# Patient Record
Sex: MALE | Race: WHITE | NOT HISPANIC OR LATINO | Employment: OTHER | URBAN - NONMETROPOLITAN AREA
[De-identification: names, ages, dates, MRNs, and addresses within clinical notes are randomized per-mention and may not be internally consistent; named-entity substitution may affect disease eponyms.]

---

## 2017-01-06 ENCOUNTER — AMBULATORY - GICH (OUTPATIENT)
Dept: SCHEDULING | Facility: OTHER | Age: 72
End: 2017-01-06

## 2017-01-20 ENCOUNTER — OFFICE VISIT - GICH (OUTPATIENT)
Dept: INTERNAL MEDICINE | Facility: OTHER | Age: 72
End: 2017-01-20

## 2017-01-20 ENCOUNTER — HISTORY (OUTPATIENT)
Dept: INTERNAL MEDICINE | Facility: OTHER | Age: 72
End: 2017-01-20

## 2017-01-20 DIAGNOSIS — F33.1 MAJOR DEPRESSIVE DISORDER, RECURRENT, MODERATE (H): ICD-10-CM

## 2017-01-20 DIAGNOSIS — R41.3 OTHER AMNESIA: ICD-10-CM

## 2017-01-20 DIAGNOSIS — R42 DIZZINESS AND GIDDINESS: ICD-10-CM

## 2017-01-20 DIAGNOSIS — E11.9 TYPE 2 DIABETES MELLITUS WITHOUT COMPLICATIONS (H): ICD-10-CM

## 2017-01-20 LAB
CREAT SERPL-MCNC: 0.83 MG/DL (ref 0.7–1.3)
ESTIMATED AVERAGE GLUCOSE: 140 MG/DL
GFR IF NOT AFRICAN AMERICAN - HISTORICAL: >60 ML/MIN/1.73M2
HEMOGLOBIN A1C MONITORING (POCT) - HISTORICAL: 6.5 % (ref 4–6.2)
VIT B12 SERPL-MCNC: 481 PG/ML (ref 180–914)

## 2017-01-24 ENCOUNTER — AMBULATORY - GICH (OUTPATIENT)
Dept: INTERNAL MEDICINE | Facility: OTHER | Age: 72
End: 2017-01-24

## 2017-01-24 DIAGNOSIS — R42 DIZZINESS AND GIDDINESS: ICD-10-CM

## 2017-01-24 DIAGNOSIS — R41.3 OTHER AMNESIA: ICD-10-CM

## 2017-02-24 ENCOUNTER — AMBULATORY - GICH (OUTPATIENT)
Dept: SCHEDULING | Facility: OTHER | Age: 72
End: 2017-02-24

## 2017-04-11 ENCOUNTER — AMBULATORY - GICH (OUTPATIENT)
Dept: INTERNAL MEDICINE | Facility: OTHER | Age: 72
End: 2017-04-11

## 2017-04-11 DIAGNOSIS — N40.3 NODULAR PROSTATE WITH LOWER URINARY TRACT SYMPTOMS: ICD-10-CM

## 2017-04-11 DIAGNOSIS — N13.8 OTHER OBSTRUCTIVE AND REFLUX UROPATHY: ICD-10-CM

## 2017-04-11 DIAGNOSIS — E11.9 TYPE 2 DIABETES MELLITUS WITHOUT COMPLICATIONS (H): ICD-10-CM

## 2017-04-13 ENCOUNTER — AMBULATORY - GICH (OUTPATIENT)
Dept: LAB | Facility: OTHER | Age: 72
End: 2017-04-13

## 2017-04-13 DIAGNOSIS — N40.3 NODULAR PROSTATE WITH LOWER URINARY TRACT SYMPTOMS: ICD-10-CM

## 2017-04-13 DIAGNOSIS — N13.8 OTHER OBSTRUCTIVE AND REFLUX UROPATHY: ICD-10-CM

## 2017-04-13 DIAGNOSIS — E11.9 TYPE 2 DIABETES MELLITUS WITHOUT COMPLICATIONS (H): ICD-10-CM

## 2017-04-13 LAB
A/G RATIO - HISTORICAL: 2.4 (ref 1–2)
ABSOLUTE BASOPHILS - HISTORICAL: 0 THOU/CU MM
ABSOLUTE EOSINOPHILS - HISTORICAL: 0.2 THOU/CU MM
ABSOLUTE LYMPHOCYTES - HISTORICAL: 1.5 THOU/CU MM (ref 0.9–2.9)
ABSOLUTE MONOCYTES - HISTORICAL: 0.6 THOU/CU MM
ABSOLUTE NEUTROPHILS - HISTORICAL: 3.1 THOU/CU MM (ref 1.7–7)
ALBUMIN SERPL-MCNC: 4.4 G/DL (ref 3.5–5.7)
ALP SERPL-CCNC: 66 IU/L (ref 34–104)
ALT (SGPT) - HISTORICAL: 14 IU/L (ref 7–52)
ANION GAP - HISTORICAL: 9 (ref 5–18)
AST SERPL-CCNC: 18 IU/L (ref 13–39)
BASOPHILS # BLD AUTO: 0.7 %
BILIRUB SERPL-MCNC: 0.7 MG/DL (ref 0.3–1)
BUN SERPL-MCNC: 21 MG/DL (ref 7–25)
BUN/CREAT RATIO - HISTORICAL: 28
CALCIUM SERPL-MCNC: 9.2 MG/DL (ref 8.6–10.3)
CHLORIDE SERPLBLD-SCNC: 106 MMOL/L (ref 98–107)
CHOL/HDL RATIO - HISTORICAL: 3.34
CHOLESTEROL TOTAL: 147 MG/DL
CO2 SERPL-SCNC: 26 MMOL/L (ref 21–31)
CREAT SERPL-MCNC: 0.76 MG/DL (ref 0.7–1.3)
EOSINOPHIL NFR BLD AUTO: 2.9 %
ERYTHROCYTE [DISTWIDTH] IN BLOOD BY AUTOMATED COUNT: 12.6 % (ref 11.5–15.5)
ESTIMATED AVERAGE GLUCOSE: 128 MG/DL
GFR IF NOT AFRICAN AMERICAN - HISTORICAL: >60 ML/MIN/1.73M2
GLOBULIN - HISTORICAL: 1.8 G/DL (ref 2–3.7)
GLUCOSE SERPL-MCNC: 111 MG/DL (ref 70–105)
HCT VFR BLD AUTO: 46 % (ref 37–53)
HDLC SERPL-MCNC: 44 MG/DL (ref 23–92)
HEMOGLOBIN A1C MONITORING (POCT) - HISTORICAL: 6.1 % (ref 4–6.2)
HEMOGLOBIN: 14.7 G/DL (ref 13.5–17.5)
LDLC SERPL CALC-MCNC: 88 MG/DL
LYMPHOCYTES NFR BLD AUTO: 27.4 % (ref 20–44)
MCH RBC QN AUTO: 29.5 PG (ref 26–34)
MCHC RBC AUTO-ENTMCNC: 32 G/DL (ref 32–36)
MCV RBC AUTO: 92 FL (ref 80–100)
MONOCYTES NFR BLD AUTO: 10.5 %
NEUTROPHILS NFR BLD AUTO: 58.5 % (ref 42–72)
NON-HDL CHOLESTEROL - HISTORICAL: 103 MG/DL
PATIENT STATUS - HISTORICAL: NORMAL
PLATELET # BLD AUTO: 209 THOU/CU MM (ref 140–440)
PMV BLD: 7.8 FL (ref 6.5–11)
POTASSIUM SERPL-SCNC: 3.8 MMOL/L (ref 3.5–5.1)
PROT SERPL-MCNC: 6.2 G/DL (ref 6.4–8.9)
PSA TOTAL (DIAGNOSTIC) - HISTORICAL: 2.92 NG/ML
RED BLOOD COUNT - HISTORICAL: 4.98 MIL/CU MM (ref 4.3–5.9)
SODIUM SERPL-SCNC: 141 MMOL/L (ref 133–143)
TRIGL SERPL-MCNC: 76 MG/DL
TSH - HISTORICAL: 2.94 UIU/ML (ref 0.34–5.6)
WHITE BLOOD COUNT - HISTORICAL: 5.4 THOU/CU MM (ref 4.5–11)

## 2017-04-14 ENCOUNTER — COMMUNICATION - GICH (OUTPATIENT)
Dept: INTERNAL MEDICINE | Facility: OTHER | Age: 72
End: 2017-04-14

## 2017-04-18 ENCOUNTER — HISTORY (OUTPATIENT)
Dept: INTERNAL MEDICINE | Facility: OTHER | Age: 72
End: 2017-04-18

## 2017-04-18 ENCOUNTER — OFFICE VISIT - GICH (OUTPATIENT)
Dept: INTERNAL MEDICINE | Facility: OTHER | Age: 72
End: 2017-04-18

## 2017-04-18 DIAGNOSIS — K21.9 GASTRO-ESOPHAGEAL REFLUX DISEASE WITHOUT ESOPHAGITIS: ICD-10-CM

## 2017-04-18 DIAGNOSIS — F41.0 PANIC DISORDER WITHOUT AGORAPHOBIA: ICD-10-CM

## 2017-04-18 DIAGNOSIS — Z23 ENCOUNTER FOR IMMUNIZATION: ICD-10-CM

## 2017-04-18 DIAGNOSIS — N40.3 NODULAR PROSTATE WITH LOWER URINARY TRACT SYMPTOMS: ICD-10-CM

## 2017-04-18 DIAGNOSIS — N13.8 OTHER OBSTRUCTIVE AND REFLUX UROPATHY: ICD-10-CM

## 2017-04-18 DIAGNOSIS — F33.1 MAJOR DEPRESSIVE DISORDER, RECURRENT, MODERATE (H): ICD-10-CM

## 2017-04-18 DIAGNOSIS — E78.5 HYPERLIPIDEMIA: ICD-10-CM

## 2017-04-18 DIAGNOSIS — E11.9 TYPE 2 DIABETES MELLITUS WITHOUT COMPLICATIONS (H): ICD-10-CM

## 2017-06-05 ENCOUNTER — HISTORY (OUTPATIENT)
Dept: INTERNAL MEDICINE | Facility: OTHER | Age: 72
End: 2017-06-05

## 2017-06-05 ENCOUNTER — OFFICE VISIT - GICH (OUTPATIENT)
Dept: INTERNAL MEDICINE | Facility: OTHER | Age: 72
End: 2017-06-05

## 2017-06-05 DIAGNOSIS — E11.65 TYPE 2 DIABETES MELLITUS WITH HYPERGLYCEMIA (H): ICD-10-CM

## 2017-06-05 DIAGNOSIS — E11.49 TYPE 2 DIABETES MELLITUS WITH OTHER DIABETIC NEUROLOGICAL COMPLICATION (CODE): ICD-10-CM

## 2017-06-05 DIAGNOSIS — F41.1 GENERALIZED ANXIETY DISORDER: ICD-10-CM

## 2017-06-06 ENCOUNTER — COMMUNICATION - GICH (OUTPATIENT)
Dept: INTERNAL MEDICINE | Facility: OTHER | Age: 72
End: 2017-06-06

## 2017-06-06 DIAGNOSIS — E78.5 HYPERLIPIDEMIA: ICD-10-CM

## 2017-08-29 ENCOUNTER — COMMUNICATION - GICH (OUTPATIENT)
Dept: INTERNAL MEDICINE | Facility: OTHER | Age: 72
End: 2017-08-29

## 2017-08-29 DIAGNOSIS — E11.8 TYPE 2 DIABETES MELLITUS WITH COMPLICATIONS (H): ICD-10-CM

## 2017-12-19 ENCOUNTER — COMMUNICATION - GICH (OUTPATIENT)
Dept: INTERNAL MEDICINE | Facility: OTHER | Age: 72
End: 2017-12-19

## 2017-12-19 DIAGNOSIS — F33.1 MAJOR DEPRESSIVE DISORDER, RECURRENT, MODERATE (H): ICD-10-CM

## 2017-12-27 NOTE — PROGRESS NOTES
Patient Information     Patient Name MRN Sex Dajuan Guzman [id number] Male 1945      Progress Notes by Brett Olivas MD at 2017  2:40 PM     Author:  Brett Olivas MD Service:  (none) Author Type:  Physician     Filed:  2017  3:08 PM Encounter Date:  2017 Status:  Signed     :  Brett Olivas MD (Physician)            SUBJECTIVE:    Dajuan Basilio is a 72 y.o. male who presents for recheck on issues.    HPI Comments: This patient is here today to talk about a couple of things. First of all, he probably does have a diabetic peripheral neuropathy that is bothersome enough that he would like to start on some treatment. We talked about potentially doing an EMG of the lower extremities but elected not to do that. Recent B12 testing was normal.    He also has a lot of troubles with anxiety. She worries about a burning sensation in his rectum being related to some form of cancer. He worries about his vascular system plugging up. He worries about his wife's health constantly as well. He tells me today that he thinks that he is developing dementia.      Allergies      Allergen   Reactions     Statins-Hmg-Coa Reductase Inhibitors  Myalgia     Higher doses cause myalgias    ,   Current Outpatient Prescriptions     Medication  Sig     aspirin 81 mg tablet Take 1 tablet by mouth once daily with a meal.     blood sugar diagnostic (ACCU-CHEK ONEL) strip Check blood glucose twice daily  Dx code e11.9     Cholecalciferol, Vitamin D3, (VITAMIN D-3) 5,000 unit tab Take 1 tablet by mouth once daily.     citalopram (CELEXA) 20 mg tablet Take 0.5 tablets by mouth once daily.     gabapentin (NEURONTIN) 300 mg capsule One daily--increase to TID if needed     LORazepam (ATIVAN) 1 mg tablet Take 1 tablet by mouth every 6 hours if needed for Anxiety.     metFORMIN (GLUCOPHAGE) 500 mg tablet TAKE 2 TABLETS IN THE MORNING AND TAKE 1 TABLET IN THE EVENING     multivitamin (MVI) tablet Take 1 tablet by  mouth once daily.     simvastatin (ZOCOR) 20 mg tablet TAKE 1/2 TABLET AT BEDTIME     No current facility-administered medications for this visit.      Medications have been reviewed by me and are current to the best of my knowledge and ability. ,   Past Medical History:     Diagnosis  Date     Diabetes mellitus (HC)      Diabetic peripheral neuropathy (HC)      Diverticulosis      Fatty liver     non alcoholic      Fibromyalgia      Hyperlipidemia      Nodular prostate 2012    US confirms benign calcifications      Obesity      Panic disorder     and   Patient Active Problem List      Diagnosis Date Noted     Generalized anxiety disorder 06/05/2017     Abnormal CT scan, bladder 06/29/2016     Moderate episode of recurrent major depressive disorder (HC) 06/16/2016     GERD (gastroesophageal reflux disease) 05/03/2013     NEPHROLITHIASIS 04/23/2012     NODULAR PROSTATE WITH URINARY OBSTRUCTION 03/27/2012     HYPERLIPIDEMIA 02/17/2010     PANIC DISORDER WITHOUT AGORAPHOBIA 02/17/2010     DIVERTICULOSIS, COLON 02/17/2010     DIABETES MELLITUS, TYPE II 02/17/2010     OBESITY 02/17/2010     FIBROMYALGIA 02/17/2010     DIABETIC PERIPHERAL NEUROPATHY 02/17/2010       REVIEW OF SYSTEMS:  Review of Systems   All other systems reviewed and are negative.      OBJECTIVE:  /72  Pulse 64  Wt 87.4 kg (192 lb 9.6 oz)  BMI 30.62 kg/m2    EXAM:   Physical Exam   Constitutional: He is well-developed, well-nourished, and in no distress. No distress.   Skin: He is not diaphoretic.   Nursing note and vitals reviewed.      ASSESSMENT/PLAN:    ICD-10-CM    1. DIABETIC PERIPHERAL NEUROPATHY E11.49 gabapentin (NEURONTIN) 300 mg capsule     E11.65    2. Generalized anxiety disorder F41.1         Plan:  Trial of gabapentin for his peripheral neuropathy. He will start at 300 mg daily at bedtime and slowly increase as needed and tolerated. I reviewed potential side effects with him in great detail.    For his anxiety, a lot of  reassurance was provided. I think the vast majority of his symptoms are made worse by his perseverating and anxiety regarding issues and concerns. Over 50% of a 25 minute visit spent in counseling and coordination of care.

## 2017-12-29 ENCOUNTER — COMMUNICATION - GICH (OUTPATIENT)
Dept: INTERNAL MEDICINE | Facility: OTHER | Age: 72
End: 2017-12-29

## 2017-12-29 DIAGNOSIS — E11.65 TYPE 2 DIABETES MELLITUS WITH HYPERGLYCEMIA (H): ICD-10-CM

## 2017-12-29 DIAGNOSIS — E11.49 TYPE 2 DIABETES MELLITUS WITH OTHER DIABETIC NEUROLOGICAL COMPLICATION (CODE): ICD-10-CM

## 2017-12-30 NOTE — NURSING NOTE
Patient Information     Patient Name MRN Dajuan Griggs [id number] Male 1945      Nursing Note by Kerri Lara LPN at 2017  2:40 PM     Author:  Kerri Lara LPN Service:  (none) Author Type:  NURS- Licensed Practical Nurse     Filed:  2017  2:45 PM Encounter Date:  2017 Status:  Signed     :  Kerri Lara LPN (NURS- Licensed Practical Nurse)            The patient is here today to discuss his bilateral foot pain. He states he has had it for a while now, but it is getting worse.  Kerri Lara LPN......2017  2:40 PM

## 2018-01-03 NOTE — NURSING NOTE
Patient Information     Patient Name MRN Dajuan Griggs [id number] Male 1945      Nursing Note by Kerri Lara LPN at 2017  3:10 PM     Author:  Kerri Lara LPN Service:  (none) Author Type:  NURS- Licensed Practical Nurse     Filed:  2017  3:22 PM Encounter Date:  2017 Status:  Signed     :  Kerri Lara LPN (NURS- Licensed Practical Nurse)            The patient is here today to discuss some of his symptoms lately. He states he has been having headaches, weakness, dizzy, and memory loss.  Kerri Lara LPN......2017  3:14 PM

## 2018-01-03 NOTE — PROGRESS NOTES
Patient Information     Patient Name MRN Sex Dajuan Guzman [id number] Male 1945      Progress Notes by Brett Olivas MD at 2017  3:10 PM     Author:  Brett Olivas MD Service:  (none) Author Type:  Physician     Filed:  2017  3:35 PM Encounter Date:  2017 Status:  Signed     :  Brett Olivas MD (Physician)            SUBJECTIVE:    Dajuan Basilio is a 71 y.o. male who presents for illness.    HPI Comments: He is in today stating that he is not feeling well. He feels weak a lot of the time. He has a headache at times in the back of his head. He says his memory isn't what it used to be. He will walk into a room and then forget what he was going to do or what he was going to get. He admits that his mood is probably a little bit more depressed than usual. He is on Celexa 20 mg but only half tablet daily, he could not tolerate the whole tablet. He thinks that overall he is better on the Celexa that he was on the Paxil so he is happy about that.    We have checked him numerous times in the past for similar symptoms. Nothing has ever really been found. He worries a lot today about Lyme disease, we did check him for that just 6 or 7 months ago and it was negative. He questions the accuracy of this test. He doesn't really have any specific symptoms for Lyme disease, however. I reconciled his medications today, nothing there is different should be affecting him adversely.      Allergies      Allergen   Reactions     Statins-Hmg-Coa Reductase Inhibitors  Myalgia     Higher doses cause myalgias    ,   Current Outpatient Prescriptions     Medication  Sig     aspirin 81 mg tablet Take 1 tablet by mouth once daily with a meal.     blood sugar diagnostic (ACCU-CHEK ONEL) strip Check blood glucose twice daily  Dx code e11.9     Cholecalciferol, Vitamin D3, (VITAMIN D-3) 5,000 unit tab Take 1 tablet by mouth once daily.     citalopram (CELEXA) 20 mg tablet Take 0.5 tablets by mouth once  daily.     lactobacillus rhamnosus, GG, (CULTURELLE) 10 billion cell capsule Take 1 capsule by mouth 3 times daily with meals.     LORazepam (ATIVAN) 1 mg tablet Take 1 tablet by mouth every 6 hours if needed for Anxiety.     metFORMIN (GLUCOPHAGE) 500 mg tablet TAKE 2 TABLETS IN THE MORNING AND TAKE 1 TABLET IN THE EVENING     multivitamin (MVI) tablet Take 1 tablet by mouth once daily.     simvastatin (ZOCOR) 20 mg tablet TAKE 1/2 TABLET AT BEDTIME     No current facility-administered medications for this visit.      Medications have been reviewed by me and are current to the best of my knowledge and ability. ,   Past Medical History      Diagnosis   Date     Diabetes mellitus (HC)       Diabetic peripheral neuropathy (HC)       Diverticulosis       Fatty liver       non alcoholic      Fibromyalgia       Hyperlipidemia       Nodular prostate  2012     US confirms benign calcifications      Obesity       Panic disorder     ,   Patient Active Problem List      Diagnosis Date Noted     Abnormal CT scan, bladder 06/29/2016     Moderate episode of recurrent major depressive disorder (HC) 06/16/2016     GERD (gastroesophageal reflux disease) 05/03/2013     NEPHROLITHIASIS 04/23/2012     NODULAR PROSTATE WITH URINARY OBSTRUCTION 03/27/2012     HYPERLIPIDEMIA 02/17/2010     PANIC DISORDER WITHOUT AGORAPHOBIA 02/17/2010     DIVERTICULOSIS, COLON 02/17/2010     DIABETES MELLITUS, TYPE II 02/17/2010     OBESITY 02/17/2010     FIBROMYALGIA 02/17/2010     DIABETIC PERIPHERAL NEUROPATHY 02/17/2010   ,   Past Surgical History       Procedure   Laterality Date     Colonoscopy screening   2003     and UGI [place] normal       Colonoscopy screening   2009     and UGI repeat Dr. Johnson negative.       Knee arthroscopy        right knee X2       Hernia repair   2000     Biopsy prostate   2013     Carpal tunnel release  Bilateral 2014     Colonoscopy screening   2013     WNL       Knee replacement  Right 2015     Vasectomy       and    Social History      Substance Use Topics        Smoking status:  Former Smoker     Types: Cigarettes     Quit date: 10/25/1983     Smokeless tobacco:  Not on file     Alcohol use  No       REVIEW OF SYSTEMS:  Review of Systems   All other systems reviewed and are negative.      OBJECTIVE:  /70  Pulse 68  Wt 90.2 kg (198 lb 12.8 oz)  BMI 31.14 kg/m2    EXAM:   Physical Exam   Constitutional: He is well-developed, well-nourished, and in no distress. No distress.   Skin: He is not diaphoretic.   Nursing note and vitals reviewed.      ASSESSMENT/PLAN:    ICD-10-CM    1. DIABETES MELLITUS, TYPE II E11.9 HEMOGLOBIN A1C MONITORING (POCT)   2. Moderate episode of recurrent major depressive disorder (HC) F33.1 citalopram (CELEXA) 20 mg tablet   3. Dizzy R42 VITAMIN B12      CREATININE      MR HEAD BRAIN WWO   4. Memory loss R41.3 MR HEAD BRAIN WWO        Plan:  I truly think a lot of his symptoms are just from his anxiety and his depression. I did do a couple blood tests today, an A1c and B12 level and I will let him know the results. Also, with his headache and dizziness as well as memory decline it seems reasonable to proceed with imaging of the brain. MRI is scheduled in the open scanner and I will let her know the results of that as well. Follow-up will be dependent on the results and how he progresses over the ensuing days and weeks.

## 2018-01-04 NOTE — NURSING NOTE
Patient Information     Patient Name MRN Dajuan Griggs [id number] Male 1945      Nursing Note by Kerri Lara LPN at 2017 12:50 PM     Author:  Kerri Lara LPN Service:  (none) Author Type:  NURS- Licensed Practical Nurse     Filed:  2017 12:59 PM Encounter Date:  2017 Status:  Signed     :  Kerri Lara LPN (NURS- Licensed Practical Nurse)            The patient is here today to be seen for a yearly check up.  Kerri Lara LPN......2017  12:49 PM

## 2018-01-04 NOTE — PROGRESS NOTES
Patient Information     Patient Name MRN Dajuan Griggs [id number] Male 1945      Progress Notes by Brett Olivas MD at 2017 12:50 PM     Author:  Brett Olivas MD Service:  (none) Author Type:  Physician     Filed:  2017  1:50 PM Encounter Date:  2017 Status:  Signed     :  Brett Olivas MD (Physician)            SUBJECTIVE:    Dajuan Basilio is a 71 y.o. male who presents for comprehensive review of their multiple medical problems and review of medications, renewal of medications and update on necessary health maintenance issues.      HPI Comments: He is here today for complete evaluation and review of his chronic medical problems. He has a history of type 2 diabetes mellitus. This is historically well controlled. He does not take an ACE inhibitor or ARB as he does not tolerate his blood pressure being any lower. In addition, he is intolerant to statins so he is not on any lipid therapy for this. He just takes his metformin and does quite well with that.    He has a history of panic disorder as well as depression. Of late, he's been having more struggles with this as his wife is ailing and this is very stressful for him. We spent a lot of time today discussing this situation and his wife's situation and a lot of reassurance was provided for him.    He has a history of diabetic peripheral neuropathy and he does have a little bit of balance dysfunction on occasion. He has a history of reflux disease that is well tolerated and controlled. He has a history of some lower urinary tract symptoms but no longer takes Flomax and doesn't feel that he needs anything else for that.    He is up-to-date with colonoscopy. He needs a Prevnar to get his immunizations up-to-date. I spent time today updating his past medical history, past surgical history, family history and social history.      Allergies      Allergen   Reactions     Statins-Hmg-Coa Reductase Inhibitors  Myalgia      Higher doses cause myalgias    ,   Current Outpatient Prescriptions     Medication  Sig     aspirin 81 mg tablet Take 1 tablet by mouth once daily with a meal.     blood sugar diagnostic (ACCU-CHEK ONEL) strip Check blood glucose twice daily  Dx code e11.9     Cholecalciferol, Vitamin D3, (VITAMIN D-3) 5,000 unit tab Take 1 tablet by mouth once daily.     citalopram (CELEXA) 20 mg tablet Take 0.5 tablets by mouth once daily.     LORazepam (ATIVAN) 1 mg tablet Take 1 tablet by mouth every 6 hours if needed for Anxiety.     metFORMIN (GLUCOPHAGE) 500 mg tablet TAKE 2 TABLETS IN THE MORNING AND TAKE 1 TABLET IN THE EVENING     multivitamin (MVI) tablet Take 1 tablet by mouth once daily.     simvastatin (ZOCOR) 20 mg tablet TAKE 1/2 TABLET AT BEDTIME     No current facility-administered medications for this visit.      Medications have been reviewed by me and are current to the best of my knowledge and ability. ,   Past Medical History:     Diagnosis  Date     Diabetes mellitus (HC)      Diabetic peripheral neuropathy (HC)      Diverticulosis      Fatty liver     non alcoholic      Fibromyalgia      Hyperlipidemia      Nodular prostate 2012    US confirms benign calcifications      Obesity      Panic disorder    ,   Patient Active Problem List      Diagnosis Date Noted     Abnormal CT scan, bladder 06/29/2016     Moderate episode of recurrent major depressive disorder (HC) 06/16/2016     GERD (gastroesophageal reflux disease) 05/03/2013     NEPHROLITHIASIS 04/23/2012     NODULAR PROSTATE WITH URINARY OBSTRUCTION 03/27/2012     HYPERLIPIDEMIA 02/17/2010     PANIC DISORDER WITHOUT AGORAPHOBIA 02/17/2010     DIVERTICULOSIS, COLON 02/17/2010     DIABETES MELLITUS, TYPE II 02/17/2010     OBESITY 02/17/2010     FIBROMYALGIA 02/17/2010     DIABETIC PERIPHERAL NEUROPATHY 02/17/2010   ,   Past Surgical History:      Procedure  Laterality Date     BIOPSY PROSTATE  2013     CARPAL TUNNEL RELEASE Bilateral 2014     COLONOSCOPY  SCREENING      and UGI [place] normal       COLONOSCOPY SCREENING  2009    and UGI repeat Dr. Johnson negative.       COLONOSCOPY SCREENING      WNL       HERNIA REPAIR  2000     KNEE ARTHROSCOPY      right knee X2       KNEE REPLACEMENT Right 2015     VASECTOMY      and   Social History      Substance Use Topics        Smoking status:  Former Smoker     Types: Cigarettes     Quit date: 10/25/1983     Smokeless tobacco:  Not on file     Alcohol use  No     Family Status     Relation  Status     Father      Mother      DM, colon cancer and renal failure      Brother Alive     Sister Alive     Sister Alive     Sister Alive     Brother Alive     Sister Alive     Social History     Social History        Marital status:       Spouse name: N/A     Number of children:  N/A     Years of education:  N/A     Social History Main Topics        Smoking status:  Former Smoker     Types: Cigarettes     Quit date: 10/25/1983     Smokeless tobacco:  None     Alcohol use  No     Drug use:  No     Sexual activity:  Not Asked     Other Topics  Concern     None      Social History Narrative     , retired from [employer].  Lives in town.                               REVIEW OF SYSTEMS:  Review of Systems   Constitutional: Negative for chills, diaphoresis, fever, malaise/fatigue and weight loss.   HENT: Negative for congestion, ear pain, nosebleeds, sore throat and tinnitus.    Eyes: Negative for blurred vision, double vision, photophobia, pain, discharge and redness.   Respiratory: Negative for cough, hemoptysis, sputum production, shortness of breath and wheezing.    Cardiovascular: Negative for chest pain, palpitations, orthopnea, claudication, leg swelling and PND.   Gastrointestinal: Negative for abdominal pain, blood in stool, constipation, diarrhea, heartburn, nausea and vomiting.   Genitourinary: Negative for dysuria, flank pain and hematuria.   Musculoskeletal: Negative for back pain, joint  "pain, myalgias and neck pain.   Skin: Negative for itching and rash.   Neurological: Negative for dizziness, tingling, tremors, speech change, loss of consciousness, weakness and headaches.   Psychiatric/Behavioral: Negative for depression, hallucinations, memory loss, substance abuse and suicidal ideas. The patient is not nervous/anxious.        OBJECTIVE:  /78  Pulse 76  Ht 1.689 m (5' 6.5\")  Wt 87.5 kg (193 lb)  BMI 30.68 kg/m2    EXAM:   Physical Exam   Constitutional: He is oriented to person, place, and time and well-developed, well-nourished, and in no distress. No distress.   HENT:   Head: Normocephalic and atraumatic.   Right Ear: Tympanic membrane and external ear normal.   Left Ear: Tympanic membrane and external ear normal.   Nose: Nose normal.   Mouth/Throat: Oropharynx is clear and moist and mucous membranes are normal. No oropharyngeal exudate.   Eyes: Conjunctivae are normal. Pupils are equal, round, and reactive to light. No scleral icterus.   Neck: Normal range of motion. Neck supple. Normal carotid pulses, no hepatojugular reflux and no JVD present. Carotid bruit is not present. No tracheal deviation and no edema present. No thyroid mass and no thyromegaly present.   Cardiovascular: Normal rate, regular rhythm, normal heart sounds and intact distal pulses.  Exam reveals no gallop and no friction rub.    No murmur heard.  Pulmonary/Chest: Effort normal and breath sounds normal. No respiratory distress. He has no decreased breath sounds. He has no wheezes. He has no rhonchi. He has no rales. He exhibits no tenderness.   Abdominal: Soft. Bowel sounds are normal. He exhibits no distension and no mass. There is no hepatosplenomegaly. There is no tenderness. There is no rebound and no guarding. A hernia is present. Hernia confirmed positive in the umbilical area. Hernia confirmed negative in the right inguinal area and confirmed negative in the left inguinal area.       Genitourinary: Rectum " normal, testes/scrotum normal and penis normal. Prostate is enlarged.   Genitourinary Comments: To 3+ prostate, minimal midline superficial nodule, not concerning   Musculoskeletal: Normal range of motion. He exhibits no edema or tenderness.   Lymphadenopathy:     He has no cervical adenopathy.   Neurological: He is alert and oriented to person, place, and time. He has normal motor skills. He displays normal reflexes. No cranial nerve deficit. He exhibits normal muscle tone. Gait normal. Coordination normal.   Skin: Skin is warm and dry. No rash noted. He is not diaphoretic. No cyanosis or erythema. No pallor. Nails show no clubbing.   Psychiatric: Mood, memory, affect and judgment normal.   Nursing note and vitals reviewed.      ASSESSMENT/PLAN:    ICD-10-CM    1. Hyperlipidemia, unspecified hyperlipidemia type E78.5    2. Panic disorder without agoraphobia F41.0    3. Moderate episode of recurrent major depressive disorder (HC) F33.1    4. Gastroesophageal reflux disease, esophagitis presence not specified K21.9    5. Nodular prostate with urinary obstruction N40.3      N13.8    6. DIABETES MELLITUS, TYPE II E11.9 OMNI PREVNAR 13 (AKA PNEUMOCOCCAL VACCINE 13-VALENT IM)   7. Encounter for immunization  Z23 OMNI PREVNAR 13 (AKA PNEUMOCOCCAL VACCINE 13-VALENT IM)        Plan:  His exam today is stable and unremarkable. His hernia does not bother him so we will do nothing with that. He has minimal peripheral neuropathy at this point in time, we are not going to address that further. Lab today looked excellent. He will continue with all of his same medications without any change. Prevnar given today. A lot of reassurance was provided regarding his stress and anxiety surrounding his wife. Assuming all goes well, we should probably recheck his diabetes again in 6 months, he is welcome to come back anytime sooner for problems.

## 2018-01-04 NOTE — TELEPHONE ENCOUNTER
Patient Information     Patient Name MRN Dajuan Griggs [id number] Male 1945      Telephone Encounter by Kerri Lara LPN at 2017  1:03 PM     Author:  Kerri Lara LPN Service:  (none) Author Type:  NURS- Licensed Practical Nurse     Filed:  2017  1:04 PM Encounter Date:  2017 Status:  Signed     :  Kerri Lara LPN (NURS- Licensed Practical Nurse)            Left a message for the patient to call back.  Kerri Lara LPN......2017  1:04 PM

## 2018-01-04 NOTE — TELEPHONE ENCOUNTER
Patient Information     Patient Name MRN Dajuan Griggs [id number] Male 1945      Telephone Encounter by Kerri Lara LPN at 2017 11:19 AM     Author:  Kerri Lara LPN Service:  (none) Author Type:  NURS- Licensed Practical Nurse     Filed:  2017 11:19 AM Encounter Date:  2017 Status:  Signed     :  Kerri Lara LPN (NURS- Licensed Practical Nurse)            Left a message for the patient to call back.  Kerri Lara LPN......2017  11:19 AM

## 2018-01-10 ENCOUNTER — AMBULATORY - GICH (OUTPATIENT)
Dept: SCHEDULING | Facility: OTHER | Age: 73
End: 2018-01-10

## 2018-01-25 ENCOUNTER — DOCUMENTATION ONLY (OUTPATIENT)
Dept: FAMILY MEDICINE | Facility: OTHER | Age: 73
End: 2018-01-25

## 2018-01-25 ENCOUNTER — AMBULATORY - GICH (OUTPATIENT)
Dept: SCHEDULING | Facility: OTHER | Age: 73
End: 2018-01-25

## 2018-01-25 PROBLEM — F41.1 GENERALIZED ANXIETY DISORDER: Status: ACTIVE | Noted: 2017-06-05

## 2018-01-25 RX ORDER — DIPHENOXYLATE HYDROCHLORIDE AND ATROPINE SULFATE 2.5; .025 MG/1; MG/1
1 TABLET ORAL DAILY
COMMUNITY
Start: 2015-02-02 | End: 2018-03-19

## 2018-01-25 RX ORDER — LORAZEPAM 1 MG/1
1 TABLET ORAL EVERY 6 HOURS PRN
COMMUNITY
Start: 2016-06-16 | End: 2019-03-19

## 2018-01-25 RX ORDER — SIMVASTATIN 20 MG
10 TABLET ORAL AT BEDTIME
COMMUNITY
Start: 2017-06-08 | End: 2018-07-22

## 2018-01-25 RX ORDER — CITALOPRAM HYDROBROMIDE 20 MG/1
10 TABLET ORAL DAILY
COMMUNITY
Start: 2017-12-20 | End: 2018-06-01

## 2018-01-25 RX ORDER — GABAPENTIN 300 MG/1
1 CAPSULE ORAL 3 TIMES DAILY PRN
COMMUNITY
Start: 2017-12-29 | End: 2018-03-19

## 2018-01-26 ENCOUNTER — HISTORY (OUTPATIENT)
Dept: INTERNAL MEDICINE | Facility: OTHER | Age: 73
End: 2018-01-26

## 2018-01-26 ENCOUNTER — OFFICE VISIT - GICH (OUTPATIENT)
Dept: INTERNAL MEDICINE | Facility: OTHER | Age: 73
End: 2018-01-26

## 2018-01-26 DIAGNOSIS — E11.49 TYPE 2 DIABETES MELLITUS WITH OTHER DIABETIC NEUROLOGICAL COMPLICATION (CODE): ICD-10-CM

## 2018-01-26 DIAGNOSIS — R80.9 PROTEINURIA: ICD-10-CM

## 2018-01-26 DIAGNOSIS — E11.65 TYPE 2 DIABETES MELLITUS WITH HYPERGLYCEMIA (H): ICD-10-CM

## 2018-01-26 DIAGNOSIS — E11.9 TYPE 2 DIABETES MELLITUS WITHOUT COMPLICATIONS (H): ICD-10-CM

## 2018-01-26 LAB
ESTIMATED AVERAGE GLUCOSE: 137 MG/DL
HEMOGLOBIN A1C MONITORING (POCT) - HISTORICAL: 6.4 % (ref 4–6.2)

## 2018-01-27 VITALS
HEART RATE: 64 BPM | WEIGHT: 192.6 LBS | SYSTOLIC BLOOD PRESSURE: 110 MMHG | DIASTOLIC BLOOD PRESSURE: 72 MMHG | BODY MASS INDEX: 30.62 KG/M2

## 2018-01-27 VITALS
HEART RATE: 76 BPM | WEIGHT: 193 LBS | BODY MASS INDEX: 30.29 KG/M2 | HEIGHT: 67 IN | SYSTOLIC BLOOD PRESSURE: 118 MMHG | DIASTOLIC BLOOD PRESSURE: 78 MMHG

## 2018-01-27 VITALS
HEART RATE: 68 BPM | WEIGHT: 198.8 LBS | BODY MASS INDEX: 31.14 KG/M2 | SYSTOLIC BLOOD PRESSURE: 112 MMHG | DIASTOLIC BLOOD PRESSURE: 70 MMHG

## 2018-02-08 ENCOUNTER — COMMUNICATION - GICH (OUTPATIENT)
Dept: INTERNAL MEDICINE | Facility: OTHER | Age: 73
End: 2018-02-08

## 2018-02-09 VITALS
HEIGHT: 67 IN | BODY MASS INDEX: 31.3 KG/M2 | DIASTOLIC BLOOD PRESSURE: 76 MMHG | WEIGHT: 199.4 LBS | SYSTOLIC BLOOD PRESSURE: 122 MMHG | HEART RATE: 68 BPM

## 2018-02-12 NOTE — TELEPHONE ENCOUNTER
"Patient Information     Patient Name MRN Dajuan Griggs [id number] Male 1945      Telephone Encounter by Jacklyn Emery RN at 2017 10:25 AM     Author:  Jacklyn Emery RN Service:  (none) Author Type:  NURS- Registered Nurse     Filed:  2017 10:34 AM Encounter Date:  2017 Status:  Signed     :  Jacklyn Emery RN (NURS- Registered Nurse)            [facility] pharmacy faxed request for refill on Neurontin 300 mg with current instructions to take one capsule by mouth once daily. May increase to one capsule three times daily if needed.    Note from pharmacy: \"Dajuan would like quantity increased as he is now taking BID.\"    Per system Dr. Bee refilled on 17 #100 x 3 refills.    #100 noted as refilled on 17 per pharmacy.    Nsaids  Gabapentin    Office visit in the past 12 months or per provider note.    Last visit with DONNELL BEE was on: 2017 in [facility]  Next visit with DONNELL BEE is on: No future appointment listed with this provider  Next visit with Internal Medicine is on: No future appointment listed in this department    Max refill for 12 months from last office visit or per provider note.  Prescription refilled per RN Medication Refill Policy.................... JACKLYN EMERY RN ....................  2017   10:32 AM              "

## 2018-02-12 NOTE — TELEPHONE ENCOUNTER
Patient Information     Patient Name MRN Sex Dajuan Guzman [id number] Male 1945      Telephone Encounter by Heber Schultz RN at 2017  2:02 PM     Author:  Heber Schultz RN Service:  (none) Author Type:  NURS- Registered Nurse     Filed:  2017  2:18 PM Encounter Date:  2017 Status:  Signed     :  Heber Schultz RN (NURS- Registered Nurse)            This is a Refill request from: [facility] pharmacy  Name of Medication: Celexa  Quantity requested: 45 tabs  Last fill date: 16 as per rx request for 90 tabs  Due for refill: Yes, as per chart review and per rx request  Last visit with BRETT OLIVAS was on: 2017 in [facility]  PCP:  Brett Olivas MD  Controlled Substance Agreement: N/A   Diagnosis r/t this medication request: Moderate episode of recurrent major depressive disorder (HC)    Chart review shows that rx as requested is listed as historical on patient's active med list. Patient was seen by PCP for a comprehensive medication review on 17, of which PCP states patient will continue on his medications without change. However, patient was to follow up in 6 months. No follow up noted, or scheduled at this time. Writer is unable to fill rx as requested. Writer will update sig from pharmacy to reflect current dosing in patient's chart, will route limited supply to PCP for his consideration/approval, as well as will send patient a reminder letter.     Unable to complete prescription refill per RN Medication Refill Policy.................... Heber Schultz RN ....................  2017   2:04 PM

## 2018-02-13 NOTE — TELEPHONE ENCOUNTER
Patient Information     Patient Name MRN Dajuan Griggs [id number] Male 1945      Telephone Encounter by Jolie May at 2018  8:39 AM     Author:  Jolie May Service:  (none) Author Type:  (none)     Filed:  2018  8:40 AM Encounter Date:  2018 Status:  Signed     :  Jolie May            [facility]

## 2018-02-13 NOTE — TELEPHONE ENCOUNTER
Patient Information     Patient Name MRN Sex Dajuan Guzman [id number] Male 1945      Telephone Encounter by Asia Jain at 2018 10:06 AM     Author:  Asia Jain Service:  (none) Author Type:  (none)     Filed:  2018 10:06 AM Encounter Date:  2018 Status:  Signed     :  Asia Jain            [email or web address] [email or web address] LPN......................2018 10:06 AM

## 2018-02-13 NOTE — TELEPHONE ENCOUNTER
"Patient Information     Patient Name MRN Dajuan Griggs [id number] Male 1945      Telephone Encounter by Stella Whyte RN at 2018  5:06 PM     Author:  Stella Whyte RN Service:  (none) Author Type:  NURS- Registered Nurse     Filed:  2018  5:22 PM Encounter Date:  2018 Status:  Signed     :  Stella Whyte RN (NURS- Registered Nurse)            ,  Pt calls today with a \"Violent cough\" lasting about a week and a half.  Pt states it is unproductive but does get up some phlegm.  Pt states it gives him a headache he coughs so hard and can be hard to catch his breath while coughing.  Pt denies any blood, fever or any other symptoms including chest pain.  Pt has tried OTC remedies with little success.  Per protocol pt should be able to treat cough at home.      Pt is requesting provider review and inquiring about a cough syrup that could be prescribed.  Please advise.  Stella Whyte RN ....................  2018   5:20 PM      Reason for Disposition    Cough    Answer Assessment - Initial Assessment Questions  1. ONSET: \"When did the cough begin?\"       About a week and a half  2. SEVERITY: \"How bad is the cough today?\"       Little bit of phlegm.  Pt states cough is violent and gives him a headache  3. RESPIRATORY DISTRESS: \"Describe your breathing.\"       Hard to catch breath while coughing  4. FEVER: \"Do you have a fever?\" If so, ask: \"What is your temperature, how was it measured, and when did it start?\"      denies  5. HEMOPTYSIS: \"Are you coughing up any blood?\" If so ask: \"How much?\" (flecks, streaks, tablespoons, etc.)      denies  6. TREATMENT: \"What have you done so far to treat the cough?\" (e.g., meds, fluids, humidifier)      Tried extra fluids-  Pt states it's not dry int he house  7. CARDIAC HISTORY: \"Do you have any history of heart disease?\" (e.g., heart attack, congestive heart failure)       denies  8. LUNG HISTORY: \"Do you have any history of " "lung disease?\"  (e.g., pulmonary embolus, asthma, emphysema)      denies  9. PE RISK FACTORS: \"Do you have a history of blood clots?\" (or: recent major surgery, recent prolonged travel, bedridden )      denies  10. OTHER SYMPTOMS: \"Do you have any other symptoms? (e.g., runny nose, wheezing, chest pain)        Pt has been blowing my nose- pt states he is over the cold but the cough is lingering and terrible  11. PREGNANCY: \"Is there any chance you are pregnant?\" \"When was your last menstrual period?\"        NA  12. TRAVEL: \"Have you traveled out of the country in the last month?\" (e.g., travel history, exposures)        NA    Protocols used: ADULT COUGH - ACUTE NON-PRODUCTIVE-A-AH            "

## 2018-02-13 NOTE — NURSING NOTE
Patient Information     Patient Name MRN Sex Dajuan Guzman [id number] Male 1945      Nursing Note by Tank Emerson at 2018  2:20 PM     Author:  Tank Emerson Service:  (none) Author Type:  (none)     Filed:  2018  2:29 PM Encounter Date:  2018 Status:  Signed     :  Tank Emerson            Patient presents to the clinic today regarding kidneys and other concerns.  Tank Emerson LPN .............2018 2:21 PM

## 2018-02-13 NOTE — TELEPHONE ENCOUNTER
Patient Information     Patient Name MRN Sex Dajuan Guzman [id number] Male 1945      Telephone Encounter by Yun Contreras at 2018  9:55 AM     Author:  Yun Contreras Service:  (none) Author Type:  (none)     Filed:  2018  9:56 AM Encounter Date:  2018 Status:  Signed     :  Yun Contreras            MAF-Pt would like to speak with nurse prior to scheduling an appt for a cough. Thank You.  Yun Contreras ....................  2018   9:55 AM

## 2018-02-13 NOTE — PROGRESS NOTES
Patient Information     Patient Name MRN Sex Dajuna Guzman [id number] Male 1945      Progress Notes by Brett Olivas MD at 2018  2:20 PM     Author:  Brett Olivas MD Service:  (none) Author Type:  Physician     Filed:  2018  2:49 PM Encounter Date:  2018 Status:  Signed     :  Brett Olivas MD (Physician)            SUBJECTIVE:    Dajuan Basilio is a 72 y.o. male who presents for check on DM.    HPI Comments: He comes in today for a few things. First of all he is due for a recheck on his diabetes. He has non-insulin-dependent diabetes mellitus. He is on 3 metformin daily. He remains active and tries to keep his blood sugar under control with his activity level. He does have widely fluctuating blood sugars so he does check his blood sugars twice daily on a regular basis. He does not have any hypoglycemic spells. He is due for a recheck on his A1c.    He also has peripheral neuropathy. He is better with gabapentin twice daily so he will continue on that long-term. He does have some slight balance difficulties as well as some memory concerns. We have talked about this in the past. I suggested that we could get him set up to see a neurologist, he declined.    He has urine microalbuminuria. He needs to be on an ACE inhibitor. This was reviewed with him today and I recommended that we start him on something. He is having some muscle aches when he exerts himself. We talked about whether this could be from his statin. He might try going off of the statin for 2 weeks to see if it improves.      Allergies      Allergen   Reactions     Atorvastatin Calcium  *Unknown     Statins-Hmg-Coa Reductase Inhibitors  Myalgia     Higher doses cause myalgias    ,   Current Outpatient Prescriptions     Medication  Sig     aspirin 81 mg tablet Take 1 tablet by mouth once daily with a meal.     blood sugar diagnostic (ACCU-CHEK ONEL) strip Check blood glucose twice daily  Dx code e11.9      Cholecalciferol, Vitamin D3, (VITAMIN D-3) 5,000 unit tab Take 1 tablet by mouth once daily.     citalopram (CELEXA) 20 mg tablet Take 0.5 tablets by mouth once daily.     gabapentin (NEURONTIN) 300 mg capsule Take 1 capsule by mouth 2 times daily. One daily--increase to TID if needed     LORazepam (ATIVAN) 1 mg tablet Take 1 tablet by mouth every 6 hours if needed for Anxiety.     metFORMIN (GLUCOPHAGE) 500 mg tablet TAKE TWO TABLETS BY MOUTH IN THE MORNING AND ONE IN THE EVENING     simvastatin (ZOCOR) 20 mg tablet TAKE ONE-HALF TABLET BY MOUTH AT BEDTIME     No current facility-administered medications for this visit.      Medications have been reviewed by me and are current to the best of my knowledge and ability. ,   Past Medical History:     Diagnosis  Date     Diabetes mellitus (HC)      Diabetic peripheral neuropathy (HC)      Diverticulosis      Fatty liver     non alcoholic      Fibromyalgia      Hyperlipidemia      Nodular prostate 2012    US confirms benign calcifications      Obesity      Panic disorder    ,   Patient Active Problem List      Diagnosis Date Noted     Microalbuminuria 01/26/2018     Generalized anxiety disorder 06/05/2017     Abnormal CT scan, bladder 06/29/2016     Moderate episode of recurrent major depressive disorder (HC) 06/16/2016     GERD (gastroesophageal reflux disease) 05/03/2013     NEPHROLITHIASIS 04/23/2012     NODULAR PROSTATE WITH URINARY OBSTRUCTION 03/27/2012     HYPERLIPIDEMIA 02/17/2010     PANIC DISORDER WITHOUT AGORAPHOBIA 02/17/2010     DIVERTICULOSIS, COLON 02/17/2010     DIABETES MELLITUS, TYPE II 02/17/2010     OBESITY 02/17/2010     FIBROMYALGIA 02/17/2010     DIABETIC PERIPHERAL NEUROPATHY 02/17/2010    and   Past Surgical History:      Procedure  Laterality Date     BIOPSY PROSTATE  2013     CARPAL TUNNEL RELEASE Bilateral 2014     COLONOSCOPY SCREENING  2003    and UGI [place] normal       COLONOSCOPY SCREENING  2009    and UGI repeat Dr. Johnson negative.        "COLONOSCOPY SCREENING  2013    WNL       HERNIA REPAIR  2000     KNEE ARTHROSCOPY      right knee X2       KNEE REPLACEMENT Right 2015     VASECTOMY         REVIEW OF SYSTEMS:  Review of Systems   All other systems reviewed and are negative.      OBJECTIVE:  /76 (Cuff Site: Right Arm, Position: Sitting, Cuff Size: Adult Regular)  Pulse 68  Ht 1.695 m (5' 6.75\")  Wt 90.4 kg (199 lb 6.4 oz)  BMI 31.46 kg/m2    EXAM:   Physical Exam   Constitutional: He is well-developed, well-nourished, and in no distress. No distress.   Skin: He is not diaphoretic.   Nursing note and vitals reviewed.      ASSESSMENT/PLAN:    ICD-10-CM    1. DIABETES MELLITUS, TYPE II E11.9 HEMOGLOBIN A1C MONITORING (POCT)      HEMOGLOBIN A1C MONITORING (POCT)   2. DIABETIC PERIPHERAL NEUROPATHY E11.49 gabapentin (NEURONTIN) 300 mg capsule     E11.65    3. Microalbuminuria R80.9 lisinopril (PRINIVIL; ZESTRIL) 5 mg tablet        Plan:  After discussion of risks and benefits, he will start lisinopril 5 mg daily. A1c drawn and pending, I will send him a letter with the results. Reassured regarding his memory and other concerns, everything seems to be reasonable at this time and we have done the complete evaluation in the past. Assuming all goes well, he will follow-up with me towards the end of April. He will want to have complete lab work done at that time to make sure everything looks good including evaluations for any sort of deficiencies so I will certainly expand his blood work somewhat. Over 50% of a 25 minute visit spent in counseling and coordination of care.        "

## 2018-03-19 ENCOUNTER — OFFICE VISIT (OUTPATIENT)
Dept: INTERNAL MEDICINE | Facility: OTHER | Age: 73
End: 2018-03-19
Attending: INTERNAL MEDICINE
Payer: MEDICARE

## 2018-03-19 ENCOUNTER — HOSPITAL ENCOUNTER (OUTPATIENT)
Dept: GENERAL RADIOLOGY | Facility: OTHER | Age: 73
Discharge: HOME | End: 2018-03-19
Attending: INTERNAL MEDICINE | Admitting: INTERNAL MEDICINE
Payer: MEDICARE

## 2018-03-19 VITALS
WEIGHT: 199.2 LBS | HEART RATE: 76 BPM | DIASTOLIC BLOOD PRESSURE: 70 MMHG | BODY MASS INDEX: 31.43 KG/M2 | SYSTOLIC BLOOD PRESSURE: 138 MMHG

## 2018-03-19 DIAGNOSIS — F41.1 GENERALIZED ANXIETY DISORDER: ICD-10-CM

## 2018-03-19 DIAGNOSIS — R05.9 COUGH: ICD-10-CM

## 2018-03-19 DIAGNOSIS — R05.9 COUGH: Primary | ICD-10-CM

## 2018-03-19 DIAGNOSIS — F41.0 PANIC DISORDER WITHOUT AGORAPHOBIA: ICD-10-CM

## 2018-03-19 PROCEDURE — 71046 X-RAY EXAM CHEST 2 VIEWS: CPT

## 2018-03-19 PROCEDURE — G0463 HOSPITAL OUTPT CLINIC VISIT: HCPCS

## 2018-03-19 PROCEDURE — 99214 OFFICE O/P EST MOD 30 MIN: CPT | Performed by: INTERNAL MEDICINE

## 2018-03-19 PROCEDURE — G0463 HOSPITAL OUTPT CLINIC VISIT: HCPCS | Mod: 25

## 2018-03-19 RX ORDER — LISINOPRIL 5 MG/1
5 TABLET ORAL DAILY
Qty: 30 TABLET | Refills: 1 | COMMUNITY
Start: 2018-03-19 | End: 2018-04-12

## 2018-03-19 RX ORDER — GABAPENTIN 300 MG/1
300 CAPSULE ORAL 2 TIMES DAILY
Qty: 90 CAPSULE | COMMUNITY
Start: 2018-03-19 | End: 2018-03-30

## 2018-03-19 ASSESSMENT — ENCOUNTER SYMPTOMS
ALLERGIC/IMMUNOLOGIC NEGATIVE: 1
CONSTITUTIONAL NEGATIVE: 1
ENDOCRINE NEGATIVE: 1
HEMATOLOGIC/LYMPHATIC NEGATIVE: 1

## 2018-03-19 NOTE — PROGRESS NOTES
Chief Complaint:  Multiple concerns.    HPI: He comes in today with a litany of complaints including a list of problems.  He is complaining of everything to feeling dizzy and lightheaded and almost passing out to having concerns and complaints about his peripheral neuropathy.  He also has concerns about a change in bowels.  His last colonoscopy was in 2013 and was normal.  He feels much more tired than he used to in the past.  He has questions about doing such things as laser and using creams for the neuropathy in his feet.  He is complaining about not having enough time to visit with the doctor due to insurance regulations.  He just is not feeling well in general.    He has a numb sensation in his tongue.  He went to the dentist and apparently he has some fillings that are causing problems.  He is worried about mercury leaching out of these and affecting his body including causing a sore throat and a severe cough.  He is going to apparently get these capped over at the end of the month.    Past Medical History:   Diagnosis Date     Diverticulosis of intestine without perforation or abscess without bleeding     No Comments Provided     Fatty (change of) liver, not elsewhere classified     non alcoholic     Fibromyalgia     No Comments Provided     Hyperlipidemia     No Comments Provided     Nodular prostate without lower urinary tract symptoms     2012,US confirms benign calcifications     Obesity     No Comments Provided     Panic disorder without agoraphobia     No Comments Provided     Type 2 diabetes mellitus with diabetic polyneuropathy (H)     No Comments Provided       Past Surgical History:   Procedure Laterality Date     ARTHROPLASTY KNEE      2015     ARTHROSCOPY KNEE      right knee X2     BIOPSY PROSTATE TRANSRECTAL      2013     COLONOSCOPY      2003,and UGI [place] normal     COLONOSCOPY      2009,and UGI repeat Dr. Johnson negative.     COLONOSCOPY      2013,WNL     HERNIA REPAIR  2000     RELEASE CARPAL  TUNNEL      2014     VASECTOMY      No Comments Provided       Allergies   Allergen Reactions     Atorvastatin Calcium      Lipitor     Hmg-Coa-R Inhibitors Muscle Pain (Myalgia)     Higher doses cause myalgias       Current Outpatient Prescriptions   Medication Sig Dispense Refill     gabapentin (NEURONTIN) 300 MG capsule Take 1 capsule (300 mg) by mouth 2 times daily 90 capsule      metFORMIN (GLUCOPHAGE) 500 MG tablet 2 in morning, 1 in evening. 60 tablet      lisinopril (PRINIVIL/ZESTRIL) 5 MG tablet Take 1 tablet (5 mg) by mouth daily 30 tablet 1     aspirin 81 MG tablet Take 81 mg by mouth daily       blood glucose monitoring (NO BRAND SPECIFIED) test strip Check blood glucose twice daily  Dx code e11.9       citalopram (CELEXA) 20 MG tablet Take 10 mg by mouth daily       Cholecalciferol (D 5000) 5000 UNITS TABS Take 5,000 Units by mouth daily       LORazepam (ATIVAN) 1 MG tablet Take 1 mg by mouth every 6 hours as needed for anxiety       simvastatin (ZOCOR) 20 MG tablet Take 10 mg by mouth At Bedtime       ascorbic acid (VITAMIN C) 500 MG tablet Take 1 tablet by mouth daily. For 60 days       [DISCONTINUED] gabapentin (NEURONTIN) 300 MG capsule Take 1 capsule by mouth 3 times daily as needed       [DISCONTINUED] metFORMIN (GLUCOPHAGE) 500 MG tablet Take 2 tablets by mouth 2 times daily (with meals)       [DISCONTINUED] metFORMIN (GLUCOPHAGE) 500 MG tablet Take 1 tablet by mouth 2 times daily (with meals).       [DISCONTINUED] simvastatin (ZOCOR) 10 MG tablet Take 1 tablet by mouth every evening.         Social History     Social History     Marital status:      Spouse name: N/A     Number of children: N/A     Years of education: N/A     Occupational History     Not on file.     Social History Main Topics     Smoking status: Former Smoker     Types: Cigarettes     Quit date: 10/25/1983     Smokeless tobacco: Never Used     Alcohol use No     Drug use: Not on file      Comment: Drug use: No     Sexual  activity: Not on file     Other Topics Concern     Not on file     Social History Narrative    , retired from [employer].  Lives in town.       Review of Systems   Constitutional: Negative.    Endocrine: Negative.    Skin: Negative.    Allergic/Immunologic: Negative.    Hematological: Negative.        Physical Exam   Constitutional: He is oriented to person, place, and time. He appears well-developed and well-nourished. No distress.   HENT:   Head: Normocephalic.   Right Ear: External ear normal.   Left Ear: External ear normal.   Mouth/Throat: Oropharynx is clear and moist. No oropharyngeal exudate.   Neck: Normal range of motion. Neck supple. No JVD present. No tracheal deviation present. No thyromegaly present.   Cardiovascular: Normal rate, regular rhythm and normal heart sounds.  Exam reveals no gallop and no friction rub.    No murmur heard.  Pulmonary/Chest: Effort normal and breath sounds normal. No respiratory distress. He has no wheezes. He has no rales.   Neurological: He is alert and oriented to person, place, and time.   Skin: Skin is warm and dry. He is not diaphoretic.   Nursing note and vitals reviewed.      Assessment:      ICD-10-CM    1. Cough R05 XR Chest 2 Views   2. Panic disorder without agoraphobia F41.0    3. Generalized anxiety disorder F41.1        Plan: His exam today is unremarkable.  Chest x-ray is normal.  I wonder if some of his symptoms could be related to his lisinopril which was recently started because of diabetic albuminuria.  He will discontinue this medication altogether.  I am not going to substitute anything at this time, I want to see if he feels better off of this medication completely.  He is can be back to see me around 1 May for complete evaluation at which time he can report as to how he is feeling.  We will likely start him on low-dose losartan in place of the ACE inhibitor.  All other medications will continue without change.  I think there is a fair amount of his  anxiety contributing to some of his symptoms as well.  Over 50% of the 25 minute visit spent in counseling and coordination of care.

## 2018-03-19 NOTE — NURSING NOTE
The patient is here today to be seen for a few different concerns.  FERNY CAM LPN 3/19/2018 2:03 PM

## 2018-03-19 NOTE — MR AVS SNAPSHOT
After Visit Summary   3/19/2018    Dajuan Basilio    MRN: [id number]           Patient Information     Date Of Birth          1945        Visit Information        Provider Department      3/19/2018 2:00 PM Brett Olivas MD [facility]        Today's Diagnoses     Cough    -  1    Panic disorder without agoraphobia        Generalized anxiety disorder           Follow-ups after your visit        Your next 10 appointments already scheduled     May 01, 2018  1:00 PM CDT   Office Visit with Brett Olivas MD   [facility] ([facility])    [address]  [place] [id number] [phone]           Bring a current list of meds and any records pertaining to this visit. For Physicals, please bring immunization records and any forms needing to be filled out. Please arrive 10 minutes early to complete paperwork.              Future tests that were ordered for you today     Open Future Orders        Priority Expected Expires Ordered    XR Chest 2 Views Routine 3/19/2018 3/19/2019 3/19/2018            Who to contact     If you have questions or need follow up information about today's clinic visit or your schedule please contact [facility] directly at [phone].  Normal or non-critical lab and imaging results will be communicated to you by MyChart, letter or phone within 4 business days after the clinic has received the results. If you do not hear from us within 7 days, please contact the clinic through Project Greenhart or phone. If you have a critical or abnormal lab result, we will notify you by phone as soon as possible.  Submit refill requests through OpenLabel or call your pharmacy and they will forward the refill request to us. Please allow 3 business days for your refill to be completed.          Additional Information About Your Visit        Project GreenharDocuSpeak Information     OpenLabel lets you send messages to your  "doctor, view your test results, renew your prescriptions, schedule appointments and more. To sign up, go to [email or web address] . Click on \"Log in\" on the left side of the screen, which will take you to the Welcome page. Then click on \"Sign up Now\" on the right side of the page.     You will be asked to enter the access code listed below, as well as some personal information. Please follow the directions to create your username and password.     Your access code is: [email or web address]  Expires: 2018  2:53 PM     Your access code will  in 90 days. If you need help or a new code, please call your [facility] clinic or [phone].        Care EveryWhere ID     This is your Care EveryWhere ID. This could be used by other organizations to access your [facility] medical records  [id number]        Your Vitals Were     Pulse BMI (Body Mass Index)                76 31.43 kg/m2           Blood Pressure from Last 3 Encounters:   18 138/70   18 122/76   17 110/72    Weight from Last 3 Encounters:   18 90.4 kg (199 lb 3.2 oz)   18 90.4 kg (199 lb 6.4 oz)   17 87.4 kg (192 lb 9.6 oz)                 Today's Medication Changes          These changes are accurate as of 3/19/18  2:53 PM.  If you have any questions, ask your nurse or doctor.               These medicines have changed or have updated prescriptions.        Dose/Directions    aspirin 81 MG tablet   This may have changed:  Another medication with the same name was removed. Continue taking this medication, and follow the directions you see here.   Changed by:  Brett Olivas MD        Dose:  81 mg   Take 81 mg by mouth daily   Refills:  0       D 5000 5000 UNITS Tabs   This may have changed:  Another medication with the same name was removed. Continue taking this medication, and follow the directions you see here.   Generic drug:  Cholecalciferol   Changed by:  Brett Olivas MD        Dose:  5000 Units   Take 5,000 Units by " mouth daily   Refills:  0       gabapentin 300 MG capsule   Commonly known as:  NEURONTIN   This may have changed:    - when to take this  - reasons to take this   Changed by:  Brett Olivas MD        Dose:  300 mg   Take 1 capsule (300 mg) by mouth 2 times daily   Quantity:  90 capsule   Refills:  0       LORazepam 1 MG tablet   Commonly known as:  ATIVAN   This may have changed:  Another medication with the same name was removed. Continue taking this medication, and follow the directions you see here.   Changed by:  Brett Olivas MD        Dose:  1 mg   Take 1 mg by mouth every 6 hours as needed for anxiety   Refills:  0       metFORMIN 500 MG tablet   Commonly known as:  GLUCOPHAGE   This may have changed:    - how much to take  - how to take this  - when to take this  - additional instructions  - Another medication with the same name was removed. Continue taking this medication, and follow the directions you see here.   Changed by:  Brett Olivas MD        2 in morning, 1 in evening.   Quantity:  60 tablet   Refills:  0       simvastatin 20 MG tablet   Commonly known as:  ZOCOR   This may have changed:  Another medication with the same name was removed. Continue taking this medication, and follow the directions you see here.   Changed by:  Brett Olivas MD        Dose:  10 mg   Take 10 mg by mouth At Bedtime   Refills:  0         Stop taking these medicines if you haven't already. Please contact your care team if you have questions.     DAILY MULTIVITAMIN PO   Stopped by:  Brett Olivas MD           Fish Oil 500 MG Caps   Stopped by:  Brett Olivas MD           FLOMAX 0.4 MG capsule   Generic drug:  tamsulosin   Stopped by:  Brett Olivas MD           hyaluronate in balanced salt ophthalmic solution solution   Commonly known as:  HEALON IN BSS   Stopped by:  Brett Olivas MD           MULTI-VITAMINS Tabs   Stopped by:  Brett Olivas MD           omeprazole 40 MG capsule   Commonly known as:   priLOSEC   Stopped by:  Brett Olivas MD           PARoxetine 20 MG tablet   Commonly known as:  PAXIL   Stopped by:  Brett Olivas MD                    Primary Care Provider Office Phone # Fax #    Brett Olivas [phone] [address]   [place] [address]        Equal Access to Services     [facility]: Hadii aad ku hadasho Soomaali, waaxda luqadaha, qaybta kaalmada adeegyada, waxay idiin hayaan adeeg kharash lachrisn . So [facility] [phone].    ATENCIÓN: Si habla español, tiene a powell disposición servicios gratuitos de asistencia lingüística. Llame al [phone].    We comply with applicable federal civil rights laws and [place] laws. We do not discriminate on the basis of race, color, national origin, age, disability, sex, sexual orientation, or gender identity.            Thank you!     Thank you for choosing [facility] AND [facility]  for your care. Our goal is always to provide you with excellent care. Hearing back from our patients is one way we can continue to improve our services. Please take a few minutes to complete the written survey that you may receive in the mail after your visit with us. Thank you!             Your Updated Medication List - Protect others around you: Learn how to safely use, store and throw away your medicines at www.disposemymeds.org.          This list is accurate as of 3/19/18  2:53 PM.  Always use your most recent med list.                   Brand Name Dispense Instructions for use Diagnosis    ascorbic acid 500 MG tablet    VITAMIN C     Take 1 tablet by mouth daily. For 60 days        aspirin 81 MG tablet      Take 81 mg by mouth daily        blood glucose monitoring test strip    no brand specified     Check blood glucose twice daily  Dx code e11.9        citalopram 20 MG tablet    celeXA     Take 10 mg by mouth daily        D 5000 5000 UNITS Tabs   Generic drug:  Cholecalciferol      Take 5,000 Units by mouth daily         gabapentin 300 MG capsule    NEURONTIN    90 capsule    Take 1 capsule (300 mg) by mouth 2 times daily        lisinopril 5 MG tablet    PRINIVIL/ZESTRIL    30 tablet    Take 1 tablet (5 mg) by mouth daily        LORazepam 1 MG tablet    ATIVAN     Take 1 mg by mouth every 6 hours as needed for anxiety        metFORMIN 500 MG tablet    GLUCOPHAGE    60 tablet    2 in morning, 1 in evening.        simvastatin 20 MG tablet    ZOCOR     Take 10 mg by mouth At Bedtime

## 2018-03-20 ASSESSMENT — PATIENT HEALTH QUESTIONNAIRE - PHQ9: SUM OF ALL RESPONSES TO PHQ QUESTIONS 1-9: 0

## 2018-03-30 DIAGNOSIS — E11.21 TYPE 2 DIABETES MELLITUS WITH DIABETIC NEPHROPATHY, WITHOUT LONG-TERM CURRENT USE OF INSULIN (H): ICD-10-CM

## 2018-03-30 DIAGNOSIS — E11.42 DIABETIC PERIPHERAL NEUROPATHY (H): Primary | ICD-10-CM

## 2018-03-30 RX ORDER — GABAPENTIN 300 MG/1
300 CAPSULE ORAL 2 TIMES DAILY
Qty: 180 CAPSULE | Refills: 3 | Status: SHIPPED | OUTPATIENT
Start: 2018-03-30 | End: 2018-07-11

## 2018-03-30 NOTE — TELEPHONE ENCOUNTER
"Fax from [facility] for Gabapentin states that patient is taking 2 tablets daily, requesting a 90 day supply. Reviewed at  on 1-26-18, \"continue long term\".    Routing Gabapentin refill request to provider for review/approval because:  Drug not on the [facility] refill protocol.   Filled test strips as requested. Alanna Allen RN on 3/30/2018 at 9:08 AM             "

## 2018-04-05 ENCOUNTER — TELEPHONE (OUTPATIENT)
Dept: INTERNAL MEDICINE | Facility: OTHER | Age: 73
End: 2018-04-05

## 2018-04-05 DIAGNOSIS — H81.10 BENIGN PAROXYSMAL POSITIONAL VERTIGO: Primary | ICD-10-CM

## 2018-04-05 NOTE — TELEPHONE ENCOUNTER
The patient was contacted and given the information below.  Kerri Lara LPN on 4/5/2018 at 9:08 AM

## 2018-04-05 NOTE — TELEPHONE ENCOUNTER
Patient calling and states that he has appointment with Dr. Olivas on 4/10/18 but he needs to be seen today.    States he has been having this dizziness, which Dr. Olivas is aware of, but it is getting progressively worse.    States its worse when he is standing he staggers around but also has some when sitting.  Denies worse when turning head from one side to the other.    State also last night was up 5-6 times in one hour urinating.  Denies any blood,fever, or any more burning sensation than usual.    States also had bad episode of burning in feet and back from last night from neuropathy.    Patient wondering if  would be will to work him in today or he would like to see someone today.    Jacklyn Vazquez RN on 4/5/2018 at 8:35 AM

## 2018-04-05 NOTE — TELEPHONE ENCOUNTER
Patient would like a work in today for multiple issues. Patient  Has an appointment on 4/10/18, but would like to be seen sooner. Feeling dizzy & feverish.

## 2018-04-09 ENCOUNTER — HOSPITAL ENCOUNTER (OUTPATIENT)
Dept: PHYSICAL THERAPY | Facility: OTHER | Age: 73
Setting detail: THERAPIES SERIES
End: 2018-04-09
Attending: FAMILY MEDICINE
Payer: MEDICARE

## 2018-04-09 PROCEDURE — 95992 CANALITH REPOSITIONING PROC: CPT | Mod: GP

## 2018-04-09 PROCEDURE — 40000185 ZZHC STATISTIC PT OUTPT VISIT

## 2018-04-09 PROCEDURE — G8981 BODY POS CURRENT STATUS: HCPCS | Mod: GP,CJ

## 2018-04-09 PROCEDURE — 97112 NEUROMUSCULAR REEDUCATION: CPT | Mod: GP,XU

## 2018-04-09 PROCEDURE — G8982 BODY POS GOAL STATUS: HCPCS | Mod: GP,CI

## 2018-04-09 PROCEDURE — 97161 PT EVAL LOW COMPLEX 20 MIN: CPT | Mod: GP

## 2018-04-09 NOTE — PROGRESS NOTES
04/09/18 1500   Quick Adds   Quick Adds Vestibular Eval   Type of Visit Initial OP PT Evaluation   General Information   Surgical/Medical history reviewed Yes   Pertinent Visual History  normal   Patient role/Employment history Retired   Living environment House/townhome   Home/Community Accessibility Comments 1 step   Patient/Family Goals Statement lay flat in bed, decrease dizziness, improve stability.   General Information Comments laying flat on his back and looking up makes him spin; lasts 2-3 minutes; wobbly with walking worse over the past few months. Bending over occassionaly will make him spin. 95% of the time is sleeping in a recliner due to hip/back issues. No dizziness when lying a recliner.   Fall Risk Screen   Fall screen completed by PT   Have you fallen 2 or more times in the past year? No   Have you fallen and had an injury in the past year? No   Pain   Patient currently in pain Yes   Pain location shoulder/ hip   Pain rating 0/10   Cognitive Status Examination   Orientation orientation to person, place and time   Level of Consciousness alert   Follows Commands and Answers Questions 100% of the time   Personal Safety and Judgment intact   Memory intact   Cervicogenic Screen   Neck ROM WFL   Vertebral Artery Test Normal   Oculomotor Exam   Smooth Pursuit Normal   Saccades Normal   VOR Normal   VOR Cancellation Normal   Rapid Head Thrust Normal   Convergence Testing Normal   Infrared Goggle Exam or Frenzel Lense Exam   Vestibular Suppressant in Last 24 Hours? No   Exam completed with Frenzel Lenses   Head Shake Horizontal Nystagmus comments vertical head shake positive for dizziness, no nystagmus   Juancho-Hallpike (right) Negative;Other  (mild, 2-3 beats horizontal nystagmus & mildly dizzy)   Eland-Hallpike (Left) Upbeating;Other   Juancho-Hallpike (left) comments pt closed eyes and nystagmus was brief, unable to discern direction of torsion   [facility] Supine Roll Test (Right) Negative   [facility] Supine Roll Test  (Left) Negative   BPPV Canal(s) L Posterior   BPPV Type Canalithasis   Planned Therapy Interventions   Planned Therapy Interventions joint mobilization;neuromuscular re-education;ROM;stretching;manual therapy;visual perception   Planned Therapy Interventions Comment vestibular therapy   Clinical Impression   Criteria for Skilled Therapeutic Interventions Met yes, treatment indicated   PT Diagnosis 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   Influenced by the following impairments dizziness, balance impairment   Functional limitations due to impairments walking, functional mobility and transfers   Clinical Presentation Stable/Uncomplicated   Clinical Presentation Rationale mCTSIB, functional mobility   Clinical Decision Making (Complexity) Low complexity   Therapy Frequency 2 times/Week   Predicted Duration of Therapy Intervention (days/wks) 8 weeks   Risk & Benefits of therapy have been explained Yes   Patient, Family & other staff in agreement with plan of care Yes   Education Assessment   Preferred Learning Style Demonstration;Pictures/video   Barriers to Learning No barriers   GOALS   PT Eval Goals 1;2;3   Goal 1   Goal Identifier HEP   Goal Description Pt will be independent in HEP and self management techniques   Target Date 05/07/18   Goal 2   Goal Identifier transfers   Goal Description Pt will perform all transfers and bed mobility with minimal to no dizziness   Target Date 05/21/18   Goal 3   Goal Identifier walking   Goal Description Pt will have decreased sway on mCTSIB cond 4 for improved stability for walking in low light environments   Target Date 05/21/18   Total Evaluation Time   Total Evaluation Time (Minutes) 35

## 2018-04-09 NOTE — PROGRESS NOTES
[facility]        OUTPATIENT PHYSICAL THERAPY FUNCTIONAL EVALUATION  PLAN OF TREATMENT FOR OUTPATIENT REHABILITATION  (COMPLETE FOR INITIAL CLAIMS ONLY)  Patient's Last Name, First Name, M.I.  YOB: 1945  NickastonDajuan  HARSH     Provider's Name   [facility]   Medical Record No.  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 Visits from SOC:  1                              __________________________________________________________________________________  Plan of Treatment/Functional Goals:  joint mobilization, neuromuscular re-education, ROM, stretching, manual therapy, visual perception  vestibular therapy        GOALS  HEP  Pt will be independent in HEP and self management techniques  05/07/18    transfers  Pt will perform all transfers and bed mobility with minimal to no dizziness  05/21/18    walking  Pt will have decreased sway on mCTSIB cond 4 for improved stability for walking in low light environments  05/21/18         Therapy Frequency:  2 times/Week   Predicted Duration of Therapy Intervention:  8 weeks    Gunjan Callejas, PT                                    I CERTIFY THE NEED FOR THESE SERVICES FURNISHED UNDER        THIS PLAN OF TREATMENT AND WHILE UNDER MY CARE .             Physician Signature               Date    X_____________________________________________________                  Certification Date From:    4/9/2018   Certification Date To:   6/4/2018    Referring Provider:   Mansoor Rios    Initial Assessment  See Epic Evaluation-

## 2018-04-12 ENCOUNTER — OFFICE VISIT (OUTPATIENT)
Dept: INTERNAL MEDICINE | Facility: OTHER | Age: 73
End: 2018-04-12
Attending: INTERNAL MEDICINE
Payer: COMMERCIAL

## 2018-04-12 VITALS
SYSTOLIC BLOOD PRESSURE: 138 MMHG | BODY MASS INDEX: 30.55 KG/M2 | WEIGHT: 193.6 LBS | DIASTOLIC BLOOD PRESSURE: 84 MMHG | HEART RATE: 76 BPM

## 2018-04-12 DIAGNOSIS — F41.1 GENERALIZED ANXIETY DISORDER: ICD-10-CM

## 2018-04-12 DIAGNOSIS — R53.81 MALAISE: Primary | ICD-10-CM

## 2018-04-12 DIAGNOSIS — M62.81 GENERALIZED MUSCLE WEAKNESS: ICD-10-CM

## 2018-04-12 DIAGNOSIS — R42 DIZZINESS: ICD-10-CM

## 2018-04-12 DIAGNOSIS — E11.21 TYPE 2 DIABETES MELLITUS WITH DIABETIC NEPHROPATHY, WITHOUT LONG-TERM CURRENT USE OF INSULIN (H): ICD-10-CM

## 2018-04-12 PROCEDURE — G0463 HOSPITAL OUTPT CLINIC VISIT: HCPCS

## 2018-04-12 PROCEDURE — 99213 OFFICE O/P EST LOW 20 MIN: CPT | Performed by: INTERNAL MEDICINE

## 2018-04-12 ASSESSMENT — ENCOUNTER SYMPTOMS
EYES NEGATIVE: 1
ALLERGIC/IMMUNOLOGIC NEGATIVE: 1
CONSTITUTIONAL NEGATIVE: 1
ENDOCRINE NEGATIVE: 1

## 2018-04-12 NOTE — NURSING NOTE
The patient is here today to be seen for fatigue, dizziness, and his body feels like it is burning.  Kerri Lara LPN on 4/12/2018 at 8:34 AM

## 2018-04-12 NOTE — MR AVS SNAPSHOT
After Visit Summary   4/12/2018    Dajuan Basilio    MRN: [id number]           Patient Information     Date Of Birth          1945        Visit Information        Provider Department      4/12/2018 8:40 AM Brett Olivas MD [facility]        Today's Diagnoses     Malaise    -  1    Dizziness        Generalized muscle weakness        Generalized anxiety disorder        Type 2 diabetes mellitus with diabetic nephropathy, without long-term current use of insulin (H)           Follow-ups after your visit        Additional Services     INTERNAL MEDICINE REFERRAL       [facility]--general medical exam                  Your next 10 appointments already scheduled     Apr 19, 2018  1:45 PM CDT   Treatment with Gunjan Callejas PT   [facility] ([facility])    [address] [facility] [id number] [phone]            May 01, 2018  1:00 PM CDT   Office Visit with Brett Olivas MD   [facility]    [address]  [facility] [id number] [phone]           Bring a current list of meds and any records pertaining to this visit. For Physicals, please bring immunization records and any forms needing to be filled out. Please arrive 10 minutes early to complete paperwork.              Who to contact     If you have questions or need follow up information about today's clinic visit or your schedule please contact [facility] directly at [phone].  Normal or non-critical lab and imaging results will be communicated to you by MyChart, letter or phone within 4 business days after the clinic has received the results. If you do not hear from us within 7 days, please contact the clinic through MyChart or phone. If you have a critical or abnormal lab result, we will notify you by phone as soon as possible.  Submit refill requests through Pouncehart or call  "your pharmacy and they will forward the refill request to us. Please allow 3 business days for your refill to be completed.          Additional Information About Your Visit        MyChart Information     Zervehart lets you send messages to your doctor, view your test results, renew your prescriptions, schedule appointments and more. To sign up, go to [email or web address] . Click on \"Log in\" on the left side of the screen, which will take you to the Welcome page. Then click on \"Sign up Now\" on the right side of the page.     You will be asked to enter the access code listed below, as well as some personal information. Please follow the directions to create your username and password.     Your access code is: [email or web address]  Expires: 2018  2:53 PM     Your access code will  in 90 days. If you need help or a new code, please call your [facility] clinic or [phone].        Care EveryWhere ID     This is your Care EveryWhere ID. This could be used by other organizations to access your [facility] medical records  [id number]        Your Vitals Were     Pulse BMI (Body Mass Index)                76 30.55 kg/m2           Blood Pressure from Last 3 Encounters:   18 138/84   18 138/70   18 122/76    Weight from Last 3 Encounters:   18 193 lb 9.6 oz (87.8 kg)   18 199 lb 3.2 oz (90.4 kg)   18 199 lb 6.4 oz (90.4 kg)              We Performed the Following     INTERNAL MEDICINE REFERRAL          Today's Medication Changes          These changes are accurate as of 18  9:00 AM.  If you have any questions, ask your nurse or doctor.               Stop taking these medicines if you haven't already. Please contact your care team if you have questions.     lisinopril 5 MG tablet   Commonly known as:  PRINIVIL/ZESTRIL   Stopped by:  Brett Olivas MD                    Primary Care Provider Office Phone # Fax #    Brett Olivas [phone] [address] [place]   [facility] [address]        Equal Access to Services     [facility] : Hadii ace leahy kendrajose martin Yvesali, wahawada luqjuan carlosha, qapujata makaylaalbertjose maurice. So [facility] [phone].    ATENCIÓN: Si habla español, tiene a powell disposición servicios gratuitos de asistencia lingüística. Llame al [phone].    We comply with applicable federal civil rights laws and [place] laws. We do not discriminate on the basis of race, color, national origin, age, disability, sex, sexual orientation, or gender identity.            Thank you!     Thank you for choosing [facility] AND [facility]  for your care. Our goal is always to provide you with excellent care. Hearing back from our patients is one way we can continue to improve our services. Please take a few minutes to complete the written survey that you may receive in the mail after your visit with us. Thank you!             Your Updated Medication List - Protect others around you: Learn how to safely use, store and throw away your medicines at www.disposemymeds.org.          This list is accurate as of 4/12/18  9:00 AM.  Always use your most recent med list.                   Brand Name Dispense Instructions for use Diagnosis    ascorbic acid 500 MG tablet    VITAMIN C     Take 1 tablet by mouth daily. For 60 days        aspirin 81 MG tablet      Take 81 mg by mouth daily        blood glucose monitoring test strip    no brand specified    200 strip    Check blood glucose twice daily,  Dx code e11.9. Dispense as covered by patient's insurance.    Type 2 diabetes mellitus with diabetic nephropathy, without long-term current use of insulin (H)       citalopram 20 MG tablet    celeXA     Take 10 mg by mouth daily        D 5000 5000 units Tabs   Generic drug:  Cholecalciferol      Take 5,000 Units by mouth daily        gabapentin 300 MG capsule    NEURONTIN    180 capsule    Take 1 capsule (300 mg) by mouth 2 times daily    Diabetic  peripheral neuropathy (H), Type 2 diabetes mellitus with diabetic nephropathy, without long-term current use of insulin (H)       LORazepam 1 MG tablet    ATIVAN     Take 1 mg by mouth every 6 hours as needed for anxiety        metFORMIN 500 MG tablet    GLUCOPHAGE    60 tablet    2 in morning, 1 in evening.        simvastatin 20 MG tablet    ZOCOR     Take 10 mg by mouth At Bedtime

## 2018-04-12 NOTE — PROGRESS NOTES
Chief Complaint: Follow-up on issues.    HPI: He returns today for a follow-up visit.  He continues to feel poorly.  He saw a different provider last week and was sent for vestibular training.  This has helped a little bit with his dizziness but not substantially.  He was also taken off of his simvastatin to see if that might be causing problems.  He has not noticed any improvement yet.  The cough has improved now that he is no longer on the lisinopril.    He is requesting a referral to the [facility].  He tells me that his brother-in-law was going to drive him down there today.  I told him that might not be the best approach, getting an appointment for a complete general medical exam would probably be much more helpful in enlightening.  I told him I would be very happy to make this appointment for him as I am unable to explain all of his symptoms.    He has developed a rash on his legs.  He does not know what to make of that.  He still feels extremely weak and dizzy.  I talked to him directly about the possibility of a lot of his somatic complaints being psychiatric.  He really does not think that that is the case as he has certainly had his psychiatric battles in the past but none of them have had the same feeling her symptoms as he is having now.    Past Medical History:   Diagnosis Date     Diverticulosis of intestine without perforation or abscess without bleeding     No Comments Provided     Fatty (change of) liver, not elsewhere classified     non alcoholic     Fibromyalgia     No Comments Provided     Hyperlipidemia     No Comments Provided     Nodular prostate without lower urinary tract symptoms     2012,US confirms benign calcifications     Obesity     No Comments Provided     Panic disorder without agoraphobia     No Comments Provided     Type 2 diabetes mellitus with diabetic polyneuropathy (H)     No Comments Provided       Past Surgical History:   Procedure Laterality Date     ARTHROPLASTY KNEE       2015     ARTHROSCOPY KNEE      right knee X2     BIOPSY PROSTATE TRANSRECTAL      2013     COLONOSCOPY      2003,and UGI [place] normal     COLONOSCOPY      2009,and UGI repeat Dr. Johnson negative.     COLONOSCOPY      2013,WNL     HERNIA REPAIR  2000     RELEASE CARPAL TUNNEL      2014     VASECTOMY      No Comments Provided       Allergies   Allergen Reactions     Ace Inhibitors Cough     Atorvastatin Calcium      Lipitor     Hmg-Coa-R Inhibitors Muscle Pain (Myalgia)     Higher doses cause myalgias       Current Outpatient Prescriptions   Medication Sig Dispense Refill     gabapentin (NEURONTIN) 300 MG capsule Take 1 capsule (300 mg) by mouth 2 times daily 180 capsule 3     blood glucose monitoring (NO BRAND SPECIFIED) test strip Check blood glucose twice daily,  Dx code e11.9. Dispense as covered by patient's insurance. 200 strip 2     metFORMIN (GLUCOPHAGE) 500 MG tablet 2 in morning, 1 in evening. 60 tablet      aspirin 81 MG tablet Take 81 mg by mouth daily       citalopram (CELEXA) 20 MG tablet Take 10 mg by mouth daily       Cholecalciferol (D 5000) 5000 UNITS TABS Take 5,000 Units by mouth daily       LORazepam (ATIVAN) 1 MG tablet Take 1 mg by mouth every 6 hours as needed for anxiety       simvastatin (ZOCOR) 20 MG tablet Take 10 mg by mouth At Bedtime       ascorbic acid (VITAMIN C) 500 MG tablet Take 1 tablet by mouth daily. For 60 days         Review of Systems   Constitutional: Negative.    Eyes: Negative.    Endocrine: Negative.    Allergic/Immunologic: Negative.        Physical Exam   Constitutional: He appears well-developed and well-nourished. No distress.   Skin: He is not diaphoretic.   Multiple excoriated lesions on his lower extremities   Nursing note and vitals reviewed.      Assessment:        ICD-10-CM    1. Malaise R53.81 INTERNAL MEDICINE REFERRAL   2. Dizziness R42 INTERNAL MEDICINE REFERRAL   3. Generalized muscle weakness M62.81 INTERNAL MEDICINE REFERRAL   4. Generalized anxiety  disorder F41.1    5. Type 2 diabetes mellitus with diabetic nephropathy, without long-term current use of insulin (H) E11.21        Plan: This patient is not feeling well or doing well at this time at all.  I am not sure how to explain this.  He did have cough with the ACE inhibitor so he will stay off of that, we will start him on an ARB at some point in time but I am going to hold on that for the time being.  He has been off his simvastatin for a week without any improvement, I will have him continue holding that for another week.  He will also try off of the gabapentin for a week or so to see if that is causing any of his symptoms.  I do not hold out high hopes that he will improve.  He is wanting to be seen at the [facility] for an evaluation and I think this is a very good idea as I am not able to come up with a diagnosis.  Consult request is placed.

## 2018-04-13 ASSESSMENT — PATIENT HEALTH QUESTIONNAIRE - PHQ9: SUM OF ALL RESPONSES TO PHQ QUESTIONS 1-9: 0

## 2018-04-18 ENCOUNTER — TELEPHONE (OUTPATIENT)
Dept: INTERNAL MEDICINE | Facility: OTHER | Age: 73
End: 2018-04-18

## 2018-04-18 DIAGNOSIS — E53.8 LOW SERUM VITAMIN B12: Primary | ICD-10-CM

## 2018-04-18 RX ORDER — CYANOCOBALAMIN 1000 UG/ML
INJECTION, SOLUTION INTRAMUSCULAR; SUBCUTANEOUS
Qty: 1 ML | Refills: 30 | OUTPATIENT
Start: 2018-04-18 | End: 2018-07-11

## 2018-04-18 NOTE — TELEPHONE ENCOUNTER
In clinical absence of patient's primary, Brett Olivas, patient is requesting that this message be sent to the primary provider's Teamlet for consideration please.      ,  Pt is requesting a referral to [facility] in [place].    Pt saw  04/12/2018 for general Malaise and fatigue and was referred to [facility] but pt states they are not able to get him in until the beginning of July.  PT states he can not wait that long because he feels so poorly.  Would you like to see him or could we send a new referral to [place].  Pt states he would like to be seen as soon as possible.  Please review last office visit and advise.  Thank you!    Stella Whyte RN.............................4/18/2018 4:48 PM

## 2018-04-18 NOTE — TELEPHONE ENCOUNTER
Patient would like to speak with nurse regarding health concerns and getting a referral to [facility] in [place].

## 2018-04-19 ENCOUNTER — ALLIED HEALTH/NURSE VISIT (OUTPATIENT)
Dept: FAMILY MEDICINE | Facility: OTHER | Age: 73
End: 2018-04-19
Attending: INTERNAL MEDICINE
Payer: MEDICARE

## 2018-04-19 DIAGNOSIS — E53.8 LOW SERUM VITAMIN B12: Primary | ICD-10-CM

## 2018-04-19 PROCEDURE — 25000128 H RX IP 250 OP 636: Performed by: INTERNAL MEDICINE

## 2018-04-19 PROCEDURE — 99207 ZZC NO CHARGE NURSE ONLY: CPT

## 2018-04-19 PROCEDURE — 96372 THER/PROPH/DIAG INJ SC/IM: CPT

## 2018-04-19 RX ORDER — CYANOCOBALAMIN 1000 UG/ML
1000 INJECTION, SOLUTION INTRAMUSCULAR; SUBCUTANEOUS CONTINUOUS PRN
Status: DISCONTINUED | OUTPATIENT
Start: 2018-04-19 | End: 2019-11-26

## 2018-04-19 RX ADMIN — CYANOCOBALAMIN 1000 MCG: 1000 INJECTION, SOLUTION INTRAMUSCULAR at 10:51

## 2018-04-19 NOTE — TELEPHONE ENCOUNTER
His vitamin B12 level was checked in early 2017 and was somewhat low.  Normal levels go up to about 1000.     Metformin as well as other medications can inhibit B vitamin absorption.    Likely has multiple low or low normal B vitamin levels.    Have patient come in for a B12 shot once weekly for a month and see if his symptoms improve.  He could also start a B complex tablet Monday Wednesday and Friday.    If he is still having fatigue and malaise symptoms we could send him off for another internal medicine consultation again.    Vitamin B12   Date Value Ref Range Status   01/20/2017 481 180 - 914 pg/mL      Cm Pickens MD

## 2018-04-19 NOTE — TELEPHONE ENCOUNTER
The patient was contacted and given the information below. He stated that he has a burning sensation all over his body and that he doesn't think B12 will help him for what he needs. He would really like the referral placed to [facility] in [place] and would like to be seen by Cm Pickens MD this week. The referral for [facility] is teed up below.  Kerri Lara LPN on 4/19/2018 at 9:03 AM

## 2018-04-19 NOTE — TELEPHONE ENCOUNTER
He will get B-12 shot today and call tomorrow with how he is doing. May want to be seen Friday 4/20.     Cm Pickens MD

## 2018-04-20 ENCOUNTER — OFFICE VISIT (OUTPATIENT)
Dept: INTERNAL MEDICINE | Facility: OTHER | Age: 73
End: 2018-04-20
Attending: INTERNAL MEDICINE
Payer: MEDICARE

## 2018-04-20 ENCOUNTER — TELEPHONE (OUTPATIENT)
Dept: INTERNAL MEDICINE | Facility: OTHER | Age: 73
End: 2018-04-20

## 2018-04-20 VITALS
SYSTOLIC BLOOD PRESSURE: 138 MMHG | DIASTOLIC BLOOD PRESSURE: 80 MMHG | BODY MASS INDEX: 30.2 KG/M2 | WEIGHT: 191.38 LBS | HEART RATE: 72 BPM | TEMPERATURE: 96.1 F

## 2018-04-20 DIAGNOSIS — E53.8 LOW SERUM VITAMIN B12: ICD-10-CM

## 2018-04-20 DIAGNOSIS — R94.31 ST SEGMENT CHANGES ON ELECTROCARDIOGRAM: ICD-10-CM

## 2018-04-20 DIAGNOSIS — R53.81 MALAISE: ICD-10-CM

## 2018-04-20 DIAGNOSIS — E78.2 MIXED HYPERLIPIDEMIA: ICD-10-CM

## 2018-04-20 DIAGNOSIS — R06.09 EXERTIONAL DYSPNEA: ICD-10-CM

## 2018-04-20 DIAGNOSIS — M62.81 GENERALIZED MUSCLE WEAKNESS: ICD-10-CM

## 2018-04-20 DIAGNOSIS — E11.42 CONTROLLED TYPE 2 DIABETES MELLITUS WITH DIABETIC POLYNEUROPATHY, WITHOUT LONG-TERM CURRENT USE OF INSULIN (H): ICD-10-CM

## 2018-04-20 DIAGNOSIS — R07.9 CHEST PAIN, UNSPECIFIED TYPE: Primary | ICD-10-CM

## 2018-04-20 DIAGNOSIS — R42 DIZZINESS: ICD-10-CM

## 2018-04-20 PROBLEM — R80.9 MICROALBUMINURIA: Status: ACTIVE | Noted: 2018-01-26

## 2018-04-20 LAB
ALBUMIN SERPL-MCNC: 4.5 G/DL (ref 3.5–5.7)
ALP SERPL-CCNC: 64 U/L (ref 34–104)
ALT SERPL W P-5'-P-CCNC: 9 U/L (ref 7–52)
ANION GAP SERPL CALCULATED.3IONS-SCNC: 10 MMOL/L (ref 3–14)
AST SERPL W P-5'-P-CCNC: 12 U/L (ref 13–39)
BASOPHILS # BLD AUTO: 0 10E9/L (ref 0–0.2)
BASOPHILS NFR BLD AUTO: 0.5 %
BILIRUB SERPL-MCNC: 0.7 MG/DL (ref 0.3–1)
BUN SERPL-MCNC: 16 MG/DL (ref 7–25)
CALCIUM SERPL-MCNC: 9.3 MG/DL (ref 8.6–10.3)
CHLORIDE SERPL-SCNC: 103 MMOL/L (ref 98–107)
CHOLEST SERPL-MCNC: 186 MG/DL
CO2 SERPL-SCNC: 26 MMOL/L (ref 21–31)
CREAT SERPL-MCNC: 0.85 MG/DL (ref 0.7–1.3)
DIFFERENTIAL METHOD BLD: NORMAL
EOSINOPHIL # BLD AUTO: 0.1 10E9/L (ref 0–0.7)
EOSINOPHIL NFR BLD AUTO: 2.1 %
ERYTHROCYTE [DISTWIDTH] IN BLOOD BY AUTOMATED COUNT: 12.9 % (ref 10–15)
GFR SERPL CREATININE-BSD FRML MDRD: 88 ML/MIN/1.7M2
GLUCOSE SERPL-MCNC: 137 MG/DL (ref 70–105)
HBA1C MFR BLD: 6.2 % (ref 4–6)
HCT VFR BLD AUTO: 46.3 % (ref 40–53)
HDLC SERPL-MCNC: 42 MG/DL (ref 23–92)
HGB BLD-MCNC: 15.9 G/DL (ref 13.3–17.7)
IMM GRANULOCYTES # BLD: 0 10E9/L (ref 0–0.4)
IMM GRANULOCYTES NFR BLD: 0 %
LDLC SERPL CALC-MCNC: 118 MG/DL
LYMPHOCYTES # BLD AUTO: 1.3 10E9/L (ref 0.8–5.3)
LYMPHOCYTES NFR BLD AUTO: 22.1 %
MCH RBC QN AUTO: 30.6 PG (ref 26.5–33)
MCHC RBC AUTO-ENTMCNC: 34.3 G/DL (ref 31.5–36.5)
MCV RBC AUTO: 89 FL (ref 78–100)
MONOCYTES # BLD AUTO: 0.5 10E9/L (ref 0–1.3)
MONOCYTES NFR BLD AUTO: 7.9 %
NEUTROPHILS # BLD AUTO: 3.8 10E9/L (ref 1.6–8.3)
NEUTROPHILS NFR BLD AUTO: 67.4 %
NONHDLC SERPL-MCNC: 144 MG/DL
PLATELET # BLD AUTO: 224 10E9/L (ref 150–450)
POTASSIUM SERPL-SCNC: 4.2 MMOL/L (ref 3.5–5.1)
PROT SERPL-MCNC: 7.4 G/DL (ref 6.4–8.9)
RBC # BLD AUTO: 5.2 10E12/L (ref 4.4–5.9)
SODIUM SERPL-SCNC: 139 MMOL/L (ref 134–144)
TRIGL SERPL-MCNC: 131 MG/DL
TROPONIN I SERPL-MCNC: <0.03 UG/L (ref 0–0.03)
WBC # BLD AUTO: 5.7 10E9/L (ref 4–11)

## 2018-04-20 PROCEDURE — 84484 ASSAY OF TROPONIN QUANT: CPT | Performed by: INTERNAL MEDICINE

## 2018-04-20 PROCEDURE — 85025 COMPLETE CBC W/AUTO DIFF WBC: CPT | Performed by: INTERNAL MEDICINE

## 2018-04-20 PROCEDURE — G0463 HOSPITAL OUTPT CLINIC VISIT: HCPCS

## 2018-04-20 PROCEDURE — 93005 ELECTROCARDIOGRAM TRACING: CPT | Performed by: INTERNAL MEDICINE

## 2018-04-20 PROCEDURE — 83036 HEMOGLOBIN GLYCOSYLATED A1C: CPT | Performed by: INTERNAL MEDICINE

## 2018-04-20 PROCEDURE — 93010 ELECTROCARDIOGRAM REPORT: CPT | Performed by: INTERNAL MEDICINE

## 2018-04-20 PROCEDURE — 36415 COLL VENOUS BLD VENIPUNCTURE: CPT | Performed by: INTERNAL MEDICINE

## 2018-04-20 PROCEDURE — 99215 OFFICE O/P EST HI 40 MIN: CPT | Mod: 25 | Performed by: INTERNAL MEDICINE

## 2018-04-20 PROCEDURE — G0463 HOSPITAL OUTPT CLINIC VISIT: HCPCS | Mod: 25

## 2018-04-20 PROCEDURE — 80053 COMPREHEN METABOLIC PANEL: CPT | Performed by: INTERNAL MEDICINE

## 2018-04-20 PROCEDURE — 80061 LIPID PANEL: CPT | Performed by: INTERNAL MEDICINE

## 2018-04-20 ASSESSMENT — PAIN SCALES - GENERAL: PAINLEVEL: MODERATE PAIN (5)

## 2018-04-20 ASSESSMENT — ENCOUNTER SYMPTOMS
WOUND: 0
ABDOMINAL PAIN: 0
CONFUSION: 0
PALPITATIONS: 0
ADENOPATHY: 0
HEADACHES: 0
CHILLS: 0
FEVER: 0
HEMATURIA: 0
WHEEZING: 0
BACK PAIN: 0
STRIDOR: 0
AGITATION: 0
FATIGUE: 1
LIGHT-HEADEDNESS: 0
BRUISES/BLEEDS EASILY: 0

## 2018-04-20 ASSESSMENT — ANXIETY QUESTIONNAIRES
2. NOT BEING ABLE TO STOP OR CONTROL WORRYING: NOT AT ALL
5. BEING SO RESTLESS THAT IT IS HARD TO SIT STILL: NOT AT ALL
IF YOU CHECKED OFF ANY PROBLEMS ON THIS QUESTIONNAIRE, HOW DIFFICULT HAVE THESE PROBLEMS MADE IT FOR YOU TO DO YOUR WORK, TAKE CARE OF THINGS AT HOME, OR GET ALONG WITH OTHER PEOPLE: NOT DIFFICULT AT ALL
GAD7 TOTAL SCORE: 0
1. FEELING NERVOUS, ANXIOUS, OR ON EDGE: NOT AT ALL
7. FEELING AFRAID AS IF SOMETHING AWFUL MIGHT HAPPEN: NOT AT ALL
6. BECOMING EASILY ANNOYED OR IRRITABLE: NOT AT ALL
3. WORRYING TOO MUCH ABOUT DIFFERENT THINGS: NOT AT ALL

## 2018-04-20 ASSESSMENT — PATIENT HEALTH QUESTIONNAIRE - PHQ9: 5. POOR APPETITE OR OVEREATING: NOT AT ALL

## 2018-04-20 NOTE — NURSING NOTE
Patient presents to the clinic for weakness, fatigue, dizzy and burning sensation all over the body for the past 2 weeks.      Britta Guzman LPN 4/20/2018 8:30 AM

## 2018-04-20 NOTE — TELEPHONE ENCOUNTER
Patient would like to have a work in  today for hot flashes, fatigue. Patient could not make it here by 8:40 appt.. that was available.

## 2018-04-20 NOTE — PROGRESS NOTES
Nursing Notes:   Britta Guzman LPN  4/20/2018  8:39 AM  Signed  Patient presents to the clinic for weakness, fatigue, dizzy and burning sensation all over the body for the past 2 weeks.      Britta Guzman LPN 4/20/2018 8:30 AM      Nursing note reviewed with patient.  Accurracy and completeness verified.   Mr. Basilio is a 72 year old male who:  Patient presents with:  Clinic Care Coordination - Follow-up    HPI     ICD-10-CM    1. Chest pain, unspecified type R07.9 CTA Angiogram coronary artery   2. ST segment changes on electrocardiogram R94.31 CTA Angiogram coronary artery   3. Exertional dyspnea R06.09 EKG 12-lead, tracing only     CTA Angiogram coronary artery     CBC with platelets and differential     Comprehensive metabolic panel (BMP + Alb, Alk Phos, ALT, AST, Total. Bili, TP)     Troponin I   4. Controlled type 2 diabetes mellitus with diabetic polyneuropathy, without long-term current use of insulin (H) E11.42 Hemoglobin A1c   5. Malaise R53.81 EKG 12-lead, tracing only   6. Generalized muscle weakness M62.81    7. Dizziness R42    8. Mixed hyperlipidemia E78.2 Lipid Profile (Chol, Trig, HDL, LDL calc)   9. Low serum vitamin B12 R79.89      Patient called in the clinic yesterday looking for some help with his nonspecific symptoms.  He states that he was walking 4-6 miles a day probably 4 days a week as of about 2-1/2-3 weeks ago but then when he went out to try walking the last time he was so fatigued he was not able to actually walk his normal distance.  He states that he has been way more tired than normal.  He has been sleeping a lot.  States that he gets some burning in his throat at times some nonspecific chest symptoms.  Feels some numbness metallic taste in his mouth at times.  Is not really sure what brings the symptoms on.  He is not really sure what makes them go away.  This seemed to be more persistent recently.    Patient has had normal stress echo couple years ago.  He has not had an EKG  recently.  He has a type II diabetic with good control.  He has been using insulin.  Cholesterol levels have been showing LDL in the 90 range.    EKG today actually shows some precordial ST segment changes concerning for ischemia.  Recommends troponin today.  Labs.  We discussed repeat stress testing versus coronary CT angiogram and he wishes to find out his coronary anatomy.  Denies outright chest pain.  Has not exerted himself very much at all since his fatigue and tiredness started 2-1/2 - 3 weeks ago.    Diabetes, has been controlled.  Due for labs today.    Malaise and nonspecific chest pain symptoms, check EKG.    Reports some generalized muscle weakness or fatigue.  B12 levels were at the low end of the normal spectrum when last checked.  He did come in for a B12 shot yesterday but did not really notice any improvement in his symptoms as of today.    Hyperlipidemia, currently on simvastatin 10 mg daily in the evening.  Recheck labs today.    Functional Capacity: less than 4 METS.  Feels like throat is burning at times.  Metallic numbness taste in his mouth at times.  Did go to the dentist recently and had some of his old fillings changed.  + burning symptoms through his body.   Review of Systems   Constitutional: Positive for fatigue. Negative for chills and fever.   HENT: Negative for congestion.    Respiratory: Negative for wheezing and stridor.    Cardiovascular: Negative for palpitations and leg swelling.   Gastrointestinal: Negative for abdominal pain.   Genitourinary: Negative for hematuria.   Musculoskeletal: Negative for back pain and gait problem.   Skin: Negative for rash and wound.   Neurological: Negative for light-headedness and headaches.   Hematological: Negative for adenopathy. Does not bruise/bleed easily.   Psychiatric/Behavioral: Negative for agitation and confusion.        NEERAJ:   NEERAJ-7 SCORE 4/20/2018   Total Score 0     PHQ9:  PHQ-9 SCORE 3/19/2018 4/12/2018 4/20/2018   Total Score 0 0 0        I have personally reviewed the past medical history, past surgical history, medications, allergies, family and social history as listed below, on 4/20/2018.    Allergies   Allergen Reactions     Ace Inhibitors Cough     Atorvastatin Calcium      Lipitor     Hmg-Coa-R Inhibitors Muscle Pain (Myalgia)     Higher doses cause myalgias       Current Outpatient Prescriptions   Medication Sig Dispense Refill     ascorbic acid (VITAMIN C) 500 MG tablet Take 1 tablet by mouth daily. For 60 days       aspirin 81 MG tablet Take 81 mg by mouth daily       blood glucose monitoring (NO BRAND SPECIFIED) test strip Check blood glucose twice daily,  Dx code e11.9. Dispense as covered by patient's insurance. 200 strip 2     Cholecalciferol (D 5000) 5000 UNITS TABS Take 5,000 Units by mouth daily       citalopram (CELEXA) 20 MG tablet Take 10 mg by mouth daily       cyanocobalamin (VITAMIN B12) 1000 MCG/ML injection Inject 1 mL (1,000 mcg) into the muscle every 1 week x4, then every 2 to 4 weeks 1 mL 30     gabapentin (NEURONTIN) 300 MG capsule Take 1 capsule (300 mg) by mouth 2 times daily 180 capsule 3     LORazepam (ATIVAN) 1 MG tablet Take 1 mg by mouth every 6 hours as needed for anxiety       metFORMIN (GLUCOPHAGE) 500 MG tablet 2 in morning, 1 in evening. 60 tablet      simvastatin (ZOCOR) 20 MG tablet Take 10 mg by mouth At Bedtime          Patient Active Problem List    Diagnosis Date Noted     Exertional dyspnea 04/20/2018     Priority: Medium     Low serum vitamin B12 04/18/2018     Priority: Medium     Microalbuminuria 01/26/2018     Priority: Medium     Generalized anxiety disorder 06/05/2017     Priority: Medium     Abnormal CT scan, bladder 06/29/2016     Priority: Medium     Moderate episode of recurrent major depressive disorder (H) 06/16/2016     Priority: Medium     GERD (gastroesophageal reflux disease) 05/03/2013     Priority: Medium     Nephrolithiasis 04/23/2012     Priority: Medium     BPH (benign  prostatic hyperplasia) 03/27/2012     Priority: Medium     Controlled type 2 diabetes mellitus with diabetic polyneuropathy, without long-term current use of insulin (H) 02/17/2010     Priority: Medium     Diabetic peripheral neuropathy (H) 02/17/2010     Priority: Medium     Diverticular disease of colon 02/17/2010     Priority: Medium     Mixed hyperlipidemia 02/17/2010     Priority: Medium     Obesity 02/17/2010     Priority: Medium     Panic disorder without agoraphobia 02/17/2010     Priority: Medium     Myalgia and myositis 02/17/2010     Priority: Medium     Past Medical History:   Diagnosis Date     Diverticulosis of intestine without perforation or abscess without bleeding     No Comments Provided     Fatty (change of) liver, 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 Johnson negative.     COLONOSCOPY      2013,WNL     HERNIA REPAIR  2000     RELEASE CARPAL TUNNEL      2014     VASECTOMY      No Comments Provided     Social History     Social History     Marital status:      Spouse name: N/A     Number of children: N/A     Years of education: N/A     Social History Main Topics     Smoking status: Former Smoker     Types: Cigarettes     Quit date: 10/25/1983     Smokeless tobacco: Never Used     Alcohol use No     Drug use: No     Sexual activity: Not Asked     Other Topics Concern     None     Social History Narrative     ", retired from [employer].  Lives in town.     Family History   Problem Relation Age of Onset     CANCER Father      Cancer, mesothelioma     Colon Cancer Mother      Cancer-colon,     Genitourinary Problems Mother      Genitourinary Disease,renal failure     DIABETES Mother      Diabetes     Other - See Comments Sister      Renal transplant     Type 2 Diabetes Sister      Diabetes type II     DIABETES Brother      Diabetes       EXAM:   Vitals:    18 0833   BP: 138/80   BP Location: Right arm   Patient Position: Sitting   Cuff Size: Adult Regular   Pulse: 72   Temp: 96.1  F (35.6  C)   TempSrc: Tympanic   Weight: 191 lb 6 oz (86.8 kg)       Current Pain Score: Moderate Pain (5)     BP Readings from Last 3 Encounters:   18 138/80   18 138/84   18 138/70    Wt Readings from Last 3 Encounters:   18 191 lb 6 oz (86.8 kg)   18 193 lb 9.6 oz (87.8 kg)   18 199 lb 3.2 oz (90.4 kg)      Estimated body mass index is 30.2 kg/(m^2) as calculated from the following:    Height as of 18: 5' 6.75\" (1.695 m).    Weight as of this encounter: 191 lb 6 oz (86.8 kg).     Physical Exam   Constitutional: He appears well-developed and well-nourished.   HENT:   Head: Normocephalic and atraumatic.   Eyes: Conjunctivae are normal. No scleral icterus.   Neck: No tracheal deviation present.   Cardiovascular: Normal rate and regular rhythm.    Pulmonary/Chest: Effort normal. No respiratory distress.   Abdominal: Soft. There is no tenderness.   Musculoskeletal: He exhibits no edema or deformity.   Lymphadenopathy:     He has no cervical adenopathy.   Neurological: He is alert.   Skin: Skin is warm and dry.   Has a few follicular inflammatory lesions on his lower legs.  No obvious cellulitis.   Psychiatric: He has a normal mood and affect.       INVESTIGATIONS:    Results for orders placed or performed in visit on 18   CBC with platelets and differential   Result Value Ref Range    " WBC 5.7 4.0 - 11.0 10e9/L    RBC Count 5.20 4.4 - 5.9 10e12/L    Hemoglobin 15.9 13.3 - 17.7 g/dL    Hematocrit 46.3 40.0 - 53.0 %    MCV 89 78 - 100 fl    MCH 30.6 26.5 - 33.0 pg    MCHC 34.3 31.5 - 36.5 g/dL    RDW 12.9 10.0 - 15.0 %    Platelet Count 224 150 - 450 10e9/L    Diff Method Automated Method     % Neutrophils 67.4 %    % Lymphocytes 22.1 %    % Monocytes 7.9 %    % Eosinophils 2.1 %    % Basophils 0.5 %    % Immature Granulocytes 0.0 %    Absolute Neutrophil 3.8 1.6 - 8.3 10e9/L    Absolute Lymphocytes 1.3 0.8 - 5.3 10e9/L    Absolute Monocytes 0.5 0.0 - 1.3 10e9/L    Absolute Eosinophils 0.1 0.0 - 0.7 10e9/L    Absolute Basophils 0.0 0.0 - 0.2 10e9/L    Abs Immature Granulocytes 0.0 0 - 0.4 10e9/L   Troponin I   Result Value Ref Range    Troponin I ES <0.030 0.000 - 0.034 ug/L   Hemoglobin A1c   Result Value Ref Range    Hemoglobin A1C 6.2 (H) 4.0 - 6.0 %   EKG 12-lead, tracing only    Narrative    normal sinus rhythm.  Rate 69.  Leftward axis.  T-wave inversion in the precordial leads V1 through V4.  Normal IL interval.  Normal QTc interval.  Abnormal EKG.  Compared to previous tracing dated 4/12/2016, precordial ST changes are new.  Cm Pickens MD       4/20/2018 - normal sinus rhythm.  Rate 69.  Leftward axis.  T-wave inversion in the precordial leads V1 through V4.  Normal IL interval.  Normal QTc interval.  Abnormal EKG.  Compared to previous tracing dated 4/12/2016, precordial ST changes are new.    Results for orders placed or performed during the hospital encounter of 03/19/18   XR Chest 2 Views    Narrative    PROCEDURE:  XR CHEST 2 VW    HISTORY: ; Cough, .    COMPARISON:  4/12/2016    FINDINGS:  The cardiomediastinal contours are normal.  The trachea is midline.  No focal consolidation, effusion or pneumothorax.  Linear scarring or  atelectasis over the lingula and right middle lobe is unchanged.  No suspicious osseous lesion or subdiaphragmatic free air.      Impression    IMPRESSION:       No focal consolidation.      LILIAN ACEVEDO MD       ASSESSMENT AND PLAN:  Problem List Items Addressed This Visit        Nervous and Auditory    Controlled type 2 diabetes mellitus with diabetic polyneuropathy, without long-term current use of insulin (H)    Relevant Orders    Hemoglobin A1c (Completed)       Respiratory    Exertional dyspnea    Relevant Orders    EKG 12-lead, tracing only (Completed)    CTA Angiogram coronary artery    CBC with platelets and differential (Completed)    Comprehensive metabolic panel (BMP + Alb, Alk Phos, ALT, AST, Total. Bili, TP) (Completed)    Troponin I (Completed)       Endocrine    Mixed hyperlipidemia    Relevant Orders    Lipid Profile (Chol, Trig, HDL, LDL calc) (Completed)       Other    Low serum vitamin B12      Other Visit Diagnoses     Chest pain, unspecified type    -  Primary    Relevant Orders    CTA Angiogram coronary artery    ST segment changes on electrocardiogram        Relevant Orders    CTA Angiogram coronary artery    Malaise        Relevant Orders    EKG 12-lead, tracing only (Completed)    Generalized muscle weakness        Dizziness            cardiovascular risk and specific lipid/LDL goals reviewed, use of aspirin to prevent MI and TIA's discussed  -- Expected clinical course discussed    -- Medications and their side effects discussed    40 minutes spent in face-to-face interaction with patient with greater than 50% spent in counseling and care coordination of listed medical problems.      The 10-year ASCVD risk score (Grafton ANTHONY Jr, et al., 2013) is: 42%    Values used to calculate the score:      Age: 72 years      Sex: Male      Is Non- : No      Diabetic: Yes      Tobacco smoker: No      Systolic Blood Pressure: 138 mmHg      Is BP treated: No      HDL Cholesterol: 44 mg/dL      Total Cholesterol: 147 mg/dL    Patient Instructions   B12 was previously on low-side of normal.... Recommend that you proceed with another  B12 shot in 1 week.     Concerning story with your recent significant fatigue and exertional intolerance... Recommend heart work-up.     Labs and EKG today.   CT coronary artery study ordered  - they will call with date/time of appointment.      Continue aspirin daily.     Return as needed for follow-up with Dr. Pickens.    Clinic : [phone]  Appointment line: [phone]      Cm Pickens MD  Internal Medicine  [facility]

## 2018-04-20 NOTE — LETTER
Dajuan Basilio  [address]  [facility] [place] [address]    4/25/2018      Dear Dajuan Basilio,    The result of your recent tests are included below:    Diabetes is controlled.    Results for orders placed or performed in visit on 04/20/18   CBC with platelets and differential   Result Value Ref Range    WBC 5.7 4.0 - 11.0 10e9/L    RBC Count 5.20 4.4 - 5.9 10e12/L    Hemoglobin 15.9 13.3 - 17.7 g/dL    Hematocrit 46.3 40.0 - 53.0 %    MCV 89 78 - 100 fl    MCH 30.6 26.5 - 33.0 pg    MCHC 34.3 31.5 - 36.5 g/dL    RDW 12.9 10.0 - 15.0 %    Platelet Count 224 150 - 450 10e9/L    Diff Method Automated Method     % Neutrophils 67.4 %    % Lymphocytes 22.1 %    % Monocytes 7.9 %    % Eosinophils 2.1 %    % Basophils 0.5 %    % Immature Granulocytes 0.0 %    Absolute Neutrophil 3.8 1.6 - 8.3 10e9/L    Absolute Lymphocytes 1.3 0.8 - 5.3 10e9/L    Absolute Monocytes 0.5 0.0 - 1.3 10e9/L    Absolute Eosinophils 0.1 0.0 - 0.7 10e9/L    Absolute Basophils 0.0 0.0 - 0.2 10e9/L    Abs Immature Granulocytes 0.0 0 - 0.4 10e9/L   Comprehensive metabolic panel (BMP + Alb, Alk Phos, ALT, AST, Total. Bili, TP)   Result Value Ref Range    Sodium 139 134 - 144 mmol/L    Potassium 4.2 3.5 - 5.1 mmol/L    Chloride 103 98 - 107 mmol/L    Carbon Dioxide 26 21 - 31 mmol/L    Anion Gap 10 3 - 14 mmol/L    Glucose 137 (H) 70 - 105 mg/dL    Urea Nitrogen 16 7 - 25 mg/dL    Creatinine 0.85 0.70 - 1.30 mg/dL    GFR Estimate 88 >60 mL/min/1.7m2    GFR Estimate If Black >90 >60 mL/min/1.7m2    Calcium 9.3 8.6 - 10.3 mg/dL    Bilirubin Total 0.7 0.3 - 1.0 mg/dL    Albumin 4.5 3.5 - 5.7 g/dL    Protein Total 7.4 6.4 - 8.9 g/dL    Alkaline Phosphatase 64 34 - 104 U/L    ALT 9 7 - 52 U/L    AST 12 (L) 13 - 39 U/L   Troponin I   Result Value Ref Range    Troponin I ES <0.030 0.000 - 0.034 ug/L   Lipid Profile (Chol, Trig, HDL, LDL calc)   Result Value Ref Range    Cholesterol 186 <200 mg/dL    Triglycerides 131 <150 mg/dL    HDL Cholesterol 42  23 - 92 mg/dL    LDL Cholesterol Calculated 118 (H) <100 mg/dL    Non HDL Cholesterol 144 (H) <130 mg/dL   Hemoglobin A1c   Result Value Ref Range    Hemoglobin A1C 6.2 (H) 4.0 - 6.0 %   EKG 12-lead, tracing only    Narrative    normal sinus rhythm.  Rate 69.  Leftward axis.  T-wave inversion in the precordial leads V1 through V4.  Normal DE interval.  Normal QTc interval.  Abnormal EKG.  Compared to previous tracing dated 4/12/2016, precordial ST changes are new.  Cm Pickens MD        If you have any further questions or problems, please contact my office at [phone] and schedule an appointment.    Clinic : [phone]  Appointment line: [phone]     Thank you,    Cm Pickens MD    Internal Medicine  [facility]     Reviewed and electronically signed by provider.

## 2018-04-20 NOTE — MR AVS SNAPSHOT
After Visit Summary   4/20/2018    Dajuan Basilio    MRN: [id number]           Patient Information     Date Of Birth          1945        Visit Information        Provider Department      4/20/2018 8:40 AM Cm Pickens MD [facility]        Today's Diagnoses     Exertional dyspnea    -  1    Controlled type 2 diabetes mellitus with diabetic polyneuropathy, without long-term current use of insulin (H)        Malaise        Generalized muscle weakness        Dizziness        Mixed hyperlipidemia          Care Instructions    B12 was previously on low-side of normal.... Recommend that you proceed with another B12 shot in 1 week.     Concerning story with your recent significant fatigue and exertional intolerance... Recommend heart work-up.     Labs and EKG today.   CT coronary artery study ordered  - they will call with date/time of appointment.      Continue aspirin daily.     Return as needed for follow-up with Dr. Pickens.    Clinic : [phone]  Appointment line: [phone]            Follow-ups after your visit        Follow-up notes from your care team     Return if symptoms worsen or fail to improve.      Your next 10 appointments already scheduled     Apr 26, 2018  9:30 AM CDT   Nurse Only with GH INJECTION NURSE   [facility] ([facility])    [address]  [place] [address]   [phone]            May 03, 2018 10:00 AM CDT   Nurse Only with GH INJECTION NURSE   [facility] ([facility])    [address]  [place] [address]   [phone]            May 10, 2018  9:30 AM CDT   Nurse Only with GH INJECTION NURSE   [facility] ([facility])    [address]  [place] [address]   [phone]            May 24, 2018 10:30 AM CDT   Nurse Only with GH INJECTION NURSE   [facility]  "Hospital ([facility])    [address]  [place] [address] [phone]              Future tests that were ordered for you today     Open Future Orders        Priority Expected Expires Ordered    CTA Angiogram coronary artery Routine  2019            Who to contact     If you have questions or need follow up information about today's clinic visit or your schedule please contact [facility] directly at [phone].  Normal or non-critical lab and imaging results will be communicated to you by [employer], letter or phone within 4 business days after the clinic has received the results. If you do not hear from us within 7 days, please contact the clinic through [employer] or phone. If you have a critical or abnormal lab result, we will notify you by phone as soon as possible.  Submit refill requests through [employer] or call your pharmacy and they will forward the refill request to us. Please allow 3 business days for your refill to be completed.          Additional Information About Your Visit        [employer] Information     [employer] lets you send messages to your doctor, view your test results, renew your prescriptions, schedule appointments and more. To sign up, go to [email or web address] . Click on \"Log in\" on the left side of the screen, which will take you to the Welcome page. Then click on \"Sign up Now\" on the right side of the page.     You will be asked to enter the access code listed below, as well as some personal information. Please follow the directions to create your username and password.     Your access code is: [email or web address]  Expires: 2018  2:53 PM     Your access code will  in 90 days. If you need help or a new code, please call your [facility] clinic or [phone].        Care EveryWhere ID     This is your Care EveryWhere ID. This could be used by other organizations to access your [facility] medical records  [id number]   "      Your Vitals Were     Pulse Temperature BMI (Body Mass Index)             72 96.1  F (35.6  C) (Tympanic) 30.2 kg/m2          Blood Pressure from Last 3 Encounters:   04/20/18 138/80   04/12/18 138/84   03/19/18 138/70    Weight from Last 3 Encounters:   04/20/18 191 lb 6 oz (86.8 kg)   04/12/18 193 lb 9.6 oz (87.8 kg)   03/19/18 199 lb 3.2 oz (90.4 kg)              We Performed the Following     CBC with platelets and differential     Comprehensive metabolic panel (BMP + Alb, Alk Phos, ALT, AST, Total. Bili, TP)     EKG 12-lead, tracing only     Hemoglobin A1c     Lipid Profile (Chol, Trig, HDL, LDL calc)     Troponin I        Primary Care Provider Office Phone # Fax #    Brett Olivas [phone] [address] [facility] [address]        Equal Access to Services     [facility] : Hadii aad ku hadasho Soomaali, waaxda luqadaha, qaybta kaalmada adeegyada, waxay jenna haymagn magnus lassiter . So [facility] [phone].    ATENCIÓN: Si habla espjose luis, tiene a powell disposición servicios gratuitos de asistencia lingüística. Llame al [phone].    We comply with applicable federal civil rights laws and [place] laws. We do not discriminate on the basis of race, color, national origin, age, disability, sex, sexual orientation, or gender identity.            Thank you!     Thank you for choosing [facility]  for your care. Our goal is always to provide you with excellent care. Hearing back from our patients is one way we can continue to improve our services. Please take a few minutes to complete the written survey that you may receive in the mail after your visit with us. Thank you!             Your Updated Medication List - Protect others around you: Learn how to safely use, store and throw away your medicines at www.disposemymeds.org.          This list is accurate as of 4/20/18  8:56 AM.  Always use your most recent med list.                   Brand Name  Dispense Instructions for use Diagnosis    ascorbic acid 500 MG tablet    VITAMIN C     Take 1 tablet by mouth daily. For 60 days        aspirin 81 MG tablet      Take 81 mg by mouth daily        blood glucose monitoring test strip    no brand specified    200 strip    Check blood glucose twice daily,  Dx code e11.9. Dispense as covered by patient's insurance.    Type 2 diabetes mellitus with diabetic nephropathy, without long-term current use of insulin (H)       citalopram 20 MG tablet    celeXA     Take 10 mg by mouth daily        cyanocobalamin 1000 MCG/ML injection    VITAMIN B12    1 mL    Inject 1 mL (1,000 mcg) into the muscle every 1 week x4, then every 2 to 4 weeks    Low serum vitamin B12       D 5000 5000 units Tabs   Generic drug:  Cholecalciferol      Take 5,000 Units by mouth daily        gabapentin 300 MG capsule    NEURONTIN    180 capsule    Take 1 capsule (300 mg) by mouth 2 times daily    Diabetic peripheral neuropathy (H), Type 2 diabetes mellitus with diabetic nephropathy, without long-term current use of insulin (H)       LORazepam 1 MG tablet    ATIVAN     Take 1 mg by mouth every 6 hours as needed for anxiety        metFORMIN 500 MG tablet    GLUCOPHAGE    60 tablet    2 in morning, 1 in evening.        simvastatin 20 MG tablet    ZOCOR     Take 10 mg by mouth At Bedtime

## 2018-04-20 NOTE — PATIENT INSTRUCTIONS
B12 was previously on low-side of normal.... Recommend that you proceed with another B12 shot in 1 week.     Concerning story with your recent significant fatigue and exertional intolerance... Recommend heart work-up.     Labs and EKG today.   CT coronary artery study ordered  - they will call with date/time of appointment.      Continue aspirin daily.     Return as needed for follow-up with Dr. Pickens.    Clinic : [phone]  Appointment line: [phone]

## 2018-04-21 ASSESSMENT — ANXIETY QUESTIONNAIRES: GAD7 TOTAL SCORE: 0

## 2018-04-21 ASSESSMENT — PATIENT HEALTH QUESTIONNAIRE - PHQ9: SUM OF ALL RESPONSES TO PHQ QUESTIONS 1-9: 0

## 2018-04-23 ENCOUNTER — TRANSFERRED RECORDS (OUTPATIENT)
Dept: HEALTH INFORMATION MANAGEMENT | Facility: OTHER | Age: 73
End: 2018-04-23

## 2018-04-24 ENCOUNTER — TELEPHONE (OUTPATIENT)
Dept: CARDIOLOGY | Facility: OTHER | Age: 73
End: 2018-04-24

## 2018-04-26 ENCOUNTER — HOSPITAL ENCOUNTER (OUTPATIENT)
Dept: CT IMAGING | Facility: OTHER | Age: 73
Discharge: HOME | End: 2018-04-26
Attending: INTERNAL MEDICINE | Admitting: INTERNAL MEDICINE
Payer: MEDICARE

## 2018-04-26 ENCOUNTER — ALLIED HEALTH/NURSE VISIT (OUTPATIENT)
Dept: FAMILY MEDICINE | Facility: OTHER | Age: 73
End: 2018-04-26
Attending: INTERNAL MEDICINE
Payer: MEDICARE

## 2018-04-26 VITALS
HEIGHT: 67 IN | WEIGHT: 191 LBS | HEART RATE: 68 BPM | SYSTOLIC BLOOD PRESSURE: 125 MMHG | DIASTOLIC BLOOD PRESSURE: 71 MMHG | BODY MASS INDEX: 29.98 KG/M2 | OXYGEN SATURATION: 99 %

## 2018-04-26 DIAGNOSIS — R93.1 AGATSTON CORONARY ARTERY CALCIUM SCORE BETWEEN 200 AND 399: ICD-10-CM

## 2018-04-26 DIAGNOSIS — R06.09 EXERTIONAL DYSPNEA: ICD-10-CM

## 2018-04-26 DIAGNOSIS — R06.09 EXERTIONAL DYSPNEA: Primary | ICD-10-CM

## 2018-04-26 DIAGNOSIS — E53.8 LOW SERUM VITAMIN B12: Primary | ICD-10-CM

## 2018-04-26 DIAGNOSIS — R94.31 ABNORMAL ELECTROCARDIOGRAM: ICD-10-CM

## 2018-04-26 DIAGNOSIS — R07.9 CHEST PAIN, UNSPECIFIED TYPE: ICD-10-CM

## 2018-04-26 DIAGNOSIS — R94.31 ST SEGMENT CHANGES ON ELECTROCARDIOGRAM: ICD-10-CM

## 2018-04-26 PROBLEM — I25.10 CORONARY ARTERY DISEASE, NON-OCCLUSIVE: Status: RESOLVED | Noted: 2018-04-26 | Resolved: 2018-04-26

## 2018-04-26 PROBLEM — I25.10 CORONARY ARTERY DISEASE, NON-OCCLUSIVE: Status: ACTIVE | Noted: 2018-04-26

## 2018-04-26 PROCEDURE — 25000132 ZZH RX MED GY IP 250 OP 250 PS 637: Mod: GY | Performed by: RADIOLOGY

## 2018-04-26 PROCEDURE — A9270 NON-COVERED ITEM OR SERVICE: HCPCS | Mod: GY | Performed by: RADIOLOGY

## 2018-04-26 PROCEDURE — 96372 THER/PROPH/DIAG INJ SC/IM: CPT

## 2018-04-26 PROCEDURE — 75574 CT ANGIO HRT W/3D IMAGE: CPT

## 2018-04-26 PROCEDURE — 25000128 H RX IP 250 OP 636: Performed by: INTERNAL MEDICINE

## 2018-04-26 PROCEDURE — 25000125 ZZHC RX 250: Performed by: INTERNAL MEDICINE

## 2018-04-26 RX ORDER — METOPROLOL TARTRATE 1 MG/ML
10 INJECTION, SOLUTION INTRAVENOUS
Status: DISCONTINUED | OUTPATIENT
Start: 2018-04-26 | End: 2018-04-27 | Stop reason: HOSPADM

## 2018-04-26 RX ORDER — METOPROLOL TARTRATE 100 MG
100 TABLET ORAL
Status: COMPLETED | OUTPATIENT
Start: 2018-04-26 | End: 2018-04-26

## 2018-04-26 RX ORDER — NITROGLYCERIN 0.4 MG/1
0.4 TABLET SUBLINGUAL
Status: COMPLETED | OUTPATIENT
Start: 2018-04-26 | End: 2018-04-26

## 2018-04-26 RX ADMIN — IOHEXOL 100 ML: 350 INJECTION, SOLUTION INTRAVENOUS at 13:30

## 2018-04-26 RX ADMIN — NITROGLYCERIN 0.4 MG: 0.4 TABLET SUBLINGUAL at 13:11

## 2018-04-26 RX ADMIN — CYANOCOBALAMIN 1000 MCG: 1000 INJECTION, SOLUTION INTRAMUSCULAR at 09:33

## 2018-04-26 RX ADMIN — METOPROLOL TARTRATE 100 MG: 100 TABLET ORAL at 12:05

## 2018-04-26 NOTE — PROGRESS NOTES
1154 Patient arrived for a CCTA. Procedure explained to patient and questions and concerns were reviewed. They were connected to a cardiac monitor and vital signs were obtained. The patient's heart rate was 68 . Medications and allergies were reviewed. Metoprolol 100 mg po given. The procedure was explained, and the patient was instructed to rest and relax, as much as possible. A saline lock was established. With an 18 gauge needle and flushed with saline per protocol.   The patient's heart rate was 57. The patient was offered the restroom, and then they walked to CT. The cardiac monitor was placed there, and the patients heart rate was within target of 53 and NTG was given and the procedure was done. The patient was given one bottle of water,and the IV used for medications and CT contrast was discontinued per protocol and the patient tolerated it well and they were told to  Drink at least 3 other additional glasses of water today, per post contrast instructions. The patient left ambulatory in stable condition.

## 2018-04-26 NOTE — DISCHARGE INSTRUCTIONS
IV Contrast- Discharge Instructions After Your CT Scan      The IV contrast you received today will be filtered from your bloodstream by your kidneys during the next 24 hours and pass from the body in urine.  You will not be aware of this process and your urine will not change in color.  To help this process you should drink at least 4 additional glasses of water or juice today.  This reduces stress on your kidneys.    Most contrast reactions are immediate.  Should you develop symptoms of concern after discharge, contact the department at the number below.  After hours you should contact your personal physician.  If you develop breathing distress or wheezing, call 911.

## 2018-04-27 ENCOUNTER — TELEPHONE (OUTPATIENT)
Dept: INTERNAL MEDICINE | Facility: OTHER | Age: 73
End: 2018-04-27

## 2018-05-02 ENCOUNTER — OFFICE VISIT (OUTPATIENT)
Dept: CARDIOLOGY | Facility: OTHER | Age: 73
End: 2018-05-02
Attending: INTERNAL MEDICINE
Payer: COMMERCIAL

## 2018-05-02 VITALS
HEIGHT: 67 IN | WEIGHT: 194.5 LBS | DIASTOLIC BLOOD PRESSURE: 72 MMHG | BODY MASS INDEX: 30.53 KG/M2 | HEART RATE: 72 BPM | SYSTOLIC BLOOD PRESSURE: 130 MMHG

## 2018-05-02 DIAGNOSIS — R06.09 EXERTIONAL DYSPNEA: ICD-10-CM

## 2018-05-02 DIAGNOSIS — R07.89 LEFT CHEST PRESSURE: ICD-10-CM

## 2018-05-02 DIAGNOSIS — E11.42 CONTROLLED TYPE 2 DIABETES MELLITUS WITH DIABETIC POLYNEUROPATHY, WITHOUT LONG-TERM CURRENT USE OF INSULIN (H): ICD-10-CM

## 2018-05-02 DIAGNOSIS — E78.2 MIXED HYPERLIPIDEMIA: ICD-10-CM

## 2018-05-02 DIAGNOSIS — I25.118 CORONARY ARTERY DISEASE OF NATIVE ARTERY OF NATIVE HEART WITH STABLE ANGINA PECTORIS (H): Primary | ICD-10-CM

## 2018-05-02 DIAGNOSIS — R93.1 ELEVATED CORONARY ARTERY CALCIUM SCORE: ICD-10-CM

## 2018-05-02 DIAGNOSIS — R94.31 ABNORMAL ELECTROCARDIOGRAM: ICD-10-CM

## 2018-05-02 DIAGNOSIS — Z86.79 HISTORY OF RHEUMATIC FEVER: ICD-10-CM

## 2018-05-02 DIAGNOSIS — R53.83 FATIGUE, UNSPECIFIED TYPE: ICD-10-CM

## 2018-05-02 PROCEDURE — G0463 HOSPITAL OUTPT CLINIC VISIT: HCPCS | Mod: 25

## 2018-05-02 PROCEDURE — 99205 OFFICE O/P NEW HI 60 MIN: CPT | Performed by: NURSE PRACTITIONER

## 2018-05-02 PROCEDURE — 93005 ELECTROCARDIOGRAM TRACING: CPT | Performed by: NURSE PRACTITIONER

## 2018-05-02 ASSESSMENT — PATIENT HEALTH QUESTIONNAIRE - PHQ9: 5. POOR APPETITE OR OVEREATING: NOT AT ALL

## 2018-05-02 ASSESSMENT — PAIN SCALES - GENERAL: PAINLEVEL: MODERATE PAIN (4)

## 2018-05-02 ASSESSMENT — ANXIETY QUESTIONNAIRES
6. BECOMING EASILY ANNOYED OR IRRITABLE: NOT AT ALL
GAD7 TOTAL SCORE: 0
7. FEELING AFRAID AS IF SOMETHING AWFUL MIGHT HAPPEN: NOT AT ALL
IF YOU CHECKED OFF ANY PROBLEMS ON THIS QUESTIONNAIRE, HOW DIFFICULT HAVE THESE PROBLEMS MADE IT FOR YOU TO DO YOUR WORK, TAKE CARE OF THINGS AT HOME, OR GET ALONG WITH OTHER PEOPLE: NOT DIFFICULT AT ALL
3. WORRYING TOO MUCH ABOUT DIFFERENT THINGS: NOT AT ALL
1. FEELING NERVOUS, ANXIOUS, OR ON EDGE: NOT AT ALL
5. BEING SO RESTLESS THAT IT IS HARD TO SIT STILL: NOT AT ALL
2. NOT BEING ABLE TO STOP OR CONTROL WORRYING: NOT AT ALL

## 2018-05-02 NOTE — PATIENT INSTRUCTIONS
Angiogram at [facility]'s    Labs at a later date - CBC and BMP    Echocardiogram    Please follow-up with cardiology 1 week after angiogram

## 2018-05-02 NOTE — PROGRESS NOTES
[facility] HEART CARE   CARDIOLOGY CONSULT     Dajuan Basilio   [address] [facility] [address]      Brett Olivas     Chief Complaint   Patient presents with     Consult For     CARCAMO, abnormal echo        HPI:   Mr. Basilio is a 72-year-old male who presents for cardiology consult exertional dyspnea, fatigue, activity intolerance and abnormal CTA of the coronaries on 4/20/18. He has a history of hyperlipidemia, type 2 diabetes with diabetic neuropathy, obesity, GERD, nephrolithiasis, BPH and past history of tobacco abuse. He does report history of rheumatic fever and previous reported cardiac hypertrophy as a teenager.    He has been dealing with complaints of generalized weakness and fatigue for the past month.  He was seen by internal medicine on 4/20/2018.  At that time he reported usual activity includes walking 4-6 miles, 4 days a week without difficulty. Over the past few weeks has noted increased fatigue with his walks and activity intolerance, not able to walk his normal distance with increased exhaustion.  He had previously described burning in his throat and nonspecific chest symptoms. EKG at this last visit with IM was noted to show some precordial ST segment changes concerning for ischemia. Troponin was also drawn at this visit without elevation. CTA coronaries was ordered for further evaluation.     Pateint previously had a stress echocardiogram on 4/29/2016.  This is a negative stress echo for ischemia.  He had normal global systolic function with a poststress EF of greater than 75%.  This is a maximum stress test with 92.5% of age-predicted maximum heart rate achieved.  There was no concerning valvular abnormalities on Doppler exam.  There is no evidence for exercise-induced dysrhythmia and no evidence for exercise-induced myocardial ischemia on stress EKG at that time.     With the above reported symptoms of CARCAMO, fatigue and activity intolerance. Patient underwent a CTA coronary arteries on  "4/20/2018.  He had a total coronary calcium score of 248.7.  Left main was 9.6, .9, LCx 0.9, RCA 59.4.  Evaluation of the coronary vessels showed dominance right, scattered mild to moderate areas of atherosclerotic narrowing as described below:  Left main with calcified plaque and remodeling, mild stenosis measuring up to 15%.  LAD with multifocal mild stenosis proximally, 42% by area and 24% by diameter stenosis just beyond the origin of the first diagonal.  Focal high-grade stenosis was present within the second diagonal measuring 65% by area and 41% by diameter.  LCx was patent approximately.  Motion on the study limited assessment for mild to moderate stenosis in the mid to distal LCx.  RCA with scattered mild luminal narrowing measuring up to 44% by air and 25% by diameter.  Additional findings on this study include visualization of the main pulmonary artery is enlarged which may likely suggest pulmonary hypertension.    Today patient reports continued exhaustion and fatigue, has not been walking at all last two weeks \"no energy\". Noted CARCAMO without shortness of breath with rest. He does complain of a \"mild\" left sided chest pressure for which he has some difficulty describing, does not associate to exertional activity specifically. No radiation of chest pain to arm, neck or back. He continues to feel generalized weakness. No edema. No palpitations, lightheadedness or syncope. CAD risk factors include HLD, DMII and past history of tobacco abuse. He denies any past family history of CAD.    Admits that he was recently started on B12 injection with very little improvement in his energy level/ level of fatigue.     Patient is currently on 81 mg aspirin, citalopram 20 mg daily, prescription vitamin D at 5000 units daily, vitamin B12 injections, gabapentin 300 mg twice daily, Ativan as needed, metformin 1000 mg in the morning and 500 mg in the evening and simvastatin 10 mg at bedtime.      IMAGING RESULTS: "   PROCEDURE:  CTA ANGIOGRAM CORONARY ARTERY, 2018 1:31 PM     HISTORY:  burning in throat, fairly sudden exertional fatigue starting  2.5-3 weeks ago, was walking 4-6 miles 4x weekly, now can't;  Exertional dyspnea; Chest pain, unspecified type; ST segment changes  on electrocardiogram.     TECHNIQUE: A ECG synchronized prospectively triggered noncontrast CT  of the heart was performed. Subsequently, a prospective triggered  helical cardiac acquisition was obtained after the administration of  the contrast bolus for the purposes of coronary assessment. Coronary  calcification and stenosis was analyzed using Siemens Family Arch[employer] Solution[employer]Via  software.      COMPARISON:  Chest radiograph 3/19/2018     FINDINGS:     Examination quality: Good.      CORONARY CALCIUM SCORE:     Left Main:                            9.6  Left anterior descendin.9  Left Circumflex:                  0.9  Right coronary artery:        59.4     TOTAL:                               248.7     The estimated probability of a non-zero calcium score for a   Male of age 72 years is 88%. The observed calcium score of 248.7 is at  52 percentile for subjects of the same age, gender, and race/ethnicity  who are free of clinical cardiovascular disease and treated diabetes.     Dominance of coronary artery system: Right     Coronary Vessels  Left main coronary artery: Calcified plaque with remodeling. Mild  stenosis measuring up to 15% by area and a percent by diameter.     Left anterior descending artery: Multifocal mild stenosis proximally.  42% by area and 24% by diameter stenosis just beyond the origin of the  first diagonal. Slight uncorrected motion limits assessment of the  midportion. No severe distal stenosis. Focal high-grade stenosis is  present within the second diagonal measuring 65% by area and 41% by  diameter.     Left circumflex artery: Patent proximally, mild uncorrected motion  limits assessment for mild to moderate stenosis in the  mid to distal  circumflex.     Right coronary artery: Scattered mild luminal narrowing, measuring up  to 44% by area and 25% by diameter 5.5 cm from the origin.     Cardiac Morphology  The heart and proximal thoracic aorta are within normal limits in  caliber. The main pulmonary artery is enlarged, suggesting pulmonary  hypertension.     Cardiac Function  Not performed due to low-dose technique.     Nonvascular findings  Dependent atelectasis. No intrapulmonary mass or suspicious osseous  lesion         IMPRESSION:       Total calcium score of 248.7, reflecting advanced atherosclerotic  disease. The observed calcium score is at the 52 percentile for  subjects of the same age, gender, and race/ethnicity who are free of  clinical cardiovascular disease and treated diabetes.      Scattered mild to moderate areas of atherosclerotic narrowing of the  coronaries as described above. Stenosis in the LAD measures up to 42%  by area and 24% by diameter. Focal stenosis within the second diagonal  is associated with bulky plaque and measures up to 65% by area and 41%  by diameter. Stenosis in the right coronary measures up to 44% by area  and 25% by diameter.     LILIAN ACEVEDO MD      PAST MEDICAL HISTORY:   Past Medical History:   Diagnosis Date     Diverticulosis of intestine without perforation or abscess without bleeding     No Comments Provided     Fatty (change of) liver, not elsewhere classified     non alcoholic     Fibromyalgia     No Comments Provided     Nodular prostate without lower urinary tract symptoms     2012,US confirms benign calcifications     Obesity     No Comments Provided     Panic disorder without agoraphobia     No Comments Provided     Type 2 diabetes mellitus with diabetic polyneuropathy (H)     No Comments Provided          FAMILY HISTORY:   Family History   Problem Relation Age of Onset     CANCER Father      Cancer, mesothelioma     Colon Cancer Mother      Cancer-colon,      Genitourinary Problems Mother      Genitourinary Disease,renal failure     DIABETES Mother      Diabetes     Other - See Comments Sister      Renal transplant     Type 2 Diabetes Sister      Diabetes type II     DIABETES Brother      Diabetes          PAST SURGICAL HISTORY:   Past Surgical History:   Procedure Laterality Date     ARTHROPLASTY KNEE      2015     ARTHROSCOPY KNEE      right knee X2     BIOPSY PROSTATE TRANSRECTAL      2013     COLONOSCOPY      2003,and [facility] [place] normal     COLONOSCOPY      2009,and [facility] repeat Dr. Johnson negative.     COLONOSCOPY      2013,WNL     HERNIA REPAIR  2000     RELEASE CARPAL TUNNEL      2014     VASECTOMY      No Comments Provided          SOCIAL HISTORY:   Social History     Social History     Marital status:      Spouse name: N/A     Number of children: N/A     Years of education: N/A     Social History Main Topics     Smoking status: Former Smoker     Types: Cigarettes     Quit date: 10/25/1983     Smokeless tobacco: Never Used     Alcohol use No     Drug use: No     Sexual activity: Not on file     Other Topics Concern     Not on file     Social History Narrative    , retired from [employer].  Lives in [facility].          CURRENT MEDICATIONS:   Current Outpatient Prescriptions   Medication     ascorbic acid (VITAMIN C) 500 MG tablet     aspirin 81 MG tablet     blood glucose monitoring (NO BRAND SPECIFIED) test strip     Cholecalciferol (D 5000) 5000 UNITS TABS     citalopram (CELEXA) 20 MG tablet     cyanocobalamin (VITAMIN B12) 1000 MCG/ML injection     gabapentin (NEURONTIN) 300 MG capsule     LORazepam (ATIVAN) 1 MG tablet     metFORMIN (GLUCOPHAGE) 500 MG tablet     simvastatin (ZOCOR) 20 MG tablet     Current Facility-Administered Medications   Medication     cyanocobalamin (VITAMIN B12) injection 1,000 mcg          ALLERGIES:   Allergies   Allergen Reactions     Ace Inhibitors Cough     Atorvastatin Calcium      Lipitor     Hmg-Coa-R Inhibitors Muscle Pain  "(Myalgia)     Higher doses cause myalgias          ROS:   CONSTITUTIONAL: No weight changes, fever or chills. Positive for generalized weakness and progressive fatigue.   CARDIOVASCULAR: Positive for left sided chest pressure, does not specifically relate to exertional activity. No palpitations or lower extremity edema.   RESPIRATORY: No shortness of breath. Positive for mild dyspnea upon exertion, no reported cough or sputum production.   GASTROINTESTINAL: No abdominal pain, nausea or vomiting reported.  NEUROLOGICAL: No lightheadedness, dizziness, syncope, ataxia or weakness.   HEMATOLOGIC: No anemia, bleeding or bruising.   ENDOCRINOLOGIC: No reports of sweating, cold or heat intolerance.   SKIN: No abnormal rashes or itching.       PHYSICAL EXAM:   /72 (BP Location: Right arm, Patient Position: Sitting, Cuff Size: Adult Large)  Pulse 72  Ht 1.702 m (5' 7\")  Wt 88.2 kg (194 lb 8 oz)  BMI 30.46 kg/m2  GENERAL: The patient is a well-developed, well-nourished, in no apparent distress. Alert and oriented x3.   HEENT: Head is normocephalic and atraumatic. Eyes are symmetrical with normal visual tracking. Nares appeared normal without nasal drainage. Mucous membranes are moist.   NECK: Supple. No carotid bruits.   HEART: Regular rate and rhythm, S1S2 present without murmur, rub or gallop.   LUNGS: Respirations regular and unlabored. Clear to auscultation.   GI: Abdomen is nondistended.   EXTREMITIES: No peripheral edema present.   NEUROLOGIC: Alert and oriented X3. No focal neurologic deficits.       EKG:    NSR, rate 66 pbm, T-wave inversion in precordial leads, essentially unchanged from last tracing on 4/20/18.    LAB RESULTS:   Office Visit on 04/20/2018   Component Date Value Ref Range Status     WBC 04/20/2018 5.7  4.0 - 11.0 10e9/L Final     RBC Count 04/20/2018 5.20  4.4 - 5.9 10e12/L Final     Hemoglobin 04/20/2018 15.9  13.3 - 17.7 g/dL Final     Hematocrit 04/20/2018 46.3  40.0 - 53.0 % Final     " MCV 04/20/2018 89  78 - 100 fl Final     MCH 04/20/2018 30.6  26.5 - 33.0 pg Final     MCHC 04/20/2018 34.3  31.5 - 36.5 g/dL Final     RDW 04/20/2018 12.9  10.0 - 15.0 % Final     Platelet Count 04/20/2018 224  150 - 450 10e9/L Final     Diff Method 04/20/2018 Automated Method   Final     % Neutrophils 04/20/2018 67.4  % Final     % Lymphocytes 04/20/2018 22.1  % Final     % Monocytes 04/20/2018 7.9  % Final     % Eosinophils 04/20/2018 2.1  % Final     % Basophils 04/20/2018 0.5  % Final     % Immature Granulocytes 04/20/2018 0.0  % Final     Absolute Neutrophil 04/20/2018 3.8  1.6 - 8.3 10e9/L Final     Absolute Lymphocytes 04/20/2018 1.3  0.8 - 5.3 10e9/L Final     Absolute Monocytes 04/20/2018 0.5  0.0 - 1.3 10e9/L Final     Absolute Eosinophils 04/20/2018 0.1  0.0 - 0.7 10e9/L Final     Absolute Basophils 04/20/2018 0.0  0.0 - 0.2 10e9/L Final     Abs Immature Granulocytes 04/20/2018 0.0  0 - 0.4 10e9/L Final     Sodium 04/20/2018 139  134 - 144 mmol/L Final     Potassium 04/20/2018 4.2  3.5 - 5.1 mmol/L Final     Chloride 04/20/2018 103  98 - 107 mmol/L Final     Carbon Dioxide 04/20/2018 26  21 - 31 mmol/L Final     Anion Gap 04/20/2018 10  3 - 14 mmol/L Final     Glucose 04/20/2018 137* 70 - 105 mg/dL Final     Urea Nitrogen 04/20/2018 16  7 - 25 mg/dL Final     Creatinine 04/20/2018 0.85  0.70 - 1.30 mg/dL Final     GFR Estimate 04/20/2018 88  >60 mL/min/1.7m2 Final     GFR Estimate If Black 04/20/2018 >90  >60 mL/min/1.7m2 Final     Calcium 04/20/2018 9.3  8.6 - 10.3 mg/dL Final     Bilirubin Total 04/20/2018 0.7  0.3 - 1.0 mg/dL Final     Albumin 04/20/2018 4.5  3.5 - 5.7 g/dL Final     Protein Total 04/20/2018 7.4  6.4 - 8.9 g/dL Final     Alkaline Phosphatase 04/20/2018 64  34 - 104 U/L Final     ALT 04/20/2018 9  7 - 52 U/L Final     AST 04/20/2018 12* 13 - 39 U/L Final     Troponin I ES 04/20/2018 <0.030  0.000 - 0.034 ug/L Final     Cholesterol 04/20/2018 186  <200 mg/dL Final     Triglycerides  04/20/2018 131  <150 mg/dL Final     HDL Cholesterol 04/20/2018 42  23 - 92 mg/dL Final     LDL Cholesterol Calculated 04/20/2018 118* <100 mg/dL Final     Non HDL Cholesterol 04/20/2018 144* <130 mg/dL Final     Hemoglobin A1C 04/20/2018 6.2* 4.0 - 6.0 % Final   Office Visit - [facility] on 01/26/2018   Component Date Value Ref Range Status     Estimated Average Glucose 01/26/2018 137  mg/dL Final     Hemoglobin A1C Monitoring - Histor* 01/26/2018 6.4* 4.0 - 6.2 % Final          ASSESSMENT:   Dajuan Basilio presents for Mr. Basilio is a 72-year-old male who presents for cardiology consult exertional dyspnea, fatigue, activity intolerance and abnormal CTA of the coronaries on 4/20/18. He has a history of hyperlipidemia, type 2 diabetes with diabetic neuropathy, obesity, GERD, nephrolithiasis, BPH and past history of tobacco abuse.   Patient underwent a CTA coronary arteries on 4/20/2018.  He had a total coronary calcium score of 248.7.  Left main was 9.6, .9, LCx 0.9, RCA 59.4.  Evaluation of the coronary vessels showed dominance right, scattered mild to moderate areas of atherosclerotic narrowing with focal high-grade stenosis present within the second diagonal.    Reports left sided chest pressure, CARCAMO, progressive fatigue and activity intolerance. Abnormal CTCA read with mild to moderate disease. Elevated calcium score mainly of LAD with with suspected moderate disease of LAD with area of focal high grade stenosis seen in second diagonal. CAD risk factors include: DMII, HLD and past history of tobacco abuse. Overall, it is recommended further evaluation with angiography. Patient requesting to stay local and would like to have scheduled at [facility].     PLAN:   1. Exertional dyspnea  - EKG 12-lead, tracing only (Same Day)  - Heart Cath Left heart cath; Future    2. Left chest pressure  - Heart Cath Left heart cath; Future    3. Fatigue, unspecified type  - Heart Cath Left heart cath; Future    4.  Controlled type 2 diabetes mellitus with diabetic polyneuropathy, without long-term current use of insulin (H)  Continued management by PCP    - Heart Cath Left heart cath; Future    5. Mixed hyperlipidemia  Continue with Simvastatin 10 mg nightly at this time.      - Heart Cath Left heart cath; Future    6. Abnormal electrocardiogram  - Heart Cath Left heart cath; Future    7. Coronary artery disease of native artery of native heart with stable angina pectoris (H)  - Heart Cath Left heart cath; Future    8. Elevated coronary artery calcium score  - Heart Cath Left heart cath; Future    9. History of rheumatic fever  Echocardiogram ordered.   - Echocardiogram Complete; Future    Follow-up with cardiology one week following angiogram, certainly sooner if needed.     Thank you for allowing me to participate in the care of your patient. Please do not hesitate to contact me if you have any questions.     Camille Leslei   [facility] Cardiology

## 2018-05-02 NOTE — NURSING NOTE
Pt presents today for consult after having a CTA on 4/26/18.  He states he was told to consult with cardiology regarding the results.  Denies shortness of breath until about a month ago.  He was walking 4 - 6 miles a day and then was no longer able to do this.  He also admits to having occasional mild chest discomfort on the left.  Doesn't last long.    lAanna Banerjee RN............................ 5/2/2018 12:54 PM

## 2018-05-02 NOTE — MR AVS SNAPSHOT
After Visit Summary   5/2/2018    Dajuan Basilio    MRN: [id number]           Patient Information     Date Of Birth          1945        Visit Information        Provider Department      5/2/2018 12:45 PM Camille Leslie APRN CNP [facility]        Today's Diagnoses     Exertional dyspnea    -  1      Care Instructions      Angiogram at [facility] at a later date - CBC and BMP    Echocardiogram    Please follow-up with cardiology 1 week after angiogram           Follow-ups after your visit        Your next 10 appointments already scheduled     May 03, 2018 10:00 AM CDT   Nurse Only with GH INJECTION NURSE   [facility] ([facility]    [address]  [place] [address]   [phone]            May 10, 2018  9:30 AM CDT   Nurse Only with GH INJECTION NURSE   [facility] ([facility])    [address]  [place] [address]   [phone]            May 16, 2018  8:40 AM CDT   SHORT with Cm Pickens MD   [facility] ([facility]    [address]  [place] [address]   [phone]            May 24, 2018 10:30 AM CDT   Nurse Only with GH INJECTION NURSE   [facility] ([facility])    [address]  [place] [address]   [phone]              Who to contact     If you have questions or need follow up information about today's clinic visit or your schedule please contact [facility] directly at [phone].  Normal or non-critical lab and imaging results will be communicated to you by MyChart, letter or phone within 4 business days after the clinic has received the results. If you do not hear from us within 7 days, please contact the clinic through MyChart or phone. If you have a critical or abnormal lab result, we will  "notify you by phone as soon as possible.  Submit refill requests through [employer] or call your pharmacy and they will forward the refill request to us. Please allow 3 business days for your refill to be completed.          Additional Information About Your Visit        MakeLeapshart Information     [employer]-Point lets you send messages to your doctor, view your test results, renew your prescriptions, schedule appointments and more. To sign up, go to [email or web address] . Click on \"Log in\" on the left side of the screen, which will take you to the Welcome page. Then click on \"Sign up Now\" on the right side of the page.     You will be asked to enter the access code listed below, as well as some personal information. Please follow the directions to create your username and password.     Your access code is: [email or web address]  Expires: 2018  2:53 PM     Your access code will  in 90 days. If you need help or a new code, please call your [facility] clinic or [phone].        Care EveryWhere ID     This is your Care EveryWhere ID. This could be used by other organizations to access your [facility] medical records  [id number]        Your Vitals Were     Pulse Height BMI (Body Mass Index)             72 1.702 m (5' 7\") 30.46 kg/m2          Blood Pressure from Last 3 Encounters:   18 130/72   18 125/71   18 138/80    Weight from Last 3 Encounters:   18 88.2 kg (194 lb 8 oz)   18 86.6 kg (191 lb)   18 86.8 kg (191 lb 6 oz)              We Performed the Following     EKG 12-lead, tracing only (Same Day)        Primary Care Provider Office Phone # Fax #    Brett Olivas [phone] [address] [facility] [address]        Equal Access to Services     [facility] HospitalJAIME : Sharon Levine, suzanne ortiz, qajose castañeda . [facility] [phone].    ATENCIÓN: Si sierra schwartz, tiene a powell disposición " servicios gratuitos de asistencia lingüística. Stephon garcia [phone].    We comply with applicable federal civil rights laws and [place] laws. We do not discriminate on the basis of race, color, national origin, age, disability, sex, sexual orientation, or gender identity.            Thank you!     Thank you for choosing [facility] AND [facility]  for your care. Our goal is always to provide you with excellent care. Hearing back from our patients is one way we can continue to improve our services. Please take a few minutes to complete the written survey that you may receive in the mail after your visit with us. Thank you!             Your Updated Medication List - Protect others around you: Learn how to safely use, store and throw away your medicines at www.disposemymeds.org.          This list is accurate as of 5/2/18  1:41 PM.  Always use your most recent med list.                   Brand Name Dispense Instructions for use Diagnosis    ascorbic acid 500 MG tablet    VITAMIN C     Take 1 tablet by mouth daily. For 60 days        aspirin 81 MG tablet      Take 81 mg by mouth daily        blood glucose monitoring test strip    no brand specified    200 strip    Check blood glucose twice daily,  Dx code e11.9. Dispense as covered by patient's insurance.    Type 2 diabetes mellitus with diabetic nephropathy, without long-term current use of insulin (H)       citalopram 20 MG tablet    celeXA     Take 10 mg by mouth daily        cyanocobalamin 1000 MCG/ML injection    VITAMIN B12    1 mL    Inject 1 mL (1,000 mcg) into the muscle every 1 week x4, then every 2 to 4 weeks    Low serum vitamin B12       D 5000 5000 units Tabs   Generic drug:  Cholecalciferol      Take 5,000 Units by mouth daily        gabapentin 300 MG capsule    NEURONTIN    180 capsule    Take 1 capsule (300 mg) by mouth 2 times daily    Diabetic peripheral neuropathy (H), Type 2 diabetes mellitus with diabetic nephropathy, without long-term  current use of insulin (H)       LORazepam 1 MG tablet    ATIVAN     Take 1 mg by mouth every 6 hours as needed for anxiety        metFORMIN 500 MG tablet    GLUCOPHAGE    60 tablet    2 in morning, 1 in evening.        simvastatin 20 MG tablet    ZOCOR     Take 10 mg by mouth At Bedtime

## 2018-05-03 ENCOUNTER — ALLIED HEALTH/NURSE VISIT (OUTPATIENT)
Dept: FAMILY MEDICINE | Facility: OTHER | Age: 73
End: 2018-05-03
Attending: INTERNAL MEDICINE
Payer: MEDICARE

## 2018-05-03 DIAGNOSIS — E53.8 LOW SERUM VITAMIN B12: Primary | ICD-10-CM

## 2018-05-03 PROCEDURE — 96372 THER/PROPH/DIAG INJ SC/IM: CPT

## 2018-05-03 PROCEDURE — 25000128 H RX IP 250 OP 636: Performed by: INTERNAL MEDICINE

## 2018-05-03 RX ADMIN — CYANOCOBALAMIN 1000 MCG: 1000 INJECTION, SOLUTION INTRAMUSCULAR at 10:00

## 2018-05-03 ASSESSMENT — PATIENT HEALTH QUESTIONNAIRE - PHQ9: SUM OF ALL RESPONSES TO PHQ QUESTIONS 1-9: 0

## 2018-05-03 ASSESSMENT — ANXIETY QUESTIONNAIRES: GAD7 TOTAL SCORE: 0

## 2018-05-09 ENCOUNTER — TELEPHONE (OUTPATIENT)
Dept: CARDIOLOGY | Facility: OTHER | Age: 73
End: 2018-05-09

## 2018-05-09 NOTE — TELEPHONE ENCOUNTER
Patient contacted us inquiring about labs before his upcoming appointment 5/17/2018 at [facility] for angiogram.  He shares with me that [facility] has faxed over lab orders at 11:00 am this morning and he also had them refax in the afternoon.  Contacted Omid at [facility]'s and she verified fax number and refaxed a 3rd time.  Not received in HIS.  Contacted [facility] diagnostic labs and orders were received for BMP and CBC.  Contacted patient and he will go to lab after visit with injection nurse tomorrow 5/9/2018.    Landy Stock RN  ....................  5/9/2018   3:52 PM

## 2018-05-10 ENCOUNTER — HOSPITAL ENCOUNTER (OUTPATIENT)
Dept: CARDIOLOGY | Facility: OTHER | Age: 73
Discharge: HOME | End: 2018-05-10
Attending: NURSE PRACTITIONER | Admitting: NURSE PRACTITIONER
Payer: MEDICARE

## 2018-05-10 ENCOUNTER — ALLIED HEALTH/NURSE VISIT (OUTPATIENT)
Dept: FAMILY MEDICINE | Facility: OTHER | Age: 73
End: 2018-05-10
Attending: INTERNAL MEDICINE
Payer: MEDICARE

## 2018-05-10 DIAGNOSIS — R07.89 LEFT CHEST PRESSURE: ICD-10-CM

## 2018-05-10 DIAGNOSIS — R94.39 ABNORMAL BALLISTOCARDIOGRAM: Primary | ICD-10-CM

## 2018-05-10 DIAGNOSIS — R06.09 EXERTIONAL DYSPNEA: ICD-10-CM

## 2018-05-10 DIAGNOSIS — Z86.79 HISTORY OF RHEUMATIC FEVER: ICD-10-CM

## 2018-05-10 DIAGNOSIS — E53.8 LOW SERUM VITAMIN B12: Primary | ICD-10-CM

## 2018-05-10 LAB
ANION GAP SERPL CALCULATED.3IONS-SCNC: 9 MMOL/L (ref 3–14)
BASOPHILS # BLD AUTO: 0 10E9/L (ref 0–0.2)
BASOPHILS NFR BLD AUTO: 0.6 %
BUN SERPL-MCNC: 16 MG/DL (ref 7–25)
CALCIUM SERPL-MCNC: 9.7 MG/DL (ref 8.6–10.3)
CHLORIDE SERPL-SCNC: 104 MMOL/L (ref 98–107)
CO2 SERPL-SCNC: 27 MMOL/L (ref 21–31)
CREAT SERPL-MCNC: 0.82 MG/DL (ref 0.7–1.3)
DIFFERENTIAL METHOD BLD: NORMAL
EOSINOPHIL # BLD AUTO: 0.1 10E9/L (ref 0–0.7)
EOSINOPHIL NFR BLD AUTO: 2.4 %
ERYTHROCYTE [DISTWIDTH] IN BLOOD BY AUTOMATED COUNT: 13.2 % (ref 10–15)
GFR SERPL CREATININE-BSD FRML MDRD: >90 ML/MIN/1.7M2
GLUCOSE SERPL-MCNC: 130 MG/DL (ref 70–105)
HCT VFR BLD AUTO: 42.7 % (ref 40–53)
HGB BLD-MCNC: 14.5 G/DL (ref 13.3–17.7)
IMM GRANULOCYTES # BLD: 0 10E9/L (ref 0–0.4)
IMM GRANULOCYTES NFR BLD: 0.4 %
LYMPHOCYTES # BLD AUTO: 1.1 10E9/L (ref 0.8–5.3)
LYMPHOCYTES NFR BLD AUTO: 21.7 %
MCH RBC QN AUTO: 30.4 PG (ref 26.5–33)
MCHC RBC AUTO-ENTMCNC: 34 G/DL (ref 31.5–36.5)
MCV RBC AUTO: 90 FL (ref 78–100)
MONOCYTES # BLD AUTO: 0.5 10E9/L (ref 0–1.3)
MONOCYTES NFR BLD AUTO: 9.1 %
NEUTROPHILS # BLD AUTO: 3.3 10E9/L (ref 1.6–8.3)
NEUTROPHILS NFR BLD AUTO: 65.8 %
PLATELET # BLD AUTO: 231 10E9/L (ref 150–450)
POTASSIUM SERPL-SCNC: 4.1 MMOL/L (ref 3.5–5.1)
RBC # BLD AUTO: 4.77 10E12/L (ref 4.4–5.9)
SODIUM SERPL-SCNC: 140 MMOL/L (ref 134–144)
WBC # BLD AUTO: 5 10E9/L (ref 4–11)

## 2018-05-10 PROCEDURE — 85025 COMPLETE CBC W/AUTO DIFF WBC: CPT

## 2018-05-10 PROCEDURE — 96372 THER/PROPH/DIAG INJ SC/IM: CPT

## 2018-05-10 PROCEDURE — 80048 BASIC METABOLIC PNL TOTAL CA: CPT

## 2018-05-10 PROCEDURE — 36415 COLL VENOUS BLD VENIPUNCTURE: CPT

## 2018-05-10 PROCEDURE — 25000128 H RX IP 250 OP 636: Performed by: INTERNAL MEDICINE

## 2018-05-10 PROCEDURE — 93306 TTE W/DOPPLER COMPLETE: CPT

## 2018-05-10 PROCEDURE — 93306 TTE W/DOPPLER COMPLETE: CPT | Mod: 26 | Performed by: INTERNAL MEDICINE

## 2018-05-10 RX ADMIN — CYANOCOBALAMIN 1000 MCG: 1000 INJECTION, SOLUTION INTRAMUSCULAR at 09:14

## 2018-05-10 NOTE — MR AVS SNAPSHOT
After Visit Summary   5/10/2018    Dajuan Basilio    MRN: [id number]           Patient Information     Date Of Birth          1945        Visit Information        Provider Department      5/10/2018 9:30 AM GH INJECTION NURSE [facility]        Today's Diagnoses     Low serum vitamin B12    -  1       Follow-ups after your visit        Your next 10 appointments already scheduled     May 10, 2018  9:30 AM CDT   Nurse Only with GH INJECTION NURSE   [facility]    [address]  [place] [address]   [phone]            May 10, 2018 10:10 AM CDT   LAB with GH LAB   [facility]    [address]  [place] [address]   [phone]           Please do not eat 10-12 hours before your appointment if you are coming in fasting for labs on lipids, cholesterol, or glucose (sugar). This does not apply to pregnant women. Water, hot tea and black coffee (with nothing added) are okay. Do not drink other fluids, diet soda or chew gum.            May 16, 2018  8:40 AM CDT   SHORT with Cm Pickens [facility]   [facility]    [address]  [place] [address]   [phone]            May 24, 2018 10:30 AM CDT   Nurse Only with GH INJECTION NURSE   [facility]    [address]  [place] [address] [phone]              Who to contact     If you have questions or need follow up information about today's clinic visit or your schedule please contact [facility] directly at [phone].  Normal or non-critical lab and imaging results will be communicated to you by MyChart, letter or phone within 4 business days after the clinic has received the results. If you do not hear from us within 7 days, please  "contact the clinic through "[employer]" or phone. If you have a critical or abnormal lab result, we will notify you by phone as soon as possible.  Submit refill requests through "[employer]" or call your pharmacy and they will forward the refill request to us. Please allow 3 business days for your refill to be completed.          Additional Information About Your Visit        [employer] Information     "[employer]" lets you send messages to your doctor, view your test results, renew your prescriptions, schedule appointments and more. To sign up, go to [email or web address]" . Click on \"Log in\" on the left side of the screen, which will take you to the Welcome page. Then click on \"Sign up Now\" on the right side of the page.     You will be asked to enter the access code listed below, as well as some personal information. Please follow the directions to create your username and password.     Your access code is: [email or web address]  Expires: 2018  2:53 PM     Your access code will  in 90 days. If you need help or a new code, please call your [place] clinic or [phone].        Care EveryWhere ID     This is your Care EveryWhere ID. This could be used by other organizations to access your [place] medical records  [id number]         Blood Pressure from Last 3 Encounters:   18 130/72   18 125/71   18 138/80    Weight from Last 3 Encounters:   18 194 lb 8 oz (88.2 kg)   18 191 lb (86.6 kg)   18 191 lb 6 oz (86.8 kg)              Today, you had the following     No orders found for display       Primary Care Provider Office Phone # Fax #    Brett Olivas [phone] [address] [facility] [address]        Equal Access to Services     [facility] Center[facility] : hSaron Levine, wahawada luqadaha, qaybta kaalmada ra, jose mendez. [facility] [phone].    ATENCIÓN: Si habla español, tiene a powell disposición servicios gratuitos " de asistencia lingüística. Stephon garcia [phone].    We comply with applicable federal civil rights laws and [place] laws. We do not discriminate on the basis of race, color, national origin, age, disability, sex, sexual orientation, or gender identity.            Thank you!     Thank you for choosing [facility]  for your care. Our goal is always to provide you with excellent care. Hearing back from our patients is one way we can continue to improve our services. Please take a few minutes to complete the written survey that you may receive in the mail after your visit with us. Thank you!             Your Updated Medication List - Protect others around you: Learn how to safely use, store and throw away your medicines at www.disposemymeds.org.          This list is accurate as of 5/10/18  9:19 AM.  Always use your most recent med list.                   Brand Name Dispense Instructions for use Diagnosis    ascorbic acid 500 MG tablet    VITAMIN C     Take 1 tablet by mouth daily. For 60 days        aspirin 81 MG tablet      Take 81 mg by mouth daily        blood glucose monitoring test strip    no brand specified    200 strip    Check blood glucose twice daily,  Dx code e11.9. Dispense as covered by patient's insurance.    Type 2 diabetes mellitus with diabetic nephropathy, without long-term current use of insulin (H)       citalopram 20 MG tablet    celeXA     Take 10 mg by mouth daily        cyanocobalamin 1000 MCG/ML injection    VITAMIN B12    1 mL    Inject 1 mL (1,000 mcg) into the muscle every 1 week x4, then every 2 to 4 weeks    Low serum vitamin B12       D 5000 5000 units Tabs   Generic drug:  Cholecalciferol      Take 5,000 Units by mouth daily        gabapentin 300 MG capsule    NEURONTIN    180 capsule    Take 1 capsule (300 mg) by mouth 2 times daily    Diabetic peripheral neuropathy (H), Type 2 diabetes mellitus with diabetic nephropathy, without long-term current use of insulin  (H)       LORazepam 1 MG tablet    ATIVAN     Take 1 mg by mouth every 6 hours as needed for anxiety        metFORMIN 500 MG tablet    GLUCOPHAGE    60 tablet    2 in morning, 1 in evening.        simvastatin 20 MG tablet    ZOCOR     Take 10 mg by mouth At Bedtime

## 2018-05-11 ENCOUNTER — TELEPHONE (OUTPATIENT)
Dept: INTERNAL MEDICINE | Facility: OTHER | Age: 73
End: 2018-05-11

## 2018-05-11 NOTE — TELEPHONE ENCOUNTER
After verification, patient notified and said he has appointment on 5/17/18 at [facility].  Sarah Allisno LPN ....................5/11/2018   4:22 PM

## 2018-05-15 DIAGNOSIS — R05.9 COUGH: Primary | ICD-10-CM

## 2018-05-15 NOTE — ADDENDUM NOTE
Encounter addended by: Gunjan Callejas, PT on: 5/15/2018  1:02 PM<BR>     Actions taken: Sign clinical note

## 2018-05-15 NOTE — PROGRESS NOTES
Outpatient Physical Therapy Discharge Note     Patient: Dajuan Basilio  : 1945    Beginning/End Dates of Reporting Period:  18 to 5/15/2018    Referring Provider: Dr. 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     Client Self Report:      Objective Measurements:     Goals:  Goal Identifier HEP   Goal Description Pt will be independent in HEP and self management techniques   Target Date 18   Date Met      Progress:     Goal Identifier transfers   Goal Description Pt will perform all transfers and bed mobility with minimal to no dizziness   Target Date 18   Date Met      Progress:     Goal Identifier walking   Goal Description Pt will have decreased sway on mCTSIB cond 4 for improved stability for walking in low light environments   Target Date 18   Date Met      Progress:     Progress Toward Goals:   Not assessed this period.    Plan:  Discharge from therapy.    Discharge:    Reason for Discharge: Patient chooses to discontinue therapy.  Patient has failed to schedule further appointments.  Medicare G-code: Patient did not attend a final scheduled session prior to discharge. Unable to determine discharge functional status.    Equipment Issued: n/a    Discharge Plan: Patient to continue home program.

## 2018-05-17 ENCOUNTER — TRANSFERRED RECORDS (OUTPATIENT)
Dept: HEALTH INFORMATION MANAGEMENT | Facility: OTHER | Age: 73
End: 2018-05-17

## 2018-05-18 ENCOUNTER — TRANSFERRED RECORDS (OUTPATIENT)
Dept: HEALTH INFORMATION MANAGEMENT | Facility: OTHER | Age: 73
End: 2018-05-18

## 2018-05-20 ENCOUNTER — MEDICAL CORRESPONDENCE (OUTPATIENT)
Dept: HEALTH INFORMATION MANAGEMENT | Facility: OTHER | Age: 73
End: 2018-05-20

## 2018-05-21 RX ORDER — CODEINE PHOSPHATE AND GUAIFENESIN 10; 100 MG/5ML; MG/5ML
10 SOLUTION ORAL
COMMUNITY
Start: 2018-02-09 | End: 2019-03-19

## 2018-05-21 NOTE — TELEPHONE ENCOUNTER
Routed to Dr Olivas for refill consideration.    Unable to complete prescription refill per RNMedication Refill Policy.................... Alanna Banerjee ....................  5/21/2018   12:46 PM

## 2018-05-23 ENCOUNTER — TELEPHONE (OUTPATIENT)
Dept: INTERNAL MEDICINE | Facility: OTHER | Age: 73
End: 2018-05-23

## 2018-05-23 NOTE — TELEPHONE ENCOUNTER
[facility] sent a fax requesting instructions for the medication Brilinta.   That medication is not on file for patient-current or past.    [facility] notified.      Britta Guzman LPN 5/23/2018 8:28 AM

## 2018-05-24 ENCOUNTER — ALLIED HEALTH/NURSE VISIT (OUTPATIENT)
Dept: FAMILY MEDICINE | Facility: OTHER | Age: 73
End: 2018-05-24
Attending: INTERNAL MEDICINE
Payer: MEDICARE

## 2018-05-24 ENCOUNTER — TRANSFERRED RECORDS (OUTPATIENT)
Dept: HEALTH INFORMATION MANAGEMENT | Facility: OTHER | Age: 73
End: 2018-05-24

## 2018-05-24 DIAGNOSIS — E53.8 LOW SERUM VITAMIN B12: Primary | ICD-10-CM

## 2018-05-24 PROCEDURE — 96372 THER/PROPH/DIAG INJ SC/IM: CPT

## 2018-05-24 PROCEDURE — 25000128 H RX IP 250 OP 636: Performed by: INTERNAL MEDICINE

## 2018-05-24 RX ADMIN — CYANOCOBALAMIN 1000 MCG: 1000 INJECTION, SOLUTION INTRAMUSCULAR at 10:21

## 2018-05-31 ENCOUNTER — OFFICE VISIT (OUTPATIENT)
Dept: INTERNAL MEDICINE | Facility: OTHER | Age: 73
End: 2018-05-31
Attending: INTERNAL MEDICINE
Payer: MEDICARE

## 2018-05-31 VITALS
SYSTOLIC BLOOD PRESSURE: 126 MMHG | WEIGHT: 194 LBS | BODY MASS INDEX: 30.38 KG/M2 | HEART RATE: 72 BPM | DIASTOLIC BLOOD PRESSURE: 72 MMHG

## 2018-05-31 DIAGNOSIS — R10.32 ABDOMINAL PAIN, LEFT LOWER QUADRANT: ICD-10-CM

## 2018-05-31 DIAGNOSIS — E78.2 MIXED HYPERLIPIDEMIA: ICD-10-CM

## 2018-05-31 DIAGNOSIS — R19.5 ABNORMAL STOOL CALIBER: ICD-10-CM

## 2018-05-31 DIAGNOSIS — R39.12 BENIGN PROSTATIC HYPERPLASIA WITH WEAK URINARY STREAM: ICD-10-CM

## 2018-05-31 DIAGNOSIS — I25.10 CORONARY ARTERY DISEASE INVOLVING NATIVE CORONARY ARTERY OF NATIVE HEART WITHOUT ANGINA PECTORIS: Primary | ICD-10-CM

## 2018-05-31 DIAGNOSIS — E53.8 LOW SERUM VITAMIN B12: ICD-10-CM

## 2018-05-31 DIAGNOSIS — N40.1 BENIGN PROSTATIC HYPERPLASIA WITH WEAK URINARY STREAM: ICD-10-CM

## 2018-05-31 DIAGNOSIS — R73.03 PREDIABETES: ICD-10-CM

## 2018-05-31 DIAGNOSIS — Z86.39 HISTORY OF DIABETES MELLITUS, TYPE II: ICD-10-CM

## 2018-05-31 DIAGNOSIS — Z95.5 STATUS POST INSERTION OF DRUG ELUTING CORONARY ARTERY STENT: ICD-10-CM

## 2018-05-31 DIAGNOSIS — R93.41 ABNORMAL CT SCAN, BLADDER: ICD-10-CM

## 2018-05-31 DIAGNOSIS — R97.20 RISING PSA LEVEL: ICD-10-CM

## 2018-05-31 DIAGNOSIS — G62.9 PERIPHERAL POLYNEUROPATHY: ICD-10-CM

## 2018-05-31 PROBLEM — R53.81 MALAISE AND FATIGUE: Status: ACTIVE | Noted: 2018-05-31

## 2018-05-31 PROBLEM — R53.83 MALAISE AND FATIGUE: Status: ACTIVE | Noted: 2018-05-31

## 2018-05-31 LAB
ALBUMIN UR-MCNC: NEGATIVE MG/DL
APPEARANCE UR: CLEAR
BILIRUB UR QL STRIP: NEGATIVE
COLOR UR AUTO: YELLOW
GLUCOSE UR STRIP-MCNC: NEGATIVE MG/DL
HGB UR QL STRIP: NEGATIVE
KETONES UR STRIP-MCNC: NEGATIVE MG/DL
LEUKOCYTE ESTERASE UR QL STRIP: NEGATIVE
NITRATE UR QL: NEGATIVE
PH UR STRIP: 7 PH (ref 5–7)
PSA SERPL-MCNC: 3.77 NG/ML
SOURCE: NORMAL
SP GR UR STRIP: 1.02 (ref 1–1.03)
UROBILINOGEN UR STRIP-ACNC: 0.2 EU/DL (ref 0.2–1)

## 2018-05-31 PROCEDURE — 99214 OFFICE O/P EST MOD 30 MIN: CPT | Performed by: INTERNAL MEDICINE

## 2018-05-31 PROCEDURE — 84153 ASSAY OF PSA TOTAL: CPT | Performed by: INTERNAL MEDICINE

## 2018-05-31 PROCEDURE — 36415 COLL VENOUS BLD VENIPUNCTURE: CPT | Performed by: INTERNAL MEDICINE

## 2018-05-31 PROCEDURE — G0463 HOSPITAL OUTPT CLINIC VISIT: HCPCS

## 2018-05-31 PROCEDURE — 81003 URINALYSIS AUTO W/O SCOPE: CPT | Performed by: INTERNAL MEDICINE

## 2018-05-31 RX ORDER — CLOPIDOGREL BISULFATE 75 MG/1
75 TABLET ORAL DAILY
Qty: 90 TABLET | Refills: 3 | Status: SHIPPED | OUTPATIENT
Start: 2018-05-31 | End: 2018-08-31

## 2018-05-31 RX ORDER — CLOPIDOGREL BISULFATE 75 MG/1
TABLET ORAL
COMMUNITY
Start: 2018-05-29 | End: 2018-05-31

## 2018-05-31 RX ORDER — NITROGLYCERIN 0.4 MG/1
TABLET SUBLINGUAL
COMMUNITY
Start: 2018-05-18 | End: 2021-10-19

## 2018-05-31 ASSESSMENT — ENCOUNTER SYMPTOMS
BRUISES/BLEEDS EASILY: 0
ABDOMINAL PAIN: 0
NUMBNESS: 1
COUGH: 0
EYE PAIN: 0
FATIGUE: 1
NAUSEA: 0
HEMATURIA: 0
ARTHRALGIAS: 0
HEADACHES: 1
CONFUSION: 0
VOMITING: 0
DIZZINESS: 1
MYALGIAS: 0
DIARRHEA: 0
PALPITATIONS: 0
WHEEZING: 0
SHORTNESS OF BREATH: 0
LIGHT-HEADEDNESS: 0
FEVER: 0
CHILLS: 0
AGITATION: 0
DYSURIA: 0

## 2018-05-31 ASSESSMENT — PATIENT HEALTH QUESTIONNAIRE - PHQ9: 5. POOR APPETITE OR OVEREATING: NOT AT ALL

## 2018-05-31 ASSESSMENT — PAIN SCALES - GENERAL: PAINLEVEL: MILD PAIN (2)

## 2018-05-31 ASSESSMENT — ANXIETY QUESTIONNAIRES
2. NOT BEING ABLE TO STOP OR CONTROL WORRYING: NOT AT ALL
5. BEING SO RESTLESS THAT IT IS HARD TO SIT STILL: NOT AT ALL
7. FEELING AFRAID AS IF SOMETHING AWFUL MIGHT HAPPEN: NOT AT ALL
IF YOU CHECKED OFF ANY PROBLEMS ON THIS QUESTIONNAIRE, HOW DIFFICULT HAVE THESE PROBLEMS MADE IT FOR YOU TO DO YOUR WORK, TAKE CARE OF THINGS AT HOME, OR GET ALONG WITH OTHER PEOPLE: NOT DIFFICULT AT ALL
GAD7 TOTAL SCORE: 0
1. FEELING NERVOUS, ANXIOUS, OR ON EDGE: NOT AT ALL
3. WORRYING TOO MUCH ABOUT DIFFERENT THINGS: NOT AT ALL
6. BECOMING EASILY ANNOYED OR IRRITABLE: NOT AT ALL

## 2018-05-31 NOTE — MR AVS SNAPSHOT
After Visit Summary   5/31/2018    Dajuan Basilio    MRN: [id number]           Patient Information     Date Of Birth          1945        Visit Information        Provider Department      5/31/2018 10:40 AM Cm Pickens MD [facility]        Today's Diagnoses     Coronary artery disease involving native coronary artery of native heart without angina pectoris    -  1    Status post insertion of drug eluting coronary artery stent - x2 stents - 1st OMB - 5/17/2018 - [facility]        Mixed hyperlipidemia        History of diabetes mellitus, type II        Prediabetes        Peripheral polyneuropathy - history of Type 2 Diabetes        Abnormal stool caliber        Abdominal pain, left lower quadrant        Low serum vitamin B12        Benign prostatic hyperplasia with weak urinary stream        Abnormal CT scan, bladder          Care Instructions    Due to urinary symptoms- May need to see urology again.     Check PSA, UA today.     1. Coronary artery disease involving native coronary artery of native heart without angina pectoris  2. Status post insertion of drug eluting coronary artery stent - x2 stents - 1st OMB - 5/17/2018 - [facility]  - clopidogrel (PLAVIX) 75 MG tablet; Take 1 tablet (75 mg) by mouth daily -- start when out of Brilinta  Dispense: 90 tablet; Refill: 3    3. Mixed hyperlipidemia  -- continue simvastatin.     4. History of diabetes mellitus, type II  -- currently well controlled. Continue metformin.     5. Prediabetes    To help with weight loss and improve blood sugar control....    -- Try to avoid Carbohydrates as much as possible -- breads, pasta, baked goods, cakes, oatmeal, cold cereal, potatoes.   These are turned to sugar in one metabolic conversion, cause insulin secretion and increased fat deposition / weight gain.      -- Eat more lean meats, proteins, eggs, nuts.      6. Peripheral polyneuropathy - history of Type 2  Diabetes    START:   - vitamin B complex with vitamin C (VITAMIN  B COMPLEX) TABS tablet; Take 1 tablet by mouth daily -- make sure it has B12 in tablet  Dispense: 100 tablet; Refill: 3    7. Abnormal stool caliber  8. Abdominal pain, left lower quadrant    - CT Abdomen Pelvis w Contrast  - they will call with date/time of appointment.      9. Low serum vitamin B12    START:   - vitamin B complex with vitamin C (VITAMIN  B COMPLEX) TABS tablet; Take 1 tablet by mouth daily -- make sure it has B12 in tablet  Dispense: 100 tablet; Refill: 3    --> Get super-B complex from [facility].       10. Benign prostatic hyperplasia with weak urinary stream  11. Abnormal CT scan, bladder  - Urinalysis w Reflex Microscopic If Positive  - Prostate Specific Antigen GH    Return as needed for follow-up with Dr. Pickens.    Clinic : [phone]  Appointment line: [phone]            Follow-ups after your visit        Your next 10 appointments already scheduled     Jun 07, 2018 10:15 AM CDT   Return Visit with Slim Lowe,    [facility] ([facility]    [address]  [place] [id number] [phone]              Future tests that were ordered for you today     Open Future Orders        Priority Expected Expires Ordered    CT Abdomen Pelvis w Contrast Routine  5/31/2019 5/31/2018            Who to contact     If you have questions or need follow up information about today's clinic visit or your schedule please contact [facility] directly at [phone].  Normal or non-critical lab and imaging results will be communicated to you by MyChart, letter or phone within 4 business days after the clinic has received the results. If you do not hear from us within 7 days, please contact the clinic through MyChart or phone. If you have a critical or abnormal lab result, we will notify you by phone as soon as possible.  Submit refill requests through  "[employer] or call your pharmacy and they will forward the refill request to us. Please allow 3 business days for your refill to be completed.          Additional Information About Your Visit        MyChart Information     [employer] lets you send messages to your doctor, view your test results, renew your prescriptions, schedule appointments and more. To sign up, go to [email or web address] . Click on \"Log in\" on the left side of the screen, which will take you to the Welcome page. Then click on \"Sign up Now\" on the right side of the page.     You will be asked to enter the access code listed below, as well as some personal information. Please follow the directions to create your username and password.     Your access code is: [email or web address]  Expires: 2018  2:53 PM     Your access code will  in 90 days. If you need help or a new code, please call your [place] clinic or [phone].        Care EveryWhere ID     This is your Care EveryWhere ID. This could be used by other organizations to access your [place] medical records  [id number]        Your Vitals Were     Pulse BMI (Body Mass Index)                72 30.38 kg/m2           Blood Pressure from Last 3 Encounters:   18 126/72   18 130/72   18 125/71    Weight from Last 3 Encounters:   18 194 lb (88 kg)   18 194 lb 8 oz (88.2 kg)   18 191 lb (86.6 kg)              We Performed the Following     Prostate Specific Antigen GH     Urinalysis w Reflex Microscopic If Positive          Today's Medication Changes          These changes are accurate as of 18 10:58 AM.  If you have any questions, ask your nurse or doctor.               Start taking these medicines.        Dose/Directions    vitamin B complex with vitamin C Tabs tablet   Used for:  Peripheral polyneuropathy, Low serum vitamin B12   Started by:  Cm Pickens MD        Dose:  1 tablet   Take 1 tablet by mouth daily -- make sure it has B12 in tablet "   Quantity:  100 tablet   Refills:  3         These medicines have changed or have updated prescriptions.        Dose/Directions    clopidogrel 75 MG tablet   Commonly known as:  PLAVIX   This may have changed:    - how much to take  - how to take this  - when to take this  - additional instructions   Used for:  Coronary artery disease involving native coronary artery of native heart without angina pectoris, Status post insertion of drug eluting coronary artery stent   Changed by:  Cm Pickens MD        Dose:  75 mg   Take 1 tablet (75 mg) by mouth daily -- start when out of Brilinta   Quantity:  90 tablet   Refills:  3            Where to get your medicines      These medications were sent to [facility] - [place], [place] - [address]  [address], [facility] [address]     Phone:  [phone]     clopidogrel 75 MG tablet         These medications were sent to [facility] [address] [address]     Phone:  [phone]     vitamin B complex with vitamin C Tabs tablet                Primary Care Provider Office Phone # Fax #    Cm Pickens [phone]       [address] [facility] [address]        Equal Access to Services     [facility] OD[facility] AH: Hadii ace bean Sojose, waaxda luqadaha, qaybta kaalmada adehongyada, jose mendez. [facility] [phone].    ATENCIÓN: Si habla español, tiene a powell disposición servicios gratuitos de asistencia lingüística. [facility] [phone].    We comply with applicable federal civil rights laws and [place] laws. We do not discriminate on the basis of race, color, national origin, age, disability, sex, sexual orientation, or gender identity.            Thank you!     Thank you for choosing [facility]  for your care. Our goal is always to provide you with excellent care. Hearing back from our  patients is one way we can continue to improve our services. Please take a few minutes to complete the written survey that you may receive in the mail after your visit with us. Thank you!             Your Updated Medication List - Protect others around you: Learn how to safely use, store and throw away your medicines at www.disposemymeds.org.          This list is accurate as of 5/31/18 10:58 AM.  Always use your most recent med list.                   Brand Name Dispense Instructions for use Diagnosis    ascorbic acid 500 MG tablet    VITAMIN C     Take 1 tablet by mouth daily. For 60 days        aspirin 81 MG tablet      Take 81 mg by mouth daily        blood glucose monitoring test strip    no brand specified    200 strip    Check blood glucose twice daily,  Dx code e11.9. Dispense as covered by patient's insurance.    Type 2 diabetes mellitus with diabetic nephropathy, without long-term current use of insulin (H)       * guaiFENesin-codeine 100-10 MG/5ML Soln solution    ROBITUSSIN AC     Take 10 mLs by mouth        * CHERATUSSIN -10 MG/5ML Soln solution   Generic drug:  guaiFENesin-codeine     240 mL    TAKE 2 TEASPOONFUL (10ML) BY MOUTH EVERY 4 HOURS AS NEEDED FOR COUGH .  MAX  DOSE  60ML  PER  24  HOURS.    Cough       citalopram 20 MG tablet    celeXA     Take 10 mg by mouth daily        clopidogrel 75 MG tablet    PLAVIX    90 tablet    Take 1 tablet (75 mg) by mouth daily -- start when out of Brilinta    Coronary artery disease involving native coronary artery of native heart without angina pectoris, Status post insertion of drug eluting coronary artery stent       cyanocobalamin 1000 MCG/ML injection    VITAMIN B12    1 mL    Inject 1 mL (1,000 mcg) into the muscle every 1 week x4, then every 2 to 4 weeks    Low serum vitamin B12       D 5000 5000 units Tabs   Generic drug:  Cholecalciferol      Take 5,000 Units by mouth daily        gabapentin 300 MG capsule    NEURONTIN    180 capsule    Take 1  capsule (300 mg) by mouth 2 times daily    Diabetic peripheral neuropathy (H), Type 2 diabetes mellitus with diabetic nephropathy, without long-term current use of insulin (H)       LORazepam 1 MG tablet    ATIVAN     Take 1 mg by mouth every 6 hours as needed for anxiety        metFORMIN 500 MG tablet    GLUCOPHAGE    60 tablet    2 in morning, 1 in evening.        nitroGLYcerin 0.4 MG sublingual tablet    NITROSTAT          simvastatin 20 MG tablet    ZOCOR     Take 10 mg by mouth At Bedtime        vitamin B complex with vitamin C Tabs tablet     100 tablet    Take 1 tablet by mouth daily -- make sure it has B12 in tablet    Peripheral polyneuropathy, Low serum vitamin B12       * Notice:  This list has 2 medication(s) that are the same as other medications prescribed for you. Read the directions carefully, and ask your doctor or other care provider to review them with you.

## 2018-05-31 NOTE — PATIENT INSTRUCTIONS
Due to urinary symptoms- May need to see urology again.     Check PSA, UA today.     1. Coronary artery disease involving native coronary artery of native heart without angina pectoris  2. Status post insertion of drug eluting coronary artery stent - x2 stents - 1st OMB - 5/17/2018 - [facility]  - clopidogrel (PLAVIX) 75 MG tablet; Take 1 tablet (75 mg) by mouth daily -- start when out of Brilinta  Dispense: 90 tablet; Refill: 3    3. Mixed hyperlipidemia  -- continue simvastatin.     4. History of diabetes mellitus, type II  -- currently well controlled. Continue metformin.     5. Prediabetes    To help with weight loss and improve blood sugar control....    -- Try to avoid Carbohydrates as much as possible -- breads, pasta, baked goods, cakes, oatmeal, cold cereal, potatoes.   These are turned to sugar in one metabolic conversion, cause insulin secretion and increased fat deposition / weight gain.      -- Eat more lean meats, proteins, eggs, nuts.      6. Peripheral polyneuropathy - history of Type 2 Diabetes    START:   - vitamin B complex with vitamin C (VITAMIN  B COMPLEX) TABS tablet; Take 1 tablet by mouth daily -- make sure it has B12 in tablet  Dispense: 100 tablet; Refill: 3    7. Abnormal stool caliber  8. Abdominal pain, left lower quadrant    - CT Abdomen Pelvis w Contrast  - they will call with date/time of appointment.      9. Low serum vitamin B12    START:   - vitamin B complex with vitamin C (VITAMIN  B COMPLEX) TABS tablet; Take 1 tablet by mouth daily -- make sure it has B12 in tablet  Dispense: 100 tablet; Refill: 3    --> Get super-B complex from [facility].       10. Benign prostatic hyperplasia with weak urinary stream  11. Abnormal CT scan, bladder  - Urinalysis w Reflex Microscopic If Positive  - Prostate Specific Antigen GH    Return as needed for follow-up with Dr. Pickens.    Clinic : [phone]  Appointment line: [phone]

## 2018-05-31 NOTE — PROGRESS NOTES
Nursing Notes:   Britta Guzman LPN  5/31/2018 10:39 AM  Signed  Patient presents to the clinic for follow up with cardiac concerns.    Previous A1C is at goal of <8  Lab Results   Component Value Date    A1C 6.2 04/20/2018     Urine microalbumin:creatine: n/a  Foot exam year ago-declines today  Eye exam year ago    Tobacco User no  Patient is on a daily aspirin  Patient is on a Statin.  Blood pressure today of:     BP Readings from Last 1 Encounters:   05/02/18 130/72      is at the goal of <139/89 for diabetics.    Britta Guzman LPN on 5/31/2018 at 10:20 AM      Nursing note reviewed with patient.  Accurracy and completeness verified.   Mr. Basilio is a 73 year old male who:  Patient presents with:  Clinic Care Coordination - Follow-up    HPI     ICD-10-CM    1. Coronary artery disease involving native coronary artery of native heart without angina pectoris I25.10 clopidogrel (PLAVIX) 75 MG tablet   2. Status post insertion of drug eluting coronary artery stent - x2 stents - 1st OMB - 5/17/2018 - [facility] Z95.5 clopidogrel (PLAVIX) 75 MG tablet   3. Mixed hyperlipidemia E78.2    4. History of diabetes mellitus, type II Z86.39    5. Prediabetes R73.03    6. Peripheral polyneuropathy - history of Type 2 Diabetes G62.9 vitamin B complex with vitamin C (VITAMIN  B COMPLEX) TABS tablet   7. Abnormal stool caliber R19.5 CT Abdomen Pelvis w Contrast   8. Abdominal pain, left lower quadrant R10.32 CT Abdomen Pelvis w Contrast   9. Low serum vitamin B12 R79.89 vitamin B complex with vitamin C (VITAMIN  B COMPLEX) TABS tablet   10. Benign prostatic hyperplasia with weak urinary stream N40.1 Urinalysis w Reflex Microscopic If Positive    R39.12 Prostate Specific Antigen GH     UROLOGY ADULT REFERRAL   11. Abnormal CT scan, bladder R93.41 Urinalysis w Reflex Microscopic If Positive     Prostate Specific Antigen    12. Rising PSA level R97.20 UROLOGY ADULT REFERRAL     Patient presents for follow-up of  multiple issues.  Recently got 2 coronary artery stents due to significant occlusion of his first obtuse marginal coronary artery.  1 of the lesions was approximately 85% occluded.  This was done at [facility].  He has been doing fairly well since that time.  They put him on Brilinta and was found to be very expensive.  He would subsequently like to change over to Plavix.  Prescription sent to pharmacy.    Hyperlipidemia, seems to be tolerating statin therapy with simvastatin 10 mg daily.  He did have some pretty significant myalgia issues when on higher dose statins.    Prediabetes, continue metformin.  History of type 2 diabetes.  Does have some peripheral neuropathy which was attributed to his diabetes previously.  Also found to have some vitamin B12 deficiency.  After starting on some B12 shots, reports his neuropathy did improve.  Recommend initiation of B complex tablets.  Prescription sent to pharmacy.    Having some left lower quadrant abdominal pain.  CT scan about 2 years ago did not show any significant abnormalities within the colon but did have prostate enlargement and bladder wall thickening.  States that his stool calibers have become much more narrow than previous and his abdominal pain is noted around the time of having a bowel movement or even sometimes when he has to urinate.  Labs just collected.  Check CT scan.    Low B12 level, start B complex tablet.    History of BPH with weak urine stream.  Check urinalysis, PSA.  Has history of abnormal bladder/prostate CT scan.  May need to see urology again.  Pending results from labs, CT scan.    Functional Capacity: > or about 4 METS.   Reports that he can climb a flight of stairs without any chest pain/heaviness.   No orthopnea/paroxysmal nocturnal dyspnea  Review of Systems   Constitutional: Positive for fatigue. Negative for chills and fever.   HENT: Negative for congestion and hearing loss.    Eyes: Negative for pain and visual disturbance.    Respiratory: Negative for cough, shortness of breath and wheezing.    Cardiovascular: Negative for chest pain and palpitations.   Gastrointestinal: Negative for abdominal pain, diarrhea, nausea and vomiting.   Endocrine: Negative for cold intolerance and heat intolerance.   Genitourinary: Negative for dysuria and hematuria.   Musculoskeletal: Negative for arthralgias and myalgias.   Skin: Negative for pallor.   Allergic/Immunologic: Negative for immunocompromised state.   Neurological: Positive for dizziness, numbness and headaches. Negative for light-headedness.        + less neuropathy with B12 replacement   Hematological: Does not bruise/bleed easily.   Psychiatric/Behavioral: Negative for agitation and confusion.        NEERAJ:   NEERAJ-7 SCORE 4/20/2018 5/2/2018 5/31/2018   Total Score 0 0 0     PHQ9:  PHQ-9 SCORE 4/20/2018 5/2/2018 5/31/2018   Total Score 0 0 0       I have personally reviewed the past medical history, past surgical history, medications, allergies, family and social history as listed below, on 5/31/2018.    Allergies   Allergen Reactions     Ace Inhibitors Cough     Atorvastatin Calcium      Lipitor     Hmg-Coa-R Inhibitors Muscle Pain (Myalgia)     Higher doses cause myalgias       Current Outpatient Prescriptions   Medication Sig Dispense Refill     ascorbic acid (VITAMIN C) 500 MG tablet Take 1 tablet by mouth daily. For 60 days       aspirin 81 MG tablet Take 81 mg by mouth daily       blood glucose monitoring (NO BRAND SPECIFIED) test strip Check blood glucose twice daily,  Dx code e11.9. Dispense as covered by patient's insurance. 200 strip 2     CHERATUSSIN -10 MG/5ML SYRP solution TAKE 2 TEASPOONFUL (10ML) BY MOUTH EVERY 4 HOURS AS NEEDED FOR COUGH .  MAX  DOSE  60ML  PER  24  HOURS. 240 mL 0     Cholecalciferol (D 5000) 5000 UNITS TABS Take 5,000 Units by mouth daily       citalopram (CELEXA) 20 MG tablet Take 10 mg by mouth daily       clopidogrel (PLAVIX) 75 MG tablet Take 1  tablet (75 mg) by mouth daily -- start when out of Brilinta 90 tablet 3     cyanocobalamin (VITAMIN B12) 1000 MCG/ML injection Inject 1 mL (1,000 mcg) into the muscle every 1 week x4, then every 2 to 4 weeks 1 mL 30     gabapentin (NEURONTIN) 300 MG capsule Take 1 capsule (300 mg) by mouth 2 times daily 180 capsule 3     guaiFENesin-codeine (ROBITUSSIN AC) 100-10 MG/5ML SOLN solution Take 10 mLs by mouth       LORazepam (ATIVAN) 1 MG tablet Take 1 mg by mouth every 6 hours as needed for anxiety       metFORMIN (GLUCOPHAGE) 500 MG tablet 2 in morning, 1 in evening. 60 tablet      nitroGLYcerin (NITROSTAT) 0.4 MG sublingual tablet        simvastatin (ZOCOR) 20 MG tablet Take 10 mg by mouth At Bedtime       vitamin B complex with vitamin C (VITAMIN  B COMPLEX) TABS tablet Take 1 tablet by mouth daily -- make sure it has B12 in tablet 100 tablet 3     [DISCONTINUED] clopidogrel (PLAVIX) 75 MG tablet           Patient Active Problem List    Diagnosis Date Noted     Status post insertion of drug eluting coronary artery stent - x2 stents - 1st B - 5/17/2018 - [facility] 05/31/2018     Priority: Medium     Prediabetes 05/31/2018     Priority: Medium     Peripheral polyneuropathy - history of Type 2 Diabetes 05/31/2018     Priority: Medium     Malaise and fatigue 05/31/2018     Priority: Medium     Benign prostatic hyperplasia with weak urinary stream 05/31/2018     Priority: Medium     Abnormal electrocardiogram 04/26/2018     Priority: Medium     Agatston coronary artery calcium score between 200 and 399 04/26/2018     Priority: Medium     Exertional dyspnea 04/20/2018     Priority: Medium     Low serum vitamin B12 04/18/2018     Priority: Medium     Microalbuminuria 01/26/2018     Priority: Medium     Generalized anxiety disorder 06/05/2017     Priority: Medium     Abnormal CT scan, bladder 06/29/2016     Priority: Medium     Moderate episode of recurrent major depressive disorder (H) 06/16/2016     Priority:  Medium     GERD (gastroesophageal reflux disease) 05/03/2013     Priority: Medium     Nephrolithiasis 04/23/2012     Priority: Medium     BPH (benign prostatic hyperplasia) 03/27/2012     Priority: Medium     History of diabetes mellitus, type II 02/17/2010     Priority: Medium     Diverticular disease of colon 02/17/2010     Priority: Medium     Mixed hyperlipidemia 02/17/2010     Priority: Medium     Obesity 02/17/2010     Priority: Medium     Panic disorder without agoraphobia 02/17/2010     Priority: Medium     Myalgia and myositis 02/17/2010     Priority: Medium     Past Medical History:   Diagnosis Date     Diverticulosis of intestine without perforation or abscess without bleeding     No Comments Provided     Fatty (change of) liver, not elsewhere classified     non alcoholic     Fibromyalgia     No Comments Provided     Nodular prostate without lower urinary tract symptoms     2012,US confirms benign calcifications     Obesity     No Comments Provided     Panic disorder without agoraphobia     No Comments Provided     Past Surgical History:   Procedure Laterality Date     ARTHROPLASTY KNEE      2015     ARTHROSCOPY KNEE      right knee X2     BIOPSY PROSTATE TRANSRECTAL      2013     COLONOSCOPY      2003,and UGI [place] normal     COLONOSCOPY      2009,and UGI repeat Dr. Johnson negative.     COLONOSCOPY      2013,WNL     HERNIA REPAIR  2000     RELEASE CARPAL TUNNEL      2014     VASECTOMY      No Comments Provided     Social History     Social History     Marital status:      Spouse name: N/A     Number of children: N/A     Years of education: N/A     Social History Main Topics     Smoking status: Former Smoker     Types: Cigarettes     Quit date: 10/25/1983     Smokeless tobacco: Never Used     Alcohol use No     Drug use: No     Sexual activity: Not Asked     Other Topics Concern     None     Social History Narrative    , retired from [employer].  Lives in town.     Family History   Problem  "Relation Age of Onset     CANCER Father      Cancer, mesothelioma     Colon Cancer Mother      Cancer-colon,     Genitourinary Problems Mother      Genitourinary Disease,renal failure     DIABETES Mother      Diabetes     Other - See Comments Sister      Renal transplant     Type 2 Diabetes Sister      Diabetes type II     DIABETES Brother      Diabetes       EXAM:   Vitals:    18 1021   BP: 126/72   BP Location: Right arm   Patient Position: Sitting   Cuff Size: Adult Regular   Pulse: 72   Weight: 194 lb (88 kg)       Current Pain Score: Mild Pain (2)     BP Readings from Last 3 Encounters:   18 126/72   18 130/72   18 125/71    Wt Readings from Last 3 Encounters:   18 194 lb (88 kg)   18 194 lb 8 oz (88.2 kg)   18 191 lb (86.6 kg)      Estimated body mass index is 30.38 kg/(m^2) as calculated from the following:    Height as of 18: 5' 7\" (1.702 m).    Weight as of this encounter: 194 lb (88 kg).     Physical Exam   Constitutional: He appears well-developed and well-nourished. No distress.   HENT:   Head: Normocephalic and atraumatic.   Eyes: Conjunctivae are normal. No scleral icterus.   Neck: No thyromegaly present.   Cardiovascular: Normal rate and regular rhythm.    Pulmonary/Chest: Effort normal. He has no wheezes.   Abdominal: Soft. There is no tenderness.   Musculoskeletal: He exhibits no tenderness.   Lymphadenopathy:     He has no cervical adenopathy.   Neurological: He is alert. No cranial nerve deficit.   Skin: Skin is warm and dry.   Psychiatric: He has a normal mood and affect.       INVESTIGATIONS:  Results for orders placed or performed in visit on 18   Urinalysis w Reflex Microscopic If Positive   Result Value Ref Range    Color Urine Yellow     Appearance Urine Clear     Glucose Urine Negative NEG^Negative mg/dL    Bilirubin Urine Negative NEG^Negative    Ketones Urine Negative NEG^Negative mg/dL    Specific Gravity Urine 1.025 1.003 - " 1.035    Blood Urine Negative NEG^Negative    pH Urine 7.0 5.0 - 7.0 pH    Protein Albumin Urine Negative NEG^Negative mg/dL    Urobilinogen Urine 0.2 0.2 - 1.0 EU/dL    Nitrite Urine Negative NEG^Negative    Leukocyte Esterase Urine Negative NEG^Negative    Source Midstream Urine    Prostate Specific Antigen GH   Result Value Ref Range    Prostate Specific Antigen 3.771 (H) <3.100 ng/mL       ASSESSMENT AND PLAN:  Problem List Items Addressed This Visit        Nervous and Auditory    Peripheral polyneuropathy - history of Type 2 Diabetes    Relevant Medications    vitamin B complex with vitamin C (VITAMIN  B COMPLEX) TABS tablet       Endocrine    Mixed hyperlipidemia       Urinary    Benign prostatic hyperplasia with weak urinary stream    Relevant Orders    Urinalysis w Reflex Microscopic If Positive (Completed)    Prostate Specific Antigen GH (Completed)    UROLOGY ADULT REFERRAL       Other    Abnormal CT scan, bladder    Relevant Orders    Urinalysis w Reflex Microscopic If Positive (Completed)    Prostate Specific Antigen GH (Completed)    History of diabetes mellitus, type II    Low serum vitamin B12    Relevant Medications    vitamin B complex with vitamin C (VITAMIN  B COMPLEX) TABS tablet    Status post insertion of drug eluting coronary artery stent - x2 stents - 1st OMB - 5/17/2018 - [facility]    Relevant Medications    clopidogrel (PLAVIX) 75 MG tablet    Prediabetes      Other Visit Diagnoses     Coronary artery disease involving native coronary artery of native heart without angina pectoris    -  Primary    Relevant Medications    clopidogrel (PLAVIX) 75 MG tablet    Abnormal stool caliber        Relevant Orders    CT Abdomen Pelvis w Contrast    Abdominal pain, left lower quadrant        Relevant Orders    CT Abdomen Pelvis w Contrast    Rising PSA level        Relevant Orders    UROLOGY ADULT REFERRAL        reviewed diet, exercise and weight control, recommended sodium restriction  --  Expected clinical course discussed    -- Medications and their side effects discussed    The 10-year ASCVD risk score (Oscoda ANTHONY Jr, et al., 2013) is: 42.5%    Values used to calculate the score:      Age: 73 years      Sex: Male      Is Non- : No      Diabetic: Yes      Tobacco smoker: No      Systolic Blood Pressure: 126 mmHg      Is BP treated: No      HDL Cholesterol: 42 mg/dL      Total Cholesterol: 186 mg/dL    Patient Instructions   Due to urinary symptoms- May need to see urology again.     Check PSA, UA today.     1. Coronary artery disease involving native coronary artery of native heart without angina pectoris  2. Status post insertion of drug eluting coronary artery stent - x2 stents - 1st OMB - 5/17/2018 - [facility]  - clopidogrel (PLAVIX) 75 MG tablet; Take 1 tablet (75 mg) by mouth daily -- start when out of Brilinta  Dispense: 90 tablet; Refill: 3    3. Mixed hyperlipidemia  -- continue simvastatin.     4. History of diabetes mellitus, type II  -- currently well controlled. Continue metformin.     5. Prediabetes    To help with weight loss and improve blood sugar control....    -- Try to avoid Carbohydrates as much as possible -- breads, pasta, baked goods, cakes, oatmeal, cold cereal, potatoes.   These are turned to sugar in one metabolic conversion, cause insulin secretion and increased fat deposition / weight gain.      -- Eat more lean meats, proteins, eggs, nuts.      6. Peripheral polyneuropathy - history of Type 2 Diabetes    START:   - vitamin B complex with vitamin C (VITAMIN  B COMPLEX) TABS tablet; Take 1 tablet by mouth daily -- make sure it has B12 in tablet  Dispense: 100 tablet; Refill: 3    7. Abnormal stool caliber  8. Abdominal pain, left lower quadrant    - CT Abdomen Pelvis w Contrast  - they will call with date/time of appointment.      9. Low serum vitamin B12    START:   - vitamin B complex with vitamin C (VITAMIN  B COMPLEX) TABS tablet; Take 1  tablet by mouth daily -- make sure it has B12 in tablet  Dispense: 100 tablet; Refill: 3    --> Get super-B complex from [facility].       10. Benign prostatic hyperplasia with weak urinary stream  11. Abnormal CT scan, bladder  - Urinalysis w Reflex Microscopic If Positive  - Prostate Specific Antigen GH    Return as needed for follow-up with Dr. Pickens.    Clinic : [phone]  Appointment line: [phone]      Cm Pickens MD  Internal Medicine  [facility]

## 2018-05-31 NOTE — LETTER
Dajuan Basilio  [address] [facility] [address]    6/1/2018      Dear Dajuan Basilio,    The result of your recent tests are included below:    Urinalysis looks good.    Prostate lab/PSA is slightly elevated.    Results for orders placed or performed in visit on 05/31/18   Urinalysis w Reflex Microscopic If Positive   Result Value Ref Range    Color Urine Yellow     Appearance Urine Clear     Glucose Urine Negative NEG^Negative mg/dL    Bilirubin Urine Negative NEG^Negative    Ketones Urine Negative NEG^Negative mg/dL    Specific Gravity Urine 1.025 1.003 - 1.035    Blood Urine Negative NEG^Negative    pH Urine 7.0 5.0 - 7.0 pH    Protein Albumin Urine Negative NEG^Negative mg/dL    Urobilinogen Urine 0.2 0.2 - 1.0 EU/dL    Nitrite Urine Negative NEG^Negative    Leukocyte Esterase Urine Negative NEG^Negative    Source Midstream Urine    Prostate Specific Antigen GH   Result Value Ref Range    Prostate Specific Antigen 3.771 (H) <3.100 ng/mL       If you have any further questions or problems, please contact my office at [phone] and schedule an appointment.    Clinic : [phone]  Appointment line: [phone]     Thank you,    Cm Pickens MD    Internal Medicine  [facility]     Reviewed and electronically signed by provider.

## 2018-05-31 NOTE — TELEPHONE ENCOUNTER
Refused due to patient on Plavix and no record of Rx for Brilenta. Alanna Allen RN on 5/31/2018 at 9:24 AM

## 2018-06-01 DIAGNOSIS — F33.1 MODERATE EPISODE OF RECURRENT MAJOR DEPRESSIVE DISORDER (H): Primary | ICD-10-CM

## 2018-06-01 ASSESSMENT — PATIENT HEALTH QUESTIONNAIRE - PHQ9: SUM OF ALL RESPONSES TO PHQ QUESTIONS 1-9: 0

## 2018-06-01 ASSESSMENT — ANXIETY QUESTIONNAIRES: GAD7 TOTAL SCORE: 0

## 2018-06-06 RX ORDER — CITALOPRAM HYDROBROMIDE 20 MG/1
TABLET ORAL
Qty: 45 TABLET | Refills: 3 | Status: SHIPPED | OUTPATIENT
Start: 2018-06-06 | End: 2019-03-19

## 2018-06-06 NOTE — TELEPHONE ENCOUNTER
This is a Refill request from:[facility]  Name of Medication and Dose:  citalopram (CELEXA) 20 mg tablet    Quantity requested:  Moderate episode of recurrent major depressive disorder (HC)  Last fill date: 12/20/2017  Last Office Visit:  5/31/2018  Narcotic Agreement Signed:  NA  PCP:  Cm Pickens MD  Associated Diagnosis: Moderate episode of recurrent major depressive disorder (HC)    Viktoriya Luna LPN Supervisor 6/6/2018   11:43 AM

## 2018-06-07 ENCOUNTER — OFFICE VISIT (OUTPATIENT)
Dept: CARDIOLOGY | Facility: OTHER | Age: 73
End: 2018-06-07
Attending: INTERNAL MEDICINE
Payer: COMMERCIAL

## 2018-06-07 VITALS
WEIGHT: 194 LBS | DIASTOLIC BLOOD PRESSURE: 72 MMHG | BODY MASS INDEX: 30.38 KG/M2 | SYSTOLIC BLOOD PRESSURE: 132 MMHG | HEART RATE: 60 BPM

## 2018-06-07 DIAGNOSIS — R94.31 ABNORMAL ELECTROCARDIOGRAM: ICD-10-CM

## 2018-06-07 DIAGNOSIS — R26.81 UNSTEADY GAIT: ICD-10-CM

## 2018-06-07 DIAGNOSIS — F41.1 GENERALIZED ANXIETY DISORDER: ICD-10-CM

## 2018-06-07 DIAGNOSIS — Z95.5 STENTED CORONARY ARTERY: ICD-10-CM

## 2018-06-07 DIAGNOSIS — R53.83 MALAISE AND FATIGUE: ICD-10-CM

## 2018-06-07 DIAGNOSIS — Z98.890 S/P CARDIAC CATH: Primary | ICD-10-CM

## 2018-06-07 DIAGNOSIS — E78.00 PURE HYPERCHOLESTEROLEMIA: ICD-10-CM

## 2018-06-07 DIAGNOSIS — R06.09 EXERTIONAL DYSPNEA: ICD-10-CM

## 2018-06-07 DIAGNOSIS — R73.03 PREDIABETES: ICD-10-CM

## 2018-06-07 DIAGNOSIS — Z95.5 STATUS POST INSERTION OF DRUG ELUTING CORONARY ARTERY STENT: ICD-10-CM

## 2018-06-07 DIAGNOSIS — Z86.39 HISTORY OF DIABETES MELLITUS, TYPE II: ICD-10-CM

## 2018-06-07 DIAGNOSIS — R53.81 MALAISE AND FATIGUE: ICD-10-CM

## 2018-06-07 PROCEDURE — 99215 OFFICE O/P EST HI 40 MIN: CPT | Performed by: INTERNAL MEDICINE

## 2018-06-07 PROCEDURE — G0463 HOSPITAL OUTPT CLINIC VISIT: HCPCS

## 2018-06-07 ASSESSMENT — PAIN SCALES - GENERAL: PAINLEVEL: NO PAIN (0)

## 2018-06-07 NOTE — NURSING NOTE
Patient comes in after stents put in on 5/2/18 at [facility].  Not feeling any better. Sarah Allison LPN ....................6/7/2018   10:20 AM

## 2018-06-07 NOTE — PROGRESS NOTES
"    Cardiology Progress Note     Assessment & Plan   Dajuan Basilio is a 73 year old male who is being seen in follow-up to a visit from 5/2/18 secondary to an abnormal CTA of her coronaries.  He was referred to [facility] where he underwent a cardiac catheterization.  He subsequently had 2 stents placed.  One was to the circumflex and the other one was to the obtuse marginal #1.  It was described as being 70% and 80-85% stenosis.  He also an arterial ultrasound of his lower extremities as well as an RONNIE examination which were normal.        Impression:  1.  Coronary artery disease status post ×2 stent placement to his first OM and circumflex.  2.  Cardiac catheterization through [facility] in [place] on 5/17/18.  3.  Unsteadiness of gait.  4.  Exertional dyspnea.  5.  \"Foggy memory\".  6.  Generalized anxiety.  7.  Hyperlipidemia.  8.  Prediabetes.  9.  Exercise intolerance.    Plan:  1.  We discussed his RONNIE, arterial ultrasound of the lower extremities, and his cath results.  2.  He has been switched from Brilinta to Plavix secondary to cost.  He was somewhat upset with being put on Brilinta secondary to cost.  3.  He reportedly has an appointment with Driver July 2 and July 3 secondary to unsteady gait and memory issues.  4.  He will continue aspirin 81 mg daily, sublingual nitro as needed for chest pain, Plavix 75 mg daily, and Zocor 10 mg daily.  5.  He will be seen 1 year follow-up or sooner with problems.          Slim Lowe    Interval History   Dajuan describes walking on a regular basis approximately 6-8 miles a day.  He even did this over the winter.  Approximately 2 months ago, he stated he walked approximately 4 miles but this is difficult for him.  He is somewhat vague in his concerns/descriptions of this problem.  He keeps referring to \"dizziness\".  We discussed vertigo, near syncope, or gait issues.  If I understand him right, sounds like this is a gait issue and a motivational issue.  He states 2 " "days prior to his attempted 4 mile walk, he did fine.  However, he was able to 4 miles but he found it very difficult to complete this task.  Since that time, he has not attempted to walk his regular 6-8 miles.  It almost sounds like a motivational issue which is complicated by his gait instability.  He also describes \"fogginess\" to his mentation.  He gives an example vacuuming his house then he cannot remember if he vacuumed all the rooms or not.    He previously had a stress echo completed on 4/29/16.  The stress was negative for ischemia with an increase of his heart function greater than 75% with stress.        More recently, he had a CTA of his coronary arteries on 4/26/18.  The left main was felt to be 15% stenosis, LAD was  42% stenosed, second diagonal with 65% stenosed, circumflex had mild to moderate disease, and RCA to 49% stenosis.    He was referred to [facility] in [place] where he went on to have an angiogram completed on 5/17/18.  This showed that the left main was 5-10% blocked, the LAD had a 20-30% mid vessel disease, D1 had an ostial 30% stenosis, D2 had a mild 20% stenosis, the OM1 had a long proximal segment narrowed at 75%, and the circumflex had a 80-85% narrowing, the RCA had mid diffuse disease at 20-30% stenosis.    He had RONNIE's completed on 5/24/18.  This was read as being normal at 1.18 on the right and 1.24 on the left.    He had  an arterial ultrasound of his lower extremes on 5/24/18.  The study was read as having patent bilateral lower extremity arteries.    Today, he expressed a level of frustration.  He has chronic left lower quadrant abdominal discomfort.  He continues to be unsteady with gait was some mild memory deficits.  He has switched providers as result.  He has been referred to [facility] as result.  He did previously have an MRI of the head on 1/24/17 which was virtually normal.  It sounds like he has been having symptoms for quite some time.  He is fatigued from going to doctor " all time.  He has no additional complaints.    Physical Exam       BP: 132/72 Pulse: 60            Vitals:    06/07/18 1021   Weight: 88 kg (194 lb)     Vital Signs with Ranges  Pulse:  [60] 60  BP: (132)/(72) 132/72  ROS is negative except that which was noted in the HPI.         Constitutional: awake, alert, cooperative, no apparent distress, and appears stated age  Eyes: Lids and lashes normal, pupils equal, sclera clear, conjunctiva normal  ENT: Normocephalic, without obvious abnormality, atraumatic.  Respiratory: No increased work of breathing, good air exchange, clear to auscultation bilaterally, no crackles or wheezing  Cardiovascular: Normal apical impulse, regular rate and rhythm, normal S1 and S2, no S3 or S4, and no murmur noted  GI: No scars, normal bowel sounds, soft.  Musculoskeletal: no lower extremity pitting edema present  Neurologic: Awake, alert, oriented to name, place and time.   Neuropsychiatric: General: normal, calm and normal eye contact    Medications     cyanocobalamin           Data   No results found for this or any previous visit (from the past 24 hour(s)).

## 2018-06-07 NOTE — MR AVS SNAPSHOT
After Visit Summary   6/7/2018    Dajuan Basilio    MRN: [id number]           Patient Information     Date Of Birth          1945        Visit Information        Provider Department      6/7/2018 10:15 AM Slim Lowe,  [facility]        Today's Diagnoses     S/P cardiac cath 5/17/18    -  1    Stented coronary artery to his LCX and OM1 on 5/17/18        Unsteady gait        Exertional dyspnea        Status post insertion of drug eluting coronary artery stent - x2 stents - 1st [facility]MB - 5/17/2018 - [facility]        Malaise and fatigue        History of diabetes mellitus, type II        Generalized anxiety disorder        Pure hypercholesterolemia        Prediabetes        Abnormal electrocardiogram           Follow-ups after your visit        Follow-up notes from your care team     Return in about 1 year (around 6/7/2019) for Routine Visit.      Your next 10 appointments already scheduled     Jun 12, 2018 11:30 AM CDT   (Arrive by 11:15 AM)   CT ABDOMEN PELVIS W CONTRAST with GHCT1   [facility] ([facility])    [address]  [place] [id number] [phone]           Please bring any scans or X-rays taken at other hospitals, if similar tests were done. Also bring a list of your medicines, including vitamins, minerals and over-the-counter drugs. It is safest to leave personal items at home.  Be sure to tell your doctor:   If you have any allergies.   If there s any chance you are pregnant.   If you are breastfeeding.  How to prepare:   Do not eat or drink for 2 hours before your exam. If you need to take medicine, you may take it with small sips of water. (We may ask you to take liquid medicine as well.)   Please wear loose clothing, such as a sweat suit or jogging clothes. Avoid snaps, zippers and other metal. We may ask you to undress and put on a hospital gown.  Please arrive 30 minutes early for your  CT. Once in the department you might be asked to drink water 15-20 minutes prior to your exam.  If indicated you may be asked to drink an oral contrast in advance of your CT.  If this is the case, the imaging team will let you know or be in contact with you prior to your appointment  Patients over 70 or patients with diabetes or kidney problems:   If you haven t had a blood test (creatinine test) within the last 30 days, the Cardiologist/Radiologist may require you to get this test prior to your exam.  If you have diabetes:   Continue to take your metformin medication on the day of your exam  If you have any questions, please call the Imaging Department where you will have your exam.              Who to contact     If you have questions or need follow up information about today's clinic visit or your schedule please contact [facility] [place] [facility] directly at [phone].  Normal or non-critical lab and imaging results will be communicated to you by MyChart, letter or phone within 4 business days after the clinic has received the results. If you do not hear from us within 7 days, please contact the clinic through MyChart or phone. If you have a critical or abnormal lab result, we will notify you by phone as soon as possible.  Submit refill requests through [employer] or call your pharmacy and they will forward the refill request to us. Please allow 3 business days for your refill to be completed.          Additional Information About Your Visit        Care EveryWhere ID     This is your Care EveryWhere ID. This could be used by other organizations to access your [facility] medical records  [id number]        Your Vitals Were     Pulse BMI (Body Mass Index)                60 30.38 kg/m2           Blood Pressure from Last 3 Encounters:   06/07/18 132/72   05/31/18 126/72   05/02/18 130/72    Weight from Last 3 Encounters:   06/07/18 88 kg (194 lb)   05/31/18 88 kg (194 lb)   05/02/18 88.2 kg (194 lb 8 oz)               Today, you had the following     No orders found for display       Primary Care Provider Office Phone # Fax #    Cm Pickens [phone] [address]   [facility] [place] [address]        Equal Access to Services     ABDIRAHMAN ODEN : Sharon leahy kendrajose martin Sojose, wahawada luqadaha, qaybta kaalmada adeashley, jose west kassishaunna hajikarlasarina mendez. So [facility] [phone].    ATENCIÓN: Si habla español, tiene a powell disposición servicios gratuitos de asistencia lingüística. Llame al [phone].    We comply with applicable federal civil rights laws and [place] laws. We do not discriminate on the basis of race, color, national origin, age, disability, sex, sexual orientation, or gender identity.            Thank you!     Thank you for choosing [facility]\""  for your care. Our goal is always to provide you with excellent care. Hearing back from our patients is one way we can continue to improve our services. Please take a few minutes to complete the written survey that you may receive in the mail after your visit with us. Thank you!             Your Updated Medication List - Protect others around you: Learn how to safely use, store and throw away your medicines at www.disposemymeds.org.          This list is accurate as of 6/7/18 12:09 PM.  Always use your most recent med list.                   Brand Name Dispense Instructions for use Diagnosis    ascorbic acid 500 MG tablet    VITAMIN C     Take 1 tablet by mouth daily. For 60 days        aspirin 81 MG tablet      Take 81 mg by mouth daily        blood glucose monitoring test strip    no brand specified    200 strip    Check blood glucose twice daily,  Dx code e11.9. Dispense as covered by patient's insurance.    Type 2 diabetes mellitus with diabetic nephropathy, without long-term current use of insulin (H)       * guaiFENesin-codeine 100-10 MG/5ML Soln solution    ROBITUSSIN AC     Take 10 mLs by mouth         * CHERATUSSIN -10 MG/5ML Soln solution   Generic drug:  guaiFENesin-codeine     240 mL    TAKE 2 TEASPOONFUL (10ML) BY MOUTH EVERY 4 HOURS AS NEEDED FOR COUGH .  MAX  DOSE  60ML  PER  24  HOURS.    Cough       citalopram 20 MG tablet    celeXA    45 tablet    TAKE ONE-HALF TABLET BY MOUTH ONCE DAILY    Moderate episode of recurrent major depressive disorder (H)       clopidogrel 75 MG tablet    PLAVIX    90 tablet    Take 1 tablet (75 mg) by mouth daily -- start when out of Brilinta    Coronary artery disease involving native coronary artery of native heart without angina pectoris, Status post insertion of drug eluting coronary artery stent       cyanocobalamin 1000 MCG/ML injection    VITAMIN B12    1 mL    Inject 1 mL (1,000 mcg) into the muscle every 1 week x4, then every 2 to 4 weeks    Low serum vitamin B12       D 5000 5000 units Tabs   Generic drug:  Cholecalciferol      Take 5,000 Units by mouth daily        gabapentin 300 MG capsule    NEURONTIN    180 capsule    Take 1 capsule (300 mg) by mouth 2 times daily    Diabetic peripheral neuropathy (H), Type 2 diabetes mellitus with diabetic nephropathy, without long-term current use of insulin (H)       LORazepam 1 MG tablet    ATIVAN     Take 1 mg by mouth every 6 hours as needed for anxiety        metFORMIN 500 MG tablet    GLUCOPHAGE    60 tablet    2 in morning, 1 in evening.        nitroGLYcerin 0.4 MG sublingual tablet    NITROSTAT          simvastatin 20 MG tablet    ZOCOR     Take 10 mg by mouth At Bedtime        vitamin B complex with vitamin C Tabs tablet     100 tablet    Take 1 tablet by mouth daily -- make sure it has B12 in tablet    Peripheral polyneuropathy, Low serum vitamin B12       * Notice:  This list has 2 medication(s) that are the same as other medications prescribed for you. Read the directions carefully, and ask your doctor or other care provider to review them with you.

## 2018-06-11 ENCOUNTER — TELEPHONE (OUTPATIENT)
Dept: LAB | Facility: OTHER | Age: 73
End: 2018-06-11

## 2018-06-12 ENCOUNTER — HOSPITAL ENCOUNTER (OUTPATIENT)
Dept: CT IMAGING | Facility: OTHER | Age: 73
Discharge: HOME | End: 2018-06-12
Attending: INTERNAL MEDICINE | Admitting: INTERNAL MEDICINE
Payer: MEDICARE

## 2018-06-12 DIAGNOSIS — R10.32 ABDOMINAL PAIN, LEFT LOWER QUADRANT: ICD-10-CM

## 2018-06-12 DIAGNOSIS — R10.32 LLQ ABDOMINAL PAIN: Primary | ICD-10-CM

## 2018-06-12 DIAGNOSIS — R19.5 ABNORMAL STOOL CALIBER: ICD-10-CM

## 2018-06-12 LAB
CREAT SERPL-MCNC: 0.8 MG/DL (ref 0.7–1.3)
GFR SERPL CREATININE-BSD FRML MDRD: >90 ML/MIN/1.7M2

## 2018-06-12 PROCEDURE — 36415 COLL VENOUS BLD VENIPUNCTURE: CPT | Performed by: INTERNAL MEDICINE

## 2018-06-12 PROCEDURE — 25000125 ZZHC RX 250: Performed by: INTERNAL MEDICINE

## 2018-06-12 PROCEDURE — 82565 ASSAY OF CREATININE: CPT | Performed by: INTERNAL MEDICINE

## 2018-06-12 PROCEDURE — 74177 CT ABD & PELVIS W/CONTRAST: CPT

## 2018-06-12 RX ADMIN — IOHEXOL 100 ML: 350 INJECTION, SOLUTION INTRAVENOUS at 11:25

## 2018-06-12 NOTE — TELEPHONE ENCOUNTER
Patient here at [facility] for CT Scan. Needed creatinine orders, but radiology placed order due to Dr. Pickens being out of the office.        Jae May, CAMERON 06/12/18 9:21 AM

## 2018-06-12 NOTE — LETTER
Dajuan Basilio  [address]  [place] [address]    6/27/2018      Dear Dajuan Basilio,    The result of your recent tests are included below:    Results for orders placed or performed during the hospital encounter of 06/12/18   CT Abdomen Pelvis w Contrast    Narrative    PROCEDURE:  CT ABDOMEN PELVIS W CONTRAST    HISTORY:  left lower abdominal pain, change in stool caliber;  Abdominal pain, left lower quadrant; Abnormal stool caliber     TECHNIQUE:  Helical CT of the abdomen and pelvis was performed  following injection of intravenous contrast.     Sagittal and coronal reformatted images were reviewed.    COMPARISON:  5/13/2016    FINDINGS:      There is mild scarring or atelectasis in the left lung base.    The liver, spleen, and pancreas are unremarkable. The gallbladder is  present. Nodularity of both adrenal glands is unchanged.    2.6 cm right renal cyst is unchanged. The kidneys are otherwise  intact.    The bowel is normal in caliber. The appendix is unremarkable. There  are multiple diverticula in the colon without evidence of  diverticulitis. There is a fat-containing periumbilical hernia with  slight fat stranding in the hernia sac. The neck of the hernia  measures 2.4 cm. Overall, this is similar to the prior study. Small  fat-containing right inguinal hernia is also unchanged. There is also  a small hiatal hernia.     The aorta is normal in size with scattered atherosclerotic  calcifications.    There is marked prostatomegaly, unchanged.    No free fluid, free air or adenopathy is present.      No suspicious osseous lesions are identified.      Impression    IMPRESSION:    1. No acute findings in the abdomen or pelvis.  2. Prostatomegaly, unchanged.  3. Fat-containing right inguinal and periumbilical hernias, unchanged.  4. Diverticulosis. No evidence of diverticulitis.    SAMEER WARD MD       If you have any further questions or problems, please contact my office at [phone] and  schedule an appointment.    Clinic : [phone]  Appointment line: [phone]     Thank you,    Cm Pickens MD    Internal Medicine  [facility]     Reviewed and electronically signed by provider.

## 2018-06-18 ENCOUNTER — TELEPHONE (OUTPATIENT)
Dept: UROLOGY | Facility: OTHER | Age: 73
End: 2018-06-18

## 2018-06-18 ENCOUNTER — OFFICE VISIT (OUTPATIENT)
Dept: UROLOGY | Facility: OTHER | Age: 73
End: 2018-06-18
Attending: UROLOGY
Payer: MEDICARE

## 2018-06-18 VITALS
WEIGHT: 194 LBS | DIASTOLIC BLOOD PRESSURE: 80 MMHG | HEART RATE: 72 BPM | BODY MASS INDEX: 30.38 KG/M2 | SYSTOLIC BLOOD PRESSURE: 138 MMHG

## 2018-06-18 DIAGNOSIS — R39.198 SLOWING OF URINARY STREAM: Primary | ICD-10-CM

## 2018-06-18 LAB
ALBUMIN UR-MCNC: NEGATIVE MG/DL
APPEARANCE UR: CLEAR
BILIRUB UR QL STRIP: NEGATIVE
COLOR UR AUTO: YELLOW
GLUCOSE UR STRIP-MCNC: NEGATIVE MG/DL
HGB UR QL STRIP: NEGATIVE
KETONES UR STRIP-MCNC: NEGATIVE MG/DL
LEUKOCYTE ESTERASE UR QL STRIP: NEGATIVE
NITRATE UR QL: NEGATIVE
PH UR STRIP: 5.5 PH (ref 5–7)
SOURCE: NORMAL
SP GR UR STRIP: >1.03 (ref 1–1.03)
UROBILINOGEN UR STRIP-ACNC: 0.2 EU/DL (ref 0.2–1)

## 2018-06-18 PROCEDURE — G0463 HOSPITAL OUTPT CLINIC VISIT: HCPCS | Mod: 25

## 2018-06-18 PROCEDURE — 51798 US URINE CAPACITY MEASURE: CPT | Performed by: UROLOGY

## 2018-06-18 PROCEDURE — 99213 OFFICE O/P EST LOW 20 MIN: CPT | Performed by: UROLOGY

## 2018-06-18 PROCEDURE — 81003 URINALYSIS AUTO W/O SCOPE: CPT | Performed by: UROLOGY

## 2018-06-18 NOTE — PROGRESS NOTES
"Type of Visit  EST    Chief Complaint  Elevated PSA    HPI  Mr. Basilio is a 73 year old male who follows up with elevated PSA and mild voiding symptoms.  He initially presented to PCP with \"weakness and dizzy\".  He has had a work up and recently started vitamin B12 injections.  He denies hematuria or dysuria.  He describes urinary complaints as very mild.  Primary symptom is pelvic pressure at times.  He has a history of negative cysto, CTU in 2016/.  He had a negative prostate biopsy about 5 years ago by Dr Michaud.      Family History  Family History   Problem Relation Age of Onset     CANCER Father      Cancer, mesothelioma     Colon Cancer Mother      Cancer-colon,     Genitourinary Problems Mother      Genitourinary Disease,renal failure     DIABETES Mother      Diabetes     Other - See Comments Sister      Renal transplant     Type 2 Diabetes Sister      Diabetes type II     DIABETES Brother      Diabetes       Review of Systems  I personally reviewed the ROS with the patient.    Nursing Notes:   Asia Jain LPN  2018  8:46 AM  Unsigned  Patient is here today for a consult for BPH/weak stream.   Review of Systems:    Weight loss:    No     Recent fever/chills:  No   Night sweats:   No  Current skin rash:  No   Recent hair loss:  No  Heat intolerance:  No   Cold intolerance:  No  Chest pain:   No   Palpitations:   No  Shortness of breath:  No   Wheezing:   No  Constipation:    No   Diarrhea:   No   Nausea:   No   Vomiting:   No   Kidney/side pain:  No   Back pain:   YES  Frequent headaches:  No   Dizziness:     YES  Leg swelling:   No   Calf pain:    No    Parents, brothers or sisters with history of kidney cancer:   No  Parents, brothers or sisters with history of bladder cancer: No  Father or brother with history of prostate cancer:  No  Asia Jain LPN......................2018 8:46 AM        Physical Exam  Vitals:    18 0847   BP: 138/80   BP Location: Right arm   Patient " Position: Sitting   Cuff Size: Adult Large   Pulse: 72   Weight: 88 kg (194 lb)     Constitutional: No acute distress.  Alert and cooperative   Head: NCAT  Eyes: Conjunctivae normal  Cardiovascular: Regular rate.  Pulmonary/Chest: Respirations are even and non-labored bilaterally, no audible wheezing  Abdominal: Soft. No distension, tenderness, masses or guarding.   Neurological: A + O x 3.  Cranial Nerves II-XII grossly intact.  Extremities: JONNY x 4, Warm. No clubbing.  No cyanosis.    Skin: Pink, warm and dry.  No visible rashes noted.  Psychiatric:  Normal mood and affect  Back:  No left CVA tenderness.  No right CVA tenderness.  Genitourinary:  Nonpalpable bladder    Labs  Results for orders placed or performed in visit on 06/18/18   *UA reflex to Microscopic   Result Value Ref Range    Color Urine Yellow     Appearance Urine Clear     Glucose Urine Negative NEG^Negative mg/dL    Bilirubin Urine Negative NEG^Negative    Ketones Urine Negative NEG^Negative mg/dL    Specific Gravity Urine >1.030 1.003 - 1.035    Blood Urine Negative NEG^Negative    pH Urine 5.5 5.0 - 7.0 pH    Protein Albumin Urine Negative NEG^Negative mg/dL    Urobilinogen Urine 0.2 0.2 - 1.0 EU/dL    Nitrite Urine Negative NEG^Negative    Leukocyte Esterase Urine Negative NEG^Negative    Source Midstream Urine      Results for FREDI BASILIO (MRN [id number]) as of 6/18/2018 08:56   2/2/2015 12:19 4/12/2016 18:13 4/13/2017 09:14   PSA Total (Diagnostic) 2.820 3.073 2.921     Results for FREDI BASILIO (MRN [id number]) as of 6/18/2018 08:56   5/31/2018 10:57   Prostate Specific Antigen 3.771 (H)     Post-Void Residual  A post-void residual was measured by ultrasonic bladder scanner.  90 mL today    Assessment & Plan  Mr. Basilio is a 73 year old male who follows up with elevated PSA and mild voiding symptoms.  Age adjusted PSA is considered normal.  PVR is low.  UA today is negative.  Follow up as needed.

## 2018-06-18 NOTE — NURSING NOTE
Post-Void Residual  A post-void residual was measured by ultrasonic bladder scanner.  90 mL. Asia Jain LPN......................6/18/2018 9:28 AM

## 2018-06-18 NOTE — NURSING NOTE
Patient is here today for a consult for BPH/weak stream.   Review of Systems:    Weight loss:    No     Recent fever/chills:  No   Night sweats:   No  Current skin rash:  No   Recent hair loss:  No  Heat intolerance:  No   Cold intolerance:  No  Chest pain:   No   Palpitations:   No  Shortness of breath:  No   Wheezing:   No  Constipation:    No   Diarrhea:   No   Nausea:   No   Vomiting:   No   Kidney/side pain:  No   Back pain:   YES  Frequent headaches:  No   Dizziness:     YES  Leg swelling:   No   Calf pain:    No    Parents, brothers or sisters with history of kidney cancer:   No  Parents, brothers or sisters with history of bladder cancer: No  Father or brother with history of prostate cancer:  No  Asia Jain LPN......................6/18/2018 8:46 AM

## 2018-06-18 NOTE — MR AVS SNAPSHOT
After Visit Summary   6/18/2018    Dajuan Basilio    MRN: [id number]           Patient Information     Date Of Birth          1945        Visit Information        Provider Department      6/18/2018 8:45 AM Olivier Connell MD [facility]        Today's Diagnoses     Slowing of urinary stream    -  1       Follow-ups after your visit        Who to contact     If you have questions or need follow up information about today's clinic visit or your schedule please contact [facility] directly at [phone].  Normal or non-critical lab and imaging results will be communicated to you by MyChart, letter or phone within 4 business days after the clinic has received the results. If you do not hear from us within 7 days, please contact the clinic through MyChart or phone. If you have a critical or abnormal lab result, we will notify you by phone as soon as possible.  Submit refill requests through [employer] or call your pharmacy and they will forward the refill request to us. Please allow 3 business days for your refill to be completed.          Additional Information About Your Visit        Care EveryWhere ID     This is your Care EveryWhere ID. This could be used by other organizations to access your [place] medical records  [id number]        Your Vitals Were     Pulse BMI (Body Mass Index)                72 30.38 kg/m2           Blood Pressure from Last 3 Encounters:   06/18/18 138/80   06/07/18 132/72   05/31/18 126/72    Weight from Last 3 Encounters:   06/18/18 88 kg (194 lb)   06/07/18 88 kg (194 lb)   05/31/18 88 kg (194 lb)              We Performed the Following     *UA reflex to Microscopic     MEASURE POST-VOID RESIDUAL URINE/BLADDER CAPACITY, US NON-IMAGING        Primary Care Provider Office Phone # Fax #    Cm Pickens [phone] [address]  [place] [facility] [place] [address]        Equal Access to Services     ABDIRAHMAN ODEN :  Hadii aad ku hadscoobyo Soomaali, waaxda luqadaha, qaybta kaalmada adeashley, jose mendez. So [facility] [phone].    ATENCIÓN: Si sierra schwartz, tiene a powell disposición servicios gratuitos de asistencia lingüística. Llame al [phone].    We comply with applicable federal civil rights laws and [place] laws. We do not discriminate on the basis of race, color, national origin, age, disability, sex, sexual orientation, or gender identity.            Thank you!     Thank you for choosing [facility]  for your care. Our goal is always to provide you with excellent care. Hearing back from our patients is one way we can continue to improve our services. Please take a few minutes to complete the written survey that you may receive in the mail after your visit with us. Thank you!             Your Updated Medication List - Protect others around you: Learn how to safely use, store and throw away your medicines at www.disposemymeds.org.          This list is accurate as of 6/18/18  3:40 PM.  Always use your most recent med list.                   Brand Name Dispense Instructions for use Diagnosis    ascorbic acid 500 MG tablet    VITAMIN C     Take 1 tablet by mouth daily. For 60 days        aspirin 81 MG tablet      Take 81 mg by mouth daily        blood glucose monitoring test strip    no brand specified    200 strip    Check blood glucose twice daily,  Dx code e11.9. Dispense as covered by patient's insurance.    Type 2 diabetes mellitus with diabetic nephropathy, without long-term current use of insulin (H)       * guaiFENesin-codeine 100-10 MG/5ML Soln solution    ROBITUSSIN AC     Take 10 mLs by mouth        * CHERATUSSIN -10 MG/5ML Soln solution   Generic drug:  guaiFENesin-codeine     240 mL    TAKE 2 TEASPOONFUL (10ML) BY MOUTH EVERY 4 HOURS AS NEEDED FOR COUGH .  MAX  DOSE  60ML  PER  24  HOURS.    Cough       citalopram 20 MG tablet    celeXA    45 tablet     TAKE ONE-HALF TABLET BY MOUTH ONCE DAILY    Moderate episode of recurrent major depressive disorder (H)       clopidogrel 75 MG tablet    PLAVIX    90 tablet    Take 1 tablet (75 mg) by mouth daily -- start when out of Brilinta    Coronary artery disease involving native coronary artery of native heart without angina pectoris, Status post insertion of drug eluting coronary artery stent       cyanocobalamin 1000 MCG/ML injection    VITAMIN B12    1 mL    Inject 1 mL (1,000 mcg) into the muscle every 1 week x4, then every 2 to 4 weeks    Low serum vitamin B12       D 5000 5000 units Tabs   Generic drug:  Cholecalciferol      Take 5,000 Units by mouth daily        gabapentin 300 MG capsule    NEURONTIN    180 capsule    Take 1 capsule (300 mg) by mouth 2 times daily    Diabetic peripheral neuropathy (H), Type 2 diabetes mellitus with diabetic nephropathy, without long-term current use of insulin (H)       LORazepam 1 MG tablet    ATIVAN     Take 1 mg by mouth every 6 hours as needed for anxiety        metFORMIN 500 MG tablet    GLUCOPHAGE    60 tablet    2 in morning, 1 in evening.        nitroGLYcerin 0.4 MG sublingual tablet    NITROSTAT          simvastatin 20 MG tablet    ZOCOR     Take 10 mg by mouth At Bedtime        vitamin B complex with vitamin C Tabs tablet     100 tablet    Take 1 tablet by mouth daily -- make sure it has B12 in tablet    Peripheral polyneuropathy, Low serum vitamin B12       * Notice:  This list has 2 medication(s) that are the same as other medications prescribed for you. Read the directions carefully, and ask your doctor or other care provider to review them with you.

## 2018-06-18 NOTE — TELEPHONE ENCOUNTER
Patient would like to speak with nurse in regards to appointment on 06/18/2018.  Laura Díaz on 6/18/2018 at 9:13 AM

## 2018-06-20 ENCOUNTER — TELEPHONE (OUTPATIENT)
Dept: UROLOGY | Facility: OTHER | Age: 73
End: 2018-06-20

## 2018-06-20 NOTE — TELEPHONE ENCOUNTER
Patient states that he was started on a medication by Dr. Olivas due to protein being in his urine.  He wanted to know what the urine test showed when he seen Olivier Connell MD last.  I notified him that his UA from 6/18/18 showed negative protein in urine.  He states that he stopped the medication that Dr. Olivas prescribed about a month ago.  I advised that he speak with his current PCP about being on or off that medication he is speaking of.  Jolie De León RN......June 20, 2018...10:21 AM

## 2018-06-25 ENCOUNTER — TELEPHONE (OUTPATIENT)
Dept: CARDIAC REHAB | Facility: OTHER | Age: 73
End: 2018-06-25

## 2018-06-25 DIAGNOSIS — R19.5 CHANGE IN STOOL CALIBER: Primary | ICD-10-CM

## 2018-06-25 NOTE — TELEPHONE ENCOUNTER
Dajuan states no one has gotten back to him yet on his recent CT. Please RTC a call with CT results.

## 2018-06-25 NOTE — TELEPHONE ENCOUNTER
Called re:  Referral to cardiac rehab. Pt states he is doing very well.  Extremely active at home and in the yard.  States he is having no difficulties and declines cardiac rehab at this time.

## 2018-06-26 ENCOUNTER — TELEPHONE (OUTPATIENT)
Dept: FAMILY MEDICINE | Facility: OTHER | Age: 73
End: 2018-06-26

## 2018-06-26 NOTE — TELEPHONE ENCOUNTER
Noted. CT results:    IMPRESSION:    1. No acute findings in the abdomen or pelvis.  2. Prostatomegaly, unchanged.  3. Fat-containing right inguinal and periumbilical hernias, unchanged.  4. Diverticulosis. No evidence of diverticulitis.      Cm Pickens MD

## 2018-06-26 NOTE — TELEPHONE ENCOUNTER
Patient notified of providers note. Patient would like to know what the next step is?  Patient reports having flat/small diameter stools.  Patient indicates that mother had colon cancer when she was his age and he is concerned about this.  Does patient need to do a Colonoscopy?    If is okay to leave a detailed message on home answering machine.  If we are unable to get back to him today he will be available prior to 0845 tomorrow.      Britta Guzman LPN 6/26/2018 1:04 PM

## 2018-06-26 NOTE — TELEPHONE ENCOUNTER
Patient had CT scan a couple of weeks ago and has not heard any results.    Kami Whaley on 6/26/2018 at 10:36 AM

## 2018-07-03 ENCOUNTER — TELEPHONE (OUTPATIENT)
Dept: INTERNAL MEDICINE | Facility: OTHER | Age: 73
End: 2018-07-03

## 2018-07-03 NOTE — TELEPHONE ENCOUNTER
Noted.  See if patient is available for 3 PM on Wednesday 7/11  ×40 minutes due to multiple problems.  Cm Pickens MD

## 2018-07-03 NOTE — TELEPHONE ENCOUNTER
Patient reports having fatigue, drowsy and coordination concerns over the past 5 months.  Patient express concerns about this due to family member having side effects.  Patient would also like to discuss further the results of the CT Abdomen scan that was done last month.      Britta Guzman LPN 7/3/2018 11:12 AM

## 2018-07-06 ENCOUNTER — TELEPHONE (OUTPATIENT)
Dept: SURGERY | Facility: OTHER | Age: 73
End: 2018-07-06

## 2018-07-06 NOTE — TELEPHONE ENCOUNTER
Patient referred by Dr. Pickens for a diagnostic colonoscopy,  Diagnosis is change in stool caliber. Please advise.  Thank you.  Neha Brito on 7/6/2018 at 10:00 AM

## 2018-07-09 ENCOUNTER — TELEPHONE (OUTPATIENT)
Dept: SURGERY | Facility: OTHER | Age: 73
End: 2018-07-09

## 2018-07-09 NOTE — TELEPHONE ENCOUNTER
Called patient to schedule colonoscopy,  He stated that he would like Dr. Johnson to do the procedure.  Referral has been faxed to Dr. Johnson's office and they will contact him to schedule.  Neha Brito on 7/9/2018 at 9:08 AM

## 2018-07-10 ENCOUNTER — TELEPHONE (OUTPATIENT)
Dept: INTERNAL MEDICINE | Facility: OTHER | Age: 73
End: 2018-07-10

## 2018-07-10 NOTE — TELEPHONE ENCOUNTER
Patient's colonoscopy is on hold for right now.    He will discuss when he can re-schedule it at the time of his office visit with Dr. Pickens.  Maureen Torres LPN  7/10/2018  3:53 PM

## 2018-07-10 NOTE — TELEPHONE ENCOUNTER
Dr. Johnson's office calling regarding patient's colonoscopy (which is scheduled for Monday)    Patient is on Plavix and ASA.    They are wondering how long to be off the Plavix?  He will be holding the ASA for 5 days.    Please call either Dr. Johnson's office (before 4 or patient after 4)    Maureen Torres LPN  7/10/2018  2:33 PM

## 2018-07-11 ENCOUNTER — OFFICE VISIT (OUTPATIENT)
Dept: INTERNAL MEDICINE | Facility: OTHER | Age: 73
End: 2018-07-11
Attending: INTERNAL MEDICINE
Payer: COMMERCIAL

## 2018-07-11 VITALS
HEART RATE: 76 BPM | WEIGHT: 196.5 LBS | DIASTOLIC BLOOD PRESSURE: 84 MMHG | SYSTOLIC BLOOD PRESSURE: 138 MMHG | BODY MASS INDEX: 30.78 KG/M2

## 2018-07-11 DIAGNOSIS — E11.42 DIABETIC PERIPHERAL NEUROPATHY (H): Primary | ICD-10-CM

## 2018-07-11 DIAGNOSIS — G62.9 PERIPHERAL POLYNEUROPATHY: ICD-10-CM

## 2018-07-11 DIAGNOSIS — E11.21 TYPE 2 DIABETES MELLITUS WITH DIABETIC NEPHROPATHY, WITHOUT LONG-TERM CURRENT USE OF INSULIN (H): ICD-10-CM

## 2018-07-11 DIAGNOSIS — E53.8 LOW SERUM VITAMIN B12: ICD-10-CM

## 2018-07-11 PROCEDURE — 25000128 H RX IP 250 OP 636: Performed by: INTERNAL MEDICINE

## 2018-07-11 PROCEDURE — 99214 OFFICE O/P EST MOD 30 MIN: CPT | Performed by: INTERNAL MEDICINE

## 2018-07-11 PROCEDURE — G0463 HOSPITAL OUTPT CLINIC VISIT: HCPCS

## 2018-07-11 PROCEDURE — 96372 THER/PROPH/DIAG INJ SC/IM: CPT

## 2018-07-11 PROCEDURE — G0463 HOSPITAL OUTPT CLINIC VISIT: HCPCS | Mod: 25

## 2018-07-11 RX ORDER — CYANOCOBALAMIN 1000 UG/ML
1000 INJECTION, SOLUTION INTRAMUSCULAR; SUBCUTANEOUS ONCE
Status: COMPLETED | OUTPATIENT
Start: 2018-07-11 | End: 2018-07-11

## 2018-07-11 RX ORDER — GABAPENTIN 100 MG/1
100-200 CAPSULE ORAL
Qty: 30 CAPSULE | Refills: 1 | Status: SHIPPED | OUTPATIENT
Start: 2018-07-11 | End: 2018-08-18

## 2018-07-11 RX ORDER — CYANOCOBALAMIN 1000 UG/ML
INJECTION, SOLUTION INTRAMUSCULAR; SUBCUTANEOUS
Qty: 1 ML | Refills: 20 | OUTPATIENT
Start: 2018-07-11 | End: 2019-03-19

## 2018-07-11 RX ADMIN — CYANOCOBALAMIN 1000 MCG: 1000 INJECTION, SOLUTION INTRAMUSCULAR at 15:40

## 2018-07-11 ASSESSMENT — ENCOUNTER SYMPTOMS
HEMATURIA: 0
FEVER: 0
EYE PAIN: 0
ARTHRALGIAS: 0
AGITATION: 0
BRUISES/BLEEDS EASILY: 0
DIZZINESS: 1
NAUSEA: 0
MYALGIAS: 0
LIGHT-HEADEDNESS: 1
SHORTNESS OF BREATH: 0
PALPITATIONS: 0
CHILLS: 0
WHEEZING: 0
DIARRHEA: 0
COUGH: 0
DYSURIA: 0
FATIGUE: 0
ABDOMINAL PAIN: 0
CONFUSION: 1
VOMITING: 0

## 2018-07-11 ASSESSMENT — PATIENT HEALTH QUESTIONNAIRE - PHQ9: 5. POOR APPETITE OR OVEREATING: NOT AT ALL

## 2018-07-11 ASSESSMENT — ANXIETY QUESTIONNAIRES
2. NOT BEING ABLE TO STOP OR CONTROL WORRYING: NOT AT ALL
3. WORRYING TOO MUCH ABOUT DIFFERENT THINGS: NOT AT ALL
5. BEING SO RESTLESS THAT IT IS HARD TO SIT STILL: NOT AT ALL
GAD7 TOTAL SCORE: 0
1. FEELING NERVOUS, ANXIOUS, OR ON EDGE: NOT AT ALL
6. BECOMING EASILY ANNOYED OR IRRITABLE: NOT AT ALL
IF YOU CHECKED OFF ANY PROBLEMS ON THIS QUESTIONNAIRE, HOW DIFFICULT HAVE THESE PROBLEMS MADE IT FOR YOU TO DO YOUR WORK, TAKE CARE OF THINGS AT HOME, OR GET ALONG WITH OTHER PEOPLE: NOT DIFFICULT AT ALL
7. FEELING AFRAID AS IF SOMETHING AWFUL MIGHT HAPPEN: NOT AT ALL

## 2018-07-11 ASSESSMENT — PAIN SCALES - GENERAL: PAINLEVEL: MODERATE PAIN (4)

## 2018-07-11 NOTE — PROGRESS NOTES
Nursing Notes:   Britta Guzman LPN  7/11/2018  3:14 PM  Signed  Patient presents to the clinic for follow up with concerns about possible side effects from Gabapentin.    Previous A1C is at goal of <8  Lab Results   Component Value Date    A1C 6.2 04/20/2018     Urine microalbumin:creatine: n/a  Foot exam declines today  Eye exam yearly    Tobacco User no  Patient is on a daily aspirin  Patient is on a Statin.  Blood pressure today of:     BP Readings from Last 1 Encounters:   06/18/18 138/80      is at the goal of <139/89 for diabetics.    Britta Guzman LPN on 7/11/2018 at 2:46 PM      Nursing note reviewed with patient.  Accurracy and completeness verified.   Mr. Basilio is a 73 year old male who:  Patient presents with:  Clinic Care Coordination - Follow-up    HPI     ICD-10-CM    1. Diabetic peripheral neuropathy (H) E11.42 gabapentin (NEURONTIN) 100 MG capsule   2. Type 2 diabetes mellitus with diabetic nephropathy, without long-term current use of insulin (H) E11.21 gabapentin (NEURONTIN) 100 MG capsule   3. Peripheral polyneuropathy - history of Type 2 Diabetes G62.9 vitamin B complex with vitamin C (VITAMIN  B COMPLEX) TABS tablet   4. Low serum vitamin B12 R79.89 vitamin B complex with vitamin C (VITAMIN  B COMPLEX) TABS tablet     cyanocobalamin (VITAMIN B12) 1000 MCG/ML injection     cyanocobalamin (VITAMIN B12) injection 1,000 mcg     Diabetic peripheral neuropathy, ongoing.  Wondering about side effects from gabapentin.    Gabapentin side effects -reports some memory problems or memory loss, change in eyesight, muscle pain and weakness, change in balance, dizziness, states feeling sleepy, feeling tired and weakness.  Agitated and restless.  Some irritability.  Changes in behavior and mood.  Lack of coordination.  Drowsiness.  Difficulty with coordination, while taking 300 mg gabapentin.  If he skips the dose symptoms improve.    He still has neuropathy pain and finds gabapentin helpful.  Will try  reducing the dose and seeing if he has fewer symptoms/side effects.    Diabetes, currently controlled.  Does have neuropathy.    Vitamin B12 deficiency, switch over to B complex tablets and he would like a B12 shot today.    Functional Capacity: > or about 4 METS.   Reports that he can climb a flight of stairs without any chest pain/heaviness   No orthopnea/paroxysmal nocturnal dyspnea  Review of Systems   Constitutional: Negative for chills, fatigue and fever.   HENT: Negative for congestion and hearing loss.    Eyes: Negative for pain and visual disturbance.   Respiratory: Negative for cough, shortness of breath and wheezing.    Cardiovascular: Negative for chest pain and palpitations.   Gastrointestinal: Negative for abdominal pain, diarrhea, nausea and vomiting.   Endocrine: Negative for cold intolerance and heat intolerance.   Genitourinary: Negative for dysuria and hematuria.   Musculoskeletal: Negative for arthralgias and myalgias.   Skin: Negative for pallor.   Allergic/Immunologic: Negative for immunocompromised state.   Neurological: Positive for dizziness and light-headedness.   Hematological: Does not bruise/bleed easily.   Psychiatric/Behavioral: Positive for confusion. Negative for agitation.        NEERAJ:   NEERAJ-7 SCORE 5/2/2018 5/31/2018 7/11/2018   Total Score 0 0 0     PHQ9:  PHQ-9 SCORE 5/2/2018 5/31/2018 7/11/2018   Total Score 0 0 0       I have personally reviewed the past medical history, past surgical history, medications, allergies, family and social history as listed below, on 7/11/2018.    Allergies   Allergen Reactions     Ace Inhibitors Cough     Atorvastatin Calcium      Lipitor     Hmg-Coa-R Inhibitors Muscle Pain (Myalgia)     Higher doses cause myalgias       Current Outpatient Prescriptions   Medication Sig Dispense Refill     ascorbic acid (VITAMIN C) 500 MG tablet Take 1 tablet by mouth daily. For 60 days       aspirin 81 MG tablet Take 81 mg by mouth daily       blood glucose  monitoring (NO BRAND SPECIFIED) test strip Check blood glucose twice daily,  Dx code e11.9. Dispense as covered by patient's insurance. 200 strip 2     CHERATUSSIN -10 MG/5ML SYRP solution TAKE 2 TEASPOONFUL (10ML) BY MOUTH EVERY 4 HOURS AS NEEDED FOR COUGH .  MAX  DOSE  60ML  PER  24  HOURS. 240 mL 0     Cholecalciferol (D 5000) 5000 UNITS TABS Take 5,000 Units by mouth daily       citalopram (CELEXA) 20 MG tablet TAKE ONE-HALF TABLET BY MOUTH ONCE DAILY 45 tablet 3     clopidogrel (PLAVIX) 75 MG tablet Take 1 tablet (75 mg) by mouth daily -- start when out of Brilinta 90 tablet 3     cyanocobalamin (VITAMIN B12) 1000 MCG/ML injection Inject 1 mL (1,000 mcg) into the muscle every 3 to 4 weeks 1 mL 20     gabapentin (NEURONTIN) 100 MG capsule Take 1-2 capsules (100-200 mg) by mouth nightly as needed -- Dose Reduced 7/11/2018 -- stop 300 mg capsule 30 capsule 1     guaiFENesin-codeine (ROBITUSSIN AC) 100-10 MG/5ML SOLN solution Take 10 mLs by mouth       LORazepam (ATIVAN) 1 MG tablet Take 1 mg by mouth every 6 hours as needed for anxiety       metFORMIN (GLUCOPHAGE) 500 MG tablet 2 in morning, 1 in evening. 60 tablet      nitroGLYcerin (NITROSTAT) 0.4 MG sublingual tablet        simvastatin (ZOCOR) 20 MG tablet Take 10 mg by mouth At Bedtime       vitamin B complex with vitamin C (VITAMIN  B COMPLEX) TABS tablet Take 1 tablet by mouth daily Monday, Wednesday, Friday -- 3 days weekly -- make sure it has B12 in tablet -- Dose Reduced 7/11/2018 100 tablet 3        Patient Active Problem List    Diagnosis Date Noted     S/P cardiac cath 5/17/18 06/07/2018     Priority: Medium     Unsteady gait 06/07/2018     Priority: Medium     Stented coronary artery to his LCX and OM1 on 5/17/18 06/07/2018     Priority: Medium     Status post insertion of drug eluting coronary artery stent - x2 stents - 1st OMB - 5/17/2018 - [facility] 05/31/2018     Priority: Medium     Prediabetes 05/31/2018     Priority: Medium      Peripheral polyneuropathy - history of Type 2 Diabetes 05/31/2018     Priority: Medium     Malaise and fatigue 05/31/2018     Priority: Medium     Benign prostatic hyperplasia with weak urinary stream 05/31/2018     Priority: Medium     Abnormal electrocardiogram 04/26/2018     Priority: Medium     Agatston coronary artery calcium score between 200 and 399 04/26/2018     Priority: Medium     Exertional dyspnea 04/20/2018     Priority: Medium     Low serum vitamin B12 04/18/2018     Priority: Medium     Microalbuminuria 01/26/2018     Priority: Medium     Generalized anxiety disorder 06/05/2017     Priority: Medium     Abnormal CT scan, bladder 06/29/2016     Priority: Medium     Moderate episode of recurrent major depressive disorder (H) 06/16/2016     Priority: Medium     GERD (gastroesophageal reflux disease) 05/03/2013     Priority: Medium     Nephrolithiasis 04/23/2012     Priority: Medium     BPH (benign prostatic hyperplasia) 03/27/2012     Priority: Medium     History of diabetes mellitus, type II 02/17/2010     Priority: Medium     Diverticular disease of colon 02/17/2010     Priority: Medium     Pure hypercholesterolemia 02/17/2010     Priority: Medium     Obesity 02/17/2010     Priority: Medium     Panic disorder without agoraphobia 02/17/2010     Priority: Medium     Myalgia and myositis 02/17/2010     Priority: Medium     Past Medical History:   Diagnosis Date     Diverticulosis of intestine without perforation or abscess without bleeding     No Comments Provided     Fatty (change of) liver, not elsewhere classified     non alcoholic     Fibromyalgia     No Comments Provided     Nodular prostate without lower urinary tract symptoms     2012,US confirms benign calcifications     Obesity     No Comments Provided     Panic disorder without agoraphobia     No Comments Provided     Past Surgical History:   Procedure Laterality Date     ARTHROPLASTY KNEE      2015     ARTHROSCOPY KNEE      right knee X2      "BIOPSY PROSTATE TRANSRECTAL      2013     COLONOSCOPY      ,and UGI [place] normal     COLONOSCOPY      ,and UGI repeat Dr. Johnson negative.     COLONOSCOPY      2013,WNL     HERNIA REPAIR  2000     RELEASE CARPAL TUNNEL      2014     VASECTOMY      No Comments Provided     Social History     Social History     Marital status:      Spouse name: N/A     Number of children: N/A     Years of education: N/A     Social History Main Topics     Smoking status: Former Smoker     Types: Cigarettes     Quit date: 10/25/1983     Smokeless tobacco: Never Used     Alcohol use No     Drug use: No     Sexual activity: Not Asked     Other Topics Concern     None     Social History Narrative    , retired from [employer].  Lives in town.     Family History   Problem Relation Age of Onset     Cancer Father      Cancer, mesothelioma     Colon Cancer Mother      Cancer-colon,     Genitourinary Problems Mother      Genitourinary Disease,renal failure     Diabetes Mother      Diabetes     Other - See Comments Sister      Renal transplant     Type 2 Diabetes Sister      Diabetes type II     Diabetes Brother      Diabetes       EXAM:   Vitals:    18 1449   BP: 138/84   BP Location: Right arm   Patient Position: Sitting   Cuff Size: Adult Regular   Pulse: 76   Weight: 196 lb 8 oz (89.1 kg)       Current Pain Score: Moderate Pain (4)     BP Readings from Last 3 Encounters:   18 138/84   18 138/80   18 132/72    Wt Readings from Last 3 Encounters:   18 196 lb 8 oz (89.1 kg)   18 194 lb (88 kg)   18 194 lb (88 kg)      Estimated body mass index is 30.78 kg/(m^2) as calculated from the following:    Height as of 18: 5' 7\" (1.702 m).    Weight as of this encounter: 196 lb 8 oz (89.1 kg).     Physical Exam   Constitutional: He appears well-developed and well-nourished. No distress.   HENT:   Head: Normocephalic and atraumatic.   Eyes: Conjunctivae are normal. No scleral " icterus.   Neck: No thyromegaly present.   Cardiovascular: Normal rate and regular rhythm.    Pulmonary/Chest: Effort normal. No respiratory distress. He has no wheezes.   Abdominal: Soft. There is no tenderness.   Musculoskeletal: He exhibits no tenderness or deformity.   Lymphadenopathy:     He has no cervical adenopathy.   Neurological: He is alert. No cranial nerve deficit.   Skin: Skin is warm and dry.   Psychiatric: He has a normal mood and affect.        INVESTIGATIONS:  Results for orders placed or performed in visit on 06/18/18   *UA reflex to Microscopic   Result Value Ref Range    Color Urine Yellow     Appearance Urine Clear     Glucose Urine Negative NEG^Negative mg/dL    Bilirubin Urine Negative NEG^Negative    Ketones Urine Negative NEG^Negative mg/dL    Specific Gravity Urine >1.030 1.003 - 1.035    Blood Urine Negative NEG^Negative    pH Urine 5.5 5.0 - 7.0 pH    Protein Albumin Urine Negative NEG^Negative mg/dL    Urobilinogen Urine 0.2 0.2 - 1.0 EU/dL    Nitrite Urine Negative NEG^Negative    Leukocyte Esterase Urine Negative NEG^Negative    Source Midstream Urine        ASSESSMENT AND PLAN:  Problem List Items Addressed This Visit        Nervous and Auditory    Peripheral polyneuropathy - history of Type 2 Diabetes    Relevant Medications    gabapentin (NEURONTIN) 100 MG capsule    vitamin B complex with vitamin C (VITAMIN  B COMPLEX) TABS tablet       Other    Low serum vitamin B12    Relevant Medications    vitamin B complex with vitamin C (VITAMIN  B COMPLEX) TABS tablet    cyanocobalamin (VITAMIN B12) 1000 MCG/ML injection    cyanocobalamin (VITAMIN B12) injection 1,000 mcg (Completed)      Other Visit Diagnoses     Diabetic peripheral neuropathy (H)    -  Primary    Relevant Medications    gabapentin (NEURONTIN) 100 MG capsule    Type 2 diabetes mellitus with diabetic nephropathy, without long-term current use of insulin (H)        Relevant Medications    gabapentin (NEURONTIN) 100 MG  capsule        reviewed diet, exercise and weight control, recommended sodium restriction  -- Expected clinical course discussed    -- Medications and their side effects discussed    The 10-year ASCVD risk score (Gilman Citytyron CRUZ Jr, et al., 2013) is: 47.8%    Values used to calculate the score:      Age: 73 years      Sex: Male      Is Non- : No      Diabetic: Yes      Tobacco smoker: No      Systolic Blood Pressure: 138 mmHg      Is BP treated: No      HDL Cholesterol: 42 mg/dL      Total Cholesterol: 186 mg/dL    Patient Instructions     1. Diabetic peripheral neuropathy (H)  - gabapentin (NEURONTIN) 100 MG capsule; Take 1-2 capsules (100-200 mg) by mouth nightly as needed -- Dose Reduced 7/11/2018 -- stop 300 mg capsule  Dispense: 30 capsule; Refill: 1    2. Type 2 diabetes mellitus with diabetic nephropathy, without long-term current use of insulin (H)  - gabapentin (NEURONTIN) 100 MG capsule; Take 1-2 capsules (100-200 mg) by mouth nightly as needed -- Dose Reduced 7/11/2018 -- stop 300 mg capsule  Dispense: 30 capsule; Refill: 1    3. Peripheral polyneuropathy - history of Type 2 Diabetes  - vitamin B complex with vitamin C (VITAMIN  B COMPLEX) TABS tablet; Take 1 tablet by mouth daily Monday, Wednesday, Friday -- 3 days weekly -- make sure it has B12 in tablet -- Dose Reduced 7/11/2018  Dispense: 100 tablet; Refill: 3    4. Low serum vitamin B12  - vitamin B complex with vitamin C (VITAMIN  B COMPLEX) TABS tablet; Take 1 tablet by mouth daily Monday, Wednesday, Friday -- 3 days weekly -- make sure it has B12 in tablet -- Dose Reduced 7/11/2018  Dispense: 100 tablet; Refill: 3  - cyanocobalamin (VITAMIN B12) 1000 MCG/ML injection; Inject 1 mL (1,000 mcg) into the muscle every 3 to 4 weeks  Dispense: 1 mL; Refill: 20    -- schedule B12 shots with the shot nurse.     Return for Diabetes labs and clinic follow-up appointment every 3 to 4 months.  --- (Go for about 91 to 100 days)    Aspects of  Diabetes we can improve:  Hemoglobin A1c Lab Results   Component Value Date    A1C 6.2 04/20/2018    Goal range is under 8. Best is 6.5 to 7   Blood Pressure 138/84 Goal to keep less than 140/90   Tobacco  reports that he quit smoking about 34 years ago. His smoking use included Cigarettes. He has never used smokeless tobacco. Goal to abstain from tobacco   Aspirin or Plavix Aspirin Aspirin or Plavix reduces risk of heart disease and stroke   ACE/ARB -- quit lisinopril -- These medications reduce risk of kidney disease   Cholesterol Simvastatin Statins reduce risk of heart disease and stroke   Eye Exam -- Do Yearly -- Annual diabetic eye exam   Healthy weight Body mass index is 30.78 kg/(m^2). Goal BMI under 30, best is under 25.      -- Trying to exercise daily (goal at least 20 min/day) with moderate aerobic activity   -- Eat healthy (resources from ADA at http://www.diabetes.org/)   -- Taking good care of my feet. Consider seeing the Podiatrist   -- Check blood sugars as directed, record in log book and bring to every appointment      Schedule lab only appointment --- A few days AFTER: 10/9/18   Schedule clinic appointment with Dr. Pickens -- Same day as labs, or 1-2 days later.     Insurance companies are now grading you and I on your blood sugar control -- Goal is to get your A1c down to 7.9% or lower and NO Smoking!    -- Medicare and most insurance companies, will only cover Hemoglobin A1c labs to be rechecked every 91+ days.      Hemoglobin A1C   Date Value Ref Range Status   04/20/2018 6.2 (H) 4.0 - 6.0 % Final       Next follow-up appointment with Dr. Pickens should be scheduled:  -- Approximately a few days AFTER: 10/9/18       -- With recent normal CT scan.... Cancel colonoscopy for now -- due to recent heart artery stent.     Colonoscopy in 2014 -- was normal.     6/12/2018 -- PROCEDURE:  CT ABDOMEN PELVIS W CONTRAST     HISTORY:  left lower abdominal pain, change in stool caliber;  Abdominal pain, left  lower quadrant; Abnormal stool caliber      TECHNIQUE:  Helical CT of the abdomen and pelvis was performed  following injection of intravenous contrast.     Sagittal and coronal reformatted images were reviewed.     COMPARISON:  5/13/2016     FINDINGS:       There is mild scarring or atelectasis in the left lung base.     The liver, spleen, and pancreas are unremarkable. The gallbladder is  present. Nodularity of both adrenal glands is unchanged.     2.6 cm right renal cyst is unchanged. The kidneys are otherwise  intact.     The bowel is normal in caliber. The appendix is unremarkable. There  are multiple diverticula in the colon without evidence of  diverticulitis. There is a fat-containing periumbilical hernia with  slight fat stranding in the hernia sac. The neck of the hernia  measures 2.4 cm. Overall, this is similar to the prior study. Small  fat-containing right inguinal hernia is also unchanged. There is also  a small hiatal hernia.      The aorta is normal in size with scattered atherosclerotic  calcifications.     There is marked prostatomegaly, unchanged.     No free fluid, free air or adenopathy is present.       No suspicious osseous lesions are identified.         IMPRESSION:    1. No acute findings in the abdomen or pelvis.  2. Prostatomegaly, unchanged.  3. Fat-containing right inguinal and periumbilical hernias, unchanged.  4. Diverticulosis. No evidence of diverticulitis.     SAMEER WARD MD    B12 shot today.   Schedule next one in 3 to 4 weeks.     Cm Pickens MD  Internal Medicine  [facility]

## 2018-07-11 NOTE — MR AVS SNAPSHOT
After Visit Summary   7/11/2018    Dajuan Basilio    MRN: [id number]           Patient Information     Date Of Birth          1945        Visit Information        Provider Department      7/11/2018 3:00 PM Cm Pickens MD [facility]        Today's Diagnoses     Diabetic peripheral neuropathy (H)    -  1    Type 2 diabetes mellitus with diabetic nephropathy, without long-term current use of insulin (H)        Peripheral polyneuropathy - history of Type 2 Diabetes        Low serum vitamin B12          Care Instructions    1. Diabetic peripheral neuropathy (H)  - gabapentin (NEURONTIN) 100 MG capsule; Take 1-2 capsules (100-200 mg) by mouth nightly as needed -- Dose Reduced 7/11/2018 -- stop 300 mg capsule  Dispense: 30 capsule; Refill: 1    2. Type 2 diabetes mellitus with diabetic nephropathy, without long-term current use of insulin (H)  - gabapentin (NEURONTIN) 100 MG capsule; Take 1-2 capsules (100-200 mg) by mouth nightly as needed -- Dose Reduced 7/11/2018 -- stop 300 mg capsule  Dispense: 30 capsule; Refill: 1    3. Peripheral polyneuropathy - history of Type 2 Diabetes  - vitamin B complex with vitamin C (VITAMIN  B COMPLEX) TABS tablet; Take 1 tablet by mouth daily Monday, Wednesday, Friday -- 3 days weekly -- make sure it has B12 in tablet -- Dose Reduced 7/11/2018  Dispense: 100 tablet; Refill: 3    4. Low serum vitamin B12  - vitamin B complex with vitamin C (VITAMIN  B COMPLEX) TABS tablet; Take 1 tablet by mouth daily Monday, Wednesday, Friday -- 3 days weekly -- make sure it has B12 in tablet -- Dose Reduced 7/11/2018  Dispense: 100 tablet; Refill: 3  - cyanocobalamin (VITAMIN B12) 1000 MCG/ML injection; Inject 1 mL (1,000 mcg) into the muscle every 3 to 4 weeks  Dispense: 1 mL; Refill: 20    -- schedule B12 shots with the shot nurse.     Return for Diabetes labs and clinic follow-up appointment every 3 to 4 months.  --- (Go for about 91 to 100  days)    Aspects of Diabetes we can improve:  Hemoglobin A1c Lab Results   Component Value Date    A1C 6.2 04/20/2018    Goal range is under 8. Best is 6.5 to 7   Blood Pressure 138/84 Goal to keep less than 140/90   Tobacco  reports that he quit smoking about 34 years ago. His smoking use included Cigarettes. He has never used smokeless tobacco. Goal to abstain from tobacco   Aspirin or Plavix Aspirin Aspirin or Plavix reduces risk of heart disease and stroke   ACE/ARB -- quit lisinopril -- These medications reduce risk of kidney disease   Cholesterol Simvastatin Statins reduce risk of heart disease and stroke   Eye Exam -- Do Yearly -- Annual diabetic eye exam   Healthy weight Body mass index is 30.78 kg/(m^2). Goal BMI under 30, best is under 25.      -- Trying to exercise daily (goal at least 20 min/day) with moderate aerobic activity   -- Eat healthy (resources from ADA at http://www.diabetes.org/)   -- Taking good care of my feet. Consider seeing the Podiatrist   -- Check blood sugars as directed, record in log book and bring to every appointment      Schedule lab only appointment --- A few days AFTER: 10/9/18   Schedule clinic appointment with Dr. Pickens -- Same day as labs, or 1-2 days later.     Insurance companies are now grading you and I on your blood sugar control -- Goal is to get your A1c down to 7.9% or lower and NO Smoking!    -- Medicare and most insurance companies, will only cover Hemoglobin A1c labs to be rechecked every 91+ days.      Hemoglobin A1C   Date Value Ref Range Status   04/20/2018 6.2 (H) 4.0 - 6.0 % Final       Next follow-up appointment with Dr. Pickens should be scheduled:  -- Approximately a few days AFTER: 10/9/18       -- With recent normal CT scan.... Cancel colonoscopy for now -- due to recent heart artery stent.     Colonoscopy in 2014 -- was normal.     6/12/2018 -- PROCEDURE:  CT ABDOMEN PELVIS W CONTRAST     HISTORY:  left lower abdominal pain, change in stool  caliber;  Abdominal pain, left lower quadrant; Abnormal stool caliber      TECHNIQUE:  Helical CT of the abdomen and pelvis was performed  following injection of intravenous contrast.     Sagittal and coronal reformatted images were reviewed.     COMPARISON:  5/13/2016     FINDINGS:       There is mild scarring or atelectasis in the left lung base.     The liver, spleen, and pancreas are unremarkable. The gallbladder is  present. Nodularity of both adrenal glands is unchanged.     2.6 cm right renal cyst is unchanged. The kidneys are otherwise  intact.     The bowel is normal in caliber. The appendix is unremarkable. There  are multiple diverticula in the colon without evidence of  diverticulitis. There is a fat-containing periumbilical hernia with  slight fat stranding in the hernia sac. The neck of the hernia  measures 2.4 cm. Overall, this is similar to the prior study. Small  fat-containing right inguinal hernia is also unchanged. There is also  a small hiatal hernia.      The aorta is normal in size with scattered atherosclerotic  calcifications.     There is marked prostatomegaly, unchanged.     No free fluid, free air or adenopathy is present.       No suspicious osseous lesions are identified.         IMPRESSION:    1. No acute findings in the abdomen or pelvis.  2. Prostatomegaly, unchanged.  3. Fat-containing right inguinal and periumbilical hernias, unchanged.  4. Diverticulosis. No evidence of diverticulitis.     SAMEER WARD MD    B12 shot today.   Schedule next one in 3 to 4 weeks.           Follow-ups after your visit        Who to contact     If you have questions or need follow up information about today's clinic visit or your schedule please contact [facility] [place] [facility] directly at [phone].  Normal or non-critical lab and imaging results will be communicated to you by MyChart, letter or phone within 4 business days after the clinic has received the results. If you do not hear  from us within 7 days, please contact the clinic through [facility] or phone. If you have a critical or abnormal lab result, we will notify you by phone as soon as possible.  Submit refill requests through [facility] or call your pharmacy and they will forward the refill request to us. Please allow 3 business days for your refill to be completed.          Additional Information About Your Visit        Care EveryWhere ID     This is your Care EveryWhere ID. This could be used by other organizations to access your [facility] medical records  [id number]        Your Vitals Were     Pulse BMI (Body Mass Index)                76 30.78 kg/m2           Blood Pressure from Last 3 Encounters:   07/11/18 138/84   06/18/18 138/80   06/07/18 132/72    Weight from Last 3 Encounters:   07/11/18 196 lb 8 oz (89.1 kg)   06/18/18 194 lb (88 kg)   06/07/18 194 lb (88 kg)              Today, you had the following     No orders found for display         Today's Medication Changes          These changes are accurate as of 7/11/18  3:34 PM.  If you have any questions, ask your nurse or doctor.               These medicines have changed or have updated prescriptions.        Dose/Directions    cyanocobalamin 1000 MCG/ML injection   Commonly known as:  VITAMIN B12   This may have changed:  additional instructions   Used for:  Low serum vitamin B12   Changed by:  Cm Pickens MD        Inject 1 mL (1,000 mcg) into the muscle every 3 to 4 weeks   Quantity:  1 mL   Refills:  20       gabapentin 100 MG capsule   Commonly known as:  NEURONTIN   This may have changed:    - medication strength  - how much to take  - when to take this  - reasons to take this  - additional instructions   Used for:  Diabetic peripheral neuropathy (H), Type 2 diabetes mellitus with diabetic nephropathy, without long-term current use of insulin (H)   Changed by:  Cm Pickens MD        Dose:  100-200 mg   Take 1-2 capsules (100-200 mg) by mouth nightly as needed -- Dose  Reduced 7/11/2018 -- stop 300 mg capsule   Quantity:  30 capsule   Refills:  1       vitamin B complex with vitamin C Tabs tablet   This may have changed:  additional instructions   Used for:  Peripheral polyneuropathy, Low serum vitamin B12   Changed by:  Cm Pickens MD        Dose:  1 tablet   Take 1 tablet by mouth daily Monday, Wednesday, Friday -- 3 days weekly -- make sure it has B12 in tablet -- Dose Reduced 7/11/2018   Quantity:  100 tablet   Refills:  3            Where to get your medicines      These medications were sent to [facility] [address] [address]     Phone:  [phone]     gabapentin 100 MG capsule    vitamin B complex with vitamin C Tabs tablet         Some of these will need a paper prescription and others can be bought over the counter.  Ask your nurse if you have questions.     You don't need a prescription for these medications     cyanocobalamin 1000 MCG/ML injection                Primary Care Provider Office Phone # Fax #    Cm Pickens [phone]       [address] [facility] [address]        Equal Access to Services     [facility] : Hadii ace bean Sojose, waaxda lukerri, qaybta kaalmada ra, jose lassiter . So [facility] [phone].    ATENCIÓN: Si habla español, tiene a powell disposición servicios gratuitos de asistencia lingüística. [facility] [phone].    We comply with applicable federal civil rights laws and [place] laws. We do not discriminate on the basis of race, color, national origin, age, disability, sex, sexual orientation, or gender identity.            Thank you!     Thank you for choosing [facility]  for your care. Our goal is always to provide you with excellent care. Hearing back from our patients is one way we can continue to improve our services. Please take a few minutes to complete the  written survey that you may receive in the mail after your visit with us. Thank you!             Your Updated Medication List - Protect others around you: Learn how to safely use, store and throw away your medicines at www.disposemymeds.org.          This list is accurate as of 7/11/18  3:34 PM.  Always use your most recent med list.                   Brand Name Dispense Instructions for use Diagnosis    ascorbic acid 500 MG tablet    VITAMIN C     Take 1 tablet by mouth daily. For 60 days        aspirin 81 MG tablet      Take 81 mg by mouth daily        blood glucose monitoring test strip    no brand specified    200 strip    Check blood glucose twice daily,  Dx code e11.9. Dispense as covered by patient's insurance.    Type 2 diabetes mellitus with diabetic nephropathy, without long-term current use of insulin (H)       * guaiFENesin-codeine 100-10 MG/5ML Soln solution    ROBITUSSIN AC     Take 10 mLs by mouth        * CHERATUSSIN -10 MG/5ML Soln solution   Generic drug:  guaiFENesin-codeine     240 mL    TAKE 2 TEASPOONFUL (10ML) BY MOUTH EVERY 4 HOURS AS NEEDED FOR COUGH .  MAX  DOSE  60ML  PER  24  HOURS.    Cough       citalopram 20 MG tablet    celeXA    45 tablet    TAKE ONE-HALF TABLET BY MOUTH ONCE DAILY    Moderate episode of recurrent major depressive disorder (H)       clopidogrel 75 MG tablet    PLAVIX    90 tablet    Take 1 tablet (75 mg) by mouth daily -- start when out of Brilinta    Coronary artery disease involving native coronary artery of native heart without angina pectoris, Status post insertion of drug eluting coronary artery stent       cyanocobalamin 1000 MCG/ML injection    VITAMIN B12    1 mL    Inject 1 mL (1,000 mcg) into the muscle every 3 to 4 weeks    Low serum vitamin B12       D 5000 5000 units Tabs   Generic drug:  Cholecalciferol      Take 5,000 Units by mouth daily        gabapentin 100 MG capsule    NEURONTIN    30 capsule    Take 1-2 capsules (100-200 mg) by mouth  nightly as needed -- Dose Reduced 7/11/2018 -- stop 300 mg capsule    Diabetic peripheral neuropathy (H), Type 2 diabetes mellitus with diabetic nephropathy, without long-term current use of insulin (H)       LORazepam 1 MG tablet    ATIVAN     Take 1 mg by mouth every 6 hours as needed for anxiety        metFORMIN 500 MG tablet    GLUCOPHAGE    60 tablet    2 in morning, 1 in evening.        nitroGLYcerin 0.4 MG sublingual tablet    NITROSTAT          simvastatin 20 MG tablet    ZOCOR     Take 10 mg by mouth At Bedtime        vitamin B complex with vitamin C Tabs tablet     100 tablet    Take 1 tablet by mouth daily Monday, Wednesday, Friday -- 3 days weekly -- make sure it has B12 in tablet -- Dose Reduced 7/11/2018    Peripheral polyneuropathy, Low serum vitamin B12       * Notice:  This list has 2 medication(s) that are the same as other medications prescribed for you. Read the directions carefully, and ask your doctor or other care provider to review them with you.

## 2018-07-11 NOTE — NURSING NOTE
Patient presents to the clinic for follow up with concerns about possible side effects from Gabapentin.    Previous A1C is at goal of <8  Lab Results   Component Value Date    A1C 6.2 04/20/2018     Urine microalbumin:creatine: n/a  Foot exam declines today  Eye exam yearly    Tobacco User no  Patient is on a daily aspirin  Patient is on a Statin.  Blood pressure today of:     BP Readings from Last 1 Encounters:   06/18/18 138/80      is at the goal of <139/89 for diabetics.    Britta Guzman LPN on 7/11/2018 at 2:46 PM

## 2018-07-11 NOTE — PATIENT INSTRUCTIONS
1. Diabetic peripheral neuropathy (H)  - gabapentin (NEURONTIN) 100 MG capsule; Take 1-2 capsules (100-200 mg) by mouth nightly as needed -- Dose Reduced 7/11/2018 -- stop 300 mg capsule  Dispense: 30 capsule; Refill: 1    2. Type 2 diabetes mellitus with diabetic nephropathy, without long-term current use of insulin (H)  - gabapentin (NEURONTIN) 100 MG capsule; Take 1-2 capsules (100-200 mg) by mouth nightly as needed -- Dose Reduced 7/11/2018 -- stop 300 mg capsule  Dispense: 30 capsule; Refill: 1    3. Peripheral polyneuropathy - history of Type 2 Diabetes  - vitamin B complex with vitamin C (VITAMIN  B COMPLEX) TABS tablet; Take 1 tablet by mouth daily Monday, Wednesday, Friday -- 3 days weekly -- make sure it has B12 in tablet -- Dose Reduced 7/11/2018  Dispense: 100 tablet; Refill: 3    4. Low serum vitamin B12  - vitamin B complex with vitamin C (VITAMIN  B COMPLEX) TABS tablet; Take 1 tablet by mouth daily Monday, Wednesday, Friday -- 3 days weekly -- make sure it has B12 in tablet -- Dose Reduced 7/11/2018  Dispense: 100 tablet; Refill: 3  - cyanocobalamin (VITAMIN B12) 1000 MCG/ML injection; Inject 1 mL (1,000 mcg) into the muscle every 3 to 4 weeks  Dispense: 1 mL; Refill: 20    -- schedule B12 shots with the shot nurse.     Return for Diabetes labs and clinic follow-up appointment every 3 to 4 months.  --- (Go for about 91 to 100 days)    Aspects of Diabetes we can improve:  Hemoglobin A1c Lab Results   Component Value Date    A1C 6.2 04/20/2018    Goal range is under 8. Best is 6.5 to 7   Blood Pressure 138/84 Goal to keep less than 140/90   Tobacco  reports that he quit smoking about 34 years ago. His smoking use included Cigarettes. He has never used smokeless tobacco. Goal to abstain from tobacco   Aspirin or Plavix Aspirin Aspirin or Plavix reduces risk of heart disease and stroke   ACE/ARB -- quit lisinopril -- These medications reduce risk of kidney disease   Cholesterol Simvastatin Statins reduce  risk of heart disease and stroke   Eye Exam -- Do Yearly -- Annual diabetic eye exam   Healthy weight Body mass index is 30.78 kg/(m^2). Goal BMI under 30, best is under 25.      -- Trying to exercise daily (goal at least 20 min/day) with moderate aerobic activity   -- Eat healthy (resources from ADA at http://www.diabetes.org/)   -- Taking good care of my feet. Consider seeing the Podiatrist   -- Check blood sugars as directed, record in log book and bring to every appointment      Schedule lab only appointment --- A few days AFTER: 10/9/18   Schedule clinic appointment with Dr. Pickens -- Same day as labs, or 1-2 days later.     Insurance companies are now grading you and I on your blood sugar control -- Goal is to get your A1c down to 7.9% or lower and NO Smoking!    -- Medicare and most insurance companies, will only cover Hemoglobin A1c labs to be rechecked every 91+ days.      Hemoglobin A1C   Date Value Ref Range Status   04/20/2018 6.2 (H) 4.0 - 6.0 % Final       Next follow-up appointment with Dr. Pickens should be scheduled:  -- Approximately a few days AFTER: 10/9/18       -- With recent normal CT scan.... Cancel colonoscopy for now -- due to recent heart artery stent.     Colonoscopy in 2014 -- was normal.     6/12/2018 -- PROCEDURE:  CT ABDOMEN PELVIS W CONTRAST     HISTORY:  left lower abdominal pain, change in stool caliber;  Abdominal pain, left lower quadrant; Abnormal stool caliber      TECHNIQUE:  Helical CT of the abdomen and pelvis was performed  following injection of intravenous contrast.     Sagittal and coronal reformatted images were reviewed.     COMPARISON:  5/13/2016     FINDINGS:       There is mild scarring or atelectasis in the left lung base.     The liver, spleen, and pancreas are unremarkable. The gallbladder is  present. Nodularity of both adrenal glands is unchanged.     2.6 cm right renal cyst is unchanged. The kidneys are otherwise  intact.     The bowel is normal in caliber.  The appendix is unremarkable. There  are multiple diverticula in the colon without evidence of  diverticulitis. There is a fat-containing periumbilical hernia with  slight fat stranding in the hernia sac. The neck of the hernia  measures 2.4 cm. Overall, this is similar to the prior study. Small  fat-containing right inguinal hernia is also unchanged. There is also  a small hiatal hernia.      The aorta is normal in size with scattered atherosclerotic  calcifications.     There is marked prostatomegaly, unchanged.     No free fluid, free air or adenopathy is present.       No suspicious osseous lesions are identified.         IMPRESSION:    1. No acute findings in the abdomen or pelvis.  2. Prostatomegaly, unchanged.  3. Fat-containing right inguinal and periumbilical hernias, unchanged.  4. Diverticulosis. No evidence of diverticulitis.     SAMEER WARD MD    B12 shot today.   Schedule next one in 3 to 4 weeks.

## 2018-07-12 ASSESSMENT — ANXIETY QUESTIONNAIRES: GAD7 TOTAL SCORE: 0

## 2018-07-12 ASSESSMENT — PATIENT HEALTH QUESTIONNAIRE - PHQ9: SUM OF ALL RESPONSES TO PHQ QUESTIONS 1-9: 0

## 2018-07-22 DIAGNOSIS — E78.5 HYPERLIPEMIA: Primary | ICD-10-CM

## 2018-07-22 DIAGNOSIS — E78.00 PURE HYPERCHOLESTEROLEMIA: ICD-10-CM

## 2018-07-23 NOTE — PROGRESS NOTES
Patient Information     Patient Name  Dajuan Basilio MRN  [id number] Sex  Male   1945      Letter by Brett Olivas MD at      Author:  Brett Olivas MD Service:  (none) Author Type:  (none)    Filed:   Encounter Date:  2017 Status:  (Other)           Dajuan Basilio  [address]  [facility] [address]          2017    Dear Mr. Basilio:    This is to remind you that you are due for your 6 month follow-up appointment with Brett Olivas MD. Your last visit was on 17. Additional refills of your medication require you to complete this visit.    Please call [phone] to schedule your appointment.    Thank you for choosing [facility] for your health care needs.    Sincerely,      Refill RN  [facility]

## 2018-07-24 NOTE — PROGRESS NOTES
Patient Information     Patient Name  Dajuan Basilio MRN  [id number] Sex  Male   1945      Letter by Brett Olivas MD at      Author:  Brett Olivas MD Service:  (none) Author Type:  (none)    Filed:   Encounter Date:  2017 Status:  (Other)           Dajuan Basilio  [address]  [facility] [address]          2017    Dear Dajuan,    Following are the tests completed during your last clinic visit:    Results for orders placed or performed in visit on 17      VITAMIN B12      Result  Value Ref Range    VITAMIN B12 481 180 - 914 pg/mL   HEMOGLOBIN A1C MONITORING (POCT)      Result  Value Ref Range    HEMOGLOBIN A1C MONITORING (POCT) 6.5 (H) 4.0 - 6.2 %    ESTIMATED AVERAGE GLUCOSE  140 mg/dL   CREATININE      Result  Value Ref Range    CREATININE 0.83 0.70 - 1.30 mg/dL    GFR if African American >60 >60 ml/min/1.73m2    GFR if not African American >60 >60 ml/min/1.73m2         Your recent blood tests look fine. This includes your diabetes test. Congratulations on this report.    Sincerely,      Brett Olivas MD  Internal Medicine  [facility]

## 2018-07-24 NOTE — PROGRESS NOTES
Patient Information     Patient Name  Dajuan Basilio MRN  [id number] Sex  Male   1945      Letter by Brett Olivas MD at      Author:  Brett Olivas MD Service:  (none) Author Type:  (none)    Filed:   Encounter Date:  2018 Status:  (Other)           Dajuan Basilio  [address]  [facility] [address]          2018    Dear Dajuan,    Following are the tests completed during your last clinic visit:    Results for orders placed or performed in visit on 18      HEMOGLOBIN A1C MONITORING (POCT)      Result  Value Ref Range    HEMOGLOBIN A1C MONITORING (POCT) 6.4 (H) 4.0 - 6.2 %    ESTIMATED AVERAGE GLUCOSE  137 mg/dL         Your diabetes control is excellent. Congratulations and keep up the good work.    Sincerely,      Brett Olivas MD  Internal Medicine  [facility]

## 2018-07-25 RX ORDER — SIMVASTATIN 20 MG
TABLET ORAL
Qty: 45 TABLET | Refills: 3 | Status: SHIPPED | OUTPATIENT
Start: 2018-07-25 | End: 2019-03-19

## 2018-07-25 NOTE — TELEPHONE ENCOUNTER
This is a Refill request from:[facility]  Name of Medication and Dose:  simvastatin (ZOCOR) 20 mg tablet    Quantity requested:  45  Last fill date: 6/8/2018  Last Office Visit:  7/11/2018  Narcotic Agreement Signed: RANDY  PCP:  Cm Pickens MD  Associated Diagnosis: Hyperlipidemia    Viktoriya Luna LPN Supervisor 7/25/2018   11:26 AM

## 2018-07-25 NOTE — TELEPHONE ENCOUNTER
Prescription approved per [facility] Refill Protocol.  Alanna Allen RN on 7/25/2018 at 11:28 AM

## 2018-08-15 ENCOUNTER — ALLIED HEALTH/NURSE VISIT (OUTPATIENT)
Dept: FAMILY MEDICINE | Facility: OTHER | Age: 73
End: 2018-08-15
Attending: INTERNAL MEDICINE
Payer: MEDICARE

## 2018-08-15 DIAGNOSIS — E53.8 LOW SERUM VITAMIN B12: Primary | ICD-10-CM

## 2018-08-15 PROCEDURE — 25000128 H RX IP 250 OP 636: Performed by: INTERNAL MEDICINE

## 2018-08-15 PROCEDURE — 96372 THER/PROPH/DIAG INJ SC/IM: CPT

## 2018-08-15 RX ADMIN — CYANOCOBALAMIN 1000 MCG: 1000 INJECTION, SOLUTION INTRAMUSCULAR at 08:50

## 2018-08-18 DIAGNOSIS — E11.42 DIABETIC PERIPHERAL NEUROPATHY (H): ICD-10-CM

## 2018-08-18 DIAGNOSIS — E11.21 TYPE 2 DIABETES MELLITUS WITH DIABETIC NEPHROPATHY, WITHOUT LONG-TERM CURRENT USE OF INSULIN (H): ICD-10-CM

## 2018-08-21 RX ORDER — GABAPENTIN 100 MG/1
CAPSULE ORAL
Qty: 90 CAPSULE | Refills: 11 | Status: SHIPPED | OUTPATIENT
Start: 2018-08-21 | End: 2019-03-19

## 2018-08-21 NOTE — TELEPHONE ENCOUNTER
"Refill request from [facility] for:  gabapentin (NEURONTIN) 100 MG capsule    LOV 7/11/2018 with PCP-medication discussed and reduced at that time  \"Return in 3-4 months for f/u and labs    Upcoming appt noted 9/12/2018    Will route to PCP for refill consideration    Requested Prescriptions   Pending Prescriptions Disp Refills     gabapentin (NEURONTIN) 100 MG capsule [Pharmacy Med Name: GABAPENTIN 100MG    CAP] 30 capsule 1     Sig: TAKE 1 TO 2 CAPSULES BY MOUTH IN THE EVENING AS NEEDED    There is no refill protocol information for this order        Unable to complete prescription refill per RN Medication Refill Policy.................... Landy Sotck ....................  8/21/2018   2:03 PM            "

## 2018-08-31 DIAGNOSIS — I25.10 CORONARY ARTERY DISEASE INVOLVING NATIVE CORONARY ARTERY OF NATIVE HEART WITHOUT ANGINA PECTORIS: ICD-10-CM

## 2018-08-31 DIAGNOSIS — Z95.5 STATUS POST INSERTION OF DRUG ELUTING CORONARY ARTERY STENT: ICD-10-CM

## 2018-09-04 ENCOUNTER — TELEPHONE (OUTPATIENT)
Dept: INTERNAL MEDICINE | Facility: OTHER | Age: 73
End: 2018-09-04

## 2018-09-04 RX ORDER — CLOPIDOGREL BISULFATE 75 MG/1
75 TABLET ORAL DAILY
Qty: 90 TABLET | Refills: 0 | Status: SHIPPED | OUTPATIENT
Start: 2018-09-04 | End: 2018-11-04

## 2018-09-04 NOTE — TELEPHONE ENCOUNTER
Triage nurse is calling [facility] pharmacy at this time.      Britta Guzman LPN 9/4/2018 12:33 PM

## 2018-09-04 NOTE — TELEPHONE ENCOUNTER
Prescription approved per [facility] Refill Protocol.  Patient's Rx had been sent to [facility] mail order in May. Per Cintia at [facility], he is out now and requested that they refill medication. Let pharmacy know that he has refills with mail order pharmacy. Per Cintia they will fill for 90 days and find out if future refills should be transferred. Alanna Allen RN on 9/4/2018 at 12:35 PM

## 2018-09-04 NOTE — TELEPHONE ENCOUNTER
Prescription for Clopidogrel 75MG.  States pt is almost out of meds and it is not something that they have dispensed there.

## 2018-09-12 ENCOUNTER — ALLIED HEALTH/NURSE VISIT (OUTPATIENT)
Dept: FAMILY MEDICINE | Facility: OTHER | Age: 73
End: 2018-09-12
Attending: INTERNAL MEDICINE
Payer: MEDICARE

## 2018-09-12 DIAGNOSIS — E53.8 LOW SERUM VITAMIN B12: Primary | ICD-10-CM

## 2018-09-12 PROCEDURE — 96372 THER/PROPH/DIAG INJ SC/IM: CPT

## 2018-09-12 PROCEDURE — 25000128 H RX IP 250 OP 636: Performed by: INTERNAL MEDICINE

## 2018-09-12 RX ADMIN — CYANOCOBALAMIN 1000 MCG: 1000 INJECTION, SOLUTION INTRAMUSCULAR at 08:42

## 2018-10-12 ENCOUNTER — ALLIED HEALTH/NURSE VISIT (OUTPATIENT)
Dept: FAMILY MEDICINE | Facility: OTHER | Age: 73
End: 2018-10-12
Attending: INTERNAL MEDICINE
Payer: MEDICARE

## 2018-10-12 DIAGNOSIS — E53.8 LOW SERUM VITAMIN B12: Primary | ICD-10-CM

## 2018-10-12 PROCEDURE — 96372 THER/PROPH/DIAG INJ SC/IM: CPT

## 2018-10-12 PROCEDURE — 25000128 H RX IP 250 OP 636: Performed by: INTERNAL MEDICINE

## 2018-10-12 RX ADMIN — CYANOCOBALAMIN 1000 MCG: 1000 INJECTION, SOLUTION INTRAMUSCULAR at 08:55

## 2018-10-12 NOTE — NURSING NOTE
Patient states not having any reactions to prior B12 injection.  Carli Negrete RN on 10/12/2018 at 8:54 AM

## 2018-10-12 NOTE — MR AVS SNAPSHOT
After Visit Summary   10/12/2018    Dajuan Basilio    MRN: [id number]           Patient Information     Date Of Birth          1945        Visit Information        Provider Department      10/12/2018 9:00 AM GH INJECTION NURSE [facility]        Today's Diagnoses     Low serum vitamin B12    -  1       Follow-ups after your visit        Your next 10 appointments already scheduled     Oct 12, 2018  9:00 AM CDT   Nurse Only with GH INJECTION NURSE   [facility] ([facility])    [address]  [place] [address] [phone]              Who to contact     If you have questions or need follow up information about today's clinic visit or your schedule please contact [facility] directly at [phone].  Normal or non-critical lab and imaging results will be communicated to you by MyChart, letter or phone within 4 business days after the clinic has received the results. If you do not hear from us within 7 days, please contact the clinic through MyChart or phone. If you have a critical or abnormal lab result, we will notify you by phone as soon as possible.  Submit refill requests through [email or web address] or call your pharmacy and they will forward the refill request to us. Please allow 3 business days for your refill to be completed.          Additional Information About Your Visit        Care EveryWhere ID     This is your Care EveryWhere ID. This could be used by other organizations to access your [place] medical records  [id number]         Blood Pressure from Last 3 Encounters:   07/11/18 138/84   06/18/18 138/80   06/07/18 132/72    Weight from Last 3 Encounters:   07/11/18 196 lb 8 oz (89.1 kg)   06/18/18 194 lb (88 kg)   06/07/18 194 lb (88 kg)              Today, you had the following     No orders found for display       Primary Care Provider Office Phone # Fax #    Cm Pickens [phone]  [phone]       [address]  [place] [address]        Equal Access to Services     EDDIEVA AKSHAT : Hadii ace leahy geneva Sojose, wahawada luqjosef, qaybta kaalmada ra, jose elsiein hayaashaunna reganhong barton maikol mendez. So [facility] [phone].    ATENCIÓN: Si habla español, tiene a powell disposición servicios gratuitos de asistencia lingüística. Llame al [phone].    We comply with applicable federal civil rights laws and [place] laws. We do not discriminate on the basis of race, color, national origin, age, disability, sex, sexual orientation, or gender identity.            Thank you!     Thank you for choosing [facility]  for your care. Our goal is always to provide you with excellent care. Hearing back from our patients is one way we can continue to improve our services. Please take a few minutes to complete the written survey that you may receive in the mail after your visit with us. Thank you!             Your Updated Medication List - Protect others around you: Learn how to safely use, store and throw away your medicines at www.disposemymeds.org.          This list is accurate as of 10/12/18  8:59 AM.  Always use your most recent med list.                   Brand Name Dispense Instructions for use Diagnosis    ascorbic acid 500 MG tablet    VITAMIN C     Take 1 tablet by mouth daily. For 60 days        aspirin 81 MG tablet      Take 81 mg by mouth daily        blood glucose monitoring test strip    no brand specified    200 strip    Check blood glucose twice daily,  Dx code e11.9. Dispense as covered by patient's insurance.    Type 2 diabetes mellitus with diabetic nephropathy, without long-term current use of insulin (H)       * guaiFENesin-codeine 100-10 MG/5ML Soln solution    ROBITUSSIN AC     Take 10 mLs by mouth        * CHERATUSSIN -10 MG/5ML Soln solution   Generic drug:  guaiFENesin-codeine     240 mL    TAKE 2 TEASPOONFUL (10ML) BY MOUTH EVERY 4 HOURS AS  NEEDED FOR COUGH .  MAX  DOSE  60ML  PER  24  HOURS.    Cough       citalopram 20 MG tablet    celeXA    45 tablet    TAKE ONE-HALF TABLET BY MOUTH ONCE DAILY    Moderate episode of recurrent major depressive disorder (H)       clopidogrel 75 MG tablet    PLAVIX    90 tablet    Take 1 tablet (75 mg) by mouth daily -- start when out of Brilinta    Coronary artery disease involving native coronary artery of native heart without angina pectoris, Status post insertion of drug eluting coronary artery stent       cyanocobalamin 1000 MCG/ML injection    VITAMIN B12    1 mL    Inject 1 mL (1,000 mcg) into the muscle every 3 to 4 weeks    Low serum vitamin B12       D 5000 5000 units Tabs   Generic drug:  Cholecalciferol      Take 5,000 Units by mouth daily        gabapentin 100 MG capsule    NEURONTIN    90 capsule    TAKE 1 TO 2 CAPSULES BY MOUTH IN THE EVENING AS NEEDED    Diabetic peripheral neuropathy (H), Type 2 diabetes mellitus with diabetic nephropathy, without long-term current use of insulin (H)       LORazepam 1 MG tablet    ATIVAN     Take 1 mg by mouth every 6 hours as needed for anxiety        metFORMIN 500 MG tablet    GLUCOPHAGE    270 tablet    TAKE TWO TABLETS BY MOUTH IN THE MORNING AND ONE AT BEDTIME    Type 2 diabetes mellitus with complication (H)       nitroGLYcerin 0.4 MG sublingual tablet    NITROSTAT          simvastatin 20 MG tablet    ZOCOR    45 tablet    TAKE ONE-HALF TABLET BY MOUTH AT BEDTIME    Hyperlipemia       vitamin B complex with vitamin C Tabs tablet     100 tablet    Take 1 tablet by mouth daily Monday, Wednesday, Friday -- 3 days weekly -- make sure it has B12 in tablet -- Dose Reduced 7/11/2018    Peripheral polyneuropathy, Low serum vitamin B12       * Notice:  This list has 2 medication(s) that are the same as other medications prescribed for you. Read the directions carefully, and ask your doctor or other care provider to review them with you.

## 2018-11-04 DIAGNOSIS — I25.10 CORONARY ARTERY DISEASE INVOLVING NATIVE CORONARY ARTERY OF NATIVE HEART WITHOUT ANGINA PECTORIS: ICD-10-CM

## 2018-11-04 DIAGNOSIS — Z95.5 STATUS POST INSERTION OF DRUG ELUTING CORONARY ARTERY STENT: ICD-10-CM

## 2018-11-07 RX ORDER — CLOPIDOGREL BISULFATE 75 MG/1
75 TABLET ORAL DAILY
Qty: 90 TABLET | Refills: 2 | Status: SHIPPED | OUTPATIENT
Start: 2018-11-07 | End: 2019-03-19

## 2018-11-07 NOTE — TELEPHONE ENCOUNTER
Prescription approved per [facility] Refill Protocol.  Alanna Allen RN on 11/7/2018 at 3:12 PM

## 2018-11-16 ENCOUNTER — ALLIED HEALTH/NURSE VISIT (OUTPATIENT)
Dept: FAMILY MEDICINE | Facility: OTHER | Age: 73
End: 2018-11-16
Attending: INTERNAL MEDICINE
Payer: MEDICARE

## 2018-11-16 DIAGNOSIS — E53.8 LOW SERUM VITAMIN B12: Primary | ICD-10-CM

## 2018-11-16 PROCEDURE — 96372 THER/PROPH/DIAG INJ SC/IM: CPT

## 2018-11-16 PROCEDURE — 25000128 H RX IP 250 OP 636: Performed by: INTERNAL MEDICINE

## 2018-11-16 RX ADMIN — CYANOCOBALAMIN 1000 MCG: 1000 INJECTION, SOLUTION INTRAMUSCULAR at 09:19

## 2018-12-06 ENCOUNTER — TRANSFERRED RECORDS (OUTPATIENT)
Dept: HEALTH INFORMATION MANAGEMENT | Facility: OTHER | Age: 73
End: 2018-12-06

## 2019-01-03 ENCOUNTER — ALLIED HEALTH/NURSE VISIT (OUTPATIENT)
Dept: FAMILY MEDICINE | Facility: OTHER | Age: 74
End: 2019-01-03
Attending: INTERNAL MEDICINE
Payer: MEDICARE

## 2019-01-03 DIAGNOSIS — E53.8 LOW SERUM VITAMIN B12: Primary | ICD-10-CM

## 2019-01-03 PROCEDURE — 96372 THER/PROPH/DIAG INJ SC/IM: CPT

## 2019-01-03 PROCEDURE — 25000128 H RX IP 250 OP 636: Performed by: INTERNAL MEDICINE

## 2019-01-03 RX ADMIN — CYANOCOBALAMIN 1000 MCG: 1000 INJECTION, SOLUTION INTRAMUSCULAR at 10:58

## 2019-02-28 ENCOUNTER — ALLIED HEALTH/NURSE VISIT (OUTPATIENT)
Dept: FAMILY MEDICINE | Facility: OTHER | Age: 74
End: 2019-02-28
Attending: INTERNAL MEDICINE
Payer: MEDICARE

## 2019-02-28 DIAGNOSIS — E53.8 LOW SERUM VITAMIN B12: Primary | ICD-10-CM

## 2019-02-28 PROCEDURE — 25000128 H RX IP 250 OP 636: Performed by: INTERNAL MEDICINE

## 2019-02-28 PROCEDURE — 96372 THER/PROPH/DIAG INJ SC/IM: CPT

## 2019-02-28 RX ADMIN — CYANOCOBALAMIN 1000 MCG: 1000 INJECTION, SOLUTION INTRAMUSCULAR at 11:07

## 2019-02-28 NOTE — PROGRESS NOTES
Verified patient's first and last name, .   Patient stated reason for visit today is to receive B 12 shot. Vitamin B 12 injection prepared and administered as ordered.     Angeline Hsu RN on 2019 at 11:08 AM

## 2019-03-19 ENCOUNTER — OFFICE VISIT (OUTPATIENT)
Dept: INTERNAL MEDICINE | Facility: OTHER | Age: 74
End: 2019-03-19
Attending: INTERNAL MEDICINE
Payer: MEDICARE

## 2019-03-19 ENCOUNTER — HOSPITAL ENCOUNTER (OUTPATIENT)
Dept: GENERAL RADIOLOGY | Facility: OTHER | Age: 74
Discharge: HOME | End: 2019-03-19
Attending: INTERNAL MEDICINE | Admitting: INTERNAL MEDICINE
Payer: MEDICARE

## 2019-03-19 VITALS
TEMPERATURE: 96.4 F | DIASTOLIC BLOOD PRESSURE: 66 MMHG | BODY MASS INDEX: 30.48 KG/M2 | HEIGHT: 67 IN | SYSTOLIC BLOOD PRESSURE: 118 MMHG | HEART RATE: 60 BPM | WEIGHT: 194.2 LBS | OXYGEN SATURATION: 96 % | RESPIRATION RATE: 16 BRPM

## 2019-03-19 DIAGNOSIS — E53.8 LOW SERUM VITAMIN B12: ICD-10-CM

## 2019-03-19 DIAGNOSIS — R19.5 POSITIVE FIT (FECAL IMMUNOCHEMICAL TEST): ICD-10-CM

## 2019-03-19 DIAGNOSIS — R53.83 MALAISE AND FATIGUE: ICD-10-CM

## 2019-03-19 DIAGNOSIS — Z95.5 STATUS POST INSERTION OF DRUG ELUTING CORONARY ARTERY STENT: ICD-10-CM

## 2019-03-19 DIAGNOSIS — M53.3 SACROILIAC JOINT PAIN: ICD-10-CM

## 2019-03-19 DIAGNOSIS — Z12.5 ENCOUNTER FOR SCREENING FOR MALIGNANT NEOPLASM OF PROSTATE: ICD-10-CM

## 2019-03-19 DIAGNOSIS — R53.1 GENERALIZED WEAKNESS: Primary | ICD-10-CM

## 2019-03-19 DIAGNOSIS — E11.21 TYPE 2 DIABETES MELLITUS WITH DIABETIC NEPHROPATHY, WITHOUT LONG-TERM CURRENT USE OF INSULIN (H): ICD-10-CM

## 2019-03-19 DIAGNOSIS — I25.10 CORONARY ARTERY DISEASE INVOLVING NATIVE CORONARY ARTERY OF NATIVE HEART WITHOUT ANGINA PECTORIS: ICD-10-CM

## 2019-03-19 DIAGNOSIS — E78.2 MIXED HYPERLIPIDEMIA: ICD-10-CM

## 2019-03-19 DIAGNOSIS — E11.42 DIABETIC PERIPHERAL NEUROPATHY (H): ICD-10-CM

## 2019-03-19 DIAGNOSIS — R97.20 ELEVATED PSA: ICD-10-CM

## 2019-03-19 DIAGNOSIS — F33.1 MODERATE EPISODE OF RECURRENT MAJOR DEPRESSIVE DISORDER (H): ICD-10-CM

## 2019-03-19 DIAGNOSIS — R53.81 MALAISE AND FATIGUE: ICD-10-CM

## 2019-03-19 LAB
ALBUMIN SERPL-MCNC: 4.5 G/DL (ref 3.5–5.7)
ALBUMIN UR-MCNC: NEGATIVE MG/DL
ALP SERPL-CCNC: 65 U/L (ref 34–104)
ALT SERPL W P-5'-P-CCNC: 12 U/L (ref 7–52)
ANION GAP SERPL CALCULATED.3IONS-SCNC: 10 MMOL/L (ref 3–14)
APPEARANCE UR: CLEAR
AST SERPL W P-5'-P-CCNC: 14 U/L (ref 13–39)
BILIRUB SERPL-MCNC: 0.4 MG/DL (ref 0.3–1)
BILIRUB UR QL STRIP: NEGATIVE
BUN SERPL-MCNC: 13 MG/DL (ref 7–25)
CALCIUM SERPL-MCNC: 9.6 MG/DL (ref 8.6–10.3)
CHLORIDE SERPL-SCNC: 103 MMOL/L (ref 98–107)
CHOLEST SERPL-MCNC: 138 MG/DL
CO2 SERPL-SCNC: 27 MMOL/L (ref 21–31)
COLOR UR AUTO: YELLOW
CREAT SERPL-MCNC: 0.74 MG/DL (ref 0.7–1.3)
ERYTHROCYTE [DISTWIDTH] IN BLOOD BY AUTOMATED COUNT: 12.8 % (ref 10–15)
GFR SERPL CREATININE-BSD FRML MDRD: >90 ML/MIN/{1.73_M2}
GLUCOSE SERPL-MCNC: 118 MG/DL (ref 70–105)
GLUCOSE UR STRIP-MCNC: NEGATIVE MG/DL
HBA1C MFR BLD: 6.2 % (ref 4–6)
HCT VFR BLD AUTO: 43.7 % (ref 40–53)
HDLC SERPL-MCNC: 41 MG/DL (ref 23–92)
HGB BLD-MCNC: 14.7 G/DL (ref 13.3–17.7)
HGB UR QL STRIP: NEGATIVE
KETONES UR STRIP-MCNC: NEGATIVE MG/DL
LDLC SERPL CALC-MCNC: 70 MG/DL
LEUKOCYTE ESTERASE UR QL STRIP: NEGATIVE
MCH RBC QN AUTO: 29.9 PG (ref 26.5–33)
MCHC RBC AUTO-ENTMCNC: 33.6 G/DL (ref 31.5–36.5)
MCV RBC AUTO: 89 FL (ref 78–100)
NITRATE UR QL: NEGATIVE
NONHDLC SERPL-MCNC: 97 MG/DL
PH UR STRIP: 6 PH (ref 5–9)
PLATELET # BLD AUTO: 208 10E9/L (ref 150–450)
POTASSIUM SERPL-SCNC: 3.9 MMOL/L (ref 3.5–5.1)
PROT SERPL-MCNC: 7.5 G/DL (ref 6.4–8.9)
PSA SERPL-ACNC: 2.83 NG/ML
RBC # BLD AUTO: 4.92 10E12/L (ref 4.4–5.9)
SODIUM SERPL-SCNC: 140 MMOL/L (ref 134–144)
SOURCE: NORMAL
SP GR UR STRIP: 1.02 (ref 1–1.03)
TRIGL SERPL-MCNC: 133 MG/DL
TSH SERPL DL<=0.05 MIU/L-ACNC: 2.5 IU/ML (ref 0.34–5.6)
UROBILINOGEN UR STRIP-ACNC: 0.2 EU/DL (ref 0.2–1)
WBC # BLD AUTO: 5.6 10E9/L (ref 4–11)

## 2019-03-19 PROCEDURE — 84443 ASSAY THYROID STIM HORMONE: CPT | Performed by: INTERNAL MEDICINE

## 2019-03-19 PROCEDURE — 81003 URINALYSIS AUTO W/O SCOPE: CPT | Performed by: INTERNAL MEDICINE

## 2019-03-19 PROCEDURE — 72202 X-RAY EXAM SI JOINTS 3/> VWS: CPT

## 2019-03-19 PROCEDURE — 80053 COMPREHEN METABOLIC PANEL: CPT | Performed by: INTERNAL MEDICINE

## 2019-03-19 PROCEDURE — 85027 COMPLETE CBC AUTOMATED: CPT | Performed by: INTERNAL MEDICINE

## 2019-03-19 PROCEDURE — 80061 LIPID PANEL: CPT | Performed by: INTERNAL MEDICINE

## 2019-03-19 PROCEDURE — 82043 UR ALBUMIN QUANTITATIVE: CPT | Performed by: INTERNAL MEDICINE

## 2019-03-19 PROCEDURE — G0463 HOSPITAL OUTPT CLINIC VISIT: HCPCS

## 2019-03-19 PROCEDURE — 83036 HEMOGLOBIN GLYCOSYLATED A1C: CPT | Performed by: INTERNAL MEDICINE

## 2019-03-19 PROCEDURE — 36415 COLL VENOUS BLD VENIPUNCTURE: CPT | Performed by: INTERNAL MEDICINE

## 2019-03-19 PROCEDURE — 99215 OFFICE O/P EST HI 40 MIN: CPT | Performed by: INTERNAL MEDICINE

## 2019-03-19 PROCEDURE — G0103 PSA SCREENING: HCPCS | Performed by: INTERNAL MEDICINE

## 2019-03-19 RX ORDER — INFLUENZA A VIRUS A/VICTORIA/4897/2022 IVR-238 (H1N1) ANTIGEN (FORMALDEHYDE INACTIVATED), INFLUENZA A VIRUS A/CALIFORNIA/122/2022 SAN-022 (H3N2) ANTIGEN (FORMALDEHYDE INACTIVATED), AND INFLUENZA B VIRUS B/MICHIGAN/01/2021 ANTIGEN (FORMALDEHYDE INACTIVATED) 60; 60; 60 UG/.5ML; UG/.5ML; UG/.5ML
INJECTION, SUSPENSION INTRAMUSCULAR
Refills: 0 | COMMUNITY
Start: 2018-09-30 | End: 2019-05-28

## 2019-03-19 RX ORDER — SIMVASTATIN 20 MG
TABLET ORAL
Qty: 45 TABLET | Refills: 3 | Status: SHIPPED | OUTPATIENT
Start: 2019-03-19 | End: 2020-03-17

## 2019-03-19 RX ORDER — CYANOCOBALAMIN 1000 UG/ML
INJECTION, SOLUTION INTRAMUSCULAR; SUBCUTANEOUS
Qty: 1 ML | Refills: 20 | Status: SHIPPED | OUTPATIENT
Start: 2019-03-19 | End: 2019-04-22

## 2019-03-19 RX ORDER — CLOPIDOGREL BISULFATE 75 MG/1
75 TABLET ORAL DAILY
Qty: 90 TABLET | Refills: 3 | Status: SHIPPED | OUTPATIENT
Start: 2019-03-19 | End: 2020-03-17

## 2019-03-19 RX ORDER — GABAPENTIN 100 MG/1
CAPSULE ORAL
Qty: 90 CAPSULE | Refills: 11 | Status: SHIPPED | OUTPATIENT
Start: 2019-03-19 | End: 2020-03-17

## 2019-03-19 RX ORDER — CITALOPRAM HYDROBROMIDE 20 MG/1
TABLET ORAL
Qty: 45 TABLET | Refills: 3 | Status: SHIPPED | OUTPATIENT
Start: 2019-03-19 | End: 2020-03-17

## 2019-03-19 ASSESSMENT — ENCOUNTER SYMPTOMS
CONFUSION: 1
WHEEZING: 0
FATIGUE: 1
EYE PAIN: 0
SHORTNESS OF BREATH: 0
FEVER: 0
NAUSEA: 0
DYSURIA: 0
LIGHT-HEADEDNESS: 1
DIZZINESS: 1
BRUISES/BLEEDS EASILY: 0
ABDOMINAL PAIN: 0
ARTHRALGIAS: 0
AGITATION: 0
DIARRHEA: 0
HEMATURIA: 0
PALPITATIONS: 0
CHILLS: 0
MYALGIAS: 0
BACK PAIN: 1
COUGH: 0
VOMITING: 0

## 2019-03-19 ASSESSMENT — MIFFLIN-ST. JEOR: SCORE: 1574.52

## 2019-03-19 ASSESSMENT — PAIN SCALES - GENERAL: PAINLEVEL: NO PAIN (0)

## 2019-03-19 NOTE — PROGRESS NOTES
Nursing Notes:   Esther Cheng LPN  3/19/2019  4:08 PM  Signed  Patient presents to the clinic for weakness and discussion of possible colonscopy.     Medication Reconciliation: carlotta Cheng LPN............. March 19, 2019 3:55 PM            Nursing note reviewed with patient.  Accuracy and completeness verified.   Mr. Basilio is a 73 year old male who:  Patient presents with:  RECHECK      ICD-10-CM    1. Generalized weakness R53.1    2. Malaise and fatigue R53.81 Thyrotropin GH    R53.83 Thyrotropin GH   3. Status post insertion of drug eluting coronary artery stent - x2 stents - 1st OMB - 5/17/2018 - [facility] Z95.5 clopidogrel (PLAVIX) 75 MG tablet   4. Coronary artery disease involving native coronary artery of native heart without angina pectoris I25.10 clopidogrel (PLAVIX) 75 MG tablet   5. Moderate episode of recurrent major depressive disorder (H) F33.1 citalopram (CELEXA) 20 MG tablet   6. Diabetic peripheral neuropathy (H) E11.42 metFORMIN (GLUCOPHAGE) 500 MG tablet     gabapentin (NEURONTIN) 100 MG capsule   7. Type 2 diabetes mellitus with diabetic nephropathy, without long-term current use of insulin (H) E11.21 metFORMIN (GLUCOPHAGE) 500 MG tablet     gabapentin (NEURONTIN) 100 MG capsule     Comprehensive metabolic panel     CBC with platelets     Albumin Random Urine Quantitative with Creat Ratio     Hemoglobin A1c     *UA reflex to Microscopic     Comprehensive metabolic panel     CBC with platelets     Hemoglobin A1c     *UA reflex to Microscopic     Albumin Random Urine Quantitative with Creat Ratio   8. Low serum vitamin B12 R79.89 cyanocobalamin (CYANOCOBALAMIN) 1000 MCG/ML injection   9. Positive FIT (fecal immunochemical test) R19.5 GASTROENTEROLOGY ADULT REF PROCEDURE ONLY (Dr. Johnson)   10. Mixed hyperlipidemia E78.2 simvastatin (ZOCOR) 20 MG tablet     Lipid Profile     Lipid Profile   11. Elevated PSA R97.20 PSA Screen GH     PSA Screen GH   12.  Encounter for screening for malignant neoplasm of prostate  Z12.5 PSA Screen GH     PSA Screen GH   13. Sacroiliac joint pain M53.3 XR Sacroiliac Joint G/E 3 Views     HPI  Patient presents for follow-up of multiple issues.  Overall feels like he has some generalized weakness.  Some fatigue and malaise.  Feels like the B12 shots and oral supplements have helped his energy levels overall but something still does not feel right.    He would like some lab work completed today.    Low back pain, most of the pain is located over the sacroiliac joints.  At times becomes quite debilitating for him.  He would like x-rays obtained.  Orders placed.    Coronary artery disease with history of stent placement x2, 10 months ago.  Has been doing well with Plavix.  No excessive bruising or bleeding issues.  Denies angina.  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  He was doing this for numerous days in a row and had no exertional angina or chest pain symptoms at all.  He did get some muscle soreness and some shoulder and chest muscle discomfort but otherwise no substernal discomfort or shortness of breath or chest heaviness.    History of depression, doing well on Celexa.  Needs refills.    Diabetic neuropathy, ongoing.  Gabapentin has been helpful.  Needs refills.    Type 2 diabetes, labs have been well controlled.  He is due for lab work.  Doing well with metformin.  Refills today.    Low vitamin B-12, B12 shot orders updated and advised to continue B complex 3 times a week.    Had a positive immunochemical fecal blood tests back in December.  At that time he was on aspirin and Plavix.  He remains on aspirin and Plavix but cannot stop his Plavix until mid May 2019.  At that point he wants to proceed with colonoscopy and will stop his Plavix for 5-7 days prior to colonoscopy, surgical provider preference.    Mixed hyperlipidemia, currently on simvastatin 10 mg at  bedtime.  Seem to be tolerating well.  Med list updated.  Refill sent to pharmacy.    Elevated PSA, levels climbed by 0.8 from previous testing up until last labs just under a year ago.  Repeat labs today.    Sacroiliac joint pain, bilateral.  X-rays today.    Needs Colonoscopy, had + FIT Stool blood testing.   Wants Dr. Johnson --- needs to be on/after 5/17/2019 -- can't stop Plavix until Mid May 2019    Functional Capacity: > 4 METS. + No cardiac limitations. Was out shoveling snow for 2-4 hours straight recently.   Review of Systems   Constitutional: Positive for fatigue. Negative for chills and fever.   HENT: Negative for congestion and hearing loss.    Eyes: Negative for pain and visual disturbance.   Respiratory: Negative for cough, shortness of breath and wheezing.    Cardiovascular: Negative for chest pain and palpitations.   Gastrointestinal: Negative for abdominal pain, diarrhea, nausea and vomiting.   Endocrine: Negative for cold intolerance and heat intolerance.   Genitourinary: Negative for dysuria and hematuria.   Musculoskeletal: Positive for back pain (Left > right low back pain -- focused over SI joints). Negative for arthralgias and myalgias.   Skin: Negative for pallor.   Allergic/Immunologic: Negative for immunocompromised state.   Neurological: Positive for dizziness and light-headedness.   Hematological: Does not bruise/bleed easily.   Psychiatric/Behavioral: Positive for confusion (+ short term memory loss). Negative for agitation.        NEERAJ:   NEERAJ-7 SCORE 5/2/2018 5/31/2018 7/11/2018   Total Score 0 0 0     PHQ9:  PHQ-9 SCORE 5/2/2018 5/31/2018 7/11/2018   PHQ-9 Total Score 0 0 0       I have personally reviewed the past medical history, past surgical history, medications, allergies, family and social history as listed below.     Allergies   Allergen Reactions     Ace Inhibitors Cough     Atorvastatin Calcium      Lipitor     Hmg-Coa-R Inhibitors Muscle Pain (Myalgia)     Higher doses cause  myalgias       Current Outpatient Medications   Medication Sig Dispense Refill     ascorbic acid (VITAMIN C) 500 MG tablet Take 1 tablet by mouth daily. For 60 days       aspirin 81 MG tablet Take 81 mg by mouth daily       blood glucose monitoring (NO BRAND SPECIFIED) test strip Check blood glucose twice daily,  Dx code e11.9. Dispense as covered by patient's insurance. 200 strip 2     Cholecalciferol (D 5000) 5000 UNITS TABS Take 5,000 Units by mouth daily       citalopram (CELEXA) 20 MG tablet TAKE ONE-HALF TABLET BY MOUTH ONCE DAILY 45 tablet 3     clopidogrel (PLAVIX) 75 MG tablet Take 1 tablet (75 mg) by mouth daily 90 tablet 3     cyanocobalamin (CYANOCOBALAMIN) 1000 MCG/ML injection Inject 1 mL (1,000 mcg) into the muscle every 3 to 4 weeks 1 mL 20     gabapentin (NEURONTIN) 100 MG capsule TAKE 1 TO 2 CAPSULES BY MOUTH IN THE EVENING AS NEEDED 90 capsule 11     metFORMIN (GLUCOPHAGE) 500 MG tablet TAKE TWO TABLETS BY MOUTH IN THE MORNING AND ONE AT BEDTIME 270 tablet 3     nitroGLYcerin (NITROSTAT) 0.4 MG sublingual tablet        simvastatin (ZOCOR) 20 MG tablet TAKE ONE-HALF TABLET BY MOUTH AT BEDTIME 45 tablet 3     vitamin B complex with vitamin C (VITAMIN  B COMPLEX) TABS tablet Take 1 tablet by mouth daily Monday, Wednesday, Friday -- 3 days weekly -- make sure it has B12 in tablet -- Dose Reduced 7/11/2018 100 tablet 3     FLUZONE HIGH-DOSE 0.5 ML injection ADM 0.5ML IM UTD  0        Patient Active Problem List    Diagnosis Date Noted     Positive FIT (fecal immunochemical test) 03/19/2019     Priority: Medium     Mixed hyperlipidemia 03/19/2019     Priority: Medium     S/P cardiac cath 5/17/18 06/07/2018     Priority: Medium     Unsteady gait 06/07/2018     Priority: Medium     Stented coronary artery to his LCX and OM1 on 5/17/18 06/07/2018     Priority: Medium     Status post insertion of drug eluting coronary artery stent - x2 stents - 1st OMB - 5/17/2018 - [facility] 05/31/2018      Priority: Medium     Prediabetes 05/31/2018     Priority: Medium     Peripheral polyneuropathy - history of Type 2 Diabetes 05/31/2018     Priority: Medium     Malaise and fatigue 05/31/2018     Priority: Medium     Benign prostatic hyperplasia with weak urinary stream 05/31/2018     Priority: Medium     Abnormal electrocardiogram 04/26/2018     Priority: Medium     Agatston coronary artery calcium score between 200 and 399 04/26/2018     Priority: Medium     Exertional dyspnea 04/20/2018     Priority: Medium     Low serum vitamin B12 04/18/2018     Priority: Medium     Microalbuminuria 01/26/2018     Priority: Medium     Generalized anxiety disorder 06/05/2017     Priority: Medium     Abnormal CT scan, bladder 06/29/2016     Priority: Medium     Moderate episode of recurrent major depressive disorder (H) 06/16/2016     Priority: Medium     GERD (gastroesophageal reflux disease) 05/03/2013     Priority: Medium     Nephrolithiasis 04/23/2012     Priority: Medium     BPH (benign prostatic hyperplasia) 03/27/2012     Priority: Medium     History of diabetes mellitus, type II 02/17/2010     Priority: Medium     Diverticular disease of colon 02/17/2010     Priority: Medium     Pure hypercholesterolemia 02/17/2010     Priority: Medium     Obesity 02/17/2010     Priority: Medium     Panic disorder without agoraphobia 02/17/2010     Priority: Medium     Myalgia and myositis 02/17/2010     Priority: Medium     Past Medical History:   Diagnosis Date     Diverticulosis of intestine without perforation or abscess without bleeding     No Comments Provided     Fatty (change of) liver, not elsewhere classified     non alcoholic     Fibromyalgia     No Comments Provided     Nodular prostate without lower urinary tract symptoms     2012,US confirms benign calcifications     Obesity     No Comments Provided     Panic disorder without agoraphobia     No Comments Provided     Positive colorectal cancer screening using Cologuard test  2018     Past Surgical History:   Procedure Laterality Date     ARTHROPLASTY KNEE      2015     ARTHROSCOPY KNEE      right knee X2     BIOPSY PROSTATE TRANSRECTAL           COLONOSCOPY      ,and UGI [place] normal     COLONOSCOPY      ,and UGI repeat Dr. Johnson negative.     COLONOSCOPY  2013,WNL     HERNIA REPAIR  2000     RELEASE CARPAL TUNNEL      2014     VASECTOMY      No Comments Provided     Social History     Socioeconomic History     Marital status:      Spouse name: None     Number of children: None     Years of education: None     Highest education level: None   Occupational History     None   Social Needs     Financial resource strain: None     Food insecurity:     Worry: None     Inability: None     Transportation needs:     Medical: None     Non-medical: None   Tobacco Use     Smoking status: Former Smoker     Types: Cigarettes     Last attempt to quit: 10/25/1983     Years since quittin.4     Smokeless tobacco: Never Used   Substance and Sexual Activity     Alcohol use: No     Drug use: No     Sexual activity: None   Lifestyle     Physical activity:     Days per week: None     Minutes per session: None     Stress: None   Relationships     Social connections:     Talks on phone: None     Gets together: None     Attends Rastafarian service: None     Active member of club or organization: None     Attends meetings of clubs or organizations: None     Relationship status: None     Intimate partner violence:     Fear of current or ex partner: None     Emotionally abused: None     Physically abused: None     Forced sexual activity: None   Other Topics Concern     Parent/sibling w/ CABG, MI or angioplasty before 65F 55M? Not Asked   Social History Narrative    , retired from [employer].  Lives in town.     Family History   Problem Relation Age of Onset     Cancer Father         Cancer, mesothelioma     Colon Cancer Mother         Cancer-colon,      "Genitourinary Problems Mother         Genitourinary Disease,renal failure     Diabetes Mother         Diabetes     Other - See Comments Sister         Renal transplant     Diabetes Type 2  Sister         Diabetes type II     Diabetes Brother         Diabetes       EXAM:   Vitals:    03/19/19 1600   BP: 118/66   BP Location: Right arm   Patient Position: Sitting   Cuff Size: Adult Regular   Pulse: 60   Resp: 16   Temp: 96.4  F (35.8  C)   TempSrc: Tympanic   SpO2: 96%   Weight: 88.1 kg (194 lb 3.2 oz)   Height: 1.702 m (5' 7\")       Current Pain Score: No Pain (0)     BP Readings from Last 3 Encounters:   03/19/19 118/66   07/11/18 138/84   06/18/18 138/80      Wt Readings from Last 3 Encounters:   03/19/19 88.1 kg (194 lb 3.2 oz)   07/11/18 89.1 kg (196 lb 8 oz)   06/18/18 88 kg (194 lb)      Estimated body mass index is 30.42 kg/m  as calculated from the following:    Height as of this encounter: 1.702 m (5' 7\").    Weight as of this encounter: 88.1 kg (194 lb 3.2 oz).     Physical Exam   Constitutional: He appears well-developed and well-nourished. No distress.   HENT:   Head: Normocephalic and atraumatic.   Eyes: Conjunctivae are normal. No scleral icterus.   Neck: Neck supple.   Cardiovascular: Normal rate and regular rhythm.   Pulmonary/Chest: Effort normal.   Abdominal: Soft. There is no tenderness.   Musculoskeletal: He exhibits no deformity.   Lymphadenopathy:     He has no cervical adenopathy.   Neurological: He is alert.   Skin: Skin is warm and dry. No rash noted. He is not diaphoretic.   Psychiatric: He has a normal mood and affect.      Procedures   INVESTIGATIONS:  Results for orders placed or performed in visit on 03/19/19   Comprehensive metabolic panel   Result Value Ref Range    Sodium 140 134 - 144 mmol/L    Potassium 3.9 3.5 - 5.1 mmol/L    Chloride 103 98 - 107 mmol/L    Carbon Dioxide 27 21 - 31 mmol/L    Anion Gap 10 3 - 14 mmol/L    Glucose 118 (H) 70 - 105 mg/dL    Urea Nitrogen 13 7 - 25 " mg/dL    Creatinine 0.74 0.70 - 1.30 mg/dL    GFR Estimate >90 >60 mL/min/[1.73_m2]    GFR Estimate If Black >90 >60 mL/min/[1.73_m2]    Calcium 9.6 8.6 - 10.3 mg/dL    Bilirubin Total 0.4 0.3 - 1.0 mg/dL    Albumin 4.5 3.5 - 5.7 g/dL    Protein Total 7.5 6.4 - 8.9 g/dL    Alkaline Phosphatase 65 34 - 104 U/L    ALT 12 7 - 52 U/L    AST 14 13 - 39 U/L   CBC with platelets   Result Value Ref Range    WBC 5.6 4.0 - 11.0 10e9/L    RBC Count 4.92 4.4 - 5.9 10e12/L    Hemoglobin 14.7 13.3 - 17.7 g/dL    Hematocrit 43.7 40.0 - 53.0 %    MCV 89 78 - 100 fl    MCH 29.9 26.5 - 33.0 pg    MCHC 33.6 31.5 - 36.5 g/dL    RDW 12.8 10.0 - 15.0 %    Platelet Count 208 150 - 450 10e9/L   Hemoglobin A1c   Result Value Ref Range    Hemoglobin A1C 6.2 (H) 4.0 - 6.0 %   Lipid Profile   Result Value Ref Range    Cholesterol 138 <200 mg/dL    Triglycerides 133 <150 mg/dL    HDL Cholesterol 41 23 - 92 mg/dL    LDL Cholesterol Calculated 70 <100 mg/dL    Non HDL Cholesterol 97 <130 mg/dL   PSA Screen GH   Result Value Ref Range    PSA Screen 2.825 <3.100 ng/mL   Thyrotropin GH   Result Value Ref Range    Thyrotropin 2.50 0.34 - 5.60 IU/mL   *UA reflex to Microscopic   Result Value Ref Range    Color Urine Yellow     Appearance Urine Clear     Glucose Urine Negative NEG^Negative mg/dL    Bilirubin Urine Negative NEG^Negative    Ketones Urine Negative NEG^Negative mg/dL    Specific Gravity Urine 1.020 1.000 - 1.030    Blood Urine Negative NEG^Negative    pH Urine 6.0 5.0 - 9.0 pH    Protein Albumin Urine Negative NEG^Negative mg/dL    Urobilinogen Urine 0.2 0.2 - 1.0 EU/dL    Nitrite Urine Negative NEG^Negative    Leukocyte Esterase Urine Negative NEG^Negative    Source Urine      3/19/2019 - SI joint xrays -appears to have moderate degenerative SI joint changes right greater than left.  Final read pending.    ASSESSMENT AND PLAN:  Problem List Items Addressed This Visit        Endocrine    Mixed hyperlipidemia    Relevant Medications     simvastatin (ZOCOR) 20 MG tablet    Other Relevant Orders    Lipid Profile       Behavioral    Moderate episode of recurrent major depressive disorder (H)    Relevant Medications    citalopram (CELEXA) 20 MG tablet       Other    Low serum vitamin B12    Relevant Medications    cyanocobalamin (CYANOCOBALAMIN) 1000 MCG/ML injection    Status post insertion of drug eluting coronary artery stent - x2 stents - 1st OMB - 5/17/2018 - [facility]    Relevant Medications    clopidogrel (PLAVIX) 75 MG tablet    Malaise and fatigue    Relevant Orders    Thyrotropin GH (Completed)    Positive FIT (fecal immunochemical test)    Relevant Orders    GASTROENTEROLOGY ADULT REF PROCEDURE ONLY (Dr. Johnson)      Other Visit Diagnoses     Generalized weakness    -  Primary    Coronary artery disease involving native coronary artery of native heart without angina pectoris        Relevant Medications    clopidogrel (PLAVIX) 75 MG tablet    Diabetic peripheral neuropathy (H)        Relevant Medications    citalopram (CELEXA) 20 MG tablet    metFORMIN (GLUCOPHAGE) 500 MG tablet    gabapentin (NEURONTIN) 100 MG capsule    Type 2 diabetes mellitus with diabetic nephropathy, without long-term current use of insulin (H)        Relevant Medications    metFORMIN (GLUCOPHAGE) 500 MG tablet    gabapentin (NEURONTIN) 100 MG capsule    Other Relevant Orders    Comprehensive metabolic panel    CBC with platelets    Albumin Random Urine Quantitative with Creat Ratio    Hemoglobin A1c    *UA reflex to Microscopic    Elevated PSA        Relevant Orders    PSA Screen GH (Completed)    Encounter for screening for malignant neoplasm of prostate         Relevant Orders    PSA Screen GH (Completed)    Sacroiliac joint pain        Relevant Orders    XR Sacroiliac Joint G/E 3 Views        reviewed diet, exercise and weight control, recommended sodium restriction, cardiovascular risk and specific lipid/LDL goals reviewed  -- Expected clinical course  discussed    -- Medications and their side effects discussed    40 minutes spent in face-to-face interaction with patient (separate from separately billed procedures) with greater than 50% spent in counseling and care coordination of listed medical problems.      The 10-year ASCVD risk score (Savannatyron CRUZ JrJerry, et al., 2013) is: 36.1%    Values used to calculate the score:      Age: 73 years      Sex: Male      Is Non- : No      Diabetic: Yes      Tobacco smoker: No      Systolic Blood Pressure: 118 mmHg      Is BP treated: No      HDL Cholesterol: 41 mg/dL      Total Cholesterol: 138 mg/dL    Patient Instructions   Needs Colonoscopy, had + FIT Stool blood testing.   Wants Dr. Johnson --- needs to be on/after 5/17/2019 -- can't stop Plavix until Mid May 2019.     Labs today.     Low back xray today.     Medications refilled.     Return as needed for follow-up with Dr. Pickens.    Clinic : [phone]  Appointment line: [phone]     Cm Pickens MD  Internal Medicine  [facility]     Portions of this note were dictated using speech recognition software. The note has been proofread but errors in the text may have been overlooked. Please contact me if there are any concerns regarding the accuracy of the dictation.

## 2019-03-19 NOTE — LETTER
March 20, 2019    Dajuan Basilio  [address] [address]      Dear Dajuan Basilio,    The result of your recent tests are included below:    Hemoglobin A1c shows prediabetes, the remainder of your labs are all normal.    Prediabetes is a hemoglobin A1c of 5.7-6.4%.    To help with weight loss and improve blood sugar control....    -- Try to avoid Carbohydrates as much as possible -- breads, pasta, baked goods, cakes, oatmeal, cold cereal, potatoes.   These are turned to sugar in one metabolic conversion, cause insulin secretion and increased fat deposition / weight gain.      -- Eat more lean meats, proteins, eggs, nuts, vegetables.        Results for orders placed or performed in visit on 03/19/19   Comprehensive metabolic panel   Result Value Ref Range    Sodium 140 134 - 144 mmol/L    Potassium 3.9 3.5 - 5.1 mmol/L    Chloride 103 98 - 107 mmol/L    Carbon Dioxide 27 21 - 31 mmol/L    Anion Gap 10 3 - 14 mmol/L    Glucose 118 (H) 70 - 105 mg/dL    Urea Nitrogen 13 7 - 25 mg/dL    Creatinine 0.74 0.70 - 1.30 mg/dL    GFR Estimate >90 >60 mL/min/[1.73_m2]    GFR Estimate If Black >90 >60 mL/min/[1.73_m2]    Calcium 9.6 8.6 - 10.3 mg/dL    Bilirubin Total 0.4 0.3 - 1.0 mg/dL    Albumin 4.5 3.5 - 5.7 g/dL    Protein Total 7.5 6.4 - 8.9 g/dL    Alkaline Phosphatase 65 34 - 104 U/L    ALT 12 7 - 52 U/L    AST 14 13 - 39 U/L   CBC with platelets   Result Value Ref Range    WBC 5.6 4.0 - 11.0 10e9/L    RBC Count 4.92 4.4 - 5.9 10e12/L    Hemoglobin 14.7 13.3 - 17.7 g/dL    Hematocrit 43.7 40.0 - 53.0 %    MCV 89 78 - 100 fl    MCH 29.9 26.5 - 33.0 pg    MCHC 33.6 31.5 - 36.5 g/dL    RDW 12.8 10.0 - 15.0 %    Platelet Count 208 150 - 450 10e9/L   Hemoglobin A1c   Result Value Ref Range    Hemoglobin A1C 6.2 (H) 4.0 - 6.0 %   Lipid Profile   Result Value Ref Range    Cholesterol 138 <200 mg/dL    Triglycerides 133 <150 mg/dL    HDL Cholesterol 41 23 - 92 mg/dL    LDL Cholesterol Calculated 70 <100  mg/dL    Non HDL Cholesterol 97 <130 mg/dL   PSA Screen GH   Result Value Ref Range    PSA Screen 2.825 <3.100 ng/mL   Thyrotropin GH   Result Value Ref Range    Thyrotropin 2.50 0.34 - 5.60 IU/mL   *UA reflex to Microscopic   Result Value Ref Range    Color Urine Yellow     Appearance Urine Clear     Glucose Urine Negative NEG^Negative mg/dL    Bilirubin Urine Negative NEG^Negative    Ketones Urine Negative NEG^Negative mg/dL    Specific Gravity Urine 1.020 1.000 - 1.030    Blood Urine Negative NEG^Negative    pH Urine 6.0 5.0 - 9.0 pH    Protein Albumin Urine Negative NEG^Negative mg/dL    Urobilinogen Urine 0.2 0.2 - 1.0 EU/dL    Nitrite Urine Negative NEG^Negative    Leukocyte Esterase Urine Negative NEG^Negative    Source Urine    Albumin Random Urine Quantitative with Creat Ratio   Result Value Ref Range    Creatinine Urine 68 mg/dL    Albumin Urine mg/L 31 mg/L    Albumin Urine mg/g Cr 44.67 (H) 0 - 17 mg/g Cr       If you have any further questions or problems, please contact my office at [phone] and schedule an appointment.    Clinic : [phone]  Appointment line: [phone]     Thank you,    Cm Pickens MD    Internal Medicine  [facility]     Reviewed and electronically signed by provider.

## 2019-03-19 NOTE — NURSING NOTE
Patient presents to the clinic for weakness and discussion of possible colonscopy.     Medication Reconciliation: complete   Esther Cheng LPN............. March 19, 2019 3:55 PM

## 2019-03-19 NOTE — PATIENT INSTRUCTIONS
Needs Colonoscopy, had + FIT Stool blood testing.   Wants Dr. Johnson --- needs to be on/after 5/17/2019 -- can't stop Plavix until Mid May 2019.     Labs today.     Low back xray today.     Medications refilled.     Return as needed for follow-up with Dr. Pickens.    Clinic : [phone]  Appointment line: [phone]

## 2019-03-20 ENCOUNTER — APPOINTMENT (OUTPATIENT)
Dept: CT IMAGING | Facility: OTHER | Age: 74
End: 2019-03-20
Attending: PHYSICIAN ASSISTANT
Payer: MEDICARE

## 2019-03-20 ENCOUNTER — HOSPITAL ENCOUNTER (EMERGENCY)
Facility: OTHER | Age: 74
Discharge: HOME | End: 2019-03-20
Attending: PHYSICIAN ASSISTANT | Admitting: PHYSICIAN ASSISTANT
Payer: MEDICARE

## 2019-03-20 ENCOUNTER — APPOINTMENT (OUTPATIENT)
Dept: GENERAL RADIOLOGY | Facility: OTHER | Age: 74
End: 2019-03-20
Attending: PHYSICIAN ASSISTANT
Payer: MEDICARE

## 2019-03-20 VITALS
HEIGHT: 67 IN | OXYGEN SATURATION: 96 % | WEIGHT: 190 LBS | BODY MASS INDEX: 29.82 KG/M2 | SYSTOLIC BLOOD PRESSURE: 146 MMHG | TEMPERATURE: 97 F | HEART RATE: 60 BPM | DIASTOLIC BLOOD PRESSURE: 77 MMHG | RESPIRATION RATE: 17 BRPM

## 2019-03-20 DIAGNOSIS — R07.9 CHEST PAIN: ICD-10-CM

## 2019-03-20 DIAGNOSIS — R42 VERTIGO: ICD-10-CM

## 2019-03-20 LAB
ALBUMIN SERPL-MCNC: 4.3 G/DL (ref 3.5–5.7)
ALP SERPL-CCNC: 82 U/L (ref 34–104)
ALT SERPL W P-5'-P-CCNC: 10 U/L (ref 7–52)
ANION GAP SERPL CALCULATED.3IONS-SCNC: 7 MMOL/L (ref 3–14)
APTT PPP: 34 SEC (ref 29–50)
AST SERPL W P-5'-P-CCNC: 11 U/L (ref 13–39)
BASOPHILS # BLD AUTO: 0 10E9/L (ref 0–0.2)
BASOPHILS NFR BLD AUTO: 0.5 %
BILIRUB SERPL-MCNC: 0.3 MG/DL (ref 0.3–1)
BUN SERPL-MCNC: 16 MG/DL (ref 7–25)
CALCIUM SERPL-MCNC: 9.3 MG/DL (ref 8.6–10.3)
CHLORIDE SERPL-SCNC: 102 MMOL/L (ref 98–107)
CO2 SERPL-SCNC: 29 MMOL/L (ref 21–31)
CREAT SERPL-MCNC: 0.95 MG/DL (ref 0.7–1.3)
CREAT UR-MCNC: 68 MG/DL
DIFFERENTIAL METHOD BLD: NORMAL
EOSINOPHIL # BLD AUTO: 0.2 10E9/L (ref 0–0.7)
EOSINOPHIL NFR BLD AUTO: 3.2 %
ERYTHROCYTE [DISTWIDTH] IN BLOOD BY AUTOMATED COUNT: 12.7 % (ref 10–15)
GFR SERPL CREATININE-BSD FRML MDRD: 77 ML/MIN/{1.73_M2}
GLUCOSE SERPL-MCNC: 144 MG/DL (ref 70–105)
HCT VFR BLD AUTO: 42.8 % (ref 40–53)
HGB BLD-MCNC: 14.1 G/DL (ref 13.3–17.7)
IMM GRANULOCYTES # BLD: 0 10E9/L (ref 0–0.4)
IMM GRANULOCYTES NFR BLD: 0.2 %
INR PPP: 0.9 (ref 0–1.3)
LYMPHOCYTES # BLD AUTO: 1.4 10E9/L (ref 0.8–5.3)
LYMPHOCYTES NFR BLD AUTO: 24.2 %
MCH RBC QN AUTO: 30 PG (ref 26.5–33)
MCHC RBC AUTO-ENTMCNC: 32.9 G/DL (ref 31.5–36.5)
MCV RBC AUTO: 91 FL (ref 78–100)
MICROALBUMIN UR-MCNC: 31 MG/L
MICROALBUMIN/CREAT UR: 44.67 MG/G CR (ref 0–17)
MONOCYTES # BLD AUTO: 0.6 10E9/L (ref 0–1.3)
MONOCYTES NFR BLD AUTO: 9.6 %
NEUTROPHILS # BLD AUTO: 3.7 10E9/L (ref 1.6–8.3)
NEUTROPHILS NFR BLD AUTO: 62.3 %
PLATELET # BLD AUTO: 200 10E9/L (ref 150–450)
POTASSIUM SERPL-SCNC: 3.9 MMOL/L (ref 3.5–5.1)
PROT SERPL-MCNC: 7.1 G/DL (ref 6.4–8.9)
RBC # BLD AUTO: 4.7 10E12/L (ref 4.4–5.9)
SODIUM SERPL-SCNC: 138 MMOL/L (ref 134–144)
TROPONIN I SERPL-MCNC: <0.03 UG/L (ref 0–0.03)
TROPONIN I SERPL-MCNC: <0.03 UG/L (ref 0–0.03)
WBC # BLD AUTO: 6 10E9/L (ref 4–11)

## 2019-03-20 PROCEDURE — 99285 EMERGENCY DEPT VISIT HI MDM: CPT | Mod: 25 | Performed by: PHYSICIAN ASSISTANT

## 2019-03-20 PROCEDURE — 25000128 H RX IP 250 OP 636: Performed by: PHYSICIAN ASSISTANT

## 2019-03-20 PROCEDURE — 25500064 ZZH RX 255 OP 636: Performed by: PHYSICIAN ASSISTANT

## 2019-03-20 PROCEDURE — 71045 X-RAY EXAM CHEST 1 VIEW: CPT

## 2019-03-20 PROCEDURE — A9270 NON-COVERED ITEM OR SERVICE: HCPCS | Mod: GY | Performed by: PHYSICIAN ASSISTANT

## 2019-03-20 PROCEDURE — 25800030 ZZH RX IP 258 OP 636: Performed by: EMERGENCY MEDICINE

## 2019-03-20 PROCEDURE — 93010 ELECTROCARDIOGRAM REPORT: CPT | Performed by: INTERNAL MEDICINE

## 2019-03-20 PROCEDURE — 80053 COMPREHEN METABOLIC PANEL: CPT | Performed by: PHYSICIAN ASSISTANT

## 2019-03-20 PROCEDURE — 85610 PROTHROMBIN TIME: CPT | Performed by: PHYSICIAN ASSISTANT

## 2019-03-20 PROCEDURE — 84484 ASSAY OF TROPONIN QUANT: CPT | Performed by: PHYSICIAN ASSISTANT

## 2019-03-20 PROCEDURE — 84484 ASSAY OF TROPONIN QUANT: CPT | Mod: 91 | Performed by: PHYSICIAN ASSISTANT

## 2019-03-20 PROCEDURE — 93005 ELECTROCARDIOGRAM TRACING: CPT | Performed by: PHYSICIAN ASSISTANT

## 2019-03-20 PROCEDURE — 70450 CT HEAD/BRAIN W/O DYE: CPT | Mod: TC,XU

## 2019-03-20 PROCEDURE — 99283 EMERGENCY DEPT VISIT LOW MDM: CPT | Mod: Z6 | Performed by: PHYSICIAN ASSISTANT

## 2019-03-20 PROCEDURE — 36415 COLL VENOUS BLD VENIPUNCTURE: CPT | Performed by: PHYSICIAN ASSISTANT

## 2019-03-20 PROCEDURE — 96374 THER/PROPH/DIAG INJ IV PUSH: CPT | Mod: XU | Performed by: PHYSICIAN ASSISTANT

## 2019-03-20 PROCEDURE — 85025 COMPLETE CBC W/AUTO DIFF WBC: CPT | Performed by: PHYSICIAN ASSISTANT

## 2019-03-20 PROCEDURE — 25000132 ZZH RX MED GY IP 250 OP 250 PS 637: Mod: GY | Performed by: PHYSICIAN ASSISTANT

## 2019-03-20 PROCEDURE — 85730 THROMBOPLASTIN TIME PARTIAL: CPT | Performed by: PHYSICIAN ASSISTANT

## 2019-03-20 PROCEDURE — 70498 CT ANGIOGRAPHY NECK: CPT | Mod: TC

## 2019-03-20 RX ORDER — MECLIZINE HCL 12.5 MG 12.5 MG/1
25 TABLET ORAL ONCE
Status: COMPLETED | OUTPATIENT
Start: 2019-03-20 | End: 2019-03-20

## 2019-03-20 RX ORDER — IODIXANOL 320 MG/ML
100 INJECTION, SOLUTION INTRAVASCULAR ONCE
Status: COMPLETED | OUTPATIENT
Start: 2019-03-20 | End: 2019-03-20

## 2019-03-20 RX ORDER — DIAZEPAM 5 MG
5 TABLET ORAL ONCE
Status: COMPLETED | OUTPATIENT
Start: 2019-03-20 | End: 2019-03-20

## 2019-03-20 RX ORDER — DIAZEPAM 10 MG/2ML
5 INJECTION, SOLUTION INTRAMUSCULAR; INTRAVENOUS ONCE
Status: DISCONTINUED | OUTPATIENT
Start: 2019-03-20 | End: 2019-03-20 | Stop reason: CLARIF

## 2019-03-20 RX ORDER — ONDANSETRON 2 MG/ML
4 INJECTION INTRAMUSCULAR; INTRAVENOUS ONCE
Status: COMPLETED | OUTPATIENT
Start: 2019-03-20 | End: 2019-03-20

## 2019-03-20 RX ORDER — MECLIZINE HCL 12.5 MG 12.5 MG/1
25 TABLET ORAL 4 TIMES DAILY PRN
Qty: 30 TABLET | Refills: 0 | Status: SHIPPED | OUTPATIENT
Start: 2019-03-20 | End: 2020-03-17

## 2019-03-20 RX ADMIN — MECLIZINE 25 MG: 12.5 TABLET ORAL at 21:46

## 2019-03-20 RX ADMIN — DIAZEPAM 5 MG: 5 TABLET ORAL at 21:50

## 2019-03-20 RX ADMIN — ONDANSETRON 4 MG: 2 INJECTION INTRAMUSCULAR; INTRAVENOUS at 21:46

## 2019-03-20 RX ADMIN — SODIUM CHLORIDE, SODIUM LACTATE, POTASSIUM CHLORIDE, AND CALCIUM CHLORIDE 1000 ML: 600; 310; 30; 20 INJECTION, SOLUTION INTRAVENOUS at 21:08

## 2019-03-20 RX ADMIN — IODIXANOL 100 ML: 320 INJECTION, SOLUTION INTRAVASCULAR at 21:58

## 2019-03-20 ASSESSMENT — MIFFLIN-ST. JEOR: SCORE: 1555.46

## 2019-03-20 NOTE — ED AVS SNAPSHOT
[facility]  [address]  [place] [address]  Phone:  [phone]  Fax:  [phone]                                    Dajuan Basilio   MRN: [id number]    Department:  [facility]   Date of Visit:  3/20/2019           After Visit Summary Signature Page    I have received my discharge instructions, and my questions have been answered. I have discussed any challenges I see with this plan with the nurse or doctor.    ..........................................................................................................................................  Patient/Patient Representative Signature      ..........................................................................................................................................  Patient Representative Print Name and Relationship to Patient    ..................................................               ................................................  Date                                   Time    ..........................................................................................................................................  Reviewed by Signature/Title    ...................................................              ..............................................  Date                                               Time          22EPIC Rev 08/18

## 2019-03-21 NOTE — ED NOTES
Patient states cardiac history of stent placement x2 1 year ago in [facility].

## 2019-03-21 NOTE — ED PROVIDER NOTES
History   No chief complaint on file.    HPI  Dajuan Basilio is a 73 year old male who presents to the ED today with a chief complaint of dizziness and left arm pain.  Patient reports that he had a sudden onset of a loss of balance this evening.  Patient states that he would get up to move and is balance become very bad almost to the point where he would fall over.  When patient would rest he would feel much better.  Patient also reports an ongoing left arm and abdominal pain that began over the last week after shoveling snow.  Patient denies shortness of breath, recent fevers or sicknesses, patient has no other complaints.    Allergies:  Allergies   Allergen Reactions     Ace Inhibitors Cough     Atorvastatin Calcium      Lipitor     Hmg-Coa-R Inhibitors Muscle Pain (Myalgia)     Higher doses cause myalgias       Problem List:    Patient Active Problem List    Diagnosis Date Noted     Positive FIT (fecal immunochemical test) 03/19/2019     Priority: Medium     Mixed hyperlipidemia 03/19/2019     Priority: Medium     S/P cardiac cath 5/17/18 06/07/2018     Priority: Medium     Unsteady gait 06/07/2018     Priority: Medium     Stented coronary artery to his LCX and OM1 on 5/17/18 06/07/2018     Priority: Medium     Status post insertion of drug eluting coronary artery stent - x2 stents - 1st OMB - 5/17/2018 - [facility] 05/31/2018     Priority: Medium     Prediabetes 05/31/2018     Priority: Medium     Peripheral polyneuropathy - history of Type 2 Diabetes 05/31/2018     Priority: Medium     Malaise and fatigue 05/31/2018     Priority: Medium     Benign prostatic hyperplasia with weak urinary stream 05/31/2018     Priority: Medium     Abnormal electrocardiogram 04/26/2018     Priority: Medium     Agatston coronary artery calcium score between 200 and 399 04/26/2018     Priority: Medium     Exertional dyspnea 04/20/2018     Priority: Medium     Low serum vitamin B12 04/18/2018     Priority: Medium      Microalbuminuria 2018     Priority: Medium     Generalized anxiety disorder 2017     Priority: Medium     Abnormal CT scan, bladder 2016     Priority: Medium     Moderate episode of recurrent major depressive disorder (H) 2016     Priority: Medium     GERD (gastroesophageal reflux disease) 2013     Priority: Medium     Nephrolithiasis 2012     Priority: Medium     BPH (benign prostatic hyperplasia) 2012     Priority: Medium     History of diabetes mellitus, type II 2010     Priority: Medium     Diverticular disease of colon 2010     Priority: Medium     Pure hypercholesterolemia 2010     Priority: Medium     Obesity 2010     Priority: Medium     Panic disorder without agoraphobia 2010     Priority: Medium     Myalgia and myositis 2010     Priority: Medium        Past Medical History:    Past Medical History:   Diagnosis Date     Diverticulosis of intestine without perforation or abscess without bleeding      Fatty (change of) liver, not elsewhere classified      Fibromyalgia      Nodular prostate without lower urinary tract symptoms      Obesity      Panic disorder without agoraphobia      Positive colorectal cancer screening using Cologuard test 2018       Past Surgical History:    Past Surgical History:   Procedure Laterality Date     ARTHROPLASTY KNEE      2015     ARTHROSCOPY KNEE      right knee X2     BIOPSY PROSTATE TRANSRECTAL           COLONOSCOPY      ,and UGI [place] normal     COLONOSCOPY      ,and UGI repeat Dr. Johnson negative.     COLONOSCOPY  2013,WNL     HERNIA REPAIR  2000     RELEASE CARPAL TUNNEL      2014     VASECTOMY      No Comments Provided       Family History:    Family History   Problem Relation Age of Onset     Cancer Father         Cancer, mesothelioma     Colon Cancer Mother         Cancer-colon,     Genitourinary Problems Mother         Genitourinary Disease,renal  "failure     Diabetes Mother         Diabetes     Other - See Comments Sister         Renal transplant     Diabetes Type 2  Sister         Diabetes type II     Diabetes Brother         Diabetes       Social History:  Marital Status:   [2]  Social History     Tobacco Use     Smoking status: Former Smoker     Types: Cigarettes     Last attempt to quit: 10/25/1983     Years since quittin.4     Smokeless tobacco: Never Used   Substance Use Topics     Alcohol use: No     Drug use: No        Medications:      ascorbic acid (VITAMIN C) 500 MG tablet   aspirin 81 MG tablet   blood glucose monitoring (NO BRAND SPECIFIED) test strip   Cholecalciferol (D 5000) 5000 UNITS TABS   citalopram (CELEXA) 20 MG tablet   clopidogrel (PLAVIX) 75 MG tablet   cyanocobalamin (CYANOCOBALAMIN) 1000 MCG/ML injection   gabapentin (NEURONTIN) 100 MG capsule   meclizine (ANTIVERT) 12.5 MG tablet   metFORMIN (GLUCOPHAGE) 500 MG tablet   nitroGLYcerin (NITROSTAT) 0.4 MG sublingual tablet   simvastatin (ZOCOR) 20 MG tablet   vitamin B complex with vitamin C (VITAMIN  B COMPLEX) TABS tablet   FLUZONE HIGH-DOSE 0.5 ML injection         Review of Systems   Musculoskeletal:        Left arm pain   Neurological:        Loss of balance   All other systems reviewed and are negative.      Physical Exam   BP: 165/75  Pulse: 65  Heart Rate: 64  Temp: 97  F (36.1  C)  Resp: 20  Height: 170.2 cm (5' 7\")  Weight: 86.2 kg (190 lb)  SpO2: 97 %      Physical Exam   Constitutional: He is oriented to person, place, and time. He appears well-developed and well-nourished. No distress.   HENT:   Head: Normocephalic and atraumatic.   Eyes: Conjunctivae are normal. Pupils are equal, round, and reactive to light. No scleral icterus.   Right bland horizontal nystagmus   Neck: Normal range of motion. Neck supple.   Cardiovascular: Normal rate, regular rhythm and normal heart sounds.   No murmur heard.  Pulmonary/Chest: Effort normal and breath sounds normal. No " respiratory distress.   Abdominal: Soft. Bowel sounds are normal. There is no tenderness.   Musculoskeletal: Normal range of motion. He exhibits no deformity.   Lymphadenopathy:     He has no cervical adenopathy.   Neurological: He is alert and oriented to person, place, and time. No cranial nerve deficit. Coordination normal.   Skin: Skin is warm and dry. No rash noted. He is not diaphoretic.   Psychiatric: He has a normal mood and affect. His behavior is normal. Judgment and thought content normal.       ED Course        Procedures          EKG read at 2024. HR 62, NSR, no ST changes.     Critical Care time:  none               Results for orders placed or performed during the hospital encounter of 03/20/19 (from the past 24 hour(s))   CBC with platelets differential   Result Value Ref Range    WBC 6.0 4.0 - 11.0 10e9/L    RBC Count 4.70 4.4 - 5.9 10e12/L    Hemoglobin 14.1 13.3 - 17.7 g/dL    Hematocrit 42.8 40.0 - 53.0 %    MCV 91 78 - 100 fl    MCH 30.0 26.5 - 33.0 pg    MCHC 32.9 31.5 - 36.5 g/dL    RDW 12.7 10.0 - 15.0 %    Platelet Count 200 150 - 450 10e9/L    Diff Method Automated Method     % Neutrophils 62.3 %    % Lymphocytes 24.2 %    % Monocytes 9.6 %    % Eosinophils 3.2 %    % Basophils 0.5 %    % Immature Granulocytes 0.2 %    Absolute Neutrophil 3.7 1.6 - 8.3 10e9/L    Absolute Lymphocytes 1.4 0.8 - 5.3 10e9/L    Absolute Monocytes 0.6 0.0 - 1.3 10e9/L    Absolute Eosinophils 0.2 0.0 - 0.7 10e9/L    Absolute Basophils 0.0 0.0 - 0.2 10e9/L    Abs Immature Granulocytes 0.0 0 - 0.4 10e9/L   Troponin I   Result Value Ref Range    Troponin I ES <0.030 0.000 - 0.034 ug/L   INR   Result Value Ref Range    INR 0.90 0 - 1.3   Partial thromboplastin time   Result Value Ref Range    PTT 34 29 - 50 sec   Comprehensive metabolic panel   Result Value Ref Range    Sodium 138 134 - 144 mmol/L    Potassium 3.9 3.5 - 5.1 mmol/L    Chloride 102 98 - 107 mmol/L    Carbon Dioxide 29 21 - 31 mmol/L    Anion Gap 7 3 -  14 mmol/L    Glucose 144 (H) 70 - 105 mg/dL    Urea Nitrogen 16 7 - 25 mg/dL    Creatinine 0.95 0.70 - 1.30 mg/dL    GFR Estimate 77 >60 mL/min/[1.73_m2]    GFR Estimate If Black >90 >60 mL/min/[1.73_m2]    Calcium 9.3 8.6 - 10.3 mg/dL    Bilirubin Total 0.3 0.3 - 1.0 mg/dL    Albumin 4.3 3.5 - 5.7 g/dL    Protein Total 7.1 6.4 - 8.9 g/dL    Alkaline Phosphatase 82 34 - 104 U/L    ALT 10 7 - 52 U/L    AST 11 (L) 13 - 39 U/L   XR Chest Port 1 View    Narrative    EXAM:    XR Chest, 1 View     EXAM DATE/TIME:    3/20/2019 8:36 PM     CLINICAL HISTORY:    73 years old, male; Pain; Chest pain; Type not specified     TECHNIQUE:    Imaging protocol: XR of the chest, 1 view.     COMPARISON:    CR Chest 3/19/2018 1:42 PM     FINDINGS:    Lungs:  No acute infiltrate or consolidation. Small chronic scarring or   subsegmental atelectasis in the left lung base.    Pleural space: Unremarkable. No pleural effusion. No pneumothorax.    Heart/Mediastinum: Unremarkable. No cardiomegaly.    diaphragm:  Chronic eventration of the right hemidiaphragm.    Bones/joints:  Degenerative changes.       Impression    IMPRESSION:   No acute findings.    THIS DOCUMENT HAS BEEN ELECTRONICALLY SIGNED BY ISAAK HOLMAN MD   CTA Head Neck with Contrast    Narrative    EXAM:    CT Angiography Head With Contrast     EXAM DATE/TIME:    3/20/2019 9:46 PM     CLINICAL HISTORY:    73 years old, male; Signs and symptoms; Vertigo; Additional info: Vertigo,   persistent, central     TECHNIQUE:    Imaging protocol: Axial computed tomographic angiography images of the head   with intravenous contrast using CT angiography protocol.     Coronal and sagittal reformatted images were created and reviewed.    3D rendering: MIP reconstructed images were created and reviewed.    Radiation optimization: All CT scans at this facility use at least one of   these dose optimization techniques: automated exposure control; mA and/or kV   adjustment per patient size  (includes targeted exams where dose is matched to   clinical indication); or iterative reconstruction.    Contrast material: Visi 320    Contrast volume: 100 ml    Contrast route: IV     COMPARISON:    CT HEAD W/O CONTRAST 3/20/2019 9:48 PM     FINDINGS:      Right internal carotid artery: Unremarkable. Intracranial segment is patent   with no significant stenosis. No aneurysm.    Right anterior cerebral artery: Unremarkable. No occlusion or significant   stenosis. No aneurysm.     Right middle cerebral artery: Unremarkable. No occlusion or significant   stenosis. No aneurysm.     Right posterior cerebral artery: Unremarkable. No occlusion or significant   stenosis. No aneurysm.     Right vertebral artery: Unremarkable. No occlusion or significant stenosis. No   aneurysm.       Left internal carotid artery: Unremarkable. Intracranial segment is patent   with no significant stenosis. No aneurysm.    Left anterior cerebral artery: Unremarkable. No occlusion or significant   stenosis. No aneurysm.     Left middle cerebral artery: Unremarkable. No occlusion or significant   stenosis. No aneurysm.     Left posterior cerebral artery: Unremarkable. No occlusion or significant   stenosis. No aneurysm.     Left vertebral artery: Unremarkable. No occlusion or significant stenosis. No   aneurysm.       Basilar artery: Unremarkable. No occlusion or significant stenosis. No   aneurysm.       Impression    IMPRESSION:   No acute findings.       =========================  EXAM:    CT Angiography Neck With Contrast     EXAM DATE/TIME:    3/20/2019 9:46 PM     CLINICAL HISTORY:    73 years old, male; Signs and symptoms; Vertigo; Additional info: Vertigo,   persistent, central     TECHNIQUE:    Imaging protocol: Axial computed tomographic angiography images of the neck   with intravenous contrast using CT angiography protocol.     Coronal and sagittal reformatted images were created and reviewed.    3D rendering: MIP reconstructed  images were created and reviewed.    Radiation optimization: All CT scans at this facility use at least one of   these dose optimization techniques: automated exposure control; mA and/or kV   adjustment per patient size (includes targeted exams where dose is matched to   clinical indication); or iterative reconstruction.    Contrast material: Visi 320    Contrast volume: 100 ml    Contrast route: IV     COMPARISON:    CT HEAD W/O CONTRAST 3/20/2019 9:48 PM     FINDINGS:     VASCULATURE:    Right common carotid artery: Normal. No significant stenosis. No dissection or   occlusion.    Right internal carotid artery:  Mild atherosclerotic plaque at the right   carotid bifurcation.  Less than 50% luminal stenosis.   Right external carotid artery: Normal. No occlusion or significant stenosis.    Right vertebral artery: Normal. No significant stenosis. No dissection or   occlusion.      Left common carotid artery: Normal. No significant stenosis. No dissection or   occlusion.    Left internal carotid artery: Normal. Extracranial segment is patent with no   significant stenosis. No dissection or occlusion.    Left external carotid artery: Normal. No occlusion or significant stenosis.    Left vertebral artery: Normal. No significant stenosis. No dissection or   occlusion.       NECK:    Thyroid:  Multiple bilobar low density thyroid nodules, largest measuring 1   cm. No follow-up is recommended.    Bones/joints: No acute fracture.  Degenerative changes.   Soft tissues: Normal. No significant soft tissue swelling.     IMPRESSION:   Mild right carotid atherosclerosis.  Less than 50% luminal stenosis right   internal carotid artery.    No critical or significant stenoses.  No occlusions.      COMMENT:    Consistent with the American College of Radiology's Incidental Findings   Committee Report (J Am Yuriy Radiol 2015): Unless the patient has clinical risk   factors for thyroid cancer or has suspicious findings characterized in  this   report, for patients under 35 years old any thyroid nodule less than 1.0 cm,   and for patients at least 35 years old any thyroid nodule less than 1.5 cm is   highly likely to be benign and does not require follow-up imaging or biopsy.   Patients with limited life expectancy and/or comorbidities do not require   follow up imaging or biopsy for nodules of any size.      Reference per NASCET criteria for degree of stenosis: Mild: less than 50%   stenosis. Moderate: 50-69% stenosis. Severe: 70-94% stenosis. Near occlusion:   95-99% stenosis.     THIS DOCUMENT HAS BEEN ELECTRONICALLY SIGNED BY ISAAK HOLMAN MD   CT Head w/o Contrast    Narrative    EXAM:    CT Head Without Contrast     EXAM DATE/TIME:    3/20/2019 9:46 PM     CLINICAL HISTORY:    73 years old, male; Signs and symptoms; Dizziness; Additional info: States has   had vertigo and was seen by his pcp yesterday. It has gotten worse today. He   also had an ache on his left arm and abdomen, and he states the doctor was not   worried about that. Feels like he is bouncing off the wall when walking, not   dizzy when sitting. States this is the worse it has ever been.     TECHNIQUE:    Imaging protocol: Axial computed tomography images of the head/brain without   contrast.     Coronal and sagittal reformatted images were created and reviewed.    Radiation optimization: All CT scans at this facility use at least one of   these dose optimization techniques: automated exposure control; mA and/or kV   adjustment per patient size (includes targeted exams where dose is matched to   clinical indication); or iterative reconstruction.     COMPARISON:    No relevant prior studies available.     FINDINGS:    Brain:  Mild generalized cerebral atrophy. No acute hemorrhage.    Ventricles: Normal. No ventriculomegaly.    Bones/joints: Unremarkable. No acute fracture.    Sinuses: Visualized sinuses are unremarkable. No acute sinusitis.    Mastoid air cells: Visualized  mastoid air cells are unremarkable. No mastoid   effusion.    Soft tissues: Unremarkable.       Impression    IMPRESSION:   No acute findings.    THIS DOCUMENT HAS BEEN ELECTRONICALLY SIGNED BY ISAAK HOLMAN MD   Troponin I (second draw)   Result Value Ref Range    Troponin I ES <0.030 0.000 - 0.034 ug/L       Medications   lactated ringers BOLUS 1,000 mL (0 mLs Intravenous Stopped 3/20/19 2256)   meclizine (ANTIVERT) tablet 25 mg (25 mg Oral Given 3/20/19 2146)   ondansetron (ZOFRAN) injection 4 mg (4 mg Intravenous Given 3/20/19 2146)   iodixanol (VISIPAQUE 320) injection 100 mL (100 mLs Intravenous Given 3/20/19 2158)   diazepam (VALIUM) tablet 5 mg (5 mg Oral Given 3/20/19 2150)       Assessments & Plan (with Medical Decision Making)   Pt nontoxic in NAD. Heart, lung, bowel sounds normal. Abd soft, nontender to palpation, nondistended. Rightward horizontal nystagmus, otherwise neurologically intact. This appears to be a peripheral cause of vertigo, however pt taken for CT scan. Pt also has s/s concerning for cardiac cause of pain.     Pt had 2 neg troponins. Normal ekg. Normal CXR. Unremarkable cbc, cmp. No concerning findings on CT/CTA head neck.     Pt given meclizine, valium, zofran, fluids. Upon reassessment pt appeared well. A referral placed for PT. Pt to f/u with pcp as needed, return to ED if symptoms worsen, he understood and agreed and was discharged.     Jigar Oneal PA-C        I have reviewed the nursing notes.    I have reviewed the findings, diagnosis, plan and need for follow up with the patient.          Medication List      Started    meclizine 12.5 MG tablet  Commonly known as:  ANTIVERT  25 mg, Oral, 4 TIMES DAILY PRN            Final diagnoses:   Vertigo   Chest pain       3/20/2019   [facility]     Jigar Oneal PA  03/20/19 9912

## 2019-03-21 NOTE — PROGRESS NOTES
1.  Is patient currently taking metformin? y     If NO: Technologist will give the patient normal post CT instructions.     If YES: Technologist will obtain GFR from Lab.    2.  Is GFR is greater than 60? y     If YES: Technologist will give the patient normal post CT instructions.     If NO: Technologist will give the patient METFORMIN post CT instructions.

## 2019-03-21 NOTE — PROGRESS NOTES
1.  Has the patient had a previous reaction to IV contrast? n    2.  Does the patient have kidney disease? n    3.  Is the patient on dialysis? n    If YES to any of these questions, exam will be reviewed with a Radiologist bef

## 2019-03-21 NOTE — ED TRIAGE NOTES
States has had vertigo and was seen by his PCP yesterday.  It has gotten worse today.  He also had an ache on his left arm and abdomen, and he states the doctor was not worried about that.  Feels like he is bouncing off the wall when walking, not dizzy when sitting.  States this is the worse it has ever been.

## 2019-03-22 ENCOUNTER — TELEPHONE (OUTPATIENT)
Dept: INTERNAL MEDICINE | Facility: OTHER | Age: 74
End: 2019-03-22

## 2019-03-22 DIAGNOSIS — R42 VERTIGO: Primary | ICD-10-CM

## 2019-03-22 NOTE — TELEPHONE ENCOUNTER
PT was in ER and they told him they would make sure he got orders for PT for his vertigo--he refuses an ER follow up wants referral/orders for PT--Call on Monday if not today

## 2019-03-25 ENCOUNTER — TELEPHONE (OUTPATIENT)
Dept: INTERNAL MEDICINE | Facility: OTHER | Age: 74
End: 2019-03-25

## 2019-03-25 NOTE — TELEPHONE ENCOUNTER
DJS- Patient is looking for results from his lab and xray he had last week.  He would like a call back, it is ok to leave a message.     Shae Anguiano on 3/25/2019 at 11:15 AM

## 2019-03-26 NOTE — TELEPHONE ENCOUNTER
Left message with wife for patient to call back . Marcella Ghosh LPN ....................3/26/2019  10:38 AM

## 2019-03-27 NOTE — TELEPHONE ENCOUNTER
Spoke with patient after verifying last name and birth date,patient was calling for his lab and x-ray results. Read him the letters mailed out on 3/20., Patient had no further questions.    Rosie White LPN 3/27/2019 10:56 AM

## 2019-04-22 ENCOUNTER — HOSPITAL ENCOUNTER (OUTPATIENT)
Dept: PHYSICAL THERAPY | Facility: OTHER | Age: 74
Setting detail: THERAPIES SERIES
End: 2019-04-22
Attending: FAMILY MEDICINE
Payer: MEDICARE

## 2019-04-22 ENCOUNTER — OFFICE VISIT (OUTPATIENT)
Dept: FAMILY MEDICINE | Facility: OTHER | Age: 74
End: 2019-04-22
Attending: FAMILY MEDICINE
Payer: COMMERCIAL

## 2019-04-22 VITALS
TEMPERATURE: 97.8 F | BODY MASS INDEX: 30.07 KG/M2 | WEIGHT: 192 LBS | SYSTOLIC BLOOD PRESSURE: 122 MMHG | DIASTOLIC BLOOD PRESSURE: 60 MMHG | HEART RATE: 60 BPM | RESPIRATION RATE: 16 BRPM

## 2019-04-22 DIAGNOSIS — H61.23 BILATERAL IMPACTED CERUMEN: ICD-10-CM

## 2019-04-22 DIAGNOSIS — H65.93 OME (OTITIS MEDIA WITH EFFUSION), BILATERAL: ICD-10-CM

## 2019-04-22 DIAGNOSIS — E53.8 LOW SERUM VITAMIN B12: ICD-10-CM

## 2019-04-22 DIAGNOSIS — R42 DIZZINESS: ICD-10-CM

## 2019-04-22 DIAGNOSIS — R42 DIZZINESS: Primary | ICD-10-CM

## 2019-04-22 PROCEDURE — 96372 THER/PROPH/DIAG INJ SC/IM: CPT

## 2019-04-22 PROCEDURE — 25000128 H RX IP 250 OP 636: Performed by: INTERNAL MEDICINE

## 2019-04-22 PROCEDURE — 97161 PT EVAL LOW COMPLEX 20 MIN: CPT | Mod: GP

## 2019-04-22 PROCEDURE — G0463 HOSPITAL OUTPT CLINIC VISIT: HCPCS

## 2019-04-22 PROCEDURE — 99214 OFFICE O/P EST MOD 30 MIN: CPT | Performed by: FAMILY MEDICINE

## 2019-04-22 PROCEDURE — 97112 NEUROMUSCULAR REEDUCATION: CPT | Mod: GP

## 2019-04-22 PROCEDURE — G0463 HOSPITAL OUTPT CLINIC VISIT: HCPCS | Mod: 25

## 2019-04-22 RX ORDER — AMOXICILLIN 875 MG
875 TABLET ORAL 2 TIMES DAILY
Qty: 20 TABLET | Refills: 0 | Status: SHIPPED | OUTPATIENT
Start: 2019-04-22 | End: 2020-02-06

## 2019-04-22 RX ORDER — CYANOCOBALAMIN 1000 UG/ML
INJECTION, SOLUTION INTRAMUSCULAR; SUBCUTANEOUS
Qty: 1 ML | Refills: 20 | Status: SHIPPED | OUTPATIENT
Start: 2019-04-22 | End: 2022-03-31

## 2019-04-22 RX ADMIN — CYANOCOBALAMIN 1000 MCG: 1000 INJECTION, SOLUTION INTRAMUSCULAR at 12:39

## 2019-04-22 ASSESSMENT — PATIENT HEALTH QUESTIONNAIRE - PHQ9: SUM OF ALL RESPONSES TO PHQ QUESTIONS 1-9: 0

## 2019-04-22 ASSESSMENT — PAIN SCALES - GENERAL: PAINLEVEL: MILD PAIN (3)

## 2019-04-22 NOTE — NURSING NOTE
Patient here for vertigo for thepast 2 weeks. He stopped taking the meclizine he says it did not help. Medication Reconciliation: complete.    Kaylee Manning LPN  4/22/2019 9:46 AM

## 2019-04-22 NOTE — PROGRESS NOTES
04/22/19 1500   Quick Adds   Quick Adds Vestibular Eval;Certification   Type of Visit Initial OP PT Evaluation   General Information   Start of Care Date 04/22/19   Referring Physician Dr. Winters   Onset of illness/injury or Date of Surgery 04/19/19   Surgical/Medical history reviewed Yes   Pertinent history of current problem (include personal factors and/or comorbidities that impact the POC) Has had vertigo before, came on Friday pretty strong, had this 2 weeks ago and went ED and they did imaging and didn't find much. That time resolved within a couple days after seeing a chiropractor. Returned Friday worse than before. Getting up from sitting feels all off balance. No true vertigo reported. Dr. Winters cleaned some wax out of his ears and said he had B ear infections and put him on amoxycillin. Has a high pitched ringing in the ears with his heartbeat.    Pertinent Visual History  a little double vision   Diagnostic Tests CT Scan   CT Results unremarkable   Previous/Current Treatment Physical Therapy;Chiropractic   Improvement after PT Significant   Improvement after Chiropractic Tx Moderate   Patient role/Employment history Retired   Living environment House/[facility]   Home/Community Accessibility Comments 1 step   Patient/Family Goals Statement decrease dizziness, improve balance   Fall Risk Screen   Fall screen completed by PT   Have you fallen 2 or more times in the past year? Yes   Have you fallen and had an injury in the past year? No   Fall screen comments slips on ice   Abuse Screen (yes response referral indicated)   Feels Unsafe at Home or Work/School no   Feels Threatened by Someone no   Does Anyone Try to Keep You From Having Contact with Others or Doing Things Outside Your Home? no   Physical Signs of Abuse Present no   Gait Special Tests   Gait Special Tests FUNCTIONAL GAIT ASSESSMENT   Gait Special Tests Functional Gait Assessment Score out of 30   Score out of 30 24   Comments horizontal head  turns, step over box, tandem walk, EC   Balance   Balance Comments veers to left with eyes closed and mildly with head turns   Balance Special Tests   Balance Special Tests Modified CTSIB Conditions   Balance Special Tests Modified CTSIB Conditions   Condition 1, seconds 30 Seconds   Condition 2, seconds 30 Seconds   Condition 4, seconds 30 Seconds   Condition 5, seconds 15 Seconds   Cervicogenic Screen   Neck ROM WNL   Vertebral Artery Test Normal   Vertebral Artery Test Comments recent CT and angiogram clear   Oculomotor Exam   Smooth Pursuit Normal   Saccades Normal   VOR Cancellation Normal   VOR Cancellation Comments mild dizziness   Rapid Head Thrust Normal   Convergence Testing Normal   Infrared Goggle Exam or Frenzel Lense Exam   Vestibular Suppressant in Last 24 Hours? No   Exam completed with Frenzel Lenses   Spontaneous Nystagmus Negative   Gaze Evoked Nystagmus Negative   Head Shake Horizontal Nystagmus Negative   Positional testing Negative   Juancho-Hallpike (right) Negative   Juancho-Hallpike (Left) Negative   HSCC Supine Roll Test (Right) Negative   HSCC Supine Roll Test (Left) Negative   Dynamic Visual Acuity (DVA)   DVA Comments 0 line change   Modality Interventions   Planned Modality Interventions Cryotherapy;Thermotherapy: Hydrocollator Packs   Planned Therapy Interventions   Planned Therapy Interventions balance training;bed mobility training;gait training;joint mobilization;neuromuscular re-education;transfer training;strengthening;stretching;manual therapy;visual perception;ROM   Clinical Impression   Criteria for Skilled Therapeutic Interventions Met yes, treatment indicated   PT Diagnosis impaired balance and vestibular function   Influenced by the following impairments as above   Functional limitations due to impairments transfers, mobility   Clinical Presentation Stable/Uncomplicated   Clinical Decision Making (Complexity) Low complexity   Therapy Frequency 2 times/Week   Predicted Duration of  Therapy Intervention (days/wks) up to 12 weeks   Risk & Benefits of therapy have been explained Yes   Patient, Family & other staff in agreement with plan of care Yes   Clinical Impression Comments Pt demonstrates mild vestibular hypofunction with balance impairment. He would benefit from additional PT services to address balance and decrease fall risk.   Education Assessment   Preferred Learning Style Listening;Demonstration;Pictures/video   Barriers to Learning No barriers   GOALS   PT Eval Goals 1;2   Goal 1   Goal Identifier balance   Goal Description Pt will improve score on condition 5 of mCTSIB to 25 seconds or more for improved vestibular function and balance with transfers and mobility   Target Date 06/03/19   Goal 2   Goal Identifier HEP   Goal Description Pt will be independent in self management with a customized home exercise program.   Target Date 06/17/19   Total Evaluation Time   PT Eval, Low Complexity Minutes (76027) 45

## 2019-04-22 NOTE — PROGRESS NOTES
[facility]        OUTPATIENT PHYSICAL THERAPY FUNCTIONAL EVALUATION  PLAN OF TREATMENT FOR OUTPATIENT REHABILITATION  (COMPLETE FOR INITIAL CLAIMS ONLY)  Patient's Last Name, First Name, M.I.  YOB: 1945  Dajuan Basilio     Provider's Name   [facility]   Medical Record No.  [id number]     Start of Care Date:  04/22/19   Onset Date:  04/19/19   Type:     _X__PT   ____OT  ____SLP Medical Diagnosis:        PT Diagnosis:  impaired balance and vestibular function Visits from SOC:  1                              __________________________________________________________________________________  Plan of Treatment/Functional Goals:  balance training, bed mobility training, gait training, joint mobilization, neuromuscular re-education, transfer training, strengthening, stretching, manual therapy, visual perception, ROM     Cryotherapy, Thermotherapy: Hydrocollator Packs     GOALS  balance  Pt will improve score on condition 5 of mCTSIB to 25 seconds or more for improved vestibular function and balance with transfers and mobility  06/03/19    HEP  Pt will be independent in self management with a customized home exercise program.  06/17/19                    Therapy Frequency:  2 times/Week   Predicted Duration of Therapy Intervention:  up to 12 weeks    Gunjan Callejas, PT                                    I CERTIFY THE NEED FOR THESE SERVICES FURNISHED UNDER        THIS PLAN OF TREATMENT AND WHILE UNDER MY CARE     (Physician co-signature of this document indicates review and certification of the therapy plan).                Certification Date From:   4/22/2019    Certification Date To:   7/15/19    Referring Provider:  Dr. Winters    Initial Assessment  See Epic Evaluation- Start of Care Date: 04/22/19

## 2019-04-24 ASSESSMENT — ENCOUNTER SYMPTOMS
WEAKNESS: 0
PSYCHIATRIC NEGATIVE: 1
CHILLS: 0
DIZZINESS: 1
EYES NEGATIVE: 1
RESPIRATORY NEGATIVE: 1
SPEECH DIFFICULTY: 0
PARESTHESIAS: 0
FEVER: 0
LIGHT-HEADEDNESS: 1

## 2019-04-24 NOTE — PROGRESS NOTES
SUBJECTIVE:   Dajuan Basilio is a 73 year old male who presents to clinic today for the following health issues:  dizziness     Patient arrives here for dizziness.  He reports is been going on for 2 weeks.  He has been given meclizine.  Recently seen in the ER where a CTA of his neck and brain.  This was negative.  He has had on and off cases for the last 2 years.  He also reports he can hear his heartbeat on his left ear.  He has not gotten his vitamin B12 shot yet and is requesting that also.  He feels his balance is off a little bit.  States when he is walking he is always seems to be walking from one side to another down the kwok.        Patient Active Problem List    Diagnosis Date Noted     OME (otitis media with effusion), bilateral 04/22/2019     Priority: Medium     Positive FIT (fecal immunochemical test) 03/19/2019     Priority: Medium     Mixed hyperlipidemia 03/19/2019     Priority: Medium     S/P cardiac cath 5/17/18 06/07/2018     Priority: Medium     Unsteady gait 06/07/2018     Priority: Medium     Stented coronary artery to his LCX and OM1 on 5/17/18 06/07/2018     Priority: Medium     Status post insertion of drug eluting coronary artery stent - x2 stents - 1st OMB - 5/17/2018 - [facility] 05/31/2018     Priority: Medium     Prediabetes 05/31/2018     Priority: Medium     Peripheral polyneuropathy - history of Type 2 Diabetes 05/31/2018     Priority: Medium     Malaise and fatigue 05/31/2018     Priority: Medium     Benign prostatic hyperplasia with weak urinary stream 05/31/2018     Priority: Medium     Abnormal electrocardiogram 04/26/2018     Priority: Medium     Agatston coronary artery calcium score between 200 and 399 04/26/2018     Priority: Medium     Exertional dyspnea 04/20/2018     Priority: Medium     Low serum vitamin B12 04/18/2018     Priority: Medium     Microalbuminuria 01/26/2018     Priority: Medium     Generalized anxiety disorder 06/05/2017     Priority: Medium      Abnormal CT scan, bladder 06/29/2016     Priority: Medium     Moderate episode of recurrent major depressive disorder (H) 06/16/2016     Priority: Medium     GERD (gastroesophageal reflux disease) 05/03/2013     Priority: Medium     Nephrolithiasis 04/23/2012     Priority: Medium     BPH (benign prostatic hyperplasia) 03/27/2012     Priority: Medium     History of diabetes mellitus, type II 02/17/2010     Priority: Medium     Diverticular disease of colon 02/17/2010     Priority: Medium     Pure hypercholesterolemia 02/17/2010     Priority: Medium     Obesity 02/17/2010     Priority: Medium     Panic disorder without agoraphobia 02/17/2010     Priority: Medium     Myalgia and myositis 02/17/2010     Priority: Medium     Past Medical History:   Diagnosis Date     Diverticulosis of intestine without perforation or abscess without bleeding     No Comments Provided     Fatty (change of) liver, 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 Johnson negative.     COLONOSCOPY  01/01/2013 2013,WNL     HERNIA REPAIR  2000     RELEASE CARPAL TUNNEL      2014     VASECTOMY      No Comments Provided     Social History     Social History Narrative    , retired from [employer].  Lives in town.     Current Outpatient Medications   Medication Sig Dispense Refill     amoxicillin (AMOXIL) 875 MG tablet Take 1 tablet (875 mg) by mouth 2 times daily 20 tablet 0      ascorbic acid (VITAMIN C) 500 MG tablet Take 1 tablet by mouth daily. For 60 days       aspirin 81 MG tablet Take 81 mg by mouth daily       blood glucose monitoring (NO BRAND SPECIFIED) test strip Check blood glucose twice daily,  Dx code e11.9. Dispense as covered by patient's insurance. 200 strip 2     Cholecalciferol (D 5000) 5000 UNITS TABS Take 5,000 Units by mouth daily       citalopram (CELEXA) 20 MG tablet TAKE ONE-HALF TABLET BY MOUTH ONCE DAILY 45 tablet 3     clopidogrel (PLAVIX) 75 MG tablet Take 1 tablet (75 mg) by mouth daily 90 tablet 3     cyanocobalamin (CYANOCOBALAMIN) 1000 MCG/ML injection Inject 1 mL (1,000 mcg) into the muscle every 3 to 4 weeks 1 mL 20     FLUZONE HIGH-DOSE 0.5 ML injection ADM 0.5ML IM UTD  0     gabapentin (NEURONTIN) 100 MG capsule TAKE 1 TO 2 CAPSULES BY MOUTH IN THE EVENING AS NEEDED 90 capsule 11     meclizine (ANTIVERT) 12.5 MG tablet Take 2 tablets (25 mg) by mouth 4 times daily as needed for dizziness 30 tablet 0     metFORMIN (GLUCOPHAGE) 500 MG tablet TAKE TWO TABLETS BY MOUTH IN THE MORNING AND ONE AT BEDTIME 270 tablet 3     nitroGLYcerin (NITROSTAT) 0.4 MG sublingual tablet        simvastatin (ZOCOR) 20 MG tablet TAKE ONE-HALF TABLET BY MOUTH AT BEDTIME 45 tablet 3     vitamin B complex with vitamin C (VITAMIN  B COMPLEX) TABS tablet Take 1 tablet by mouth daily Monday, Wednesday, Friday -- 3 days weekly -- make sure it has B12 in tablet -- Dose Reduced 7/11/2018 100 tablet 3     Allergies   Allergen Reactions     Ace Inhibitors Cough     Atorvastatin Calcium      Lipitor     Hmg-Coa-R Inhibitors Muscle Pain (Myalgia)     Higher doses cause myalgias       Review of Systems   Constitutional: Negative for chills and fever.   Eyes: Negative.    Respiratory: Negative.    Cardiovascular: Negative for chest pain.   Genitourinary: Negative.    Neurological: Positive for dizziness and light-headedness. Negative for speech difficulty, weakness and paresthesias.    Psychiatric/Behavioral: Negative.         OBJECTIVE:     /60   Pulse 60   Temp 97.8  F (36.6  C)   Resp 16   Wt 87.1 kg (192 lb)   BMI 30.07 kg/m     Body mass index is 30.07 kg/m .  Physical Exam   Constitutional: He appears well-developed and well-nourished.   HENT:   Both ears were occluded by wax. both TMs were red the right being worse than the left    Eyes: Pupils are equal, round, and reactive to light. Conjunctivae are normal.   Neck: Normal range of motion.   Cardiovascular: Normal rate and regular rhythm.   Pulmonary/Chest: Effort normal and breath sounds normal.   Abdominal: Soft. Bowel sounds are normal.   Musculoskeletal: Normal range of motion.   Fast alternating movement finger-nose-finger all normal   Neurological: He is alert.   Skin: Skin is warm.       none     ASSESSMENT/PLAN:         1. Low serum vitamin B12    - cyanocobalamin (CYANOCOBALAMIN) 1000 MCG/ML injection; Inject 1 mL (1,000 mcg) into the muscle every 3 to 4 weeks  Dispense: 1 mL; Refill: 20    2. OME (otitis media with effusion), bilateral  Start  - amoxicillin (AMOXIL) 875 MG tablet; Take 1 tablet (875 mg) by mouth 2 times daily  Dispense: 20 tablet; Refill: 0    3. Dizziness  Possibly labyrinthitis versus benign positional vertigo if he does not get good relief with the amoxicillin physical therapy  - PHYSICAL THERAPY REFERRAL; Future    4. Bilateral impacted cerumen  Resolved with flushing        Maynor Winters MD  [facility]

## 2019-04-29 ENCOUNTER — TRANSFERRED RECORDS (OUTPATIENT)
Dept: HEALTH INFORMATION MANAGEMENT | Facility: OTHER | Age: 74
End: 2019-04-29

## 2019-05-03 ENCOUNTER — HOSPITAL ENCOUNTER (OUTPATIENT)
Dept: PHYSICAL THERAPY | Facility: OTHER | Age: 74
Setting detail: THERAPIES SERIES
End: 2019-05-03
Attending: FAMILY MEDICINE
Payer: MEDICARE

## 2019-05-03 PROCEDURE — 97112 NEUROMUSCULAR REEDUCATION: CPT | Mod: GP

## 2019-05-23 ENCOUNTER — TRANSFERRED RECORDS (OUTPATIENT)
Dept: HEALTH INFORMATION MANAGEMENT | Facility: OTHER | Age: 74
End: 2019-05-23

## 2019-05-23 NOTE — PROGRESS NOTES
"Nursing Notes:   Britta Guzman LPN  5/28/2019 10:09 AM  Signed  Patient presents to the clinic for lump on the back of the neck over the past month with headaches and neck pain. Patient express concerns about ongoing concerns about vertigo.        Previous A1C is at goal of <8  Lab Results   Component Value Date    A1C 6.2 03/19/2019    A1C 6.2 04/20/2018     Urine microalbumin:creatine: n/a  Foot exam years-declines today  Eye exam 4-29-19    Tobacco User no  Patient is on a daily aspirin  Patient is on a Statin.  Blood pressure today of:     BP Readings from Last 1 Encounters:   04/22/19 122/60      is at the goal of <139/89 for diabetics.    Chief Complaint   Patient presents with     Musculoskeletal Problem       Initial /72 (BP Location: Right arm, Patient Position: Sitting, Cuff Size: Adult Regular)   Pulse 60   Temp 96.8  F (36  C) (Tympanic)   Resp 20   Wt 89.5 kg (197 lb 6 oz)   BMI 30.91 kg/m    Estimated body mass index is 30.91 kg/m  as calculated from the following:    Height as of 3/20/19: 1.702 m (5' 7\").    Weight as of this encounter: 89.5 kg (197 lb 6 oz).  Medication Reconciliation: complete    Britta Guzman LPN        Nursing note reviewed with patient.  Accuracy and completeness verified.   Mr. Basilio is a 74 year old male who:  Patient presents with:  Musculoskeletal Problem      ICD-10-CM    1. Lipoma of neck D17.0    2. Unsteady gait R26.81    3. 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 Arthropathy  M12.9 XR Sacroiliac Joint Inj Bilateral     HPI  Patient comes in with multiple concerns.    He has a lump on the back of his right neck, appears to have lipoma.  No redness, no open areas no drainage.  He has had this for a long time.  Does not bother him.  Minimally tender if he palpates the area.  At this point advised probably lipoma and did not recommend any treatment.  Continue to watch for any changes.    Unsteady gait, " went to physical therapy to evaluate for possible BPPV.  Therapy was not able to help him.  Did not think it was BPPV.  Advised probably some issues with small vessel disease of the brain.  He has some memory loss as well.    We reviewed vascular risk factors.  At this point he is maximally medicated.  No changes advised.  Continue to be careful.  Consider cane or walking stick for stability.    Low back pain, still has pain bilaterally over the sacroiliac joints.  Some other low back pain issues as well.  States if he picks things up he has more pain.  Previously noted to have some sacroiliac joint arthritis.  He would like to get a back injection.  Orders placed.    He will need to hold his Plavix and aspirin for 5 to 7 days before back injection.    Functional Capacity: about 4 METS.   Review of Systems   Constitutional: Positive for fatigue. Negative for chills and fever.   HENT: Negative for congestion and hearing loss.    Eyes: Negative for pain and visual disturbance.   Respiratory: Negative for cough, shortness of breath and wheezing.    Cardiovascular: Negative for chest pain and palpitations.   Gastrointestinal: Negative for abdominal pain, diarrhea, nausea and vomiting.   Endocrine: Negative for cold intolerance and heat intolerance.   Genitourinary: Negative for dysuria and hematuria.   Musculoskeletal: Positive for back pain (Left > right low back pain -- focused over SI joints). Negative for arthralgias and myalgias.   Skin: Negative for pallor.   Allergic/Immunologic: Negative for immunocompromised state.   Neurological: Positive for dizziness and light-headedness.   Hematological: Does not bruise/bleed easily.   Psychiatric/Behavioral: Positive for confusion (+ short term memory loss). Negative for agitation.      NEERAJ:   NEERAJ-7 SCORE 5/31/2018 7/11/2018 5/28/2019   Total Score 0 0 0     PHQ9:  PHQ-9 SCORE 7/11/2018 4/22/2019 5/28/2019   PHQ-9 Total Score 0 0 0       I have personally reviewed the past  medical history, past surgical history, medications, allergies, family and social history as listed below.     Allergies   Allergen Reactions     Ace Inhibitors Cough     Atorvastatin Calcium      Lipitor     Hmg-Coa-R Inhibitors Muscle Pain (Myalgia)     Higher doses cause myalgias       Current Outpatient Medications   Medication Sig Dispense Refill     amoxicillin (AMOXIL) 875 MG tablet Take 1 tablet (875 mg) by mouth 2 times daily 20 tablet 0     ascorbic acid (VITAMIN C) 500 MG tablet Take 1 tablet by mouth daily. For 60 days       aspirin 81 MG tablet Take 81 mg by mouth daily       blood glucose monitoring (NO BRAND SPECIFIED) test strip Check blood glucose twice daily,  Dx code e11.9. Dispense as covered by patient's insurance. 200 strip 2     Cholecalciferol (D 5000) 5000 UNITS TABS Take 5,000 Units by mouth daily       citalopram (CELEXA) 20 MG tablet TAKE ONE-HALF TABLET BY MOUTH ONCE DAILY 45 tablet 3     clopidogrel (PLAVIX) 75 MG tablet Take 1 tablet (75 mg) by mouth daily 90 tablet 3     cyanocobalamin (CYANOCOBALAMIN) 1000 MCG/ML injection Inject 1 mL (1,000 mcg) into the muscle every 3 to 4 weeks 1 mL 20     gabapentin (NEURONTIN) 100 MG capsule TAKE 1 TO 2 CAPSULES BY MOUTH IN THE EVENING AS NEEDED 90 capsule 11     meclizine (ANTIVERT) 12.5 MG tablet Take 2 tablets (25 mg) by mouth 4 times daily as needed for dizziness 30 tablet 0     metFORMIN (GLUCOPHAGE) 500 MG tablet TAKE TWO TABLETS BY MOUTH IN THE MORNING AND ONE AT BEDTIME 270 tablet 3     nitroGLYcerin (NITROSTAT) 0.4 MG sublingual tablet        simvastatin (ZOCOR) 20 MG tablet TAKE ONE-HALF TABLET BY MOUTH AT BEDTIME 45 tablet 3     vitamin B complex with vitamin C (VITAMIN  B COMPLEX) TABS tablet Take 1 tablet by mouth daily Monday, Wednesday, Friday -- 3 days weekly -- make sure it has B12 in tablet -- Dose Reduced 7/11/2018 100 tablet 3        Patient Active Problem List    Diagnosis Date Noted     Lipoma of neck 05/28/2019      Priority: Medium     OME (otitis media with effusion), bilateral 04/22/2019     Priority: Medium     Positive FIT (fecal immunochemical test) 03/19/2019     Priority: Medium     Mixed hyperlipidemia 03/19/2019     Priority: Medium     S/P cardiac cath 5/17/18 06/07/2018     Priority: Medium     Unsteady gait 06/07/2018     Priority: Medium     Stented coronary artery to his LCX and OM1 on 5/17/18 06/07/2018     Priority: Medium     Status post insertion of drug eluting coronary artery stent - x2 stents - 1st B - 5/17/2018 - [facility] 05/31/2018     Priority: Medium     Prediabetes 05/31/2018     Priority: Medium     Peripheral polyneuropathy - history of Type 2 Diabetes 05/31/2018     Priority: Medium     Malaise and fatigue 05/31/2018     Priority: Medium     Benign prostatic hyperplasia with weak urinary stream 05/31/2018     Priority: Medium     Abnormal electrocardiogram 04/26/2018     Priority: Medium     Agatston coronary artery calcium score between 200 and 399 04/26/2018     Priority: Medium     Exertional dyspnea 04/20/2018     Priority: Medium     Low serum vitamin B12 04/18/2018     Priority: Medium     Microalbuminuria 01/26/2018     Priority: Medium     Generalized anxiety disorder 06/05/2017     Priority: Medium     Abnormal CT scan, bladder 06/29/2016     Priority: Medium     Moderate episode of recurrent major depressive disorder (H) 06/16/2016     Priority: Medium     GERD (gastroesophageal reflux disease) 05/03/2013     Priority: Medium     Nephrolithiasis 04/23/2012     Priority: Medium     BPH (benign prostatic hyperplasia) 03/27/2012     Priority: Medium     History of diabetes mellitus, type II 02/17/2010     Priority: Medium     Diverticular disease of colon 02/17/2010     Priority: Medium     Pure hypercholesterolemia 02/17/2010     Priority: Medium     Obesity 02/17/2010     Priority: Medium     Panic disorder without agoraphobia 02/17/2010     Priority: Medium     Myalgia and  myositis 2010     Priority: Medium     Past Medical History:   Diagnosis Date     Diverticulosis of intestine without perforation or abscess without bleeding     No Comments Provided     Fatty (change of) liver, not elsewhere classified     non alcoholic     Fibromyalgia     No Comments Provided     Nodular prostate without lower urinary tract symptoms     ,US confirms benign calcifications     Obesity     No Comments Provided     Panic disorder without agoraphobia     No Comments Provided     Positive colorectal cancer screening using Cologuard test 2018     Past Surgical History:   Procedure Laterality Date     ARTHROPLASTY KNEE      2015     ARTHROSCOPY KNEE      right knee X2     BIOPSY PROSTATE TRANSRECTAL           COLONOSCOPY      ,and UGI [place] normal     COLONOSCOPY      ,and UGI repeat Dr. Johnson negative.     COLONOSCOPY  2013,WNL     COLONOSCOPY  2019    [facility]-no follow up     HERNIA REPAIR  2000     RELEASE CARPAL TUNNEL      2014     VASECTOMY      No Comments Provided     Social History     Socioeconomic History     Marital status:      Spouse name: None     Number of children: None     Years of education: None     Highest education level: None   Occupational History     None   Social Needs     Financial resource strain: None     Food insecurity:     Worry: None     Inability: None     Transportation needs:     Medical: None     Non-medical: None   Tobacco Use     Smoking status: Former Smoker     Types: Cigarettes     Last attempt to quit: 10/25/1983     Years since quittin.6     Smokeless tobacco: Never Used   Substance and Sexual Activity     Alcohol use: No     Drug use: No     Sexual activity: Not Currently     Comment: doctor to address    Lifestyle     Physical activity:     Days per week: None     Minutes per session: None     Stress: None   Relationships     Social connections:     Talks on phone: None     Gets together: None      "Attends [employer] service: None     Active member of club or organization: None     Attends meetings of clubs or organizations: None     Relationship status: None     Intimate partner violence:     Fear of current or ex partner: None     Emotionally abused: None     Physically abused: None     Forced sexual activity: None   Other Topics Concern     Parent/sibling w/ CABG, MI or angioplasty before 65F 55M? Not Asked   Social History Narrative    , retired from [employer].  Lives in town.     Family History   Problem Relation Age of Onset     Cancer Father         Cancer, mesothelioma     Colon Cancer Mother         Cancer-colon,     Genitourinary Problems Mother         Genitourinary Disease,renal failure     Diabetes Mother         Diabetes     Other - See Comments Sister         Renal transplant     Diabetes Type 2  Sister         Diabetes type II     Diabetes Brother         Diabetes       EXAM:   Vitals:    19 0957   BP: 126/72   BP Location: Right arm   Patient Position: Sitting   Cuff Size: Adult Regular   Pulse: 60   Resp: 20   Temp: 96.8  F (36  C)   TempSrc: Tympanic   Weight: 89.5 kg (197 lb 6 oz)       Current Pain Score: No Pain (1)     BP Readings from Last 3 Encounters:   19 126/72   19 122/60   19 146/77      Wt Readings from Last 3 Encounters:   19 89.5 kg (197 lb 6 oz)   19 87.1 kg (192 lb)   19 86.2 kg (190 lb)      Estimated body mass index is 30.91 kg/m  as calculated from the following:    Height as of 3/20/19: 1.702 m (5' 7\").    Weight as of this encounter: 89.5 kg (197 lb 6 oz).     Physical Exam   Constitutional: He appears well-developed and well-nourished. No distress.   HENT:   Head: Normocephalic and atraumatic.   Eyes: Conjunctivae are normal. No scleral icterus.   Neck: Neck supple.   Cardiovascular: Normal rate and regular rhythm.   Pulmonary/Chest: Effort normal.   Abdominal: Soft. There is no tenderness.   Musculoskeletal: He " exhibits no deformity.   Lymphadenopathy:     He has no cervical adenopathy.   Neurological: He is alert.   Skin: Skin is warm and dry. No rash noted. He is not diaphoretic.   Has approximately 1 cm lump on the back of the right neck.  Based on palpation, evaluation, appears to have lipoma.  No surface skin changes noted.  No open sores or redness or drainage.   Psychiatric: He has a normal mood and affect.      Procedures   INVESTIGATIONS:  Results for orders placed or performed during the hospital encounter of 03/20/19   XR Chest Port 1 View    Narrative    EXAM:    XR Chest, 1 View     EXAM DATE/TIME:    3/20/2019 8:36 PM     CLINICAL HISTORY:    73 years old, male; Pain; Chest pain; Type not specified     TECHNIQUE:    Imaging protocol: XR of the chest, 1 view.     COMPARISON:    CR Chest 3/19/2018 1:42 PM     FINDINGS:    Lungs:  No acute infiltrate or consolidation. Small chronic scarring or   subsegmental atelectasis in the left lung base.    Pleural space: Unremarkable. No pleural effusion. No pneumothorax.    Heart/Mediastinum: Unremarkable. No cardiomegaly.    diaphragm:  Chronic eventration of the right hemidiaphragm.    Bones/joints:  Degenerative changes.       Impression    IMPRESSION:   No acute findings.    THIS DOCUMENT HAS BEEN ELECTRONICALLY SIGNED BY ISAAK HOLMAN MD   CTA Head Neck with Contrast    Narrative    EXAM:    CT Angiography Head With Contrast     EXAM DATE/TIME:    3/20/2019 9:46 PM     CLINICAL HISTORY:    73 years old, male; Signs and symptoms; Vertigo; Additional info: Vertigo,   persistent, central     TECHNIQUE:    Imaging protocol: Axial computed tomographic angiography images of the head   with intravenous contrast using CT angiography protocol.     Coronal and sagittal reformatted images were created and reviewed.    3D rendering: MIP reconstructed images were created and reviewed.    Radiation optimization: All CT scans at this facility use at least one of   these dose  optimization techniques: automated exposure control; mA and/or kV   adjustment per patient size (includes targeted exams where dose is matched to   clinical indication); or iterative reconstruction.    Contrast material: Visi 320    Contrast volume: 100 ml    Contrast route: IV     COMPARISON:    CT HEAD W/O CONTRAST 3/20/2019 9:48 PM     FINDINGS:      Right internal carotid artery: Unremarkable. Intracranial segment is patent   with no significant stenosis. No aneurysm.    Right anterior cerebral artery: Unremarkable. No occlusion or significant   stenosis. No aneurysm.     Right middle cerebral artery: Unremarkable. No occlusion or significant   stenosis. No aneurysm.     Right posterior cerebral artery: Unremarkable. No occlusion or significant   stenosis. No aneurysm.     Right vertebral artery: Unremarkable. No occlusion or significant stenosis. No   aneurysm.       Left internal carotid artery: Unremarkable. Intracranial segment is patent   with no significant stenosis. No aneurysm.    Left anterior cerebral artery: Unremarkable. No occlusion or significant   stenosis. No aneurysm.     Left middle cerebral artery: Unremarkable. No occlusion or significant   stenosis. No aneurysm.     Left posterior cerebral artery: Unremarkable. No occlusion or significant   stenosis. No aneurysm.     Left vertebral artery: Unremarkable. No occlusion or significant stenosis. No   aneurysm.       Basilar artery: Unremarkable. No occlusion or significant stenosis. No   aneurysm.       Impression    IMPRESSION:   No acute findings.       =========================  EXAM:    CT Angiography Neck With Contrast     EXAM DATE/TIME:    3/20/2019 9:46 PM     CLINICAL HISTORY:    73 years old, male; Signs and symptoms; Vertigo; Additional info: Vertigo,   persistent, central     TECHNIQUE:    Imaging protocol: Axial computed tomographic angiography images of the neck   with intravenous contrast using CT angiography protocol.     Coronal  and sagittal reformatted images were created and reviewed.    3D rendering: MIP reconstructed images were created and reviewed.    Radiation optimization: All CT scans at this facility use at least one of   these dose optimization techniques: automated exposure control; mA and/or kV   adjustment per patient size (includes targeted exams where dose is matched to   clinical indication); or iterative reconstruction.    Contrast material: Visi 320    Contrast volume: 100 ml    Contrast route: IV     COMPARISON:    CT HEAD W/O CONTRAST 3/20/2019 9:48 PM     FINDINGS:     VASCULATURE:    Right common carotid artery: Normal. No significant stenosis. No dissection or   occlusion.    Right internal carotid artery:  Mild atherosclerotic plaque at the right   carotid bifurcation.  Less than 50% luminal stenosis.   Right external carotid artery: Normal. No occlusion or significant stenosis.    Right vertebral artery: Normal. No significant stenosis. No dissection or   occlusion.      Left common carotid artery: Normal. No significant stenosis. No dissection or   occlusion.    Left internal carotid artery: Normal. Extracranial segment is patent with no   significant stenosis. No dissection or occlusion.    Left external carotid artery: Normal. No occlusion or significant stenosis.    Left vertebral artery: Normal. No significant stenosis. No dissection or   occlusion.       NECK:    Thyroid:  Multiple bilobar low density thyroid nodules, largest measuring 1   cm. No follow-up is recommended.    Bones/joints: No acute fracture.  Degenerative changes.   Soft tissues: Normal. No significant soft tissue swelling.     IMPRESSION:   Mild right carotid atherosclerosis.  Less than 50% luminal stenosis right   internal carotid artery.    No critical or significant stenoses.  No occlusions.      COMMENT:    Consistent with the American College of Radiology's Incidental Findings   Committee Report (J Am Yuriy Radiol 2015): Unless the  patient has clinical risk   factors for thyroid cancer or has suspicious findings characterized in this   report, for patients under 35 years old any thyroid nodule less than 1.0 cm,   and for patients at least 35 years old any thyroid nodule less than 1.5 cm is   highly likely to be benign and does not require follow-up imaging or biopsy.   Patients with limited life expectancy and/or comorbidities do not require   follow up imaging or biopsy for nodules of any size.      Reference per NASCET criteria for degree of stenosis: Mild: less than 50%   stenosis. Moderate: 50-69% stenosis. Severe: 70-94% stenosis. Near occlusion:   95-99% stenosis.     THIS DOCUMENT HAS BEEN ELECTRONICALLY SIGNED BY ISAAK HOLMAN MD   CT Head w/o Contrast    Narrative    EXAM:    CT Head Without Contrast     EXAM DATE/TIME:    3/20/2019 9:46 PM     CLINICAL HISTORY:    73 years old, male; Signs and symptoms; Dizziness; Additional info: States has   had vertigo and was seen by his pcp yesterday. It has gotten worse today. He   also had an ache on his left arm and abdomen, and he states the doctor was not   worried about that. Feels like he is bouncing off the wall when walking, not   dizzy when sitting. States this is the worse it has ever been.     TECHNIQUE:    Imaging protocol: Axial computed tomography images of the head/brain without   contrast.     Coronal and sagittal reformatted images were created and reviewed.    Radiation optimization: All CT scans at this facility use at least one of   these dose optimization techniques: automated exposure control; mA and/or kV   adjustment per patient size (includes targeted exams where dose is matched to   clinical indication); or iterative reconstruction.     COMPARISON:    No relevant prior studies available.     FINDINGS:    Brain:  Mild generalized cerebral atrophy. No acute hemorrhage.    Ventricles: Normal. No ventriculomegaly.    Bones/joints: Unremarkable. No acute fracture.     Sinuses: Visualized sinuses are unremarkable. No acute sinusitis.    Mastoid air cells: Visualized mastoid air cells are unremarkable. No mastoid   effusion.    Soft tissues: Unremarkable.       Impression    IMPRESSION:   No acute findings.    THIS DOCUMENT HAS BEEN ELECTRONICALLY SIGNED BY ISAAK HOLMAN MD   CBC with platelets differential   Result Value Ref Range    WBC 6.0 4.0 - 11.0 10e9/L    RBC Count 4.70 4.4 - 5.9 10e12/L    Hemoglobin 14.1 13.3 - 17.7 g/dL    Hematocrit 42.8 40.0 - 53.0 %    MCV 91 78 - 100 fl    MCH 30.0 26.5 - 33.0 pg    MCHC 32.9 31.5 - 36.5 g/dL    RDW 12.7 10.0 - 15.0 %    Platelet Count 200 150 - 450 10e9/L    Diff Method Automated Method     % Neutrophils 62.3 %    % Lymphocytes 24.2 %    % Monocytes 9.6 %    % Eosinophils 3.2 %    % Basophils 0.5 %    % Immature Granulocytes 0.2 %    Absolute Neutrophil 3.7 1.6 - 8.3 10e9/L    Absolute Lymphocytes 1.4 0.8 - 5.3 10e9/L    Absolute Monocytes 0.6 0.0 - 1.3 10e9/L    Absolute Eosinophils 0.2 0.0 - 0.7 10e9/L    Absolute Basophils 0.0 0.0 - 0.2 10e9/L    Abs Immature Granulocytes 0.0 0 - 0.4 10e9/L   Troponin I   Result Value Ref Range    Troponin I ES <0.030 0.000 - 0.034 ug/L   INR   Result Value Ref Range    INR 0.90 0 - 1.3   Partial thromboplastin time   Result Value Ref Range    PTT 34 29 - 50 sec   Comprehensive metabolic panel   Result Value Ref Range    Sodium 138 134 - 144 mmol/L    Potassium 3.9 3.5 - 5.1 mmol/L    Chloride 102 98 - 107 mmol/L    Carbon Dioxide 29 21 - 31 mmol/L    Anion Gap 7 3 - 14 mmol/L    Glucose 144 (H) 70 - 105 mg/dL    Urea Nitrogen 16 7 - 25 mg/dL    Creatinine 0.95 0.70 - 1.30 mg/dL    GFR Estimate 77 >60 mL/min/[1.73_m2]    GFR Estimate If Black >90 >60 mL/min/[1.73_m2]    Calcium 9.3 8.6 - 10.3 mg/dL    Bilirubin Total 0.3 0.3 - 1.0 mg/dL    Albumin 4.3 3.5 - 5.7 g/dL    Protein Total 7.1 6.4 - 8.9 g/dL    Alkaline Phosphatase 82 34 - 104 U/L    ALT 10 7 - 52 U/L    AST 11 (L) 13 - 39 U/L    Troponin I (second draw)   Result Value Ref Range    Troponin I ES <0.030 0.000 - 0.034 ug/L   EKG 12 lead    Narrative    EKG Interpretation:    Rhythm: Normal Sinus   Rate: 62  Axis: Possibly leftward  QRS interval: Normal  Conduction: First-degree AV block  ST Segments: ST flattening in the anterior and inferior leads  MI: None  QTc interval: Normal  APC's Present: No  VPC's Present: No    Impression: Abnormal EKG.  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 (H)        Relevant Orders    XR Sacroiliac Joint Inj Bilateral    Arthropathy         Relevant Orders    XR Sacroiliac Joint Inj Bilateral        reviewed diet, exercise and weight control, cardiovascular risk and specific lipid/LDL goals reviewed, specific diabetic recommendations low cholesterol diet, weight control and daily exercise discussed, use of aspirin to prevent MI and TIA's discussed  -- Expected clinical course discussed    -- Medications and their side effects discussed    The 10-year ASCVD risk score (Arpin ANTHONY Jr., et al., 2013) is: 41.9%    Values used to calculate the score:      Age: 74 years      Sex: Male      Is Non- : No      Diabetic: Yes      Tobacco smoker: No      Systolic Blood Pressure: 126 mmHg      Is BP treated: No      HDL Cholesterol: 41 mg/dL      Total Cholesterol: 138 mg/dL    Patient Instructions   1. Lipoma of neck  -- benign. No treatment needed.     2. Unsteady gait  -- possibly from small brain blood vessel closing up....     Only treatment is risk factor reduction.  Can be done to reverse the injury.    Continue good blood pressure control,  cholesterol control, aspirin and Plavix, diabetes control.  No tobacco use.    Cannot modify the aging process which is the last confounding issue.    To help with weight loss and improve blood sugar control....    -- Try to avoid Carbohydrates as much as possible -- breads, pasta, baked goods, cakes, oatmeal, cold cereal, potatoes.   These are turned to sugar in one metabolic conversion, cause insulin secretion and increased fat deposition / weight gain.      -- Eat more lean meats, proteins, eggs, nuts, vegetables.       3. 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 Arthropathy   - XR Sacroiliac Joint Inj Bilateral; Future -- --will need to hold aspirin and plavix for 5 to 7 days  - they will call with date/time of appointment.        Annual follow-up in Mid March 2020 -- before running out of medications.     Return as needed for follow-up with Dr. Pickens.    Clinic : [phone]  Appointment line: [phone]     Cm Pickens MD  Internal Medicine  [facility]     Portions of this note were dictated using speech recognition software. The note has been proofread but errors in the text may have been overlooked. Please contact me if there are any concerns regarding the accuracy of the dictation.

## 2019-05-28 ENCOUNTER — OFFICE VISIT (OUTPATIENT)
Dept: INTERNAL MEDICINE | Facility: OTHER | Age: 74
End: 2019-05-28
Attending: INTERNAL MEDICINE
Payer: COMMERCIAL

## 2019-05-28 VITALS
WEIGHT: 197.38 LBS | RESPIRATION RATE: 20 BRPM | TEMPERATURE: 96.8 F | HEART RATE: 60 BPM | SYSTOLIC BLOOD PRESSURE: 126 MMHG | BODY MASS INDEX: 30.91 KG/M2 | DIASTOLIC BLOOD PRESSURE: 72 MMHG

## 2019-05-28 DIAGNOSIS — M46.1 ARTHRITIS OF SACROILIAC JOINT OF BOTH SIDES (H): ICD-10-CM

## 2019-05-28 DIAGNOSIS — M12.9 ARTHROPATHY: ICD-10-CM

## 2019-05-28 DIAGNOSIS — D17.0 LIPOMA OF NECK: Primary | ICD-10-CM

## 2019-05-28 DIAGNOSIS — R26.81 UNSTEADY GAIT: ICD-10-CM

## 2019-05-28 PROCEDURE — G0463 HOSPITAL OUTPT CLINIC VISIT: HCPCS

## 2019-05-28 PROCEDURE — 99214 OFFICE O/P EST MOD 30 MIN: CPT | Performed by: INTERNAL MEDICINE

## 2019-05-28 ASSESSMENT — ENCOUNTER SYMPTOMS
VOMITING: 0
DYSURIA: 0
EYE PAIN: 0
LIGHT-HEADEDNESS: 1
DIARRHEA: 0
BACK PAIN: 1
ABDOMINAL PAIN: 0
HEMATURIA: 0
FEVER: 0
ARTHRALGIAS: 0
CONFUSION: 1
NAUSEA: 0
SHORTNESS OF BREATH: 0
FATIGUE: 1
COUGH: 0
MYALGIAS: 0
PALPITATIONS: 0
DIZZINESS: 1
BRUISES/BLEEDS EASILY: 0
AGITATION: 0
CHILLS: 0
WHEEZING: 0

## 2019-05-28 ASSESSMENT — ANXIETY QUESTIONNAIRES
7. FEELING AFRAID AS IF SOMETHING AWFUL MIGHT HAPPEN: NOT AT ALL
5. BEING SO RESTLESS THAT IT IS HARD TO SIT STILL: NOT AT ALL
2. NOT BEING ABLE TO STOP OR CONTROL WORRYING: NOT AT ALL
6. BECOMING EASILY ANNOYED OR IRRITABLE: NOT AT ALL
IF YOU CHECKED OFF ANY PROBLEMS ON THIS QUESTIONNAIRE, HOW DIFFICULT HAVE THESE PROBLEMS MADE IT FOR YOU TO DO YOUR WORK, TAKE CARE OF THINGS AT HOME, OR GET ALONG WITH OTHER PEOPLE: NOT DIFFICULT AT ALL
3. WORRYING TOO MUCH ABOUT DIFFERENT THINGS: NOT AT ALL
GAD7 TOTAL SCORE: 0
1. FEELING NERVOUS, ANXIOUS, OR ON EDGE: NOT AT ALL

## 2019-05-28 ASSESSMENT — PATIENT HEALTH QUESTIONNAIRE - PHQ9
SUM OF ALL RESPONSES TO PHQ QUESTIONS 1-9: 0
5. POOR APPETITE OR OVEREATING: NOT AT ALL

## 2019-05-28 ASSESSMENT — PAIN SCALES - GENERAL: PAINLEVEL: NO PAIN (1)

## 2019-05-28 NOTE — PATIENT INSTRUCTIONS
1. Lipoma of neck  -- benign. No treatment needed.     2. Unsteady gait  -- possibly from small brain blood vessel closing up....     Only treatment is risk factor reduction.  Can be done to reverse the injury.    Continue good blood pressure control, cholesterol control, aspirin and Plavix, diabetes control.  No tobacco use.    Cannot modify the aging process which is the last confounding issue.    To help with weight loss and improve blood sugar control....    -- Try to avoid Carbohydrates as much as possible -- breads, pasta, baked goods, cakes, oatmeal, cold cereal, potatoes.   These are turned to sugar in one metabolic conversion, cause insulin secretion and increased fat deposition / weight gain.      -- Eat more lean meats, proteins, eggs, nuts, vegetables.       3. 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 Arthropathy   - XR Sacroiliac Joint Inj Bilateral; Future -- --will need to hold aspirin and plavix for 5 to 7 days  - they will call with date/time of appointment.        Annual follow-up in Mid March 2020 -- before running out of medications.     Return as needed for follow-up with Dr. Pickens.    Clinic : [phone]  Appointment line: [phone]

## 2019-05-28 NOTE — NURSING NOTE
"Patient presents to the clinic for lump on the back of the neck over the past month with headaches and neck pain. Patient express concerns about ongoing concerns about vertigo.        Previous A1C is at goal of <8  Lab Results   Component Value Date    A1C 6.2 03/19/2019    A1C 6.2 04/20/2018     Urine microalbumin:creatine: n/a  Foot exam years-declines today  Eye exam 4-29-19    Tobacco User no  Patient is on a daily aspirin  Patient is on a Statin.  Blood pressure today of:     BP Readings from Last 1 Encounters:   04/22/19 122/60      is at the goal of <139/89 for diabetics.    Chief Complaint   Patient presents with     Musculoskeletal Problem       Initial /72 (BP Location: Right arm, Patient Position: Sitting, Cuff Size: Adult Regular)   Pulse 60   Temp 96.8  F (36  C) (Tympanic)   Resp 20   Wt 89.5 kg (197 lb 6 oz)   BMI 30.91 kg/m   Estimated body mass index is 30.91 kg/m  as calculated from the following:    Height as of 3/20/19: 1.702 m (5' 7\").    Weight as of this encounter: 89.5 kg (197 lb 6 oz).  Medication Reconciliation: complete    Britta Guzman LPN        "

## 2019-05-29 ASSESSMENT — ANXIETY QUESTIONNAIRES: GAD7 TOTAL SCORE: 0

## 2019-06-04 ENCOUNTER — HOSPITAL ENCOUNTER (OUTPATIENT)
Dept: GENERAL RADIOLOGY | Facility: OTHER | Age: 74
Discharge: HOME | End: 2019-06-04
Attending: INTERNAL MEDICINE | Admitting: INTERNAL MEDICINE
Payer: MEDICARE

## 2019-06-04 DIAGNOSIS — M46.1 ARTHRITIS OF SACROILIAC JOINT OF BOTH SIDES (H): ICD-10-CM

## 2019-06-04 DIAGNOSIS — M12.9 ARTHROPATHY: ICD-10-CM

## 2019-06-04 PROCEDURE — 25500064 ZZH RX 255 OP 636: Performed by: RADIOLOGY

## 2019-06-04 PROCEDURE — 25000125 ZZHC RX 250: Performed by: RADIOLOGY

## 2019-06-04 PROCEDURE — 25000128 H RX IP 250 OP 636: Performed by: RADIOLOGY

## 2019-06-04 PROCEDURE — 27096 INJECT SACROILIAC JOINT: CPT | Mod: 50

## 2019-06-04 RX ORDER — LIDOCAINE HYDROCHLORIDE 10 MG/ML
2 INJECTION, SOLUTION INFILTRATION; PERINEURAL ONCE
Status: COMPLETED | OUTPATIENT
Start: 2019-06-04 | End: 2019-06-04

## 2019-06-04 RX ORDER — TRIAMCINOLONE ACETONIDE 40 MG/ML
80 INJECTION, SUSPENSION INTRA-ARTICULAR; INTRAMUSCULAR ONCE
Status: COMPLETED | OUTPATIENT
Start: 2019-06-04 | End: 2019-06-04

## 2019-06-04 RX ORDER — BUPIVACAINE HYDROCHLORIDE 5 MG/ML
6 INJECTION, SOLUTION EPIDURAL; INTRACAUDAL ONCE
Status: COMPLETED | OUTPATIENT
Start: 2019-06-04 | End: 2019-06-04

## 2019-06-04 RX ADMIN — BUPIVACAINE HYDROCHLORIDE 6 ML: 5 INJECTION, SOLUTION EPIDURAL; INTRACAUDAL; PERINEURAL at 09:43

## 2019-06-04 RX ADMIN — TRIAMCINOLONE ACETONIDE 80 MG: 40 INJECTION, SUSPENSION INTRA-ARTICULAR; INTRAMUSCULAR at 09:43

## 2019-06-04 RX ADMIN — IOHEXOL 2 ML: 240 INJECTION, SOLUTION INTRATHECAL; INTRAVASCULAR; INTRAVENOUS; ORAL at 09:43

## 2019-06-04 RX ADMIN — LIDOCAINE HYDROCHLORIDE 2 ML: 10 INJECTION, SOLUTION INFILTRATION; PERINEURAL at 09:43

## 2019-06-25 ENCOUNTER — TELEPHONE (OUTPATIENT)
Dept: INTERNAL MEDICINE | Facility: OTHER | Age: 74
End: 2019-06-25

## 2019-06-25 DIAGNOSIS — G89.29 CHRONIC BILATERAL LOW BACK PAIN WITH LEFT-SIDED SCIATICA: Primary | ICD-10-CM

## 2019-06-25 DIAGNOSIS — M54.42 CHRONIC BILATERAL LOW BACK PAIN WITH LEFT-SIDED SCIATICA: Primary | ICD-10-CM

## 2019-06-25 NOTE — TELEPHONE ENCOUNTER
Message from [facility] 4 days ago- Britta Guzman LPN 6/25/2019 9:29 AM     Patient called and stated he didn't get any relief from his Bilat SI joint injections done 6.4.19. He is interested in another injection. I let patient know I would send you a message. If you did want him to have a Lumbar HARSH, we would need an Lumbar MRI of his low back.

## 2019-06-25 NOTE — TELEPHONE ENCOUNTER
1. Chronic bilateral low back pain with left-sided sciatica    Needs lumbar MRI and back injection, both ordered.    - MR Lumbar Spine w/o Contrast; Future  - XR Lumbar Epidural Injection Incl Imaging; Future     - they will call with date/time of appointment.        --Patient will need to hold his aspirin and Plavix prior to back injection.    Cm Pickens MD

## 2019-06-25 NOTE — TELEPHONE ENCOUNTER
[facility] confirm they received MRI orders and will contact patient.      Britta Guzman LPN 6/25/2019 10:56 AM

## 2019-07-02 NOTE — PROGRESS NOTES
Outpatient Physical Therapy Discharge Note     Patient: Dajuan Basilio  : 1945    Beginning/End Dates of Reporting Period:  19 to 2019    Referring Provider: Dr. Winters    Therapy Diagnosis: impaired balance and vestibular function     Client Self Report: Dajuan reports he's not quite as unsteady as he was. Has been trying to do his exercises morning and night. Finished his antibiotic for his ear infection and got put on a steriod for his dry eyes. Has some loss in acuity due to cataracts. States dizziness is not there anymore but feels off balance more with walking than getting up from sitting now.    Objective Measurements:         Outcome Measures (most recent score):      Goals:  Goal Identifier balance   Goal Description Pt will improve score on condition 5 of mCTSIB to 25 seconds or more for improved vestibular function and balance with transfers and mobility   Target Date 19   Date Met      Progress:     Goal Identifier HEP   Goal Description Pt will be independent in self management with a customized home exercise program.   Target Date 19   Date Met      Progress:       Progress Toward Goals:   Not assessed this period.    Plan:  Discharge from therapy.    Discharge:    Reason for Discharge: Patient has failed to schedule further appointments.    Equipment Issued: n/a    Discharge Plan: Patient to continue home program.

## 2019-07-02 NOTE — ADDENDUM NOTE
Encounter addended by: Gunjan Callejas, PT on: 7/2/2019 2:36 PM   Actions taken: Episode resolved, Sign clinical note

## 2019-07-26 DIAGNOSIS — E11.21 TYPE 2 DIABETES MELLITUS WITH DIABETIC NEPHROPATHY, WITHOUT LONG-TERM CURRENT USE OF INSULIN (H): ICD-10-CM

## 2019-07-31 ENCOUNTER — ALLIED HEALTH/NURSE VISIT (OUTPATIENT)
Dept: FAMILY MEDICINE | Facility: OTHER | Age: 74
End: 2019-07-31
Attending: INTERNAL MEDICINE
Payer: MEDICARE

## 2019-07-31 DIAGNOSIS — E53.8 LOW SERUM VITAMIN B12: Primary | ICD-10-CM

## 2019-07-31 PROCEDURE — 25000128 H RX IP 250 OP 636: Performed by: INTERNAL MEDICINE

## 2019-07-31 PROCEDURE — 96372 THER/PROPH/DIAG INJ SC/IM: CPT

## 2019-07-31 RX ADMIN — CYANOCOBALAMIN 1000 MCG: 1000 INJECTION, SOLUTION INTRAMUSCULAR at 14:51

## 2019-07-31 NOTE — PROGRESS NOTES
Verified name and date of birth .(B12 shot ) administered as ordered . Patient reports no concerns with previous injections . Patient  instructed  to wait 15 min post injection in the lobby and to report any symptoms of a reaction to nursing . Pt left ambulatory.  Elizabeth Renee RN on 7/31/2019 at 2:51 PM

## 2019-08-30 DIAGNOSIS — E11.21 TYPE 2 DIABETES MELLITUS WITH DIABETIC NEPHROPATHY, WITHOUT LONG-TERM CURRENT USE OF INSULIN (H): ICD-10-CM

## 2019-08-30 DIAGNOSIS — E11.42 DIABETIC PERIPHERAL NEUROPATHY (H): ICD-10-CM

## 2019-09-05 RX ORDER — GABAPENTIN 100 MG/1
CAPSULE ORAL
Qty: 180 CAPSULE | Refills: 3 | Status: SHIPPED | OUTPATIENT
Start: 2019-09-05 | End: 2020-02-06

## 2019-09-05 NOTE — TELEPHONE ENCOUNTER
Routing refill request to provider for review/approval because:  Drug not on the [facility] refill protocol     Jacklyn Vazquez RN on 9/5/2019 at 3:49 PM

## 2019-11-26 ENCOUNTER — ALLIED HEALTH/NURSE VISIT (OUTPATIENT)
Dept: FAMILY MEDICINE | Facility: OTHER | Age: 74
End: 2019-11-26
Attending: INTERNAL MEDICINE
Payer: MEDICARE

## 2019-11-26 DIAGNOSIS — E53.8 LOW SERUM VITAMIN B12: Primary | ICD-10-CM

## 2019-11-26 PROCEDURE — 96372 THER/PROPH/DIAG INJ SC/IM: CPT

## 2019-11-26 PROCEDURE — 25000128 H RX IP 250 OP 636: Performed by: INTERNAL MEDICINE

## 2019-11-26 RX ORDER — CYANOCOBALAMIN 1000 UG/ML
1000 INJECTION, SOLUTION INTRAMUSCULAR; SUBCUTANEOUS CONTINUOUS PRN
Status: ACTIVE | OUTPATIENT
Start: 2019-11-26 | End: 2020-11-25

## 2019-11-26 RX ADMIN — CYANOCOBALAMIN 1000 MCG: 1000 INJECTION, SOLUTION INTRAMUSCULAR at 15:05

## 2019-11-26 NOTE — Clinical Note
Patient scheduled appointment for this afternoon to receive B 12 injection. Order . Order denisha'd up for consideration. Thank you. Angeline PRINGLEN, RN on 2019 at 10:05 AM

## 2019-11-26 NOTE — PROGRESS NOTES
Order renewal required for Vitamin B 12 injections. Order denisha'd up. Will route to PCP for review and consideration.       Angeline PRINGLEN, RN on 11/26/2019 at 10:03 AM

## 2019-11-26 NOTE — PROGRESS NOTES
Verified patient's first and last name, and . Patient stated reason for visit today is to receive B 12. Patient denied adverse reactions and concerns with previous injections. Vitamin B 12 prepared and administered IM as previously ordered by provider. Administration of medication documented in MAR (see MAR for further information regarding dose, lot #, NDC #, expiration date). Patient left clinic room ambulatory.       Angeline BOTELLO, RN on 2019 at 3:05 PM

## 2019-12-11 ENCOUNTER — ALLIED HEALTH/NURSE VISIT (OUTPATIENT)
Dept: FAMILY MEDICINE | Facility: OTHER | Age: 74
End: 2019-12-11
Attending: INTERNAL MEDICINE
Payer: MEDICARE

## 2019-12-11 DIAGNOSIS — E53.8 VITAMIN B12 DEFICIENCY: Primary | ICD-10-CM

## 2019-12-11 PROCEDURE — 96372 THER/PROPH/DIAG INJ SC/IM: CPT

## 2019-12-11 PROCEDURE — 25000128 H RX IP 250 OP 636: Performed by: INTERNAL MEDICINE

## 2019-12-11 RX ADMIN — CYANOCOBALAMIN 1000 MCG: 1000 INJECTION, SOLUTION INTRAMUSCULAR at 11:01

## 2019-12-11 NOTE — NURSING NOTE
Clinic Administered Medication Documentation    MEDICATION LIST:   Injectable Medication Documentation    Patient was given Cyanocobalamin (B-12). Prior to medication administration, verified patients identity using patient s name and date of birth. Please see MAR and medication order for additional information. Patient instructed to remain in clinic for 15 minutes.      Was entire vial of medication used? Yes  Vial/Syringe: Syringe  Expiration Date:  01/2021  Was this medication supplied by the patient? No       Britta Guzman LPN 12/11/2019 11:40 AM

## 2020-01-06 ENCOUNTER — ALLIED HEALTH/NURSE VISIT (OUTPATIENT)
Dept: FAMILY MEDICINE | Facility: OTHER | Age: 75
End: 2020-01-06
Attending: INTERNAL MEDICINE
Payer: MEDICARE

## 2020-01-06 DIAGNOSIS — E53.8 VITAMIN B12 DEFICIENCY: Primary | ICD-10-CM

## 2020-01-06 PROCEDURE — 25000128 H RX IP 250 OP 636: Performed by: INTERNAL MEDICINE

## 2020-01-06 PROCEDURE — 96372 THER/PROPH/DIAG INJ SC/IM: CPT

## 2020-01-06 RX ADMIN — CYANOCOBALAMIN 1000 MCG: 1000 INJECTION, SOLUTION INTRAMUSCULAR at 09:10

## 2020-01-06 NOTE — PROGRESS NOTES
Verified patient's first and last name, and . Patient stated reason for visit today is to receive Vitamin B 12. Patient denied any concerns with previous injections. Vitamin B 12 prepared and administered IM into Right Deltoid as ordered. Administration of medication documented in MAR (see MAR for further information regarding dose, lot #, NDC #, expiration date). Patient encouraged to wait in lobby for 15 minutes post-injection and notify staff immediately of any reaction.     Marly PRINGLEN, RN on 2020 at 9:03 AM

## 2020-01-15 ENCOUNTER — HOSPITAL ENCOUNTER (OUTPATIENT)
Dept: GENERAL RADIOLOGY | Facility: OTHER | Age: 75
End: 2020-01-15
Attending: INTERNAL MEDICINE
Payer: MEDICARE

## 2020-01-15 ENCOUNTER — HOSPITAL ENCOUNTER (OUTPATIENT)
Dept: MRI IMAGING | Facility: OTHER | Age: 75
Discharge: HOME | End: 2020-01-15
Attending: INTERNAL MEDICINE | Admitting: INTERNAL MEDICINE
Payer: MEDICARE

## 2020-01-15 DIAGNOSIS — M54.42 CHRONIC BILATERAL LOW BACK PAIN WITH LEFT-SIDED SCIATICA: ICD-10-CM

## 2020-01-15 DIAGNOSIS — G89.29 CHRONIC BILATERAL LOW BACK PAIN WITH LEFT-SIDED SCIATICA: ICD-10-CM

## 2020-01-15 PROCEDURE — 72148 MRI LUMBAR SPINE W/O DYE: CPT

## 2020-01-15 PROCEDURE — 62323 NJX INTERLAMINAR LMBR/SAC: CPT

## 2020-01-15 PROCEDURE — 25000125 ZZHC RX 250: Performed by: RADIOLOGY

## 2020-01-15 PROCEDURE — 25000128 H RX IP 250 OP 636: Performed by: RADIOLOGY

## 2020-01-15 PROCEDURE — 25500064 ZZH RX 255 OP 636: Performed by: RADIOLOGY

## 2020-01-15 RX ORDER — METHYLPREDNISOLONE ACETATE 80 MG/ML
80 INJECTION, SUSPENSION INTRA-ARTICULAR; INTRALESIONAL; INTRAMUSCULAR; SOFT TISSUE ONCE
Status: COMPLETED | OUTPATIENT
Start: 2020-01-15 | End: 2020-01-15

## 2020-01-15 RX ORDER — LIDOCAINE HYDROCHLORIDE 10 MG/ML
2 INJECTION, SOLUTION EPIDURAL; INFILTRATION; INTRACAUDAL; PERINEURAL ONCE
Status: COMPLETED | OUTPATIENT
Start: 2020-01-15 | End: 2020-01-15

## 2020-01-15 RX ORDER — LIDOCAINE HYDROCHLORIDE 10 MG/ML
2 INJECTION, SOLUTION INFILTRATION; PERINEURAL ONCE
Status: COMPLETED | OUTPATIENT
Start: 2020-01-15 | End: 2020-01-15

## 2020-01-15 RX ADMIN — LIDOCAINE HYDROCHLORIDE 2 ML: 10 INJECTION, SOLUTION EPIDURAL; INFILTRATION; INTRACAUDAL; PERINEURAL at 08:53

## 2020-01-15 RX ADMIN — METHYLPREDNISOLONE ACETATE 80 MG: 80 INJECTION, SUSPENSION INTRA-ARTICULAR; INTRALESIONAL; INTRAMUSCULAR; SOFT TISSUE at 08:53

## 2020-01-15 RX ADMIN — LIDOCAINE HYDROCHLORIDE 2 ML: 10 INJECTION, SOLUTION INFILTRATION; PERINEURAL at 08:53

## 2020-01-15 RX ADMIN — IOHEXOL 2 ML: 240 INJECTION, SOLUTION INTRATHECAL; INTRAVASCULAR; INTRAVENOUS; ORAL at 08:53

## 2020-02-06 ENCOUNTER — HOSPITAL ENCOUNTER (EMERGENCY)
Facility: OTHER | Age: 75
Discharge: HOME | End: 2020-02-06
Attending: EMERGENCY MEDICINE | Admitting: EMERGENCY MEDICINE
Payer: MEDICARE

## 2020-02-06 ENCOUNTER — NURSE TRIAGE (OUTPATIENT)
Dept: INTERNAL MEDICINE | Facility: OTHER | Age: 75
End: 2020-02-06

## 2020-02-06 ENCOUNTER — APPOINTMENT (OUTPATIENT)
Dept: GENERAL RADIOLOGY | Facility: OTHER | Age: 75
End: 2020-02-06
Attending: EMERGENCY MEDICINE
Payer: MEDICARE

## 2020-02-06 VITALS
TEMPERATURE: 96.7 F | SYSTOLIC BLOOD PRESSURE: 140 MMHG | BODY MASS INDEX: 29.82 KG/M2 | RESPIRATION RATE: 12 BRPM | OXYGEN SATURATION: 95 % | DIASTOLIC BLOOD PRESSURE: 75 MMHG | HEART RATE: 67 BPM | WEIGHT: 190 LBS | HEIGHT: 67 IN

## 2020-02-06 DIAGNOSIS — E78.2 MIXED HYPERLIPIDEMIA: ICD-10-CM

## 2020-02-06 DIAGNOSIS — Z95.5 STATUS POST INSERTION OF DRUG ELUTING CORONARY ARTERY STENT: ICD-10-CM

## 2020-02-06 DIAGNOSIS — R07.89 ATYPICAL CHEST PAIN: ICD-10-CM

## 2020-02-06 DIAGNOSIS — Z95.5 STENTED CORONARY ARTERY: ICD-10-CM

## 2020-02-06 LAB
ALBUMIN SERPL-MCNC: 4.6 G/DL (ref 3.5–5.7)
ALP SERPL-CCNC: 55 U/L (ref 34–104)
ALT SERPL W P-5'-P-CCNC: 13 U/L (ref 7–52)
ANION GAP SERPL CALCULATED.3IONS-SCNC: 9 MMOL/L (ref 3–14)
APTT PPP: 34 SEC (ref 22–37)
AST SERPL W P-5'-P-CCNC: 13 U/L (ref 13–39)
BASOPHILS # BLD AUTO: 0 10E9/L (ref 0–0.2)
BASOPHILS NFR BLD AUTO: 0.5 %
BILIRUB SERPL-MCNC: 0.6 MG/DL (ref 0.3–1)
BUN SERPL-MCNC: 18 MG/DL (ref 7–25)
CALCIUM SERPL-MCNC: 9.9 MG/DL (ref 8.6–10.3)
CHLORIDE SERPL-SCNC: 104 MMOL/L (ref 98–107)
CO2 SERPL-SCNC: 28 MMOL/L (ref 21–31)
CREAT SERPL-MCNC: 0.79 MG/DL (ref 0.7–1.3)
DIFFERENTIAL METHOD BLD: NORMAL
EOSINOPHIL # BLD AUTO: 0.1 10E9/L (ref 0–0.7)
EOSINOPHIL NFR BLD AUTO: 2.1 %
ERYTHROCYTE [DISTWIDTH] IN BLOOD BY AUTOMATED COUNT: 12.8 % (ref 10–15)
GFR SERPL CREATININE-BSD FRML MDRD: >90 ML/MIN/{1.73_M2}
GLUCOSE SERPL-MCNC: 102 MG/DL (ref 70–105)
HCT VFR BLD AUTO: 46 % (ref 40–53)
HGB BLD-MCNC: 15 G/DL (ref 13.3–17.7)
IMM GRANULOCYTES # BLD: 0 10E9/L (ref 0–0.4)
IMM GRANULOCYTES NFR BLD: 0.2 %
INR PPP: 0.98 (ref 0–1.3)
LIPASE SERPL-CCNC: 49 U/L (ref 11–82)
LYMPHOCYTES # BLD AUTO: 1.3 10E9/L (ref 0.8–5.3)
LYMPHOCYTES NFR BLD AUTO: 19.5 %
MCH RBC QN AUTO: 30.3 PG (ref 26.5–33)
MCHC RBC AUTO-ENTMCNC: 32.6 G/DL (ref 31.5–36.5)
MCV RBC AUTO: 93 FL (ref 78–100)
MONOCYTES # BLD AUTO: 0.5 10E9/L (ref 0–1.3)
MONOCYTES NFR BLD AUTO: 7.9 %
NEUTROPHILS # BLD AUTO: 4.6 10E9/L (ref 1.6–8.3)
NEUTROPHILS NFR BLD AUTO: 69.8 %
NT-PROBNP SERPL-MCNC: 29 PG/ML (ref 0–100)
PLATELET # BLD AUTO: 205 10E9/L (ref 150–450)
POTASSIUM SERPL-SCNC: 3.8 MMOL/L (ref 3.5–5.1)
PROT SERPL-MCNC: 7 G/DL (ref 6.4–8.9)
RBC # BLD AUTO: 4.95 10E12/L (ref 4.4–5.9)
SODIUM SERPL-SCNC: 141 MMOL/L (ref 134–144)
TROPONIN I SERPL-MCNC: 2.7 PG/ML
TROPONIN I SERPL-MCNC: 3.4 PG/ML
WBC # BLD AUTO: 6.6 10E9/L (ref 4–11)

## 2020-02-06 PROCEDURE — 85025 COMPLETE CBC W/AUTO DIFF WBC: CPT | Performed by: EMERGENCY MEDICINE

## 2020-02-06 PROCEDURE — 99285 EMERGENCY DEPT VISIT HI MDM: CPT | Mod: 25 | Performed by: EMERGENCY MEDICINE

## 2020-02-06 PROCEDURE — 99284 EMERGENCY DEPT VISIT MOD MDM: CPT | Mod: Z6 | Performed by: EMERGENCY MEDICINE

## 2020-02-06 PROCEDURE — 85730 THROMBOPLASTIN TIME PARTIAL: CPT | Performed by: EMERGENCY MEDICINE

## 2020-02-06 PROCEDURE — 84484 ASSAY OF TROPONIN QUANT: CPT | Performed by: EMERGENCY MEDICINE

## 2020-02-06 PROCEDURE — 85610 PROTHROMBIN TIME: CPT | Performed by: EMERGENCY MEDICINE

## 2020-02-06 PROCEDURE — 71046 X-RAY EXAM CHEST 2 VIEWS: CPT

## 2020-02-06 PROCEDURE — 83690 ASSAY OF LIPASE: CPT | Performed by: EMERGENCY MEDICINE

## 2020-02-06 PROCEDURE — 36415 COLL VENOUS BLD VENIPUNCTURE: CPT | Performed by: EMERGENCY MEDICINE

## 2020-02-06 PROCEDURE — 93005 ELECTROCARDIOGRAM TRACING: CPT | Performed by: EMERGENCY MEDICINE

## 2020-02-06 PROCEDURE — 80053 COMPREHEN METABOLIC PANEL: CPT | Performed by: EMERGENCY MEDICINE

## 2020-02-06 PROCEDURE — 93010 ELECTROCARDIOGRAM REPORT: CPT | Performed by: INTERNAL MEDICINE

## 2020-02-06 PROCEDURE — 83880 ASSAY OF NATRIURETIC PEPTIDE: CPT | Performed by: EMERGENCY MEDICINE

## 2020-02-06 ASSESSMENT — MIFFLIN-ST. JEOR: SCORE: 1550.46

## 2020-02-06 NOTE — ED TRIAGE NOTES
Pt reports he's had some chest pains on the left side and he feels weak but he's had that problem for a while (several months).  He does a lot of heavy work & heavy shoveling.  Today he got the pains twice and called & was told to go in..  Pt says the pain goes into his left shoulder, and he feels weak.  He had 2 stents placed in 2018.

## 2020-02-06 NOTE — TELEPHONE ENCOUNTER
S-(situation): Pt scheduled appt  for intermittent chest pain. Transferred to triage RN for further assessment.    B-(background): 74 y.o. male, 5'7' 180lb, Coronary artery stents x2, 2018, Type II DM, High cholesterol (on Statin), non-smoker. Also noted hx NEERAJ and GERD.    A-(assessment): Left-sided chest pain; ongoing/intermittent (once every few weeks). Occurred 2x today while on walk, lasting 2 min. and rated 3/10. Currently asymptomatic. Chronic weakness (normally improved w/ physical activity), more severe following chest pain today (left side of chest only, not involving leg/arm, etc). Mild/intermittent right-sided h/a past few weeks. Mildly out of balance (has vertigo and balance exercises). Nitroglycerin available; hasn't felt need to take. Strenuous shoveling all winter, without issue.    R-(recommendations): Protocol recommends Pt go to ED to rule out unstable angina, or to office with PCP Approval. Pt is requesting writer discuss with Dr. Pickens and see what he recommends.     Alanna Patterson RN .............. 2020  2:18 PM    Denies: SOB, crushing chest pressure/sharp or radiating pain, unusual sweating, irregular/rapid heart rate, heartburn, N&V fever, cough  _____________________________________________________    Patient called requesting appointment for chest pain and weakness. Appointment scheduled with Dr. Pickens on 20. Pt transferred to nurse triage call line for further assessment. Pt verified his last name and . Pt triaged:    Additional Information    Intermittent chest pain and pain has been increasing in severity or frequency    Negative: Severe difficulty breathing (e.g., struggling for each breath, speaks in single words)    Negative: Passed out (i.e., fainted, collapsed and was not responding)    Negative: Chest pain lasting longer than 5 minutes and ANY of the following:* Over 50 years old* Over 30 years old and at least one cardiac risk factor (i.e., high blood  "pressure, diabetes, high cholesterol, obesity, smoker or strong family history of heart disease)* Pain is crushing, pressure-like, or heavy * Took nitroglycerin and chest pain was not relieved* History of heart disease (i.e., angina, heart attack, bypass surgery, angioplasty, CHF)    Negative: Visible sweat on face or sweat dripping down face    Negative: Sounds like a life-threatening emergency to the triager    Negative: Followed an injury to chest    Negative: SEVERE chest pain    Negative: Pain also present in shoulder(s) or arm(s) or jaw    Negative: Difficulty breathing    Negative: Cocaine use within last 3 days    Negative: History of prior 'blood clot' in leg or lungs (i.e., deep vein thrombosis, pulmonary embolism)    Negative: Recent illness requiring prolonged bed rest (i.e., immobilization)    Negative: Hip or leg fracture in past 2 months (e.g, or had cast on leg or ankle)    Negative: Major surgery in the past month    Negative: Recent long-distance travel with prolonged time in car, bus, plane, or train (i.e., within past 2 weeks; 6 or more hours duration)    Negative: Heart beating irregularly or very rapidly    Negative: Chest pain lasting longer than 5 minutes    Answer Assessment - Initial Assessment Questions  1. LOCATION: \"Where does it hurt?\"    Left side of chest.    2. RADIATION: \"Does the pain go anywhere else?\" (e.g., into neck, jaw, arms, back)   Denies.    3. ONSET: \"When did the chest pain begin?\" (Minutes, hours or days)   Has been going on for some time; episodes no more than 2-3 weeks apart. In the past has only happened once per day, but called as it occurred two different times today while on a walk. Currently asymptomatic.    4. PATTERN \"Does the pain come and go, or has it been constant since it started?\"  \"Does it get worse with exertion?\"   See above.    5. DURATION: \"How long does it last\" (e.g., seconds, minutes, hours)  2 minutes.    6. SEVERITY: \"How bad is the pain?\"  " "(e.g., Scale 1-10; mild, moderate, or severe)  MILD (rated 3/10): doesn't interfere with normal activities    \"A pain; not sharp or pressure.\"    7. CARDIAC RISK FACTORS: \"Do you have any history of heart problems or risk factors for heart disease?\"  Coronary artery stents x2, 5/2018, Type II DM, High cholesterol (controlled with Simvastatin), 5'7\" 180 lb    Non-smoker    8. PULMONARY RISK FACTORS:   Denies.    9. CAUSE:  Pt is unsure, but called as he was concerned that pain occurred twice today.    10: OTHER SYMPTOMS:  Weakness: Pt states he has had for a long time. Normally, when he feels this way, he gets outside, and works hard physically, gets his adrenaline pumping, and feels better. Weakness experienced today was a little more than normal, but gone shortly after. Pt states weakness was on left side of chest, not involving leg/arm etc.    Headache- mild, intermittent, past few weeks, right side of head.    Pt states he has vertigo and is \"a little out of balance\", and has been doing balance exercises.    Denies:SOB, N&V, sweating, fever, cough    Pt states this winter he has done a lot of snow blowing and a tremendous amount of shoveling without pain or other issue.    Also noted history of NEERAJ and GERD.     Has nitroglycerin available, but has not felt the need to take.    Protocols used: CHEST PAIN-A-[place]    Protocol recommends Pt go to ED to rule out unstable angina, or to office with PCP Approval. Pt is requesting writer discuss with Dr. Pickens and see what he recommends.     Alanna Patterson RN .............. 2/6/2020  2:18 PM    "

## 2020-02-06 NOTE — ED AVS SNAPSHOT
[facility]  [address]  [place] [address]  Phone:  [phone]  Fax:  [phone]                                    Dajuan Basilio   MRN: [id number]    Department:  [facility]   Date of Visit:  2/6/2020           After Visit Summary Signature Page    I have received my discharge instructions, and my questions have been answered. I have discussed any challenges I see with this plan with the nurse or doctor.    ..........................................................................................................................................  Patient/Patient Representative Signature      ..........................................................................................................................................  Patient Representative Print Name and Relationship to Patient    ..................................................               ................................................  Date                                   Time    ..........................................................................................................................................  Reviewed by Signature/Title    ...................................................              ..............................................  Date                                               Time          22EPIC Rev 08/18

## 2020-02-06 NOTE — ED PROVIDER NOTES
[facility]  Emergency Department Visit Note    Chest Pain and Fatigue      History of Present Illness     Dajuan Basilio is a 74 year old male presenting with chest pain. This pain started approximately 2 hours prior to arrival and the onset was while he was on a walk. He had shoveled a bit and then went for a walk. After 2 miles he developed chest tightness for 2 minutes and it went away. It came back after 15 minutes and lasted 2 minutes and went away on its jamshid. He walked home and drove himself here. He has no pain currently. He normally walks 5 miles a day without pain. He had 2 stents place din 2018 at [facility]. The pain is characterized as a tightness across his chest does not radiate, and is not associated with shortness of breath, nausea, vomiting, diaphoresis, change with respiration. No cough or fevers. The patient has had similar symptoms in the past intermittently for the past couple of months and it is not always associate with activity  No falls or other recent injuries.    Medications:  Prior to Admission medications    Medication Sig Last Dose Taking? Auth Provider   ascorbic acid (VITAMIN C) 500 MG tablet Take 1 tablet by mouth daily. For 60 days 2/6/2020 at am Yes Reported, Patient   aspirin 81 MG tablet Take 81 mg by mouth daily 2/6/2020 at am Yes Reported, Patient   Cholecalciferol (D 5000) 5000 UNITS TABS Take 5,000 Units by mouth daily 2/6/2020 at am Yes Reported, Patient   citalopram (CELEXA) 20 MG tablet TAKE ONE-HALF TABLET BY MOUTH ONCE DAILY 2/6/2020 at am Yes Cm Pickens MD   clopidogrel (PLAVIX) 75 MG tablet Take 1 tablet (75 mg) by mouth daily 2/6/2020 at am Yes Cm Pickens MD   cyanocobalamin (CYANOCOBALAMIN) 1000 MCG/ML injection Inject 1 mL (1,000 mcg) into the muscle every 3 to 4 weeks Past Month at Unknown time Yes Maynor Winters MD   gabapentin (NEURONTIN) 100 MG capsule TAKE 1 TO 2 CAPSULES BY MOUTH IN THE EVENING AS NEEDED 2/5/2020 at pm Yes  Cm Pickens MD   metFORMIN (GLUCOPHAGE) 500 MG tablet TAKE TWO TABLETS BY MOUTH IN THE MORNING AND ONE AT BEDTIME 2/6/2020 at am Yes Cm Pickens MD   simvastatin (ZOCOR) 20 MG tablet TAKE ONE-HALF TABLET BY MOUTH AT BEDTIME 2/5/2020 at pm Yes Cm Pickens MD   vitamin B complex with vitamin C (VITAMIN  B COMPLEX) TABS tablet Take 1 tablet by mouth daily Monday, Wednesday, Friday -- 3 days weekly -- make sure it has B12 in tablet -- Dose Reduced 7/11/2018 2/5/2020 at Unknown time Yes Cm Pickens MD   blood glucose (ACCU-CHEK ONEL PLUS) test strip USE  STRIP TO CHECK GLUCOSE TWICE DAILY Unknown at Unknown time  Brett Olivas MD   meclizine (ANTIVERT) 12.5 MG tablet Take 2 tablets (25 mg) by mouth 4 times daily as needed for dizziness More than a month at Unknown time  Jigar Oneal PA   nitroGLYcerin (NITROSTAT) 0.4 MG sublingual tablet  Unknown at Unknown time  Reported, Patient     Allergies:  Allergies   Allergen Reactions     Ace Inhibitors Cough     Atorvastatin Calcium      Lipitor     Hmg-Coa-R Inhibitors Muscle Pain (Myalgia)     Higher doses cause myalgias     Problem List:  Patient Active Problem List   Diagnosis     Abnormal CT scan, bladder     History of diabetes mellitus, type II     Diverticular disease of colon     Generalized anxiety disorder     GERD (gastroesophageal reflux disease)     Pure hypercholesterolemia     Nephrolithiasis     Moderate episode of recurrent major depressive disorder (H)     BPH (benign prostatic hyperplasia)     Obesity     Panic disorder without agoraphobia     Low serum vitamin B12     Microalbuminuria     Myalgia and myositis     Exertional dyspnea     Abnormal electrocardiogram     Agatston coronary artery calcium score between 200 and 399     Status post insertion of drug eluting coronary artery stent - x2 stents - 1st OMB - 5/17/2018 - [facility]     Prediabetes     Peripheral polyneuropathy - history of Type 2 Diabetes     Malaise and  "fatigue     Benign prostatic hyperplasia with weak urinary stream     S/P cardiac cath 18     Unsteady gait     Stented coronary artery to his LCX and OM1 on 18     Positive FIT (fecal immunochemical test)     Mixed hyperlipidemia     OME (otitis media with effusion), bilateral     Lipoma of neck     Past Medical History:  Past Medical History:   Diagnosis Date     Diverticulosis of intestine without perforation or abscess without bleeding     No Comments Provided     Fatty (change of) liver, 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 Johnson negative.     COLONOSCOPY  2013,WNL     COLONOSCOPY  2019    [facility]-no follow up     HERNIA REPAIR  2000     RELEASE CARPAL TUNNEL      2014     VASECTOMY      No Comments Provided       Social History:  Social History     Tobacco Use     Smoking status: Former Smoker     Types: Cigarettes     Last attempt to quit: 10/25/1983     Years since quittin.3     Smokeless tobacco: Never Used   Substance Use Topics     Alcohol use: No     Drug use: No       Review of Systems:  10 point review of systems obtained and pertinent positive and negative findings noted in HPI. Review of systems otherwise negative.      Physical Exam     Vital signs: BP (!) 140/75   Pulse 67   Temp 96.7  F (35.9  C) (Tympanic)   Resp 12   Ht 1.702 m (5' 7\")   Wt 86.2 kg (190 lb)  "  SpO2 95%   BMI 29.76 kg/m      Physical Exam:    General: awake and alert, comfortable  HEENT: atraumatic, no scleral injection, no nasal discharge, neck supple  Chest wall: palpation of the chest wall does not reproduce symptoms  Chest: clear to auscultation bilaterally without wheezes or crackles, non labored respirations  Cardiovascular: regular rate and rhythm, no murmurs or gallops  Abdomen: soft, nontender, no rebound or guarding, nondistended  Extremities: no deformities, edema, or tenderness  Skin: warm, dry, no rashes  Neuro: alert and oriented x 3, moving extremities x 4, ambulates without difficulty    Medical Decision Making & ED Course     Dajuan Basilio is a 74 year old male presenting with chest pain. Differential includes acute coronary syndrome, pulmonary embolism, aortic dissection, pneumonia, esophageal spasm, gastroesophageal reflux, reactive airway disease, chest wall pain, stress or anxiety. Concern for a life threatening etiology of symptoms prompts EKG, CXR, and laboratory evaluation. EKG reveals no acute ischemia. History and exam suggest a low likelihood of pulmonary embolism, aortic dissection, or pulmonary pathology as the etiology of this patient's pain. Given the patient's HEART score, age, risk factors, and history of present illness including onset of current symptoms, a single initial troponin is insufficient to rule out acute coronary syndrome and he requires a serial troponin to make acute coronary syndrome sufficiently unlikely to allow discharge home.     If serial troponin is negative and the patient is stable for outpatient management. Recommend follow up with primary care provider within 72 hours to discuss possible stress testing. He will be signed out to Dr May for discharge if 3 hour troponin is negative. Patient aware and agrees with this plan    Results for orders placed or performed during the hospital encounter of 02/06/20   XR Chest 2 Views     Status: None     Narrative    PROCEDURE:  XR CHEST 2 VW    HISTORY:  CP.     COMPARISON:  March 20, 2019    FINDINGS:   The cardiac silhouette is normal in size. The pulmonary vasculature is  normal.  There is some linear opacities in both lungs representing  atelectasis or scarring No pleural effusion or pneumothorax.      Impression    IMPRESSION:  Linear atelectasis or scarring in both lung bases      KELBY BENITO MD   CBC with platelets differential     Status: None   Result Value Ref Range    WBC 6.6 4.0 - 11.0 10e9/L    RBC Count 4.95 4.4 - 5.9 10e12/L    Hemoglobin 15.0 13.3 - 17.7 g/dL    Hematocrit 46.0 40.0 - 53.0 %    MCV 93 78 - 100 fl    MCH 30.3 26.5 - 33.0 pg    MCHC 32.6 31.5 - 36.5 g/dL    RDW 12.8 10.0 - 15.0 %    Platelet Count 205 150 - 450 10e9/L    Diff Method Automated Method     % Neutrophils 69.8 %    % Lymphocytes 19.5 %    % Monocytes 7.9 %    % Eosinophils 2.1 %    % Basophils 0.5 %    % Immature Granulocytes 0.2 %    Absolute Neutrophil 4.6 1.6 - 8.3 10e9/L    Absolute Lymphocytes 1.3 0.8 - 5.3 10e9/L    Absolute Monocytes 0.5 0.0 - 1.3 10e9/L    Absolute Eosinophils 0.1 0.0 - 0.7 10e9/L    Absolute Basophils 0.0 0.0 - 0.2 10e9/L    Abs Immature Granulocytes 0.0 0 - 0.4 10e9/L   INR     Status: None   Result Value Ref Range    INR 0.98 0 - 1.3   Partial thromboplastin time     Status: None   Result Value Ref Range    PTT 34 22 - 37 sec   Comprehensive metabolic panel     Status: None   Result Value Ref Range    Sodium 141 134 - 144 mmol/L    Potassium 3.8 3.5 - 5.1 mmol/L    Chloride 104 98 - 107 mmol/L    Carbon Dioxide 28 21 - 31 mmol/L    Anion Gap 9 3 - 14 mmol/L    Glucose 102 70 - 105 mg/dL    Urea Nitrogen 18 7 - 25 mg/dL    Creatinine 0.79 0.70 - 1.30 mg/dL    GFR Estimate >90 >60 mL/min/[1.73_m2]    GFR Estimate If Black >90 >60 mL/min/[1.73_m2]    Calcium 9.9 8.6 - 10.3 mg/dL    Bilirubin Total 0.6 0.3 - 1.0 mg/dL    Albumin 4.6 3.5 - 5.7 g/dL    Protein Total 7.0 6.4 - 8.9 g/dL    Alkaline  Phosphatase 55 34 - 104 U/L    ALT 13 7 - 52 U/L    AST 13 13 - 39 U/L   Lipase     Status: None   Result Value Ref Range    Lipase 49 11 - 82 U/L   Troponin GH     Status: None   Result Value Ref Range    Troponin 3.4 <34.0 pg/mL   Nt probnp inpatient (BNP)     Status: None   Result Value Ref Range    N-Terminal Pro BNP Inpatient 29 0 - 100 pg/mL   Troponin GH (now)     Status: None   Result Value Ref Range    Troponin 2.7 <34.0 pg/mL     8:35 PM long discussion with patient and reviewed his benign labs and sx and will schedule for stress echo and continue with f/u 2/13/20 with Dr. Pickens.  Avoid heavy exertion until after stress testing.  Jovan May MD     I have reviewed the patient's medica record, ECG, labs, CXR.    Diagnosis & Disposition     Diagnosis:  Chest pain    Disposition:  Home with stress test to be scheduled.       Yun Villarreal MD  02/06/20 1706       Jovan May MD  02/06/20 2036       Jovan May MD  02/06/20 2041

## 2020-02-06 NOTE — TELEPHONE ENCOUNTER
Called and spoke to Patient after verifying last name and date of birth. Pt notified of Dr. Pickens' response/recommendation. The patient indicates understanding of these issues and agrees with the plan. Alanna Patterson RN .............. 2/6/2020  4:12 PM

## 2020-02-06 NOTE — TELEPHONE ENCOUNTER
Patient has known heart disease and walking was making the symptoms worse.    -- Would recommend going to the emergency room.    Cm Pickens MD

## 2020-02-07 LAB — INTERPRETATION ECG - MUSE: NORMAL

## 2020-02-07 NOTE — DISCHARGE INSTRUCTIONS
Dear Mr. Romeroaston,  It was nice to meet you.  As we talked, avoid heavy exertion until after your stress test.  Call Dr. Pickens tomorrow to determine scheduling of stress test and follow up.    Dr. Nhan May

## 2020-02-12 ENCOUNTER — ALLIED HEALTH/NURSE VISIT (OUTPATIENT)
Dept: FAMILY MEDICINE | Facility: OTHER | Age: 75
End: 2020-02-12
Attending: INTERNAL MEDICINE
Payer: MEDICARE

## 2020-02-12 DIAGNOSIS — E53.8 VITAMIN B 12 DEFICIENCY: Primary | ICD-10-CM

## 2020-02-12 PROCEDURE — 96372 THER/PROPH/DIAG INJ SC/IM: CPT

## 2020-02-12 PROCEDURE — 25000128 H RX IP 250 OP 636: Performed by: INTERNAL MEDICINE

## 2020-02-12 RX ADMIN — CYANOCOBALAMIN 1000 MCG: 1000 INJECTION, SOLUTION INTRAMUSCULAR at 10:11

## 2020-02-12 NOTE — PROGRESS NOTES
Verified patient's name and . Patient stated reason for visit today is to receive B 12. Patient denied adverse reactions and concerns with previous injections. Vitamin B 12 prepared and administered IM as previously ordered by provider. Administration of medication documented in MAR (see MAR for further information regarding dose, lot #, NDC #, expiration date). Patient left clinic room ambulatory.       Angeline BOTELLO, RN on 2020 at 10:12 AM

## 2020-02-18 ENCOUNTER — TELEPHONE (OUTPATIENT)
Dept: CARDIOLOGY | Facility: OTHER | Age: 75
End: 2020-02-18

## 2020-02-18 NOTE — TELEPHONE ENCOUNTER
Dajuan called verified his  to cancel his stress echo test as ordered by ER on 20.  He states he fell a week before that and injured his right ribs and they are still quite sore.    RN instructed patient that she will check with  to see if he wants to see him sooner than his scheduled annual physical on 3/17/20 to discuss the atypical chest pain that brought him to the ER on 20 or if  wants to order a different test.   Patient was instructed if he has further chest pain he needs to return to ER.

## 2020-02-19 NOTE — TELEPHONE ENCOUNTER
Move his appointment on 3/17 ---> to 10:40 x40 minutes....     I don't have anything available before 3/17.     He can see Nela Molina NP -- before my appointment if needed.     Cm Pickens MD

## 2020-02-19 NOTE — TELEPHONE ENCOUNTER
Called patient who verified his .  Instructed patient that first available appointment with  is 3/17/2020 at 10:40 and patient agreed to that appointment.  He denies any further atypical chest pain which brought him to ER.  He states his right side still sore from fall. (the reason he cancelled his stress echo for 20.)   He declined seeing Nela Molina NP sooner and verbally ok'd waiting until 3/17 to see .  He verbalized understanding to return to ER for further atypical chest discomfort.  He did not want the stress test until he sees .   POC:  Patient has appointment for 3/17/2020 with  at 10:40.

## 2020-03-04 ENCOUNTER — TELEPHONE (OUTPATIENT)
Dept: CARDIOLOGY | Facility: OTHER | Age: 75
End: 2020-03-04

## 2020-03-04 NOTE — TELEPHONE ENCOUNTER
Pt verified his .  Called stated he wants to reschedule his stress echo.  Suni/Sanjiv RN ok to reschedule stress echo as ordered from ER on 2020.  Patient reported he did take 2 nitroglycerin on his walk on 3/2/2020 pain resolved.  RN verbally informed Dr. Olivas and stress echo ordered.  Patient informed that if pain returns he does need to be seen in ER and he verbalized understanding.

## 2020-03-11 ENCOUNTER — HOSPITAL ENCOUNTER (OUTPATIENT)
Dept: CARDIOLOGY | Facility: OTHER | Age: 75
Discharge: HOME | End: 2020-03-11
Attending: INTERNAL MEDICINE | Admitting: INTERNAL MEDICINE
Payer: MEDICARE

## 2020-03-11 ENCOUNTER — TELEPHONE (OUTPATIENT)
Dept: CARDIOLOGY | Facility: OTHER | Age: 75
End: 2020-03-11

## 2020-03-11 DIAGNOSIS — R07.89 ATYPICAL CHEST PAIN: ICD-10-CM

## 2020-03-11 PROCEDURE — 93018 CV STRESS TEST I&R ONLY: CPT | Performed by: INTERNAL MEDICINE

## 2020-03-11 PROCEDURE — 93350 STRESS TTE ONLY: CPT | Mod: TC

## 2020-03-11 PROCEDURE — 93321 DOPPLER ECHO F-UP/LMTD STD: CPT | Mod: 26 | Performed by: INTERNAL MEDICINE

## 2020-03-11 PROCEDURE — 93016 CV STRESS TEST SUPVJ ONLY: CPT | Performed by: INTERNAL MEDICINE

## 2020-03-11 PROCEDURE — 93325 DOPPLER ECHO COLOR FLOW MAPG: CPT | Mod: 26 | Performed by: INTERNAL MEDICINE

## 2020-03-11 PROCEDURE — 93350 STRESS TTE ONLY: CPT | Mod: 26 | Performed by: INTERNAL MEDICINE

## 2020-03-11 PROCEDURE — 25500064 ZZH RX 255 OP 636: Performed by: INTERNAL MEDICINE

## 2020-03-11 RX ORDER — DOXYCYCLINE HYCLATE 50 MG/1
CAPSULE ORAL
COMMUNITY
Start: 2020-03-07 | End: 2020-09-17

## 2020-03-11 RX ADMIN — PERFLUTREN 3 ML: 6.52 INJECTION, SUSPENSION INTRAVENOUS at 10:45

## 2020-03-11 NOTE — TELEPHONE ENCOUNTER
Dajuan loera verified his  with questions re:  Stress test.  Instructions given he verbalized understanding.

## 2020-03-11 NOTE — PROGRESS NOTES
10:00:  The patient arrived for a stress echo.  The procedure, risks and benefits were discussed and the consent was signed.  The patient was prepped for the stress test, and an IV was placed per procedure.  The echo sonographer did the initial images with definity for image enhancement.   Augustina William NP arrived, and the patient biked 7minutes and 57seconds.  The patient tolerated the procedure.  Stress images were completed and the IV was removed per procedure.  The patient was released in stable condition.  The patient was instructed that the ordering MD will call with results in one to two days. Pt has a follow up appt with  on 3/17/20.  Please see the chart for complete test results.

## 2020-03-16 PROBLEM — R93.1 AGATSTON CORONARY ARTERY CALCIUM SCORE BETWEEN 200 AND 399: Status: RESOLVED | Noted: 2018-04-26 | Resolved: 2020-03-16

## 2020-03-16 PROBLEM — R06.09 EXERTIONAL DYSPNEA: Status: RESOLVED | Noted: 2018-04-20 | Resolved: 2020-03-16

## 2020-03-16 PROBLEM — R94.31 ABNORMAL ELECTROCARDIOGRAM: Status: RESOLVED | Noted: 2018-04-26 | Resolved: 2020-03-16

## 2020-03-16 PROBLEM — D17.0 LIPOMA OF NECK: Status: RESOLVED | Noted: 2019-05-28 | Resolved: 2020-03-16

## 2020-03-17 ENCOUNTER — OFFICE VISIT (OUTPATIENT)
Dept: INTERNAL MEDICINE | Facility: OTHER | Age: 75
End: 2020-03-17
Attending: INTERNAL MEDICINE
Payer: COMMERCIAL

## 2020-03-17 VITALS
RESPIRATION RATE: 20 BRPM | HEIGHT: 67 IN | WEIGHT: 188.38 LBS | SYSTOLIC BLOOD PRESSURE: 112 MMHG | BODY MASS INDEX: 29.57 KG/M2 | DIASTOLIC BLOOD PRESSURE: 70 MMHG | HEART RATE: 60 BPM | TEMPERATURE: 96.6 F

## 2020-03-17 DIAGNOSIS — I25.10 CORONARY ARTERY DISEASE INVOLVING NATIVE CORONARY ARTERY OF NATIVE HEART WITHOUT ANGINA PECTORIS: ICD-10-CM

## 2020-03-17 DIAGNOSIS — R53.83 MALAISE AND FATIGUE: ICD-10-CM

## 2020-03-17 DIAGNOSIS — Z12.5 ENCOUNTER FOR SCREENING FOR MALIGNANT NEOPLASM OF PROSTATE: ICD-10-CM

## 2020-03-17 DIAGNOSIS — F33.1 MODERATE EPISODE OF RECURRENT MAJOR DEPRESSIVE DISORDER (H): ICD-10-CM

## 2020-03-17 DIAGNOSIS — M79.18 GLUTEAL PAIN: ICD-10-CM

## 2020-03-17 DIAGNOSIS — R73.03 PREDIABETES: ICD-10-CM

## 2020-03-17 DIAGNOSIS — E78.2 MIXED HYPERLIPIDEMIA: ICD-10-CM

## 2020-03-17 DIAGNOSIS — R53.81 MALAISE AND FATIGUE: ICD-10-CM

## 2020-03-17 DIAGNOSIS — Z95.5 STATUS POST INSERTION OF DRUG ELUTING CORONARY ARTERY STENT: ICD-10-CM

## 2020-03-17 DIAGNOSIS — E11.42 DIABETIC PERIPHERAL NEUROPATHY (H): ICD-10-CM

## 2020-03-17 DIAGNOSIS — Z00.00 ENCOUNTER FOR MEDICARE ANNUAL WELLNESS EXAM: ICD-10-CM

## 2020-03-17 DIAGNOSIS — M54.32 LEFT SIDED SCIATICA: ICD-10-CM

## 2020-03-17 DIAGNOSIS — R39.12 BENIGN PROSTATIC HYPERPLASIA WITH WEAK URINARY STREAM: ICD-10-CM

## 2020-03-17 DIAGNOSIS — F41.1 GENERALIZED ANXIETY DISORDER: Primary | ICD-10-CM

## 2020-03-17 DIAGNOSIS — H04.123 CHRONICALLY DRY EYES, BILATERAL: ICD-10-CM

## 2020-03-17 DIAGNOSIS — N40.1 BENIGN PROSTATIC HYPERPLASIA WITH WEAK URINARY STREAM: ICD-10-CM

## 2020-03-17 DIAGNOSIS — E11.21 TYPE 2 DIABETES MELLITUS WITH DIABETIC NEPHROPATHY, WITHOUT LONG-TERM CURRENT USE OF INSULIN (H): ICD-10-CM

## 2020-03-17 LAB
ALBUMIN SERPL-MCNC: 4.8 G/DL (ref 3.5–5.7)
ALBUMIN UR-MCNC: NEGATIVE MG/DL
ALP SERPL-CCNC: 65 U/L (ref 34–104)
ALT SERPL W P-5'-P-CCNC: 13 U/L (ref 7–52)
ANION GAP SERPL CALCULATED.3IONS-SCNC: 9 MMOL/L (ref 3–14)
APPEARANCE UR: CLEAR
AST SERPL W P-5'-P-CCNC: 16 U/L (ref 13–39)
BILIRUB SERPL-MCNC: 0.8 MG/DL (ref 0.3–1)
BILIRUB UR QL STRIP: NEGATIVE
BUN SERPL-MCNC: 14 MG/DL (ref 7–25)
CALCIUM SERPL-MCNC: 9.8 MG/DL (ref 8.6–10.3)
CHLORIDE SERPL-SCNC: 104 MMOL/L (ref 98–107)
CHOLEST SERPL-MCNC: 124 MG/DL
CO2 SERPL-SCNC: 28 MMOL/L (ref 21–31)
COLOR UR AUTO: YELLOW
CREAT SERPL-MCNC: 0.83 MG/DL (ref 0.7–1.3)
CREAT UR-MCNC: 44 MG/DL
ERYTHROCYTE [DISTWIDTH] IN BLOOD BY AUTOMATED COUNT: 12.9 % (ref 10–15)
GFR SERPL CREATININE-BSD FRML MDRD: >90 ML/MIN/{1.73_M2}
GLUCOSE SERPL-MCNC: 119 MG/DL (ref 70–105)
GLUCOSE UR STRIP-MCNC: NEGATIVE MG/DL
HBA1C MFR BLD: 6.4 % (ref 4–6)
HCT VFR BLD AUTO: 46.7 % (ref 40–53)
HDLC SERPL-MCNC: 45 MG/DL (ref 23–92)
HGB BLD-MCNC: 15.4 G/DL (ref 13.3–17.7)
HGB UR QL STRIP: NEGATIVE
KETONES UR STRIP-MCNC: NEGATIVE MG/DL
LDLC SERPL CALC-MCNC: 62 MG/DL
LEUKOCYTE ESTERASE UR QL STRIP: NEGATIVE
MCH RBC QN AUTO: 30.3 PG (ref 26.5–33)
MCHC RBC AUTO-ENTMCNC: 33 G/DL (ref 31.5–36.5)
MCV RBC AUTO: 92 FL (ref 78–100)
MICROALBUMIN UR-MCNC: 21 MG/L
MICROALBUMIN/CREAT UR: 47.27 MG/G CR (ref 0–17)
NITRATE UR QL: NEGATIVE
NONHDLC SERPL-MCNC: 79 MG/DL
PH UR STRIP: 5.5 PH (ref 5–7)
PLATELET # BLD AUTO: 210 10E9/L (ref 150–450)
POTASSIUM SERPL-SCNC: 4.7 MMOL/L (ref 3.5–5.1)
PROT SERPL-MCNC: 7.4 G/DL (ref 6.4–8.9)
PSA SERPL-ACNC: 3.76 NG/ML
RBC # BLD AUTO: 5.08 10E12/L (ref 4.4–5.9)
SODIUM SERPL-SCNC: 141 MMOL/L (ref 134–144)
SOURCE: NORMAL
SP GR UR STRIP: 1.01 (ref 1–1.03)
TRIGL SERPL-MCNC: 83 MG/DL
TSH SERPL DL<=0.05 MIU/L-ACNC: 2.53 IU/ML (ref 0.34–5.6)
UROBILINOGEN UR STRIP-MCNC: NORMAL MG/DL (ref 0–2)
WBC # BLD AUTO: 6 10E9/L (ref 4–11)

## 2020-03-17 PROCEDURE — 80053 COMPREHEN METABOLIC PANEL: CPT | Mod: ZL | Performed by: INTERNAL MEDICINE

## 2020-03-17 PROCEDURE — 80061 LIPID PANEL: CPT | Mod: ZL | Performed by: INTERNAL MEDICINE

## 2020-03-17 PROCEDURE — 85027 COMPLETE CBC AUTOMATED: CPT | Mod: ZL | Performed by: INTERNAL MEDICINE

## 2020-03-17 PROCEDURE — 82043 UR ALBUMIN QUANTITATIVE: CPT | Mod: ZL | Performed by: INTERNAL MEDICINE

## 2020-03-17 PROCEDURE — G0463 HOSPITAL OUTPT CLINIC VISIT: HCPCS

## 2020-03-17 PROCEDURE — 99213 OFFICE O/P EST LOW 20 MIN: CPT | Mod: 25 | Performed by: INTERNAL MEDICINE

## 2020-03-17 PROCEDURE — G0103 PSA SCREENING: HCPCS | Mod: ZL | Performed by: INTERNAL MEDICINE

## 2020-03-17 PROCEDURE — 36415 COLL VENOUS BLD VENIPUNCTURE: CPT | Mod: ZL | Performed by: INTERNAL MEDICINE

## 2020-03-17 PROCEDURE — G0438 PPPS, INITIAL VISIT: HCPCS | Performed by: INTERNAL MEDICINE

## 2020-03-17 PROCEDURE — 81003 URINALYSIS AUTO W/O SCOPE: CPT | Mod: ZL | Performed by: INTERNAL MEDICINE

## 2020-03-17 PROCEDURE — 83036 HEMOGLOBIN GLYCOSYLATED A1C: CPT | Mod: ZL | Performed by: INTERNAL MEDICINE

## 2020-03-17 PROCEDURE — 84443 ASSAY THYROID STIM HORMONE: CPT | Mod: ZL | Performed by: INTERNAL MEDICINE

## 2020-03-17 RX ORDER — MECLIZINE HCL 12.5 MG 12.5 MG/1
25 TABLET ORAL 4 TIMES DAILY PRN
Qty: 30 TABLET | Refills: 0 | Status: CANCELLED | OUTPATIENT
Start: 2020-03-17

## 2020-03-17 RX ORDER — CLOPIDOGREL BISULFATE 75 MG/1
75 TABLET ORAL DAILY
Qty: 90 TABLET | Refills: 3 | Status: SHIPPED | OUTPATIENT
Start: 2020-03-17 | End: 2021-03-17

## 2020-03-17 RX ORDER — SIMVASTATIN 20 MG
TABLET ORAL
Qty: 45 TABLET | Refills: 3 | Status: SHIPPED | OUTPATIENT
Start: 2020-03-17 | End: 2020-12-18

## 2020-03-17 RX ORDER — CITALOPRAM HYDROBROMIDE 20 MG/1
TABLET ORAL
Qty: 45 TABLET | Refills: 3 | Status: SHIPPED | OUTPATIENT
Start: 2020-03-17 | End: 2020-12-18

## 2020-03-17 RX ORDER — GABAPENTIN 100 MG/1
CAPSULE ORAL
Qty: 90 CAPSULE | Refills: 3 | Status: SHIPPED | OUTPATIENT
Start: 2020-03-17 | End: 2020-12-18

## 2020-03-17 ASSESSMENT — ANXIETY QUESTIONNAIRES
7. FEELING AFRAID AS IF SOMETHING AWFUL MIGHT HAPPEN: NOT AT ALL
IF YOU CHECKED OFF ANY PROBLEMS ON THIS QUESTIONNAIRE, HOW DIFFICULT HAVE THESE PROBLEMS MADE IT FOR YOU TO DO YOUR WORK, TAKE CARE OF THINGS AT HOME, OR GET ALONG WITH OTHER PEOPLE: NOT DIFFICULT AT ALL
5. BEING SO RESTLESS THAT IT IS HARD TO SIT STILL: NOT AT ALL
2. NOT BEING ABLE TO STOP OR CONTROL WORRYING: NOT AT ALL
GAD7 TOTAL SCORE: 0
1. FEELING NERVOUS, ANXIOUS, OR ON EDGE: NOT AT ALL
3. WORRYING TOO MUCH ABOUT DIFFERENT THINGS: NOT AT ALL
6. BECOMING EASILY ANNOYED OR IRRITABLE: NOT AT ALL

## 2020-03-17 ASSESSMENT — ENCOUNTER SYMPTOMS
WHEEZING: 0
DYSURIA: 0
COUGH: 0
AGITATION: 0
VOMITING: 0
MYALGIAS: 0
EYE PAIN: 1
DIZZINESS: 1
CHILLS: 0
ABDOMINAL PAIN: 0
CONFUSION: 1
FATIGUE: 1
HEMATURIA: 0
EYE REDNESS: 1
SHORTNESS OF BREATH: 0
BRUISES/BLEEDS EASILY: 0
DIARRHEA: 0
PALPITATIONS: 0
LIGHT-HEADEDNESS: 1
BACK PAIN: 1
NAUSEA: 0
FEVER: 0
ARTHRALGIAS: 0

## 2020-03-17 ASSESSMENT — PAIN SCALES - GENERAL: PAINLEVEL: NO PAIN (0)

## 2020-03-17 ASSESSMENT — PATIENT HEALTH QUESTIONNAIRE - PHQ9
SUM OF ALL RESPONSES TO PHQ QUESTIONS 1-9: 0
5. POOR APPETITE OR OVEREATING: NOT AT ALL

## 2020-03-17 ASSESSMENT — MIFFLIN-ST. JEOR: SCORE: 1539.12

## 2020-03-17 NOTE — PATIENT INSTRUCTIONS
Medications refilled.   Labs today.   Blood pressure is well controlled.     Return in approximately 6 month(s), or sooner as needed for follow-up with Dr. Pickens.    -- Diabetic follow-up in about 6 months....     Clinic : [phone]  Appointment line: [phone]     Patient Education   Personalized Prevention Plan  You are due for the preventive services outlined below.  Your care team is available to assist you in scheduling these services.  If you have already completed any of these items, please share that information with your care team to update in your medical record.  Health Maintenance Due   Topic Date Due     Annual Wellness Visit  05/21/2008     Zoster (Shingles) Vaccine (2 of 3) 12/23/2014     Diabetic Foot Exam  04/20/2019     A1C Lab  09/19/2019     Cholesterol Lab  03/19/2020     Kidney Microalbumin Urine Test  03/19/2020     Preventive Health Recommendations  See your health care provider every year to    Review health changes.     Discuss preventive care.      Review your medicines if your doctor has prescribed any.    Talk with your health care provider about whether you should have a test to screen for prostate cancer (PSA).    Every 3 years, have a diabetes test (fasting glucose). If you are at risk for diabetes, you should have this test more often.    Every 5 years, have a cholesterol test. Have this test more often if you are at risk for high cholesterol or heart disease.     Every 10 years, have a colonoscopy. Or, have a yearly FIT test (stool test). These exams will check for colon cancer.    Talk to with your health care provider about screening for Abdominal Aortic Aneurysm if you have a family history of AAA or have a history of smoking.    Shots:     Get a flu shot each year.     Get a tetanus shot every 10 years.     Talk to your doctor about your pneumonia vaccines. There are now two you should receive - Pneumovax (PPSV 23) and Prevnar (PCV 13).    Talk to your pharmacist  about a shingles vaccine.     Talk to your doctor about the hepatitis B vaccine.    Nutrition:     Eat at least 5 servings of fruits and vegetables each day.     Eat whole-grain bread, whole-wheat pasta and brown rice instead of white grains and rice.     Get adequate Calcium and Vitamin D.     Lifestyle    Exercise for at least 150 minutes a week (30 minutes a day, 5 days a week). This will help you control your weight and prevent disease.     Limit alcohol to one drink per day.     No smoking.     Wear sunscreen to prevent skin cancer.     See your dentist every six months for an exam and cleaning.     See your eye doctor every 1 to 2 years to screen for conditions such as glaucoma, macular degeneration and cataracts.    Personalized Prevention Plan  You are due for the preventive services outlined below.  Your care team is available to assist you in scheduling these services.  If you have already completed any of these items, please share that information with your care team to update in your medical record.  Health Maintenance Due   Topic Date Due     Annual Wellness Visit  05/21/2008     Zoster (Shingles) Vaccine (2 of 3) 12/23/2014     Diabetic Foot Exam  04/20/2019     A1C Lab  09/19/2019     Cholesterol Lab  03/19/2020     Kidney Microalbumin Urine Test  03/19/2020

## 2020-03-17 NOTE — PROGRESS NOTES
"SUBJECTIVE:   Dajuan Basilio is a 74 year old male who presents for Preventive Visit.  Are you in the first 12 months of your Medicare Part B coverage?  No    Physical Health:    In general, how would you rate your overall physical health? fair    Outside of work, how many days during the week do you exercise? 6-7 days/week    Outside of work, approximately how many minutes a day do you exercise?greater than 60 minutes    If you drink alcohol do you typically have >3 drinks per day or >7 drinks per week? No    Do you usually eat at least 4 servings of fruit and vegetables a day, include whole grains & fiber and avoid regularly eating high fat or \"junk\" foods? Yes and NO    Do you have any problems taking medications regularly?  No    Do you have any side effects from medications? tired and weak    Needs assistance for the following daily activities: no assistance needed    Which of the following safety concerns are present in your home?  lack of grab bars in the bathroom     Hearing impairment: Yes, Difficulty following a conversation in a noisy restaurant or crowded room.    Feel that people are mumbling or not speaking clearly.    Difficult to understand a speaker at a public meeting or [employer] witness service.    Need to ask people to speak up or repeat themselves.    Difficulty understanding soft or whispered speech.    Difficulty understanding speech on the telephone.    In the past 6 months, have you been bothered by leaking of urine? yes    Mental Health:    In general, how would you rate your overall mental or emotional health? good  PHQ-2 Score:      Do you feel safe in your environment? Yes    Have you ever done Advance Care Planning? (For example, a Health Directive, POLST, or a discussion with a medical provider or your loved ones about your wishes): No, advance care planning information given to patient to review.  Patient declined advance care planning discussion at this time.    Additional concerns to " address?  YES    Fall risk:  Fallen 2 or more times in the past year?: No  Any fall with injury in the past year?: No    Cognitive Screenin) Repeat 3 items (Leader, Season, Table)    2) Clock draw: ABNORMAL hands in wrong place and numbers on the outside of the clock  3) 3 item recall: Recalls NO objects   Results: 0 items recalled: PROBABLE COGNITIVE IMPAIRMENT, **INFORM PROVIDER**    Mini-CogTM Copyright EVA Gray. Licensed by the author for use in [facility]; reprinted with permission (maico@[facility]). All rights reserved.      Do you have sleep apnea, excessive snoring or daytime drowsiness?: no    Reviewed and updated as needed this visit by clinical staff  Tobacco  Allergies  Meds  Problems  Med Hx  Surg Hx  Fam Hx  Soc Hx          Reviewed and updated as needed this visit by Provider  Tobacco  Allergies  Meds  Problems  Med Hx  Surg Hx  Fam Hx        Social History     Tobacco Use     Smoking status: Former Smoker     Types: Cigarettes     Last attempt to quit: 10/25/1983     Years since quittin.4     Smokeless tobacco: Never Used   Substance Use Topics     Alcohol use: No                           Current providers sharing in care for this patient include:   Patient Care Team:  Cm Pickens MD as PCP - General (Internal Medicine)  Cm Pickens MD as Assigned PCP    The following health maintenance items are reviewed in Epic and correct as of today:  Health Maintenance   Topic Date Due     MEDICARE ANNUAL WELLNESS VISIT  2008     ZOSTER IMMUNIZATION (2 of 3) 2014     DIABETIC FOOT EXAM  2019     A1C  2019     LIPID  2020     MICROALBUMIN  2020     EYE EXAM  2020     FALL RISK ASSESSMENT  2020     PHQ-9  2020     BMP  2021     DTAP/TDAP/TD IMMUNIZATION (2 - Td) 2023     ADVANCE CARE PLANNING  2023     COLORECTAL CANCER SCREENING  2029     INFLUENZA VACCINE  Completed     PNEUMOCOCCAL  "IMMUNIZATION 65+ LOW/MEDIUM RISK  Completed     DEPRESSION ACTION PLAN  Addressed     IPV IMMUNIZATION  Aged Out     MENINGITIS IMMUNIZATION  Aged Out     ROS:  Review of Systems   Constitutional: Positive for fatigue. Negative for chills and fever.   HENT: Negative for congestion and hearing loss.    Eyes: Positive for pain and redness. Negative for visual disturbance.        + chronic dry eyes, tried many eye drops, not much improvement - told needs eye surgery   Respiratory: Negative for cough, shortness of breath and wheezing.    Cardiovascular: Negative for chest pain and palpitations.        Cardiac stress testing 3/2020 - negative/normal results.   Gastrointestinal: Negative for abdominal pain, diarrhea, nausea and vomiting.   Endocrine: Negative for cold intolerance and heat intolerance.   Genitourinary: Negative for dysuria and hematuria.   Musculoskeletal: Positive for back pain (Left > right low back pain -- focused over SI joints). Negative for arthralgias and myalgias.   Skin: Negative for pallor.   Allergic/Immunologic: Negative for immunocompromised state.   Neurological: Positive for dizziness and light-headedness.   Hematological: Does not bruise/bleed easily.   Psychiatric/Behavioral: Positive for confusion (+ short term memory loss). Negative for agitation.        OBJECTIVE:   /70 (BP Location: Right arm, Patient Position: Sitting, Cuff Size: Adult Regular)   Pulse 60   Temp 96.6  F (35.9  C) (Tympanic)   Resp 20   Ht 1.695 m (5' 6.75\")   Wt 85.4 kg (188 lb 6 oz)   BMI 29.73 kg/m   Estimated body mass index is 29.73 kg/m  as calculated from the following:    Height as of this encounter: 1.695 m (5' 6.75\").    Weight as of this encounter: 85.4 kg (188 lb 6 oz).  EXAM:   Physical Exam  Constitutional:       General: He is not in acute distress.     Appearance: He is well-developed. He is not diaphoretic.   HENT:      Head: Normocephalic and atraumatic.   Eyes:      General: No scleral " icterus.     Conjunctiva/sclera: Conjunctivae normal.   Neck:      Musculoskeletal: Neck supple.   Cardiovascular:      Rate and Rhythm: Normal rate and regular rhythm.   Pulmonary:      Effort: Pulmonary effort is normal.      Breath sounds: Normal breath sounds.   Abdominal:      Palpations: Abdomen is soft.      Tenderness: There is no abdominal tenderness.   Musculoskeletal:         General: No deformity.   Lymphadenopathy:      Cervical: No cervical adenopathy.   Skin:     General: Skin is warm and dry.      Findings: No rash.   Neurological:      Mental Status: He is alert and oriented to person, place, and time. Mental status is at baseline.   Psychiatric:         Mood and Affect: Mood normal.         Behavior: Behavior normal.          Diagnostic Test Results:  Labs reviewed in Epic    ASSESSMENT / PLAN:       ICD-10-CM    1. Generalized anxiety disorder  F41.1    2. Mixed hyperlipidemia  E78.2 simvastatin (ZOCOR) 20 MG tablet     Lipid Profile     Lipid Profile   3. Diabetic peripheral neuropathy (H)  E11.42 metFORMIN (GLUCOPHAGE) 500 MG tablet     gabapentin (NEURONTIN) 100 MG capsule   4. Type 2 diabetes mellitus with diabetic nephropathy, without long-term current use of insulin (H)  E11.21 metFORMIN (GLUCOPHAGE) 500 MG tablet     gabapentin (NEURONTIN) 100 MG capsule     Albumin Random Urine Quantitative with Creat Ratio     Hemoglobin A1c     UA reflex to Microscopic     UA reflex to Microscopic     Hemoglobin A1c     Albumin Random Urine Quantitative with Creat Ratio   5. Coronary artery disease involving native coronary artery of native heart without angina pectoris  I25.10 clopidogrel (PLAVIX) 75 MG tablet   6. Status post insertion of drug eluting coronary artery stent - x2 stents - [facility]st [facility] - 5/17/2018 - [facility]  Z95.5 clopidogrel (PLAVIX) 75 MG tablet   7. Moderate episode of recurrent major depressive disorder (H)  F33.1 citalopram (CELEXA) 20 MG tablet   8. Prediabetes  R73.03    9.  Benign prostatic hyperplasia with weak urinary stream  N40.1 PSA Screen GH    R39.12 PSA Screen GH   10. Malaise and fatigue  R53.81 CBC with platelets    R53.83 Comprehensive metabolic panel     TSH Reflex GH     TSH Reflex GH     Comprehensive metabolic panel     CBC with platelets   11. Encounter for Medicare annual wellness exam  Z00.00    12. Encounter for screening for malignant neoplasm of prostate   Z12.5 PSA Screen GH     PSA Screen GH   13. Chronically dry eyes, bilateral  H04.123    14. Left sided sciatica  M54.32    15. Gluteal pain  M79.18       Patient presents for Medicare annual wellness visit as well as follow-up of multiple issues.    Mixed hyperlipidemia, currently utilizing simvastatin.  Needs refills.  Tolerating well.  No side effects reported.    Diabetic peripheral neuropathy, ongoing.  Having some neuropathy pain.  Continues with gabapentin 1 or 2 capsules at nighttime.  Needs refills.  Finds that this dosing has been adequate.    Type 2 diabetes, due for lab work, A1c has been controlled.  Denies hypoglycemia.  Continues with metformin.  Needs refills.  Recent Labs   Lab Test 03/17/20  1121 02/06/20  1655 03/20/19  2050 03/19/19  1638  04/20/18  0917   A1C 6.4*  --   --  6.2*  --  6.2*   LDL 62  --   --  70  --  118*   HDL 45  --   --  41  --  42   TRIG 83  --   --  133  --  131   ALT 13 13 10 12  --  9   CR 0.83 0.79 0.95 0.74   < > 0.85   GFRESTIMATED >90 >90 77 >90   < > 88   GFRESTBLACK >90 >90 >90 >90   < > >90   POTASSIUM 4.7 3.8 3.9 3.9   < > 4.2    < > = values in this interval not displayed.     Coronary artery disease without angina.  Recent stress testing was unremarkable.  Has history of stent placement in the past.  Continue Plavix.  Needs refills.    History of depression, recurrent.  Doing well with Celexa.  Needs refills.  Has some chronic anxiety as well.  This seems to be relatively well controlled with current regimen.  No changes today.    Prediabetes noted with most  "recent lab work.  Continue current medications.    BPH with weak urine stream.  Reports symptoms are stable.  Denies need for any new medications.    PSA screening today    Has chronic dry eyes, sometimes to get quite red and irritated and are painful.  Has tried numerous eyedrops without success.    Having some ongoing bilateral gluteal pain, some left-sided pain shooting down the legs at times.  Has had sacroiliac joint injections about a year ago, initially he thought they were very helpful but now looking back is not really remembering how well they were effective.  His post procedure note indicated 75% reduction in pain.  Encouraged regular exercise, consider physical therapy.  Some home paperwork printed for use of home exercise options.  He has been doing a lot of snow shoveling without chest pain or heaviness.  Follow-up as needed.  Advised to call if he would like to see physical therapy.    In addition to the preventive visit, 15 minutes spent in face-to-face interaction with patient (separate from separately billed procedures) with greater than 50% spent in counseling and care coordination for his additional concern(s).     COUNSELING:  Reviewed preventive health counseling, as reflected in patient instructions  Special attention given to:       Regular exercise       Healthy diet/nutrition       Vision screening       Hearing screening       Dental care       Bladder control       Fall risk prevention       Immunizations    Estimated body mass index is 29.73 kg/m  as calculated from the following:    Height as of this encounter: 1.695 m (5' 6.75\").    Weight as of this encounter: 85.4 kg (188 lb 6 oz).         reports that he quit smoking about 36 years ago. His smoking use included cigarettes. He has never used smokeless tobacco.      Appropriate preventive services were discussed with this patient, including applicable screening as appropriate for cardiovascular disease, diabetes, " osteopenia/osteoporosis, and glaucoma.  As appropriate for age/gender, discussed screening for colorectal cancer, prostate cancer, breast cancer, and cervical cancer. Checklist reviewing preventive services available has been given to the patient.    Reviewed patients plan of care and provided an AVS. The Complex Care Plan (for patients with higher acuity and needing more deliberate coordination of services) for Dajuan meets the Care Plan requirement. This Care Plan has been established and reviewed with the Patient.    Counseling Resources:  ATP IV Guidelines  Pooled Cohorts Equation Calculator  Breast Cancer Risk Calculator  FRAX Risk Assessment  ICSI Preventive Guidelines  Dietary Guidelines for Americans, 2010  USDA's MyPlate  ASA Prophylaxis  Lung CA Screening    Cm Pickens MD  [facility] AND [facility]

## 2020-03-17 NOTE — LETTER
March 20, 2020       Dajuan Basilio  [address]  [place] [address]        Dear ,    We are writing to inform you of your test results.    The current medical regimen is effective;  continue present plan and medications.     Resulted Orders   PSA Screen GH   Result Value Ref Range    PSA Screen 3.756 (H) <3.100 ng/mL      Comment:      The DXI Access PSAS WHO assay is a two site immunoenzymatic assay. Assay   values obtained with different assay methods cannot be used interchangeably   due to differences in assay methods and reagent specificity.     TSH Reflex GH   Result Value Ref Range    TSH Reflex 2.53 0.34 - 5.60 IU/mL   UA reflex to Microscopic   Result Value Ref Range    Color Urine Yellow     Appearance Urine Clear     Glucose Urine Negative NEG^Negative mg/dL    Bilirubin Urine Negative NEG^Negative    Ketones Urine Negative NEG^Negative mg/dL    Specific Gravity Urine 1.009 1.003 - 1.035    Blood Urine Negative NEG^Negative    pH Urine 5.5 5.0 - 7.0 pH    Protein Albumin Urine Negative NEG^Negative mg/dL    Urobilinogen mg/dL Normal 0.0 - 2.0 mg/dL    Nitrite Urine Negative NEG^Negative    Leukocyte Esterase Urine Negative NEG^Negative    Source Midstream Urine    Lipid Profile   Result Value Ref Range    Cholesterol 124 <200 mg/dL    Triglycerides 83 <150 mg/dL    HDL Cholesterol 45 23 - 92 mg/dL    LDL Cholesterol Calculated 62 <100 mg/dL      Comment:      Desirable:       <100 mg/dl    Non HDL Cholesterol 79 <130 mg/dL   Comprehensive metabolic panel   Result Value Ref Range    Sodium 141 134 - 144 mmol/L    Potassium 4.7 3.5 - 5.1 mmol/L    Chloride 104 98 - 107 mmol/L    Carbon Dioxide 28 21 - 31 mmol/L    Anion Gap 9 3 - 14 mmol/L    Glucose 119 (H) 70 - 105 mg/dL    Urea Nitrogen 14 7 - 25 mg/dL    Creatinine 0.83 0.70 - 1.30 mg/dL    GFR Estimate >90 >60 mL/min/[1.73_m2]    GFR Estimate If Black >90 >60 mL/min/[1.73_m2]    Calcium 9.8 8.6 - 10.3 mg/dL    Bilirubin Total  0.8 0.3 - 1.0 mg/dL    Albumin 4.8 3.5 - 5.7 g/dL    Protein Total 7.4 6.4 - 8.9 g/dL    Alkaline Phosphatase 65 34 - 104 U/L    ALT 13 7 - 52 U/L    AST 16 13 - 39 U/L   CBC with platelets   Result Value Ref Range    WBC 6.0 4.0 - 11.0 10e9/L    RBC Count 5.08 4.4 - 5.9 10e12/L    Hemoglobin 15.4 13.3 - 17.7 g/dL    Hematocrit 46.7 40.0 - 53.0 %    MCV 92 78 - 100 fl    MCH 30.3 26.5 - 33.0 pg    MCHC 33.0 31.5 - 36.5 g/dL    RDW 12.9 10.0 - 15.0 %    Platelet Count 210 150 - 450 10e9/L   Hemoglobin A1c   Result Value Ref Range    Hemoglobin A1C 6.4 (H) 4.0 - 6.0 %   Albumin Random Urine Quantitative with Creat Ratio   Result Value Ref Range    Creatinine Urine 44 mg/dL    Albumin Urine mg/L 21 mg/L    Albumin Urine mg/g Cr 47.27 (H) 0 - 17 mg/g Cr       If you have any questions or concerns, please call the clinic at the number listed above.       Sincerely,        Cm Pickens MD

## 2020-03-17 NOTE — NURSING NOTE
"Patient presents to the clinic for medication management and wellness exam.    Previous A1C is at goal of <8  Lab Results   Component Value Date    A1C 6.2 03/19/2019    A1C 6.2 04/20/2018     Urine microalbumin:creatine: n/a  Foot exam unknown-declines today  Eye exam   02/2020  Tobacco User no  Patient is on a daily aspirin  Patient is not on a Statin.  Blood pressure today of:     BP Readings from Last 1 Encounters:   02/06/20 (!) 140/75      is not at the goal of <139/89 for diabetics.    Chief Complaint   Patient presents with     Recheck Medication     Wellness Visit       Initial /70 (BP Location: Right arm, Patient Position: Sitting, Cuff Size: Adult Regular)   Pulse 60   Temp 96.6  F (35.9  C) (Tympanic)   Resp 20   Ht 1.695 m (5' 6.75\")   Wt 85.4 kg (188 lb 6 oz)   BMI 29.73 kg/m   Estimated body mass index is 29.73 kg/m  as calculated from the following:    Height as of this encounter: 1.695 m (5' 6.75\").    Weight as of this encounter: 85.4 kg (188 lb 6 oz).  Medication Reconciliation: complete    Britta Roche LPN        "

## 2020-03-18 ASSESSMENT — ANXIETY QUESTIONNAIRES: GAD7 TOTAL SCORE: 0

## 2020-03-26 ENCOUNTER — TELEPHONE (OUTPATIENT)
Dept: INTERNAL MEDICINE | Facility: OTHER | Age: 75
End: 2020-03-26

## 2020-03-26 NOTE — TELEPHONE ENCOUNTER
He received letter with results. PSA is high and he is wondering if that is concerning.  Also A1C is 6.4, wonders if that is concerning.    Component      Latest Ref Rng & Units 3/19/2019 3/17/2020   Hemoglobin A1C      4.0 - 6.0 % 6.2 (H) 6.4 (H)   PSA Screen      <3.100 ng/mL 2.825 3.756 (H)

## 2020-03-29 NOTE — TELEPHONE ENCOUNTER
Clinic nursing... please advise patient:     A1c is getting a little higher.   -- Prediabetes is A1c up to 6.4%..... Diabetes is 6.5% or higher.     -- Would encourage regular walking / exercise.... and reduced sugar / carbohydrate eating.     PSA is up slightly from 1 year ago...   Urology would likely recommend recheck labs in 3 to 6 months....   I will route this to Urology clinic and they can get up a telephone appointment and discuss evaluation recommendations.     Cm Pickens MD

## 2020-03-30 NOTE — TELEPHONE ENCOUNTER
verified full name and  with patient. Updated him with the below information regarding A1c and Urology referral.  Haven Ballesteros LPN .......  3/30/2020  10:56 AM

## 2020-07-07 ENCOUNTER — TELEPHONE (OUTPATIENT)
Dept: INTERNAL MEDICINE | Facility: OTHER | Age: 75
End: 2020-07-07

## 2020-07-07 DIAGNOSIS — M47.816 LUMBAR FACET ARTHROPATHY: Primary | ICD-10-CM

## 2020-07-07 NOTE — TELEPHONE ENCOUNTER
1. Lumbar facet arthropathy    Recommend different back injections  - they will call with date/time of appointment.      - XR Lumbar Sacral Facet Inj Bilateral; Future    Pending response to above, Can also consider additional procedure:  - IR Rhizotomy; Future    Cm Pickens MD

## 2020-07-07 NOTE — TELEPHONE ENCOUNTER
Patient has been having ongoing low back pain.  Patient had an MRI and back injection earlier this year with no effect.  Then COVID happened.    Patient would like a referral to Ortho to discuss further options.  If this isn't able to be done locally patient would like to try [facility] if possible before the [place].      Britta Roche LPN 7/7/2020 2:39 PM

## 2020-07-09 ENCOUNTER — OFFICE VISIT (OUTPATIENT)
Dept: UROLOGY | Facility: OTHER | Age: 75
End: 2020-07-09
Attending: UROLOGY
Payer: COMMERCIAL

## 2020-07-09 VITALS
DIASTOLIC BLOOD PRESSURE: 78 MMHG | RESPIRATION RATE: 18 BRPM | HEART RATE: 68 BPM | BODY MASS INDEX: 29.89 KG/M2 | WEIGHT: 189.4 LBS | SYSTOLIC BLOOD PRESSURE: 130 MMHG

## 2020-07-09 DIAGNOSIS — R97.20 ELEVATED PSA: Primary | ICD-10-CM

## 2020-07-09 PROCEDURE — 99213 OFFICE O/P EST LOW 20 MIN: CPT | Performed by: UROLOGY

## 2020-07-09 PROCEDURE — G0463 HOSPITAL OUTPT CLINIC VISIT: HCPCS

## 2020-07-09 ASSESSMENT — PAIN SCALES - GENERAL: PAINLEVEL: SEVERE PAIN (6)

## 2020-07-09 NOTE — NURSING NOTE
Patient presents for initial consult for elevated PSA.    Review of Systems:    Weight loss:    No     Recent fever/chills:  No   Night sweats:   No  Current skin rash:  No   Recent hair loss:  No  Heat intolerance:  No   Cold intolerance:  No  Chest pain:   No   Palpitations:   No  Shortness of breath:  No   Wheezing:   No  Constipation:    No   Diarrhea:   No   Nausea:   No   Vomiting:   No   Kidney/side pain:  No   Back pain:   yes  Frequent headaches:  No   Dizziness:     No  Leg swelling:   No   Calf pain:    No    Parents, brothers or sisters with history of kidney cancer:   No  Parents, brothers or sisters with history of bladder cancer: No      Marly Mueller RN ............... 7/9/2020  2:49 PM

## 2020-07-09 NOTE — PROGRESS NOTES
Type of Visit  EST    Chief Complaint  Elevated PSA    HPI  Mr. Basilio is a 75 year old male who follows up with elevated PSA.  He underwent a prostate biopsy which was negative in  by Dr. Michaud.  Patient denies unintentional weight loss, bone or pelvic pain.  He has minimal bother urinary symptoms.    He has a history of negative cysto, CTU in 2016/.      Family History  Family History   Problem Relation Age of Onset     Cancer Father         Cancer, mesothelioma     Colon Cancer Mother         Cancer-colon,     Genitourinary Problems Mother         Genitourinary Disease,renal failure     Diabetes Mother         Diabetes     Other - See Comments Sister         Renal transplant     Diabetes Type 2  Sister         Diabetes type II     Diabetes Brother         Diabetes       Review of Systems  I personally reviewed the ROS with the patient.    Nursing Notes:   Marly Mueller RN  2020  2:53 PM  Addendum  Patient presents for initial consult for elevated PSA.    Review of Systems:    Weight loss:    No     Recent fever/chills:  No   Night sweats:   No  Current skin rash:  No   Recent hair loss:  No  Heat intolerance:  No   Cold intolerance:  No  Chest pain:   No   Palpitations:   No  Shortness of breath:  No   Wheezing:   No  Constipation:    No   Diarrhea:   No   Nausea:   No   Vomiting:   No   Kidney/side pain:  No   Back pain:   yes  Frequent headaches:  No   Dizziness:     No  Leg swelling:   No   Calf pain:    No    Parents, brothers or sisters with history of kidney cancer:   No  Parents, brothers or sisters with history of bladder cancer: No      Marly Mueller RN ............... 2020  2:49 PM      Physical Exam  Vitals:    20 1452   BP: 130/78   BP Location: Left arm   Patient Position: Sitting   Pulse: 68   Resp: 18   Weight: 85.9 kg (189 lb 6.4 oz)   Constitutional: No acute distress.  Alert and cooperative   Head: NCAT  Eyes: Conjunctivae normal  Cardiovascular: Regular  rate.  Pulmonary/Chest: Respirations are even and non-labored bilaterally, no audible wheezing  Abdominal: Soft. No distension, tenderness, masses or guarding.   Neurological: A + O x 3.  Cranial Nerves II-XII grossly intact.  Extremities: JONNY x 4, Warm. No clubbing.  No cyanosis.    Skin: Pink, warm and dry.  No visible rashes noted.  Psychiatric:  Normal mood and affect  Back:  No left CVA tenderness.  No right CVA tenderness.  Genitourinary:  Nonpalpable bladder    Labs  Results for FREDI BASILIO (MRN [id number]) as of 7/9/2020 14:45   3/17/2020 11:21   PSA 3.756 (H)     Results for FREDI BASILIO (MRN [id number]) as of 6/18/2018 08:56   5/31/2018 10:57   PSA 3.771 (H)     Results for FREDI BASILIO (MRN [id number]) as of 6/18/2018 08:56   2/2/2015 12:19 4/12/2016 18:13 4/13/2017 09:14   PSA 2.820 3.073 2.921     Post-Void Residual  A post-void residual was measured by ultrasonic bladder scanner.  90 mL (previously recorded)    Assessment & Plan  Mr. Basilio is a 75 year old male who follows up with stable PSA.  Age adjusted PSA is considered normal.  Previously recorded PVR was low.  Follow up as needed.

## 2020-07-21 ENCOUNTER — HOSPITAL ENCOUNTER (OUTPATIENT)
Dept: GENERAL RADIOLOGY | Facility: OTHER | Age: 75
Discharge: HOME | End: 2020-07-21
Attending: INTERNAL MEDICINE | Admitting: INTERNAL MEDICINE
Payer: MEDICARE

## 2020-07-21 DIAGNOSIS — M47.816 LUMBAR FACET ARTHROPATHY: ICD-10-CM

## 2020-07-21 PROCEDURE — 64493 INJ PARAVERT F JNT L/S 1 LEV: CPT

## 2020-07-21 PROCEDURE — 25000128 H RX IP 250 OP 636: Performed by: RADIOLOGY

## 2020-07-21 PROCEDURE — 25500064 ZZH RX 255 OP 636: Performed by: RADIOLOGY

## 2020-07-21 PROCEDURE — 25000125 ZZHC RX 250: Performed by: RADIOLOGY

## 2020-07-21 RX ORDER — LIDOCAINE HYDROCHLORIDE 10 MG/ML
6 INJECTION, SOLUTION INFILTRATION; PERINEURAL ONCE
Status: COMPLETED | OUTPATIENT
Start: 2020-07-21 | End: 2020-07-21

## 2020-07-21 RX ORDER — TRIAMCINOLONE ACETONIDE 40 MG/ML
80 INJECTION, SUSPENSION INTRA-ARTICULAR; INTRAMUSCULAR ONCE
Status: COMPLETED | OUTPATIENT
Start: 2020-07-21 | End: 2020-07-21

## 2020-07-21 RX ORDER — BUPIVACAINE HYDROCHLORIDE 5 MG/ML
4 INJECTION, SOLUTION EPIDURAL; INTRACAUDAL ONCE
Status: COMPLETED | OUTPATIENT
Start: 2020-07-21 | End: 2020-07-21

## 2020-07-21 RX ADMIN — IOHEXOL 2 ML: 240 INJECTION, SOLUTION INTRATHECAL; INTRAVASCULAR; INTRAVENOUS; ORAL at 12:46

## 2020-07-21 RX ADMIN — BUPIVACAINE HYDROCHLORIDE 2 ML: 5 INJECTION, SOLUTION EPIDURAL; INTRACAUDAL at 12:46

## 2020-07-21 RX ADMIN — LIDOCAINE HYDROCHLORIDE 4 ML: 10 INJECTION, SOLUTION INFILTRATION; PERINEURAL at 12:47

## 2020-07-21 RX ADMIN — TRIAMCINOLONE ACETONIDE 40 MG: 40 INJECTION, SUSPENSION INTRA-ARTICULAR; INTRAMUSCULAR at 12:47

## 2020-09-17 ENCOUNTER — TELEPHONE (OUTPATIENT)
Dept: INTERNAL MEDICINE | Facility: OTHER | Age: 75
End: 2020-09-17

## 2020-09-17 ENCOUNTER — OFFICE VISIT (OUTPATIENT)
Dept: INTERNAL MEDICINE | Facility: OTHER | Age: 75
End: 2020-09-17
Attending: INTERNAL MEDICINE
Payer: MEDICARE

## 2020-09-17 VITALS
HEART RATE: 60 BPM | HEIGHT: 67 IN | BODY MASS INDEX: 30.02 KG/M2 | SYSTOLIC BLOOD PRESSURE: 112 MMHG | DIASTOLIC BLOOD PRESSURE: 62 MMHG | WEIGHT: 191.25 LBS | TEMPERATURE: 98 F | RESPIRATION RATE: 16 BRPM

## 2020-09-17 DIAGNOSIS — I25.10 CORONARY ARTERY DISEASE INVOLVING NATIVE CORONARY ARTERY OF NATIVE HEART WITHOUT ANGINA PECTORIS: ICD-10-CM

## 2020-09-17 DIAGNOSIS — I10 BENIGN ESSENTIAL HYPERTENSION: ICD-10-CM

## 2020-09-17 DIAGNOSIS — M79.674 PAIN DUE TO ONYCHOMYCOSIS OF TOENAILS OF BOTH FEET: Primary | ICD-10-CM

## 2020-09-17 DIAGNOSIS — R41.3 MEMORY LOSS: ICD-10-CM

## 2020-09-17 DIAGNOSIS — H04.123 CHRONICALLY DRY EYES, BILATERAL: ICD-10-CM

## 2020-09-17 DIAGNOSIS — R80.9 MICROALBUMINURIA: ICD-10-CM

## 2020-09-17 DIAGNOSIS — M54.50 CHRONIC LEFT-SIDED LOW BACK PAIN WITHOUT SCIATICA: ICD-10-CM

## 2020-09-17 DIAGNOSIS — E11.21 TYPE 2 DIABETES MELLITUS WITH DIABETIC NEPHROPATHY, WITHOUT LONG-TERM CURRENT USE OF INSULIN (H): ICD-10-CM

## 2020-09-17 DIAGNOSIS — E78.2 MIXED HYPERLIPIDEMIA: ICD-10-CM

## 2020-09-17 DIAGNOSIS — E53.8 VITAMIN B12 DEFICIENCY: ICD-10-CM

## 2020-09-17 DIAGNOSIS — R53.81 MALAISE AND FATIGUE: ICD-10-CM

## 2020-09-17 DIAGNOSIS — Z95.5 STATUS POST INSERTION OF DRUG ELUTING CORONARY ARTERY STENT: ICD-10-CM

## 2020-09-17 DIAGNOSIS — M79.675 PAIN DUE TO ONYCHOMYCOSIS OF TOENAILS OF BOTH FEET: Primary | ICD-10-CM

## 2020-09-17 DIAGNOSIS — E11.40 CONTROLLED TYPE 2 DIABETES MELLITUS WITH DIABETIC NEUROPATHY, WITHOUT LONG-TERM CURRENT USE OF INSULIN (H): ICD-10-CM

## 2020-09-17 DIAGNOSIS — B35.1 PAIN DUE TO ONYCHOMYCOSIS OF TOENAILS OF BOTH FEET: Primary | ICD-10-CM

## 2020-09-17 DIAGNOSIS — F33.42 RECURRENT MAJOR DEPRESSIVE DISORDER, IN FULL REMISSION (H): ICD-10-CM

## 2020-09-17 DIAGNOSIS — R53.83 MALAISE AND FATIGUE: ICD-10-CM

## 2020-09-17 DIAGNOSIS — G89.29 CHRONIC LEFT-SIDED LOW BACK PAIN WITHOUT SCIATICA: ICD-10-CM

## 2020-09-17 LAB
ALBUMIN SERPL-MCNC: 4.4 G/DL (ref 3.5–5.7)
ALBUMIN UR-MCNC: NEGATIVE MG/DL
ALP SERPL-CCNC: 67 U/L (ref 34–104)
ALT SERPL W P-5'-P-CCNC: 13 U/L (ref 7–52)
ANION GAP SERPL CALCULATED.3IONS-SCNC: 7 MMOL/L (ref 3–14)
APPEARANCE UR: CLEAR
AST SERPL W P-5'-P-CCNC: 14 U/L (ref 13–39)
BILIRUB SERPL-MCNC: 0.7 MG/DL (ref 0.3–1)
BILIRUB UR QL STRIP: NEGATIVE
BUN SERPL-MCNC: 16 MG/DL (ref 7–25)
CALCIUM SERPL-MCNC: 9.2 MG/DL (ref 8.6–10.3)
CHLORIDE SERPL-SCNC: 104 MMOL/L (ref 98–107)
CHOLEST SERPL-MCNC: 143 MG/DL
CO2 SERPL-SCNC: 29 MMOL/L (ref 21–31)
COLOR UR AUTO: NORMAL
CREAT SERPL-MCNC: 0.82 MG/DL (ref 0.7–1.3)
CREAT UR-MCNC: 74 MG/DL
ERYTHROCYTE [DISTWIDTH] IN BLOOD BY AUTOMATED COUNT: 13.2 % (ref 10–15)
GFR SERPL CREATININE-BSD FRML MDRD: >90 ML/MIN/{1.73_M2}
GLUCOSE SERPL-MCNC: 122 MG/DL (ref 70–105)
GLUCOSE UR STRIP-MCNC: NEGATIVE MG/DL
HBA1C MFR BLD: 6.5 % (ref 4–6)
HCT VFR BLD AUTO: 44.3 % (ref 40–53)
HDLC SERPL-MCNC: 45 MG/DL (ref 23–92)
HGB BLD-MCNC: 14.4 G/DL (ref 13.3–17.7)
HGB UR QL STRIP: NEGATIVE
KETONES UR STRIP-MCNC: NEGATIVE MG/DL
LDLC SERPL CALC-MCNC: 84 MG/DL
LEUKOCYTE ESTERASE UR QL STRIP: NEGATIVE
MCH RBC QN AUTO: 29.8 PG (ref 26.5–33)
MCHC RBC AUTO-ENTMCNC: 32.5 G/DL (ref 31.5–36.5)
MCV RBC AUTO: 92 FL (ref 78–100)
MICROALBUMIN UR-MCNC: 36 MG/L
MICROALBUMIN/CREAT UR: 48.71 MG/G CR (ref 0–17)
NITRATE UR QL: NEGATIVE
NONHDLC SERPL-MCNC: 98 MG/DL
PH UR STRIP: 7 PH (ref 5–7)
PLATELET # BLD AUTO: 207 10E9/L (ref 150–450)
POTASSIUM SERPL-SCNC: 3.9 MMOL/L (ref 3.5–5.1)
PROT SERPL-MCNC: 6.7 G/DL (ref 6.4–8.9)
RBC # BLD AUTO: 4.84 10E12/L (ref 4.4–5.9)
SODIUM SERPL-SCNC: 140 MMOL/L (ref 134–144)
SOURCE: NORMAL
SP GR UR STRIP: 1.02 (ref 1–1.03)
TRIGL SERPL-MCNC: 70 MG/DL
TSH SERPL DL<=0.05 MIU/L-ACNC: 3.27 IU/ML (ref 0.34–5.6)
UROBILINOGEN UR STRIP-MCNC: NORMAL MG/DL (ref 0–2)
VIT B12 SERPL-MCNC: 899 PG/ML (ref 180–914)
WBC # BLD AUTO: 5.5 10E9/L (ref 4–11)

## 2020-09-17 PROCEDURE — 36415 COLL VENOUS BLD VENIPUNCTURE: CPT | Mod: ZL | Performed by: INTERNAL MEDICINE

## 2020-09-17 PROCEDURE — G0463 HOSPITAL OUTPT CLINIC VISIT: HCPCS | Mod: 25

## 2020-09-17 PROCEDURE — 83036 HEMOGLOBIN GLYCOSYLATED A1C: CPT | Mod: ZL | Performed by: INTERNAL MEDICINE

## 2020-09-17 PROCEDURE — 81003 URINALYSIS AUTO W/O SCOPE: CPT | Mod: ZL | Performed by: INTERNAL MEDICINE

## 2020-09-17 PROCEDURE — 25000128 H RX IP 250 OP 636: Performed by: INTERNAL MEDICINE

## 2020-09-17 PROCEDURE — G0463 HOSPITAL OUTPT CLINIC VISIT: HCPCS

## 2020-09-17 PROCEDURE — 82043 UR ALBUMIN QUANTITATIVE: CPT | Mod: ZL | Performed by: INTERNAL MEDICINE

## 2020-09-17 PROCEDURE — 96372 THER/PROPH/DIAG INJ SC/IM: CPT

## 2020-09-17 PROCEDURE — 80061 LIPID PANEL: CPT | Mod: ZL | Performed by: INTERNAL MEDICINE

## 2020-09-17 PROCEDURE — 85027 COMPLETE CBC AUTOMATED: CPT | Mod: ZL | Performed by: INTERNAL MEDICINE

## 2020-09-17 PROCEDURE — 84443 ASSAY THYROID STIM HORMONE: CPT | Mod: ZL | Performed by: INTERNAL MEDICINE

## 2020-09-17 PROCEDURE — 82607 VITAMIN B-12: CPT | Mod: ZL | Performed by: INTERNAL MEDICINE

## 2020-09-17 PROCEDURE — 99214 OFFICE O/P EST MOD 30 MIN: CPT | Performed by: INTERNAL MEDICINE

## 2020-09-17 PROCEDURE — 80053 COMPREHEN METABOLIC PANEL: CPT | Mod: ZL | Performed by: INTERNAL MEDICINE

## 2020-09-17 RX ORDER — CYANOCOBALAMIN 1000 UG/ML
1000 INJECTION, SOLUTION INTRAMUSCULAR; SUBCUTANEOUS ONCE
Status: COMPLETED | OUTPATIENT
Start: 2020-09-17 | End: 2020-09-17

## 2020-09-17 RX ORDER — RIVASTIGMINE TARTRATE 1.5 MG/1
CAPSULE ORAL
Qty: 888 CAPSULE | Refills: 0 | Status: SHIPPED | OUTPATIENT
Start: 2020-09-17 | End: 2020-12-18

## 2020-09-17 RX ORDER — TERBINAFINE HYDROCHLORIDE 250 MG/1
250 TABLET ORAL DAILY
Qty: 90 TABLET | Refills: 2 | Status: SHIPPED | OUTPATIENT
Start: 2020-09-17 | End: 2020-11-23

## 2020-09-17 RX ORDER — MEMANTINE HYDROCHLORIDE 5 MG/1
TABLET ORAL
Qty: 402 TABLET | Refills: 0 | Status: SHIPPED | OUTPATIENT
Start: 2020-09-17 | End: 2020-12-18

## 2020-09-17 RX ADMIN — CYANOCOBALAMIN 1000 MCG: 1000 INJECTION, SOLUTION INTRAMUSCULAR at 08:39

## 2020-09-17 ASSESSMENT — ENCOUNTER SYMPTOMS
PALPITATIONS: 0
AGITATION: 0
LIGHT-HEADEDNESS: 1
CHILLS: 0
COUGH: 0
FATIGUE: 1
NAUSEA: 0
EYE REDNESS: 1
DIARRHEA: 0
BRUISES/BLEEDS EASILY: 0
MYALGIAS: 0
HEMATURIA: 0
ABDOMINAL PAIN: 0
SHORTNESS OF BREATH: 0
BACK PAIN: 1
DIZZINESS: 1
WHEEZING: 0
ARTHRALGIAS: 0
FEVER: 0
DYSURIA: 0
VOMITING: 0
EYE PAIN: 1

## 2020-09-17 ASSESSMENT — PATIENT HEALTH QUESTIONNAIRE - PHQ9
5. POOR APPETITE OR OVEREATING: NOT AT ALL
SUM OF ALL RESPONSES TO PHQ QUESTIONS 1-9: 0

## 2020-09-17 ASSESSMENT — ANXIETY QUESTIONNAIRES
6. BECOMING EASILY ANNOYED OR IRRITABLE: NOT AT ALL
GAD7 TOTAL SCORE: 0
5. BEING SO RESTLESS THAT IT IS HARD TO SIT STILL: NOT AT ALL
IF YOU CHECKED OFF ANY PROBLEMS ON THIS QUESTIONNAIRE, HOW DIFFICULT HAVE THESE PROBLEMS MADE IT FOR YOU TO DO YOUR WORK, TAKE CARE OF THINGS AT HOME, OR GET ALONG WITH OTHER PEOPLE: NOT DIFFICULT AT ALL
3. WORRYING TOO MUCH ABOUT DIFFERENT THINGS: NOT AT ALL
1. FEELING NERVOUS, ANXIOUS, OR ON EDGE: NOT AT ALL
7. FEELING AFRAID AS IF SOMETHING AWFUL MIGHT HAPPEN: NOT AT ALL
2. NOT BEING ABLE TO STOP OR CONTROL WORRYING: NOT AT ALL

## 2020-09-17 ASSESSMENT — MIFFLIN-ST. JEOR: SCORE: 1543.19

## 2020-09-17 ASSESSMENT — PAIN SCALES - GENERAL: PAINLEVEL: SEVERE PAIN (6)

## 2020-09-17 NOTE — TELEPHONE ENCOUNTER
START:   - memantine (NAMENDA) 5 MG tablet; Take 1 tablet (5 mg) by mouth daily for 7 days, THEN 1 tablet (5 mg) 2 times daily for 7 days, THEN 1 tablet (5 mg) 3 times daily for 7 days, THEN 2 tablets (10 mg) 2 times daily.  Dispense: 402 tablet; Refill: 0     After about 2 weeks, start:  - rivastigmine (EXELON) 1.5 MG capsule; Take 1 capsule (1.5 mg) by mouth 2 times daily for 14 days, THEN 2 capsules (3 mg) 2 times daily for 14 days, THEN 3 capsules (4.5 mg) 2 times daily for 14 days, THEN 4 capsules (6 mg) 2 times daily.  Dispense: 888 capsule; Refill: 0       Pharmacy notified of information provided in AVS.  Pharmacy has to tweak the strengths a little in order for insurance.  Britta Roche LPN 9/17/2020 2:08 PM

## 2020-09-17 NOTE — NURSING NOTE
"Patient presents to the clinic for diabetes management and declines a refill on test strips at this time.      Previous A1C is at goal of <8  Lab Results   Component Value Date    A1C 6.5 09/17/2020    A1C 6.4 03/17/2020    A1C 6.2 03/19/2019    A1C 6.2 04/20/2018     Urine microalbumin:creatine: n/a  Foot exam unknown-declines   Eye exam Summer 2020    Tobacco User no  Patient is on a daily aspirin  Patient is on a Statin.  Blood pressure today of:     BP Readings from Last 1 Encounters:   07/09/20 130/78      is at the goal of <139/89 for diabetics.    Chief Complaint   Patient presents with     Diabetes       Initial /62 (BP Location: Right arm, Patient Position: Sitting, Cuff Size: Adult Regular)   Pulse 60   Temp 98  F (36.7  C) (Tympanic)   Resp 16   Ht 1.689 m (5' 6.5\")   Wt 86.8 kg (191 lb 4 oz)   BMI 30.41 kg/m   Estimated body mass index is 30.41 kg/m  as calculated from the following:    Height as of this encounter: 1.689 m (5' 6.5\").    Weight as of this encounter: 86.8 kg (191 lb 4 oz).  Medication Reconciliation: complete    Britta Roche LPN    "

## 2020-09-17 NOTE — PATIENT INSTRUCTIONS
1. Pain due to onychomycosis of toenails of both feet  START:   - terbinafine (LAMISIL) 250 MG tablet; Take 1 tablet (250 mg) by mouth daily - for toenail fungus  Dispense: 90 tablet; Refill: 2    2. Controlled type 2 diabetes mellitus with diabetic neuropathy, without long-term current use of insulin (H)    To help with weight loss and improve blood sugar control....    -- Try to avoid Carbohydrates as much as possible -- breads, pasta, baked goods, cakes, oatmeal, cold cereal, potatoes.   These are turned to sugar in one metabolic conversion, cause insulin secretion and increased fat deposition / weight gain.      -- Eat more lean meats, proteins, eggs, nuts, vegetables.       3. Microalbuminuria    4. Vitamin B12 deficiency  - Vitamin B12  - cyanocobalamin injection 1,000 mcg -- B12 shot today.     5. Memory loss    START:   - memantine (NAMENDA) 5 MG tablet; Take 1 tablet (5 mg) by mouth daily for 7 days, THEN 1 tablet (5 mg) 2 times daily for 7 days, THEN 1 tablet (5 mg) 3 times daily for 7 days, THEN 2 tablets (10 mg) 2 times daily.  Dispense: 402 tablet; Refill: 0    After about 2 weeks, start:  - rivastigmine (EXELON) 1.5 MG capsule; Take 1 capsule (1.5 mg) by mouth 2 times daily for 14 days, THEN 2 capsules (3 mg) 2 times daily for 14 days, THEN 3 capsules (4.5 mg) 2 times daily for 14 days, THEN 4 capsules (6 mg) 2 times daily.  Dispense: 888 capsule; Refill: 0    6. Chronic left-sided low back pain without sciatica  -- consider topical lidocaine patch or gel, Diclofenac gel for pain relief.     7. Chronically dry eyes, bilateral  -- continue eye drops.     8. Coronary artery disease involving native coronary artery of native heart without angina pectoris  9. Status post insertion of drug eluting coronary artery stent - x2 stents - 1st [facility] - 5/17/2018 - [facility]  -- Appears stable.     10. Benign essential hypertension  -- currently controlled.     11. Mixed hyperlipidemia  -- controlled.      12. Recurrent major depressive disorder, in full remission (H)  -- controlled.     Return for Diabetes labs and clinic follow-up appointment every 3 to 4 months.  --- (Go for about 91 to 100 days)    Aspects of Diabetes we can improve:  Hemoglobin A1c Lab Results   Component Value Date    A1C 6.5 09/17/2020    A1C 6.4 03/17/2020    A1C 6.2 03/19/2019    A1C 6.2 04/20/2018    Goal range is under 8. Best is 6.5 to 7   Blood Pressure 112/62 Goal to keep less than 140/90   Tobacco  reports that he quit smoking about 36 years ago. His smoking use included cigarettes. He has never used smokeless tobacco. Goal to abstain from tobacco   Eye Exam -- Do Yearly -- Annual diabetic eye exam   Healthy weight Body mass index is 30.41 kg/m . Goal BMI under 30, best is under 25.      -- Trying to exercise daily (goal at least 20 min/day) with moderate aerobic activity   -- Eat healthy (resources from ADA at http://www.diabetes.org/)   -- Taking good care of my feet. Consider seeing the Podiatrist   -- Check blood sugars as directed, record in log book and bring to every appointment      Schedule lab only appointment --- A few days AFTER: 12/16/20   Schedule clinic appointment with Dr. Pickens -- Same day as labs, or 1-2 days later.     Insurance companies are now grading you and I on your blood sugar control -- Goal is to get your A1c down to 7.9% or lower and NO Smoking!    -- Medicare and most insurance companies, will only cover Hemoglobin A1c labs to be rechecked every 91+ days.    Next follow-up appointment with Dr. Pickens should be scheduled:  -- Approximately a few days AFTER: 12/16/20        Results for orders placed or performed in visit on 09/17/20   Lipid Profile     Status: None   Result Value Ref Range    Cholesterol 143 <200 mg/dL    Triglycerides 70 <150 mg/dL    HDL Cholesterol 45 23 - 92 mg/dL    LDL Cholesterol Calculated 84 <100 mg/dL    Non HDL Cholesterol 98 <130 mg/dL   Comprehensive metabolic panel      Status: Abnormal   Result Value Ref Range    Sodium 140 134 - 144 mmol/L    Potassium 3.9 3.5 - 5.1 mmol/L    Chloride 104 98 - 107 mmol/L    Carbon Dioxide 29 21 - 31 mmol/L    Anion Gap 7 3 - 14 mmol/L    Glucose 122 (H) 70 - 105 mg/dL    Urea Nitrogen 16 7 - 25 mg/dL    Creatinine 0.82 0.70 - 1.30 mg/dL    GFR Estimate >90 >60 mL/min/[1.73_m2]    GFR Estimate If Black >90 >60 mL/min/[1.73_m2]    Calcium 9.2 8.6 - 10.3 mg/dL    Bilirubin Total 0.7 0.3 - 1.0 mg/dL    Albumin 4.4 3.5 - 5.7 g/dL    Protein Total 6.7 6.4 - 8.9 g/dL    Alkaline Phosphatase 67 34 - 104 U/L    ALT 13 7 - 52 U/L    AST 14 13 - 39 U/L   CBC with platelets     Status: None   Result Value Ref Range    WBC 5.5 4.0 - 11.0 10e9/L    RBC Count 4.84 4.4 - 5.9 10e12/L    Hemoglobin 14.4 13.3 - 17.7 g/dL    Hematocrit 44.3 40.0 - 53.0 %    MCV 92 78 - 100 fl    MCH 29.8 26.5 - 33.0 pg    MCHC 32.5 31.5 - 36.5 g/dL    RDW 13.2 10.0 - 15.0 %    Platelet Count 207 150 - 450 10e9/L   Hemoglobin A1c     Status: Abnormal   Result Value Ref Range    Hemoglobin A1C 6.5 (H) 4.0 - 6.0 %   UA reflex to Microscopic     Status: None   Result Value Ref Range    Color Urine Light Yellow     Appearance Urine Clear     Glucose Urine Negative NEG^Negative mg/dL    Bilirubin Urine Negative NEG^Negative    Ketones Urine Negative NEG^Negative mg/dL    Specific Gravity Urine 1.016 1.003 - 1.035    Blood Urine Negative NEG^Negative    pH Urine 7.0 5.0 - 7.0 pH    Protein Albumin Urine Negative NEG^Negative mg/dL    Urobilinogen mg/dL Normal 0.0 - 2.0 mg/dL    Nitrite Urine Negative NEG^Negative    Leukocyte Esterase Urine Negative NEG^Negative    Source Midstream Urine

## 2020-09-17 NOTE — LETTER
September 17, 2020      Dajuan Basilio  [address] [address]        Dear ,    We are writing to inform you of your test results.    Your current vitamin B12 blood level is doing quite well.  Continue oral replacement.    If the vitamin B12 shot does improve your symptoms we could try this again in 1 or 2 months.    Labs are otherwise stable. Continue current medications.   Plan to recheck labs again in 3 to 4 months.     Cm Pickens MD     Results for orders placed or performed in visit on 09/17/20   Vitamin B12     Status: None   Result Value Ref Range    Vitamin B12 899 180 - 914 pg/mL   Results for orders placed or performed in visit on 09/17/20   TSH Reflex GH     Status: None   Result Value Ref Range    TSH Reflex 3.27 0.34 - 5.60 IU/mL   Lipid Profile     Status: None   Result Value Ref Range    Cholesterol 143 <200 mg/dL    Triglycerides 70 <150 mg/dL    HDL Cholesterol 45 23 - 92 mg/dL    LDL Cholesterol Calculated 84 <100 mg/dL    Non HDL Cholesterol 98 <130 mg/dL   Comprehensive metabolic panel     Status: Abnormal   Result Value Ref Range    Sodium 140 134 - 144 mmol/L    Potassium 3.9 3.5 - 5.1 mmol/L    Chloride 104 98 - 107 mmol/L    Carbon Dioxide 29 21 - 31 mmol/L    Anion Gap 7 3 - 14 mmol/L    Glucose 122 (H) 70 - 105 mg/dL    Urea Nitrogen 16 7 - 25 mg/dL    Creatinine 0.82 0.70 - 1.30 mg/dL    GFR Estimate >90 >60 mL/min/[1.73_m2]    GFR Estimate If Black >90 >60 mL/min/[1.73_m2]    Calcium 9.2 8.6 - 10.3 mg/dL    Bilirubin Total 0.7 0.3 - 1.0 mg/dL    Albumin 4.4 3.5 - 5.7 g/dL    Protein Total 6.7 6.4 - 8.9 g/dL    Alkaline Phosphatase 67 34 - 104 U/L    ALT 13 7 - 52 U/L    AST 14 13 - 39 U/L   CBC with platelets     Status: None   Result Value Ref Range    WBC 5.5 4.0 - 11.0 10e9/L    RBC Count 4.84 4.4 - 5.9 10e12/L    Hemoglobin 14.4 13.3 - 17.7 g/dL    Hematocrit 44.3 40.0 - 53.0 %    MCV 92 78 - 100 fl    MCH 29.8 26.5 - 33.0 pg    MCHC 32.5 31.5 -  36.5 g/dL    RDW 13.2 10.0 - 15.0 %    Platelet Count 207 150 - 450 10e9/L   Hemoglobin A1c     Status: Abnormal   Result Value Ref Range    Hemoglobin A1C 6.5 (H) 4.0 - 6.0 %   UA reflex to Microscopic     Status: None   Result Value Ref Range    Color Urine Light Yellow     Appearance Urine Clear     Glucose Urine Negative NEG^Negative mg/dL    Bilirubin Urine Negative NEG^Negative    Ketones Urine Negative NEG^Negative mg/dL    Specific Gravity Urine 1.016 1.003 - 1.035    Blood Urine Negative NEG^Negative    pH Urine 7.0 5.0 - 7.0 pH    Protein Albumin Urine Negative NEG^Negative mg/dL    Urobilinogen mg/dL Normal 0.0 - 2.0 mg/dL    Nitrite Urine Negative NEG^Negative    Leukocyte Esterase Urine Negative NEG^Negative    Source Midstream Urine         If you have any questions or concerns, please call the clinic at the number listed above.       Sincerely,        Cm Pickens MD

## 2020-09-17 NOTE — NURSING NOTE
Clinic Administered Medication Documentation      Injectable Medication Documentation    Patient was given Cyanocobalamin (B-12). Prior to medication administration, verified patients identity using patient s name and date of birth. Please see MAR and medication order for additional information. Patient instructed to remain in clinic for 15 minutes.      Was entire vial of medication used? Yes  Vial/Syringe: Single dose vial  Expiration Date:  09/2021  Was this medication supplied by the patient? No     Britta Roche LPN 9/17/2020 8:40 AM

## 2020-09-17 NOTE — PROGRESS NOTES
"Nursing Notes:   Britta Roche LPN  9/17/2020  8:12 AM  Signed  Patient presents to the clinic for diabetes management and declines a refill on test strips at this time.      Previous A1C is at goal of <8  Lab Results   Component Value Date    A1C 6.5 09/17/2020    A1C 6.4 03/17/2020    A1C 6.2 03/19/2019    A1C 6.2 04/20/2018     Urine microalbumin:creatine: n/a  Foot exam unknown-declines   Eye exam Summer 2020    Tobacco User no  Patient is on a daily aspirin  Patient is on a Statin.  Blood pressure today of:     BP Readings from Last 1 Encounters:   07/09/20 130/78      is at the goal of <139/89 for diabetics.    Chief Complaint   Patient presents with     Diabetes       Initial /62 (BP Location: Right arm, Patient Position: Sitting, Cuff Size: Adult Regular)   Pulse 60   Temp 98  F (36.7  C) (Tympanic)   Resp 16   Ht 1.689 m (5' 6.5\")   Wt 86.8 kg (191 lb 4 oz)   BMI 30.41 kg/m   Estimated body mass index is 30.41 kg/m  as calculated from the following:    Height as of this encounter: 1.689 m (5' 6.5\").    Weight as of this encounter: 86.8 kg (191 lb 4 oz).  Medication Reconciliation: complete    CAMERON Angel Amy M., LPN  9/17/2020  8:40 AM  Signed  Clinic Administered Medication Documentation      Injectable Medication Documentation    Patient was given Cyanocobalamin (B-12). Prior to medication administration, verified patients identity using patient s name and date of birth. Please see MAR and medication order for additional information. Patient instructed to remain in clinic for 15 minutes.      Was entire vial of medication used? Yes  Vial/Syringe: Single dose vial  Expiration Date:  09/2021  Was this medication supplied by the patient? No     Britta Roche LPN 9/17/2020 8:40 AM        Nursing note reviewed with patient.  Accuracy and completeness verified.   Mr. Basilio is a 75 year old male who:  Patient presents with:  Diabetes      ICD-10-CM    1. Pain due to " onychomycosis of toenails of both feet  B35.1 terbinafine (LAMISIL) 250 MG tablet    M79.675     M79.674    2. Controlled type 2 diabetes mellitus with diabetic neuropathy, without long-term current use of insulin (H)  E11.40    3. Microalbuminuria  R80.9    4. Vitamin B12 deficiency  E53.8 Vitamin B12     cyanocobalamin injection 1,000 mcg   5. Memory loss  R41.3 memantine (NAMENDA) 5 MG tablet     rivastigmine (EXELON) 1.5 MG capsule   6. Chronic left-sided low back pain without sciatica  M54.5     G89.29    7. Chronically dry eyes, bilateral  H04.123    8. Coronary artery disease involving native coronary artery of native heart without angina pectoris  I25.10    9. Status post insertion of drug eluting coronary artery stent - x2 stents - 1st OMB - 5/17/2018 - [facility]  Z95.5    10. Benign essential hypertension  I10    11. Mixed hyperlipidemia  E78.2    12. Recurrent major depressive disorder, in full remission (H)  F33.42      HPI  Patient comes in for follow-up of multiple issues.    He has painful toenails on both feet that hurt due to onychomycosis.  He has to grind them down with his  in order to get him to not be so painful and have some much pressure when he wears his shoes.  He would like to start antifungal treatment.  Prescription for Lamisil sent to pharmacy.    Type 2 diabetes, has diabetic neuropathy.  This is been a chronic issue.  Labs are stable.  Denies hypoglycemia.  Managing with metformin and diet.    Urine microalbumin, recheck urinalysis today.    Vitamin B12 deficiency, ongoing.  Has been taking oral replacement but he would like a B12 shot today.  Check B12 levels as well.    Memory loss, has been worsening.  He would like to start memory medications.  Start Namenda to begin with then Exelon.  Slow titration.  See instructions.    Chronic left-sided low back pain, localized.  Has had back injections in the past.  He has problems sleeping due to pain.  He has to sleep in  his recliner.  This is been an ongoing problem.  Encouraged trial of lidocaine patches, diclofenac gel.    Chronic dry eye.  Continues to use eyedrops.  Had talked with eye surgeon about this but he would end up having some type of lift or  his lower lid and then would have to see 3 slits rather than being able to open his eye fully so he declined.    Coronary artery disease, history of stenting, last angiogram about 2 years ago.  Currently stable.  Continues on aspirin, simvastatin, Plavix.  Denies exertional chest pain or heaviness.    Hypertension, blood pressures are currently controlled.  Doing well with current medication regimen.    Mixed hyperlipidemia, cholesterol levels are stable.  Continue simvastatin.    History of depression, currently in remission.  Does report having some anxiety.    Functional Capacity: > 4 METS.   Review of Systems   Constitutional: Positive for fatigue. Negative for chills and fever.   HENT: Negative for congestion and hearing loss.    Eyes: Positive for pain and redness. Negative for visual disturbance.        + chronic dry eyes, tried many eye drops, not much improvement - told needs eye surgery   Respiratory: Negative for cough, shortness of breath and wheezing.    Cardiovascular: Negative for chest pain and palpitations.        Cardiac stress testing 3/2020 - negative/normal results.   Gastrointestinal: Negative for abdominal pain, diarrhea, nausea and vomiting.   Endocrine: Negative for cold intolerance and heat intolerance.   Genitourinary: Negative for dysuria and hematuria.   Musculoskeletal: Positive for back pain (Left > right low back pain -- focused over SI joints, can't sleep in bed.). Negative for arthralgias and myalgias.   Skin: Negative for pallor.        Painful Toenail onychomycosis   Allergic/Immunologic: Negative for immunocompromised state.   Neurological: Positive for dizziness and light-headedness.   Hematological: Does not bruise/bleed easily.  "  Psychiatric/Behavioral: Negative for agitation.        + short term memory loss      Reviewed and updated as needed this visit by Provider   Tobacco  Allergies  Meds  Problems  Med Hx  Surg Hx  Fam Hx         EXAM:   Vitals:    09/17/20 0757   BP: 112/62   BP Location: Right arm   Patient Position: Sitting   Cuff Size: Adult Regular   Pulse: 60   Resp: 16   Temp: 98  F (36.7  C)   TempSrc: Tympanic   Weight: 86.8 kg (191 lb 4 oz)   Height: 1.689 m (5' 6.5\")       Current Pain Score: Severe Pain (6)     BP Readings from Last 3 Encounters:   09/17/20 112/62   07/09/20 130/78   03/17/20 112/70      Wt Readings from Last 3 Encounters:   09/17/20 86.8 kg (191 lb 4 oz)   07/09/20 85.9 kg (189 lb 6.4 oz)   03/17/20 85.4 kg (188 lb 6 oz)      Estimated body mass index is 30.41 kg/m  as calculated from the following:    Height as of this encounter: 1.689 m (5' 6.5\").    Weight as of this encounter: 86.8 kg (191 lb 4 oz).     Physical Exam  Constitutional:       General: He is not in acute distress.     Appearance: He is well-developed. He is not diaphoretic.   HENT:      Head: Normocephalic and atraumatic.   Eyes:      General: No scleral icterus.     Conjunctiva/sclera: Conjunctivae normal.   Neck:      Musculoskeletal: Neck supple.      Vascular: No carotid bruit.   Cardiovascular:      Rate and Rhythm: Normal rate and regular rhythm.      Pulses: Normal pulses.   Pulmonary:      Effort: Pulmonary effort is normal.      Breath sounds: Normal breath sounds.   Abdominal:      Palpations: Abdomen is soft.      Tenderness: There is no abdominal tenderness.   Musculoskeletal:         General: No deformity.      Right lower leg: No edema.      Left lower leg: No edema.   Lymphadenopathy:      Cervical: No cervical adenopathy.   Skin:     General: Skin is warm and dry.      Findings: No rash.      Comments: Toenail onychomycosis   Neurological:      Mental Status: He is alert and oriented to person, place, and time. " Mental status is at baseline.      Comments: short term memory loss   Psychiatric:         Mood and Affect: Mood normal.         Behavior: Behavior normal.          Procedures   INVESTIGATIONS:  - Labs reviewed in Epic     ASSESSMENT AND PLAN:  Problem List Items Addressed This Visit        Nervous and Auditory    Controlled type 2 diabetes mellitus with diabetic neuropathy, without long-term current use of insulin (H)    Relevant Medications    memantine (NAMENDA) 5 MG tablet    rivastigmine (EXELON) 1.5 MG capsule    Chronic left-sided low back pain without sciatica       Digestive    Vitamin B12 deficiency    Relevant Medications    cyanocobalamin injection 1,000 mcg (Completed)    Other Relevant Orders    Vitamin B12 (Completed)       Endocrine    Mixed hyperlipidemia       Circulatory    Coronary artery disease involving native coronary artery of native heart without angina pectoris    Benign essential hypertension       Infectious/Inflammatory    Chronically dry eyes, bilateral       Behavioral    Recurrent major depressive disorder, in full remission (H)       Other    Microalbuminuria    Status post insertion of drug eluting coronary artery stent - x2 stents - 1st OMB - 5/17/2018 - [facility]    Memory loss    Relevant Medications    memantine (NAMENDA) 5 MG tablet    rivastigmine (EXELON) 1.5 MG capsule      Other Visit Diagnoses     Pain due to onychomycosis of toenails of both feet    -  Primary    Relevant Medications    terbinafine (LAMISIL) 250 MG tablet        reviewed diet, exercise and weight control, recommended sodium restriction, cardiovascular risk and specific lipid/LDL goals reviewed, specific diabetic recommendations low cholesterol diet, weight control and daily exercise discussed, use of aspirin to prevent MI and TIA's discussed  -- Expected clinical course discussed    -- Medications and their side effects discussed    The 10-year ASCVD risk score (Donna CRUZ Jr., et al., 2013) is:  37.3%    Values used to calculate the score:      Age: 75 years      Sex: Male      Is Non- : No      Diabetic: Yes      Tobacco smoker: No      Systolic Blood Pressure: 112 mmHg      Is BP treated: No      HDL Cholesterol: 45 mg/dL      Total Cholesterol: 143 mg/dL    Patient Instructions     1. Pain due to onychomycosis of toenails of both feet  START:   - terbinafine (LAMISIL) 250 MG tablet; Take 1 tablet (250 mg) by mouth daily - for toenail fungus  Dispense: 90 tablet; Refill: 2    2. Controlled type 2 diabetes mellitus with diabetic neuropathy, without long-term current use of insulin (H)    To help with weight loss and improve blood sugar control....    -- Try to avoid Carbohydrates as much as possible -- breads, pasta, baked goods, cakes, oatmeal, cold cereal, potatoes.   These are turned to sugar in one metabolic conversion, cause insulin secretion and increased fat deposition / weight gain.      -- Eat more lean meats, proteins, eggs, nuts, vegetables.       3. Microalbuminuria    4. Vitamin B12 deficiency  - Vitamin B12  - cyanocobalamin injection 1,000 mcg -- B12 shot today.     5. Memory loss    START:   - memantine (NAMENDA) 5 MG tablet; Take 1 tablet (5 mg) by mouth daily for 7 days, THEN 1 tablet (5 mg) 2 times daily for 7 days, THEN 1 tablet (5 mg) 3 times daily for 7 days, THEN 2 tablets (10 mg) 2 times daily.  Dispense: 402 tablet; Refill: 0    After about 2 weeks, start:  - rivastigmine (EXELON) 1.5 MG capsule; Take 1 capsule (1.5 mg) by mouth 2 times daily for 14 days, THEN 2 capsules (3 mg) 2 times daily for 14 days, THEN 3 capsules (4.5 mg) 2 times daily for 14 days, THEN 4 capsules (6 mg) 2 times daily.  Dispense: 888 capsule; Refill: 0    6. Chronic left-sided low back pain without sciatica  -- consider topical lidocaine patch or gel, Diclofenac gel for pain relief.     7. Chronically dry eyes, bilateral  -- continue eye drops.     8. Coronary artery disease involving  native coronary artery of native heart without angina pectoris  9. Status post insertion of drug eluting coronary artery stent - x2 stents - 1st OMB - 5/17/2018 - [facility]  -- Appears stable.     10. Benign essential hypertension  -- currently controlled.     11. Mixed hyperlipidemia  -- controlled.     12. Recurrent major depressive disorder, in full remission (H)  -- controlled.     Return for Diabetes labs and clinic follow-up appointment every 3 to 4 months.  --- (Go for about 91 to 100 days)    Aspects of Diabetes we can improve:  Hemoglobin A1c Lab Results   Component Value Date    A1C 6.5 09/17/2020    A1C 6.4 03/17/2020    A1C 6.2 03/19/2019    A1C 6.2 04/20/2018    Goal range is under 8. Best is 6.5 to 7   Blood Pressure 112/62 Goal to keep less than 140/90   Tobacco  reports that he quit smoking about 36 years ago. His smoking use included cigarettes. He has never used smokeless tobacco. Goal to abstain from tobacco   Eye Exam -- Do Yearly -- Annual diabetic eye exam   Healthy weight Body mass index is 30.41 kg/m . Goal BMI under 30, best is under 25.      -- Trying to exercise daily (goal at least 20 min/day) with moderate aerobic activity   -- Eat healthy (resources from ADA at http://www.diabetes.org/)   -- Taking good care of my feet. Consider seeing the Podiatrist   -- Check blood sugars as directed, record in log book and bring to every appointment      Schedule lab only appointment --- A few days AFTER: 12/16/20   Schedule clinic appointment with Dr. Pickens -- Same day as labs, or 1-2 days later.     Insurance companies are now grading you and I on your blood sugar control -- Goal is to get your A1c down to 7.9% or lower and NO Smoking!    -- Medicare and most insurance companies, will only cover Hemoglobin A1c labs to be rechecked every 91+ days.    Next follow-up appointment with Dr. Pickens should be scheduled:  -- Approximately a few days AFTER: 12/16/20        Results for orders  placed or performed in visit on 09/17/20   Lipid Profile     Status: None   Result Value Ref Range    Cholesterol 143 <200 mg/dL    Triglycerides 70 <150 mg/dL    HDL Cholesterol 45 23 - 92 mg/dL    LDL Cholesterol Calculated 84 <100 mg/dL    Non HDL Cholesterol 98 <130 mg/dL   Comprehensive metabolic panel     Status: Abnormal   Result Value Ref Range    Sodium 140 134 - 144 mmol/L    Potassium 3.9 3.5 - 5.1 mmol/L    Chloride 104 98 - 107 mmol/L    Carbon Dioxide 29 21 - 31 mmol/L    Anion Gap 7 3 - 14 mmol/L    Glucose 122 (H) 70 - 105 mg/dL    Urea Nitrogen 16 7 - 25 mg/dL    Creatinine 0.82 0.70 - 1.30 mg/dL    GFR Estimate >90 >60 mL/min/[1.73_m2]    GFR Estimate If Black >90 >60 mL/min/[1.73_m2]    Calcium 9.2 8.6 - 10.3 mg/dL    Bilirubin Total 0.7 0.3 - 1.0 mg/dL    Albumin 4.4 3.5 - 5.7 g/dL    Protein Total 6.7 6.4 - 8.9 g/dL    Alkaline Phosphatase 67 34 - 104 U/L    ALT 13 7 - 52 U/L    AST 14 13 - 39 U/L   CBC with platelets     Status: None   Result Value Ref Range    WBC 5.5 4.0 - 11.0 10e9/L    RBC Count 4.84 4.4 - 5.9 10e12/L    Hemoglobin 14.4 13.3 - 17.7 g/dL    Hematocrit 44.3 40.0 - 53.0 %    MCV 92 78 - 100 fl    MCH 29.8 26.5 - 33.0 pg    MCHC 32.5 31.5 - 36.5 g/dL    RDW 13.2 10.0 - 15.0 %    Platelet Count 207 150 - 450 10e9/L   Hemoglobin A1c     Status: Abnormal   Result Value Ref Range    Hemoglobin A1C 6.5 (H) 4.0 - 6.0 %   UA reflex to Microscopic     Status: None   Result Value Ref Range    Color Urine Light Yellow     Appearance Urine Clear     Glucose Urine Negative NEG^Negative mg/dL    Bilirubin Urine Negative NEG^Negative    Ketones Urine Negative NEG^Negative mg/dL    Specific Gravity Urine 1.016 1.003 - 1.035    Blood Urine Negative NEG^Negative    pH Urine 7.0 5.0 - 7.0 pH    Protein Albumin Urine Negative NEG^Negative mg/dL    Urobilinogen mg/dL Normal 0.0 - 2.0 mg/dL    Nitrite Urine Negative NEG^Negative    Leukocyte Esterase Urine Negative NEG^Negative    Source Midstream  Danna Pickens MD   Internal Medicine  [facility]     Portions of this note were dictated using speech recognition software. The note has been proofread but errors in the text may have been overlooked. Please contact me if there are any concerns regarding the accuracy of the dictation.

## 2020-09-18 ASSESSMENT — ANXIETY QUESTIONNAIRES: GAD7 TOTAL SCORE: 0

## 2020-11-09 ENCOUNTER — TELEPHONE (OUTPATIENT)
Dept: INTERNAL MEDICINE | Facility: OTHER | Age: 75
End: 2020-11-09

## 2020-11-09 DIAGNOSIS — R30.0 DYSURIA: Primary | ICD-10-CM

## 2020-11-09 DIAGNOSIS — R30.0 DYSURIA: ICD-10-CM

## 2020-11-09 LAB
ALBUMIN UR-MCNC: 10 MG/DL
APPEARANCE UR: ABNORMAL
BACTERIA #/AREA URNS HPF: ABNORMAL /HPF
BILIRUB UR QL STRIP: NEGATIVE
COLOR UR AUTO: ABNORMAL
GLUCOSE UR STRIP-MCNC: 70 MG/DL
HGB UR QL STRIP: ABNORMAL
KETONES UR STRIP-MCNC: NEGATIVE MG/DL
LEUKOCYTE ESTERASE UR QL STRIP: ABNORMAL
MUCOUS THREADS #/AREA URNS LPF: PRESENT /LPF
NITRATE UR QL: NEGATIVE
PH UR STRIP: 7 PH (ref 5–7)
RBC #/AREA URNS AUTO: 27 /HPF (ref 0–2)
SOURCE: ABNORMAL
SP GR UR STRIP: 1.01 (ref 1–1.03)
TRANS CELLS #/AREA URNS HPF: <1 /HPF
UROBILINOGEN UR STRIP-MCNC: NORMAL MG/DL (ref 0–2)
WBC #/AREA URNS AUTO: >182 /HPF (ref 0–5)
WBC CLUMPS #/AREA URNS HPF: PRESENT /HPF

## 2020-11-09 PROCEDURE — 87086 URINE CULTURE/COLONY COUNT: CPT | Mod: ZL | Performed by: INTERNAL MEDICINE

## 2020-11-09 PROCEDURE — 81001 URINALYSIS AUTO W/SCOPE: CPT | Mod: ZL | Performed by: INTERNAL MEDICINE

## 2020-11-09 PROCEDURE — 87088 URINE BACTERIA CULTURE: CPT | Mod: ZL | Performed by: INTERNAL MEDICINE

## 2020-11-09 RX ORDER — SULFAMETHOXAZOLE/TRIMETHOPRIM 800-160 MG
1 TABLET ORAL 2 TIMES DAILY
Qty: 14 TABLET | Refills: 0 | Status: SHIPPED | OUTPATIENT
Start: 2020-11-09 | End: 2020-11-23

## 2020-11-09 NOTE — TELEPHONE ENCOUNTER
Nelia patient with urinary frequency and burning for a few weeks now,worse in the last couple days.  Denies blood, fever or other symptoms.  Wonders if you could order a UA with out being seen.    Cary Gonzalez LPN..........11/9/2020  9:10 AM

## 2020-11-09 NOTE — TELEPHONE ENCOUNTER
Patient thinks he has an UTI and is hoping just to come in and leave a sample without being seen.   Please call patient back.  Thank you  Taylor Manning on 11/9/2020 at 8:09 AM

## 2020-11-11 ENCOUNTER — TELEPHONE (OUTPATIENT)
Dept: INTERNAL MEDICINE | Facility: OTHER | Age: 75
End: 2020-11-11

## 2020-11-11 LAB
BACTERIA SPEC CULT: ABNORMAL
SPECIMEN SOURCE: ABNORMAL

## 2020-11-11 NOTE — TELEPHONE ENCOUNTER
Pt was calling back regarding urine culture results.  I notified him of Brett Olivas MD response.  He states he isn't feeling much different but has only taken it for 2 days.  He will come in before meds are gone if he isn't better.  Taylor Mobley CMA ([facility])......................11/11/2020  12:55 PM

## 2020-11-23 ENCOUNTER — OFFICE VISIT (OUTPATIENT)
Dept: PEDIATRICS | Facility: OTHER | Age: 75
End: 2020-11-23
Attending: INTERNAL MEDICINE
Payer: MEDICARE

## 2020-11-23 ENCOUNTER — TELEPHONE (OUTPATIENT)
Dept: INTERNAL MEDICINE | Facility: OTHER | Age: 75
End: 2020-11-23

## 2020-11-23 VITALS
TEMPERATURE: 98.1 F | SYSTOLIC BLOOD PRESSURE: 142 MMHG | BODY MASS INDEX: 30.1 KG/M2 | HEART RATE: 67 BPM | WEIGHT: 189.3 LBS | OXYGEN SATURATION: 95 % | RESPIRATION RATE: 16 BRPM | DIASTOLIC BLOOD PRESSURE: 70 MMHG

## 2020-11-23 DIAGNOSIS — E11.40 CONTROLLED TYPE 2 DIABETES MELLITUS WITH DIABETIC NEUROPATHY, WITHOUT LONG-TERM CURRENT USE OF INSULIN (H): ICD-10-CM

## 2020-11-23 DIAGNOSIS — R35.0 URINARY FREQUENCY: ICD-10-CM

## 2020-11-23 DIAGNOSIS — G62.9 NEUROPATHY: ICD-10-CM

## 2020-11-23 DIAGNOSIS — N40.1 BENIGN PROSTATIC HYPERPLASIA WITH LOWER URINARY TRACT SYMPTOMS, SYMPTOM DETAILS UNSPECIFIED: ICD-10-CM

## 2020-11-23 DIAGNOSIS — N30.01 ACUTE CYSTITIS WITH HEMATURIA: Primary | ICD-10-CM

## 2020-11-23 LAB
ALBUMIN UR-MCNC: 30 MG/DL
APPEARANCE UR: ABNORMAL
BACTERIA #/AREA URNS HPF: ABNORMAL /HPF
BILIRUB UR QL STRIP: NEGATIVE
COLOR UR AUTO: YELLOW
GLUCOSE UR STRIP-MCNC: NEGATIVE MG/DL
HGB UR QL STRIP: ABNORMAL
KETONES UR STRIP-MCNC: NEGATIVE MG/DL
LEUKOCYTE ESTERASE UR QL STRIP: ABNORMAL
MUCOUS THREADS #/AREA URNS LPF: PRESENT /LPF
NITRATE UR QL: NEGATIVE
PH UR STRIP: 7 PH (ref 5–7)
RBC #/AREA URNS AUTO: 29 /HPF (ref 0–2)
SOURCE: ABNORMAL
SP GR UR STRIP: 1.01 (ref 1–1.03)
UROBILINOGEN UR STRIP-MCNC: NORMAL MG/DL (ref 0–2)
WBC #/AREA URNS AUTO: >182 /HPF (ref 0–5)
WBC CLUMPS #/AREA URNS HPF: PRESENT /HPF

## 2020-11-23 PROCEDURE — 81001 URINALYSIS AUTO W/SCOPE: CPT | Mod: ZL | Performed by: INTERNAL MEDICINE

## 2020-11-23 PROCEDURE — 87086 URINE CULTURE/COLONY COUNT: CPT | Mod: ZL | Performed by: INTERNAL MEDICINE

## 2020-11-23 PROCEDURE — 87088 URINE BACTERIA CULTURE: CPT | Mod: ZL | Performed by: INTERNAL MEDICINE

## 2020-11-23 PROCEDURE — G0463 HOSPITAL OUTPT CLINIC VISIT: HCPCS

## 2020-11-23 PROCEDURE — 99213 OFFICE O/P EST LOW 20 MIN: CPT | Performed by: INTERNAL MEDICINE

## 2020-11-23 RX ORDER — SULFAMETHOXAZOLE/TRIMETHOPRIM 800-160 MG
1 TABLET ORAL 2 TIMES DAILY
Qty: 20 TABLET | Refills: 0 | Status: SHIPPED | OUTPATIENT
Start: 2020-11-23 | End: 2020-12-03

## 2020-11-23 ASSESSMENT — ANXIETY QUESTIONNAIRES
IF YOU CHECKED OFF ANY PROBLEMS ON THIS QUESTIONNAIRE, HOW DIFFICULT HAVE THESE PROBLEMS MADE IT FOR YOU TO DO YOUR WORK, TAKE CARE OF THINGS AT HOME, OR GET ALONG WITH OTHER PEOPLE: NOT DIFFICULT AT ALL
5. BEING SO RESTLESS THAT IT IS HARD TO SIT STILL: NOT AT ALL
2. NOT BEING ABLE TO STOP OR CONTROL WORRYING: NOT AT ALL
GAD7 TOTAL SCORE: 0
1. FEELING NERVOUS, ANXIOUS, OR ON EDGE: NOT AT ALL
6. BECOMING EASILY ANNOYED OR IRRITABLE: NOT AT ALL
3. WORRYING TOO MUCH ABOUT DIFFERENT THINGS: NOT AT ALL
7. FEELING AFRAID AS IF SOMETHING AWFUL MIGHT HAPPEN: NOT AT ALL

## 2020-11-23 ASSESSMENT — PAIN SCALES - GENERAL: PAINLEVEL: MODERATE PAIN (4)

## 2020-11-23 ASSESSMENT — PATIENT HEALTH QUESTIONNAIRE - PHQ9
5. POOR APPETITE OR OVEREATING: NOT AT ALL
SUM OF ALL RESPONSES TO PHQ QUESTIONS 1-9: 0

## 2020-11-23 NOTE — TELEPHONE ENCOUNTER
DJS-pt aware pcp gone today. Pt treated for UTI a couple weeks ago and it has come back. He refused appt and just wants another dr to put in orders for lab and medication. Please call. Thank you.  Yun Contreras

## 2020-11-23 NOTE — PATIENT INSTRUCTIONS
-- Bactrim another 10 days   -- Eat yogurt 1-2 times per day while on antibiotics (and for a few weeks after) to reduce the chances of diarrhea   -- Cranberry juice   -- Stay hydrated   -- Urology consult

## 2020-11-23 NOTE — TELEPHONE ENCOUNTER
Called patient and patient states he has appointment at 12:40 today with Dr. Aranda.  States was treated for UTI a couple weeks ago and now is having burning with urination, frequency, urgency.    Jacklyn Vazquez RN on 11/23/2020 at 12:15 PM

## 2020-11-23 NOTE — NURSING NOTE
"Chief Complaint   Patient presents with     Urinary Frequency     Follow Up     11/9/2020     Patient is here for urinary frequency. There is a telephone encounter from 11/9/2020 where a UA was ordered for dysuria and frequency. Patient was put in Bactrim for 1 week. Patient again has frequency.     Initial BP (!) 142/68 (BP Location: Right arm, Patient Position: Sitting, Cuff Size: Adult Regular)   Pulse 67   Temp 98.1  F (36.7  C) (Tympanic)   Resp 16   Wt 85.9 kg (189 lb 4.8 oz)   SpO2 95%   BMI 30.10 kg/m   Estimated body mass index is 30.1 kg/m  as calculated from the following:    Height as of 9/17/20: 1.689 m (5' 6.5\").    Weight as of this encounter: 85.9 kg (189 lb 4.8 oz).  Medication Reconciliation: complete    Grace Vivar MA  "

## 2020-11-23 NOTE — LETTER
November 27, 2020      Dajuan Basilio  [address]  [facility] [place] [facility] [address]        Dear ,    We are writing to inform you of your test results.    Multiple resistances, but the bactrim should be effective. Return if you are not improving.    Signed, Mic Aranda MD, FAAP, FACP  Internal Medicine & Pediatrics      Resulted Orders   UA reflex to Microscopic and Culture   Result Value Ref Range    Color Urine Yellow     Appearance Urine Slightly Cloudy     Glucose Urine Negative NEG^Negative mg/dL    Bilirubin Urine Negative NEG^Negative    Ketones Urine Negative NEG^Negative mg/dL    Specific Gravity Urine 1.012 1.003 - 1.035    Blood Urine Moderate (A) NEG^Negative    pH Urine 7.0 5.0 - 7.0 pH    Protein Albumin Urine 30 (A) NEG^Negative mg/dL    Urobilinogen mg/dL Normal 0.0 - 2.0 mg/dL    Nitrite Urine Negative NEG^Negative    Leukocyte Esterase Urine Large (A) NEG^Negative    Source Midstream Urine     RBC Urine 29 (H) 0 - 2 /HPF    WBC Urine >182 (H) 0 - 5 /HPF    WBC Clumps Present (A) NEG^Negative /HPF    Bacteria Urine Many (A) NEG^Negative /HPF    Mucous Urine Present (A) NEG^Negative /LPF   Urine Culture Aerobic Bacterial   Result Value Ref Range    Specimen Description Midstream Urine     Culture Micro (A)      >100,000 colonies/mL  Serratia marcescens  Some antibiotics have been suppressed due to the known inducible beta lactamase activity.   Please contact Microbiology for more information.         If you have any questions or concerns, please call the clinic at the number listed above.       Sincerely,        Mic Aranda MD

## 2020-11-23 NOTE — PROGRESS NOTES
Subjective  Dajuan Basilio is a 75 year old male who presents for recurrence of urinary burning.  He was last seen around  for a urine test.  He was found to have a bladder infection and started on Bactrim.  His pain went away but not returned.  He describes it as a burning sensation inside his penis associated with pain.  No blood.  No pus.  No constipation.  He has had prostate problems in the past.  He wants to know if there is anything you can do besides Roto-Rooter or pills.  He has neuropathy and wonders if this might be a factor.  He continues on gabapentin.  No flank pain.  No vomiting.  No fever.    Problem List/PMH: reviewed in EMR, and made relevant updates today.  Medications: reviewed in EMR, and made relevant updates today.  Allergies: reviewed in EMR, and made relevant updates today.    Social Hx:  Social History     Tobacco Use     Smoking status: Former Smoker     Types: Cigarettes     Quit date: 10/25/1983     Years since quittin.1     Smokeless tobacco: Never Used   Substance Use Topics     Alcohol use: No     Drug use: No     Social History     Social History Narrative    , retired from [employer].  Lives in [facility].     I reviewed social history and made relevant updates today.    Family Hx:   Family History   Problem Relation Age of Onset     Cancer Father         Cancer, mesothelioma     Colon Cancer Mother         Cancer-colon,     Genitourinary Problems Mother         Genitourinary Disease,renal failure     Diabetes Mother         Diabetes     Other - See Comments Sister         Renal transplant     Diabetes Type 2  Sister         Diabetes type II     Diabetes Brother         Diabetes       Objective  Vitals: reviewed in EMR.  BP (!) 142/70 (BP Location: Right arm, Patient Position: Sitting, Cuff Size: Adult Regular)   Pulse 67   Temp 98.1  F (36.7  C) (Tympanic)   Resp 16   Wt 85.9 kg (189 lb 4.8 oz)   SpO2 95%   BMI 30.10 kg/m      Gen: Pleasant male,  NAD.  HEENT: MMM  Neck: Supple  Pulm: Breathing easily  Neuro: Grossly intact  Skin: No concerning lesions.  Psychiatric: Normal affect and insight. Does not appear anxious or depressed.  Back: No CVA TTP    Results for orders placed or performed in visit on 11/23/20 (from the past 48 hour(s))   UA reflex to Microscopic and Culture    Specimen: Midstream Urine   Result Value Ref Range    Color Urine Yellow     Appearance Urine Slightly Cloudy     Glucose Urine Negative NEG^Negative mg/dL    Bilirubin Urine Negative NEG^Negative    Ketones Urine Negative NEG^Negative mg/dL    Specific Gravity Urine 1.012 1.003 - 1.035    Blood Urine Moderate (A) NEG^Negative    pH Urine 7.0 5.0 - 7.0 pH    Protein Albumin Urine 30 (A) NEG^Negative mg/dL    Urobilinogen mg/dL Normal 0.0 - 2.0 mg/dL    Nitrite Urine Negative NEG^Negative    Leukocyte Esterase Urine Large (A) NEG^Negative    Source Midstream Urine     RBC Urine 29 (H) 0 - 2 /HPF    WBC Urine >182 (H) 0 - 5 /HPF    WBC Clumps Present (A) NEG^Negative /HPF    Bacteria Urine Many (A) NEG^Negative /HPF    Mucous Urine Present (A) NEG^Negative /LPF     Component      Latest Ref Rng & Units 3/17/2020   PSA Screen      <3.100 ng/mL 3.756 (H)         Assessment    ICD-10-CM    1. Acute cystitis with hematuria  N30.01 sulfamethoxazole-trimethoprim (BACTRIM DS) 800-160 MG tablet   2. Urinary frequency  R35.0 UA reflex to Microscopic and Culture     Urine Culture Aerobic Bacterial     UROLOGY ADULT REFERRAL   3. Controlled type 2 diabetes mellitus with diabetic neuropathy, without long-term current use of insulin (H)  E11.40    4. Benign prostatic hyperplasia with lower urinary tract symptoms, symptom details unspecified  N40.1 UROLOGY ADULT REFERRAL   5. Neuropathy  G62.9      Orders Placed This Encounter   Procedures     UA reflex to Microscopic and Culture     UROLOGY ADULT REFERRAL       He has been getting urinary tract infections now after not having any for many years.   Most likely prostate enlargement is the source.  Differential diagnosis also includes disorder of innervation to the bladder.     Plan   -- Expected clinical course discussed   -- Medications and their side effects discussed  Patient Instructions    -- Bactrim another 10 days   -- Eat yogurt 1-2 times per day while on antibiotics (and for a few weeks after) to reduce the chances of diarrhea   -- Cranberry juice   -- Stay hydrated   -- Urology consult      Return if symptoms worsen or fail to improve.    SignedMic MD, FAAP, FACP  Internal Medicine & Pediatrics

## 2020-11-24 ASSESSMENT — ANXIETY QUESTIONNAIRES: GAD7 TOTAL SCORE: 0

## 2020-11-25 LAB
BACTERIA SPEC CULT: ABNORMAL
SPECIMEN SOURCE: ABNORMAL

## 2020-12-02 ENCOUNTER — OFFICE VISIT (OUTPATIENT)
Dept: UROLOGY | Facility: OTHER | Age: 75
End: 2020-12-02
Attending: INTERNAL MEDICINE
Payer: MEDICARE

## 2020-12-02 ENCOUNTER — HOSPITAL ENCOUNTER (OUTPATIENT)
Dept: CT IMAGING | Facility: OTHER | Age: 75
End: 2020-12-02
Attending: UROLOGY
Payer: MEDICARE

## 2020-12-02 VITALS
HEART RATE: 64 BPM | WEIGHT: 190 LBS | RESPIRATION RATE: 16 BRPM | BODY MASS INDEX: 30.21 KG/M2 | SYSTOLIC BLOOD PRESSURE: 130 MMHG | DIASTOLIC BLOOD PRESSURE: 62 MMHG

## 2020-12-02 DIAGNOSIS — N39.0 CHRONIC UTI: ICD-10-CM

## 2020-12-02 DIAGNOSIS — R35.0 URINARY FREQUENCY: ICD-10-CM

## 2020-12-02 DIAGNOSIS — N39.0 CHRONIC UTI: Primary | ICD-10-CM

## 2020-12-02 DIAGNOSIS — N40.1 BENIGN PROSTATIC HYPERPLASIA WITH LOWER URINARY TRACT SYMPTOMS, SYMPTOM DETAILS UNSPECIFIED: ICD-10-CM

## 2020-12-02 LAB
ALBUMIN UR-MCNC: NEGATIVE MG/DL
APPEARANCE UR: CLEAR
BILIRUB UR QL STRIP: NEGATIVE
COLOR UR AUTO: NORMAL
GLUCOSE UR STRIP-MCNC: NEGATIVE MG/DL
HGB UR QL STRIP: NEGATIVE
KETONES UR STRIP-MCNC: NEGATIVE MG/DL
LEUKOCYTE ESTERASE UR QL STRIP: NEGATIVE
NITRATE UR QL: NEGATIVE
PH UR STRIP: 6 PH (ref 5–7)
SOURCE: NORMAL
SP GR UR STRIP: 1.01 (ref 1–1.03)
UROBILINOGEN UR STRIP-MCNC: NORMAL MG/DL (ref 0–2)

## 2020-12-02 PROCEDURE — G0463 HOSPITAL OUTPT CLINIC VISIT: HCPCS | Mod: 25

## 2020-12-02 PROCEDURE — 99214 OFFICE O/P EST MOD 30 MIN: CPT | Performed by: UROLOGY

## 2020-12-02 PROCEDURE — 74176 CT ABD & PELVIS W/O CONTRAST: CPT

## 2020-12-02 PROCEDURE — 81003 URINALYSIS AUTO W/O SCOPE: CPT | Mod: ZL | Performed by: UROLOGY

## 2020-12-02 PROCEDURE — 51798 US URINE CAPACITY MEASURE: CPT | Performed by: UROLOGY

## 2020-12-02 ASSESSMENT — PAIN SCALES - GENERAL: PAINLEVEL: MILD PAIN (2)

## 2020-12-02 NOTE — NURSING NOTE
Review of Systems:    Weight loss:    No     Recent fever/chills:  No   Night sweats:   No  Current skin rash:  No   Recent hair loss:  No  Heat intolerance:  No   Cold intolerance:  No  Chest pain:   No   Palpitations:   No  Shortness of breath:  No   Wheezing:   No  Constipation:    No   Diarrhea:   No   Nausea:   No   Vomiting:   No   Kidney/side pain:  No   Back pain:   Yes  Frequent headaches:  No   Dizziness:     Yes  Leg swelling:   No   Calf pain:    No    Post-Void Residual  A post-void residual was measured by ultrasonic bladder scanner.  95 mL

## 2020-12-02 NOTE — PROGRESS NOTES
Type of Visit  Established    Chief Complaint  UTI    HPI  Mr. Basilio is a 75 year old male follows up with persistent UTI.  The patient was seen and evaluated for acute urinary symptoms almost 1 month ago.  Testing revealed a clinically significant UTI.  The patient was treated with a course of antibiotics however the symptoms persisted and he was seen approximately 2 weeks later.  Testing again revealed significant pyuria.  Antibiotics were again prescribed.  The patient denies fevers and chills or other systemic symptoms.  He presents today concerned that his symptoms have not resolved in spite of 2 courses of antibiotics.      Review of Systems  I reviewed the ROS with the patient.    Nursing Notes:   Jolie De León RN  2020  1:55 PM  Signed  Review of Systems:    Weight loss:    No     Recent fever/chills:  No   Night sweats:   No  Current skin rash:  No   Recent hair loss:  No  Heat intolerance:  No   Cold intolerance:  No  Chest pain:   No   Palpitations:   No  Shortness of breath:  No   Wheezing:   No  Constipation:    No   Diarrhea:   No   Nausea:   No   Vomiting:   No   Kidney/side pain:  No   Back pain:   Yes  Frequent headaches:  No   Dizziness:     Yes  Leg swelling:   No   Calf pain:    No    Post-Void Residual  A post-void residual was measured by ultrasonic bladder scanner.  95 mL      Family History  Family History   Problem Relation Age of Onset     Cancer Father         Cancer, mesothelioma     Colon Cancer Mother         Cancer-colon,     Genitourinary Problems Mother         Genitourinary Disease,renal failure     Diabetes Mother         Diabetes     Other - See Comments Sister         Renal transplant     Diabetes Type 2  Sister         Diabetes type II     Diabetes Brother         Diabetes       Physical Exam  Vitals:    20 1353   BP: 130/62   BP Location: Right arm   Patient Position: Sitting   Cuff Size: Adult Regular   Pulse: 64   Resp: 16   Weight: 86.2 kg (190 lb)      Constitutional: NAD, WDWN.  Cardiovascular: Regular rate.  Pulmonary/Chest: Respirations are even and non-labored bilaterally.  Abdominal: Soft. No distension, tenderness, masses or guarding. No CVA tenderness.  Extremities: JONNY x 4, Warm. No clubbing.  No cyanosis.    Skin: Pink, warm and dry.  No rashes noted.  Genitourinary: nonpalpable bladder    Labs    Results for orders placed or performed during the hospital encounter of 12/02/20   CT Abdomen Pelvis w/o Contrast     Status: None    Narrative    PROCEDURE:  CT ABDOMEN PELVIS W/O CONTRAST    HISTORY:  persistent UTI, not clearing with routine treatment; Chronic  UTI    TECHNIQUE:  Helical CT of the abdomen and pelvis was performed without  intravenous contrast.    COMPARISON:  6/12/2018, 5/13/2016.    FINDINGS:      Evaluation of the solid organs is somewhat limited due to the lack of  intravenous contrast.    Limited views through the lung bases demonstrate coronary  calcifications.    A right renal cyst is redemonstrated. No renal or ureteral calculus is  identified. A 3 mm calculus is seen in the posterior right aspect of  the bladder. There is massive prostatomegaly. There is mass effect  upon the base the bladder. Mild circumferential bladder wall  thickening may be accentuated by underdistention. No hydronephrosis.    The liver, gallbladder, spleen, and pancreas are unremarkable. Subtle  adrenal nodularity is chronic and unchanged. The aorta is normal in  size with scattered atherosclerotic calcifications.  A fat-containing  supraumbilical ventral hernia is again seen. The bowel is normal in  caliber.     No free fluid, free air or adenopathy is present.  No suspicious  osseous lesions are identified.      Impression    IMPRESSION:      Massive prostatomegaly may result in chronic bladder outlet  obstruction. 3 mm right posterolateral bladder/ureterovesicular  junction calculus. No hydronephrosis.    LILIAN ACEVEDO MD   Results for orders placed  or performed in visit on 12/02/20   UA reflex to Microscopic     Status: None   Result Value Ref Range    Color Urine Light Yellow     Appearance Urine Clear     Glucose Urine Negative NEG^Negative mg/dL    Bilirubin Urine Negative NEG^Negative    Ketones Urine Negative NEG^Negative mg/dL    Specific Gravity Urine 1.014 1.003 - 1.035    Blood Urine Negative NEG^Negative    pH Urine 6.0 5.0 - 7.0 pH    Protein Albumin Urine Negative NEG^Negative mg/dL    Urobilinogen mg/dL Normal 0.0 - 2.0 mg/dL    Nitrite Urine Negative NEG^Negative    Leukocyte Esterase Urine Negative NEG^Negative    Source Unspecified Urine        Results for FREDI SUAREZ (MRN [id number]) as of 12/2/2020 14:07   11/23/2020 12:45   Color Urine Yellow   Appearance Urine Slightly Cloudy   Glucose Urine Negative   Bilirubin Urine Negative   Ketones Urine Negative   Specific Gravity Urine 1.012   pH Urine 7.0   Protein Albumin Urine 30 (A)   Urobilinogen mg/dL Normal   Nitrite Urine Negative   Blood Urine Moderate (A)   Leukocyte Esterase Urine Large (A)   Source Midstream Urine   WBC Urine >182 (H)   RBC Urine 29 (H)   Bacteria Urine Many (A)   WBC Clumps Present (A)   Mucous Urine Present (A)   Specimen Description Midstream Urine   Culture Micro >100,000 colonies... (A)     UCx  11/23/2020  Serratia marcescens       ALONDRA     Amoxicillin/Clav >16/8 ug/mL Resistant     AMPICILLIN >16 ug/mL Resistant1     AMPICILLIN/SULBACTAM >16/8 ug/mL Resistant1     CEFAZOLIN >16 ug/mL Resistant2     CEFEPIME <=2 ug/mL Sensitive     CEFOXITIN 16 ug/mL Resistant1     Cefpodoxime >4 ug/mL Resistant     CEFUROXIME >16 ug/mL Resistant     CIPROFLOXACIN <=1 ug/mL Sensitive     ERTAPENEM <=0.5 ug/mL Sensitive     GENTAMICIN <=4 ug/mL Sensitive     IMIPENEM <=1 ug/mL Sensitive     LEVOFLOXACIN <=2 ug/mL Sensitive     NITROFURANTOIN >64 ug/mL Resistant1     TETRACYCLINE 8 ug/mL Intermediate     TOBRAMYCIN <=4 ug/mL Sensitive     TRIMETHOPRIM  Sensitive3      Trimethoprim/Sulfa <=2/38 ug/mL Sensitive      Radiographic Studies  12/2/2020  CT Stone  IMPRESSION:    Massive prostatomegaly may result in chronic bladder outlet  obstruction. 3 mm right posterolateral bladder/ureterovesicular  junction calculus. No hydronephrosis.    Post-Void Residual  A post-void residual was measured by ultrasonic bladder scanner.  95 mL today    Assessment & Plan  Mr. Basilio is a 75 year old male follows up with concerns regarding persistent UTI.  I reviewed the information obtained today including a PVR demonstrating adequate emptying, his UA today is completely negative and his CT scan reveals no clinically significant ureteral or kidney stones.  I reassured the patient that his symptoms should eventually improve but the timeline can be protracted compared to the timeline for resolution of infection.

## 2020-12-18 ENCOUNTER — OFFICE VISIT (OUTPATIENT)
Dept: INTERNAL MEDICINE | Facility: OTHER | Age: 75
End: 2020-12-18
Attending: INTERNAL MEDICINE
Payer: MEDICARE

## 2020-12-18 VITALS
DIASTOLIC BLOOD PRESSURE: 78 MMHG | HEART RATE: 58 BPM | OXYGEN SATURATION: 99 % | BODY MASS INDEX: 30.84 KG/M2 | TEMPERATURE: 97.4 F | WEIGHT: 194 LBS | SYSTOLIC BLOOD PRESSURE: 138 MMHG | RESPIRATION RATE: 16 BRPM

## 2020-12-18 DIAGNOSIS — I25.10 CORONARY ARTERY DISEASE INVOLVING NATIVE CORONARY ARTERY OF NATIVE HEART WITHOUT ANGINA PECTORIS: ICD-10-CM

## 2020-12-18 DIAGNOSIS — N39.0 RECURRENT UTI (URINARY TRACT INFECTION): ICD-10-CM

## 2020-12-18 DIAGNOSIS — M53.3 SACROILIAC JOINT PAIN: ICD-10-CM

## 2020-12-18 DIAGNOSIS — E78.2 MIXED HYPERLIPIDEMIA: ICD-10-CM

## 2020-12-18 DIAGNOSIS — E53.8 VITAMIN B12 DEFICIENCY: ICD-10-CM

## 2020-12-18 DIAGNOSIS — E55.9 VITAMIN D DEFICIENCY: ICD-10-CM

## 2020-12-18 DIAGNOSIS — M47.816 LUMBAR FACET ARTHROPATHY: ICD-10-CM

## 2020-12-18 DIAGNOSIS — M46.98 UNSPECIFIED INFLAMMATORY SPONDYLOPATHY, SACRAL AND SACROCOCCYGEAL REGION (H): ICD-10-CM

## 2020-12-18 DIAGNOSIS — R53.81 MALAISE AND FATIGUE: ICD-10-CM

## 2020-12-18 DIAGNOSIS — R53.83 MALAISE AND FATIGUE: ICD-10-CM

## 2020-12-18 DIAGNOSIS — E11.21 TYPE 2 DIABETES MELLITUS WITH DIABETIC NEPHROPATHY, WITHOUT LONG-TERM CURRENT USE OF INSULIN (H): ICD-10-CM

## 2020-12-18 DIAGNOSIS — E11.42 DIABETIC PERIPHERAL NEUROPATHY (H): ICD-10-CM

## 2020-12-18 DIAGNOSIS — M79.18 GLUTEAL PAIN: Primary | ICD-10-CM

## 2020-12-18 DIAGNOSIS — F33.42 RECURRENT MAJOR DEPRESSIVE DISORDER, IN FULL REMISSION (H): ICD-10-CM

## 2020-12-18 DIAGNOSIS — M79.10 MYALGIA: ICD-10-CM

## 2020-12-18 LAB
ALBUMIN SERPL-MCNC: 4.6 G/DL (ref 3.5–5.7)
ALBUMIN UR-MCNC: NEGATIVE MG/DL
ALP SERPL-CCNC: 56 U/L (ref 34–104)
ALT SERPL W P-5'-P-CCNC: 13 U/L (ref 7–52)
ANION GAP SERPL CALCULATED.3IONS-SCNC: 8 MMOL/L (ref 3–14)
APPEARANCE UR: CLEAR
AST SERPL W P-5'-P-CCNC: 13 U/L (ref 13–39)
BILIRUB SERPL-MCNC: 0.7 MG/DL (ref 0.3–1)
BILIRUB UR QL STRIP: NEGATIVE
BUN SERPL-MCNC: 18 MG/DL (ref 7–25)
CALCIUM SERPL-MCNC: 9.5 MG/DL (ref 8.6–10.3)
CHLORIDE SERPL-SCNC: 102 MMOL/L (ref 98–107)
CHOLEST SERPL-MCNC: 144 MG/DL
CO2 SERPL-SCNC: 30 MMOL/L (ref 21–31)
COLOR UR AUTO: ABNORMAL
CREAT SERPL-MCNC: 0.83 MG/DL (ref 0.7–1.3)
CREAT UR-MCNC: 58 MG/DL
CRP SERPL-MCNC: <1 MG/L
DEPRECATED CALCIDIOL+CALCIFEROL SERPL-MC: 84.6 NG/ML
ERYTHROCYTE [DISTWIDTH] IN BLOOD BY AUTOMATED COUNT: 13.5 % (ref 10–15)
ERYTHROCYTE [SEDIMENTATION RATE] IN BLOOD BY WESTERGREN METHOD: 5 MM/H (ref 1–10)
GFR SERPL CREATININE-BSD FRML MDRD: 90 ML/MIN/{1.73_M2}
GLUCOSE SERPL-MCNC: 171 MG/DL (ref 70–105)
GLUCOSE UR STRIP-MCNC: NEGATIVE MG/DL
HBA1C MFR BLD: 6.5 % (ref 4–6)
HCT VFR BLD AUTO: 45 % (ref 40–53)
HDLC SERPL-MCNC: 46 MG/DL (ref 23–92)
HGB BLD-MCNC: 14.5 G/DL (ref 13.3–17.7)
HGB UR QL STRIP: NEGATIVE
KETONES UR STRIP-MCNC: NEGATIVE MG/DL
LDLC SERPL CALC-MCNC: 77 MG/DL
LEUKOCYTE ESTERASE UR QL STRIP: NEGATIVE
MCH RBC QN AUTO: 29.7 PG (ref 26.5–33)
MCHC RBC AUTO-ENTMCNC: 32.2 G/DL (ref 31.5–36.5)
MCV RBC AUTO: 92 FL (ref 78–100)
MICROALBUMIN UR-MCNC: 11 MG/L
MICROALBUMIN/CREAT UR: 18.4 MG/G CR (ref 0–17)
MUCOUS THREADS #/AREA URNS LPF: PRESENT /LPF
NITRATE UR QL: NEGATIVE
NONHDLC SERPL-MCNC: 98 MG/DL
PH UR STRIP: 6 PH (ref 5–7)
PLATELET # BLD AUTO: 220 10E9/L (ref 150–450)
POTASSIUM SERPL-SCNC: 3.7 MMOL/L (ref 3.5–5.1)
PROT SERPL-MCNC: 7.2 G/DL (ref 6.4–8.9)
RBC # BLD AUTO: 4.88 10E12/L (ref 4.4–5.9)
RBC #/AREA URNS AUTO: 1 /HPF (ref 0–2)
SODIUM SERPL-SCNC: 140 MMOL/L (ref 134–144)
SOURCE: ABNORMAL
SP GR UR STRIP: 1.01 (ref 1–1.03)
TRIGL SERPL-MCNC: 106 MG/DL
TSH SERPL DL<=0.05 MIU/L-ACNC: 2.91 IU/ML (ref 0.34–5.6)
UROBILINOGEN UR STRIP-MCNC: NORMAL MG/DL (ref 0–2)
VIT B12 SERPL-MCNC: 1427 PG/ML (ref 180–914)
WBC # BLD AUTO: 4.9 10E9/L (ref 4–11)
WBC #/AREA URNS AUTO: 1 /HPF (ref 0–5)

## 2020-12-18 PROCEDURE — 81001 URINALYSIS AUTO W/SCOPE: CPT | Mod: ZL | Performed by: INTERNAL MEDICINE

## 2020-12-18 PROCEDURE — 80053 COMPREHEN METABOLIC PANEL: CPT | Mod: ZL | Performed by: INTERNAL MEDICINE

## 2020-12-18 PROCEDURE — 82306 VITAMIN D 25 HYDROXY: CPT | Mod: ZL | Performed by: INTERNAL MEDICINE

## 2020-12-18 PROCEDURE — 96372 THER/PROPH/DIAG INJ SC/IM: CPT | Performed by: INTERNAL MEDICINE

## 2020-12-18 PROCEDURE — 82607 VITAMIN B-12: CPT | Mod: ZL | Performed by: INTERNAL MEDICINE

## 2020-12-18 PROCEDURE — 82043 UR ALBUMIN QUANTITATIVE: CPT | Mod: ZL | Performed by: INTERNAL MEDICINE

## 2020-12-18 PROCEDURE — 85652 RBC SED RATE AUTOMATED: CPT | Mod: ZL | Performed by: INTERNAL MEDICINE

## 2020-12-18 PROCEDURE — G0463 HOSPITAL OUTPT CLINIC VISIT: HCPCS

## 2020-12-18 PROCEDURE — 36415 COLL VENOUS BLD VENIPUNCTURE: CPT | Mod: ZL | Performed by: INTERNAL MEDICINE

## 2020-12-18 PROCEDURE — G0463 HOSPITAL OUTPT CLINIC VISIT: HCPCS | Mod: 25

## 2020-12-18 PROCEDURE — 80061 LIPID PANEL: CPT | Mod: ZL | Performed by: INTERNAL MEDICINE

## 2020-12-18 PROCEDURE — 84443 ASSAY THYROID STIM HORMONE: CPT | Mod: ZL | Performed by: INTERNAL MEDICINE

## 2020-12-18 PROCEDURE — 250N000011 HC RX IP 250 OP 636: Performed by: INTERNAL MEDICINE

## 2020-12-18 PROCEDURE — 99214 OFFICE O/P EST MOD 30 MIN: CPT | Performed by: INTERNAL MEDICINE

## 2020-12-18 PROCEDURE — 85027 COMPLETE CBC AUTOMATED: CPT | Mod: ZL | Performed by: INTERNAL MEDICINE

## 2020-12-18 PROCEDURE — 84403 ASSAY OF TOTAL TESTOSTERONE: CPT | Mod: ZL | Performed by: INTERNAL MEDICINE

## 2020-12-18 PROCEDURE — 83036 HEMOGLOBIN GLYCOSYLATED A1C: CPT | Mod: ZL | Performed by: INTERNAL MEDICINE

## 2020-12-18 PROCEDURE — 84270 ASSAY OF SEX HORMONE GLOBUL: CPT | Mod: ZL | Performed by: INTERNAL MEDICINE

## 2020-12-18 PROCEDURE — 86140 C-REACTIVE PROTEIN: CPT | Mod: ZL | Performed by: INTERNAL MEDICINE

## 2020-12-18 RX ORDER — SIMVASTATIN 10 MG
10 TABLET ORAL AT BEDTIME
Qty: 90 TABLET | Refills: 3 | Status: SHIPPED | OUTPATIENT
Start: 2020-12-18 | End: 2021-09-01

## 2020-12-18 RX ORDER — GABAPENTIN 100 MG/1
100-200 CAPSULE ORAL AT BEDTIME
Qty: 120 CAPSULE | Refills: 3 | Status: SHIPPED | OUTPATIENT
Start: 2020-12-18 | End: 2021-11-04

## 2020-12-18 RX ORDER — CITALOPRAM HYDROBROMIDE 10 MG/1
10 TABLET ORAL DAILY
Qty: 90 TABLET | Refills: 3 | Status: SHIPPED | OUTPATIENT
Start: 2020-12-18 | End: 2021-09-01

## 2020-12-18 RX ORDER — CYANOCOBALAMIN 1000 UG/ML
1000 INJECTION, SOLUTION INTRAMUSCULAR; SUBCUTANEOUS ONCE
Status: COMPLETED | OUTPATIENT
Start: 2020-12-18 | End: 2020-12-18

## 2020-12-18 RX ADMIN — CYANOCOBALAMIN 1000 MCG: 1000 INJECTION, SOLUTION INTRAMUSCULAR at 09:37

## 2020-12-18 ASSESSMENT — ANXIETY QUESTIONNAIRES
IF YOU CHECKED OFF ANY PROBLEMS ON THIS QUESTIONNAIRE, HOW DIFFICULT HAVE THESE PROBLEMS MADE IT FOR YOU TO DO YOUR WORK, TAKE CARE OF THINGS AT HOME, OR GET ALONG WITH OTHER PEOPLE: SOMEWHAT DIFFICULT
2. NOT BEING ABLE TO STOP OR CONTROL WORRYING: SEVERAL DAYS
6. BECOMING EASILY ANNOYED OR IRRITABLE: SEVERAL DAYS
1. FEELING NERVOUS, ANXIOUS, OR ON EDGE: SEVERAL DAYS
5. BEING SO RESTLESS THAT IT IS HARD TO SIT STILL: NOT AT ALL
7. FEELING AFRAID AS IF SOMETHING AWFUL MIGHT HAPPEN: NOT AT ALL
GAD7 TOTAL SCORE: 5
3. WORRYING TOO MUCH ABOUT DIFFERENT THINGS: MORE THAN HALF THE DAYS

## 2020-12-18 ASSESSMENT — ENCOUNTER SYMPTOMS
FEVER: 0
LIGHT-HEADEDNESS: 1
COUGH: 0
EYE REDNESS: 1
FATIGUE: 1
MYALGIAS: 1
BACK PAIN: 1
WHEEZING: 0
ARTHRALGIAS: 0
BRUISES/BLEEDS EASILY: 0
DIZZINESS: 1
DIARRHEA: 0
AGITATION: 0
DYSURIA: 0
EYE PAIN: 1
ABDOMINAL PAIN: 0
NAUSEA: 0
VOMITING: 0
SHORTNESS OF BREATH: 0
WEAKNESS: 1
HEMATURIA: 0
CHILLS: 0
PALPITATIONS: 0

## 2020-12-18 ASSESSMENT — PATIENT HEALTH QUESTIONNAIRE - PHQ9
5. POOR APPETITE OR OVEREATING: NOT AT ALL
SUM OF ALL RESPONSES TO PHQ QUESTIONS 1-9: 7

## 2020-12-18 ASSESSMENT — PAIN SCALES - GENERAL: PAINLEVEL: SEVERE PAIN (6)

## 2020-12-18 NOTE — PROGRESS NOTES
Nursing Notes:   Oliva Hutchins LPN  12/18/2020  8:57 AM  Signed  Chief Complaint   Patient presents with     Medication Therapy Management       Medication Reconciliation complete.   Oliva Hutchins LPN  ..................12/18/2020   8:40 AM     Nursing note reviewed with patient.  Accuracy and completeness verified.   Mr. Basliio is a 75 year old male who:  Patient presents with:  Medication Therapy Management      ICD-10-CM    1. Gluteal pain  M79.18 MR Sacrum and Coccyx w/o Contrast   2. Sacroiliac joint pain -- Left > Right  M53.3 MR Sacrum and Coccyx w/o Contrast   3. Coronary artery disease involving native coronary artery of native heart without angina pectoris  I25.10    4. Unspecified inflammatory spondylopathy, sacral and sacrococcygeal region (H)  M46.98    5. Recurrent major depressive disorder, in full remission (H)  F33.42 citalopram (CELEXA) 10 MG tablet   6. Diabetic peripheral neuropathy (H)  E11.42 gabapentin (NEURONTIN) 100 MG capsule     metFORMIN (GLUCOPHAGE) 500 MG tablet   7. Type 2 diabetes mellitus with diabetic nephropathy, without long-term current use of insulin (H)  E11.21 gabapentin (NEURONTIN) 100 MG capsule     metFORMIN (GLUCOPHAGE) 500 MG tablet     Hemoglobin A1c     Albumin Random Urine Quantitative with Creat Ratio   8. Mixed hyperlipidemia  E78.2 simvastatin (ZOCOR) 10 MG tablet     Lipid Profile   9. Recurrent UTI (urinary tract infection)  N39.0 UA with Microscopic reflex to Culture   10. Vitamin B12 deficiency  E53.8 cyanocobalamin injection 1,000 mcg     Vitamin B12     Vitamin B12   11. Malaise and fatigue  R53.81 Testosterone Free and Total    R53.83 TSH Reflex GH     Comprehensive metabolic panel     CBC with platelets   12. Vitamin D deficiency  E55.9 Vitamin D Total     Vitamin D Total   13. Myalgia  M79.10 Sedimentation Rate (ESR)     CRP inflammation     CRP inflammation     Sedimentation Rate (ESR)   14. Lumbar facet arthropathy  M47.816 MR Sacrum and  Coccyx w/o Contrast     HPI  Patient presents with concerns and multiple complaints today.  He was feeling very dizzy lightheaded and having multiple side effects to the combination of Namenda and Exelon.  He was having memory loss and wished to proceed with these medications previously.  He subsequently developed problems and now stopped taking them.  Overall he is still feeling quite lousy.    He has been having some persistent pain mostly in his left gluteal area sometimes even more in his left sacroiliac joint area.  He did have a fairly advanced arthritic changes in the L4-L5 and L5-S1 facet joints.  Has not ever had facet joint injections.  Sacroiliac joint injections x3 in the past were minimally effective.  He is not interested in getting injections there anymore.  He is interested to find out why his medial gluteal muscles are so painful and sacroiliac area are so painful.  MRI of the sacrum and coccyx were ordered.    He has unspecified inflammatory spondylopathy of the sacral and coccyx region.  Imaging ordered.    History of major depression, currently taking Celexa.  Mood has improved but he is overall still feeling lousy and somewhat miserable.    Peripheral neuropathy, ongoing due to diabetes.  Continues with gabapentin and Metformin.  Still taking 1 or 2 tablets of gabapentin at night.    Mixed hyperlipidemia - continues on simvastatin. Doing well, needs refills. Check labs.     Recurrent UTI - has been on multiple antibiotics again recently. Recheck UA today.     Vitamin B12 deficiency, he would like a B12 shot today.  Check labs prior to B12 shot.    Suzan malaise, he is worried about testosterone levels.  Recheck labs today.  He would also like thyroid levels and other metabolic panel checked.    Vitamin D deficiency, having some myalgia issues.  Check vitamin D levels.    Due to the myalgias we will also check sed rate and CRP to look for other possible issues such as polymyositis.    With his  history of lumbar facet arthropathy and sacral pain, check MRI.    Functional Capacity: less than or about 4 METS.   Review of Systems   Constitutional: Positive for fatigue. Negative for chills and fever.   HENT: Negative for congestion and hearing loss.    Eyes: Positive for pain and redness. Negative for visual disturbance.        + chronic dry eyes, tried many eye drops, not much improvement - told needs eye surgery   Respiratory: Negative for cough, shortness of breath and wheezing.    Cardiovascular: Negative for chest pain and palpitations.        Cardiac stress testing 3/2020 - negative/normal results.   Gastrointestinal: Negative for abdominal pain, diarrhea, nausea and vomiting.   Endocrine: Negative for cold intolerance and heat intolerance.   Genitourinary: Negative for dysuria and hematuria.   Musculoskeletal: Positive for back pain (mostly left > right gluteal and SI joint pain) and myalgias. Negative for arthralgias.   Skin: Negative for pallor.        Painful Toenail onychomycosis   Allergic/Immunologic: Negative for immunocompromised state.   Neurological: Positive for dizziness, weakness and light-headedness.   Hematological: Does not bruise/bleed easily.   Psychiatric/Behavioral: Negative for agitation.        + short term memory loss -- stopped Exelon and Namenda -- due to side effects.         Reviewed and updated as needed this visit by Provider   Tobacco  Allergies  Meds  Problems  Med Hx  Surg Hx  Fam Hx          EXAM:   Vitals:    12/18/20 0840   BP: 138/78   BP Location: Right arm   Patient Position: Sitting   Cuff Size: Adult Regular   Pulse: 58   Resp: 16   Temp: 97.4  F (36.3  C)   TempSrc: Tympanic   SpO2: 99%   Weight: 88 kg (194 lb)       Current Pain Score: Severe Pain (6)     BP Readings from Last 3 Encounters:   12/18/20 138/78   12/02/20 130/62   11/23/20 (!) 142/70      Wt Readings from Last 3 Encounters:   12/18/20 88 kg (194 lb)   12/02/20 86.2 kg (190 lb)   11/23/20  "85.9 kg (189 lb 4.8 oz)      Estimated body mass index is 30.84 kg/m  as calculated from the following:    Height as of 9/17/20: 1.689 m (5' 6.5\").    Weight as of this encounter: 88 kg (194 lb).     Physical Exam  Constitutional:       General: He is not in acute distress.     Appearance: He is well-developed. He is not diaphoretic.   HENT:      Head: Normocephalic and atraumatic.   Eyes:      General: No scleral icterus.     Conjunctiva/sclera: Conjunctivae normal.   Neck:      Musculoskeletal: Neck supple.      Vascular: No carotid bruit.   Cardiovascular:      Rate and Rhythm: Normal rate and regular rhythm.      Pulses: Normal pulses.   Pulmonary:      Effort: Pulmonary effort is normal.      Breath sounds: Normal breath sounds.   Abdominal:      Palpations: Abdomen is soft.      Tenderness: There is no abdominal tenderness.   Musculoskeletal:         General: Tenderness (left > right gluteal and SI joint pain to palpation) present. No deformity.      Right lower leg: No edema.      Left lower leg: No edema.   Lymphadenopathy:      Cervical: No cervical adenopathy.   Skin:     General: Skin is warm and dry.      Findings: No rash.   Neurological:      Mental Status: He is alert and oriented to person, place, and time. Mental status is at baseline.      Comments: short term memory loss   Psychiatric:         Mood and Affect: Mood normal.         Behavior: Behavior normal.          Procedures   INVESTIGATIONS:  - Labs reviewed in Epic     ASSESSMENT AND PLAN:  Problem List Items Addressed This Visit        Nervous and Auditory    Gluteal pain - Primary    Relevant Orders    MR Sacrum and Coccyx w/o Contrast       Digestive    Vitamin B12 deficiency    Relevant Medications    cyanocobalamin injection 1,000 mcg (Completed)    Other Relevant Orders    Vitamin B12 (Completed)       Endocrine    Mixed hyperlipidemia    Relevant Medications    simvastatin (ZOCOR) 10 MG tablet       Circulatory    Coronary artery " disease involving native coronary artery of native heart without angina pectoris       Musculoskeletal and Integumentary    Lumbar facet arthropathy    Relevant Orders    MR Sacrum and Coccyx w/o Contrast    Unspecified inflammatory spondylopathy, sacral and sacrococcygeal region (H)       Behavioral    Recurrent major depressive disorder, in full remission (H)    Relevant Medications    citalopram (CELEXA) 10 MG tablet      Other Visit Diagnoses     Sacroiliac joint pain -- Left > Right        Relevant Orders    MR Sacrum and Coccyx w/o Contrast    Diabetic peripheral neuropathy (H)        Relevant Medications    citalopram (CELEXA) 10 MG tablet    gabapentin (NEURONTIN) 100 MG capsule    metFORMIN (GLUCOPHAGE) 500 MG tablet    Type 2 diabetes mellitus with diabetic nephropathy, without long-term current use of insulin (H)        Relevant Medications    gabapentin (NEURONTIN) 100 MG capsule    metFORMIN (GLUCOPHAGE) 500 MG tablet    Recurrent UTI (urinary tract infection)        Relevant Orders    UA with Microscopic reflex to Culture (Completed)    Malaise and fatigue        Relevant Orders    Testosterone Free and Total (Completed)    Vitamin D deficiency        Relevant Orders    Vitamin D Total (Completed)    Myalgia        Relevant Orders    Sedimentation Rate (ESR) (Completed)    CRP inflammation (Completed)        reviewed diet, exercise and weight control, recommended sodium restriction, cardiovascular risk and specific lipid/LDL goals reviewed  -- Expected clinical course discussed    -- Medications and their side effects discussed    The 10-year ASCVD risk score (Donna CRUZ Jr., et al., 2013) is: 48.9%    Values used to calculate the score:      Age: 75 years      Sex: Male      Is Non- : No      Diabetic: Yes      Tobacco smoker: No      Systolic Blood Pressure: 138 mmHg      Is BP treated: No      HDL Cholesterol: 46 mg/dL      Total Cholesterol: 144 mg/dL    Patient Instructions    1. Gluteal pain  2. Sacroiliac joint pain -- Left > Right  4. Unspecified inflammatory spondylopathy, sacral and sacrococcygeal region (H)  - MR Sacrum and Coccyx w/o Contrast; Future  - they will call with date/time of appointment.      3. Coronary artery disease involving native coronary artery of native heart without angina pectoris  --- currently stable.     5. Moderate episode of recurrent major depressive disorder (H)  - citalopram (CELEXA) 10 MG tablet; Take 1 tablet (10 mg) by mouth daily -- For Mood --- Note pill size change, no dose change ---  Dispense: 90 tablet; Refill: 3    6. Diabetic peripheral neuropathy (H)  7. Type 2 diabetes mellitus with diabetic nephropathy, without long-term current use of insulin (H)  - gabapentin (NEURONTIN) 100 MG capsule; Take 1-2 capsules (100-200 mg) by mouth At Bedtime - for pain  Dispense: 120 capsule; Refill: 3  - metFORMIN (GLUCOPHAGE) 500 MG tablet; Take 2 tablets (1,000 mg) by mouth daily (with breakfast) AND 1 tablet (500 mg) daily (with dinner).  Dispense: 270 tablet; Refill: 3    8. Mixed hyperlipidemia  - simvastatin (ZOCOR) 10 MG tablet; Take 1 tablet (10 mg) by mouth At Bedtime -- Note pill size change -- No dose change --  Dispense: 90 tablet; Refill: 3    9. Recurrent UTI (urinary tract infection)  - UA with Microscopic reflex to Culture    10. Vitamin B12 deficiency  - cyanocobalamin injection 1,000 mcg  - Vitamin B12; Future    11. Malaise and fatigue  - Testosterone Free and Total    12. Vitamin D deficiency  - Vitamin D Total; Future    13. Myalgia  - Sedimentation Rate (ESR); Future  - CRP inflammation; Future    14. Lumbar facet arthropathy  - MR Sacrum and Coccyx w/o Contrast; Future      Return in approximately 3 week(s), or sooner as needed for follow-up with Dr. Pickens.  -- Follow-up MRI results and Lab results.     Clinic : [phone]  Appointment line: [phone]     Cm Pickens MD   Internal Medicine  [facility]     Portions of this note were dictated using speech recognition software. The note has been proofread but errors in the text may have been overlooked. Please contact me if there are any concerns regarding the accuracy of the dictation.

## 2020-12-18 NOTE — PATIENT INSTRUCTIONS
1. Gluteal pain  2. Sacroiliac joint pain -- Left > Right  4. Unspecified inflammatory spondylopathy, sacral and sacrococcygeal region (H)  - MR Sacrum and Coccyx w/o Contrast; Future  - they will call with date/time of appointment.      3. Coronary artery disease involving native coronary artery of native heart without angina pectoris  --- currently stable.     5. Moderate episode of recurrent major depressive disorder (H)  - citalopram (CELEXA) 10 MG tablet; Take 1 tablet (10 mg) by mouth daily -- For Mood --- Note pill size change, no dose change ---  Dispense: 90 tablet; Refill: 3    6. Diabetic peripheral neuropathy (H)  7. Type 2 diabetes mellitus with diabetic nephropathy, without long-term current use of insulin (H)  - gabapentin (NEURONTIN) 100 MG capsule; Take 1-2 capsules (100-200 mg) by mouth At Bedtime - for pain  Dispense: 120 capsule; Refill: 3  - metFORMIN (GLUCOPHAGE) 500 MG tablet; Take 2 tablets (1,000 mg) by mouth daily (with breakfast) AND 1 tablet (500 mg) daily (with dinner).  Dispense: 270 tablet; Refill: 3    8. Mixed hyperlipidemia  - simvastatin (ZOCOR) 10 MG tablet; Take 1 tablet (10 mg) by mouth At Bedtime -- Note pill size change -- No dose change --  Dispense: 90 tablet; Refill: 3    9. Recurrent UTI (urinary tract infection)  - UA with Microscopic reflex to Culture    10. Vitamin B12 deficiency  - cyanocobalamin injection 1,000 mcg  - Vitamin B12; Future    11. Malaise and fatigue  - Testosterone Free and Total    12. Vitamin D deficiency  - Vitamin D Total; Future    13. Myalgia  - Sedimentation Rate (ESR); Future  - CRP inflammation; Future    14. Lumbar facet arthropathy  - MR Sacrum and Coccyx w/o Contrast; Future      Return in approximately 3 week(s), or sooner as needed for follow-up with Dr. Pickens.  -- Follow-up MRI results and Lab results.     Clinic : [phone]  Appointment line: [phone]

## 2020-12-18 NOTE — LETTER
December 23, 2020      Dajuan Basilio  [address] [facility] [address]        Dear ,    We are writing to inform you of your test results.    Overall, your labs look very good.    There is no good explanation to explain your fatigue and malaise and muscle pains.    Cm Pickens MD     Resulted Orders   UA with Microscopic reflex to Culture   Result Value Ref Range    Color Urine Light Yellow     Appearance Urine Clear     Glucose Urine Negative NEG^Negative mg/dL    Bilirubin Urine Negative NEG^Negative    Ketones Urine Negative NEG^Negative mg/dL    Specific Gravity Urine 1.012 1.003 - 1.035    Blood Urine Negative NEG^Negative    pH Urine 6.0 5.0 - 7.0 pH    Protein Albumin Urine Negative NEG^Negative mg/dL    Urobilinogen mg/dL Normal 0.0 - 2.0 mg/dL    Nitrite Urine Negative NEG^Negative    Leukocyte Esterase Urine Negative NEG^Negative    Source Midstream Urine     WBC Urine 1 0 - 5 /HPF    RBC Urine 1 0 - 2 /HPF    Mucous Urine Present (A) NEG^Negative /LPF   Testosterone Free and Total   Result Value Ref Range    Testosterone Total 455 240 - 950 ng/dL      Comment:      This test was developed and its performance characteristics determined by the   [facility].   It has not been cleared or approved by the FDA. The laboratory is regulated   under CLIA as qualified to perform high-complexity testing. This test is used   for clinical purposes. It should not be regarded as investigational or for   research.      Sex Hormone Binding Globulin 69 11 - 80 nmol/L    Free Testosterone Calculated 5.59 4.7 - 24.4 ng/dL   Vitamin B12   Result Value Ref Range    Vitamin B12 1,427 (H) 180 - 914 pg/mL   CRP inflammation   Result Value Ref Range    CRP Inflammation <1.0 <10.0 mg/L   Sedimentation Rate (ESR)   Result Value Ref Range    Sed Rate 5 1 - 10 mm/h   Vitamin D Total   Result Value Ref Range    Vitamin D Total 84.6 ng/mL       Comment:      Comments:  Deficiency:             <10 ng/mL  Insufficiency:        10-29 ng/mL  Sufficiency:          ng/mL  Possible Toxicity:     >100 ng/mL     TSH Reflex GH   Result Value Ref Range    TSH Reflex 2.91 0.34 - 5.60 IU/mL   Lipid Profile   Result Value Ref Range    Cholesterol 144 <200 mg/dL    Triglycerides 106 <150 mg/dL    HDL Cholesterol 46 23 - 92 mg/dL    LDL Cholesterol Calculated 77 <100 mg/dL      Comment:      Desirable:       <100 mg/dl    Non HDL Cholesterol 98 <130 mg/dL   Comprehensive metabolic panel   Result Value Ref Range    Sodium 140 134 - 144 mmol/L    Potassium 3.7 3.5 - 5.1 mmol/L    Chloride 102 98 - 107 mmol/L    Carbon Dioxide 30 21 - 31 mmol/L    Anion Gap 8 3 - 14 mmol/L    Glucose 171 (H) 70 - 105 mg/dL    Urea Nitrogen 18 7 - 25 mg/dL    Creatinine 0.83 0.70 - 1.30 mg/dL    GFR Estimate 90 >60 mL/min/[1.73_m2]    GFR Estimate If Black >90 >60 mL/min/[1.73_m2]    Calcium 9.5 8.6 - 10.3 mg/dL    Bilirubin Total 0.7 0.3 - 1.0 mg/dL    Albumin 4.6 3.5 - 5.7 g/dL    Protein Total 7.2 6.4 - 8.9 g/dL    Alkaline Phosphatase 56 34 - 104 U/L    ALT 13 7 - 52 U/L    AST 13 13 - 39 U/L   CBC with platelets   Result Value Ref Range    WBC 4.9 4.0 - 11.0 10e9/L    RBC Count 4.88 4.4 - 5.9 10e12/L    Hemoglobin 14.5 13.3 - 17.7 g/dL    Hematocrit 45.0 40.0 - 53.0 %    MCV 92 78 - 100 fl    MCH 29.7 26.5 - 33.0 pg    MCHC 32.2 31.5 - 36.5 g/dL    RDW 13.5 10.0 - 15.0 %    Platelet Count 220 150 - 450 10e9/L   Hemoglobin A1c   Result Value Ref Range    Hemoglobin A1C 6.5 (H) 4.0 - 6.0 %   Albumin Random Urine Quantitative with Creat Ratio   Result Value Ref Range    Creatinine Urine 58 mg/dL    Albumin Urine mg/L 11 mg/L    Albumin Urine mg/g Cr 18.40 (H) 0 - 17 mg/g Cr       If you have any questions or concerns, please call the clinic at the number listed above.       Sincerely,      Cm Pickens MD

## 2020-12-18 NOTE — NURSING NOTE
Chief Complaint   Patient presents with     Medication Therapy Management       Medication Reconciliation complete.   Oliva Hutchins LPN  ..................12/18/2020   8:40 AM

## 2020-12-19 ASSESSMENT — ANXIETY QUESTIONNAIRES: GAD7 TOTAL SCORE: 5

## 2020-12-23 LAB
SHBG SERPL-SCNC: 69 NMOL/L (ref 11–80)
TESTOST FREE SERPL-MCNC: 5.59 NG/DL (ref 4.7–24.4)
TESTOST SERPL-MCNC: 455 NG/DL (ref 240–950)

## 2020-12-29 ENCOUNTER — HOSPITAL ENCOUNTER (OUTPATIENT)
Dept: MRI IMAGING | Facility: OTHER | Age: 75
Discharge: HOME | End: 2020-12-29
Attending: INTERNAL MEDICINE | Admitting: INTERNAL MEDICINE
Payer: MEDICARE

## 2020-12-29 DIAGNOSIS — M47.816 LUMBAR FACET ARTHROPATHY: ICD-10-CM

## 2020-12-29 DIAGNOSIS — M53.3 SACROILIAC JOINT PAIN: ICD-10-CM

## 2020-12-29 DIAGNOSIS — M79.18 GLUTEAL PAIN: ICD-10-CM

## 2020-12-29 PROCEDURE — 72195 MRI PELVIS W/O DYE: CPT

## 2020-12-31 DIAGNOSIS — E11.21 TYPE 2 DIABETES MELLITUS WITH DIABETIC NEPHROPATHY, WITHOUT LONG-TERM CURRENT USE OF INSULIN (H): ICD-10-CM

## 2020-12-31 RX ORDER — BLOOD SUGAR DIAGNOSTIC
STRIP MISCELLANEOUS
Qty: 200 STRIP | Refills: 3 | Status: SHIPPED | OUTPATIENT
Start: 2020-12-31 | End: 2020-12-31

## 2020-12-31 RX ORDER — BLOOD SUGAR DIAGNOSTIC
STRIP MISCELLANEOUS
Qty: 100 STRIP | Refills: 11 | Status: SHIPPED | OUTPATIENT
Start: 2020-12-31 | End: 2021-11-04

## 2020-12-31 NOTE — TELEPHONE ENCOUNTER
"Requested Prescriptions   Pending Prescriptions Disp Refills     blood glucose (ACCU-CHEK ONEL PLUS) test strip [Pharmacy Med Name: Accu-Chek Onel Plus In Vitro Strip]  0     Sig: USE  STRIP TO CHECK GLUCOSE TWICE DAILY       Diabetic Supplies Protocol Passed - 12/31/2020  9:15 AM        Passed - Medication is active on med list        Passed - Patient is 18 years of age or older        Passed - Recent (6 mo) or future (30 days) visit within the authorizing provider's specialty     Patient had office visit in the last 6 months or has a visit in the next 30 days with authorizing provider.  See \"Patient Info\" tab in inbasket, or \"Choose Columns\" in Meds & Orders section of the refill encounter.             Prescription approved per [facility] Protocol.  LOV with Cm Pickens MD.  Kailee Vallejo RN on 12/31/2020 at 10:05 AM          "

## 2020-12-31 NOTE — TELEPHONE ENCOUNTER
Insurance will not cover twice daily blood sugars when not using insulin.    He will need to buy the other strips out-of-pocket.    I believe the generic over-the-counter testing supplies at [facility] are the cheapest.    1. Type 2 diabetes mellitus with diabetic nephropathy, without long-term current use of insulin (H)  Updated rx sent to pharmacy.     - blood glucose (ACCU-CHEK ONEL PLUS) test strip; USE  STRIP TO CHECK GLUCOSE Once DAILY  Dispense: 100 strip; Refill: 11      Cm Pickens MD

## 2020-12-31 NOTE — TELEPHONE ENCOUNTER
Received a fax from [facility] asking for test strips. Called patient to see if patient checks blood sugars.  Patient checks sugars twice daily to help control diet. Oliva Hutchins LPN .............12/31/2020  1:12 PM

## 2021-01-08 ENCOUNTER — OFFICE VISIT (OUTPATIENT)
Dept: INTERNAL MEDICINE | Facility: OTHER | Age: 76
End: 2021-01-08
Attending: INTERNAL MEDICINE
Payer: COMMERCIAL

## 2021-01-08 VITALS
WEIGHT: 193.8 LBS | TEMPERATURE: 98.2 F | HEART RATE: 67 BPM | RESPIRATION RATE: 16 BRPM | DIASTOLIC BLOOD PRESSURE: 68 MMHG | SYSTOLIC BLOOD PRESSURE: 128 MMHG | OXYGEN SATURATION: 94 % | BODY MASS INDEX: 30.81 KG/M2

## 2021-01-08 DIAGNOSIS — H61.21 IMPACTED CERUMEN OF RIGHT EAR: ICD-10-CM

## 2021-01-08 DIAGNOSIS — M46.98 UNSPECIFIED INFLAMMATORY SPONDYLOPATHY, SACRAL AND SACROCOCCYGEAL REGION (H): ICD-10-CM

## 2021-01-08 DIAGNOSIS — M25.562 MEDIAL KNEE PAIN, LEFT: Primary | ICD-10-CM

## 2021-01-08 DIAGNOSIS — R26.0 STAGGERING GAIT: ICD-10-CM

## 2021-01-08 DIAGNOSIS — M47.816 LUMBAR FACET ARTHROPATHY: ICD-10-CM

## 2021-01-08 DIAGNOSIS — R53.81 MALAISE AND FATIGUE: ICD-10-CM

## 2021-01-08 DIAGNOSIS — F33.42 RECURRENT MAJOR DEPRESSIVE DISORDER, IN FULL REMISSION (H): ICD-10-CM

## 2021-01-08 DIAGNOSIS — E11.42 DIABETIC PERIPHERAL NEUROPATHY (H): ICD-10-CM

## 2021-01-08 DIAGNOSIS — R53.83 MALAISE AND FATIGUE: ICD-10-CM

## 2021-01-08 DIAGNOSIS — M62.81 GENERALIZED MUSCLE WEAKNESS: ICD-10-CM

## 2021-01-08 PROBLEM — H61.23 BILATERAL IMPACTED CERUMEN: Status: ACTIVE | Noted: 2021-01-08

## 2021-01-08 PROCEDURE — G0463 HOSPITAL OUTPT CLINIC VISIT: HCPCS

## 2021-01-08 PROCEDURE — 69209 REMOVE IMPACTED EAR WAX UNI: CPT | Performed by: INTERNAL MEDICINE

## 2021-01-08 PROCEDURE — G0463 HOSPITAL OUTPT CLINIC VISIT: HCPCS | Mod: 25

## 2021-01-08 PROCEDURE — 99214 OFFICE O/P EST MOD 30 MIN: CPT | Performed by: INTERNAL MEDICINE

## 2021-01-08 ASSESSMENT — ANXIETY QUESTIONNAIRES
3. WORRYING TOO MUCH ABOUT DIFFERENT THINGS: MORE THAN HALF THE DAYS
IF YOU CHECKED OFF ANY PROBLEMS ON THIS QUESTIONNAIRE, HOW DIFFICULT HAVE THESE PROBLEMS MADE IT FOR YOU TO DO YOUR WORK, TAKE CARE OF THINGS AT HOME, OR GET ALONG WITH OTHER PEOPLE: SOMEWHAT DIFFICULT
7. FEELING AFRAID AS IF SOMETHING AWFUL MIGHT HAPPEN: NOT AT ALL
2. NOT BEING ABLE TO STOP OR CONTROL WORRYING: SEVERAL DAYS
GAD7 TOTAL SCORE: 7
5. BEING SO RESTLESS THAT IT IS HARD TO SIT STILL: SEVERAL DAYS
6. BECOMING EASILY ANNOYED OR IRRITABLE: SEVERAL DAYS
1. FEELING NERVOUS, ANXIOUS, OR ON EDGE: SEVERAL DAYS

## 2021-01-08 ASSESSMENT — ENCOUNTER SYMPTOMS
DIARRHEA: 0
SLEEP DISTURBANCE: 0
AGITATION: 0
HEMATURIA: 0
LIGHT-HEADEDNESS: 1
FATIGUE: 1
BACK PAIN: 1
DYSURIA: 0
DIZZINESS: 1
WHEEZING: 0
COUGH: 0
ARTHRALGIAS: 1
ABDOMINAL PAIN: 0
VOMITING: 0
MYALGIAS: 1
PALPITATIONS: 0
CHILLS: 0
EYE REDNESS: 1
NAUSEA: 0
WEAKNESS: 1
BRUISES/BLEEDS EASILY: 0
FEVER: 0
SHORTNESS OF BREATH: 0
EYE PAIN: 1

## 2021-01-08 ASSESSMENT — PATIENT HEALTH QUESTIONNAIRE - PHQ9
SUM OF ALL RESPONSES TO PHQ QUESTIONS 1-9: 8
5. POOR APPETITE OR OVEREATING: SEVERAL DAYS

## 2021-01-08 ASSESSMENT — PAIN SCALES - GENERAL
PAINLEVEL: MODERATE PAIN (4)
PAINLEVEL: MODERATE PAIN (5)

## 2021-01-08 NOTE — NURSING NOTE
Chief Complaint   Patient presents with     Follow Up     MRI/labs   Patient would like ears looked at.   COVID vaccinations are allocated by State and Federal resources and what we know is that they are not currently available in [place].  When a vaccine is available at [facility], we will alert our patients and the public and communicate a vaccination plan.   Medication Reconciliation complete.   Oliva Hutchins LPN  ..................1/8/2021   3:21 PM

## 2021-01-08 NOTE — PATIENT INSTRUCTIONS
Left knee xray today is not needed... pain is due to Pes Anserine Bursitis.     Low back Facet joint arthritis noted with recent MRI.   -- order for left lumbar facet joint injection was ordered  - they will call with date/time of appointment.      -- will need to hold Aspirin and Plavix for 5 to 7 days prior to back injection.     Return as needed for follow-up with Dr. Pickens.    Clinic : [phone]  Appointment line: [phone]

## 2021-01-08 NOTE — PROGRESS NOTES
Mr. Basilio is a 75 year old male who presents to the clinic for evaluation of the following problems:      ICD-10-CM    1. Medial knee pain, left  M25.562 CANCELED: XR Knee Standing 2v  Bilateral & 2v Left   2. Lumbar facet arthropathy  M47.816 XR Lumbar Sacral Facet Inj Left   3. Generalized muscle weakness  M62.81    4. Malaise and fatigue  R53.81     R53.83    5. Staggering gait  R26.0    6. Unspecified inflammatory spondylopathy, sacral and sacrococcygeal region (H)  M46.98    7. Recurrent major depressive disorder, in full remission (H)  F33.42    8. Diabetic peripheral neuropathy (H)  E11.42    9. Impacted cerumen of right ear  H61.21 CA REMOVAL IMPACTED CERUMEN IRRIGATION/LVG UNILAT     CANCELED: Removal Impacted Cerumen - [facility] ONLY     HPI  Patient presents today with multiple concerns.    He has been having medial left knee pain for the past few days.  He was outside walking on some very soft ground.  The snow would give way when he would move forward.  Since that time he has had much worse pain especially medially involving his left knee.  He tried some ibuprofen x2 tablets last night and put a lidocaine patch on the inner part of his knee and that was much better now today.  Initially he was for some x-rays but after examination, he was found to have pes anserine bursitis.  Recommend conservative management.  Ice packs, ibuprofen, lidocaine patches as needed.  X-ray was canceled.    Patient has been having persistent issues with low back pain.  He has had epidural injection in the past, sacroiliac joint injection in the past, none of these were all that helpful.  Recent MRI showed advanced lumbar facet arthropathy involving the lumbar spine.  He has been having no significant symptoms in the left side/left buttocks.  He is interested in pursuing a left-sided facet joint injection as long as it is not horribly painful.  The last injection he had was a sacroiliac joint injection and states that it was  probably comfortable.  Patient has history of inflammatory spondylopathy of the sacral and sacrococcygeal region.  Previous steroid injection into the SI joint was extremely painful and unhelpful.    Has been having issues with fatigue and malaise, staggering gait.  He is hoping that maybe the cerumen impaction is contributing to some of this but he overall has been having reduction in energy and exercise tolerance for the last 3 or 4 weeks.  Has had some sporadic chest pain left-sided resolved spontaneously no worsening with exertion or at rest.  This is only happened a couple of times.  He is quite frustrated.  He does have some dizzy spells especially with change in position and standing.  Advised that he likely has some issues with autonomic neuropathy.  He has peripheral neuropathy from diabetes already.    History of depression, currently in full remission.  Does have some frustration due to his symptoms as noted above.    Right ear cerumen impaction, this was cleared with water irrigation today by clinic nursing staff.    Functional Capacity: less than 4 METS.   Review of Systems   Constitutional: Positive for fatigue. Negative for chills and fever.   HENT: Positive for hearing loss. Negative for congestion.    Eyes: Positive for pain and redness. Negative for visual disturbance.        + chronic dry eyes, tried many eye drops, not much improvement - told needs eye surgery   Respiratory: Negative for cough, shortness of breath and wheezing.    Cardiovascular: Positive for chest pain (rare, left sided - sporatic, then resolves spontaneously - not worse with walking). Negative for palpitations.        Cardiac stress testing 3/2020 - negative/normal results.   Gastrointestinal: Negative for abdominal pain, diarrhea, nausea and vomiting.   Endocrine: Negative for cold intolerance and heat intolerance.   Genitourinary: Negative for dysuria and hematuria.   Musculoskeletal: Positive for arthralgias (left knee),  "back pain (left low back / buttocks pain), gait problem (staggering at times, low back and left knee pain) and myalgias.   Skin: Negative for pallor.   Allergic/Immunologic: Negative for immunocompromised state.   Neurological: Positive for dizziness, weakness and light-headedness.   Hematological: Does not bruise/bleed easily.   Psychiatric/Behavioral: Negative for agitation and sleep disturbance (+ thinks he is sleeping too much -- 8 to 10 hours/day).        + short term memory loss -- stopped Exelon and Namenda -- due to side effects.       Reviewed and updated as needed this visit by Provider   Tobacco  Allergies  Meds  Problems  Med Hx  Surg Hx  Fam Hx          EXAM:   Vitals:    01/08/21 1521   BP: 128/68   BP Location: Right arm   Patient Position: Sitting   Cuff Size: Adult Regular   Pulse: 67   Resp: 16   Temp: 98.2  F (36.8  C)   TempSrc: Tympanic   SpO2: 94%   Weight: 87.9 kg (193 lb 12.8 oz)       Current Pain Score: Moderate Pain (4)     BP Readings from Last 3 Encounters:   01/08/21 128/68   12/18/20 138/78   12/02/20 130/62      Wt Readings from Last 3 Encounters:   01/08/21 87.9 kg (193 lb 12.8 oz)   12/18/20 88 kg (194 lb)   12/02/20 86.2 kg (190 lb)      Estimated body mass index is 30.81 kg/m  as calculated from the following:    Height as of 9/17/20: 1.689 m (5' 6.5\").    Weight as of this encounter: 87.9 kg (193 lb 12.8 oz).     Physical Exam  Constitutional:       General: He is not in acute distress.     Appearance: He is well-developed. He is not diaphoretic.   HENT:      Head: Normocephalic and atraumatic.      Right Ear: There is impacted cerumen.      Ears:      Comments: Right ear with cerumen impaction, left ear with moderate wax  Eyes:      General: No scleral icterus.     Conjunctiva/sclera: Conjunctivae normal.   Neck:      Musculoskeletal: Neck supple.      Vascular: No carotid bruit.   Cardiovascular:      Rate and Rhythm: Normal rate and regular rhythm.      Pulses: Normal " pulses.   Pulmonary:      Effort: Pulmonary effort is normal.      Breath sounds: Normal breath sounds.   Abdominal:      Palpations: Abdomen is soft.      Tenderness: There is no abdominal tenderness.   Musculoskeletal:         General: Tenderness (Pes anserine bursitis of left knee) present. No deformity.      Right lower leg: No edema.      Left lower leg: No edema.      Comments: + able to stand on heels and toes symmetrically   Lymphadenopathy:      Cervical: No cervical adenopathy.   Skin:     General: Skin is warm and dry.      Findings: No rash.   Neurological:      Mental Status: He is alert and oriented to person, place, and time. Mental status is at baseline.      Comments: short term memory loss   Psychiatric:         Mood and Affect: Mood normal.         Behavior: Behavior normal.        Procedures   INVESTIGATIONS:  - Labs reviewed in Epic     ASSESSMENT AND PLAN:  Problem List Items Addressed This Visit        Nervous and Auditory    Medial knee pain, left - Primary    Bilateral impacted cerumen    Diabetic peripheral neuropathy (H)       Musculoskeletal and Integumentary    Lumbar facet arthropathy    Relevant Orders    XR Lumbar Sacral Facet Inj Left    Unspecified inflammatory spondylopathy, sacral and sacrococcygeal region (H)       Behavioral    Recurrent major depressive disorder, in full remission (H)      Other Visit Diagnoses     Generalized muscle weakness        Malaise and fatigue        Staggering gait            reviewed diet, exercise and weight control, recommended sodium restriction, cardiovascular risk and specific lipid/LDL goals reviewed  -- Expected clinical course discussed    -- Medications and their side effects discussed    The 10-year ASCVD risk score (Donnatyron CRUZ Jr., et al., 2013) is: 44.5%    Values used to calculate the score:      Age: 75 years      Sex: Male      Is Non- : No      Diabetic: Yes      Tobacco smoker: No      Systolic Blood Pressure:  128 mmHg      Is BP treated: No      HDL Cholesterol: 46 mg/dL      Total Cholesterol: 144 mg/dL    Patient Instructions   Left knee xray today is not needed... pain is due to Pes Anserine Bursitis.     Low back Facet joint arthritis noted with recent MRI.   -- order for left lumbar facet joint injection was ordered  - they will call with date/time of appointment.      -- will need to hold Aspirin and Plavix for 5 to 7 days prior to back injection.     Return as needed for follow-up with Dr. Pickens.    Clinic : [phone]  Appointment line: [phone]     Cm Pickens MD   Internal Medicine  [facility]     Portions of this note were dictated using speech recognition software. The note has been proofread but errors in the text may have been overlooked. Please contact me if there are any concerns regarding the accuracy of the dictation.

## 2021-01-08 NOTE — PROGRESS NOTES
The bilateral canals were irrigated with body-temperature tap water with the jet of water directed superiorly.  The ear canals were then re-examined and cleared of the impaction.  The patient tolerated the procedure well.   Oliva Hutchins LPN .............1/8/2021  4:32 PM

## 2021-01-09 ASSESSMENT — ANXIETY QUESTIONNAIRES: GAD7 TOTAL SCORE: 7

## 2021-01-12 ENCOUNTER — HOSPITAL ENCOUNTER (OUTPATIENT)
Dept: GENERAL RADIOLOGY | Facility: OTHER | Age: 76
Discharge: HOME | End: 2021-01-12
Attending: INTERNAL MEDICINE | Admitting: INTERNAL MEDICINE
Payer: MEDICARE

## 2021-01-12 DIAGNOSIS — M47.816 LUMBAR FACET ARTHROPATHY: ICD-10-CM

## 2021-01-12 DIAGNOSIS — M53.3 SACROILIAC JOINT PAIN: ICD-10-CM

## 2021-01-12 PROCEDURE — 27096 INJECT SACROILIAC JOINT: CPT | Mod: LT

## 2021-01-12 PROCEDURE — 250N000011 HC RX IP 250 OP 636: Performed by: RADIOLOGY

## 2021-01-12 PROCEDURE — 250N000009 HC RX 250: Performed by: RADIOLOGY

## 2021-01-12 PROCEDURE — 255N000002 HC RX 255 OP 636: Performed by: RADIOLOGY

## 2021-01-12 RX ORDER — BUPIVACAINE HYDROCHLORIDE 5 MG/ML
4 INJECTION, SOLUTION EPIDURAL; INTRACAUDAL ONCE
Status: COMPLETED | OUTPATIENT
Start: 2021-01-12 | End: 2021-01-12

## 2021-01-12 RX ORDER — LIDOCAINE HYDROCHLORIDE 10 MG/ML
4 INJECTION, SOLUTION INFILTRATION; PERINEURAL ONCE
Status: COMPLETED | OUTPATIENT
Start: 2021-01-12 | End: 2021-01-12

## 2021-01-12 RX ORDER — TRIAMCINOLONE ACETONIDE 40 MG/ML
80 INJECTION, SUSPENSION INTRA-ARTICULAR; INTRAMUSCULAR ONCE
Status: COMPLETED | OUTPATIENT
Start: 2021-01-12 | End: 2021-01-12

## 2021-01-12 RX ADMIN — IOHEXOL 2 ML: 240 INJECTION, SOLUTION INTRATHECAL; INTRAVASCULAR; INTRAVENOUS; ORAL at 14:04

## 2021-01-12 RX ADMIN — LIDOCAINE HYDROCHLORIDE 2 ML: 10 INJECTION, SOLUTION INFILTRATION; PERINEURAL at 14:04

## 2021-01-12 RX ADMIN — BUPIVACAINE HYDROCHLORIDE 3 ML: 5 INJECTION, SOLUTION EPIDURAL; INTRACAUDAL at 14:04

## 2021-01-12 RX ADMIN — TRIAMCINOLONE ACETONIDE 40 MG: 40 INJECTION, SUSPENSION INTRA-ARTICULAR; INTRAMUSCULAR at 14:04

## 2021-01-26 ENCOUNTER — NURSE TRIAGE (OUTPATIENT)
Dept: INTERNAL MEDICINE | Facility: OTHER | Age: 76
End: 2021-01-26

## 2021-01-26 ENCOUNTER — HOSPITAL ENCOUNTER (EMERGENCY)
Facility: OTHER | Age: 76
Discharge: HOME | End: 2021-01-26
Attending: EMERGENCY MEDICINE | Admitting: EMERGENCY MEDICINE
Payer: MEDICARE

## 2021-01-26 ENCOUNTER — APPOINTMENT (OUTPATIENT)
Dept: GENERAL RADIOLOGY | Facility: OTHER | Age: 76
End: 2021-01-26
Attending: EMERGENCY MEDICINE
Payer: MEDICARE

## 2021-01-26 VITALS
RESPIRATION RATE: 16 BRPM | HEIGHT: 67 IN | DIASTOLIC BLOOD PRESSURE: 72 MMHG | TEMPERATURE: 96.6 F | BODY MASS INDEX: 29.82 KG/M2 | OXYGEN SATURATION: 98 % | SYSTOLIC BLOOD PRESSURE: 148 MMHG | WEIGHT: 190 LBS | HEART RATE: 64 BPM

## 2021-01-26 DIAGNOSIS — R53.81 MALAISE: ICD-10-CM

## 2021-01-26 DIAGNOSIS — R53.1 WEAKNESS: ICD-10-CM

## 2021-01-26 LAB
ALBUMIN SERPL-MCNC: 4.5 G/DL (ref 3.5–5.7)
ALP SERPL-CCNC: 68 U/L (ref 34–104)
ALT SERPL W P-5'-P-CCNC: 13 U/L (ref 7–52)
ANION GAP SERPL CALCULATED.3IONS-SCNC: 8 MMOL/L (ref 3–14)
APTT PPP: 27 SEC (ref 22–37)
AST SERPL W P-5'-P-CCNC: 12 U/L (ref 13–39)
BASOPHILS # BLD AUTO: 0 10E9/L (ref 0–0.2)
BASOPHILS NFR BLD AUTO: 0.4 %
BILIRUB SERPL-MCNC: 0.6 MG/DL (ref 0.3–1)
BUN SERPL-MCNC: 17 MG/DL (ref 7–25)
CALCIUM SERPL-MCNC: 9.8 MG/DL (ref 8.6–10.3)
CHLORIDE SERPL-SCNC: 104 MMOL/L (ref 98–107)
CO2 SERPL-SCNC: 28 MMOL/L (ref 21–31)
CREAT SERPL-MCNC: 0.78 MG/DL (ref 0.7–1.3)
DIFFERENTIAL METHOD BLD: NORMAL
EOSINOPHIL # BLD AUTO: 0.1 10E9/L (ref 0–0.7)
EOSINOPHIL NFR BLD AUTO: 1.4 %
ERYTHROCYTE [DISTWIDTH] IN BLOOD BY AUTOMATED COUNT: 13.7 % (ref 10–15)
GFR SERPL CREATININE-BSD FRML MDRD: >90 ML/MIN/{1.73_M2}
GLUCOSE SERPL-MCNC: 111 MG/DL (ref 70–105)
HCT VFR BLD AUTO: 45.8 % (ref 40–53)
HGB BLD-MCNC: 15.2 G/DL (ref 13.3–17.7)
IMM GRANULOCYTES # BLD: 0 10E9/L (ref 0–0.4)
IMM GRANULOCYTES NFR BLD: 0.3 %
INR PPP: 0.9 (ref 0.86–1.14)
LYMPHOCYTES # BLD AUTO: 1.3 10E9/L (ref 0.8–5.3)
LYMPHOCYTES NFR BLD AUTO: 17.1 %
MCH RBC QN AUTO: 30.2 PG (ref 26.5–33)
MCHC RBC AUTO-ENTMCNC: 33.2 G/DL (ref 31.5–36.5)
MCV RBC AUTO: 91 FL (ref 78–100)
MONOCYTES # BLD AUTO: 0.6 10E9/L (ref 0–1.3)
MONOCYTES NFR BLD AUTO: 7.4 %
NEUTROPHILS # BLD AUTO: 5.6 10E9/L (ref 1.6–8.3)
NEUTROPHILS NFR BLD AUTO: 73.4 %
NT-PROBNP SERPL-MCNC: 43 PG/ML (ref 0–100)
PLATELET # BLD AUTO: 223 10E9/L (ref 150–450)
POTASSIUM SERPL-SCNC: 3.9 MMOL/L (ref 3.5–5.1)
PROT SERPL-MCNC: 7.2 G/DL (ref 6.4–8.9)
RBC # BLD AUTO: 5.03 10E12/L (ref 4.4–5.9)
SODIUM SERPL-SCNC: 140 MMOL/L (ref 134–144)
TROPONIN I SERPL-MCNC: 2.4 PG/ML
WBC # BLD AUTO: 7.7 10E9/L (ref 4–11)

## 2021-01-26 PROCEDURE — 71045 X-RAY EXAM CHEST 1 VIEW: CPT

## 2021-01-26 PROCEDURE — 99283 EMERGENCY DEPT VISIT LOW MDM: CPT | Performed by: EMERGENCY MEDICINE

## 2021-01-26 PROCEDURE — 85025 COMPLETE CBC W/AUTO DIFF WBC: CPT | Performed by: EMERGENCY MEDICINE

## 2021-01-26 PROCEDURE — 85610 PROTHROMBIN TIME: CPT | Performed by: EMERGENCY MEDICINE

## 2021-01-26 PROCEDURE — 93010 ELECTROCARDIOGRAM REPORT: CPT | Performed by: INTERNAL MEDICINE

## 2021-01-26 PROCEDURE — 250N000013 HC RX MED GY IP 250 OP 250 PS 637: Performed by: EMERGENCY MEDICINE

## 2021-01-26 PROCEDURE — 83880 ASSAY OF NATRIURETIC PEPTIDE: CPT | Performed by: EMERGENCY MEDICINE

## 2021-01-26 PROCEDURE — 99285 EMERGENCY DEPT VISIT HI MDM: CPT | Mod: 25 | Performed by: EMERGENCY MEDICINE

## 2021-01-26 PROCEDURE — 80053 COMPREHEN METABOLIC PANEL: CPT | Performed by: EMERGENCY MEDICINE

## 2021-01-26 PROCEDURE — 85730 THROMBOPLASTIN TIME PARTIAL: CPT | Performed by: EMERGENCY MEDICINE

## 2021-01-26 PROCEDURE — 84484 ASSAY OF TROPONIN QUANT: CPT | Performed by: EMERGENCY MEDICINE

## 2021-01-26 PROCEDURE — 93005 ELECTROCARDIOGRAM TRACING: CPT | Performed by: EMERGENCY MEDICINE

## 2021-01-26 RX ORDER — ASPIRIN 81 MG/1
324 TABLET, CHEWABLE ORAL ONCE
Status: COMPLETED | OUTPATIENT
Start: 2021-01-26 | End: 2021-01-26

## 2021-01-26 RX ADMIN — ASPIRIN 81 MG CHEWABLE TABLET 243 MG: 81 TABLET CHEWABLE at 12:52

## 2021-01-26 ASSESSMENT — MIFFLIN-ST. JEOR: SCORE: 1545.46

## 2021-01-26 NOTE — ED TRIAGE NOTES
ED Nursing Triage Note (General)   ________________________________    Dajuan Basilio is a 75 year old Male that presents to triage private car  With history of feeling unwell with increased weakness today. Hx of chest and left arm pain off and on for several months. Denies any chest pain currently. Feels that left arm pain is worse today.   Vitals: BP-148/72,P-66, Sp02- 98% on RA. Resp: 16    Patient appears alert  and oriented, in mild distress., and cooperative and calm behavior.    GCS 15  Airway: intact  Breathing noted as Normal.  Circulation Normal  Skin normal  Action taken:  Triage to critical care immediately      PRE HOSPITAL PRIOR LIVING SITUATION Spouse

## 2021-01-26 NOTE — TELEPHONE ENCOUNTER
Please call the patient.  He has left arm pain and chest pain but he states it's not a heart attack he just wants a stress test ordered.          Anita Mccracken on 1/26/2021 at 9:37 AM

## 2021-01-26 NOTE — ED PROVIDER NOTES
[facility]  Emergency Department Visit Note    Generalized Weakness      History of Present Illness     Dajuan Basiilo is a 75 year old male with initial chief complain of left arm pain and weakness. However he states he has generalized weakness and has lost his motivaton. He state she is tired and does not wan tot do activities. He states if he gets going he feels better and can walk without CP or SOB. He has had left arm pain at rest but it is brief and never there when he is doing activities. This pain started approximately 1 month or more prior to arrival and the onset is always at rest.  The pain is characterized as cramping, does not radiate, and is not associated with fevers, cough, shortness of breath, nausea, vomiting, diaphoresis, change with respiration. The patient has not had similar symptoms in the past. No falls or other recent injuries.    Medications:  Prior to Admission medications    Medication Sig Last Dose Taking? Auth Provider   ascorbic acid (VITAMIN C) 500 MG tablet Take 1 tablet by mouth daily. For 60 days 1/26/2021 at am Yes Reported, Patient   aspirin 81 MG tablet Take 81 mg by mouth daily 1/25/2021 at pm Yes Reported, Patient   Cholecalciferol (D 5000) 5000 UNITS TABS Take 5,000 Units by mouth daily 1/26/2021 at am Yes Reported, Patient   citalopram (CELEXA) 10 MG tablet Take 1 tablet (10 mg) by mouth daily -- For Mood --- Note pill size change, no dose change --- 1/25/2021 at pm Yes Cm Pickens MD   cyanocobalamin (CYANOCOBALAMIN) 1000 MCG/ML injection Inject 1 mL (1,000 mcg) into the muscle every 3 to 4 weeks Past Month at Unknown time Yes Maynor Winters MD   gabapentin (NEURONTIN) 100 MG capsule Take 1-2 capsules (100-200 mg) by mouth At Bedtime - for pain 1/25/2021 at pm Yes Cm Pickens MD   metFORMIN (GLUCOPHAGE) 500 MG tablet Take 2 tablets (1,000 mg) by mouth daily (with breakfast) AND 1 tablet (500 mg) daily (with dinner). 1/26/2021 at am Yes  Cm Pickens MD   simvastatin (ZOCOR) 10 MG tablet Take 1 tablet (10 mg) by mouth At Bedtime -- Note pill size change -- No dose change -- 1/25/2021 at pm Yes Cm Pickens MD   blood glucose (ACCU-CHEK ONEL PLUS) test strip USE  STRIP TO CHECK GLUCOSE Once DAILY   Cm Pickens MD   clopidogrel (PLAVIX) 75 MG tablet Take 1 tablet (75 mg) by mouth daily Unknown at Unknown time  Cm Pickens MD   nitroGLYcerin (NITROSTAT) 0.4 MG sublingual tablet    Reported, Patient   vitamin B complex with vitamin C (VITAMIN  B COMPLEX) TABS tablet Take 1 tablet by mouth daily Monday, Wednesday, Friday -- 3 days weekly -- make sure it has B12 in tablet -- Dose Reduced 7/11/2018 Unknown at Unknown time  Cm Pickens MD       Allergies:  Allergies   Allergen Reactions     Ace Inhibitors Cough     Atorvastatin Calcium Muscle Pain (Myalgia)     Lipitor     Exelon [Rivastigmine] Other (See Comments)     Dizzy, lightheaded     Hmg-Coa-R Inhibitors Muscle Pain (Myalgia)     Higher doses cause myalgias     Namenda [Memantine] Other (See Comments)     Dizzy, lightheaded       Problem List:  Patient Active Problem List   Diagnosis     Diverticular disease of colon     Generalized anxiety disorder     GERD (gastroesophageal reflux disease)     Nephrolithiasis     Recurrent major depressive disorder, in full remission (H)     BPH (benign prostatic hyperplasia)     Obesity     Panic disorder without agoraphobia     Microalbuminuria     Coronary artery disease involving native coronary artery of native heart without angina pectoris     Status post insertion of drug eluting coronary artery stent - x2 stents - 1st OMB - 5/17/2018 - [facility]     Controlled type 2 diabetes mellitus with diabetic neuropathy, without long-term current use of insulin (H)     Benign prostatic hyperplasia with weak urinary stream     S/P cardiac cath 5/17/18     Unsteady gait     Stented coronary artery to his LCX and OM1 on 5/17/18     Positive  FIT (fecal immunochemical test)     Mixed hyperlipidemia     OME (otitis media with effusion), bilateral     Chronically dry eyes, bilateral     Left sided sciatica     Gluteal pain     Lumbar facet arthropathy     Chronic left-sided low back pain without sciatica     Vitamin B12 deficiency     Memory loss     Benign essential hypertension     Unspecified inflammatory spondylopathy, sacral and sacrococcygeal region (H)     Medial knee pain, left     Bilateral impacted cerumen     Diabetic peripheral neuropathy (H)       Past Medical History:  Past Medical History:   Diagnosis Date     Abnormal CT scan, bladder 2016     Diverticulosis of intestine without perforation or abscess without bleeding     No Comments Provided     Fatty (change of) liver, 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 Johnson negative.     COLONOSCOPY  2013,WNL     COLONOSCOPY  2019    [facility]-no follow up     HERNIA REPAIR  2000     RELEASE CARPAL TUNNEL      2014     VASECTOMY      No Comments Provided       Social History:  Social History     Tobacco Use     Smoking status: Former Smoker     Types: Cigarettes     Quit date: 10/25/1983     Years since quittin.2     Smokeless tobacco: Never Used   Substance Use Topics     Alcohol use: No     Drug use: No       Review of Systems:  10 point review of systems obtained and  "pertinent positive and negative findings noted in HPI. Review of systems otherwise negative.    Physical Exam     Vital signs: BP (!) 148/72   Pulse 64   Temp 96.6  F (35.9  C) (Tympanic)   Resp 16   Ht 1.702 m (5' 7\")   Wt 86.2 kg (190 lb)   SpO2 98%   BMI 29.76 kg/m      Physical Exam:    General: awake and alert, comfortable  HEENT: atraumatic, no scleral injection, no nasal discharge, neck supple  Chest wall: palpation of the chest wall does notreproduce symptoms  Chest: clear to auscultation bilaterally without wheezes or crackles, non labored respirations  Cardiovascular: regular rate and rhythm, no murmurs or gallops  Abdomen: soft, nontender, no rebound or guarding, nondistended  Extremities: no deformities, edema, or tenderness  Skin: warm, dry, no rashes  Neuro: alert and oriented x 3, moving extremities x 4, ambulates without difficulty      Medical Decision Making & ED Course     Dajuan Basilio is a 75 year old male presenting with chest pain. Differential includes acute coronary syndrome, pulmonary embolism, aortic dissection, pneumonia, esophageal spasm, gastroesophageal reflux, reactive airway disease, chest wall pain, stress or anxiety. Concern for a life threatening etiology of symptoms prompts EKG, CXR, and laboratory evaluation. Given the patient's few risk factors and atypical history for acute coronary syndrome with studies results suggesting low suspicion of a coronary event, the patient is stable for outpatient management. Results were discussed with the patient and he states he thinks he is depressed and it is the main problem. He states he is having a hard time with quarantine. He denies suicidal ideation or helplessness. He declines behavioral health assessment. An appointment was made with Dr Pickens for 2/2. Patient given instructions on follow-up and warning signs for which to return to ED. All questions were answered and the patient is comfortable with plan for discharge. The " patient was discharged in stable condition.    I have reviewed the patients ECG, laboratory studies, imaging, and medical records.      Diagnosis & Disposition     Diagnosis:  1. Malaise    2. Weakness        Disposition:  Home    MD Cherelle Joseph Theresa M, MD  01/26/21 5232

## 2021-01-26 NOTE — TELEPHONE ENCOUNTER
"Patient calling and states that has had this chest pressure on and off for months.  States now is also having left arm pain and weakness.  States it is happening more frequent and lasting longer.  Patient states is still having pressure feeling in left side of chest and left arm weakness.  States is having some dizziness.  Patient advised to call 911 due to how long chest pressure, age and risk factors.    Patient states he doesn't want to come into ER he was hoping he could have stress test scheduled.  Patient advised to present to ER due to symptoms and explained importance of this.  Patient verbalized understanding and will have some one drive him to ER.    Jacklyn Vazquez RN on 1/26/2021 at 11:44 AM    Reason for Disposition    Chest pain lasting longer than 5 minutes and ANY of the following:* Over 50 years old* Over 30 years old and at least one cardiac risk factor (i.e., high blood pressure, diabetes, high cholesterol, obesity, smoker or strong family history of heart disease)* Pain is crushing, pressure-like, or heavy * Took nitroglycerin and chest pain was not relieved* History of heart disease (i.e., angina, heart attack, bypass surgery, angioplasty, CHF)    Additional Information    Negative: Severe difficulty breathing (e.g., struggling for each breath, speaks in single words)    Negative: Passed out (i.e., fainted, collapsed and was not responding)    Answer Assessment - Initial Assessment Questions  1. LOCATION: \"Where does it hurt?\"        Left side chest and left arm  2. RADIATION: \"Does the pain go anywhere else?\" (e.g., into neck, jaw, arms, back)      arm  3. ONSET: \"When did the chest pain begin?\" (Minutes, hours or days)       \"for awhile\" months  4. PATTERN \"Does the pain come and go, or has it been constant since it started?\"  \"Does it get worse with exertion?\"       Comes and goes and becoming more frequent  5. DURATION: \"How long does it last\" (e.g., seconds, minutes, hours)      hours  6. " "SEVERITY: \"How bad is the pain?\"  (e.g., Scale 1-10; mild, moderate, or severe)     - MILD (1-3): doesn't interfere with normal activities      - MODERATE (4-7): interferes with normal activities or awakens from sleep     - SEVERE (8-10): excruciating pain, unable to do any normal activities        2-3/10  7. CARDIAC RISK FACTORS: \"Do you have any history of heart problems or risk factors for heart disease?\" (e.g., prior heart attack, angina; high blood pressure, diabetes, being overweight, high cholesterol, smoking, or strong family history of heart disease)      Stent placement, diabetes, overweight, high cholesterol,   8. PULMONARY RISK FACTORS: \"Do you have any history of lung disease?\"  (e.g., blood clots in lung, asthma, emphysema, birth control pills)      denies  9. CAUSE: \"What do you think is causing the chest pain?\"      unsure  10. OTHER SYMPTOMS: \"Do you have any other symptoms?\" (e.g., dizziness, nausea, vomiting, sweating, fever, difficulty breathing, cough)        Dizziness, flushed at times, cough, 98.6 temp  11. PREGNANCY: \"Is there any chance you are pregnant?\" \"When was your last menstrual period?\"        n/a    Protocols used: CHEST PAIN-A-OH    "

## 2021-01-27 LAB — INTERPRETATION ECG - MUSE: NORMAL

## 2021-02-16 ENCOUNTER — IMMUNIZATION (OUTPATIENT)
Dept: FAMILY MEDICINE | Facility: OTHER | Age: 76
End: 2021-02-16
Attending: INTERNAL MEDICINE
Payer: MEDICARE

## 2021-02-16 PROCEDURE — 91300 PR COVID VAC PFIZER DIL RECON 30 MCG/0.3 ML IM: CPT

## 2021-03-09 ENCOUNTER — IMMUNIZATION (OUTPATIENT)
Dept: FAMILY MEDICINE | Facility: OTHER | Age: 76
End: 2021-03-09
Attending: FAMILY MEDICINE
Payer: MEDICARE

## 2021-03-09 PROCEDURE — 91300 PR COVID VAC PFIZER DIL RECON 30 MCG/0.3 ML IM: CPT

## 2021-03-12 ENCOUNTER — TELEPHONE (OUTPATIENT)
Dept: INTERNAL MEDICINE | Facility: OTHER | Age: 76
End: 2021-03-12

## 2021-03-12 ASSESSMENT — ANXIETY QUESTIONNAIRES
2. NOT BEING ABLE TO STOP OR CONTROL WORRYING: NEARLY EVERY DAY
1. FEELING NERVOUS, ANXIOUS, OR ON EDGE: NEARLY EVERY DAY
GAD7 TOTAL SCORE: 18
5. BEING SO RESTLESS THAT IT IS HARD TO SIT STILL: NEARLY EVERY DAY
6. BECOMING EASILY ANNOYED OR IRRITABLE: NEARLY EVERY DAY
IF YOU CHECKED OFF ANY PROBLEMS ON THIS QUESTIONNAIRE, HOW DIFFICULT HAVE THESE PROBLEMS MADE IT FOR YOU TO DO YOUR WORK, TAKE CARE OF THINGS AT HOME, OR GET ALONG WITH OTHER PEOPLE: SOMEWHAT DIFFICULT
3. WORRYING TOO MUCH ABOUT DIFFERENT THINGS: NEARLY EVERY DAY
7. FEELING AFRAID AS IF SOMETHING AWFUL MIGHT HAPPEN: NOT AT ALL

## 2021-03-12 ASSESSMENT — PATIENT HEALTH QUESTIONNAIRE - PHQ9: 5. POOR APPETITE OR OVEREATING: NEARLY EVERY DAY

## 2021-03-12 NOTE — TELEPHONE ENCOUNTER
Please call the patient.  He has called back, asking about anxiety medication.  Please call soon.      Anita Mccracken on 3/12/2021 at 12:43 PM

## 2021-03-12 NOTE — TELEPHONE ENCOUNTER
Patient has PX on 3/23/21 but is having lots of anxiety and doesn't think he should wait or could he get something until then-please call

## 2021-03-13 ASSESSMENT — ANXIETY QUESTIONNAIRES: GAD7 TOTAL SCORE: 18

## 2021-03-17 ENCOUNTER — OFFICE VISIT (OUTPATIENT)
Dept: INTERNAL MEDICINE | Facility: OTHER | Age: 76
End: 2021-03-17
Attending: INTERNAL MEDICINE
Payer: COMMERCIAL

## 2021-03-17 VITALS
HEART RATE: 64 BPM | WEIGHT: 191.4 LBS | TEMPERATURE: 97.7 F | DIASTOLIC BLOOD PRESSURE: 88 MMHG | BODY MASS INDEX: 30.04 KG/M2 | SYSTOLIC BLOOD PRESSURE: 154 MMHG | RESPIRATION RATE: 16 BRPM | OXYGEN SATURATION: 97 % | HEIGHT: 67 IN

## 2021-03-17 DIAGNOSIS — R53.83 MALAISE AND FATIGUE: ICD-10-CM

## 2021-03-17 DIAGNOSIS — Z71.85 VACCINE COUNSELING: ICD-10-CM

## 2021-03-17 DIAGNOSIS — R41.3 MEMORY LOSS: ICD-10-CM

## 2021-03-17 DIAGNOSIS — E11.21 TYPE 2 DIABETES MELLITUS WITH DIABETIC NEPHROPATHY, WITHOUT LONG-TERM CURRENT USE OF INSULIN (H): ICD-10-CM

## 2021-03-17 DIAGNOSIS — E53.8 VITAMIN B12 DEFICIENCY: ICD-10-CM

## 2021-03-17 DIAGNOSIS — I10 BENIGN ESSENTIAL HYPERTENSION: ICD-10-CM

## 2021-03-17 DIAGNOSIS — R41.89 BRAIN FOG: ICD-10-CM

## 2021-03-17 DIAGNOSIS — F33.41 RECURRENT MAJOR DEPRESSIVE DISORDER, IN PARTIAL REMISSION (H): ICD-10-CM

## 2021-03-17 DIAGNOSIS — E11.40 CONTROLLED TYPE 2 DIABETES MELLITUS WITH DIABETIC NEUROPATHY, WITHOUT LONG-TERM CURRENT USE OF INSULIN (H): ICD-10-CM

## 2021-03-17 DIAGNOSIS — H04.123 CHRONICALLY DRY EYES, BILATERAL: ICD-10-CM

## 2021-03-17 DIAGNOSIS — E78.2 MIXED HYPERLIPIDEMIA: ICD-10-CM

## 2021-03-17 DIAGNOSIS — Z95.5 STATUS POST INSERTION OF DRUG ELUTING CORONARY ARTERY STENT: ICD-10-CM

## 2021-03-17 DIAGNOSIS — F41.9 ANXIETY AND DEPRESSION: Primary | ICD-10-CM

## 2021-03-17 DIAGNOSIS — F32.A ANXIETY AND DEPRESSION: Primary | ICD-10-CM

## 2021-03-17 DIAGNOSIS — R53.81 MALAISE AND FATIGUE: ICD-10-CM

## 2021-03-17 DIAGNOSIS — I25.10 CORONARY ARTERY DISEASE INVOLVING NATIVE CORONARY ARTERY OF NATIVE HEART WITHOUT ANGINA PECTORIS: ICD-10-CM

## 2021-03-17 DIAGNOSIS — Z00.00 ENCOUNTER FOR MEDICARE ANNUAL WELLNESS EXAM: ICD-10-CM

## 2021-03-17 PROBLEM — R19.5 POSITIVE FIT (FECAL IMMUNOCHEMICAL TEST): Status: RESOLVED | Noted: 2019-03-19 | Resolved: 2021-03-17

## 2021-03-17 PROBLEM — H65.93 OME (OTITIS MEDIA WITH EFFUSION), BILATERAL: Status: RESOLVED | Noted: 2019-04-22 | Resolved: 2021-03-17

## 2021-03-17 LAB
ALBUMIN SERPL-MCNC: 4.6 G/DL (ref 3.5–5.7)
ALP SERPL-CCNC: 60 U/L (ref 34–104)
ALT SERPL W P-5'-P-CCNC: 11 U/L (ref 7–52)
ANION GAP SERPL CALCULATED.3IONS-SCNC: 6 MMOL/L (ref 3–14)
AST SERPL W P-5'-P-CCNC: 14 U/L (ref 13–39)
BILIRUB SERPL-MCNC: 0.5 MG/DL (ref 0.3–1)
BUN SERPL-MCNC: 16 MG/DL (ref 7–25)
CALCIUM SERPL-MCNC: 9.6 MG/DL (ref 8.6–10.3)
CHLORIDE SERPL-SCNC: 104 MMOL/L (ref 98–107)
CHOLEST SERPL-MCNC: 120 MG/DL
CO2 SERPL-SCNC: 30 MMOL/L (ref 21–31)
CREAT SERPL-MCNC: 0.8 MG/DL (ref 0.7–1.3)
ERYTHROCYTE [DISTWIDTH] IN BLOOD BY AUTOMATED COUNT: 12.8 % (ref 10–15)
GFR SERPL CREATININE-BSD FRML MDRD: >90 ML/MIN/{1.73_M2}
GLUCOSE SERPL-MCNC: 114 MG/DL (ref 70–105)
HBA1C MFR BLD: 6.4 % (ref 4–6)
HCT VFR BLD AUTO: 45.5 % (ref 40–53)
HDLC SERPL-MCNC: 43 MG/DL (ref 23–92)
HGB BLD-MCNC: 15 G/DL (ref 13.3–17.7)
LDLC SERPL CALC-MCNC: 55 MG/DL
MCH RBC QN AUTO: 30.1 PG (ref 26.5–33)
MCHC RBC AUTO-ENTMCNC: 33 G/DL (ref 31.5–36.5)
MCV RBC AUTO: 91 FL (ref 78–100)
NONHDLC SERPL-MCNC: 77 MG/DL
PLATELET # BLD AUTO: 226 10E9/L (ref 150–450)
POTASSIUM SERPL-SCNC: 4.6 MMOL/L (ref 3.5–5.1)
PROT SERPL-MCNC: 7.1 G/DL (ref 6.4–8.9)
RBC # BLD AUTO: 4.99 10E12/L (ref 4.4–5.9)
SODIUM SERPL-SCNC: 140 MMOL/L (ref 134–144)
TRIGL SERPL-MCNC: 112 MG/DL
TSH SERPL DL<=0.05 MIU/L-ACNC: 3.27 IU/ML (ref 0.34–5.6)
WBC # BLD AUTO: 7.4 10E9/L (ref 4–11)

## 2021-03-17 PROCEDURE — 80053 COMPREHEN METABOLIC PANEL: CPT | Mod: ZL | Performed by: INTERNAL MEDICINE

## 2021-03-17 PROCEDURE — 82043 UR ALBUMIN QUANTITATIVE: CPT | Mod: ZL | Performed by: INTERNAL MEDICINE

## 2021-03-17 PROCEDURE — G0439 PPPS, SUBSEQ VISIT: HCPCS | Performed by: INTERNAL MEDICINE

## 2021-03-17 PROCEDURE — 85027 COMPLETE CBC AUTOMATED: CPT | Mod: ZL | Performed by: INTERNAL MEDICINE

## 2021-03-17 PROCEDURE — 36415 COLL VENOUS BLD VENIPUNCTURE: CPT | Mod: ZL | Performed by: INTERNAL MEDICINE

## 2021-03-17 PROCEDURE — 99214 OFFICE O/P EST MOD 30 MIN: CPT | Mod: 25 | Performed by: INTERNAL MEDICINE

## 2021-03-17 PROCEDURE — 83036 HEMOGLOBIN GLYCOSYLATED A1C: CPT | Mod: ZL | Performed by: INTERNAL MEDICINE

## 2021-03-17 PROCEDURE — 80061 LIPID PANEL: CPT | Mod: ZL | Performed by: INTERNAL MEDICINE

## 2021-03-17 PROCEDURE — G0463 HOSPITAL OUTPT CLINIC VISIT: HCPCS

## 2021-03-17 PROCEDURE — 84443 ASSAY THYROID STIM HORMONE: CPT | Mod: ZL | Performed by: INTERNAL MEDICINE

## 2021-03-17 RX ORDER — BUSPIRONE HYDROCHLORIDE 5 MG/1
TABLET ORAL
Qty: 150 TABLET | Refills: 3 | Status: SHIPPED | OUTPATIENT
Start: 2021-03-17 | End: 2022-03-31

## 2021-03-17 RX ORDER — CLONAZEPAM 0.25 MG/1
0.25 TABLET, ORALLY DISINTEGRATING ORAL DAILY PRN
Qty: 15 TABLET | Refills: 1 | Status: SHIPPED | OUTPATIENT
Start: 2021-03-17 | End: 2021-11-04

## 2021-03-17 RX ORDER — CLOPIDOGREL BISULFATE 75 MG/1
75 TABLET ORAL DAILY
Qty: 90 TABLET | Refills: 3 | Status: SHIPPED | OUTPATIENT
Start: 2021-03-17 | End: 2021-05-05

## 2021-03-17 ASSESSMENT — ENCOUNTER SYMPTOMS
VOMITING: 0
EYE PAIN: 1
AGITATION: 0
WHEEZING: 0
COUGH: 0
PALPITATIONS: 0
MYALGIAS: 1
FEVER: 0
DIZZINESS: 1
EYE REDNESS: 1
HEMATURIA: 0
ARTHRALGIAS: 1
SHORTNESS OF BREATH: 0
BRUISES/BLEEDS EASILY: 0
DIARRHEA: 0
SLEEP DISTURBANCE: 0
NAUSEA: 0
BACK PAIN: 1
CHILLS: 0
DYSURIA: 0
FATIGUE: 1
LIGHT-HEADEDNESS: 1
NERVOUS/ANXIOUS: 1
ABDOMINAL PAIN: 0
WEAKNESS: 1

## 2021-03-17 ASSESSMENT — ANXIETY QUESTIONNAIRES
3. WORRYING TOO MUCH ABOUT DIFFERENT THINGS: NEARLY EVERY DAY
IF YOU CHECKED OFF ANY PROBLEMS ON THIS QUESTIONNAIRE, HOW DIFFICULT HAVE THESE PROBLEMS MADE IT FOR YOU TO DO YOUR WORK, TAKE CARE OF THINGS AT HOME, OR GET ALONG WITH OTHER PEOPLE: VERY DIFFICULT
6. BECOMING EASILY ANNOYED OR IRRITABLE: SEVERAL DAYS
GAD7 TOTAL SCORE: 12
7. FEELING AFRAID AS IF SOMETHING AWFUL MIGHT HAPPEN: NOT AT ALL
5. BEING SO RESTLESS THAT IT IS HARD TO SIT STILL: SEVERAL DAYS
1. FEELING NERVOUS, ANXIOUS, OR ON EDGE: NEARLY EVERY DAY
2. NOT BEING ABLE TO STOP OR CONTROL WORRYING: NEARLY EVERY DAY

## 2021-03-17 ASSESSMENT — PAIN SCALES - GENERAL: PAINLEVEL: MODERATE PAIN (4)

## 2021-03-17 ASSESSMENT — MIFFLIN-ST. JEOR: SCORE: 1544.42

## 2021-03-17 ASSESSMENT — PATIENT HEALTH QUESTIONNAIRE - PHQ9
SUM OF ALL RESPONSES TO PHQ QUESTIONS 1-9: 15
5. POOR APPETITE OR OVEREATING: SEVERAL DAYS

## 2021-03-17 NOTE — LETTER
March 18, 2021      Dajuan Basilio  [address]  [place] [facility] [address]        Dear ,    We are writing to inform you of your test results.    Labs are stable. Continue current medications.   Plan to recheck labs again in 3 to 4 months.     Cm Pickens MD      Resulted Orders   TSH Reflex GH   Result Value Ref Range    TSH Reflex 3.27 0.34 - 5.60 IU/mL   Albumin Random Urine Quantitative with Creat Ratio   Result Value Ref Range    Creatinine Urine 80 mg/dL    Albumin Urine mg/L 43 mg/L    Albumin Urine mg/g Cr 53.98 (H) 0 - 17 mg/g Cr   Hemoglobin A1c   Result Value Ref Range    Hemoglobin A1C 6.4 (H) 4.0 - 6.0 %   CBC with platelets   Result Value Ref Range    WBC 7.4 4.0 - 11.0 10e9/L    RBC Count 4.99 4.4 - 5.9 10e12/L    Hemoglobin 15.0 13.3 - 17.7 g/dL    Hematocrit 45.5 40.0 - 53.0 %    MCV 91 78 - 100 fl    MCH 30.1 26.5 - 33.0 pg    MCHC 33.0 31.5 - 36.5 g/dL    RDW 12.8 10.0 - 15.0 %    Platelet Count 226 150 - 450 10e9/L   Comprehensive metabolic panel   Result Value Ref Range    Sodium 140 134 - 144 mmol/L    Potassium 4.6 3.5 - 5.1 mmol/L    Chloride 104 98 - 107 mmol/L    Carbon Dioxide 30 21 - 31 mmol/L    Anion Gap 6 3 - 14 mmol/L    Glucose 114 (H) 70 - 105 mg/dL    Urea Nitrogen 16 7 - 25 mg/dL    Creatinine 0.80 0.70 - 1.30 mg/dL    GFR Estimate >90 >60 mL/min/[1.73_m2]    GFR Estimate If Black >90 >60 mL/min/[1.73_m2]    Calcium 9.6 8.6 - 10.3 mg/dL    Bilirubin Total 0.5 0.3 - 1.0 mg/dL    Albumin 4.6 3.5 - 5.7 g/dL    Protein Total 7.1 6.4 - 8.9 g/dL    Alkaline Phosphatase 60 34 - 104 U/L    ALT 11 7 - 52 U/L    AST 14 13 - 39 U/L   Lipid Profile   Result Value Ref Range    Cholesterol 120 <200 mg/dL    Triglycerides 112 <150 mg/dL    HDL Cholesterol 43 23 - 92 mg/dL    LDL Cholesterol Calculated 55 <100 mg/dL      Comment:      Desirable:       <100 mg/dl    Non HDL Cholesterol 77 <130 mg/dL       If you have any questions or concerns, please call the clinic at the  number listed above.       Sincerely,      Cm Pickens MD

## 2021-03-17 NOTE — PATIENT INSTRUCTIONS
Patient Education   Personalized Prevention Plan  You are due for the preventive services outlined below.  Your care team is available to assist you in scheduling these services.  If you have already completed any of these items, please share that information with your care team to update in your medical record.  Health Maintenance Due   Topic Date Due     Hepatitis C Screening  Never done     Zoster (Shingles) Vaccine (2 of 3) 12/23/2014     Diabetic Foot Exam  04/20/2019     Preventive Health Recommendations  See your health care provider every year to    Review health changes.     Discuss preventive care.      Review your medicines if your doctor has prescribed any.    Talk with your health care provider about whether you should have a test to screen for prostate cancer (PSA).    Every 3 years, have a diabetes test (fasting glucose). If you are at risk for diabetes, you should have this test more often.    Every 5 years, have a cholesterol test. Have this test more often if you are at risk for high cholesterol or heart disease.     Every 10 years, have a colonoscopy. Or, have a yearly FIT test (stool test). These exams will check for colon cancer.    Talk to with your health care provider about screening for Abdominal Aortic Aneurysm if you have a family history of AAA or have a history of smoking.    Shots:     Get a flu shot each year.     Get a tetanus shot every 10 years.     Talk to your doctor about your pneumonia vaccines. There are now two you should receive - Pneumovax (PPSV 23) and Prevnar (PCV 13).    Talk to your pharmacist about a shingles vaccine.     Talk to your doctor about the hepatitis B vaccine.    Nutrition:     Eat at least 5 servings of fruits and vegetables each day.     Eat whole-grain bread, whole-wheat pasta and brown rice instead of white grains and rice.     Get adequate Calcium and Vitamin D.     Lifestyle    Exercise for at least 150 minutes a week (30 minutes a day, 5 days a  week). This will help you control your weight and prevent disease.     Limit alcohol to one drink per day.     No smoking.     Wear sunscreen to prevent skin cancer.     See your dentist every six months for an exam and cleaning.     See your eye doctor every 1 to 2 years to screen for conditions such as glaucoma, macular degeneration and cataracts.    Personalized Prevention Plan  You are due for the preventive services outlined below.  Your care team is available to assist you in scheduling these services.  If you have already completed any of these items, please share that information with your care team to update in your medical record.  Health Maintenance   Topic Date Due     HEPATITIS C SCREENING  Never done     ZOSTER IMMUNIZATION (2 of 3) 12/23/2014     DIABETIC FOOT EXAM  04/20/2019     EYE EXAM  06/01/2021     A1C  06/18/2021     PHQ-9  09/17/2021     LIPID  12/18/2021     MICROALBUMIN  12/18/2021     FALL RISK ASSESSMENT  01/08/2022     BMP  01/26/2022     MEDICARE ANNUAL WELLNESS VISIT  03/17/2022     DTAP/TDAP/TD IMMUNIZATION (2 - Td) 05/03/2023     ADVANCE CARE PLANNING  03/17/2026     INFLUENZA VACCINE  Completed     Pneumococcal Vaccine: Pediatrics (0 to 5 Years) and At-Risk Patients (6 to 64 Years)  Completed     Pneumococcal Vaccine: 65+ Years  Completed     COVID-19 Vaccine  Completed     DEPRESSION ACTION PLAN  Addressed     IPV IMMUNIZATION  Aged Out     MENINGITIS IMMUNIZATION  Aged Out       Depression and Suicide in Older Adults    Nearly 2 million older Americans have some type of depression. Some of them even take their own lives. Yet depression among older adults is often ignored. Learn the warning signs. You may help spare a loved one needless pain. You may also save a life.   What is depression?  Depression is a common and serious illness that affects the way you think and feel. It is not a normal part of aging, nor is it a sign of weakness, a character flaw, or something you can snap  "out of. Most people with depression need treatment to get better. The most common symptom is a feeling of deep sadness. People who are depressed also may seem tired and listless. And nothing seems to give them pleasure. It s normal to grieve or be sad sometimes. But sadness lessens or passes with time. Depression rarely goes away or improves on its own. A person with clinical depression can't \"snap out of it.\" Other symptoms of depression are:     Sleeping more or less than normal    Eating more or less than normal    Having headaches, stomachaches, or other pains that don t go away    Feeling nervous,  empty,  or worthless    Crying a great deal    Thinking or talking about suicide or death    Loss of interest in activities previously enjoyed    Social isolation    Feeling confused or forgetful  What causes it?  The causes of depression aren t fully known. But it is thought to result from a complex blend of these factors:     Biochemistry. Certain chemicals in the brain play a role.    Genes. Depression does run in families.    Life stress. Life stresses can also trigger depression in some people. Older adults often face many stressors, such as death of friends or a spouse, health problems, and financial concerns.    Chronic conditions. This includes conditions such as diabetes, heart disease, or cancer. These can cause symptoms of depression. Medicine side effects can cause changes in thoughts and behaviors.  How you can help  Often, depressed people may not want to ask for help. When they do, they may be ignored. Or, they may receive the wrong treatment. You can help by showing parents and older friends love and support. If they seem depressed, don t lecture the person, ignore the symptoms, or discount the symptoms as a  normal  part of aging -which they are not. Get involved, listen, and show interest and support.   Help them understand that depression is a treatable illness. Tell them you can help them find the " right treatment. Offer to go to their healthcare provider's appointment with them for support when the symptoms are discussed. With their approval, contact a local mental health center, social service agency, or hospital about services.   You can be an advocate for him or her at healthcare appointments. Many older adults have chronic illnesses that can cause symptoms of depression. Medicine side effects can change thoughts and behaviors. You can help make sure that the healthcare provider looks at all of these factors. He or she should refer your family member or friend to a mental healthcare provider when needed. in some cases, untreated depression can lead to a misdiagnosis. A person may be diagnosed with a brain disorder such as dementia. If the healthcare provider does not take the issue of depression seriously, help your family member or friend to find another provider.   Don't be afraid to ask  If you think an older person you care about could be suicidal, ask,  Have you thought about suicide?  Most people will tell you the truth. If they say  yes,  they may already have a plan for how and when they will attempt it. Find out as much as you can. The more detailed the plan, and the easier it is to carry out, the more danger the person is in right now. Tell the person you are there for them and do not want them to harm him or herself. Don't wait to get help for the person. Call the person's healthcare provider, local hospital, or emergency services.   To learn more    National Suicide Prevention Lifeline (crisis hotline) 861-303-RQTH (436-450-4770)    National [place] of Mental Aryuen553-257-6408hqv.[facility].nih.gov    National [place] on Mental Jkqrvsj489-475-7752egz.tami.org    Mental Health Dyrmeoi568-304-2900ooq.[facility].org    National Suicide Qtysiah131-IJAGLUO (393-267-0632)    Call 840  Never leave the person alone. A person who is actively suicidal needs psychiatric care right away. They will need constant  supervision. Never leave the person out of sight. Call 911 or the national 24-hour suicide crisis hotline at 800-273-talk (888.402.8864). You can also take the person to the closest emergency room.   Elmer last reviewed this educational content on 5/1/2020 2000-2020 [employer]. All rights reserved. This information is not intended as a substitute for professional medical care. Always follow your healthcare professional's instructions.            1. Anxiety and depression  2. Recurrent major depressive disorder, in partial remission (H)  5. Brain fog  START:   - busPIRone (BUSPAR) 5 MG tablet; Start 5mg twice daily for 5 days, then 5mg three times daily - for anxiety prevention  Dispense: 150 tablet; Refill: 3  - clonazePAM (KLONOPIN) 0.25 MG TBDP ODT tab; Take 1 tablet (0.25 mg) by mouth daily as needed for anxiety - no driving after use  Dispense: 15 tablet; Refill: 1    3. Coronary artery disease involving native coronary artery of native heart without angina pectoris  4. Status post insertion of drug eluting coronary artery stent - x2 stents - 1st [facility] - 5/17/2018 - [facility]  Continue:   - clopidogrel (PLAVIX) 75 MG tablet; Take 1 tablet (75 mg) by mouth daily  Dispense: 90 tablet; Refill: 3    6. Chronically dry eyes, bilateral  -- good to get 2nd opinion on your eye issues.     7. Benign essential hypertension  8. Controlled type 2 diabetes mellitus with diabetic neuropathy, without long-term current use of insulin (H)  -- currently controlled. Continue current medications.   -- labs today.     9. Memory loss  -- ongoing.     10. Mixed hyperlipidemia  -- well controlled.   Recent Labs   Lab Test 01/26/21  1238 12/18/20  0922 09/17/20  0659 03/17/20  1121   A1C  --  6.5* 6.5* 6.4*   LDL  --  77 84 62   HDL  --  46 45 45   TRIG  --  106 70 83   ALT 13 13 13 13   CR 0.78 0.83 0.82 0.83   GFRESTIMATED >90 90 >90 >90   GFRESTBLACK >90 >90 >90 >90   POTASSIUM 3.9 3.7 3.9 4.7        11.  Vitamin B12 deficiency  -- continue oral replacement.     12. Encounter for Medicare annual wellness exam      Aspects of Diabetes:   Recent Labs   Lab Test 01/26/21  1238 12/18/20  0922 09/17/20  0659 03/17/20  1121   A1C  --  6.5* 6.5* 6.4*   LDL  --  77 84 62   HDL  --  46 45 45   TRIG  --  106 70 83   ALT 13 13 13 13   CR 0.78 0.83 0.82 0.83   GFRESTIMATED >90 90 >90 >90   GFRESTBLACK >90 >90 >90 >90   POTASSIUM 3.9 3.7 3.9 4.7      Hemoglobin A1c  Goal range is under 8%. Best is 6.5 to 7   Blood Pressure 154/88 Goal to keep less than 140/90   Tobacco  reports that he quit smoking about 37 years ago. His smoking use included cigarettes. He has never used smokeless tobacco. Goal to abstain from tobacco   Aspirin or Plavix Anti-platelet therapy Aspirin or Plavix reduces risk of heart disease and stroke  -- sometimes used with other blood thinners, depending on bleeding risk and risk factors.    ACE/ARB Specific blood pressure meds These medications can reduce risk of kidney disease   Cholesterol Statins (Lipitor, Crestor, vs others) Statins reduce risk of heart disease and stroke   Eye Exam -- Do Yearly -- Annual diabetic eye exam   Healthy weight Wt Readings from Last 3 Encounters:   03/17/21 86.8 kg (191 lb 6.4 oz)   01/26/21 86.2 kg (190 lb)   01/08/21 87.9 kg (193 lb 12.8 oz)     Body mass index is 30.4 kg/m .  Goal BMI under 30, best is under 25.      -- Trying to exercise daily (goal at least 20 min/day) with moderate aerobic activity   -- Eat healthy (resources from ADA at http://www.diabetes.org/)   -- Taking good care of my feet. Consider seeing the Podiatrist   -- Check blood sugars as directed, record in log book and bring to every appointment    Insurance companies are grading you and I on your blood sugar control -- Goal is to get your A1c down to 7.9% or lower and NO Smoking!  -- Medicare and most insurance companies, will only cover Hemoglobin A1c labs to be rechecked every 91+ days.      Return for  Diabetes labs and clinic follow-up appointment every 3 to 4 months.    Schedule lab only appointment --- A few days AFTER: 6/15/21   Schedule clinic appointment with Dr. Pickens -- Same day as labs, or 1-2 days later.

## 2021-03-18 LAB
CREAT UR-MCNC: 80 MG/DL
MICROALBUMIN UR-MCNC: 43 MG/L
MICROALBUMIN/CREAT UR: 53.98 MG/G CR (ref 0–17)

## 2021-03-18 ASSESSMENT — ANXIETY QUESTIONNAIRES: GAD7 TOTAL SCORE: 12

## 2021-03-25 ENCOUNTER — TELEPHONE (OUTPATIENT)
Dept: INTERNAL MEDICINE | Facility: OTHER | Age: 76
End: 2021-03-25

## 2021-03-25 NOTE — TELEPHONE ENCOUNTER
Patient just got his medical report and has a couple questions.   Please call back.  Thank you   Taylor Manning on 3/25/2021 at 12:54 PM

## 2021-03-26 ENCOUNTER — OFFICE VISIT (OUTPATIENT)
Dept: INTERNAL MEDICINE | Facility: OTHER | Age: 76
End: 2021-03-26
Attending: INTERNAL MEDICINE
Payer: COMMERCIAL

## 2021-03-26 VITALS
BODY MASS INDEX: 30.43 KG/M2 | DIASTOLIC BLOOD PRESSURE: 72 MMHG | OXYGEN SATURATION: 96 % | HEART RATE: 74 BPM | RESPIRATION RATE: 16 BRPM | SYSTOLIC BLOOD PRESSURE: 138 MMHG | TEMPERATURE: 98.3 F | WEIGHT: 191.6 LBS

## 2021-03-26 DIAGNOSIS — M21.612 BUNION, LEFT: ICD-10-CM

## 2021-03-26 DIAGNOSIS — Z02.89 PAIN MEDICATION AGREEMENT: ICD-10-CM

## 2021-03-26 DIAGNOSIS — B35.1 ONYCHOMYCOSIS: ICD-10-CM

## 2021-03-26 DIAGNOSIS — E11.40 CONTROLLED TYPE 2 DIABETES MELLITUS WITH DIABETIC NEUROPATHY, WITHOUT LONG-TERM CURRENT USE OF INSULIN (H): Primary | ICD-10-CM

## 2021-03-26 DIAGNOSIS — M21.611 BUNION, RIGHT: ICD-10-CM

## 2021-03-26 DIAGNOSIS — R26.81 UNSTEADY GAIT: ICD-10-CM

## 2021-03-26 DIAGNOSIS — H04.123 CHRONICALLY DRY EYES, BILATERAL: ICD-10-CM

## 2021-03-26 DIAGNOSIS — F41.1 GENERALIZED ANXIETY DISORDER: ICD-10-CM

## 2021-03-26 DIAGNOSIS — L84 PRE-ULCERATIVE CORN OR CALLOUS: ICD-10-CM

## 2021-03-26 PROCEDURE — 99214 OFFICE O/P EST MOD 30 MIN: CPT | Performed by: INTERNAL MEDICINE

## 2021-03-26 PROCEDURE — G0463 HOSPITAL OUTPT CLINIC VISIT: HCPCS

## 2021-03-26 ASSESSMENT — ANXIETY QUESTIONNAIRES
2. NOT BEING ABLE TO STOP OR CONTROL WORRYING: MORE THAN HALF THE DAYS
GAD7 TOTAL SCORE: 12
1. FEELING NERVOUS, ANXIOUS, OR ON EDGE: MORE THAN HALF THE DAYS
IF YOU CHECKED OFF ANY PROBLEMS ON THIS QUESTIONNAIRE, HOW DIFFICULT HAVE THESE PROBLEMS MADE IT FOR YOU TO DO YOUR WORK, TAKE CARE OF THINGS AT HOME, OR GET ALONG WITH OTHER PEOPLE: VERY DIFFICULT
5. BEING SO RESTLESS THAT IT IS HARD TO SIT STILL: SEVERAL DAYS
6. BECOMING EASILY ANNOYED OR IRRITABLE: MORE THAN HALF THE DAYS
3. WORRYING TOO MUCH ABOUT DIFFERENT THINGS: NEARLY EVERY DAY
7. FEELING AFRAID AS IF SOMETHING AWFUL MIGHT HAPPEN: SEVERAL DAYS

## 2021-03-26 ASSESSMENT — ENCOUNTER SYMPTOMS
LIGHT-HEADEDNESS: 1
EYE PAIN: 1
PALPITATIONS: 0
NAUSEA: 0
FATIGUE: 1
WHEEZING: 0
SHORTNESS OF BREATH: 0
DIARRHEA: 0
AGITATION: 0
EYE REDNESS: 1
NERVOUS/ANXIOUS: 1
MYALGIAS: 1
BRUISES/BLEEDS EASILY: 0
DIZZINESS: 1
CHILLS: 0
SLEEP DISTURBANCE: 1
NUMBNESS: 1
ARTHRALGIAS: 1
ABDOMINAL PAIN: 0
VOMITING: 0
FEVER: 0
DYSURIA: 0
BACK PAIN: 1
WEAKNESS: 1
HEMATURIA: 0
COUGH: 0

## 2021-03-26 ASSESSMENT — PATIENT HEALTH QUESTIONNAIRE - PHQ9
5. POOR APPETITE OR OVEREATING: SEVERAL DAYS
SUM OF ALL RESPONSES TO PHQ QUESTIONS 1-9: 12

## 2021-03-26 ASSESSMENT — PAIN SCALES - GENERAL: PAINLEVEL: MILD PAIN (3)

## 2021-03-26 NOTE — PROGRESS NOTES
Mr. Basilio is a 75 year old male who presents to the clinic for evaluation of the following problems:      ICD-10-CM    1. Controlled type 2 diabetes mellitus with diabetic neuropathy, without long-term current use of insulin (H)  E11.40 Ankle/Foot Bracing Supplies Order   2. Pre-ulcerative corn or callous  L84 Ankle/Foot Bracing Supplies Order   3. Pain medication agreement-nonopiod 3-26-21  Z02.89    4. Onychomycosis  B35.1    5. Bunion, left  M21.612 Ankle/Foot Bracing Supplies Order   6. Bunion, right  M21.611    7. Chronically dry eyes, bilateral  H04.123    8. Generalized anxiety disorder  F41.1    9. Unsteady gait  R26.81      HPI  Patient presents for follow-up of a few issues and would like to get his feet checked for diabetic shoes.    Type 2 diabetes, has diabetic neuropathy.  Has abnormal foot exam today.  DME prescription for diabetic custom shoes printed.  This is medically necessary.  He was referred to orthotics for custom diabetic shoes and insoles.    Preulcerative callus and corn.  Has had previously.  He has been working to keep his calluses away.  He does have neuropathy.    Bilateral toe onychomycosis.  Noted again on exam.  He tried oral antifungals without any improvement.    He has bilateral foot bunions.    He has a number of other questions and concerns about his eyes and possible eye surgery.  Numbness below the knees.  Weakness and dizziness and unsteadiness.  Also still having some anxiety and depression and not sleeping well.    Functional Capacity: about 4 METS.   Review of Systems   Constitutional: Positive for fatigue. Negative for chills and fever.   HENT: Positive for hearing loss. Negative for congestion.    Eyes: Positive for pain and redness. Negative for visual disturbance.        + chronic dry eyes, tried many eye drops, not much improvement - told needs eye surgery   Respiratory: Negative for cough, shortness of breath and wheezing.    Cardiovascular: Positive for chest  "pain (rare, left sided - sporatic, then resolves spontaneously - not worse with walking). Negative for palpitations.        Cardiac stress testing 3/2020 - negative/normal results.   Gastrointestinal: Negative for abdominal pain, diarrhea, nausea and vomiting.   Endocrine: Negative for cold intolerance and heat intolerance.   Genitourinary: Negative for dysuria and hematuria.   Musculoskeletal: Positive for arthralgias (left knee), back pain (left low back / buttocks pain), gait problem (staggering at times, low back and left knee pain) and myalgias.        Bilateral feet with bunions   Skin: Negative for pallor.   Allergic/Immunologic: Negative for immunocompromised state.   Neurological: Positive for dizziness, weakness, light-headedness and numbness (right > left feet).   Hematological: Does not bruise/bleed easily.   Psychiatric/Behavioral: Positive for sleep disturbance (+ thinks he is sleeping too much -- 8 to 10 hours/day). Negative for agitation. The patient is nervous/anxious.         + short term memory loss -- stopped Exelon and Namenda -- due to side effects.   + reporting anxiety / depression - virus pandemic has worsened everything      Reviewed and updated as needed this visit by Provider   Tobacco  Allergies  Meds  Problems  Med Hx  Surg Hx  Fam Hx          EXAM:   Vitals:    03/26/21 1354   BP: 138/72   BP Location: Right arm   Patient Position: Sitting   Cuff Size: Adult Regular   Pulse: 74   Resp: 16   Temp: 98.3  F (36.8  C)   TempSrc: Tympanic   SpO2: 96%   Weight: 86.9 kg (191 lb 9.6 oz)       Current Pain Score: Mild Pain (3)     BP Readings from Last 3 Encounters:   03/26/21 138/72   03/17/21 (!) 154/88   01/26/21 (!) 148/72      Wt Readings from Last 3 Encounters:   03/26/21 86.9 kg (191 lb 9.6 oz)   03/17/21 86.8 kg (191 lb 6.4 oz)   01/26/21 86.2 kg (190 lb)      Estimated body mass index is 30.43 kg/m  as calculated from the following:    Height as of 3/17/21: 1.69 m (5' 6.54\").    " Weight as of this encounter: 86.9 kg (191 lb 9.6 oz).     Physical Exam  Constitutional:       General: He is not in acute distress.     Appearance: He is well-developed. He is not diaphoretic.   HENT:      Head: Normocephalic and atraumatic.   Eyes:      General: No scleral icterus.     Comments: + dry, irritated eyes   Neck:      Musculoskeletal: Neck supple.      Vascular: No carotid bruit.   Cardiovascular:      Rate and Rhythm: Normal rate and regular rhythm.      Pulses: Normal pulses.   Pulmonary:      Effort: Pulmonary effort is normal.      Breath sounds: Normal breath sounds.   Abdominal:      Palpations: Abdomen is soft.      Tenderness: There is no abdominal tenderness.   Musculoskeletal:         General: Tenderness (left > right gluteal and SI joint pain to palpation) present. No deformity.      Right lower leg: No edema.      Left lower leg: No edema.   Lymphadenopathy:      Cervical: No cervical adenopathy.   Skin:     General: Skin is warm and dry.      Findings: No rash.      Comments: Diabetic Foot Exam:  Findings:   LEFT: normal DP and PT pulses, Abnormal monofilament exam - mildly reduced sensation, 1st toe bunion and onychomycosis    RIGHT:  normal DP and PT pulses, Abnormal monofilament exam - all sites were diminished or numb, 1st toe bunion and onychomycosis   Neurological:      Mental Status: He is alert and oriented to person, place, and time. Mental status is at baseline.      Comments: short term memory loss   Psychiatric:         Behavior: Behavior is slowed.         Thought Content: Thought content normal.         Judgment: Judgment normal.      Comments: Anxious and flat affect / depressed.        Procedures   INVESTIGATIONS:  - Labs reviewed in Epic     ASSESSMENT AND PLAN:  Problem List Items Addressed This Visit        Nervous and Auditory    Controlled type 2 diabetes mellitus with diabetic neuropathy, without long-term current use of insulin (H) - Primary    Relevant Orders     "Ankle/Foot Bracing Supplies Order       Musculoskeletal and Integumentary    Bunion, right    Onychomycosis    Bunion, left    Relevant Orders    Ankle/Foot Bracing Supplies Order       Infectious/Inflammatory    Chronically dry eyes, bilateral       Behavioral    Generalized anxiety disorder       Other    Unsteady gait    Pain medication agreement-nonopiod 3-26-21      Other Visit Diagnoses     Pre-ulcerative corn or callous        Relevant Orders    Ankle/Foot Bracing Supplies Order        reviewed diet, exercise and weight control, recommended sodium restriction, cardiovascular risk and specific lipid/LDL goals reviewed, specific diabetic recommendations low cholesterol diet, weight control and daily exercise discussed, use of aspirin to prevent MI and TIA's discussed  -- Expected clinical course discussed    -- Medications and their side effects discussed    Total time personally spent on the day of service: 30 minutes.       Patient Instructions     Diabetes is well controlled. 6.4%    Mild elevation of urine albumin. Plan to recheck again in 3 to 4 months.     Cholesterol levels -- LDL -- looks great!    Prescription for diabetes shoes printed.     Your Body mass index (BMI) is:  Estimated body mass index is 30.43 kg/m  as calculated from the following:    Height as of 3/17/21: 1.69 m (5' 6.54\").    Weight as of this encounter: 86.9 kg (191 lb 9.6 oz).   (BMI ranges: Normal 18.5 - 25, Overweight 25 - 30, Obesity greater than 30)     Facts about losing weight:   -- Overweight and Obesity increase your risk for developing diabetes, high blood pressure and stroke, and shorten your life.   -- 90% of weight loss comes from dietary changes, only 10% from exercise   -- For every 1 pound of of weight loss -- this is equal to about 8 to 10 pounds of weight off of your knees.   -- there are about 3500 calories in 1 pound of body weight.     -- Goal Caloric intake -- 1200 to 1500 calories daily.     Get out and " exercise, bike ride, walk for 10 to 15 minutes after each meal -- this can significantly lowers the spike in blood sugar after eating.     To help with weight loss and improve blood sugar control....    -- Try to avoid Carbohydrates as much as possible -- breads, pasta, baked goods, cakes, oatmeal, cold cereal, potatoes.   -- Eat more lean meats, proteins, eggs, nuts, vegetables.    -- Start or Continue regular daily exercise.      -- Eat more lean meats, proteins, eggs, nuts, vegetables.      What have successful people done to lose large amounts of weight, and keep it off?   -- Used both diet and exercise to lose weight   -- Ate a healthy breakfast every day   -- Exercised an hour per day   -- Watched less than 10 hours of TV per week   -- Weighed themselves weekly   -- Drink a large glass of water 10-15 minutes before your meals.   -- Use smaller dinner plates and don't go back for seconds.     What should I do?   -- Work on 5-10% weight loss   -- Focus on a few healthy dietary changes   -- Eat more fresh fruits and vegetables, and fewer carbohydrates (http://myplate.gov/)   -- Exercise by some means -- every day   -- Weigh yourself once a week    Weight Management   Weight Watchers     Meets Mondays 9:30, 11:45, or 5:30    Rob Darling, [address], [place]     Contact Erica [phone] [email or web address]  Weight Watchers [email or web address]    -- Consider My Fitness Pal on your smart phone  [email or web address]       Office Visit on 03/17/2021   Component Date Value Ref Range Status     TSH Reflex 03/17/2021 3.27  0.34 - 5.60 IU/mL Final     Creatinine Urine 03/17/2021 80  mg/dL Final     Albumin Urine mg/L 03/17/2021 43  mg/L Final     Albumin Urine mg/g Cr 03/17/2021 53.98* 0 - 17 mg/g Cr Final     Hemoglobin A1C 03/17/2021 6.4* 4.0 - 6.0 % Final     WBC 03/17/2021 7.4  4.0 - 11.0 10e9/L Final     RBC Count 03/17/2021 4.99  4.4 - 5.9 10e12/L Final     Hemoglobin 03/17/2021 15.0  13.3 -  17.7 g/dL Final     Hematocrit 03/17/2021 45.5  40.0 - 53.0 % Final     MCV 03/17/2021 91  78 - 100 fl Final     MCH 03/17/2021 30.1  26.5 - 33.0 pg Final     MCHC 03/17/2021 33.0  31.5 - 36.5 g/dL Final     RDW 03/17/2021 12.8  10.0 - 15.0 % Final     Platelet Count 03/17/2021 226  150 - 450 10e9/L Final     Sodium 03/17/2021 140  134 - 144 mmol/L Final     Potassium 03/17/2021 4.6  3.5 - 5.1 mmol/L Final     Chloride 03/17/2021 104  98 - 107 mmol/L Final     Carbon Dioxide 03/17/2021 30  21 - 31 mmol/L Final     Anion Gap 03/17/2021 6  3 - 14 mmol/L Final     Glucose 03/17/2021 114* 70 - 105 mg/dL Final     Urea Nitrogen 03/17/2021 16  7 - 25 mg/dL Final     Creatinine 03/17/2021 0.80  0.70 - 1.30 mg/dL Final     GFR Estimate 03/17/2021 >90  >60 mL/min/[1.73_m2] Final     GFR Estimate If Black 03/17/2021 >90  >60 mL/min/[1.73_m2] Final     Calcium 03/17/2021 9.6  8.6 - 10.3 mg/dL Final     Bilirubin Total 03/17/2021 0.5  0.3 - 1.0 mg/dL Final     Albumin 03/17/2021 4.6  3.5 - 5.7 g/dL Final     Protein Total 03/17/2021 7.1  6.4 - 8.9 g/dL Final     Alkaline Phosphatase 03/17/2021 60  34 - 104 U/L Final     ALT 03/17/2021 11  7 - 52 U/L Final     AST 03/17/2021 14  13 - 39 U/L Final     Cholesterol 03/17/2021 120  <200 mg/dL Final     Triglycerides 03/17/2021 112  <150 mg/dL Final     HDL Cholesterol 03/17/2021 43  23 - 92 mg/dL Final     LDL Cholesterol Calculated 03/17/2021 55  <100 mg/dL Final    Desirable:       <100 mg/dl     Non HDL Cholesterol 03/17/2021 77  <130 mg/dL Final        Aspects of Diabetes:   Recent Labs   Lab Test 03/17/21  1659 01/26/21  1238 12/18/20  0922 09/17/20  0659   A1C 6.4*  --  6.5* 6.5*   LDL 55  --  77 84   HDL 43  --  46 45   TRIG 112  --  106 70   ALT 11 13 13 13   CR 0.80 0.78 0.83 0.82   GFRESTIMATED >90 >90 90 >90   GFRESTBLACK >90 >90 >90 >90   POTASSIUM 4.6 3.9 3.7 3.9      Hemoglobin A1c  Goal range is under 8%. Best is 6.5 to 7   Blood Pressure 138/72 Goal to keep less than  140/90   Tobacco  reports that he quit smoking about 37 years ago. His smoking use included cigarettes. He has never used smokeless tobacco. Goal to abstain from tobacco   Aspirin or Plavix Anti-platelet therapy Aspirin or Plavix reduces risk of heart disease and stroke  -- sometimes used with other blood thinners, depending on bleeding risk and risk factors.    ACE/ARB Specific blood pressure meds These medications can reduce risk of kidney disease   Cholesterol Statins (Lipitor, Crestor, vs others) Statins reduce risk of heart disease and stroke   Eye Exam -- Do Yearly -- Annual diabetic eye exam   Healthy weight Wt Readings from Last 3 Encounters:   03/26/21 86.9 kg (191 lb 9.6 oz)   03/17/21 86.8 kg (191 lb 6.4 oz)   01/26/21 86.2 kg (190 lb)     Body mass index is 30.43 kg/m .  Goal BMI under 30, best is under 25.      -- Trying to exercise daily (goal at least 20 min/day) with moderate aerobic activity   -- Eat healthy (resources from ADA at http://www.diabetes.org/)   -- Taking good care of my feet. Consider seeing the Podiatrist   -- Check blood sugars as directed, record in log book and bring to every appointment    Insurance companies are grading you and I on your blood sugar control -- Goal is to get your A1c down to 7.9% or lower and NO Smoking!  -- Medicare and most insurance companies, will only cover Hemoglobin A1c labs to be rechecked every 91+ days.      Return for Diabetes labs and clinic follow-up appointment every 3 to 4 months.    Schedule lab only appointment --- A few days AFTER: 6/24/21   Schedule clinic appointment with Dr. Pickens -- Same day as labs, or 1-2 days later.       Cm Pickens MD   Internal Medicine  [facility]     Portions of this note were dictated using speech recognition software. The note has been proofread but errors in the text may have been overlooked. Please contact me if there are any concerns regarding the accuracy of the dictation.

## 2021-03-26 NOTE — PATIENT INSTRUCTIONS
"Diabetes is well controlled. 6.4%    Mild elevation of urine albumin. Plan to recheck again in 3 to 4 months.     Cholesterol levels -- LDL -- looks great!    Prescription for diabetes shoes printed.     Your Body mass index (BMI) is:  Estimated body mass index is 30.43 kg/m  as calculated from the following:    Height as of 3/17/21: 1.69 m (5' 6.54\").    Weight as of this encounter: 86.9 kg (191 lb 9.6 oz).   (BMI ranges: Normal 18.5 - 25, Overweight 25 - 30, Obesity greater than 30)     Facts about losing weight:   -- Overweight and Obesity increase your risk for developing diabetes, high blood pressure and stroke, and shorten your life.   -- 90% of weight loss comes from dietary changes, only 10% from exercise   -- For every 1 pound of of weight loss -- this is equal to about 8 to 10 pounds of weight off of your knees.   -- there are about 3500 calories in 1 pound of body weight.     -- Goal Caloric intake -- 1200 to 1500 calories daily.     Get out and exercise, bike ride, walk for 10 to 15 minutes after each meal -- this can significantly lowers the spike in blood sugar after eating.     To help with weight loss and improve blood sugar control....    -- Try to avoid Carbohydrates as much as possible -- breads, pasta, baked goods, cakes, oatmeal, cold cereal, potatoes.   -- Eat more lean meats, proteins, eggs, nuts, vegetables.    -- Start or Continue regular daily exercise.      -- Eat more lean meats, proteins, eggs, nuts, vegetables.      What have successful people done to lose large amounts of weight, and keep it off?   -- Used both diet and exercise to lose weight   -- Ate a healthy breakfast every day   -- Exercised an hour per day   -- Watched less than 10 hours of TV per week   -- Weighed themselves weekly   -- Drink a large glass of water 10-15 minutes before your meals.   -- Use smaller dinner plates and don't go back for seconds.     What should I do?   -- Work on 5-10% weight loss   -- Focus on a " few healthy dietary changes   -- Eat more fresh fruits and vegetables, and fewer carbohydrates (http://myplate.gov/)   -- Exercise by some means -- every day   -- Weigh yourself once a week    Weight Management   Weight Watchers     Meets Mondays 9:30, 11:45, or 5:30    lorraine Darling, [address], [place], [place]     Contact Erica [phone] [email or web address]  Weight Watchers [email or web address]    -- Consider My Fitness Pal on your smart phone  [email or web address]       Office Visit on 03/17/2021   Component Date Value Ref Range Status     TSH Reflex 03/17/2021 3.27  0.34 - 5.60 IU/mL Final     Creatinine Urine 03/17/2021 80  mg/dL Final     Albumin Urine mg/L 03/17/2021 43  mg/L Final     Albumin Urine mg/g Cr 03/17/2021 53.98* 0 - 17 mg/g Cr Final     Hemoglobin A1C 03/17/2021 6.4* 4.0 - 6.0 % Final     WBC 03/17/2021 7.4  4.0 - 11.0 10e9/L Final     RBC Count 03/17/2021 4.99  4.4 - 5.9 10e12/L Final     Hemoglobin 03/17/2021 15.0  13.3 - 17.7 g/dL Final     Hematocrit 03/17/2021 45.5  40.0 - 53.0 % Final     MCV 03/17/2021 91  78 - 100 fl Final     MCH 03/17/2021 30.1  26.5 - 33.0 pg Final     MCHC 03/17/2021 33.0  31.5 - 36.5 g/dL Final     RDW 03/17/2021 12.8  10.0 - 15.0 % Final     Platelet Count 03/17/2021 226  150 - 450 10e9/L Final     Sodium 03/17/2021 140  134 - 144 mmol/L Final     Potassium 03/17/2021 4.6  3.5 - 5.1 mmol/L Final     Chloride 03/17/2021 104  98 - 107 mmol/L Final     Carbon Dioxide 03/17/2021 30  21 - 31 mmol/L Final     Anion Gap 03/17/2021 6  3 - 14 mmol/L Final     Glucose 03/17/2021 114* 70 - 105 mg/dL Final     Urea Nitrogen 03/17/2021 16  7 - 25 mg/dL Final     Creatinine 03/17/2021 0.80  0.70 - 1.30 mg/dL Final     GFR Estimate 03/17/2021 >90  >60 mL/min/[1.73_m2] Final     GFR Estimate If Black 03/17/2021 >90  >60 mL/min/[1.73_m2] Final     Calcium 03/17/2021 9.6  8.6 - 10.3 mg/dL Final     Bilirubin Total 03/17/2021 0.5  0.3 - 1.0 mg/dL Final     Albumin  03/17/2021 4.6  3.5 - 5.7 g/dL Final     Protein Total 03/17/2021 7.1  6.4 - 8.9 g/dL Final     Alkaline Phosphatase 03/17/2021 60  34 - 104 U/L Final     ALT 03/17/2021 11  7 - 52 U/L Final     AST 03/17/2021 14  13 - 39 U/L Final     Cholesterol 03/17/2021 120  <200 mg/dL Final     Triglycerides 03/17/2021 112  <150 mg/dL Final     HDL Cholesterol 03/17/2021 43  23 - 92 mg/dL Final     LDL Cholesterol Calculated 03/17/2021 55  <100 mg/dL Final    Desirable:       <100 mg/dl     Non HDL Cholesterol 03/17/2021 77  <130 mg/dL Final        Aspects of Diabetes:   Recent Labs   Lab Test 03/17/21  1659 01/26/21  1238 12/18/20  0922 09/17/20  0659   A1C 6.4*  --  6.5* 6.5*   LDL 55  --  77 84   HDL 43  --  46 45   TRIG 112  --  106 70   ALT 11 13 13 13   CR 0.80 0.78 0.83 0.82   GFRESTIMATED >90 >90 90 >90   GFRESTBLACK >90 >90 >90 >90   POTASSIUM 4.6 3.9 3.7 3.9      Hemoglobin A1c  Goal range is under 8%. Best is 6.5 to 7   Blood Pressure 138/72 Goal to keep less than 140/90   Tobacco  reports that he quit smoking about 37 years ago. His smoking use included cigarettes. He has never used smokeless tobacco. Goal to abstain from tobacco   Aspirin or Plavix Anti-platelet therapy Aspirin or Plavix reduces risk of heart disease and stroke  -- sometimes used with other blood thinners, depending on bleeding risk and risk factors.    ACE/ARB Specific blood pressure meds These medications can reduce risk of kidney disease   Cholesterol Statins (Lipitor, Crestor, vs others) Statins reduce risk of heart disease and stroke   Eye Exam -- Do Yearly -- Annual diabetic eye exam   Healthy weight Wt Readings from Last 3 Encounters:   03/26/21 86.9 kg (191 lb 9.6 oz)   03/17/21 86.8 kg (191 lb 6.4 oz)   01/26/21 86.2 kg (190 lb)     Body mass index is 30.43 kg/m .  Goal BMI under 30, best is under 25.      -- Trying to exercise daily (goal at least 20 min/day) with moderate aerobic activity   -- Eat healthy (resources from ADA at  http://www.diabetes.org/)   -- Taking good care of my feet. Consider seeing the Podiatrist   -- Check blood sugars as directed, record in log book and bring to every appointment    Insurance companies are grading you and I on your blood sugar control -- Goal is to get your A1c down to 7.9% or lower and NO Smoking!  -- Medicare and most insurance companies, will only cover Hemoglobin A1c labs to be rechecked every 91+ days.      Return for Diabetes labs and clinic follow-up appointment every 3 to 4 months.    Schedule lab only appointment --- A few days AFTER: 6/24/21   Schedule clinic appointment with Dr. Pickens -- Same day as labs, or 1-2 days later.

## 2021-03-26 NOTE — TELEPHONE ENCOUNTER
Patient has an appointment today and will ask then. Oliva Hutchins LPN .............3/26/2021  9:23 AM

## 2021-03-26 NOTE — NURSING NOTE
Chief Complaint   Patient presents with     Dme     diabetic shoes       Medication Reconciliation complete.   Oliva Hutchins LPN  ..................3/26/2021   1:54 PM

## 2021-03-27 ASSESSMENT — ANXIETY QUESTIONNAIRES: GAD7 TOTAL SCORE: 12

## 2021-03-29 ENCOUNTER — TRANSFERRED RECORDS (OUTPATIENT)
Dept: HEALTH INFORMATION MANAGEMENT | Facility: OTHER | Age: 76
End: 2021-03-29

## 2021-04-15 NOTE — DISCHARGE INSTRUCTIONS
Get plenty of fluids and rest.  Medication was sent to [facility], take as directed and as needed.  A referral was placed for you to follow-up with physical therapy.  They should contact you tomorrow, if they do not I recommend that you call the hospital and try to contact them.  Return to the ED if you have any worsening symptoms.  
show

## 2021-05-05 ENCOUNTER — OFFICE VISIT (OUTPATIENT)
Dept: INTERNAL MEDICINE | Facility: OTHER | Age: 76
End: 2021-05-05
Attending: INTERNAL MEDICINE
Payer: COMMERCIAL

## 2021-05-05 VITALS
SYSTOLIC BLOOD PRESSURE: 136 MMHG | WEIGHT: 195.2 LBS | TEMPERATURE: 97.9 F | HEART RATE: 67 BPM | DIASTOLIC BLOOD PRESSURE: 72 MMHG | OXYGEN SATURATION: 94 % | BODY MASS INDEX: 31 KG/M2 | RESPIRATION RATE: 16 BRPM

## 2021-05-05 DIAGNOSIS — F03.90: Primary | ICD-10-CM

## 2021-05-05 DIAGNOSIS — I25.10 CORONARY ARTERY DISEASE INVOLVING NATIVE CORONARY ARTERY OF NATIVE HEART WITHOUT ANGINA PECTORIS: ICD-10-CM

## 2021-05-05 DIAGNOSIS — E11.40 CONTROLLED TYPE 2 DIABETES MELLITUS WITH DIABETIC NEUROPATHY, WITHOUT LONG-TERM CURRENT USE OF INSULIN (H): ICD-10-CM

## 2021-05-05 DIAGNOSIS — M46.98 UNSPECIFIED INFLAMMATORY SPONDYLOPATHY, SACRAL AND SACROCOCCYGEAL REGION (H): ICD-10-CM

## 2021-05-05 DIAGNOSIS — H04.123 CHRONICALLY DRY EYES, BILATERAL: ICD-10-CM

## 2021-05-05 DIAGNOSIS — E78.2 MIXED HYPERLIPIDEMIA: ICD-10-CM

## 2021-05-05 DIAGNOSIS — J34.89 INFECTION OF NOSE: ICD-10-CM

## 2021-05-05 DIAGNOSIS — F41.9 CHRONIC ANXIETY: ICD-10-CM

## 2021-05-05 DIAGNOSIS — Z79.02 PLATELET INHIBITION DUE TO PLAVIX: ICD-10-CM

## 2021-05-05 PROBLEM — M25.562 MEDIAL KNEE PAIN, LEFT: Status: RESOLVED | Noted: 2021-01-08 | Resolved: 2021-05-05

## 2021-05-05 PROBLEM — F32.A ANXIETY AND DEPRESSION: Status: RESOLVED | Noted: 2021-03-17 | Resolved: 2021-05-05

## 2021-05-05 PROBLEM — R41.3 MEMORY LOSS: Status: RESOLVED | Noted: 2020-09-17 | Resolved: 2021-05-05

## 2021-05-05 PROCEDURE — G0463 HOSPITAL OUTPT CLINIC VISIT: HCPCS

## 2021-05-05 PROCEDURE — 99214 OFFICE O/P EST MOD 30 MIN: CPT | Performed by: INTERNAL MEDICINE

## 2021-05-05 ASSESSMENT — ANXIETY QUESTIONNAIRES
IF YOU CHECKED OFF ANY PROBLEMS ON THIS QUESTIONNAIRE, HOW DIFFICULT HAVE THESE PROBLEMS MADE IT FOR YOU TO DO YOUR WORK, TAKE CARE OF THINGS AT HOME, OR GET ALONG WITH OTHER PEOPLE: SOMEWHAT DIFFICULT
6. BECOMING EASILY ANNOYED OR IRRITABLE: SEVERAL DAYS
5. BEING SO RESTLESS THAT IT IS HARD TO SIT STILL: SEVERAL DAYS
7. FEELING AFRAID AS IF SOMETHING AWFUL MIGHT HAPPEN: NOT AT ALL
1. FEELING NERVOUS, ANXIOUS, OR ON EDGE: SEVERAL DAYS
GAD7 TOTAL SCORE: 6
3. WORRYING TOO MUCH ABOUT DIFFERENT THINGS: SEVERAL DAYS
2. NOT BEING ABLE TO STOP OR CONTROL WORRYING: SEVERAL DAYS

## 2021-05-05 ASSESSMENT — ENCOUNTER SYMPTOMS
EYE PAIN: 1
ARTHRALGIAS: 1
FATIGUE: 1
SLEEP DISTURBANCE: 1
NERVOUS/ANXIOUS: 1
NAUSEA: 0
DIZZINESS: 1
ABDOMINAL PAIN: 0
COUGH: 0
EYE REDNESS: 1
DYSURIA: 0
CHILLS: 0
WHEEZING: 0
HEMATURIA: 0
FEVER: 0
PALPITATIONS: 0
BACK PAIN: 1
AGITATION: 0
NUMBNESS: 1
SHORTNESS OF BREATH: 0
LIGHT-HEADEDNESS: 1
VOMITING: 0
WEAKNESS: 1
BRUISES/BLEEDS EASILY: 0
MYALGIAS: 1
DIARRHEA: 0

## 2021-05-05 ASSESSMENT — PATIENT HEALTH QUESTIONNAIRE - PHQ9
5. POOR APPETITE OR OVEREATING: SEVERAL DAYS
SUM OF ALL RESPONSES TO PHQ QUESTIONS 1-9: 8

## 2021-05-05 ASSESSMENT — PAIN SCALES - GENERAL: PAINLEVEL: MODERATE PAIN (4)

## 2021-05-05 NOTE — PROGRESS NOTES
Mr. Basilio is a 75 year old male who presents to the clinic for evaluation of the following problems:      ICD-10-CM    1. Major neurocognitive disorder due to possible Alzheimer's disease, without behavioral disturbance (H)  F01.50    2. Controlled type 2 diabetes mellitus with diabetic neuropathy, without long-term current use of insulin (H)  E11.40    3. Chronic anxiety  F41.9    4. Mixed hyperlipidemia  E78.2    5. Unspecified inflammatory spondylopathy, sacral and sacrococcygeal region (H)  M46.98    6. Infection of nose  J34.89 neomycin-polymyxin-pramoxine (NEOSPORIN PLUS) 1 % external cream   7. Chronically dry eyes, bilateral  H04.123    8. Coronary artery disease involving native coronary artery of native heart without angina pectoris  I25.10    9. Platelet inhibition due to Plavix  Z79.02      HPI  Patient presents for follow-up of multiple issues.  He was done at [facility] and had significant work-up over the course of a week.  Ultimately was diagnosed with major neurocognitive disorder.  Based on his ambulatory status, other physical examination concerns, he does not appear to have Parkinson's disease but more likely has Alzheimer's disease.  [facility] offered doing a spinal tap but he declined.  Overall reports his depression seems to be doing better.  Anxiety is also doing better.  Currently on BuSpar and citalopram.  Continue current dosing.    Type 2 diabetes with diabetic neuropathy.  Continues on gabapentin for neuropathy pain and Metformin for blood sugar control peer denies hypoglycemia.    Chronic anxiety, see above.  Improvement noted with BuSpar and citalopram.  No changes for now.    Mixed hyperlipidemia, currently on simvastatin 10 mg daily.  Continue current dosing.    Unspecified spondylopathy of the sacral and coccyx region.  Still has quite a bit of pain.  Does take Tylenol intermittently.    Nasal infection.  Has been passing blood from the nose anytime he blows his nose over the  last week and a half or so and has been having pain in the nostrils as well.  Exam shows nasal passage infection.  Start topical Neosporin cream.  Afrin nasal spray as needed for bloody nose symptoms.  + Does take Plavix due to his coronary artery disease.    Chronic dry eye, he saw the eye specialist at [facility] and was told this is a very complicated issue.  The eyedrops he has been using, that he was recommended from them, really burns his eyes and he subsequently stopped using it after a few days.    Patient reports he spent a number of hours over at [employer], and this was very helpful for his mental health.    Functional Capacity: about 4 METS.   Review of Systems   Constitutional: Positive for fatigue. Negative for chills and fever.   HENT: Positive for hearing loss and nosebleeds (+ pain in nostrils, frequent nose bleeding for about 1 month - gets blood with blowing his nose). Negative for congestion.    Eyes: Positive for pain and redness. Negative for visual disturbance.        + chronic dry eyes, tried many eye drops, not much improvement - told needs eye surgery   Respiratory: Negative for cough, shortness of breath and wheezing.    Cardiovascular: Positive for chest pain (rare, left sided - sporatic, then resolves spontaneously - not worse with walking). Negative for palpitations.        Cardiac stress testing 3/2020 - negative/normal results.   Gastrointestinal: Negative for abdominal pain, diarrhea, nausea and vomiting.   Endocrine: Negative for cold intolerance and heat intolerance.   Genitourinary: Negative for dysuria and hematuria.   Musculoskeletal: Positive for arthralgias (left knee), back pain (left low back / buttocks pain), gait problem (staggering at times, low back and left knee pain) and myalgias.        Bilateral feet with bunions   Skin: Negative for pallor.   Allergic/Immunologic: Negative for immunocompromised state.   Neurological: Positive for dizziness, weakness,  "light-headedness and numbness (right > left feet).   Hematological: Does not bruise/bleed easily.   Psychiatric/Behavioral: Positive for sleep disturbance (+ thinks he is sleeping too much -- 8 to 10 hours/day). Negative for agitation. The patient is nervous/anxious.         + short term memory loss -- stopped Exelon and Namenda -- due to side effects.   + reporting anxiety / depression - virus pandemic has worsened everything      Reviewed and updated as needed this visit by Provider   Tobacco  Allergies  Meds  Problems  Med Hx  Surg Hx  Fam Hx          EXAM:   Vitals:    05/05/21 1123   BP: 136/72   BP Location: Right arm   Patient Position: Sitting   Cuff Size: Adult Regular   Pulse: 67   Resp: 16   Temp: 97.9  F (36.6  C)   TempSrc: Tympanic   SpO2: 94%   Weight: 88.5 kg (195 lb 3.2 oz)       Current Pain Score: Moderate Pain (4)     BP Readings from Last 3 Encounters:   05/05/21 136/72   03/26/21 138/72   03/17/21 (!) 154/88      Wt Readings from Last 3 Encounters:   05/05/21 88.5 kg (195 lb 3.2 oz)   03/26/21 86.9 kg (191 lb 9.6 oz)   03/17/21 86.8 kg (191 lb 6.4 oz)      Estimated body mass index is 31 kg/m  as calculated from the following:    Height as of 3/17/21: 1.69 m (5' 6.54\").    Weight as of this encounter: 88.5 kg (195 lb 3.2 oz).     Physical Exam  Constitutional:       General: He is not in acute distress.     Appearance: He is well-developed. He is not diaphoretic.   HENT:      Head: Normocephalic and atraumatic.      Nose: Mucosal edema present.      Right Nostril: Epistaxis present.      Left Nostril: Epistaxis present.      Right Turbinates: Swollen.      Left Turbinates: Swollen.   Eyes:      General: No scleral icterus.     Comments: + dry, irritated eyes   Neck:      Musculoskeletal: Neck supple.      Vascular: No carotid bruit.   Cardiovascular:      Rate and Rhythm: Normal rate and regular rhythm.      Pulses: Normal pulses.   Pulmonary:      Effort: Pulmonary effort is normal.     "  Breath sounds: Normal breath sounds.   Abdominal:      Palpations: Abdomen is soft.      Tenderness: There is no abdominal tenderness.   Musculoskeletal:         General: Tenderness (left > right gluteal and SI joint pain to palpation) present. No deformity.      Right lower leg: No edema.      Left lower leg: No edema.   Lymphadenopathy:      Cervical: No cervical adenopathy.   Skin:     General: Skin is warm and dry.      Findings: No rash.   Neurological:      Mental Status: He is alert and oriented to person, place, and time. Mental status is at baseline.      Comments: short term memory loss   Psychiatric:         Behavior: Behavior is slowed.         Thought Content: Thought content normal.         Judgment: Judgment normal.      Comments: Anxious and flat affect / depressed.          Procedures   INVESTIGATIONS:  - Labs reviewed in Epic + outside records reviewed from [facility].     ASSESSMENT AND PLAN:  Problem List Items Addressed This Visit        Nervous and Auditory    Controlled type 2 diabetes mellitus with diabetic neuropathy, without long-term current use of insulin (H)    Major neurocognitive disorder due to possible Alzheimer's disease, without behavioral disturbance (H) - Primary       Endocrine    Mixed hyperlipidemia       Circulatory    Coronary artery disease involving native coronary artery of native heart without angina pectoris       Musculoskeletal and Integumentary    Unspecified inflammatory spondylopathy, sacral and sacrococcygeal region (H)       Hematologic    Platelet inhibition due to Plavix       Infectious/Inflammatory    Chronically dry eyes, bilateral       Other    Chronic anxiety      Other Visit Diagnoses     Infection of nose        Relevant Medications    neomycin-polymyxin-pramoxine (NEOSPORIN PLUS) 1 % external cream        reviewed diet, exercise and weight control  -- Expected clinical course discussed    -- Medications and their side effects discussed    Patient  Instructions   Infection of nose  START:   - neomycin-polymyxin-pramoxine (NEOSPORIN PLUS) 1 % external cream; Apply 2 or 3 times daily - into nostrils, as needed for nasal infection  Dispense: 56 g; Refill: 1    + use Generic Afrin nasal spray -- once or twice daily -- to help reduce nose bleeds....    -- Only use for 2 to 3 days... then stop using for about 1 week.      Reduce Aspirin 81 mg -- to EVERY OTHER day.       Hopefully the Accupuncture helps your back pain if needed.     Glad your mood is doing a little better.     Blood pressure is well controlled.     Return as needed for follow-up with Dr. Pickens.    Clinic : [phone]  Appointment line: [phone]     Cm Pickens MD   Internal Medicine  [facility]

## 2021-05-05 NOTE — PATIENT INSTRUCTIONS
Infection of nose  START:   - neomycin-polymyxin-pramoxine (NEOSPORIN PLUS) 1 % external cream; Apply 2 or 3 times daily - into nostrils, as needed for nasal infection  Dispense: 56 g; Refill: 1    + use Generic Afrin nasal spray -- once or twice daily -- to help reduce nose bleeds....    -- Only use for 2 to 3 days... then stop using for about 1 week.      Reduce Aspirin 81 mg -- to EVERY OTHER day.       Hopefully the Accupuncture helps your back pain if needed.     Glad your mood is doing a little better.     Blood pressure is well controlled.     Return as needed for follow-up with Dr. Pickens.    Clinic : [phone]  Appointment line: [phone]

## 2021-05-06 PROBLEM — Z79.02 PLATELET INHIBITION DUE TO PLAVIX: Status: ACTIVE | Noted: 2021-05-06

## 2021-05-06 ASSESSMENT — ANXIETY QUESTIONNAIRES: GAD7 TOTAL SCORE: 6

## 2021-08-31 ENCOUNTER — LAB (OUTPATIENT)
Dept: LAB | Facility: OTHER | Age: 76
End: 2021-08-31
Attending: INTERNAL MEDICINE
Payer: MEDICARE

## 2021-08-31 DIAGNOSIS — E78.2 MIXED HYPERLIPIDEMIA: ICD-10-CM

## 2021-08-31 DIAGNOSIS — E11.40 CONTROLLED TYPE 2 DIABETES MELLITUS WITH DIABETIC NEUROPATHY, WITHOUT LONG-TERM CURRENT USE OF INSULIN (H): ICD-10-CM

## 2021-08-31 LAB
ALBUMIN SERPL-MCNC: 4.3 G/DL (ref 3.5–5.7)
ALBUMIN UR-MCNC: NEGATIVE MG/DL
ALP SERPL-CCNC: 60 U/L (ref 34–104)
ALT SERPL W P-5'-P-CCNC: 13 U/L (ref 7–52)
ANION GAP SERPL CALCULATED.3IONS-SCNC: 9 MMOL/L (ref 3–14)
APPEARANCE UR: CLEAR
AST SERPL W P-5'-P-CCNC: 15 U/L (ref 13–39)
BILIRUB SERPL-MCNC: 0.6 MG/DL (ref 0.3–1)
BILIRUB UR QL STRIP: NEGATIVE
BUN SERPL-MCNC: 20 MG/DL (ref 7–25)
CALCIUM SERPL-MCNC: 9.7 MG/DL (ref 8.6–10.3)
CHLORIDE BLD-SCNC: 105 MMOL/L (ref 98–107)
CHOLEST SERPL-MCNC: 162 MG/DL
CO2 SERPL-SCNC: 27 MMOL/L (ref 21–31)
COLOR UR AUTO: YELLOW
CREAT SERPL-MCNC: 0.74 MG/DL (ref 0.7–1.3)
CREAT UR-MCNC: 141 MG/DL
ERYTHROCYTE [DISTWIDTH] IN BLOOD BY AUTOMATED COUNT: 13 % (ref 10–15)
FASTING STATUS PATIENT QL REPORTED: YES
GFR SERPL CREATININE-BSD FRML MDRD: 88 ML/MIN/1.73M2
GLUCOSE BLD-MCNC: 124 MG/DL (ref 70–105)
GLUCOSE UR STRIP-MCNC: NEGATIVE MG/DL
HBA1C MFR BLD: 6.7 % (ref 4–6.2)
HCT VFR BLD AUTO: 44.6 % (ref 40–53)
HDLC SERPL-MCNC: 44 MG/DL (ref 23–92)
HGB BLD-MCNC: 14.7 G/DL (ref 13.3–17.7)
HGB UR QL STRIP: NEGATIVE
KETONES UR STRIP-MCNC: NEGATIVE MG/DL
LDLC SERPL CALC-MCNC: 101 MG/DL
LEUKOCYTE ESTERASE UR QL STRIP: NEGATIVE
MCH RBC QN AUTO: 30.1 PG (ref 26.5–33)
MCHC RBC AUTO-ENTMCNC: 33 G/DL (ref 31.5–36.5)
MCV RBC AUTO: 91 FL (ref 78–100)
MICROALBUMIN UR-MCNC: 55 MG/L
MICROALBUMIN/CREAT UR: 39.01 MG/G CR (ref 0–17)
NITRATE UR QL: NEGATIVE
NONHDLC SERPL-MCNC: 118 MG/DL
PH UR STRIP: 5.5 [PH] (ref 5–9)
PLATELET # BLD AUTO: 206 10E3/UL (ref 150–450)
POTASSIUM BLD-SCNC: 3.9 MMOL/L (ref 3.5–5.1)
PROT SERPL-MCNC: 6.7 G/DL (ref 6.4–8.9)
RBC # BLD AUTO: 4.89 10E6/UL (ref 4.4–5.9)
SODIUM SERPL-SCNC: 141 MMOL/L (ref 134–144)
SP GR UR STRIP: 1.03 (ref 1–1.03)
TRIGL SERPL-MCNC: 87 MG/DL
TSH SERPL DL<=0.005 MIU/L-ACNC: 3.11 MU/L (ref 0.4–4)
UROBILINOGEN UR STRIP-MCNC: NORMAL MG/DL
WBC # BLD AUTO: 5.1 10E3/UL (ref 4–11)

## 2021-08-31 PROCEDURE — 82043 UR ALBUMIN QUANTITATIVE: CPT | Mod: ZL

## 2021-08-31 PROCEDURE — 83036 HEMOGLOBIN GLYCOSYLATED A1C: CPT | Mod: ZL

## 2021-08-31 PROCEDURE — 85027 COMPLETE CBC AUTOMATED: CPT | Mod: ZL

## 2021-08-31 PROCEDURE — 84443 ASSAY THYROID STIM HORMONE: CPT | Mod: ZL

## 2021-08-31 PROCEDURE — 80061 LIPID PANEL: CPT | Mod: ZL

## 2021-08-31 PROCEDURE — 80053 COMPREHEN METABOLIC PANEL: CPT | Mod: ZL

## 2021-08-31 PROCEDURE — 81003 URINALYSIS AUTO W/O SCOPE: CPT | Mod: ZL

## 2021-08-31 PROCEDURE — 36415 COLL VENOUS BLD VENIPUNCTURE: CPT | Mod: ZL

## 2021-09-01 ENCOUNTER — OFFICE VISIT (OUTPATIENT)
Dept: INTERNAL MEDICINE | Facility: OTHER | Age: 76
End: 2021-09-01
Attending: INTERNAL MEDICINE
Payer: COMMERCIAL

## 2021-09-01 VITALS
BODY MASS INDEX: 30.81 KG/M2 | DIASTOLIC BLOOD PRESSURE: 60 MMHG | OXYGEN SATURATION: 96 % | TEMPERATURE: 98.3 F | SYSTOLIC BLOOD PRESSURE: 122 MMHG | WEIGHT: 194 LBS | RESPIRATION RATE: 16 BRPM | HEART RATE: 67 BPM

## 2021-09-01 DIAGNOSIS — F33.42 RECURRENT MAJOR DEPRESSIVE DISORDER, IN FULL REMISSION (H): ICD-10-CM

## 2021-09-01 DIAGNOSIS — Z12.5 ENCOUNTER FOR SCREENING FOR MALIGNANT NEOPLASM OF PROSTATE: ICD-10-CM

## 2021-09-01 DIAGNOSIS — F03.90: ICD-10-CM

## 2021-09-01 DIAGNOSIS — Z95.5 STATUS POST INSERTION OF DRUG ELUTING CORONARY ARTERY STENT: ICD-10-CM

## 2021-09-01 DIAGNOSIS — E78.2 MIXED HYPERLIPIDEMIA: ICD-10-CM

## 2021-09-01 DIAGNOSIS — E11.40 CONTROLLED TYPE 2 DIABETES MELLITUS WITH DIABETIC NEUROPATHY, WITHOUT LONG-TERM CURRENT USE OF INSULIN (H): ICD-10-CM

## 2021-09-01 DIAGNOSIS — E53.8 VITAMIN B12 DEFICIENCY: ICD-10-CM

## 2021-09-01 DIAGNOSIS — I10 BENIGN ESSENTIAL HYPERTENSION: Primary | ICD-10-CM

## 2021-09-01 PROBLEM — Z79.02 PLATELET INHIBITION DUE TO PLAVIX: Status: RESOLVED | Noted: 2021-05-06 | Resolved: 2021-09-01

## 2021-09-01 PROCEDURE — 99214 OFFICE O/P EST MOD 30 MIN: CPT | Performed by: INTERNAL MEDICINE

## 2021-09-01 PROCEDURE — G0463 HOSPITAL OUTPT CLINIC VISIT: HCPCS

## 2021-09-01 RX ORDER — SIMVASTATIN 10 MG
10 TABLET ORAL AT BEDTIME
Qty: 90 TABLET | Refills: 3 | Status: SHIPPED | OUTPATIENT
Start: 2021-09-01 | End: 2022-07-06

## 2021-09-01 RX ORDER — CITALOPRAM HYDROBROMIDE 10 MG/1
10 TABLET ORAL DAILY
Qty: 90 TABLET | Refills: 3 | Status: SHIPPED | OUTPATIENT
Start: 2021-09-01 | End: 2022-03-31

## 2021-09-01 ASSESSMENT — ENCOUNTER SYMPTOMS
BRUISES/BLEEDS EASILY: 0
NERVOUS/ANXIOUS: 1
FEVER: 0
PALPITATIONS: 0
DIZZINESS: 1
ARTHRALGIAS: 1
AGITATION: 0
HEMATURIA: 0
SLEEP DISTURBANCE: 0
VOMITING: 0
EYE REDNESS: 1
ABDOMINAL PAIN: 0
BACK PAIN: 1
FATIGUE: 1
WHEEZING: 0
CHILLS: 0
DIARRHEA: 0
SHORTNESS OF BREATH: 0
DYSURIA: 0
COUGH: 1
MYALGIAS: 1
EYE PAIN: 1
WEAKNESS: 1
LIGHT-HEADEDNESS: 1
NAUSEA: 0
NUMBNESS: 1

## 2021-09-01 ASSESSMENT — ANXIETY QUESTIONNAIRES
3. WORRYING TOO MUCH ABOUT DIFFERENT THINGS: SEVERAL DAYS
6. BECOMING EASILY ANNOYED OR IRRITABLE: SEVERAL DAYS
1. FEELING NERVOUS, ANXIOUS, OR ON EDGE: SEVERAL DAYS
2. NOT BEING ABLE TO STOP OR CONTROL WORRYING: NOT AT ALL
IF YOU CHECKED OFF ANY PROBLEMS ON THIS QUESTIONNAIRE, HOW DIFFICULT HAVE THESE PROBLEMS MADE IT FOR YOU TO DO YOUR WORK, TAKE CARE OF THINGS AT HOME, OR GET ALONG WITH OTHER PEOPLE: NOT DIFFICULT AT ALL
GAD7 TOTAL SCORE: 4
7. FEELING AFRAID AS IF SOMETHING AWFUL MIGHT HAPPEN: NOT AT ALL
5. BEING SO RESTLESS THAT IT IS HARD TO SIT STILL: SEVERAL DAYS

## 2021-09-01 ASSESSMENT — PAIN SCALES - GENERAL: PAINLEVEL: MODERATE PAIN (4)

## 2021-09-01 ASSESSMENT — PATIENT HEALTH QUESTIONNAIRE - PHQ9: 5. POOR APPETITE OR OVEREATING: NOT AT ALL

## 2021-09-01 NOTE — PROGRESS NOTES
Mr. Basilio is a 76 year old male who presents to the clinic for evaluation of the following problems:      ICD-10-CM    1. Benign essential hypertension  I10    2. Controlled type 2 diabetes mellitus with diabetic neuropathy, without long-term current use of insulin (H)  E11.40    3. Major neurocognitive disorder due to possible Alzheimer's disease, without behavioral disturbance (H)  F01.50    4. Mixed hyperlipidemia  E78.2 simvastatin (ZOCOR) 10 MG tablet   5. Status post insertion of drug eluting coronary artery stent - x2 stents - 1st OMB - 5/17/2018 - [facility]  Z95.5    6. Vitamin B12 deficiency  E53.8    7. Encounter for screening for malignant neoplasm of prostate  Z12.5 CANCELED: PSA Screen GH   8. Recurrent major depressive disorder, in full remission (H)  F33.42 citalopram (CELEXA) 10 MG tablet     HPI  Patient presents for diabetic follow-up, as well as follow-up multiple issues.    Hypertension, blood pressure is currently well controlled. Doing well with current medication regimen. Denies syncope or presyncope. No changes at this time. Creatinine has improved slightly.    Type 2 diabetes with diabetic neuropathy. Ongoing and chronic. Kidney function has improved slightly. Diabetic neuropathy is relatively unchanged. Continue with gabapentin. Blood sugar control has worsened slightly. Hemoglobin A1c has gone up. Still within goal range of less than 7.9%. No medication changes for now.    Major neurocognitive disorder. Still reporting some issues with memory loss but thinks that everything is sort of stabilized. Has not noted any significant new changes lately.    Mixed hyperlipidemia, currently on simvastatin 10 mg daily. Seems to be tolerating well but is cholesterol levels have almost doubled. He believes he is still taking simvastatin. It appears that he has been filling it based on his previous prescription and refill dates. Etiology of the nearly doubling of his LDL cholesterol is  unknown. Encourage that he verify that he is taking the simvastatin at home. Otherwise he plans to continue to work on healthy diet and exercise    Coronary artery disease, history of stent placement back in 2018 in [place]. Continues on aspirin. No longer on Plavix.    Vitamin B12 deficiency. Continues with replacement. Seems to be doing well. No changes for now.    Prostate lab cancer screening is requested, PSA orders placed.    History of depression, much improved with Celexa. Needs refills.    Functional Capacity: about 4 METS.   Review of Systems   Constitutional: Positive for fatigue. Negative for chills and fever.   HENT: Positive for hearing loss. Negative for congestion and nosebleeds.    Eyes: Positive for pain and redness. Negative for visual disturbance.        + chronic dry eyes, tried many eye drops, not much improvement - told needs eye surgery   Respiratory: Positive for cough (clear phlegm - months). Negative for shortness of breath and wheezing.    Cardiovascular: Negative for chest pain and palpitations.        Cardiac stress testing 3/2020 - negative/normal results.   Gastrointestinal: Negative for abdominal pain, diarrhea, nausea and vomiting.        + left inguinal pains intermittently   Endocrine: Negative for cold intolerance and heat intolerance.   Genitourinary: Negative for dysuria and hematuria.   Musculoskeletal: Positive for arthralgias (left knee), back pain (left low back / buttocks pain), gait problem (staggering at times, low back and left knee pain) and myalgias.        Bilateral feet with bunions   Skin: Negative for pallor.   Allergic/Immunologic: Negative for immunocompromised state.   Neurological: Positive for dizziness, weakness, light-headedness and numbness (right > left feet).   Hematological: Does not bruise/bleed easily.   Psychiatric/Behavioral: Negative for agitation and sleep disturbance. The patient is nervous/anxious.         + short term memory loss -- stopped  "Exelon and Namenda -- due to side effects.   + reporting anxiety / depression - virus pandemic has worsened everything      Reviewed and updated as needed this visit by Provider   Tobacco  Allergies  Meds  Problems  Med Hx  Surg Hx  Fam Hx          EXAM:   Vitals:    09/01/21 1329   BP: 122/60   Pulse: 67   Resp: 16   Temp: 98.3  F (36.8  C)   TempSrc: Tympanic   SpO2: 96%   Weight: 88 kg (194 lb)       Current Pain Score: Moderate Pain (4)     BP Readings from Last 3 Encounters:   09/01/21 122/60   05/05/21 136/72   03/26/21 138/72      Wt Readings from Last 3 Encounters:   09/01/21 88 kg (194 lb)   05/05/21 88.5 kg (195 lb 3.2 oz)   03/26/21 86.9 kg (191 lb 9.6 oz)      Estimated body mass index is 30.81 kg/m  as calculated from the following:    Height as of 3/17/21: 1.69 m (5' 6.54\").    Weight as of this encounter: 88 kg (194 lb).     Physical Exam  Constitutional:       General: He is not in acute distress.     Appearance: He is well-developed. He is not diaphoretic.   HENT:      Head: Normocephalic and atraumatic.   Eyes:      General: No scleral icterus.     Comments: + dry, irritated eyes   Neck:      Vascular: No carotid bruit.   Cardiovascular:      Rate and Rhythm: Normal rate and regular rhythm.      Pulses: Normal pulses.   Pulmonary:      Effort: Pulmonary effort is normal.      Breath sounds: Normal breath sounds.   Abdominal:      Palpations: Abdomen is soft.      Tenderness: There is no abdominal tenderness.   Musculoskeletal:         General: Tenderness (left > right gluteal and SI joint pain to palpation) present. No deformity.      Cervical back: Neck supple.      Right lower leg: No edema.      Left lower leg: No edema.   Lymphadenopathy:      Cervical: No cervical adenopathy.   Skin:     General: Skin is warm and dry.      Findings: No rash.   Neurological:      Mental Status: He is alert and oriented to person, place, and time. Mental status is at baseline.      Comments: short term " memory loss   Psychiatric:         Behavior: Behavior is slowed.         Thought Content: Thought content normal.         Judgment: Judgment normal.      Comments: Anxious and flat affect / depressed.        Procedures   INVESTIGATIONS:  - Labs reviewed in Epic     ASSESSMENT AND PLAN:  Problem List Items Addressed This Visit        Nervous and Auditory    Controlled type 2 diabetes mellitus with diabetic neuropathy, without long-term current use of insulin (H)    Relevant Medications    citalopram (CELEXA) 10 MG tablet    Major neurocognitive disorder due to possible Alzheimer's disease, without behavioral disturbance (H)       Digestive    Vitamin B12 deficiency       Endocrine    Mixed hyperlipidemia    Relevant Medications    simvastatin (ZOCOR) 10 MG tablet       Circulatory    Benign essential hypertension - Primary       Other    Status post insertion of drug eluting coronary artery stent - x2 stents - 1st OMB - 5/17/2018 - [facility]      Other Visit Diagnoses     Encounter for screening for malignant neoplasm of prostate        Recurrent major depressive disorder, in full remission (H)        Relevant Medications    citalopram (CELEXA) 10 MG tablet        reviewed diet, exercise and weight control, recommended sodium restriction, cardiovascular risk and specific lipid/LDL goals reviewed, specific diabetic recommendations low cholesterol diet, weight control and daily exercise discussed, use of aspirin to prevent MI and TIA's discussed  -- Expected clinical course discussed    -- Medications and their side effects discussed    The 10-year ASCVD risk score ([place] ANTHONY Jr., et al., 2013) is: 45.6%    Values used to calculate the score:      Age: 76 years      Sex: Male      Is Non- : No      Diabetic: Yes      Tobacco smoker: No      Systolic Blood Pressure: 122 mmHg      Is BP treated: No      HDL Cholesterol: 44 mg/dL      Total Cholesterol: 162 mg/dL    Patient Instructions      Immunization History   Administered Date(s) Administered     COVID-19,PF,Pfizer 02/16/2021, 03/09/2021     FLU 6-35 months 09/25/2010     Flu 65+ Years 09/06/2017     Influenza (H1N1) 11/24/2009     Influenza (High Dose) 3 valent vaccine 09/23/2015, 10/05/2016, 09/30/2018, 09/21/2019, 09/09/2020     Influenza (IIV3) PF 11/09/2006, 10/18/2007, 10/20/2008, 09/09/2020     Pneumo Conj 13-V (2010&after) 04/18/2017     Pneumococcal 23 valent 05/03/2013     TDAP Vaccine (Boostrix) 05/03/2013     Zoster vaccine, live 10/28/2014      -- Booster shots for COVID-19 are coming.    -- Moderna and Pfizer booster shot   - If immunocompromised, can get 3rd shot (booster shot) 28+ days from date of 2nd shot.   - Otherwise - (Tentative) vaccine schedule is to get 3rd shot (booster shot) 8+ months from date of 2nd shot.   -- George and George booster shot   - Likely on the way, but people should not get a Booster from Moderna or Pfizer if they got George and George shot.     -- Consider Annual Flu / Influenza vaccination - Every Fall (Starting about October 1st)      -- Get the new shingles shot (2 in series) (Shingrix) - from one of the local pharmacies, at your convenience. -- Check cost/coverage.   -- or -- If these are very expensive, go to the Local Public Health Department     [facility] -- [address] [address]    ---- Shot clinic is the FIRST Thursday of Each Month -- from 2 to 6 PM, by Appointment only.     Call for an appointment -- [phone]         Lab on 08/31/2021   Component Date Value Ref Range Status     TSH 08/31/2021 3.11  0.40 - 4.00 mU/L Final     Sodium 08/31/2021 141  134 - 144 mmol/L Final     Potassium 08/31/2021 3.9  3.5 - 5.1 mmol/L Final     Chloride 08/31/2021 105  98 - 107 mmol/L Final     Carbon Dioxide (CO2) 08/31/2021 27  21 - 31 mmol/L Final     Anion Gap 08/31/2021 9  3 - 14 mmol/L Final     Urea Nitrogen 08/31/2021  20  7 - 25 mg/dL Final     Creatinine 08/31/2021 0.74  0.70 - 1.30 mg/dL Final     Calcium 08/31/2021 9.7  8.6 - 10.3 mg/dL Final     Glucose 08/31/2021 124* 70 - 105 mg/dL Final     Alkaline Phosphatase 08/31/2021 60  34 - 104 U/L Final     AST 08/31/2021 15  13 - 39 U/L Final     ALT 08/31/2021 13  7 - 52 U/L Final     Protein Total 08/31/2021 6.7  6.4 - 8.9 g/dL Final     Albumin 08/31/2021 4.3  3.5 - 5.7 g/dL Final     Bilirubin Total 08/31/2021 0.6  0.3 - 1.0 mg/dL Final     GFR Estimate 08/31/2021 88  >60 mL/min/1.73m2 Final    As of July 11, 2021, eGFR is calculated by the CKD-EPI creatinine equation, without race adjustment. eGFR can be influenced by muscle mass, exercise, and diet. The reported eGFR is an estimation only and is only applicable if the renal function is stable.     WBC Count 08/31/2021 5.1  4.0 - 11.0 10e3/uL Final     RBC Count 08/31/2021 4.89  4.40 - 5.90 10e6/uL Final     Hemoglobin 08/31/2021 14.7  13.3 - 17.7 g/dL Final     Hematocrit 08/31/2021 44.6  40.0 - 53.0 % Final     MCV 08/31/2021 91  78 - 100 fL Final     MCH 08/31/2021 30.1  26.5 - 33.0 pg Final     MCHC 08/31/2021 33.0  31.5 - 36.5 g/dL Final     RDW 08/31/2021 13.0  10.0 - 15.0 % Final     Platelet Count 08/31/2021 206  150 - 450 10e3/uL Final     Creatinine Urine mg/dL 08/31/2021 141  mg/dL Final     Albumin Urine mg/L 08/31/2021 55  mg/L Final     Albumin Urine mg/g Cr 08/31/2021 39.01* 0.00 - 17.00 mg/g Cr Final     Color Urine 08/31/2021 Yellow  Colorless, Straw, Light Yellow, Yellow Final     Appearance Urine 08/31/2021 Clear  Clear Final     Glucose Urine 08/31/2021 Negative  Negative mg/dL Final     Bilirubin Urine 08/31/2021 Negative  Negative Final     Ketones Urine 08/31/2021 Negative  Negative mg/dL Final     Specific Gravity Urine 08/31/2021 1.027  1.000 - 1.030 Final     Blood Urine 08/31/2021 Negative  Negative Final     pH Urine 08/31/2021 5.5  5.0 - 9.0 Final     Protein Albumin Urine 08/31/2021 Negative   Negative mg/dL Final     Urobilinogen Urine 08/31/2021 Normal  Normal, 2.0 mg/dL Final     Nitrite Urine 08/31/2021 Negative  Negative Final     Leukocyte Esterase Urine 08/31/2021 Negative  Negative Final     Cholesterol 08/31/2021 162  <200 mg/dL Final    Age 0-19 years  Desirable: <170 mg/dL  Borderline high:  170-199 mg/dl  High:            >199 mg/dl    Age 20 years and older  Desirable: <200 mg/dL     Triglycerides 08/31/2021 87  <150 mg/dL Final    0-9 years:  Normal:    Less than 75 mg/dL  Borderline high:  75-99 mg/dL  High:             Greater than or equal to 100 mg/dL    0-19 years:  Normal:    Less than 90 mg/dL  Borderline high:   mg/dL  High:             Greater than or equal to 130 mg/dL    20 years and older:  Normal:    Less than 150 mg/dL  Borderline high:  150-199 mg/dL  High:             200-499 mg/dL  Very high:   Greater than or equal to 500 mg/dL     Direct Measure HDL 08/31/2021 44  23 - 92 mg/dL Final    0-19 years:       Greater than or equal to 45 mg/dL   Low: Less than 40 mg/dL   Borderline low: 40-44 mg/dL     20 years and older:   Female: Greater than or equal to 50 mg/dL   Male:   Greater than or equal to 40 mg/dL          LDL Cholesterol Calculated 08/31/2021 101* <=100 mg/dL Final    Age 0-19 years:  Desirable: 0-110 mg/dL   Borderline high: 110-129 mg/dL   High: >= 130 mg/dL    Age 20 years and older:  Desirable: <100mg/dL  Above desirable: 100-129 mg/dL   Borderline high: 130-159 mg/dL   High: 160-189 mg/dL   Very high: >= 190 mg/dL     Non HDL Cholesterol 08/31/2021 118  <130 mg/dL Final    0-19 years:  Desirable:          Less than 120 mg/dL  Borderline high:   120-144 mg/dL  High:                   Greater than or equal to 145 mg/dL    20 years and older:  Desirable:          130 mg/dL  Above Desirable: 130-159 mg/dL  Borderline high:   160-189 mg/dL  High:               190-219 mg/dL  Very high:     Greater than or equal to 220 mg/dL     Patient Fasting > 8hrs?  08/31/2021 Yes   Final     Hemoglobin A1C 08/31/2021 6.7* 4.0 - 6.2 % Final     Aspects of Diabetes:   Recent Labs   Lab Test 08/31/21  0848 03/17/21  1659 01/26/21  1238 12/18/20  0922   A1C 6.7* 6.4*  --  6.5*   * 55  --  77   HDL 44 43  --  46   TRIG 87 112  --  106   ALT 13 11 13 13   CR 0.74 0.80 0.78 0.83   GFRESTIMATED 88 >90 >90 90   GFRESTBLACK  --  >90 >90 >90   POTASSIUM 3.9 4.6 3.9 3.7   TSH 3.11  --   --   --       Hemoglobin A1c  Goal range is under 8%. Best is 6.5 to 7   Blood Pressure 122/60 Goal to keep less than 140/90   Tobacco  reports that he quit smoking about 37 years ago. His smoking use included cigarettes. He has never used smokeless tobacco. Goal to abstain from tobacco   Aspirin or Plavix Anti-platelet therapy Aspirin or Plavix reduces risk of heart disease and stroke  -- sometimes used with other blood thinners, depending on bleeding risk and risk factors.    ACE/ARB Specific blood pressure meds These medications can reduce risk of kidney disease   Cholesterol Statins (Lipitor, Crestor, vs others) Statins reduce risk of heart disease and stroke   Eye Exam -- Do Yearly -- Annual diabetic eye exam   Healthy weight Wt Readings from Last 3 Encounters:   09/01/21 88 kg (194 lb)   05/05/21 88.5 kg (195 lb 3.2 oz)   03/26/21 86.9 kg (191 lb 9.6 oz)     Body mass index is 30.81 kg/m .  Goal BMI under 30, best is under 25.      -- Trying to exercise daily (goal at least 20 min/day) with moderate aerobic activity   -- Eat healthy (resources from ADA at http://www.diabetes.org/)   -- Taking good care of my feet. Consider seeing the Podiatrist   -- Check blood sugars as directed, record in log book and bring to every appointment    Insurance companies are grading you and I on your blood sugar control -- Goal is to get your A1c down to 7.9% or lower and NO Smoking!  -- Medicare and most insurance companies, will only cover Hemoglobin A1c labs to be rechecked every 91+ days.      Return for  Diabetes labs and clinic follow-up appointment every 3 to 4 months.    Schedule lab only appointment --- A few days AFTER: 11/30/21   Schedule clinic appointment with Dr. Pickens -- Same day as labs, or 1-2 days later.      Electronically signed by:  Cm Pickens MD on 9/1/2021  Internal Medicine  [facility] and [facility]

## 2021-09-01 NOTE — NURSING NOTE
Patient is here for a diabetic check, states he has some questions and problems.     Previous A1C is at goal of <8  Lab Results   Component Value Date    A1C 6.7 08/31/2021    A1C 6.4 03/17/2021    A1C 6.5 12/18/2020    A1C 6.5 09/17/2020    A1C 6.4 03/17/2020    A1C 6.2 03/19/2019     Urine Microalbumin/Creat:    Albumin Urine mg/L   Date Value Ref Range Status   08/31/2021 55 mg/L Final   03/17/2021 43 mg/L Final     Albumin Urine mg/g Cr   Date Value Ref Range Status   08/31/2021 39.01 (H) 0.00 - 17.00 mg/g Cr Final   03/17/2021 53.98 (H) 0 - 17 mg/g Cr Final     Creatinine Urine   Date Value Ref Range Status   03/17/2021 80 mg/dL Final     Creatinine Urine mg/dL   Date Value Ref Range Status   08/31/2021 141 mg/dL Final     Foot exam 3/26/21  Eye exam 4/19/19    Tobacco User no  Patient is on a daily aspirin  Patient is on a Statin.  Blood pressure today of:     BP Readings from Last 1 Encounters:   09/01/21 122/60      is at the goal of <139/89 for diabetics.    Maria Isabel Santoro LPN on 9/1/2021 at 1:31 PM    Medication Reconciliation: complete    Maria Isabel Santoro LPN  9/1/2021 1:31 PM    FOOD SECURITY SCREENING QUESTIONS  Hunger Vital Signs:  Within the past 12 months we worried whether our food would run out before we got money to buy more. Never  Within the past 12 months the food we bought just didn't last and we didn't have money to get more. Never  Maria Isabel Santoro LPN 9/1/2021 1:31 PM

## 2021-09-01 NOTE — PATIENT INSTRUCTIONS
Immunization History   Administered Date(s) Administered     COVID-19,PF,Pfizer 02/16/2021, 03/09/2021     FLU 6-35 months 09/25/2010     Flu 65+ Years 09/06/2017     Influenza (H1N1) 11/24/2009     Influenza (High Dose) 3 valent vaccine 09/23/2015, 10/05/2016, 09/30/2018, 09/21/2019, 09/09/2020     Influenza (IIV3) PF 11/09/2006, 10/18/2007, 10/20/2008, 09/09/2020     Pneumo Conj 13-V (2010&after) 04/18/2017     Pneumococcal 23 valent 05/03/2013     TDAP Vaccine (Boostrix) 05/03/2013     Zoster vaccine, live 10/28/2014      -- Booster shots for COVID-19 are coming.    -- Moderna and Pfizer booster shot   - If immunocompromised, can get 3rd shot (booster shot) 28+ days from date of 2nd shot.   - Otherwise - (Tentative) vaccine schedule is to get 3rd shot (booster shot) 8+ months from date of 2nd shot.   -- George and George booster shot   - Likely on the way, but people should not get a Booster from Moderna or Pfizer if they got George and George shot.     -- Consider Annual Flu / Influenza vaccination - Every Fall (Starting about October 1st)      -- Get the new shingles shot (2 in series) (Shingrix) - from one of the local pharmacies, at your convenience. -- Check cost/coverage.   -- or -- If these are very expensive, go to the Local Public Health Department     [facility] -- [address] [address]    ---- Shot clinic is the FIRST Thursday of Each Month -- from 2 to 6 PM, by Appointment only.     Call for an appointment -- [phone]         Lab on 08/31/2021   Component Date Value Ref Range Status     TSH 08/31/2021 3.11  0.40 - 4.00 mU/L Final     Sodium 08/31/2021 141  134 - 144 mmol/L Final     Potassium 08/31/2021 3.9  3.5 - 5.1 mmol/L Final     Chloride 08/31/2021 105  98 - 107 mmol/L Final     Carbon Dioxide (CO2) 08/31/2021 27  21 - 31 mmol/L Final     Anion Gap 08/31/2021 9  3 - 14 mmol/L Final     Urea Nitrogen 08/31/2021 20   7 - 25 mg/dL Final     Creatinine 08/31/2021 0.74  0.70 - 1.30 mg/dL Final     Calcium 08/31/2021 9.7  8.6 - 10.3 mg/dL Final     Glucose 08/31/2021 124* 70 - 105 mg/dL Final     Alkaline Phosphatase 08/31/2021 60  34 - 104 U/L Final     AST 08/31/2021 15  13 - 39 U/L Final     ALT 08/31/2021 13  7 - 52 U/L Final     Protein Total 08/31/2021 6.7  6.4 - 8.9 g/dL Final     Albumin 08/31/2021 4.3  3.5 - 5.7 g/dL Final     Bilirubin Total 08/31/2021 0.6  0.3 - 1.0 mg/dL Final     GFR Estimate 08/31/2021 88  >60 mL/min/1.73m2 Final    As of July 11, 2021, eGFR is calculated by the CKD-EPI creatinine equation, without race adjustment. eGFR can be influenced by muscle mass, exercise, and diet. The reported eGFR is an estimation only and is only applicable if the renal function is stable.     WBC Count 08/31/2021 5.1  4.0 - 11.0 10e3/uL Final     RBC Count 08/31/2021 4.89  4.40 - 5.90 10e6/uL Final     Hemoglobin 08/31/2021 14.7  13.3 - 17.7 g/dL Final     Hematocrit 08/31/2021 44.6  40.0 - 53.0 % Final     MCV 08/31/2021 91  78 - 100 fL Final     MCH 08/31/2021 30.1  26.5 - 33.0 pg Final     MCHC 08/31/2021 33.0  31.5 - 36.5 g/dL Final     RDW 08/31/2021 13.0  10.0 - 15.0 % Final     Platelet Count 08/31/2021 206  150 - 450 10e3/uL Final     Creatinine Urine mg/dL 08/31/2021 141  mg/dL Final     Albumin Urine mg/L 08/31/2021 55  mg/L Final     Albumin Urine mg/g Cr 08/31/2021 39.01* 0.00 - 17.00 mg/g Cr Final     Color Urine 08/31/2021 Yellow  Colorless, Straw, Light Yellow, Yellow Final     Appearance Urine 08/31/2021 Clear  Clear Final     Glucose Urine 08/31/2021 Negative  Negative mg/dL Final     Bilirubin Urine 08/31/2021 Negative  Negative Final     Ketones Urine 08/31/2021 Negative  Negative mg/dL Final     Specific Gravity Urine 08/31/2021 1.027  1.000 - 1.030 Final     Blood Urine 08/31/2021 Negative  Negative Final     pH Urine 08/31/2021 5.5  5.0 - 9.0 Final     Protein Albumin Urine 08/31/2021 Negative   Negative mg/dL Final     Urobilinogen Urine 08/31/2021 Normal  Normal, 2.0 mg/dL Final     Nitrite Urine 08/31/2021 Negative  Negative Final     Leukocyte Esterase Urine 08/31/2021 Negative  Negative Final     Cholesterol 08/31/2021 162  <200 mg/dL Final    Age 0-19 years  Desirable: <170 mg/dL  Borderline high:  170-199 mg/dl  High:            >199 mg/dl    Age 20 years and older  Desirable: <200 mg/dL     Triglycerides 08/31/2021 87  <150 mg/dL Final    0-9 years:  Normal:    Less than 75 mg/dL  Borderline high:  75-99 mg/dL  High:             Greater than or equal to 100 mg/dL    0-19 years:  Normal:    Less than 90 mg/dL  Borderline high:   mg/dL  High:             Greater than or equal to 130 mg/dL    20 years and older:  Normal:    Less than 150 mg/dL  Borderline high:  150-199 mg/dL  High:             200-499 mg/dL  Very high:   Greater than or equal to 500 mg/dL     Direct Measure HDL 08/31/2021 44  23 - 92 mg/dL Final    0-19 years:       Greater than or equal to 45 mg/dL   Low: Less than 40 mg/dL   Borderline low: 40-44 mg/dL     20 years and older:   Female: Greater than or equal to 50 mg/dL   Male:   Greater than or equal to 40 mg/dL          LDL Cholesterol Calculated 08/31/2021 101* <=100 mg/dL Final    Age 0-19 years:  Desirable: 0-110 mg/dL   Borderline high: 110-129 mg/dL   High: >= 130 mg/dL    Age 20 years and older:  Desirable: <100mg/dL  Above desirable: 100-129 mg/dL   Borderline high: 130-159 mg/dL   High: 160-189 mg/dL   Very high: >= 190 mg/dL     Non HDL Cholesterol 08/31/2021 118  <130 mg/dL Final    0-19 years:  Desirable:          Less than 120 mg/dL  Borderline high:   120-144 mg/dL  High:                   Greater than or equal to 145 mg/dL    20 years and older:  Desirable:          130 mg/dL  Above Desirable: 130-159 mg/dL  Borderline high:   160-189 mg/dL  High:               190-219 mg/dL  Very high:     Greater than or equal to 220 mg/dL     Patient Fasting > 8hrs?  08/31/2021 Yes   Final     Hemoglobin A1C 08/31/2021 6.7* 4.0 - 6.2 % Final     Aspects of Diabetes:   Recent Labs   Lab Test 08/31/21  0848 03/17/21  1659 01/26/21  1238 12/18/20  0922   A1C 6.7* 6.4*  --  6.5*   * 55  --  77   HDL 44 43  --  46   TRIG 87 112  --  106   ALT 13 11 13 13   CR 0.74 0.80 0.78 0.83   GFRESTIMATED 88 >90 >90 90   GFRESTBLACK  --  >90 >90 >90   POTASSIUM 3.9 4.6 3.9 3.7   TSH 3.11  --   --   --       Hemoglobin A1c  Goal range is under 8%. Best is 6.5 to 7   Blood Pressure 122/60 Goal to keep less than 140/90   Tobacco  reports that he quit smoking about 37 years ago. His smoking use included cigarettes. He has never used smokeless tobacco. Goal to abstain from tobacco   Aspirin or Plavix Anti-platelet therapy Aspirin or Plavix reduces risk of heart disease and stroke  -- sometimes used with other blood thinners, depending on bleeding risk and risk factors.    ACE/ARB Specific blood pressure meds These medications can reduce risk of kidney disease   Cholesterol Statins (Lipitor, Crestor, vs others) Statins reduce risk of heart disease and stroke   Eye Exam -- Do Yearly -- Annual diabetic eye exam   Healthy weight Wt Readings from Last 3 Encounters:   09/01/21 88 kg (194 lb)   05/05/21 88.5 kg (195 lb 3.2 oz)   03/26/21 86.9 kg (191 lb 9.6 oz)     Body mass index is 30.81 kg/m .  Goal BMI under 30, best is under 25.      -- Trying to exercise daily (goal at least 20 min/day) with moderate aerobic activity   -- Eat healthy (resources from ADA at http://www.diabetes.org/)   -- Taking good care of my feet. Consider seeing the Podiatrist   -- Check blood sugars as directed, record in log book and bring to every appointment    Insurance companies are grading you and I on your blood sugar control -- Goal is to get your A1c down to 7.9% or lower and NO Smoking!  -- Medicare and most insurance companies, will only cover Hemoglobin A1c labs to be rechecked every 91+ days.      Return for  Diabetes labs and clinic follow-up appointment every 3 to 4 months.    Schedule lab only appointment --- A few days AFTER: 11/30/21   Schedule clinic appointment with Dr. Pickens -- Same day as labs, or 1-2 days later.

## 2021-09-02 ASSESSMENT — ANXIETY QUESTIONNAIRES: GAD7 TOTAL SCORE: 4

## 2021-09-08 ENCOUNTER — LAB (OUTPATIENT)
Dept: LAB | Facility: OTHER | Age: 76
End: 2021-09-08
Attending: INTERNAL MEDICINE
Payer: MEDICARE

## 2021-09-08 DIAGNOSIS — Z12.5 SCREENING PSA (PROSTATE SPECIFIC ANTIGEN): ICD-10-CM

## 2021-09-08 DIAGNOSIS — Z12.5 SCREENING PSA (PROSTATE SPECIFIC ANTIGEN): Primary | ICD-10-CM

## 2021-09-08 DIAGNOSIS — N40.0 BENIGN PROSTATIC HYPERPLASIA WITHOUT LOWER URINARY TRACT SYMPTOMS: ICD-10-CM

## 2021-09-08 LAB — PSA SERPL-MCNC: 4.39 UG/L (ref 0–4)

## 2021-09-08 PROCEDURE — G0103 PSA SCREENING: HCPCS

## 2021-09-08 PROCEDURE — 36415 COLL VENOUS BLD VENIPUNCTURE: CPT | Mod: ZL

## 2021-09-08 NOTE — PROGRESS NOTES
Please call and notify patient, schedule lab appointment if desired.    He will need a separate lab appointment to check his prostate lab/PSA.    They were not able to add this to his blood work from a few days ago.    Electronically signed by:  Cm Pickens MD  on September 8, 2021 9:49 AM

## 2021-10-04 ENCOUNTER — IMMUNIZATION (OUTPATIENT)
Dept: FAMILY MEDICINE | Facility: OTHER | Age: 76
End: 2021-10-04
Attending: FAMILY MEDICINE
Payer: MEDICARE

## 2021-10-04 PROCEDURE — 91300 PR COVID VAC PFIZER DIL RECON 30 MCG/0.3 ML IM: CPT

## 2021-10-14 ENCOUNTER — TRANSFERRED RECORDS (OUTPATIENT)
Dept: HEALTH INFORMATION MANAGEMENT | Facility: OTHER | Age: 76
End: 2021-10-14

## 2021-10-14 LAB — RETINOPATHY: NEGATIVE

## 2021-10-19 ENCOUNTER — APPOINTMENT (OUTPATIENT)
Dept: GENERAL RADIOLOGY | Facility: OTHER | Age: 76
End: 2021-10-19
Attending: STUDENT IN AN ORGANIZED HEALTH CARE EDUCATION/TRAINING PROGRAM
Payer: MEDICARE

## 2021-10-19 ENCOUNTER — HOSPITAL ENCOUNTER (EMERGENCY)
Facility: OTHER | Age: 76
Discharge: HOME | End: 2021-10-19
Attending: STUDENT IN AN ORGANIZED HEALTH CARE EDUCATION/TRAINING PROGRAM | Admitting: STUDENT IN AN ORGANIZED HEALTH CARE EDUCATION/TRAINING PROGRAM
Payer: MEDICARE

## 2021-10-19 ENCOUNTER — TELEPHONE (OUTPATIENT)
Dept: INTERNAL MEDICINE | Facility: OTHER | Age: 76
End: 2021-10-19

## 2021-10-19 VITALS
SYSTOLIC BLOOD PRESSURE: 130 MMHG | WEIGHT: 196.21 LBS | HEIGHT: 67 IN | OXYGEN SATURATION: 93 % | HEART RATE: 61 BPM | BODY MASS INDEX: 30.8 KG/M2 | RESPIRATION RATE: 11 BRPM | DIASTOLIC BLOOD PRESSURE: 68 MMHG | TEMPERATURE: 97.7 F

## 2021-10-19 DIAGNOSIS — R07.9 CHEST PAIN, UNSPECIFIED TYPE: Primary | ICD-10-CM

## 2021-10-19 DIAGNOSIS — R07.9 CHEST PAIN, UNSPECIFIED TYPE: ICD-10-CM

## 2021-10-19 LAB
ANION GAP SERPL CALCULATED.3IONS-SCNC: 8 MMOL/L (ref 3–14)
BASOPHILS # BLD AUTO: 0 10E3/UL (ref 0–0.2)
BASOPHILS NFR BLD AUTO: 0 %
BUN SERPL-MCNC: 19 MG/DL (ref 7–25)
CALCIUM SERPL-MCNC: 9.3 MG/DL (ref 8.6–10.3)
CHLORIDE BLD-SCNC: 105 MMOL/L (ref 98–107)
CO2 SERPL-SCNC: 27 MMOL/L (ref 21–31)
CREAT SERPL-MCNC: 0.73 MG/DL (ref 0.7–1.3)
EOSINOPHIL # BLD AUTO: 0.2 10E3/UL (ref 0–0.7)
EOSINOPHIL NFR BLD AUTO: 3 %
ERYTHROCYTE [DISTWIDTH] IN BLOOD BY AUTOMATED COUNT: 12.9 % (ref 10–15)
GFR SERPL CREATININE-BSD FRML MDRD: 89 ML/MIN/1.73M2
GLUCOSE BLD-MCNC: 131 MG/DL (ref 70–105)
HCT VFR BLD AUTO: 42.6 % (ref 40–53)
HGB BLD-MCNC: 14.2 G/DL (ref 13.3–17.7)
IMM GRANULOCYTES # BLD: 0 10E3/UL
IMM GRANULOCYTES NFR BLD: 0 %
LYMPHOCYTES # BLD AUTO: 1.2 10E3/UL (ref 0.8–5.3)
LYMPHOCYTES NFR BLD AUTO: 21 %
MCH RBC QN AUTO: 30.3 PG (ref 26.5–33)
MCHC RBC AUTO-ENTMCNC: 33.3 G/DL (ref 31.5–36.5)
MCV RBC AUTO: 91 FL (ref 78–100)
MONOCYTES # BLD AUTO: 0.6 10E3/UL (ref 0–1.3)
MONOCYTES NFR BLD AUTO: 10 %
NEUTROPHILS # BLD AUTO: 3.8 10E3/UL (ref 1.6–8.3)
NEUTROPHILS NFR BLD AUTO: 66 %
NRBC # BLD AUTO: 0 10E3/UL
NRBC BLD AUTO-RTO: 0 /100
PLATELET # BLD AUTO: 197 10E3/UL (ref 150–450)
POTASSIUM BLD-SCNC: 3.8 MMOL/L (ref 3.5–5.1)
RBC # BLD AUTO: 4.69 10E6/UL (ref 4.4–5.9)
SODIUM SERPL-SCNC: 140 MMOL/L (ref 134–144)
TROPONIN I SERPL-MCNC: <2.4 PG/ML (ref 0–34)
TROPONIN I SERPL-MCNC: <2.4 PG/ML (ref 0–34)
WBC # BLD AUTO: 5.8 10E3/UL (ref 4–11)

## 2021-10-19 PROCEDURE — 250N000013 HC RX MED GY IP 250 OP 250 PS 637: Performed by: STUDENT IN AN ORGANIZED HEALTH CARE EDUCATION/TRAINING PROGRAM

## 2021-10-19 PROCEDURE — 99285 EMERGENCY DEPT VISIT HI MDM: CPT | Mod: 25 | Performed by: STUDENT IN AN ORGANIZED HEALTH CARE EDUCATION/TRAINING PROGRAM

## 2021-10-19 PROCEDURE — 93005 ELECTROCARDIOGRAM TRACING: CPT | Performed by: STUDENT IN AN ORGANIZED HEALTH CARE EDUCATION/TRAINING PROGRAM

## 2021-10-19 PROCEDURE — 93010 ELECTROCARDIOGRAM REPORT: CPT | Performed by: INTERNAL MEDICINE

## 2021-10-19 PROCEDURE — 71045 X-RAY EXAM CHEST 1 VIEW: CPT

## 2021-10-19 PROCEDURE — 84484 ASSAY OF TROPONIN QUANT: CPT | Performed by: STUDENT IN AN ORGANIZED HEALTH CARE EDUCATION/TRAINING PROGRAM

## 2021-10-19 PROCEDURE — 99284 EMERGENCY DEPT VISIT MOD MDM: CPT | Performed by: STUDENT IN AN ORGANIZED HEALTH CARE EDUCATION/TRAINING PROGRAM

## 2021-10-19 PROCEDURE — 36415 COLL VENOUS BLD VENIPUNCTURE: CPT | Performed by: STUDENT IN AN ORGANIZED HEALTH CARE EDUCATION/TRAINING PROGRAM

## 2021-10-19 PROCEDURE — 80048 BASIC METABOLIC PNL TOTAL CA: CPT | Performed by: STUDENT IN AN ORGANIZED HEALTH CARE EDUCATION/TRAINING PROGRAM

## 2021-10-19 PROCEDURE — 85025 COMPLETE CBC W/AUTO DIFF WBC: CPT | Performed by: STUDENT IN AN ORGANIZED HEALTH CARE EDUCATION/TRAINING PROGRAM

## 2021-10-19 RX ORDER — NITROGLYCERIN 0.4 MG/1
TABLET SUBLINGUAL
Qty: 10 TABLET | Refills: 0 | Status: SHIPPED | OUTPATIENT
Start: 2021-10-19 | End: 2022-10-24

## 2021-10-19 RX ORDER — ASPIRIN 81 MG/1
324 TABLET, CHEWABLE ORAL ONCE
Status: COMPLETED | OUTPATIENT
Start: 2021-10-19 | End: 2021-10-19

## 2021-10-19 RX ADMIN — ASPIRIN 81 MG CHEWABLE TABLET 324 MG: 81 TABLET CHEWABLE at 12:13

## 2021-10-19 ASSESSMENT — MIFFLIN-ST. JEOR: SCORE: 1568.63

## 2021-10-19 NOTE — ED TRIAGE NOTES
"ED Nursing Triage Note (General)   ________________________________    Dajuan Basilio is a 76 year old Male that presents to triage private car  With history of having intermittent chest pain that he describes as heaviness that radiates down his left arm. Pt says it started last night. Pt reports more generalized weakness and feeling more fatigued.  Pt has 3 cardiac stents.  BP (!) 144/73   Pulse 63   Temp 97.7  F (36.5  C) (Tympanic)   Resp 18   Ht 1.702 m (5' 7\")   Wt 89 kg (196 lb 3.4 oz)   SpO2 94%   BMI 30.73 kg/m  t  Patient appears alert  and oriented, in no acute distress., and cooperative and pleasant behavior.  GCS Total = 15  Airway: intact  Breathing noted as Normal  Circulation Normal  Skin:  Normal  Action taken:  Triage to critical care immediately      [facility] SITUATION Spouse  "

## 2021-10-19 NOTE — ED PROVIDER NOTES
History     Chief Complaint   Patient presents with     Chest Pain     Generalized Weakness     HPI  Dajuan Basilio is a 76-year-old man with history of diabetes with diabetic peripheral neuropathy, hypertension, CAD s/p multiple stents including to LCT and OM1 (no history of myocardial infarction), obesity, anxiety, GERD, and major neurocognitive disorder due to possible Alzheimer's disease who presents for evaluation of chest pain and arm pain as well as generalized weakness. Patient reports he started having left-sided chest pain and left arm pain that he describes as aching, 3-10 in severity starting yesterday evening, this is persisted throughout the night and been constant, not worsening, denies worsening pain with getting up and walking around or exertion, and pain not improved with rest. He denies pain is ripping or tearing but states it does occasionally go to the back. He denies pain in his jaw or neck or on the right side. It is not worse with taking a deep breath. No history of blood clots in the legs or lungs, recent surgeries, personal history of malignancy, hemoptysis or cough, fevers or chills, or leg swelling. He further reports feeling more fatigued and generally weak over the last several months, denies any focal weakness or numbness/tingling in his extremities, no nausea or vomiting or loose stools or abdominal pain, no new headaches or vision changes.    Allergies:  Allergies   Allergen Reactions     Ace Inhibitors Cough     Atorvastatin Calcium Muscle Pain (Myalgia)     Lipitor     Exelon [Rivastigmine] Other (See Comments)     Dizzy, lightheaded     Hmg-Coa-R Inhibitors Muscle Pain (Myalgia)     Higher doses cause myalgias     Namenda [Memantine] Other (See Comments)     Dizzy, lightheaded       Problem List:    Patient Active Problem List    Diagnosis Date Noted     Major neurocognitive disorder due to possible Alzheimer's disease, without behavioral disturbance (H) 05/05/2021      Priority: Medium     Pain medication agreement-nonopiod 3-26-21 03/26/2021     Priority: Medium     Bunion, right 03/26/2021     Priority: Medium     Onychomycosis 03/26/2021     Priority: Medium     Bunion, left 03/26/2021     Priority: Medium     Brain fog 03/17/2021     Priority: Medium     Bilateral impacted cerumen 01/08/2021     Priority: Medium     Diabetic peripheral neuropathy (H) 01/08/2021     Priority: Medium     Unspecified inflammatory spondylopathy, sacral and sacrococcygeal region (H) 12/18/2020     Priority: Medium     Chronic left-sided low back pain without sciatica 09/17/2020     Priority: Medium     Vitamin B12 deficiency 09/17/2020     Priority: Medium     Benign essential hypertension 09/17/2020     Priority: Medium     Lumbar facet arthropathy 07/07/2020     Priority: Medium     Chronically dry eyes, bilateral 03/17/2020     Priority: Medium     Left sided sciatica 03/17/2020     Priority: Medium     Gluteal pain 03/17/2020     Priority: Medium     Mixed hyperlipidemia 03/19/2019     Priority: Medium     S/P cardiac cath 5/17/18 06/07/2018     Priority: Medium     Unsteady gait 06/07/2018     Priority: Medium     Stented coronary artery to his LCX and OM1 on 5/17/18 06/07/2018     Priority: Medium     Status post insertion of drug eluting coronary artery stent - x2 stents - 1st B - 5/17/2018 - Replaced by [facility] 05/31/2018     Priority: Medium     Controlled type 2 diabetes mellitus with diabetic neuropathy, without long-term current use of insulin (H) 05/31/2018     Priority: Medium     Benign prostatic hyperplasia with weak urinary stream 05/31/2018     Priority: Medium     Coronary artery disease involving native coronary artery of native heart without angina pectoris 04/26/2018     Priority: Medium     Microalbuminuria 01/26/2018     Priority: Medium     Generalized anxiety disorder 06/05/2017     Priority: Medium     GERD (gastroesophageal reflux disease) 05/03/2013     Priority:  Medium     Nephrolithiasis 2012     Priority: Medium     BPH (benign prostatic hyperplasia) 2012     Priority: Medium     Diverticular disease of colon 2010     Priority: Medium     Obesity 2010     Priority: Medium     Chronic anxiety 2010     Priority: Medium        Past Medical History:    Past Medical History:   Diagnosis Date     Abnormal CT scan, bladder 2016     Diverticulosis of intestine without perforation or abscess without bleeding      Fatty (change of) liver, not elsewhere classified      Fibromyalgia      Nodular prostate without lower urinary tract symptoms      Obesity      Panic disorder without agoraphobia      Positive colorectal cancer screening using Cologuard test 2018       Past Surgical History:    Past Surgical History:   Procedure Laterality Date     ARTHROPLASTY KNEE      2015     ARTHROSCOPY KNEE      right knee X2     BIOPSY PROSTATE TRANSRECTAL      2013     COLONOSCOPY      ,and UGI [place] normal     COLONOSCOPY      ,and UGI repeat Dr. Johnson negative.     COLONOSCOPY  2013,WNL     COLONOSCOPY  2019    [place]-no follow up     HERNIA REPAIR  2000     RELEASE CARPAL TUNNEL      2014     VASECTOMY      No Comments Provided       Family History:    Family History   Problem Relation Age of Onset     Cancer Father         Cancer, mesothelioma     Colon Cancer Mother         Cancer-colon,     Genitourinary Problems Mother         Genitourinary Disease,renal failure     Diabetes Mother         Diabetes     Other - See Comments Sister         Renal transplant     Diabetes Type 2  Sister         Diabetes type II     Diabetes Brother         Diabetes       Social History:  Marital Status:   [2]  Social History     Tobacco Use     Smoking status: Former Smoker     Types: Cigarettes     Quit date: 10/25/1983     Years since quittin.0     Smokeless tobacco: Never Used   Vaping Use     Vaping Use: Never used  "  Substance Use Topics     Alcohol use: No     Drug use: No        Medications:    ascorbic acid (VITAMIN C) 500 MG tablet  aspirin 81 MG tablet  blood glucose (ACCU-CHEK ONEL PLUS) test strip  busPIRone (BUSPAR) 5 MG tablet  Cholecalciferol (D 5000) 5000 UNITS TABS  citalopram (CELEXA) 10 MG tablet  clonazePAM (KLONOPIN) 0.25 MG TBDP ODT tab  cyanocobalamin (CYANOCOBALAMIN) 1000 MCG/ML injection  gabapentin (NEURONTIN) 100 MG capsule  metFORMIN (GLUCOPHAGE) 500 MG tablet  neomycin-polymyxin-pramoxine (NEOSPORIN PLUS) 1 % external cream  nitroGLYcerin (NITROSTAT) 0.4 MG sublingual tablet  simvastatin (ZOCOR) 10 MG tablet  vitamin B complex with vitamin C (VITAMIN  B COMPLEX) TABS tablet          Review of Systems  Please see HPI above for pertinent positives and negatives. All other systems reviewed and found to be negative.    Physical Exam   BP: (!) 144/73  Pulse: 63  Temp: 97.7  F (36.5  C)  Resp: 18  Height: 170.2 cm (5' 7\")  Weight: 89 kg (196 lb 3.4 oz)  SpO2: 94 %      Physical Exam  Gen: Lying in bed, no acute distress, alert, nontoxic  HEENT: Normocephalic and atraumatic, mucous membranes moist, conjunctiva clear  CV: Regular rate and rhythm, is warm and well-perfused, no murmurs  Pulm: Normal respiratory effort, no wheezing or stridor, clear bilaterally  Abd: Soft, nontender, nondistended  Skin: No rash or other lesions  MSK: No gross deformities or swelling  Neuro: Alert and oriented x4, no focal deficits    ED Course     ED Course as of Oct 19 1857   Tue Oct 19, 2021   1152 EKG reviewed, normal sinus rhythm, no STEMI or current of injury appreciated on this EKG but does have subtle ST depressions in V4 and V5 and questionable ST depressions in V2 V3 (anticipate small degree of ST elevation in these leads at baseline), no reciprocal changes appreciated; these findings are similar compared to prior EKG from 1/2021      1207 Patient evaluated, reports left-sided chest pain that he describes as aching, 3-10 " in severity, as well as pain in his left arm since last night.  States pain is been constant, does not seem to get worse with activity, nor better with rest.  He has not taken any medication for the pain.  Findings are somewhat atypical for ACS although does have concerning history of CAD with 3 stents in the past (no prior history of MI however), recent stress test about a year ago was unremarkable.  Plan for serial troponins, basic labs, chest x-ray, will give full dose aspirin now and continue to monitor closely      1229 CBC unremarkable      1239 BMP unremarkable, initial troponin WNL; plan for 1 hour delta troponin, anticipate likely discharge      1314 Chest x-ray clear on my read, mediastinum normal      1333 Repeat troponin unchanged/undetectably low, plan for discharge with close outpatient follow-up, careful return precautions provided        Procedures              Critical Care time:  none               Results for orders placed or performed during the hospital encounter of 10/19/21 (from the past 24 hour(s))   CBC with platelets differential    Narrative    The following orders were created for panel order CBC with platelets differential.  Procedure                               Abnormality         Status                     ---------                               -----------         ------                     CBC with platelets and d...[[id number]]                      Final result                 Please view results for these tests on the individual orders.   Basic metabolic panel   Result Value Ref Range    Sodium 140 134 - 144 mmol/L    Potassium 3.8 3.5 - 5.1 mmol/L    Chloride 105 98 - 107 mmol/L    Carbon Dioxide (CO2) 27 21 - 31 mmol/L    Anion Gap 8 3 - 14 mmol/L    Urea Nitrogen 19 7 - 25 mg/dL    Creatinine 0.73 0.70 - 1.30 mg/dL    Calcium 9.3 8.6 - 10.3 mg/dL    Glucose 131 (H) 70 - 105 mg/dL    GFR Estimate 89 >60 mL/min/1.73m2   Troponin I   Result Value Ref Range    Troponin I <2.4 0.0 -  34.0 pg/mL   CBC with platelets and differential   Result Value Ref Range    WBC Count 5.8 4.0 - 11.0 10e3/uL    RBC Count 4.69 4.40 - 5.90 10e6/uL    Hemoglobin 14.2 13.3 - 17.7 g/dL    Hematocrit 42.6 40.0 - 53.0 %    MCV 91 78 - 100 fL    MCH 30.3 26.5 - 33.0 pg    MCHC 33.3 31.5 - 36.5 g/dL    RDW 12.9 10.0 - 15.0 %    Platelet Count 197 150 - 450 10e3/uL    % Neutrophils 66 %    % Lymphocytes 21 %    % Monocytes 10 %    % Eosinophils 3 %    % Basophils 0 %    % Immature Granulocytes 0 %    NRBCs per 100 WBC 0 <1 /100    Absolute Neutrophils 3.8 1.6 - 8.3 10e3/uL    Absolute Lymphocytes 1.2 0.8 - 5.3 10e3/uL    Absolute Monocytes 0.6 0.0 - 1.3 10e3/uL    Absolute Eosinophils 0.2 0.0 - 0.7 10e3/uL    Absolute Basophils 0.0 0.0 - 0.2 10e3/uL    Absolute Immature Granulocytes 0.0 <=0.0 10e3/uL    Absolute NRBCs 0.0 10e3/uL   XR Chest Port 1 View    Narrative    Exam:  XR CHEST PORT 1 VIEW    HISTORY: chest pain.    COMPARISON:  1/26/2021, 2/6/2020, 3/20/2019    FINDINGS:     The cardiomediastinal contours are normal.      There is mild streaky retrocardiac opacity. No pleural effusion or  pneumothorax.      No acute osseous abnormality.       Impression    IMPRESSION:      Mild retrocardiac opacity most likely atelectasis, however aspiration  or pneumonia are possible in the appropriate clinical setting.      INESSA CARCAMO MD         SYSTEM ID:  [id number]   Troponin I (second draw)   Result Value Ref Range    Troponin I <2.4 0.0 - 34.0 pg/mL       Medications   aspirin (ASA) chewable tablet 324 mg (324 mg Oral Given 10/19/21 1213)       Assessments & Plan (with Medical Decision Making)   76-year-old man with history of diabetes with diabetic peripheral neuropathy, hypertension, CAD s/p multiple stents including to LCT and OM1 (no history of myocardial infarction), obesity, anxiety, GERD, and major neurocognitive disorder due to possible Alzheimer's disease who presents for evaluation of chest pain and arm pain as  well as generalized weakness.  Vitally stable here, afebrile, no acute distress.  History of constant pain is somewhat atypical for ACS, serial troponin testing undetectably low and EKG without any ischemic changes compared to prior actively rules out ACS in his clinical context.  Given very atypical history with no worsening with exertion or improvement with rest I do not suspect unstable angina, patient received aspirin on arrival but no further therapeutic interventions indicated.  Remainder of basic lab work unrevealing.  History and pain characteristics given absence of pleuritic component is highly atypical for pulmonary embolism therefore D-dimer testing not indicated.  Chest x-ray without widened mediastinum and pain described as aching, rather than sharp or ripping, given these findings and hemodynamic stability with no hypertension or tachycardia similarly very low suspicion for aortic dissection therefore CT angiogram not indicated.  Chest x-ray otherwise clear on my read, final report did come back with mild retrocardiac opacity that most likely represents atelectasis in this clinical context is patient otherwise has no pulmonary symptoms.  Therefore no indication to treat for pneumonia unless he develops fevers or cough.  Patient stable on recheck, this time no dangerous or life-threatening cause of his symptoms are suspected, he is appropriate for discharge with very close outpatient follow-up and careful return precautions were reviewed.    From ED discharge instructions:  No dangerous or life-threatening cause of your chest pain was discovered today. Your heart enzymes were normal and there was no evidence of a heart attack today.    Follow-up soon as possible with your doctor in clinic to discuss neck steps including possible stress testing as well as further evaluation as to the cause of your worsening fatigue in recent months as well as your chest pain.    Come back to the emergency department  immediately or call 911 if you have severe worsening chest pain, shortness of breath, vomiting and inability to eat or drink, new fevers, new weakness or numbness/tingling in your arms or legs, or any other acute concerns.      I have reviewed the nursing notes.    I have reviewed the findings, diagnosis, plan and need for follow up with the patient.       Discharge Medication List as of 10/19/2021  2:04 PM          Final diagnoses:   Chest pain, unspecified type       10/19/2021   [facility] Jax Harris MD  10/20/21 [id number]

## 2021-10-19 NOTE — TELEPHONE ENCOUNTER
I am uncertain what is available in the schedule.  Please review.  May need to see another provider.      Currently scheduled with Dr. Mcarthur on 10/25.  Uncertain if there is anything sooner.    Uncertain how soon a stress test can be performed.    Cm Pickens MD

## 2021-10-19 NOTE — DISCHARGE INSTRUCTIONS
No dangerous or life-threatening cause of your chest pain was discovered today. Your heart enzymes were normal and there was no evidence of a heart attack today.    Follow-up soon as possible with your doctor in clinic to discuss neck steps including possible stress testing as well as further evaluation as to the cause of your worsening fatigue in recent months as well as your chest pain.    Come back to the emergency department immediately or call 911 if you have severe worsening chest pain, shortness of breath, vomiting and inability to eat or drink, new fevers, new weakness or numbness/tingling in your arms or legs, or any other acute concerns.

## 2021-10-19 NOTE — TELEPHONE ENCOUNTER
Patient was in the [facility] ED today and was told that he has a stress test on 10/25/2021 with Dr Mcarthur.  Explained to patient that was a follow up and not the stress test.    Patient is still having \chest and arm pain,   Wants to get in and see PCP this week and to also have the stress test done this week.      Please call patient back. Thank you   Taylor Manning on 10/19/2021 at 4:08 PM

## 2021-10-20 LAB
ATRIAL RATE - MUSE: 61 BPM
DIASTOLIC BLOOD PRESSURE - MUSE: NORMAL MMHG
INTERPRETATION ECG - MUSE: NORMAL
P AXIS - MUSE: 42 DEGREES
PR INTERVAL - MUSE: 194 MS
QRS DURATION - MUSE: 100 MS
QT - MUSE: 408 MS
QTC - MUSE: 410 MS
R AXIS - MUSE: -13 DEGREES
SYSTOLIC BLOOD PRESSURE - MUSE: NORMAL MMHG
T AXIS - MUSE: 36 DEGREES
VENTRICULAR RATE- MUSE: 61 BPM

## 2021-10-20 NOTE — TELEPHONE ENCOUNTER
Patient was contacted and DJS response given.  There is nothing sooner in clinic this week for patient to be seen.  He will keep his Monday appointment with Dr. Mcarthur as planned.  He has concerns about having a stress test as the ED told him he should have one.  He'd like to do that as soon as possible.  It was discussed with Dr. Mcarthur and he was kind enough to place the order for the stress test.  Radiology will be contacted to see about when the patient can have the test.  Lea White on 10/20/2021 at 9:07 AM

## 2021-10-29 ENCOUNTER — TELEPHONE (OUTPATIENT)
Dept: CARDIOLOGY | Facility: OTHER | Age: 76
End: 2021-10-29

## 2021-11-02 ENCOUNTER — HOSPITAL ENCOUNTER (OUTPATIENT)
Dept: NUCLEAR MEDICINE | Facility: OTHER | Age: 76
End: 2021-11-02
Attending: STUDENT IN AN ORGANIZED HEALTH CARE EDUCATION/TRAINING PROGRAM
Payer: MEDICARE

## 2021-11-02 DIAGNOSIS — R07.9 CHEST PAIN, UNSPECIFIED TYPE: ICD-10-CM

## 2021-11-02 LAB
CV BLOOD PRESSURE: 72 MMHG
CV STRESS MAX HR HE: 80
NUC STRESS EJECTION FRACTION: 71 %
RATE PRESSURE PRODUCT: NORMAL
STRESS ECHO BASELINE DIASTOLIC HE: 74
STRESS ECHO BASELINE HR: 56 BPM
STRESS ECHO BASELINE SYSTOLIC BP: 124
STRESS ECHO CALCULATED PERCENT HR: 56 %
STRESS ECHO LAST STRESS DIASTOLIC BP: 74
STRESS ECHO LAST STRESS SYSTOLIC BP: 129
STRESS ECHO POST ESTIMATED WORKLOAD: 1 METS
STRESS ECHO TARGET HR: 142

## 2021-11-02 PROCEDURE — 93018 CV STRESS TEST I&R ONLY: CPT

## 2021-11-02 PROCEDURE — A9500 TC99M SESTAMIBI: HCPCS | Performed by: STUDENT IN AN ORGANIZED HEALTH CARE EDUCATION/TRAINING PROGRAM

## 2021-11-02 PROCEDURE — 93017 CV STRESS TEST TRACING ONLY: CPT

## 2021-11-02 PROCEDURE — 250N000011 HC RX IP 250 OP 636: Performed by: STUDENT IN AN ORGANIZED HEALTH CARE EDUCATION/TRAINING PROGRAM

## 2021-11-02 PROCEDURE — 343N000001 HC RX 343: Performed by: STUDENT IN AN ORGANIZED HEALTH CARE EDUCATION/TRAINING PROGRAM

## 2021-11-02 PROCEDURE — 78452 HT MUSCLE IMAGE SPECT MULT: CPT

## 2021-11-02 PROCEDURE — 93016 CV STRESS TEST SUPVJ ONLY: CPT

## 2021-11-02 RX ORDER — REGADENOSON 0.08 MG/ML
0.4 INJECTION, SOLUTION INTRAVENOUS ONCE
Status: COMPLETED | OUTPATIENT
Start: 2021-11-02 | End: 2021-11-02

## 2021-11-02 RX ORDER — AMINOPHYLLINE 25 MG/ML
50-200 INJECTION, SOLUTION INTRAVENOUS
Status: DISCONTINUED | OUTPATIENT
Start: 2021-11-02 | End: 2021-11-03 | Stop reason: HOSPADM

## 2021-11-02 RX ADMIN — REGADENOSON 0.4 MG: 0.08 INJECTION, SOLUTION INTRAVENOUS at 08:48

## 2021-11-02 RX ADMIN — KIT FOR THE PREPARATION OF TECHNETIUM TC99M SESTAMIBI 8.48 MILLICURIE: 1 INJECTION, POWDER, LYOPHILIZED, FOR SOLUTION PARENTERAL at 07:15

## 2021-11-02 RX ADMIN — KIT FOR THE PREPARATION OF TECHNETIUM TC99M SESTAMIBI 32 MILLICURIE: 1 INJECTION, POWDER, LYOPHILIZED, FOR SOLUTION PARENTERAL at 08:50

## 2021-11-02 NOTE — PROGRESS NOTES
0700 The patient arrived for a Lexiscan Cardiolite stress test.  The procedure, risks, and benefits were discussed with the patient, and the consent was signed.  A saline lock was started,and the Cardiolite was injected by Nuclear Medicine.  The patient was taken to the waiting area, to await resting images at 0730.  0830 The patient returned from Nuclear Medicine and was prepped for the stress test. Dr. Mcarthur arrived, and the patient was administered the Lexiscan per procedure.  The patient tolerated the procedure. He was given a snack and taken to Nuclear Medicine in stable condition for stress images.  The saline lock will be removed by Nuclear Medicine for proper disposal.  The patient was instructed that the ordering MD will call with results in one to two days.  Please see the chart for the complete test results.

## 2021-11-04 ENCOUNTER — OFFICE VISIT (OUTPATIENT)
Dept: INTERNAL MEDICINE | Facility: OTHER | Age: 76
End: 2021-11-04
Attending: INTERNAL MEDICINE
Payer: COMMERCIAL

## 2021-11-04 VITALS
OXYGEN SATURATION: 99 % | HEART RATE: 59 BPM | DIASTOLIC BLOOD PRESSURE: 70 MMHG | BODY MASS INDEX: 30.6 KG/M2 | WEIGHT: 195.4 LBS | TEMPERATURE: 97.8 F | SYSTOLIC BLOOD PRESSURE: 138 MMHG | RESPIRATION RATE: 16 BRPM

## 2021-11-04 DIAGNOSIS — F41.9 ANXIETY AND DEPRESSION: ICD-10-CM

## 2021-11-04 DIAGNOSIS — Z95.5 STATUS POST INSERTION OF DRUG ELUTING CORONARY ARTERY STENT: ICD-10-CM

## 2021-11-04 DIAGNOSIS — E11.42 DIABETIC PERIPHERAL NEUROPATHY (H): ICD-10-CM

## 2021-11-04 DIAGNOSIS — F32.A ANXIETY AND DEPRESSION: ICD-10-CM

## 2021-11-04 DIAGNOSIS — E11.21 TYPE 2 DIABETES MELLITUS WITH DIABETIC NEPHROPATHY, WITHOUT LONG-TERM CURRENT USE OF INSULIN (H): ICD-10-CM

## 2021-11-04 DIAGNOSIS — Z95.5 STENTED CORONARY ARTERY: ICD-10-CM

## 2021-11-04 DIAGNOSIS — R07.9 NONSPECIFIC CHEST PAIN: Primary | ICD-10-CM

## 2021-11-04 DIAGNOSIS — J41.8 MIXED SIMPLE AND MUCOPURULENT CHRONIC BRONCHITIS (H): ICD-10-CM

## 2021-11-04 PROCEDURE — G0463 HOSPITAL OUTPT CLINIC VISIT: HCPCS

## 2021-11-04 PROCEDURE — 99214 OFFICE O/P EST MOD 30 MIN: CPT | Performed by: INTERNAL MEDICINE

## 2021-11-04 RX ORDER — CLONAZEPAM 0.25 MG/1
0.25 TABLET, ORALLY DISINTEGRATING ORAL DAILY PRN
Qty: 15 TABLET | Refills: 1 | Status: SHIPPED | OUTPATIENT
Start: 2021-11-04 | End: 2022-03-31

## 2021-11-04 RX ORDER — TIOTROPIUM BROMIDE 18 UG/1
18 CAPSULE ORAL; RESPIRATORY (INHALATION) DAILY
Qty: 30 CAPSULE | Refills: 3 | Status: SHIPPED | OUTPATIENT
Start: 2021-11-04 | End: 2021-12-03

## 2021-11-04 RX ORDER — BLOOD SUGAR DIAGNOSTIC
STRIP MISCELLANEOUS
Qty: 100 STRIP | Refills: 11 | Status: SHIPPED | OUTPATIENT
Start: 2021-11-04 | End: 2022-04-07

## 2021-11-04 RX ORDER — GABAPENTIN 100 MG/1
100-200 CAPSULE ORAL AT BEDTIME
Qty: 120 CAPSULE | Refills: 0 | Status: SHIPPED | OUTPATIENT
Start: 2021-11-04 | End: 2022-03-31

## 2021-11-04 ASSESSMENT — ANXIETY QUESTIONNAIRES
7. FEELING AFRAID AS IF SOMETHING AWFUL MIGHT HAPPEN: NOT AT ALL
3. WORRYING TOO MUCH ABOUT DIFFERENT THINGS: SEVERAL DAYS
GAD7 TOTAL SCORE: 6
8. IF YOU CHECKED OFF ANY PROBLEMS, HOW DIFFICULT HAVE THESE MADE IT FOR YOU TO DO YOUR WORK, TAKE CARE OF THINGS AT HOME, OR GET ALONG WITH OTHER PEOPLE?: NOT DIFFICULT AT ALL
4. TROUBLE RELAXING: NOT AT ALL
2. NOT BEING ABLE TO STOP OR CONTROL WORRYING: SEVERAL DAYS
5. BEING SO RESTLESS THAT IT IS HARD TO SIT STILL: NOT AT ALL
6. BECOMING EASILY ANNOYED OR IRRITABLE: MORE THAN HALF THE DAYS
1. FEELING NERVOUS, ANXIOUS, OR ON EDGE: MORE THAN HALF THE DAYS
7. FEELING AFRAID AS IF SOMETHING AWFUL MIGHT HAPPEN: NOT AT ALL
GAD7 TOTAL SCORE: 6
GAD7 TOTAL SCORE: 6

## 2021-11-04 ASSESSMENT — ENCOUNTER SYMPTOMS
SLEEP DISTURBANCE: 0
WHEEZING: 0
MYALGIAS: 1
WEAKNESS: 1
DYSURIA: 0
BRUISES/BLEEDS EASILY: 0
BACK PAIN: 1
NAUSEA: 0
FATIGUE: 1
FEVER: 0
EYE PAIN: 1
ABDOMINAL PAIN: 0
NUMBNESS: 1
EYE REDNESS: 1
ARTHRALGIAS: 1
AGITATION: 0
CHILLS: 0
LIGHT-HEADEDNESS: 1
SHORTNESS OF BREATH: 0
PALPITATIONS: 0
NERVOUS/ANXIOUS: 1
DIZZINESS: 1
VOMITING: 0
COUGH: 1
HEMATURIA: 0
DIARRHEA: 0

## 2021-11-04 ASSESSMENT — PATIENT HEALTH QUESTIONNAIRE - PHQ9
10. IF YOU CHECKED OFF ANY PROBLEMS, HOW DIFFICULT HAVE THESE PROBLEMS MADE IT FOR YOU TO DO YOUR WORK, TAKE CARE OF THINGS AT HOME, OR GET ALONG WITH OTHER PEOPLE: NOT DIFFICULT AT ALL
SUM OF ALL RESPONSES TO PHQ QUESTIONS 1-9: 4
SUM OF ALL RESPONSES TO PHQ QUESTIONS 1-9: 4

## 2021-11-04 ASSESSMENT — PAIN SCALES - GENERAL: PAINLEVEL: NO PAIN (0)

## 2021-11-04 NOTE — PATIENT INSTRUCTIONS
1. Nonspecific chest pain  2. Stented coronary artery to his LCX and OM1 on 5/17/18  3. Status post insertion of drug eluting coronary artery stent - x2 stents - 1st OMB - 5/17/2018 - [facility]    Recent Stress testing from 11/2/2021: Was normal. No ischemia noted.        The nuclear stress test is negative for inducible myocardial ischemia or infarction.      Left ventricular function is normal.      The left ventricular ejection fraction at rest is 72%.  The left ventricular ejection fraction at stress is 71%.      There is no prior study for comparison.       4. Anxiety and depression  - clonazePAM (KLONOPIN) 0.25 MG TBDP ODT tab; Take 1 tablet (0.25 mg) by mouth daily as needed for anxiety - no driving after use  Dispense: 15 tablet; Refill: 1    5. Diabetic peripheral neuropathy (H)  - gabapentin (NEURONTIN) 100 MG capsule; Take 1-2 capsules (100-200 mg) by mouth At Bedtime - for pain  Dispense: 120 capsule; Refill: 0  - metFORMIN (GLUCOPHAGE) 500 MG tablet; Take 2 tablets (1,000 mg) by mouth daily (with breakfast) AND 1 tablet (500 mg) daily (with dinner).  Dispense: 270 tablet; Refill: 0    6. Type 2 diabetes mellitus with diabetic nephropathy, without long-term current use of insulin (H)  - gabapentin (NEURONTIN) 100 MG capsule; Take 1-2 capsules (100-200 mg) by mouth At Bedtime - for pain  Dispense: 120 capsule; Refill: 0  - metFORMIN (GLUCOPHAGE) 500 MG tablet; Take 2 tablets (1,000 mg) by mouth daily (with breakfast) AND 1 tablet (500 mg) daily (with dinner).  Dispense: 270 tablet; Refill: 0  - blood glucose (ACCU-CHEK ONEL PLUS) test strip; USE  STRIP TO CHECK GLUCOSE Once DAILY  Dispense: 100 strip; Refill: 11    7. Mixed simple and mucopurulent chronic bronchitis (H)    START:   - tiotropium (SPIRIVA) 18 MCG inhaled capsule; Inhale 1 capsule (18 mcg) into the lungs daily - for chronic phlegm  Dispense: 30 capsule; Refill: 3      Return as needed for follow-up with Dr. Pickens.    Clinic  : [phone]  Appointment line: [phone]

## 2021-11-04 NOTE — NURSING NOTE
"Chief Complaint   Patient presents with     Follow Up     ER for chest pain       FOOD SECURITY SCREENING QUESTIONS  Hunger Vital Signs:  Within the past 12 months we worried whether our food would run out before we got money to buy more. Never  Within the past 12 months the food we bought just didn't last and we didn't have money to get more. Never  Eunice Flores LPN 11/4/2021 2:36 PM      Initial /70 (BP Location: Right arm, Patient Position: Sitting, Cuff Size: Adult Regular)   Pulse 59   Temp 97.8  F (36.6  C) (Tympanic)   Resp 16   Wt 88.6 kg (195 lb 6.4 oz)   SpO2 99%   BMI 30.60 kg/m   Estimated body mass index is 30.6 kg/m  as calculated from the following:    Height as of 10/19/21: 1.702 m (5' 7\").    Weight as of this encounter: 88.6 kg (195 lb 6.4 oz).  Medication Reconciliation: complete    Eunice Flores LPN  "

## 2021-11-04 NOTE — PROGRESS NOTES
Assessment & Plan       ICD-10-CM    1. Nonspecific chest pain  R07.9    2. Stented coronary artery to his LCX and OM1 on 5/17/18  Z95.5    3. Status post insertion of drug eluting coronary artery stent - x2 stents - 1st OMB - 5/17/2018 - [facility]  Z95.5    4. Anxiety and depression  F41.9 clonazePAM (KLONOPIN) 0.25 MG TBDP ODT tab    F32.A    5. Diabetic peripheral neuropathy (H)  E11.42 gabapentin (NEURONTIN) 100 MG capsule     metFORMIN (GLUCOPHAGE) 500 MG tablet   6. Type 2 diabetes mellitus with diabetic nephropathy, without long-term current use of insulin (H)  E11.21 gabapentin (NEURONTIN) 100 MG capsule     metFORMIN (GLUCOPHAGE) 500 MG tablet     blood glucose (ACCU-CHEK ONEL PLUS) test strip   7. Mixed simple and mucopurulent chronic bronchitis (H)  J41.8 tiotropium (SPIRIVA) 18 MCG inhaled capsule   Patient was seen in the emergency room on October 19 with unspecified chest pain.   See below.  Work-up was negative.  Recently had stress test.  May need some medication adjustments.  Possible consideration of restart of beta-blocker.  Recommend cardiology follow-up with his cardiologist.    Anxiety and depression.  Clonazepam seems to be helping.  Also on citalopram.  Continue current medications.  Needs refills.    Diabetic peripheral neuropathy.  Currently taking Metformin and gabapentin for blood sugar control and neuropathy  pain relief.    Needs refills of his testing supplies and Metformin for his diabetes.  Hemoglobin A1c has been well controlled.    Mixed simple and mucopurulent bronchitis.  Chronic.  Has not been taking anything for this.  Has tried albuterol inhaler as far as he can remember, in the past without much change.  Given that his primary symptoms are phlegm production and cough, recommend tiotropium.  Hopefully this will help dry up some of his phlegm and mucus.  1 month prescriptions with refill sent to pharmacy.     BMI:   Estimated body mass index is 30.6 kg/m  as  "calculated from the following:    Height as of 10/19/21: 1.702 m (5' 7\").    Weight as of this encounter: 88.6 kg (195 lb 6.4 oz).   Weight management plan: Discussed healthy diet and exercise guidelines    Work on weight loss and regular exercise    Return for Appointments: [phone], Return as needed for follow-up with Dr. Pickens..    Cm Pickens MD  [facility]    Subjective     Dajuan Basilio is a 76 year old male who presents to clinic today for the following health issues:    HPI     Diabetes Follow-up    How often are you checking your blood sugar? One time daily  What time of day are you checking your blood sugars (select all that apply)?  Before meals  Have you had any blood sugars above 200?  No  Have you had any blood sugars below 70?  No    What symptoms do you notice when your blood sugar is low?  Not applicable    What concerns do you have today about your diabetes? None     Do you have any of these symptoms? (Select all that apply)  Numbness in feet and Burning in feet    Hyperlipidemia Follow-Up      Are you regularly taking any medication or supplement to lower your cholesterol?   Yes- Simvastatin    Are you having muscle aches or other side effects that you think could be caused by your cholesterol lowering medication?  No    Hypertension Follow-up      Do you check your blood pressure regularly outside of the clinic? No     Are you following a low salt diet? Yes    Are your blood pressures ever more than 140 on the top number (systolic) OR more   than 90 on the bottom number (diastolic), for example 140/90? No    BP Readings from Last 2 Encounters:   11/04/21 138/70   10/19/21 130/68     Hemoglobin A1C (%)   Date Value   08/31/2021 6.7 (H)   03/17/2021 6.4 (H)   12/18/2020 6.5 (H)     LDL Cholesterol Calculated (mg/dL)   Date Value   08/31/2021 101 (H)   03/17/2021 55   12/18/2020 77         How many servings of fruits and vegetables do you eat daily?  0-1    On " average, how many sweetened beverages do you drink each day (Examples: soda, juice, sweet tea, etc.  Do NOT count diet or artificially sweetened beverages)?   0    How many days per week do you exercise enough to make your heart beat faster? 3 or less    How many minutes a day do you exercise enough to make your heart beat faster? 9 or less    How many days per week do you miss taking your medication? 0    Chest Pain  Onset/Duration: Pretty bad chest pain for 1 day.  Ended up going to the emergency room.  Subsequently seen and evaluated with stress testing on November 2.  This came back negative.  He feels like this is doing better.  He is reassured by the normal stress test.  Pain was mostly in the substernal area heavy and tight some pressure.  Did radiate into the left chest as well.  Lasted a number of hours.  Has had a few mild episodes since that time.  Originally started up a week or 2 ago.  Was moderately severe.  Has been improving.  He felt somewhat short of breath but has been having some increased shortness of breath at baseline.  Reports some chronic phlegm and cough for more than 6 months.  No associated nausea or vomiting, fevers or chills, sweating, lightheadedness.  He is not sure what made it worse.  He just tried to take it easy at that time.  Has not really tried anything for the pain.    Review of Systems   Constitutional: Positive for fatigue. Negative for chills and fever.   HENT: Positive for hearing loss. Negative for congestion and nosebleeds.    Eyes: Positive for pain and redness. Negative for visual disturbance.        + chronic dry eyes, tried many eye drops, not much improvement - told needs eye surgery   Respiratory: Positive for cough (clear phlegm - months). Negative for shortness of breath and wheezing.    Cardiovascular: Positive for chest pain. Negative for palpitations.        Cardiac stress testing 3/2020 - negative/normal results.   Gastrointestinal: Negative for abdominal  pain, diarrhea, nausea and vomiting.        + left inguinal pains intermittently   Endocrine: Negative for cold intolerance and heat intolerance.   Genitourinary: Negative for dysuria and hematuria.   Musculoskeletal: Positive for arthralgias (left knee), back pain (left low back / buttocks pain), gait problem (staggering at times, low back and left knee pain) and myalgias.        Bilateral feet with bunions   Skin: Negative for pallor.   Allergic/Immunologic: Negative for immunocompromised state.   Neurological: Positive for dizziness, weakness, light-headedness and numbness (right > left feet).   Hematological: Does not bruise/bleed easily.   Psychiatric/Behavioral: Negative for agitation and sleep disturbance. The patient is nervous/anxious.         + short term memory loss -- stopped Exelon and Namenda -- due to side effects.   + reporting anxiety / depression - virus pandemic has worsened everything            Objective    /70 (BP Location: Right arm, Patient Position: Sitting, Cuff Size: Adult Regular)   Pulse 59   Temp 97.8  F (36.6  C) (Tympanic)   Resp 16   Wt 88.6 kg (195 lb 6.4 oz)   SpO2 99%   BMI 30.60 kg/m    Body mass index is 30.6 kg/m .  Physical Exam  Constitutional:       General: He is not in acute distress.     Appearance: He is well-developed. He is not diaphoretic.   HENT:      Head: Normocephalic and atraumatic.   Eyes:      General: No scleral icterus.     Comments: + dry, irritated eyes   Neck:      Vascular: No carotid bruit.   Cardiovascular:      Rate and Rhythm: Normal rate and regular rhythm.      Pulses: Normal pulses.   Pulmonary:      Effort: Pulmonary effort is normal.      Breath sounds: Normal breath sounds.   Abdominal:      Palpations: Abdomen is soft.      Tenderness: There is no abdominal tenderness.   Musculoskeletal:         General: Tenderness (left > right gluteal and SI joint pain to palpation) present. No deformity.      Cervical back: Neck supple.       Right lower leg: No edema.      Left lower leg: No edema.   Lymphadenopathy:      Cervical: No cervical adenopathy.   Skin:     General: Skin is warm and dry.      Findings: No rash.   Neurological:      Mental Status: He is alert and oriented to person, place, and time. Mental status is at baseline.      Comments: short term memory loss   Psychiatric:         Behavior: Behavior is slowed.         Thought Content: Thought content normal.         Judgment: Judgment normal.      Comments: Anxious and flat affect / depressed.       ER evaluation was reviewed.  Troponin negative.  Recent stress testing was negative.  Ejection fraction was normal.      Answers for HPI/ROS submitted by the patient on 11/4/2021  If you checked off any problems, how difficult have these problems made it for you to do your work, take care of things at home, or get along with other people?: Not difficult at all  PHQ9 TOTAL SCORE: 4  NEERAJ 7 TOTAL SCORE: 6

## 2021-11-05 ASSESSMENT — ANXIETY QUESTIONNAIRES: GAD7 TOTAL SCORE: 6

## 2021-11-05 ASSESSMENT — PATIENT HEALTH QUESTIONNAIRE - PHQ9: SUM OF ALL RESPONSES TO PHQ QUESTIONS 1-9: 4

## 2021-12-02 ENCOUNTER — LAB (OUTPATIENT)
Dept: LAB | Facility: OTHER | Age: 76
End: 2021-12-02
Attending: INTERNAL MEDICINE
Payer: MEDICARE

## 2021-12-02 DIAGNOSIS — E78.2 MIXED HYPERLIPIDEMIA: ICD-10-CM

## 2021-12-02 DIAGNOSIS — R97.20 ELEVATED PROSTATE SPECIFIC ANTIGEN (PSA): ICD-10-CM

## 2021-12-02 DIAGNOSIS — E11.40 CONTROLLED TYPE 2 DIABETES MELLITUS WITH DIABETIC NEUROPATHY, WITHOUT LONG-TERM CURRENT USE OF INSULIN (H): ICD-10-CM

## 2021-12-02 LAB
ALBUMIN SERPL-MCNC: 4.6 G/DL (ref 3.5–5.7)
ALBUMIN UR-MCNC: NEGATIVE MG/DL
ALP SERPL-CCNC: 55 U/L (ref 34–104)
ALT SERPL W P-5'-P-CCNC: 14 U/L (ref 7–52)
ANION GAP SERPL CALCULATED.3IONS-SCNC: 7 MMOL/L (ref 3–14)
APPEARANCE UR: CLEAR
AST SERPL W P-5'-P-CCNC: 16 U/L (ref 13–39)
BILIRUB SERPL-MCNC: 0.8 MG/DL (ref 0.3–1)
BILIRUB UR QL STRIP: NEGATIVE
BUN SERPL-MCNC: 19 MG/DL (ref 7–25)
CALCIUM SERPL-MCNC: 9.4 MG/DL (ref 8.6–10.3)
CHLORIDE BLD-SCNC: 103 MMOL/L (ref 98–107)
CHOLEST SERPL-MCNC: 123 MG/DL
CO2 SERPL-SCNC: 29 MMOL/L (ref 21–31)
COLOR UR AUTO: NORMAL
CREAT SERPL-MCNC: 0.82 MG/DL (ref 0.7–1.3)
CREAT UR-MCNC: 105 MG/DL
ERYTHROCYTE [DISTWIDTH] IN BLOOD BY AUTOMATED COUNT: 13.1 % (ref 10–15)
FASTING STATUS PATIENT QL REPORTED: YES
GFR SERPL CREATININE-BSD FRML MDRD: 85 ML/MIN/1.73M2
GLUCOSE BLD-MCNC: 111 MG/DL (ref 70–105)
GLUCOSE UR STRIP-MCNC: NEGATIVE MG/DL
HBA1C MFR BLD: 6.6 % (ref 4–6.2)
HCT VFR BLD AUTO: 45.6 % (ref 40–53)
HDLC SERPL-MCNC: 43 MG/DL (ref 23–92)
HGB BLD-MCNC: 15.3 G/DL (ref 13.3–17.7)
HGB UR QL STRIP: NEGATIVE
KETONES UR STRIP-MCNC: NEGATIVE MG/DL
LDLC SERPL CALC-MCNC: 69 MG/DL
LEUKOCYTE ESTERASE UR QL STRIP: NEGATIVE
MCH RBC QN AUTO: 30.4 PG (ref 26.5–33)
MCHC RBC AUTO-ENTMCNC: 33.6 G/DL (ref 31.5–36.5)
MCV RBC AUTO: 91 FL (ref 78–100)
MICROALBUMIN UR-MCNC: 22 MG/L
MICROALBUMIN/CREAT UR: 20.95 MG/G CR (ref 0–17)
NITRATE UR QL: NEGATIVE
NONHDLC SERPL-MCNC: 80 MG/DL
PH UR STRIP: 6.5 [PH] (ref 5–9)
PLATELET # BLD AUTO: 219 10E3/UL (ref 150–450)
POTASSIUM BLD-SCNC: 3.9 MMOL/L (ref 3.5–5.1)
PROT SERPL-MCNC: 7.2 G/DL (ref 6.4–8.9)
RBC # BLD AUTO: 5.03 10E6/UL (ref 4.4–5.9)
SODIUM SERPL-SCNC: 139 MMOL/L (ref 134–144)
SP GR UR STRIP: 1.02 (ref 1–1.03)
TRIGL SERPL-MCNC: 57 MG/DL
TSH SERPL DL<=0.005 MIU/L-ACNC: 2.37 MU/L (ref 0.4–4)
UROBILINOGEN UR STRIP-MCNC: NORMAL MG/DL
WBC # BLD AUTO: 5 10E3/UL (ref 4–11)

## 2021-12-02 PROCEDURE — 84443 ASSAY THYROID STIM HORMONE: CPT | Mod: ZL

## 2021-12-02 PROCEDURE — 80053 COMPREHEN METABOLIC PANEL: CPT | Mod: ZL

## 2021-12-02 PROCEDURE — 83036 HEMOGLOBIN GLYCOSYLATED A1C: CPT | Mod: ZL

## 2021-12-02 PROCEDURE — 36415 COLL VENOUS BLD VENIPUNCTURE: CPT | Mod: ZL

## 2021-12-02 PROCEDURE — 80061 LIPID PANEL: CPT | Mod: ZL

## 2021-12-02 PROCEDURE — 85027 COMPLETE CBC AUTOMATED: CPT | Mod: ZL

## 2021-12-02 PROCEDURE — 84153 ASSAY OF PSA TOTAL: CPT | Mod: ZL

## 2021-12-02 PROCEDURE — 81003 URINALYSIS AUTO W/O SCOPE: CPT | Mod: ZL

## 2021-12-02 PROCEDURE — 82043 UR ALBUMIN QUANTITATIVE: CPT | Mod: ZL

## 2021-12-03 ENCOUNTER — OFFICE VISIT (OUTPATIENT)
Dept: INTERNAL MEDICINE | Facility: OTHER | Age: 76
End: 2021-12-03
Attending: INTERNAL MEDICINE
Payer: COMMERCIAL

## 2021-12-03 VITALS
TEMPERATURE: 97.8 F | HEART RATE: 74 BPM | SYSTOLIC BLOOD PRESSURE: 130 MMHG | BODY MASS INDEX: 31.01 KG/M2 | RESPIRATION RATE: 16 BRPM | DIASTOLIC BLOOD PRESSURE: 60 MMHG | WEIGHT: 198 LBS | OXYGEN SATURATION: 95 %

## 2021-12-03 DIAGNOSIS — L29.9 ITCHY SKIN: ICD-10-CM

## 2021-12-03 DIAGNOSIS — R97.20 ELEVATED PROSTATE SPECIFIC ANTIGEN (PSA): ICD-10-CM

## 2021-12-03 DIAGNOSIS — H81.10 BENIGN PAROXYSMAL POSITIONAL VERTIGO, UNSPECIFIED LATERALITY: Primary | ICD-10-CM

## 2021-12-03 DIAGNOSIS — J41.8 MIXED SIMPLE AND MUCOPURULENT CHRONIC BRONCHITIS (H): ICD-10-CM

## 2021-12-03 LAB — PSA SERPL-MCNC: 3.51 UG/L (ref 0–4)

## 2021-12-03 PROCEDURE — 99214 OFFICE O/P EST MOD 30 MIN: CPT | Performed by: INTERNAL MEDICINE

## 2021-12-03 PROCEDURE — G0463 HOSPITAL OUTPT CLINIC VISIT: HCPCS

## 2021-12-03 RX ORDER — TIOTROPIUM BROMIDE 18 UG/1
18 CAPSULE ORAL; RESPIRATORY (INHALATION) DAILY
Qty: 30 CAPSULE | Refills: 3 | Status: SHIPPED | OUTPATIENT
Start: 2021-12-03 | End: 2022-03-31

## 2021-12-03 ASSESSMENT — PATIENT HEALTH QUESTIONNAIRE - PHQ9
SUM OF ALL RESPONSES TO PHQ QUESTIONS 1-9: 5
10. IF YOU CHECKED OFF ANY PROBLEMS, HOW DIFFICULT HAVE THESE PROBLEMS MADE IT FOR YOU TO DO YOUR WORK, TAKE CARE OF THINGS AT HOME, OR GET ALONG WITH OTHER PEOPLE: SOMEWHAT DIFFICULT
SUM OF ALL RESPONSES TO PHQ QUESTIONS 1-9: 5

## 2021-12-03 ASSESSMENT — ENCOUNTER SYMPTOMS
SHORTNESS OF BREATH: 0
AGITATION: 0
COUGH: 1
NAUSEA: 0
HEMATURIA: 0
EYE REDNESS: 1
MYALGIAS: 1
FATIGUE: 1
ABDOMINAL PAIN: 0
WHEEZING: 0
DIARRHEA: 0
WEAKNESS: 1
CHILLS: 0
SLEEP DISTURBANCE: 0
FEVER: 0
BACK PAIN: 1
DIZZINESS: 1
EYE PAIN: 1
ARTHRALGIAS: 1
NUMBNESS: 1
LIGHT-HEADEDNESS: 1
NERVOUS/ANXIOUS: 1
BRUISES/BLEEDS EASILY: 0
PALPITATIONS: 0
VOMITING: 0
DYSURIA: 0

## 2021-12-03 ASSESSMENT — ANXIETY QUESTIONNAIRES
GAD7 TOTAL SCORE: 7
6. BECOMING EASILY ANNOYED OR IRRITABLE: SEVERAL DAYS
1. FEELING NERVOUS, ANXIOUS, OR ON EDGE: NOT AT ALL
2. NOT BEING ABLE TO STOP OR CONTROL WORRYING: NEARLY EVERY DAY
GAD7 TOTAL SCORE: 7
3. WORRYING TOO MUCH ABOUT DIFFERENT THINGS: NEARLY EVERY DAY
GAD7 TOTAL SCORE: 7
7. FEELING AFRAID AS IF SOMETHING AWFUL MIGHT HAPPEN: NOT AT ALL
8. IF YOU CHECKED OFF ANY PROBLEMS, HOW DIFFICULT HAVE THESE MADE IT FOR YOU TO DO YOUR WORK, TAKE CARE OF THINGS AT HOME, OR GET ALONG WITH OTHER PEOPLE?: SOMEWHAT DIFFICULT
5. BEING SO RESTLESS THAT IT IS HARD TO SIT STILL: NOT AT ALL
7. FEELING AFRAID AS IF SOMETHING AWFUL MIGHT HAPPEN: NOT AT ALL
4. TROUBLE RELAXING: NOT AT ALL

## 2021-12-03 ASSESSMENT — PAIN SCALES - GENERAL: PAINLEVEL: MODERATE PAIN (4)

## 2021-12-03 NOTE — PROGRESS NOTES
Assessment & Plan       ICD-10-CM    1. Benign paroxysmal positional vertigo, unspecified laterality  H81.10 Physical Therapy Referral   2. Mixed simple and mucopurulent chronic bronchitis (H)  J41.8 tiotropium (SPIRIVA) 18 MCG inhaled capsule   3. Itchy skin  L29.9 chlorhexidine (HIBICLENS) 4 % liquid   4. Elevated prostate specific antigen (PSA)  R97.20 Prostate Specific Antigen   Patient presents for follow-up of multiple issues.    Dizziness and spinning sensation.  Reports that for the last few weeks he has been having a lot of dizziness.  He gets very worse when he first lays down in bed.  When he first gets up out of bed and changes position symptoms also get quite bad.  Occasionally if he bends over in the wrong position he will get swelling as well.  This is been gradually worsening over the last couple of weeks since onset.  Based on symptoms and the fact that there is an abrupt onset and fairly abrupt discontinuation of symptoms, recommend patient get treatment for BPPV.  Physical therapy referral placed.    Mixed simple and mucopurulent chronic bronchitis.  Reports his Spiriva inhaler is very expensive as previous pharmacy.  Suspect patient qualifies for the community care program through [facility] pharmacy.  If Spiriva is not preferred okay to switch to other preferred anticholinergic inhaler.  Prescription for Spiriva sent to [facility] pharmacy for review.    Itchy skin, has had some follicular infections.  Small boils.  Recommend starting to wash with chlorhexidine intermittently.  Prescription sent to pharmacy.    Patient has history of elevated PSA.  PSA levels are rising.  He would like to check his PSA level with next lab work.  PSA ordered.         Encouraged regular exercise    Return for Return as needed for follow-up with Dr. Pickens., Appointments: [phone].    Cm Pickens MD  [facility]    Alisha Graff is a 76 year old who presents for the following  health issues     HPI     Review of Systems   Constitutional: Positive for fatigue. Negative for chills and fever.   HENT: Positive for hearing loss. Negative for congestion and nosebleeds.    Eyes: Positive for pain and redness. Negative for visual disturbance.        + chronic dry eyes, tried many eye drops, not much improvement - told needs eye surgery   Respiratory: Positive for cough (clear phlegm - months). Negative for shortness of breath and wheezing.    Cardiovascular: Positive for chest pain. Negative for palpitations.        Cardiac stress testing 3/2020 - negative/normal results.   Gastrointestinal: Negative for abdominal pain, diarrhea, nausea and vomiting.        + left inguinal pains intermittently   Endocrine: Negative for cold intolerance and heat intolerance.   Genitourinary: Negative for dysuria and hematuria.   Musculoskeletal: Positive for arthralgias (left knee), back pain (left low back / buttocks pain), gait problem (staggering at times, low back and left knee pain) and myalgias.        Bilateral feet with bunions   Skin: Negative for pallor.   Allergic/Immunologic: Negative for immunocompromised state.   Neurological: Positive for dizziness (+ Spinning with change in position), weakness, light-headedness and numbness (right > left feet).   Hematological: Does not bruise/bleed easily.   Psychiatric/Behavioral: Negative for agitation and sleep disturbance. The patient is nervous/anxious.         + short term memory loss -- stopped Exelon and Namenda -- due to side effects.   + reporting anxiety / depression - virus pandemic has worsened everything          Objective    /60 (BP Location: Right arm, Patient Position: Sitting, Cuff Size: Adult Regular)   Pulse 74   Temp 97.8  F (36.6  C) (Temporal)   Resp 16   Wt 89.8 kg (198 lb)   SpO2 95%   BMI 31.01 kg/m    Body mass index is 31.01 kg/m .  Physical Exam  Constitutional:       General: He is not in acute distress.     Appearance: He  is well-developed. He is not diaphoretic.   HENT:      Head: Normocephalic and atraumatic.   Eyes:      General: No scleral icterus.     Comments: + dry, irritated eyes   Neck:      Vascular: No carotid bruit.   Cardiovascular:      Rate and Rhythm: Normal rate and regular rhythm.      Pulses: Normal pulses.   Pulmonary:      Effort: Pulmonary effort is normal.      Breath sounds: Normal breath sounds.   Abdominal:      Palpations: Abdomen is soft.      Tenderness: There is no abdominal tenderness.   Musculoskeletal:         General: Tenderness (left > right gluteal and SI joint pain to palpation) present. No deformity.      Cervical back: Neck supple.      Right lower leg: No edema.      Left lower leg: No edema.   Lymphadenopathy:      Cervical: No cervical adenopathy.   Skin:     General: Skin is warm and dry.      Findings: No rash.   Neurological:      Mental Status: He is alert and oriented to person, place, and time. Mental status is at baseline.      Comments: short term memory loss   Psychiatric:         Behavior: Behavior is slowed.         Thought Content: Thought content normal.         Judgment: Judgment normal.      Comments: Anxious and flat affect / depressed.            Answers for HPI/ROS submitted by the patient on 12/3/2021  If you checked off any problems, how difficult have these problems made it for you to do your work, take care of things at home, or get along with other people?: Somewhat difficult  PHQ9 TOTAL SCORE: 5  NEERAJ 7 TOTAL SCORE: 7

## 2021-12-03 NOTE — PROGRESS NOTES
Medication Reconciliation: complete   Advance Care Directive Reviewed    FOOD SECURITY SCREENING QUESTIONS  Hunger Vital Signs:  Within the past 12 months we worried whether our food would run out before we got money to buy more. Never  Within the past 12 months the food we bought just didn't last and we didn't have money to get more. Never  Oliva Hutchins LPN .............12/3/2021  3:41 PM  Previous A1C is at goal of <8  Lab Results   Component Value Date    A1C 6.6 12/02/2021    A1C 6.7 08/31/2021    A1C 6.4 03/17/2021    A1C 6.5 12/18/2020    A1C 6.5 09/17/2020    A1C 6.4 03/17/2020    A1C 6.2 03/19/2019     Urine microalbumin:creatine: 8-31-21  Foot exam 3-26-21  Eye exam 10-15-21    Tobacco User no  Patient is on a daily aspirin  Patient is on a Statin.  Blood pressure today of:     BP Readings from Last 1 Encounters:   12/03/21 130/60      is at the goal of <139/89 for diabetics.    Oliva Hutchins LPN on 12/3/2021 at 3:41 PM        Answers for HPI/ROS submitted by the patient on 12/3/2021  If you checked off any problems, how difficult have these problems made it for you to do your work, take care of things at home, or get along with other people?: Somewhat difficult  PHQ9 TOTAL SCORE: 5  NEERAJ 7 TOTAL SCORE: 7

## 2021-12-03 NOTE — PATIENT INSTRUCTIONS
Agree with trial of Acupuncture.     Consider:   [facility]  [facility]   [address] [address]   Spring 2017 Hours  Monday:        8:00 a.m. - 5:00 p.m.  Wednesday:   7:00 a.m. - 4:00 p.m.  Thursday:     8:00 a.m. - 5:00 p.m.   Friday:          8:00 a.m. - 4:00 p.m.        [phone]                    [email or web address]       Physical therapy referral sent  - they will call with date/time of appointment.     -- for Vertigo.     Start trial of Hibiclens -- for skin lesion / rash.     Check on same inhaler / vs / different inhaler -- cost at [facility].     Return as needed for follow-up with Dr. Pickens.    Clinic : [phone]  Appointment line: [phone]

## 2021-12-04 ASSESSMENT — PATIENT HEALTH QUESTIONNAIRE - PHQ9: SUM OF ALL RESPONSES TO PHQ QUESTIONS 1-9: 5

## 2021-12-04 ASSESSMENT — ANXIETY QUESTIONNAIRES: GAD7 TOTAL SCORE: 7

## 2021-12-08 ENCOUNTER — HOSPITAL ENCOUNTER (OUTPATIENT)
Dept: PHYSICAL THERAPY | Facility: OTHER | Age: 76
Setting detail: THERAPIES SERIES
End: 2021-12-08
Attending: INTERNAL MEDICINE
Payer: MEDICARE

## 2021-12-08 DIAGNOSIS — H81.10 BENIGN PAROXYSMAL POSITIONAL VERTIGO, UNSPECIFIED LATERALITY: ICD-10-CM

## 2021-12-08 PROCEDURE — 97110 THERAPEUTIC EXERCISES: CPT | Mod: GP,XU | Performed by: PHYSICAL THERAPIST

## 2021-12-08 PROCEDURE — 95992 CANALITH REPOSITIONING PROC: CPT | Mod: GP | Performed by: PHYSICAL THERAPIST

## 2021-12-08 PROCEDURE — 97163 PT EVAL HIGH COMPLEX 45 MIN: CPT | Mod: GP | Performed by: PHYSICAL THERAPIST

## 2021-12-08 NOTE — PROGRESS NOTES
[facility]                                                                                   OUTPATIENT PHYSICAL THERAPY FUNCTIONAL EVALUATION  PLAN OF TREATMENT FOR OUTPATIENT REHABILITATION  (COMPLETE FOR INITIAL CLAIMS ONLY)  Patient's Last Name, First Name, M.I.  YOB: 1945  Dajuan Basilio     Provider's Name   [facility]   Medical Record No.  [id number]     Start of Care Date:  12/08/21   Onset Date:   10/1/21   Type:     _X__PT   ____OT  ____SLP Medical Diagnosis:  BPPV, left     PT Diagnosis:  impaired mobility Visits from SOC:  1                              __________________________________________________________________________________  Plan of Treatment/Functional Goals:  balance training,neuromuscular re-education (vestibular therapy)           GOALS  transfers  Patient will be able to complete sit-supine transfers independently with no limitations due to dizziness for increased safety and decreased risk of falling.  02/02/22    forward bend  Patient will be able to forward bend and return to standing safely with no limitations due to dizziness or unsteadiness for decreased risk of falls.  02/02/22    walking  Patient will score 20/24 or better on DGI indicating low fall risk.  02/02/22             Therapy Frequency:  2 times/Week   Predicted Duration of Therapy Intervention:  8 weeks    Yas Fallon, PT                                    I CERTIFY THE NEED FOR THESE SERVICES FURNISHED UNDER        THIS PLAN OF TREATMENT AND WHILE UNDER MY CARE     (Physician co-signature of this document indicates review and certification of the therapy plan).                Certification Date From:  12/08/21   Certification Date To:  02/02/22    Referring Provider:  Dr. Pickens    Initial Assessment  See Epic Evaluation- Start of Care Date: 12/08/21

## 2021-12-08 NOTE — PROGRESS NOTES
12/08/21 0700   Quick Adds   Quick Adds Certification;Vestibular Eval   General Information   Start of Care Date 12/08/21   Referring Physician Dr. Pickens   Orders Evaluate and Treat as Indicated   Order Date 12/03/21   Medical Diagnosis BPPV, unspecified laterality   Precautions/Limitations no known precautions/limitations   Surgical/Medical history reviewed Yes   Pertinent history of current problem (include personal factors and/or comorbidities that impact the POC) Patient reports he had vertigo in the past.  Was seen in therapy.  Reports today he's not sure if the dizziness ever really left.  Was less, but seems like it's just getting worse and worse.  Dizziness with laying down, getting up from laying down.  Not with rolling.  Sit-stand and walking patient feels unsteady.  Feels ok when just sitting in a chair.  Seems ok if he looks quickly to R/L.  Was seen at [facility] due to chronic (4-5 yr) of eye issues, cataracts and dryness/blurriness.  Tried glasses, but seem to make eyes feel worse, so stopped wearing them.  Has bilateral hearing loss (worked at [employer]) but does not wear hearing aides.   Has not needed a cane or walker due to unsteadiness.  Reports when he is outside working, it doesn't seem to bother him as much.   Able to shovel snow, do yardwork.    Patient role/Employment history Retired   Living environment House/townhome   Home/Community Accessibility Comments 1 level home, lives with wife   Fall Risk Screen   Fall screen completed by PT   Have you fallen 2 or more times in the past year? No   Have you fallen and had an injury in the past year? No   Pain   Patient currently in pain Yes   Pain location low back   Pain rating 0/10 right now   Pain comments Patient reports he has an accupuncture appointment at 11:00 today.     Oculomotor Exam   Smooth Pursuit Normal   Smooth Pursuit Comment saccades with up to downward gaze   Saccades Abnormal   VOR Normal   VOR Cancellation  Normal   Rapid Head Thrust Normal   Rapid Head Thrust Comments several trials to get test requirements   Convergence Testing Normal   Infrared Goggle Exam or Frenzel Lense Exam   Vestibular Suppressant in Last 24 Hours? No   Exam completed with Frenzel Lenses   Spontaneous Nystagmus Negative   Gaze Evoked Nystagmus Negative   Natchez-Hallpike (right) Other   Juancho-Hallpike (right) comments NT   Natchez-Hallpike (Left) Upbeating L torsional   Juancho-Hallpike (left) comments almost immediate onset, 20 sec duration, gradual fatigue   [facility] Supine Roll Test (Right) Other   [facility] Supine Roll Test (Right) Comments NT   [facility] Supine Roll Test (Left) Other   [facility] Supine Roll Test (Left) Comments  NT   Planned Therapy Interventions   Planned Therapy Interventions balance training;neuromuscular re-education  (vestibular therapy)   Clinical Impression   Criteria for Skilled Therapeutic Interventions Met yes, treatment indicated   PT Diagnosis impaired mobility   Influenced by the following impairments impaired balance, dizziness   Functional limitations due to impairments transfers, standing, walking, forward bend/return to standing   Clinical Presentation Unstable/Unpredictable   Clinical Presentation Rationale clinical observation   Clinical Decision Making (Complexity) High complexity   Therapy Frequency 2 times/Week   Predicted Duration of Therapy Intervention (days/wks) 8 weeks   Risk & Benefits of therapy have been explained Yes   Patient, Family & other staff in agreement with plan of care Yes   Clinical Impression Comments Signs today consistent with left posterior canal BPPV.  Treatment completed, not able to evaluate remaining canals due to time limitations.  Instructed patient in current dysfunction, treatment, prognosis, and follow through with balance training as needed.  Patient voiced understanding and agreement.     GOALS   PT Eval Goals 1;2;3   Goal 1   Goal Identifier transfers   Goal Description Patient will be able to  complete sit-supine transfers independently with no limitations due to dizziness for increased safety and decreased risk of falling.   Target Date 02/02/22   Goal 2   Goal Identifier forward bend   Goal Description Patient will be able to forward bend and return to standing safely with no limitations due to dizziness or unsteadiness for decreased risk of falls.   Target Date 02/02/22   Goal 3   Goal Identifier walking   Goal Description Patient will score 20/24 or better on DGI indicating low fall risk.   Target Date 02/02/22   Total Evaluation Time   PT Eval, High Complexity Minutes (63956) 20   Therapy Certification   Certification date from 12/08/21   Certification date to 02/02/22   Medical Diagnosis BPPV, left

## 2021-12-10 ENCOUNTER — HOSPITAL ENCOUNTER (OUTPATIENT)
Dept: PHYSICAL THERAPY | Facility: OTHER | Age: 76
Setting detail: THERAPIES SERIES
End: 2021-12-10
Attending: INTERNAL MEDICINE
Payer: MEDICARE

## 2021-12-10 PROCEDURE — 97112 NEUROMUSCULAR REEDUCATION: CPT | Mod: GP | Performed by: PHYSICAL THERAPIST

## 2021-12-15 ENCOUNTER — HOSPITAL ENCOUNTER (OUTPATIENT)
Dept: PHYSICAL THERAPY | Facility: OTHER | Age: 76
Setting detail: THERAPIES SERIES
End: 2021-12-15
Attending: INTERNAL MEDICINE
Payer: MEDICARE

## 2021-12-15 PROCEDURE — 97112 NEUROMUSCULAR REEDUCATION: CPT | Mod: GP | Performed by: PHYSICAL THERAPIST

## 2022-01-07 ENCOUNTER — HOSPITAL ENCOUNTER (OUTPATIENT)
Dept: PHYSICAL THERAPY | Facility: OTHER | Age: 77
Setting detail: THERAPIES SERIES
End: 2022-01-07
Attending: INTERNAL MEDICINE
Payer: MEDICARE

## 2022-01-07 PROCEDURE — 97112 NEUROMUSCULAR REEDUCATION: CPT | Mod: GP | Performed by: PHYSICAL THERAPIST

## 2022-01-07 NOTE — PROGRESS NOTES
[facility]    Outpatient Physical Therapy Discharge Note  Patient: Dajuan Basilio  : 1945    Beginning/End Dates of Reporting Period:  21 to 22      Referring Provider: Dr. Pickens    Therapy Diagnosis: BPPV           Client Self Report:  Patient continues to feel unsteady with walking.  Denies any dizziness/spinning.  Does have peripheral neuropathy and bilateral cataracts.  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    Discussed POC with patient and options for continued treatment or discharge.  Patient plans to follow up with having cataract surgery in the next couple of months.  Decided he will take care of that first, then see if he still needs physical therapy or not.  Also answered questions about chronic back issues.    Objective Measurements:    DGI =  = low risk for falls            Goals:  Goal Identifier transfers   Goal Description Patient will be able to complete sit-supine transfers independently with no limitations due to dizziness for increased safety and decreased risk of falling.   Target Date 22   Date Met  22   Progress (detail required for progress note): Goal met     Goal Identifier forward bend   Goal Description Patient will be able to forward bend and return to standing safely with no limitations due to dizziness or unsteadiness for decreased risk of falls.   Target Date 22   Date Met  22   Progress (detail required for progress note): Goal met     Goal Identifier walking   Goal Description Patient will score 20/24 or better on DGI indicating low fall risk.   Target Date 22   Date Met  22   Progress (detail required for progress note): Goal met =          Plan:  Discharge from therapy.    Reason for Discharge: Patient will follow up after cataract surgeries if  needed.    Equipment Issued: none    Discharge Plan: none

## 2022-01-12 ENCOUNTER — ALLIED HEALTH/NURSE VISIT (OUTPATIENT)
Dept: FAMILY MEDICINE | Facility: OTHER | Age: 77
End: 2022-01-12
Attending: FAMILY MEDICINE
Payer: MEDICARE

## 2022-01-12 DIAGNOSIS — Z20.822 COVID-19 RULED OUT: Primary | ICD-10-CM

## 2022-01-12 PROCEDURE — C9803 HOPD COVID-19 SPEC COLLECT: HCPCS

## 2022-01-12 PROCEDURE — U0003 INFECTIOUS AGENT DETECTION BY NUCLEIC ACID (DNA OR RNA); SEVERE ACUTE RESPIRATORY SYNDROME CORONAVIRUS 2 (SARS-COV-2) (CORONAVIRUS DISEASE [COVID-19]), AMPLIFIED PROBE TECHNIQUE, MAKING USE OF HIGH THROUGHPUT TECHNOLOGIES AS DESCRIBED BY CMS-2020-01-R: HCPCS | Mod: ZL

## 2022-01-14 ENCOUNTER — TELEPHONE (OUTPATIENT)
Dept: NURSING | Facility: CLINIC | Age: 77
End: 2022-01-14
Payer: COMMERCIAL

## 2022-01-14 LAB — SARS-COV-2 RNA RESP QL NAA+PROBE: DETECTED

## 2022-01-14 NOTE — TELEPHONE ENCOUNTER
"Coronavirus (COVID-19) Notification    Caller Name (Patient, parent, daughter/son, grandparent, etc)  Patient    Reason for call  Notify of Positive Coronavirus (COVID-19) lab results, assess symptoms,  review  [facility] recommendations    Lab Result    Lab test:  2019-nCoV rRt-PCR or SARS-CoV-2 PCR    Oropharyngeal AND/OR nasopharyngeal swabs is POSITIVE for 2019-nCoV RNA/SARS-COV-2 PCR (COVID-19 virus)    RN Recommendations/Instructions per [facility] Coronavirus COVID-19 recommendations    Brief introduction script  Introduce self then review script:  \"I am calling on behalf of [facility].  We were notified that your Coronavirus test (COVID-19) for was POSITIVE for the virus.  I have some information to relay to you but first I wanted to mention that the [place] Dept of Health will be contacting you shortly [it's possible MD already called Patient] to talk to you more about how you are feeling and other people you have had contact with who might now also have the virus.  Also,  [facility] is Partnering with the [facility] for Covid-19 research, you may be contacted directly by research staff.\"    Assessment (Inquire about Patient's current symptoms)   Assessment   Current Symptoms at time of phone call: (if no symptoms, document No symptoms] Patient feels fine  Patient past 10 day quarantine guideline   Symptoms onset (if applicable) 12/15/2021     If at time of call, Patients symptoms hare worsened, the Patient should contact 911 or have someone drive them to Emergency Dept promptly:      If Patient calling 911, inform 911 personal that you have tested positive for the Coronavirus (COVID-19).  Place mask on and await 911 to arrive.    If Emergency Dept, If possible, please have another adult drive you to the Emergency Dept but you need to wear mask when in contact with other people.      Monoclonal Antibody Administration    You may be eligible to receive a new treatment with a " monoclonal antibody for preventing hospitalization in patients at high risk for complications from COVID-19.   This medication is still experimental and available on a limited basis; it is given through an IV and must be given at an infusion center. Please note that not all people who are eligible will receive the medication since it is in limited supply.     Are you interested in being considered for this medication?  No.   Does the patient fit the criteria: No    Review information with Patient    Your result was positive. This means you have COVID-19 (coronavirus).  We have sent you a letter that reviews the information that I'll be reviewing with you now.    How can I protect others?    If you have symptoms: stay home and away from others (self-isolate) until:    You've had no fever--and no medicine that reduces fever--for 1 full day (24 hours). And       Your other symptoms have gotten better. For example, your cough or breathing has improved. And     At least 10 days have passed since your symptoms started. (If you've been told by a doctor that you have a weak immune system, wait 20 days.)     If you don't have symptoms: Stay home and away from others (self-isolate) until at least 10 days have passed since your first positive COVID-19 test. (Date test collected)    During this time:    Stay in your own room, including for meals. Use your own bathroom if you can.    Stay away from others in your home. No hugging, kissing or shaking hands. No visitors.     Don't go to work, school or anywhere else.     Clean  high touch  surfaces often (doorknobs, counters, handles, etc.). Use a household cleaning spray or wipes. You'll find a full list on the EPA website at www.epa.gov/pesticide-registration/list-n-disinfectants-use-against-sars-cov-2.     Cover your mouth and nose with a mask, tissue or other face covering to avoid spreading germs.    Wash your hands and face often with soap and water.    Make a list of people  you have been in close contact with recently, even if either of you wore a face covering.   - Start your list from 2 days before you became ill or had a positive test.  - Include anyone that was within 6 feet of you for a cumulative total of 15 minutes or more in 24 hours. (Example: if you sat next to Dajuan for 5 minutes in the morning and 10 minutes in the afternoon, then you were in close contact for 15 minutes total that day. Dajuan would be added to your list.)    A public health worker will call or text you. It is important that you answer. They will ask you questions about possible exposures to COVID-19, such as people you have been in direct contact with and places you have visited.    Tell the people on your list that you have COVID-19; they should stay away from others for 14 days starting from the last time they were in contact with you (unless you are told something different from a public health worker).     Caregivers in these groups are at risk for severe illness due to COVID-19:  o People 65 years and older  o People who live in a nursing home or long-term care facility  o People with chronic disease (lung, heart, cancer, diabetes, kidney, liver, immunologic)  o People who have a weakened immune system, including those who:  - Are in cancer treatment  - Take medicine that weakens the immune system, such as corticosteroids  - Had a bone marrow or organ transplant  - Have an immune deficiency  - Have poorly controlled HIV or AIDS  - Are obese (body mass index of 40 or higher)  - Smoke regularly    Caregivers should wear gloves while washing dishes, handling laundry and cleaning bedrooms and bathrooms.    Wash and dry laundry with special caution. Don't shake dirty laundry, and use the warmest water setting you can.    If you have a weakened immune system, ask your doctor about other actions you should take.    For more tips, go to www.cdc.gov/coronavirus/2019-ncov/downloads/10Things.pdf.    You should not  go back to work until you meet the guidelines above for ending your home isolation. You don't need to be retested for COVID-19 before going back to work--studies show that you won't spread the virus if it's been at least 10 days since your symptoms started (or 20 days, if you have a weak immune system).    Employers: This document serves as formal notice of your employee's medical guidelines for going back to work. They must meet the above guidelines before going back to work in person.    How can I take care of myself?    1. Get lots of rest. Drink extra fluids (unless a doctor has told you not to).    2. Take Tylenol (acetaminophen) for fever or pain. If you have liver or kidney problems, ask your family doctor if it's okay to take Tylenol.     Take either:     650 mg (two 325 mg pills) every 4 to 6 hours, or     1,000 mg (two 500 mg pills) every 8 hours as needed.     Note: Don't take more than 3,000 mg in one day. Acetaminophen is found in many medicines (both prescribed and over-the-counter medicines). Read all labels to be sure you don't take too much.    For children, check the Tylenol bottle for the right dose (based on their age or weight).    3. If you have other health problems (like cancer, heart failure, an organ transplant or severe kidney disease): Call your specialty clinic if you don't feel better in the next 2 days.    4. Know when to call 911: Emergency warning signs include:    Trouble breathing or shortness of breath    Pain or pressure in the chest that doesn't go away    Feeling confused like you haven't felt before, or not being able to wake up    Bluish-colored lips or face    5. Sign up for [email or web address]. We know it's scary to hear that you have COVID-19. We want to track your symptoms to make sure you're okay over the next 2 weeks. Please look for an email from [email or web address]--this is a free, online program that we'll use to keep in touch. To sign up, follow the link in the email. Learn more  at [email or web address].    Where can I get more information?    [facility]: [email or web address]    Coronavirus Basics: www.health.[facility].[place]/diseases/coronavirus/basics.html    What to Do If You're Sick: www.cdc.gov/coronavirus/2019-ncov/about/steps-when-sick.html    Ending Home Isolation: www.cdc.gov/coronavirus/2019-ncov/hcp/disposition-in-home-patients.html     Caring for Someone with COVID-19: www.cdc.gov/coronavirus/2019-ncov/if-you-are-sick/care-for-someone.html     [facility] clinical trials (COVID-19 research studies): clinicalaffairs.[facility]-clinical-trials     A Positive COVID-19 letter will be sent via [employer] or the mail. (Exception, no letters sent to Presurgerical/Preprocedure Patients)    Chanel Castro LPN

## 2022-02-28 ENCOUNTER — OFFICE VISIT (OUTPATIENT)
Dept: INTERNAL MEDICINE | Facility: OTHER | Age: 77
End: 2022-02-28
Attending: STUDENT IN AN ORGANIZED HEALTH CARE EDUCATION/TRAINING PROGRAM
Payer: MEDICARE

## 2022-02-28 ENCOUNTER — HOSPITAL ENCOUNTER (OUTPATIENT)
Dept: GENERAL RADIOLOGY | Facility: OTHER | Age: 77
End: 2022-02-28
Attending: STUDENT IN AN ORGANIZED HEALTH CARE EDUCATION/TRAINING PROGRAM
Payer: MEDICARE

## 2022-02-28 VITALS
RESPIRATION RATE: 16 BRPM | DIASTOLIC BLOOD PRESSURE: 60 MMHG | HEART RATE: 63 BPM | BODY MASS INDEX: 29.41 KG/M2 | OXYGEN SATURATION: 97 % | SYSTOLIC BLOOD PRESSURE: 122 MMHG | TEMPERATURE: 97.8 F | WEIGHT: 187.8 LBS

## 2022-02-28 DIAGNOSIS — J20.9 ACUTE BRONCHITIS, UNSPECIFIED ORGANISM: Primary | ICD-10-CM

## 2022-02-28 DIAGNOSIS — J20.9 ACUTE BRONCHITIS, UNSPECIFIED ORGANISM: ICD-10-CM

## 2022-02-28 DIAGNOSIS — J41.8 MIXED SIMPLE AND MUCOPURULENT CHRONIC BRONCHITIS (H): ICD-10-CM

## 2022-02-28 PROCEDURE — 99213 OFFICE O/P EST LOW 20 MIN: CPT | Performed by: STUDENT IN AN ORGANIZED HEALTH CARE EDUCATION/TRAINING PROGRAM

## 2022-02-28 PROCEDURE — G0463 HOSPITAL OUTPT CLINIC VISIT: HCPCS | Mod: 25

## 2022-02-28 PROCEDURE — 71046 X-RAY EXAM CHEST 2 VIEWS: CPT

## 2022-02-28 RX ORDER — AZITHROMYCIN 250 MG/1
TABLET, FILM COATED ORAL
Qty: 6 TABLET | Refills: 0 | Status: SHIPPED | OUTPATIENT
Start: 2022-02-28 | End: 2022-03-05

## 2022-02-28 RX ORDER — ALBUTEROL SULFATE 90 UG/1
2 AEROSOL, METERED RESPIRATORY (INHALATION) EVERY 6 HOURS PRN
Qty: 18 G | Refills: 3 | Status: SHIPPED | OUTPATIENT
Start: 2022-02-28 | End: 2022-03-31

## 2022-02-28 RX ORDER — PREDNISONE 20 MG/1
20 TABLET ORAL DAILY
Qty: 10 TABLET | Refills: 0 | Status: SHIPPED | OUTPATIENT
Start: 2022-02-28 | End: 2022-03-31

## 2022-02-28 ASSESSMENT — PAIN SCALES - GENERAL: PAINLEVEL: NO PAIN (1)

## 2022-02-28 NOTE — PROGRESS NOTES
"Mr. Basilio is a 76 year old male who presents to the clinic for breathing concerns    HPI  Wondering about his breathing  Increased sputum production  Coughing more recently.   Has worsened over the past week or so but much worse in past month.   Spiriva inhaler once morning.   Has been on a albuterol inhaler in the past without much effect        Review of Systems     Reviewed and updated as needed this visit by Provider                   EXAM:   Vitals:    02/28/22 1500   BP: 122/60   Pulse: 63   Resp: 16   Temp: 97.8  F (36.6  C)   TempSrc: Tympanic   SpO2: 97%   Weight: 85.2 kg (187 lb 12.8 oz)         BP Readings from Last 3 Encounters:   02/28/22 122/60   12/03/21 130/60   11/04/21 138/70      Wt Readings from Last 3 Encounters:   02/28/22 85.2 kg (187 lb 12.8 oz)   12/03/21 89.8 kg (198 lb)   11/04/21 88.6 kg (195 lb 6.4 oz)      Estimated body mass index is 29.41 kg/m  as calculated from the following:    Height as of 10/19/21: 1.702 m (5' 7\").    Weight as of this encounter: 85.2 kg (187 lb 12.8 oz).     Physical Exam   Pulmonary: Occasional rhonchi throughout.  Otherwise clear to auscultation bilaterally  CV: regular rate and rhythm, no murmur, rubs or peripheral edema appreciated     INVESTIGATIONS:  - Labs reviewed in [facility]     ASSESSMENT AND PLAN:    ICD-10-CM    1. Acute bronchitis, unspecified organism  J20.9 XR Chest 2 Views     predniSONE (DELTASONE) 20 MG tablet     azithromycin (ZITHROMAX) 250 MG tablet     albuterol (PROAIR HFA/PROVENTIL HFA/VENTOLIN HFA) 108 (90 Base) MCG/ACT inhaler   2. Mixed simple and mucopurulent chronic bronchitis (H)  J41.8 predniSONE (DELTASONE) 20 MG tablet     azithromycin (ZITHROMAX) 250 MG tablet     albuterol (PROAIR HFA/PROVENTIL HFA/VENTOLIN HFA) 108 (90 Base) MCG/ACT inhaler       Assessment/Plan: Acute on chronic mucopurulent bronchitis, will treat as a COPD exacerbation with a 5-day course of steroids and azithromycin based on worsening sputum production, " worsening cough.  Also prescribed albuterol inhaler in addition to his Spiriva.  Recommend repeating PFTs at future exam for evaluation of pulmonary function.      Follow-up with PCP in 4 weeks for follow-up of breathing        Electronically signed by:  Stanley Mcarthur MD on 2/28/2022  Internal Medicine  [facility]

## 2022-02-28 NOTE — NURSING NOTE
Patient is here for SOB, he would like to have an x-ray done. He also would like to discuss on going weakness.       Medication Reconciliation: complete    FOOD SECURITY SCREENING QUESTIONS  Hunger Vital Signs:  Within the past 12 months we worried whether our food would run out before we got money to buy more. Never  Within the past 12 months the food we bought just didn't last and we didn't have money to get more. Never  Maria Isabel Santoro LPN 2/28/2022 3:03 PM       Advance care directive on file? no  Advance care directive provided to patient? no       Maria Isabel Santoro LPN

## 2022-03-03 ENCOUNTER — TELEPHONE (OUTPATIENT)
Dept: INTERNAL MEDICINE | Facility: OTHER | Age: 77
End: 2022-03-03
Payer: COMMERCIAL

## 2022-03-03 DIAGNOSIS — E53.8 VITAMIN B12 DEFICIENCY: ICD-10-CM

## 2022-03-03 DIAGNOSIS — E78.2 MIXED HYPERLIPIDEMIA: ICD-10-CM

## 2022-03-03 DIAGNOSIS — E11.21 TYPE 2 DIABETES MELLITUS WITH DIABETIC NEPHROPATHY, WITHOUT LONG-TERM CURRENT USE OF INSULIN (H): ICD-10-CM

## 2022-03-03 DIAGNOSIS — I10 BENIGN ESSENTIAL HYPERTENSION: ICD-10-CM

## 2022-03-03 DIAGNOSIS — R97.20 ELEVATED PROSTATE SPECIFIC ANTIGEN (PSA): Primary | ICD-10-CM

## 2022-03-03 DIAGNOSIS — Z12.5 SCREENING PSA (PROSTATE SPECIFIC ANTIGEN): ICD-10-CM

## 2022-03-03 NOTE — TELEPHONE ENCOUNTER
Has a PX on 3/31, would like to do labs prior to appt, can you please call him when the order is in so we can schedule?  Thank you!    Maggie Silva on 3/3/2022 at 8:43 AM

## 2022-03-04 NOTE — TELEPHONE ENCOUNTER
Orders are pending please review add change or remove if needed.  Maria Isabel Santoro LPN .............3/4/2022     9:28 AM

## 2022-03-29 ENCOUNTER — LAB (OUTPATIENT)
Dept: LAB | Facility: OTHER | Age: 77
End: 2022-03-29
Attending: INTERNAL MEDICINE
Payer: MEDICARE

## 2022-03-29 DIAGNOSIS — R97.20 ELEVATED PROSTATE SPECIFIC ANTIGEN (PSA): ICD-10-CM

## 2022-03-29 DIAGNOSIS — E78.2 MIXED HYPERLIPIDEMIA: ICD-10-CM

## 2022-03-29 DIAGNOSIS — E11.21 TYPE 2 DIABETES MELLITUS WITH DIABETIC NEPHROPATHY, WITHOUT LONG-TERM CURRENT USE OF INSULIN (H): ICD-10-CM

## 2022-03-29 LAB
ALBUMIN SERPL-MCNC: 4.6 G/DL (ref 3.5–5.7)
ALBUMIN UR-MCNC: NEGATIVE MG/DL
ALP SERPL-CCNC: 53 U/L (ref 34–104)
ALT SERPL W P-5'-P-CCNC: 16 U/L (ref 7–52)
ANION GAP SERPL CALCULATED.3IONS-SCNC: 9 MMOL/L (ref 3–14)
APPEARANCE UR: CLEAR
AST SERPL W P-5'-P-CCNC: 16 U/L (ref 13–39)
BILIRUB SERPL-MCNC: 0.7 MG/DL (ref 0.3–1)
BILIRUB UR QL STRIP: NEGATIVE
BUN SERPL-MCNC: 16 MG/DL (ref 7–25)
CALCIUM SERPL-MCNC: 9.6 MG/DL (ref 8.6–10.3)
CHLORIDE BLD-SCNC: 103 MMOL/L (ref 98–107)
CHOLEST SERPL-MCNC: 138 MG/DL
CO2 SERPL-SCNC: 29 MMOL/L (ref 21–31)
COLOR UR AUTO: NORMAL
CREAT SERPL-MCNC: 0.83 MG/DL (ref 0.7–1.3)
CREAT UR-MCNC: 73 MG/DL
ERYTHROCYTE [DISTWIDTH] IN BLOOD BY AUTOMATED COUNT: 13.4 % (ref 10–15)
FASTING STATUS PATIENT QL REPORTED: NORMAL
GFR SERPL CREATININE-BSD FRML MDRD: 90 ML/MIN/1.73M2
GLUCOSE BLD-MCNC: 117 MG/DL (ref 70–105)
GLUCOSE UR STRIP-MCNC: NEGATIVE MG/DL
HBA1C MFR BLD: 6.6 % (ref 4–6.2)
HCT VFR BLD AUTO: 45.8 % (ref 40–53)
HDLC SERPL-MCNC: 41 MG/DL (ref 23–92)
HGB BLD-MCNC: 14.8 G/DL (ref 13.3–17.7)
HGB UR QL STRIP: NEGATIVE
KETONES UR STRIP-MCNC: NEGATIVE MG/DL
LDLC SERPL CALC-MCNC: 76 MG/DL
LEUKOCYTE ESTERASE UR QL STRIP: NEGATIVE
MCH RBC QN AUTO: 29.7 PG (ref 26.5–33)
MCHC RBC AUTO-ENTMCNC: 32.3 G/DL (ref 31.5–36.5)
MCV RBC AUTO: 92 FL (ref 78–100)
MICROALBUMIN UR-MCNC: 15 MG/L
MICROALBUMIN/CREAT UR: 20.55 MG/G CR (ref 0–17)
NITRATE UR QL: NEGATIVE
NONHDLC SERPL-MCNC: 97 MG/DL
PH UR STRIP: 6.5 [PH] (ref 5–9)
PLATELET # BLD AUTO: 220 10E3/UL (ref 150–450)
POTASSIUM BLD-SCNC: 3.9 MMOL/L (ref 3.5–5.1)
PROT SERPL-MCNC: 7.2 G/DL (ref 6.4–8.9)
PSA SERPL-MCNC: 3.12 UG/L (ref 0–4)
RBC # BLD AUTO: 4.98 10E6/UL (ref 4.4–5.9)
SODIUM SERPL-SCNC: 141 MMOL/L (ref 134–144)
SP GR UR STRIP: 1.01 (ref 1–1.03)
T4 FREE SERPL-MCNC: 0.68 NG/DL (ref 0.6–1.6)
TRIGL SERPL-MCNC: 107 MG/DL
TSH SERPL DL<=0.005 MIU/L-ACNC: 4.14 MU/L (ref 0.4–4)
UROBILINOGEN UR STRIP-MCNC: NORMAL MG/DL
WBC # BLD AUTO: 5.6 10E3/UL (ref 4–11)

## 2022-03-29 PROCEDURE — 84439 ASSAY OF FREE THYROXINE: CPT | Mod: ZL

## 2022-03-29 PROCEDURE — 82043 UR ALBUMIN QUANTITATIVE: CPT | Mod: ZL

## 2022-03-29 PROCEDURE — 36415 COLL VENOUS BLD VENIPUNCTURE: CPT | Mod: ZL

## 2022-03-29 PROCEDURE — 85027 COMPLETE CBC AUTOMATED: CPT | Mod: ZL

## 2022-03-29 PROCEDURE — 81003 URINALYSIS AUTO W/O SCOPE: CPT | Mod: ZL

## 2022-03-29 PROCEDURE — 80061 LIPID PANEL: CPT | Mod: ZL

## 2022-03-29 PROCEDURE — 84153 ASSAY OF PSA TOTAL: CPT | Mod: ZL

## 2022-03-29 PROCEDURE — 80053 COMPREHEN METABOLIC PANEL: CPT | Mod: ZL

## 2022-03-29 PROCEDURE — 83036 HEMOGLOBIN GLYCOSYLATED A1C: CPT | Mod: ZL

## 2022-03-29 PROCEDURE — 84443 ASSAY THYROID STIM HORMONE: CPT | Mod: ZL

## 2022-03-31 ENCOUNTER — OFFICE VISIT (OUTPATIENT)
Dept: INTERNAL MEDICINE | Facility: OTHER | Age: 77
End: 2022-03-31
Attending: INTERNAL MEDICINE
Payer: COMMERCIAL

## 2022-03-31 VITALS
DIASTOLIC BLOOD PRESSURE: 66 MMHG | SYSTOLIC BLOOD PRESSURE: 126 MMHG | TEMPERATURE: 97.5 F | WEIGHT: 191.2 LBS | OXYGEN SATURATION: 96 % | HEART RATE: 68 BPM | HEIGHT: 66 IN | BODY MASS INDEX: 30.73 KG/M2 | RESPIRATION RATE: 16 BRPM

## 2022-03-31 DIAGNOSIS — Z00.00 ENCOUNTER FOR MEDICARE ANNUAL WELLNESS EXAM: ICD-10-CM

## 2022-03-31 DIAGNOSIS — H61.21 IMPACTED CERUMEN OF RIGHT EAR: ICD-10-CM

## 2022-03-31 DIAGNOSIS — I25.10 CORONARY ARTERY DISEASE INVOLVING NATIVE CORONARY ARTERY OF NATIVE HEART WITHOUT ANGINA PECTORIS: ICD-10-CM

## 2022-03-31 DIAGNOSIS — E03.4 HYPOTHYROIDISM DUE TO ACQUIRED ATROPHY OF THYROID: Primary | ICD-10-CM

## 2022-03-31 DIAGNOSIS — E11.42 DIABETIC PERIPHERAL NEUROPATHY (H): ICD-10-CM

## 2022-03-31 DIAGNOSIS — J41.8 MIXED SIMPLE AND MUCOPURULENT CHRONIC BRONCHITIS (H): ICD-10-CM

## 2022-03-31 DIAGNOSIS — Z95.5 STATUS POST INSERTION OF DRUG ELUTING CORONARY ARTERY STENT: ICD-10-CM

## 2022-03-31 DIAGNOSIS — M46.98 UNSPECIFIED INFLAMMATORY SPONDYLOPATHY, SACRAL AND SACROCOCCYGEAL REGION (H): ICD-10-CM

## 2022-03-31 DIAGNOSIS — R53.81 MALAISE AND FATIGUE: ICD-10-CM

## 2022-03-31 DIAGNOSIS — E11.21 TYPE 2 DIABETES MELLITUS WITH DIABETIC NEPHROPATHY, WITHOUT LONG-TERM CURRENT USE OF INSULIN (H): ICD-10-CM

## 2022-03-31 DIAGNOSIS — E53.8 VITAMIN B12 DEFICIENCY: ICD-10-CM

## 2022-03-31 DIAGNOSIS — F03.90: ICD-10-CM

## 2022-03-31 DIAGNOSIS — R39.12 BENIGN PROSTATIC HYPERPLASIA WITH WEAK URINARY STREAM: ICD-10-CM

## 2022-03-31 DIAGNOSIS — F33.42 RECURRENT MAJOR DEPRESSIVE DISORDER, IN FULL REMISSION (H): ICD-10-CM

## 2022-03-31 DIAGNOSIS — R53.83 MALAISE AND FATIGUE: ICD-10-CM

## 2022-03-31 DIAGNOSIS — N40.1 BENIGN PROSTATIC HYPERPLASIA WITH WEAK URINARY STREAM: ICD-10-CM

## 2022-03-31 DIAGNOSIS — Z71.85 VACCINE COUNSELING: ICD-10-CM

## 2022-03-31 DIAGNOSIS — I10 BENIGN ESSENTIAL HYPERTENSION: ICD-10-CM

## 2022-03-31 DIAGNOSIS — E78.2 MIXED HYPERLIPIDEMIA: ICD-10-CM

## 2022-03-31 PROBLEM — M79.18 GLUTEAL PAIN: Status: RESOLVED | Noted: 2020-03-17 | Resolved: 2022-03-31

## 2022-03-31 PROBLEM — R41.89 BRAIN FOG: Status: RESOLVED | Noted: 2021-03-17 | Resolved: 2022-03-31

## 2022-03-31 PROBLEM — H61.23 BILATERAL IMPACTED CERUMEN: Status: RESOLVED | Noted: 2021-01-08 | Resolved: 2022-03-31

## 2022-03-31 PROCEDURE — 96372 THER/PROPH/DIAG INJ SC/IM: CPT | Performed by: INTERNAL MEDICINE

## 2022-03-31 PROCEDURE — 250N000011 HC RX IP 250 OP 636: Performed by: INTERNAL MEDICINE

## 2022-03-31 PROCEDURE — G0463 HOSPITAL OUTPT CLINIC VISIT: HCPCS | Mod: 25

## 2022-03-31 PROCEDURE — G0463 HOSPITAL OUTPT CLINIC VISIT: HCPCS

## 2022-03-31 PROCEDURE — G0439 PPPS, SUBSEQ VISIT: HCPCS | Performed by: INTERNAL MEDICINE

## 2022-03-31 PROCEDURE — 99214 OFFICE O/P EST MOD 30 MIN: CPT | Mod: 25 | Performed by: INTERNAL MEDICINE

## 2022-03-31 PROCEDURE — 69209 REMOVE IMPACTED EAR WAX UNI: CPT | Performed by: INTERNAL MEDICINE

## 2022-03-31 RX ORDER — LEVOTHYROXINE SODIUM 50 UG/1
50 TABLET ORAL DAILY
Qty: 93 TABLET | Refills: 1 | Status: SHIPPED | OUTPATIENT
Start: 2022-03-31 | End: 2022-07-06

## 2022-03-31 RX ORDER — CYANOCOBALAMIN 1000 UG/ML
1000 INJECTION, SOLUTION INTRAMUSCULAR; SUBCUTANEOUS CONTINUOUS PRN
Status: ACTIVE | OUTPATIENT
Start: 2022-03-31

## 2022-03-31 RX ORDER — GABAPENTIN 100 MG/1
100 CAPSULE ORAL AT BEDTIME
Qty: 90 CAPSULE | Refills: 1 | Status: SHIPPED | OUTPATIENT
Start: 2022-03-31 | End: 2022-07-06

## 2022-03-31 RX ORDER — CYANOCOBALAMIN 1000 UG/ML
1000 INJECTION, SOLUTION INTRAMUSCULAR; SUBCUTANEOUS ONCE
Status: COMPLETED | OUTPATIENT
Start: 2022-03-31 | End: 2022-03-31

## 2022-03-31 RX ADMIN — CYANOCOBALAMIN 1000 MCG: 1000 INJECTION, SOLUTION INTRAMUSCULAR at 14:30

## 2022-03-31 ASSESSMENT — ENCOUNTER SYMPTOMS
CHILLS: 0
DIZZINESS: 1
COUGH: 1
NUMBNESS: 1
SLEEP DISTURBANCE: 0
BACK PAIN: 1
HEMATURIA: 0
NAUSEA: 0
BRUISES/BLEEDS EASILY: 0
DYSURIA: 0
ABDOMINAL PAIN: 0
WEAKNESS: 1
EYE PAIN: 1
DIARRHEA: 0
FEVER: 0
WHEEZING: 0
LIGHT-HEADEDNESS: 1
NERVOUS/ANXIOUS: 1
VOMITING: 0
MYALGIAS: 1
PALPITATIONS: 0
EYE REDNESS: 1
ARTHRALGIAS: 1
SHORTNESS OF BREATH: 0
AGITATION: 0
FATIGUE: 1

## 2022-03-31 ASSESSMENT — ANXIETY QUESTIONNAIRES
3. WORRYING TOO MUCH ABOUT DIFFERENT THINGS: SEVERAL DAYS
1. FEELING NERVOUS, ANXIOUS, OR ON EDGE: SEVERAL DAYS
IF YOU CHECKED OFF ANY PROBLEMS ON THIS QUESTIONNAIRE, HOW DIFFICULT HAVE THESE PROBLEMS MADE IT FOR YOU TO DO YOUR WORK, TAKE CARE OF THINGS AT HOME, OR GET ALONG WITH OTHER PEOPLE: SOMEWHAT DIFFICULT
2. NOT BEING ABLE TO STOP OR CONTROL WORRYING: NOT AT ALL
7. FEELING AFRAID AS IF SOMETHING AWFUL MIGHT HAPPEN: NOT AT ALL
6. BECOMING EASILY ANNOYED OR IRRITABLE: SEVERAL DAYS
5. BEING SO RESTLESS THAT IT IS HARD TO SIT STILL: SEVERAL DAYS
GAD7 TOTAL SCORE: 4

## 2022-03-31 ASSESSMENT — PAIN SCALES - GENERAL: PAINLEVEL: MODERATE PAIN (5)

## 2022-03-31 ASSESSMENT — PATIENT HEALTH QUESTIONNAIRE - PHQ9: 5. POOR APPETITE OR OVEREATING: NOT AT ALL

## 2022-03-31 NOTE — PROGRESS NOTES
"SUBJECTIVE:   Dajuan Basilio is a 76 year old male who presents for Preventive Visit.    Annual Wellness Visit    Patient has been advised of split billing requirements and indicates understanding: Yes     Are you in the first 12 months of your Medicare Part B coverage?  No    Physical Health:    In general, how would you rate your overall physical health? fair    Outside of work, how many days during the week do you exercise?4-5 days/week    Outside of work, approximately how many minutes a day do you exercise?greater than 60 minutes    If you drink alcohol do you typically have >3 drinks per day or >7 drinks per week? No    Do you usually eat at least 4 servings of fruit and vegetables a day, include whole grains & fiber and avoid regularly eating high fat or \"junk\" foods? NO    Do you have any problems taking medications regularly? No    Do you have any side effects from medications? unsure    Needs assistance for the following daily activities: no assistance needed    Which of the following safety concerns are present in your home?  none identified     Hearing impairment: Yes, Difficulty following a conversation in a noisy restaurant or crowded room.    Feel that people are mumbling or not speaking clearly.    Need to ask people to speak up or repeat themselves.    Difficulty understanding soft or whispered speech.    In the past 6 months, have you been bothered by leaking of urine? yes    Mental Health:    In general, how would you rate your overall mental or emotional health? fair  PHQ-2 Score: 0    Do you feel safe in your environment? Yes    Have you ever done Advance Care Planning? (For example, a Health Directive, POLST, or a discussion with a medical provider or your loved ones about your wishes)? No, advance care planning information given to patient to review.  Patient declined advance care planning discussion at this time.    Fall risk:  Fallen 2 or more times in the past year?: No  Any fall with " injury in the past year?: No    Cognitive Screenin) Repeat 3 items (Leader, Season, Table)    2) Clock draw: ABNORMAL hand in the wrong place  3) 3 item recall: Recalls 2 objects   Results: ABNORMAL clock, 1-2 items recalled: PROBABLE COGNITIVE IMPAIRMENT, **INFORM PROVIDER**    Mini-CogTM Copyright S Marina. Licensed by the author for use in [facility]; reprinted with permission ([email or web address]). All rights reserved.      Do you have sleep apnea, excessive snoring or daytime drowsiness?: no    Current providers sharing in care for this patient include:   Patient Care Team:  Cm Pickens MD as PCP - General (Internal Medicine)  Cm Pickens MD as Assigned PCP  Olivier Connell MD as Assigned Surgical Provider    Patient has been advised of split billing requirements and indicates understanding: Yes    Current providers sharing in care for this patient include:   Patient Care Team:  Cm Pickens MD as PCP - General (Internal Medicine)  Cm Pickens MD as Assigned PCP  Oliiver Connell MD as Assigned Surgical Provider    The following health maintenance items are reviewed in Epic and correct as of today:  Health Maintenance Due   Topic Date Due     SPIROMETRY  Never done     DIABETIC FOOT EXAM  2022     Review of Systems   Constitutional: Positive for fatigue. Negative for chills and fever.   HENT: Positive for hearing loss. Negative for congestion and nosebleeds.    Eyes: Positive for pain and redness. Negative for visual disturbance.        + chronic dry eyes, tried many eye drops, not much improvement - told needs eye surgery   Respiratory: Positive for cough (clear phlegm - months). Negative for shortness of breath and wheezing.    Cardiovascular: Positive for chest pain. Negative for palpitations.        Cardiac stress testing 3/2020 - negative/normal results.   Gastrointestinal: Negative for abdominal pain, diarrhea, nausea and vomiting.        + left inguinal pains intermittently  "  Endocrine: Negative for cold intolerance and heat intolerance.   Genitourinary: Negative for dysuria and hematuria.   Musculoskeletal: Positive for arthralgias (left knee), back pain (left low back / buttocks pain), gait problem (staggering at times, low back and left knee pain) and myalgias.        Bilateral feet with bunions   Skin: Negative for pallor.   Allergic/Immunologic: Negative for immunocompromised state.   Neurological: Positive for dizziness (+ Spinning with change in position), weakness, light-headedness and numbness (right > left feet).   Hematological: Does not bruise/bleed easily.   Psychiatric/Behavioral: Negative for agitation and sleep disturbance. The patient is nervous/anxious.         + short term memory loss -- stopped Exelon and Namenda -- due to side effects.   + reporting anxiety / depression - virus pandemic has worsened everything         OBJECTIVE:   /66   Pulse 68   Temp 97.5  F (36.4  C) (Temporal)   Resp 16   Ht 1.676 m (5' 6\")   Wt 86.7 kg (191 lb 3.2 oz)   SpO2 96%   BMI 30.86 kg/m   Estimated body mass index is 30.86 kg/m  as calculated from the following:    Height as of this encounter: 1.676 m (5' 6\").    Weight as of this encounter: 86.7 kg (191 lb 3.2 oz).  Physical Exam  Constitutional:       General: He is not in acute distress.     Appearance: He is well-developed. He is not diaphoretic.   HENT:      Head: Normocephalic and atraumatic.      Right Ear: There is impacted cerumen.      Left Ear: Tympanic membrane, ear canal and external ear normal. There is no impacted cerumen.      Ears:      Comments: Right - abnormal external canal exam - cerumenosis impacting canal, cerumen removed with manual debridement (lighted loop and Alligator forceps), Mild erythema of canal and normal TM noted after wax removal   Eyes:      General: No scleral icterus.     Comments: + dry, irritated eyes   Neck:      Vascular: No carotid bruit.   Cardiovascular:      Rate and Rhythm: " Normal rate and regular rhythm.      Pulses: Normal pulses.   Pulmonary:      Effort: Pulmonary effort is normal.      Breath sounds: Normal breath sounds.   Abdominal:      Palpations: Abdomen is soft.      Tenderness: There is no abdominal tenderness.   Musculoskeletal:         General: Tenderness (left > right gluteal and SI joint pain to palpation) present. No deformity.      Cervical back: Neck supple.      Right lower leg: No edema.      Left lower leg: No edema.   Lymphadenopathy:      Cervical: No cervical adenopathy.   Skin:     General: Skin is warm and dry.      Findings: No rash.   Neurological:      Mental Status: He is alert and oriented to person, place, and time. Mental status is at baseline.      Comments: short term memory loss   Psychiatric:         Behavior: Behavior is slowed.         Thought Content: Thought content normal.         Judgment: Judgment normal.      Comments: Anxious and flat affect / depressed.         Recent Labs   Lab Test 03/29/22  0953 12/02/21  0956 10/19/21  1208 08/31/21  0848 03/17/21  1659 01/26/21  1238   0000   A1C 6.6* 6.6*  --  6.7* 6.4*  --   --    LDL 76 69  --  101* 55  --   --    HDL 41 43  --  44 43  --   --    TRIG 107 57  --  87 112  --   --    ALT 16 14  --  13 11 13  --    CR 0.83 0.82   < > 0.74 0.80 0.78  --    GFRESTIMATED 90 85   < > 88 >90 >90  --    GFRESTBLACK  --   --   --   --  >90 >90  --    POTASSIUM 3.9 3.9   < > 3.9 4.6 3.9  --    TSH 4.14* 2.37  --  3.11  --   --    < >    < > = values in this interval not displayed.   Hemoglobin A1c is well controlled.  LDL is not at goal, less than 70.  HDL and triglycerides are at goal.  ALT is normal.  Creatinine is at baseline.  Potassium is normal.  TSH is high.    ASSESSMENT / PLAN:       ICD-10-CM    1. Hypothyroidism due to acquired atrophy of thyroid  E03.4 levothyroxine (SYNTHROID/LEVOTHROID) 50 MCG tablet   2. Malaise and fatigue  R53.81     R53.83    3. Vitamin B12 deficiency  E53.8 cyanocobalamin  injection 1,000 mcg     cyanocobalamin injection 1,000 mcg   4. Encounter for Medicare annual wellness exam  Z00.00    5. Impacted cerumen of right ear  H61.21 Removal Impacted Cerumen - [facility] ONLY   6. Diabetic peripheral neuropathy (H)  E11.42 gabapentin (NEURONTIN) 100 MG capsule     metFORMIN (GLUCOPHAGE) 500 MG tablet   7. Type 2 diabetes mellitus with diabetic nephropathy, without long-term current use of insulin (H)  E11.21 gabapentin (NEURONTIN) 100 MG capsule     metFORMIN (GLUCOPHAGE) 500 MG tablet   8. Benign essential hypertension  I10    9. Benign prostatic hyperplasia with weak urinary stream  N40.1     R39.12    10. Major neurocognitive disorder due to possible Alzheimer's disease, without behavioral disturbance (H)  F03.90    11. Mixed hyperlipidemia  E78.2    12. Mixed simple and mucopurulent chronic bronchitis (H)  J41.8    13. Coronary artery disease involving native coronary artery of native heart without angina pectoris  I25.10    14. Status post insertion of drug eluting coronary artery stent - x2 stents - 1st [facility] - 5/17/2018 - [facility]  Z95.5    15. Recurrent major depressive disorder, in full remission (H)  F33.42    16. Unspecified inflammatory spondylopathy, sacral and sacrococcygeal region (H)  M46.98    17. Vaccine counseling  Z71.85    Patient presents for Medicare annual wellness visit as well as follow-up multiple issues.    Hypothyroidism.  Continues with oral replacement.  TSH is elevated.  May need to consider dose increase.  Patient reports he feels good and is not interested in making medication changes at this time.  Needs refills.  Plan to recheck labs again in 3+ months if TSH is more elevated we will need to consider dose increase.    Fatigue and malaise.  With vitamin B12 deficiency.  Has not been getting B12 shots recently.  He would like 1 today.  Hopefully this will help with some of his fatigue.  Etiology is unclear of his cause of fatigue and malaise.    Right  ear, hearing problems.  Impacted cerumen noted on examination.  Discussed treatment options.  He would like this was removed in the clinic today.  See above.    Diabetic peripheral neuropathy.  Ongoing.  Continue with gabapentin use.  Feels that this has been very effective.  Continues with Metformin as well.  Tolerating without side effects.  Needs refills.    Hypertension.  Blood pressure is well controlled.  Doing well with current medication regimen.  No changes for now.    BPH with weak urine stream.  Chronic.  Takes over-the-counter medication to help with his urine stream and feels that this has been adequate.    Major neurocognitive disorder.  Possible Alzheimer's disease.  Has memory loss.  Consider neurology consultation.  At this time he feels like things are relatively stable.    Mixed hyperlipidemia.  Cholesterol levels are high.  LDL is not at goal, less than 70.  Taking simvastatin.  May need to consider dose increase.  At this time he is not interested in any dose adjustment.  Continue healthy diet and exercise.  May need to consider dose adjustment in the future at clinic follow-up.  Recent Labs   Lab Test 03/29/22  0953 12/02/21  0956   CHOL 138 123   HDL 41 43   LDL 76 69   TRIG 107 57     Coronary disease.  She denies angina.  History of stent placement 3 years ago.  Continues on simvastatin, aspirin.  Appears stable.  No changes for now.    History of major depression.  Currently in remission.  Doing well with BuSpar, citalopram.    History of lumbar spondylopathy.  Has some chronic back pain.  Gabapentin has been helpful.  Tries to modify his activities to limit pain.    Vaccine counseling completed.  Vaccinations are currently up-to-date.  Consider COVID-19 booster shot when available.    Patient has been advised of split billing requirements and indicates understanding: Yes    COUNSELING:  Reviewed preventive health counseling, as reflected in patient instructions  Special attention given  "to:       Regular exercise       Healthy diet/nutrition       Vision screening       Hearing screening       Dental care       Bladder control       Fall risk prevention       Immunizations       Aspirin prophylaxis     Estimated body mass index is 30.86 kg/m  as calculated from the following:    Height as of this encounter: 1.676 m (5' 6\").    Weight as of this encounter: 86.7 kg (191 lb 3.2 oz).    Weight management plan: Discussed healthy diet and exercise guidelines    He reports that he quit smoking about 38 years ago. His smoking use included cigarettes. He has never used smokeless tobacco.      Appropriate preventive services were discussed with this patient, including applicable screening as appropriate for cardiovascular disease, diabetes, osteopenia/osteoporosis, and glaucoma.  As appropriate for age/gender, discussed screening for colorectal cancer, prostate cancer, breast cancer, and cervical cancer. Checklist reviewing preventive services available has been given to the patient.    Reviewed patients plan of care and provided an AVS. The Complex Care Plan (for patients with higher acuity and needing more deliberate coordination of services) for Dajuan meets the Care Plan requirement. This Care Plan has been established and reviewed with the Patient.    Counseling Resources:  ATP IV Guidelines  Pooled Cohorts Equation Calculator  Breast Cancer Risk Calculator  Breast Cancer: Medication to Reduce Risk  FRAX Risk Assessment  ICSI Preventive Guidelines  Dietary Guidelines for Americans, 2010  [employer]'s MyPlate  ASA Prophylaxis  Lung CA Screening    Cm Pickens MD  [facility]    Identified Health Risks:  "

## 2022-03-31 NOTE — PATIENT INSTRUCTIONS
Patient Education   Personalized Prevention Plan  You are due for the preventive services outlined below.  Your care team is available to assist you in scheduling these services.  If you have already completed any of these items, please share that information with your care team to update in your medical record.  Health Maintenance Due   Topic Date Due     Breathing Capacity Test  Never done     COVID-19 Vaccine (4 - Booster for Pfizer series) 01/04/2022     Diabetic Foot Exam  03/26/2022     Preventive Health Recommendations  See your health care provider every year to    Review health changes.     Discuss preventive care.      Review your medicines if your doctor has prescribed any.    Talk with your health care provider about whether you should have a test to screen for prostate cancer (PSA).    Every 3 years, have a diabetes test (fasting glucose). If you are at risk for diabetes, you should have this test more often.    Every 5 years, have a cholesterol test. Have this test more often if you are at risk for high cholesterol or heart disease.     Every 10 years, have a colonoscopy. Or, have a yearly FIT test (stool test). These exams will check for colon cancer.    Talk to with your health care provider about screening for Abdominal Aortic Aneurysm if you have a family history of AAA or have a history of smoking.    Shots:     Get a flu shot each year.     Get a tetanus shot every 10 years.     Talk to your doctor about your pneumonia vaccines. There are now two you should receive - Pneumovax (PPSV 23) and Prevnar (PCV 13).    Talk to your pharmacist about a shingles vaccine.     Talk to your doctor about the hepatitis B vaccine.    Nutrition:     Eat at least 5 servings of fruits and vegetables each day.     Eat whole-grain bread, whole-wheat pasta and brown rice instead of white grains and rice.     Get adequate Calcium and Vitamin D.     Lifestyle    Exercise for at least 150 minutes a week (30 minutes a  day, 5 days a week). This will help you control your weight and prevent disease.     Limit alcohol to one drink per day.     No smoking.     Wear sunscreen to prevent skin cancer.     See your dentist every six months for an exam and cleaning.     See your eye doctor every 1 to 2 years to screen for conditions such as glaucoma, macular degeneration and cataracts.    Personalized Prevention Plan  You are due for the preventive services outlined below.  Your care team is available to assist you in scheduling these services.  If you have already completed any of these items, please share that information with your care team to update in your medical record.  Health Maintenance   Topic Date Due     SPIROMETRY  Never done     COVID-19 Vaccine (4 - Booster for Pfizer series) 01/04/2022     DIABETIC FOOT EXAM  03/26/2022     PHQ-9  06/03/2022     A1C  09/29/2022     EYE EXAM  10/14/2022     FALL RISK ASSESSMENT  12/03/2022     BMP  03/29/2023     LIPID  03/29/2023     MICROALBUMIN  03/29/2023     MEDICARE ANNUAL WELLNESS VISIT  03/31/2023     DTAP/TDAP/TD IMMUNIZATION (2 - Td or Tdap) 05/03/2023     ADVANCE CARE PLANNING  03/31/2027     INFLUENZA VACCINE  Completed     Pneumococcal Vaccine: 65+ Years  Completed     ZOSTER IMMUNIZATION  Completed     HEPATITIS C SCREENING  Addressed     COPD ACTION PLAN  Addressed     DEPRESSION ACTION PLAN  Addressed     IPV IMMUNIZATION  Aged Out     MENINGITIS IMMUNIZATION  Aged Out         Lab on 03/29/2022   Component Date Value Ref Range Status     PSA Tumor Marker 03/29/2022 3.12  0.00 - 4.00 ug/L Final     Sodium 03/29/2022 141  134 - 144 mmol/L Final     Potassium 03/29/2022 3.9  3.5 - 5.1 mmol/L Final     Chloride 03/29/2022 103  98 - 107 mmol/L Final     Carbon Dioxide (CO2) 03/29/2022 29  21 - 31 mmol/L Final     Anion Gap 03/29/2022 9  3 - 14 mmol/L Final     Urea Nitrogen 03/29/2022 16  7 - 25 mg/dL Final     Creatinine 03/29/2022 0.83  0.70 - 1.30 mg/dL Final     Calcium  03/29/2022 9.6  8.6 - 10.3 mg/dL Final     Glucose 03/29/2022 117 (A) 70 - 105 mg/dL Final     Alkaline Phosphatase 03/29/2022 53  34 - 104 U/L Final     AST 03/29/2022 16  13 - 39 U/L Final     ALT 03/29/2022 16  7 - 52 U/L Final     Protein Total 03/29/2022 7.2  6.4 - 8.9 g/dL Final     Albumin 03/29/2022 4.6  3.5 - 5.7 g/dL Final     Bilirubin Total 03/29/2022 0.7  0.3 - 1.0 mg/dL Final     GFR Estimate 03/29/2022 90  >60 mL/min/1.73m2 Final    Effective December 21, 2021 eGFRcr in adults is calculated using the 2021 CKD-EPI creatinine equation which includes age and gender (Orion et al., [place], DOI: 10.1056/XNIRkv6058467)     Cholesterol 03/29/2022 138  <200 mg/dL Final     Triglycerides 03/29/2022 107  <150 mg/dL Final     Direct Measure HDL 03/29/2022 41  23 - 92 mg/dL Final     LDL Cholesterol Calculated 03/29/2022 76  <=100 mg/dL Final     Non HDL Cholesterol 03/29/2022 97  <130 mg/dL Final     Patient Fasting > 8hrs? 03/29/2022 Unknown   Final     Creatinine Urine mg/dL 03/29/2022 73  mg/dL Final     Albumin Urine mg/L 03/29/2022 15  mg/L Final     Albumin Urine mg/g Cr 03/29/2022 20.55 (A) 0.00 - 17.00 mg/g Cr Final     WBC Count 03/29/2022 5.6  4.0 - 11.0 10e3/uL Final     RBC Count 03/29/2022 4.98  4.40 - 5.90 10e6/uL Final     Hemoglobin 03/29/2022 14.8  13.3 - 17.7 g/dL Final     Hematocrit 03/29/2022 45.8  40.0 - 53.0 % Final     MCV 03/29/2022 92  78 - 100 fL Final     MCH 03/29/2022 29.7  26.5 - 33.0 pg Final     MCHC 03/29/2022 32.3  31.5 - 36.5 g/dL Final     RDW 03/29/2022 13.4  10.0 - 15.0 % Final     Platelet Count 03/29/2022 220  150 - 450 10e3/uL Final     Hemoglobin A1C 03/29/2022 6.6 (A) 4.0 - 6.2 % Final     TSH 03/29/2022 4.14 (A) 0.40 - 4.00 mU/L Final     Color Urine 03/29/2022 Light Yellow  Colorless, Straw, Light Yellow, Yellow Final     Appearance Urine 03/29/2022 Clear  Clear Final     Glucose Urine 03/29/2022 Negative  Negative mg/dL Final     Bilirubin Urine 03/29/2022 Negative   Negative Final     Ketones Urine 03/29/2022 Negative  Negative mg/dL Final     Specific Gravity Urine 03/29/2022 1.014  1.000 - 1.030 Final     Blood Urine 03/29/2022 Negative  Negative Final     pH Urine 03/29/2022 6.5  5.0 - 9.0 Final     Protein Albumin Urine 03/29/2022 Negative  Negative mg/dL Final     Urobilinogen Urine 03/29/2022 Normal  Normal, 2.0 mg/dL Final     Nitrite Urine 03/29/2022 Negative  Negative Final     Leukocyte Esterase Urine 03/29/2022 Negative  Negative Final     Free T4 03/29/2022 0.68  0.60 - 1.60 ng/dL Final        Blood pressure is well controlled.   Diabetes is well controlled.     Medications refilled.   Other than TSH being high (meaning low thyroid hormone levels) -- Labs are relatively stable.     B12 shot today.   + Schedule B12 shots with clinic shot nurse, about every 2 weeks.     Start oral Thyroid replacement -- Synthroid 50 mcg once daily.     Aspects of Diabetes:   Recent Labs   Lab Test 03/29/22  0953 12/02/21  0956 10/19/21  1208 08/31/21  0848 03/17/21  1659 01/26/21  1238   0000   A1C 6.6* 6.6*  --  6.7* 6.4*  --   --    LDL 76 69  --  101* 55  --   --    HDL 41 43  --  44 43  --   --    TRIG 107 57  --  87 112  --   --    ALT 16 14  --  13 11 13  --    CR 0.83 0.82   < > 0.74 0.80 0.78  --    GFRESTIMATED 90 85   < > 88 >90 >90  --    GFRESTBLACK  --   --   --   --  >90 >90  --    POTASSIUM 3.9 3.9   < > 3.9 4.6 3.9  --    TSH 4.14* 2.37  --  3.11  --   --    < >    < > = values in this interval not displayed.      Hemoglobin A1c  Goal range is under 8%. Best is 6.5 to 7   Blood Pressure 126/66 Goal to keep less than 140/90   Tobacco  reports that he quit smoking about 38 years ago. His smoking use included cigarettes. He has never used smokeless tobacco. Goal to abstain from tobacco   Aspirin or Plavix Anti-platelet therapy Aspirin or Plavix reduces risk of heart disease and stroke  -- sometimes used with other blood thinners, depending on bleeding risk and risk  factors.    ACE/ARB Specific blood pressure meds These medications can reduce risk of kidney disease   Cholesterol Statins (Lipitor, Crestor, vs others) Statins reduce risk of heart disease and stroke   Eye Exam -- Do Yearly -- Annual diabetic eye exam   Healthy weight Wt Readings from Last 3 Encounters:   03/31/22 86.7 kg (191 lb 3.2 oz)   02/28/22 85.2 kg (187 lb 12.8 oz)   12/03/21 89.8 kg (198 lb)     Body mass index is 30.86 kg/m .  Goal BMI under 30, best is under 25.      -- Trying to exercise daily (goal at least 20 min/day) with moderate aerobic activity   -- Eat healthy (resources from ADA at http://www.diabetes.org/)   -- Taking good care of my feet. Consider seeing the Podiatrist   -- Check blood sugars as directed, record in log book and bring to every appointment    Insurance companies are grading you and I on your blood sugar control -- Goal is to get your A1c down to 7.9% or lower and NO Smoking!  -- Medicare and most insurance companies, will only cover Hemoglobin A1c labs to be rechecked every 91+ days.      Return for Diabetes labs and clinic follow-up appointment every 3 to 4 months.    Schedule lab only appointment --- A few days AFTER: 6/29/22   Schedule clinic appointment with Dr. Pickens -- Same day as labs, or 1-2 days later.

## 2022-03-31 NOTE — NURSING NOTE
"Chief Complaint   Patient presents with     Medicare Visit     annual       Initial /66   Pulse 68   Temp 97.5  F (36.4  C) (Temporal)   Resp 16   Ht 1.676 m (5' 6\")   Wt 86.7 kg (191 lb 3.2 oz)   SpO2 96%   BMI 30.86 kg/m   Estimated body mass index is 30.86 kg/m  as calculated from the following:    Height as of this encounter: 1.676 m (5' 6\").    Weight as of this encounter: 86.7 kg (191 lb 3.2 oz).  Medication Reconciliation: complete    FOOD SECURITY SCREENING QUESTIONS  Hunger Vital Signs:  Within the past 12 months we worried whether our food would run out before we got money to buy more. Never  Within the past 12 months the food we bought just didn't last and we didn't have money to get more. Never        Advance care directive on file? no  Advance care directive provided to patient? declined     Jamilah Restrepo LPN  "

## 2022-04-01 ASSESSMENT — ANXIETY QUESTIONNAIRES: GAD7 TOTAL SCORE: 4

## 2022-04-02 PROBLEM — F33.42 RECURRENT MAJOR DEPRESSIVE DISORDER, IN FULL REMISSION (H): Status: ACTIVE | Noted: 2022-04-02

## 2022-04-07 DIAGNOSIS — E11.21 TYPE 2 DIABETES MELLITUS WITH DIABETIC NEPHROPATHY, WITHOUT LONG-TERM CURRENT USE OF INSULIN (H): ICD-10-CM

## 2022-04-07 RX ORDER — BLOOD SUGAR DIAGNOSTIC
STRIP MISCELLANEOUS
Qty: 100 STRIP | Refills: 11 | Status: SHIPPED | OUTPATIENT
Start: 2022-04-07 | End: 2022-04-28

## 2022-04-11 ENCOUNTER — ALLIED HEALTH/NURSE VISIT (OUTPATIENT)
Dept: FAMILY MEDICINE | Facility: OTHER | Age: 77
End: 2022-04-11
Attending: INTERNAL MEDICINE
Payer: MEDICARE

## 2022-04-11 DIAGNOSIS — Z23 NEED FOR VACCINATION: Primary | ICD-10-CM

## 2022-04-11 PROCEDURE — 91305 COVID-19,PF,PFIZER (12+ YRS): CPT

## 2022-04-13 NOTE — PROGRESS NOTES
Immunization Documentation  Verified patient's first and last name, and . Stated reason for visit today is to receive COVID vaccine. Accompained to clinic visit with SELF. Denied any concerns with previous immunizations. Allergies reviewed. VIS handout(s) reviewed and given to take home. VACCINE prepared and administered per standing order. Administration documented in IMMUNIZATIONS (see flowsheet and order for further information).  Instructed to wait in lobby for 15 minutes post-injection and notify staff immediately of any reaction.     Stacey Kumar RN ....................

## 2022-04-14 ENCOUNTER — ALLIED HEALTH/NURSE VISIT (OUTPATIENT)
Dept: FAMILY MEDICINE | Facility: OTHER | Age: 77
End: 2022-04-14
Attending: INTERNAL MEDICINE
Payer: MEDICARE

## 2022-04-14 DIAGNOSIS — E53.8 B12 DEFICIENCY: Primary | ICD-10-CM

## 2022-04-14 PROCEDURE — 250N000011 HC RX IP 250 OP 636: Performed by: INTERNAL MEDICINE

## 2022-04-14 PROCEDURE — 96372 THER/PROPH/DIAG INJ SC/IM: CPT | Performed by: INTERNAL MEDICINE

## 2022-04-14 RX ADMIN — CYANOCOBALAMIN 1000 MCG: 1000 INJECTION, SOLUTION INTRAMUSCULAR at 10:53

## 2022-04-14 NOTE — PROGRESS NOTES
Verified patient's first and last name, and . Patient stated reason for visit today is to receive a B12 injection. Patient denied any concerns with previous injections. B12 prepared and administered IM as ordered. Administration of medication documented in MAR (see MAR for further information regarding dose, lot #, NDC #, expiration date). Patient encouraged to wait in lobby for 15 minutes post-injection and notify staff immediately of any reaction.     Stacey Kumar RN ....................  2022   10:50 AM

## 2022-04-28 ENCOUNTER — TELEPHONE (OUTPATIENT)
Dept: INTERNAL MEDICINE | Facility: OTHER | Age: 77
End: 2022-04-28

## 2022-04-28 ENCOUNTER — ALLIED HEALTH/NURSE VISIT (OUTPATIENT)
Dept: FAMILY MEDICINE | Facility: OTHER | Age: 77
End: 2022-04-28
Attending: INTERNAL MEDICINE
Payer: MEDICARE

## 2022-04-28 DIAGNOSIS — E11.21 TYPE 2 DIABETES MELLITUS WITH DIABETIC NEPHROPATHY, WITHOUT LONG-TERM CURRENT USE OF INSULIN (H): ICD-10-CM

## 2022-04-28 DIAGNOSIS — E53.8 B12 DEFICIENCY: Primary | ICD-10-CM

## 2022-04-28 PROCEDURE — 250N000011 HC RX IP 250 OP 636: Performed by: INTERNAL MEDICINE

## 2022-04-28 PROCEDURE — 96372 THER/PROPH/DIAG INJ SC/IM: CPT | Performed by: INTERNAL MEDICINE

## 2022-04-28 RX ORDER — BLOOD SUGAR DIAGNOSTIC
STRIP MISCELLANEOUS
Qty: 100 STRIP | Refills: 11 | Status: SHIPPED | OUTPATIENT
Start: 2022-04-28 | End: 2023-01-13

## 2022-04-28 RX ORDER — BLOOD SUGAR DIAGNOSTIC
STRIP MISCELLANEOUS
Refills: 0 | OUTPATIENT
Start: 2022-04-28

## 2022-04-28 RX ADMIN — CYANOCOBALAMIN 1000 MCG: 1000 INJECTION, SOLUTION INTRAMUSCULAR at 09:00

## 2022-04-28 NOTE — TELEPHONE ENCOUNTER
"Per note from pharmacy, \"Patient is wanting to get these through [facility] instead of [facility] and per Medicare guidelines the Rx is not transferable and they need a new script\". Juan Hansen RN, BSN  ....................  4/28/2022   1:49 PM      "

## 2022-04-28 NOTE — TELEPHONE ENCOUNTER
[facility] sent Rx request for the following:      Requested Prescriptions   Pending Prescriptions Disp Refills     ACCU-CHEK ONEL PLUS test strip [Pharmacy Med Name: ACCU-CHEK ONEL PLUS STRIP]  0     Sig: USE TO CHECK BLOOD SUGAR ONCE DAILY     Last Prescription Date:   4/26/22  Last Fill Qty/Refills:         50, R-PRN    Last Office Visit:              3/31/22   Future Office visit:           7/6/22  Unable to complete prescription refill per RN Medication Refill Policy. Redundant refill request refused: Too soon:    Linn Espinoza RN on 4/28/2022 at 10:01 AM

## 2022-04-28 NOTE — TELEPHONE ENCOUNTER
See second message regarding this same issue.  Gale Vanegas CMA([facility])..................4/28/2022   2:18 PM

## 2022-04-28 NOTE — PROGRESS NOTES
Verified patient's first and last name, and . Patient stated reason for visit today is to receive a B12 injection. Patient denied any concerns with previous injections. B12 prepared and administered IM as ordered. Administration of medication documented in MAR (see MAR for further information regarding dose, lot #, NDC #, expiration date). Patient encouraged to wait in lobby for 15 minutes post-injection and notify staff immediately of any reaction.     Stacey Kumar RN ....................  2022   8:57 AM

## 2022-04-28 NOTE — TELEPHONE ENCOUNTER
This patient has had test strips sent to [facility] this month but wants to have them filled at [facility].  Patient's wife doesn't want to call to have it transferred, just wants us to resend it.  [facility]. See Refill encounter.  Gale Vanegas CMA([facility])..................4/28/2022   2:16 PM

## 2022-04-28 NOTE — TELEPHONE ENCOUNTER
Patient is call regarding his test strips, he is out and needs them refilled. Please call pt      Halina Stein on 4/28/2022 at 1:35 PM

## 2022-05-12 ENCOUNTER — ALLIED HEALTH/NURSE VISIT (OUTPATIENT)
Dept: FAMILY MEDICINE | Facility: OTHER | Age: 77
End: 2022-05-12
Attending: INTERNAL MEDICINE
Payer: MEDICARE

## 2022-05-12 DIAGNOSIS — E53.8 B12 DEFICIENCY: Primary | ICD-10-CM

## 2022-05-12 PROCEDURE — 250N000011 HC RX IP 250 OP 636: Performed by: INTERNAL MEDICINE

## 2022-05-12 PROCEDURE — 96372 THER/PROPH/DIAG INJ SC/IM: CPT | Performed by: INTERNAL MEDICINE

## 2022-05-12 RX ADMIN — CYANOCOBALAMIN 1000 MCG: 1000 INJECTION, SOLUTION INTRAMUSCULAR at 09:14

## 2022-05-12 NOTE — PROGRESS NOTES
Verified patient's first and last name, and . Patient stated reason for visit today is to receive a B12 injection. Patient denied any concerns with previous injections. B12 prepared and administered IM as ordered. Administration of medication documented in MAR (see MAR for further information regarding dose, lot #, NDC #, expiration date). Patient encouraged to wait in lobby for 15 minutes post-injection and notify staff immediately of any reaction.     Stacey Kumar RN ....................  2022   9:11 AM

## 2022-05-26 ENCOUNTER — ALLIED HEALTH/NURSE VISIT (OUTPATIENT)
Dept: FAMILY MEDICINE | Facility: OTHER | Age: 77
End: 2022-05-26
Attending: INTERNAL MEDICINE
Payer: MEDICARE

## 2022-05-26 DIAGNOSIS — E53.8 B12 DEFICIENCY: Primary | ICD-10-CM

## 2022-05-26 PROCEDURE — 250N000011 HC RX IP 250 OP 636: Performed by: INTERNAL MEDICINE

## 2022-05-26 PROCEDURE — 96372 THER/PROPH/DIAG INJ SC/IM: CPT | Performed by: INTERNAL MEDICINE

## 2022-05-26 RX ADMIN — CYANOCOBALAMIN 1000 MCG: 1000 INJECTION, SOLUTION INTRAMUSCULAR at 09:07

## 2022-05-26 NOTE — PROGRESS NOTES
Verified patient's first and last name, and . Patient stated reason for visit today is to receive a B12 injection. Patient denied any concerns with previous injections. B12 prepared and administered IM as ordered. Administration of medication documented in MAR (see MAR for further information regarding dose, lot #, NDC #, expiration date). Patient encouraged to wait in lobby for 15 minutes post-injection and notify staff immediately of any reaction.     Effie Best RN ....................  2022   9:05 AM

## 2022-06-09 ENCOUNTER — ALLIED HEALTH/NURSE VISIT (OUTPATIENT)
Dept: FAMILY MEDICINE | Facility: OTHER | Age: 77
End: 2022-06-09
Attending: INTERNAL MEDICINE
Payer: MEDICARE

## 2022-06-09 DIAGNOSIS — E53.8 B12 DEFICIENCY: Primary | ICD-10-CM

## 2022-06-09 PROCEDURE — 96372 THER/PROPH/DIAG INJ SC/IM: CPT | Performed by: INTERNAL MEDICINE

## 2022-06-09 PROCEDURE — 250N000011 HC RX IP 250 OP 636: Performed by: INTERNAL MEDICINE

## 2022-06-09 RX ADMIN — CYANOCOBALAMIN 1000 MCG: 1000 INJECTION, SOLUTION INTRAMUSCULAR at 09:07

## 2022-06-09 NOTE — PROGRESS NOTES
Verified patient's first and last name, and . Patient stated reason for visit today is to receive a B12 injection. Patient denied any concerns with previous injections. B12 prepared and administered IM as ordered. Administration of medication documented in MAR (see MAR for further information regarding dose, lot #, NDC #, expiration date). Patient encouraged to wait in lobby for 15 minutes post-injection and notify staff immediately of any reaction.     Effie Best RN ....................  2022   9:03 AM

## 2022-06-21 ENCOUNTER — OFFICE VISIT (OUTPATIENT)
Dept: INTERNAL MEDICINE | Facility: OTHER | Age: 77
End: 2022-06-21
Attending: INTERNAL MEDICINE
Payer: COMMERCIAL

## 2022-06-21 VITALS
SYSTOLIC BLOOD PRESSURE: 134 MMHG | DIASTOLIC BLOOD PRESSURE: 60 MMHG | TEMPERATURE: 96.9 F | OXYGEN SATURATION: 97 % | WEIGHT: 193.4 LBS | BODY MASS INDEX: 31.22 KG/M2 | HEART RATE: 78 BPM | RESPIRATION RATE: 16 BRPM

## 2022-06-21 DIAGNOSIS — L02.92 BOIL: Primary | ICD-10-CM

## 2022-06-21 PROCEDURE — 99213 OFFICE O/P EST LOW 20 MIN: CPT | Performed by: INTERNAL MEDICINE

## 2022-06-21 PROCEDURE — G0463 HOSPITAL OUTPT CLINIC VISIT: HCPCS

## 2022-06-21 ASSESSMENT — ENCOUNTER SYMPTOMS
CONSTITUTIONAL NEGATIVE: 1
ENDOCRINE NEGATIVE: 1
ALLERGIC/IMMUNOLOGIC NEGATIVE: 1
EYES NEGATIVE: 1

## 2022-06-21 ASSESSMENT — PAIN SCALES - GENERAL: PAINLEVEL: SEVERE PAIN (6)

## 2022-06-21 NOTE — PROGRESS NOTES
Chief Complaint:  Sore on left buttock.    HPI: For the last 2 weeks he has noticed some discomfort and swelling in his left buttock region near the base of his testicles.  It is sore and tender.  There has been no drainage.  He has not had a fever.  There was no injury.    Past Medical History:   Diagnosis Date     Abnormal CT scan, bladder 6/29/2016     Diverticulosis of intestine without perforation or abscess without bleeding     No Comments Provided     Fatty (change of) liver, not elsewhere classified     non alcoholic     Fibromyalgia     No Comments Provided     Nodular prostate without lower urinary tract symptoms     2012,US confirms benign calcifications     Obesity     No Comments Provided     Panic disorder without agoraphobia     No Comments Provided     Positive colorectal cancer screening using Cologuard test 12/06/2018       Past Surgical History:   Procedure Laterality Date     ARTHROPLASTY KNEE      2015     ARTHROSCOPY KNEE      right knee X2     BIOPSY PROSTATE TRANSRECTAL      2013     COLONOSCOPY      2003,and [facility] [place] normal     COLONOSCOPY      2009,and [facility] repeat Dr. Johnson negative.     COLONOSCOPY  01/01/2013 2013,WNL     COLONOSCOPY  05/23/2019    [facility]-no follow up     HERNIA REPAIR  2000     RELEASE CARPAL TUNNEL      2014     VASECTOMY      No Comments Provided       Allergies   Allergen Reactions     Memantine Other (See Comments)     Dizzy, lightheaded  Dizzy, lightheaded     Rivastigmine Other (See Comments)     Dizzy, lightheaded  Dizzy, lightheaded     Ace Inhibitors Cough     Atorvastatin Calcium Muscle Pain (Myalgia)     Lipitor     Hmg-Coa-R Inhibitors Muscle Pain (Myalgia)     Higher doses cause myalgias       Current Outpatient Medications   Medication Sig Dispense Refill     ascorbic acid (VITAMIN C) 500 MG tablet Take 1 tablet by mouth daily. For 60 days       aspirin 81 MG tablet Take 81 mg by mouth daily       blood glucose (ACCU-CHEK ONEL PLUS) test strip USE   STRIP TO CHECK GLUCOSE Once DAILY 100 strip 11     Cholecalciferol (D 5000) 5000 UNITS TABS Take 5,000 Units by mouth daily       gabapentin (NEURONTIN) 100 MG capsule Take 1 capsule (100 mg) by mouth At Bedtime - for pain 90 capsule 1     levothyroxine (SYNTHROID/LEVOTHROID) 50 MCG tablet Take 1 tablet (50 mcg) by mouth daily - for Thyroid Replacement 93 tablet 1     metFORMIN (GLUCOPHAGE) 500 MG tablet Take 2 tablets (1,000 mg) by mouth daily (with breakfast) AND 1 tablet (500 mg) daily (with dinner). 270 tablet 0     nitroGLYcerin (NITROSTAT) 0.4 MG sublingual tablet Use every 5 minutes as needed for chest pain, if needing more than 3 doses then call 911 10 tablet 0     simvastatin (ZOCOR) 10 MG tablet Take 1 tablet (10 mg) by mouth At Bedtime -- Note pill size change -- No dose change -- 90 tablet 3     vitamin B complex with vitamin C (VITAMIN  B COMPLEX) TABS tablet Take 1 tablet by mouth daily Monday, Wednesday, Friday -- 3 days weekly -- make sure it has B12 in tablet -- Dose Reduced 7/11/2018 100 tablet 3       Review of Systems   Constitutional: Negative.    Eyes: Negative.    Endocrine: Negative.    Allergic/Immunologic: Negative.        Physical Exam  Vitals and nursing note reviewed.   Constitutional:       General: He is not in acute distress.     Appearance: Normal appearance. He is not ill-appearing, toxic-appearing or diaphoretic.   Genitourinary:      Neurological:      Mental Status: He is alert.         Assessment:        ICD-10-CM    1. Boil  L02.92        Plan: His findings are most consistent with an early boil.  This is not anything that is amenable to incision and drainage at this time.  Antibiotics are not indicated.  Situation was reviewed with the patient.  I suggested warm packs or sitz bath and watchful waiting.  If this does come to more of a head, he could always return for incision and drainage.  If it does not resolve on its own, potential surgical referral for evaluation and  possible excision.

## 2022-06-21 NOTE — NURSING NOTE
Patient there for swelling on the buttocks for the past 2 weeks. He says it is painful Medication Reconciliation: complete..     Kaylee Manning LPN  6/21/2022 4:17 PM

## 2022-06-23 ENCOUNTER — ALLIED HEALTH/NURSE VISIT (OUTPATIENT)
Dept: FAMILY MEDICINE | Facility: OTHER | Age: 77
End: 2022-06-23
Attending: INTERNAL MEDICINE
Payer: MEDICARE

## 2022-06-23 DIAGNOSIS — E53.8 B12 DEFICIENCY: Primary | ICD-10-CM

## 2022-06-23 PROCEDURE — 250N000011 HC RX IP 250 OP 636: Performed by: INTERNAL MEDICINE

## 2022-06-23 PROCEDURE — 96372 THER/PROPH/DIAG INJ SC/IM: CPT | Performed by: INTERNAL MEDICINE

## 2022-06-23 RX ADMIN — CYANOCOBALAMIN 1000 MCG: 1000 INJECTION, SOLUTION INTRAMUSCULAR at 09:00

## 2022-06-23 NOTE — PROGRESS NOTES
Verified patient's first and last name, and . Patient stated reason for visit today is to receive a B12 injection. Patient denied any concerns with previous injections. B12 prepared and administered IM as ordered. Administration of medication documented in MAR (see MAR for further information regarding dose, lot #, NDC #, expiration date). Patient encouraged to wait in lobby for 15 minutes post-injection and notify staff immediately of any reaction.     Effie Best RN ....................  2022   8:57 AM

## 2022-07-05 ENCOUNTER — LAB (OUTPATIENT)
Dept: LAB | Facility: OTHER | Age: 77
End: 2022-07-05
Attending: INTERNAL MEDICINE
Payer: MEDICARE

## 2022-07-05 ENCOUNTER — ALLIED HEALTH/NURSE VISIT (OUTPATIENT)
Dept: FAMILY MEDICINE | Facility: OTHER | Age: 77
End: 2022-07-05
Attending: INTERNAL MEDICINE
Payer: MEDICARE

## 2022-07-05 DIAGNOSIS — E11.21 TYPE 2 DIABETES MELLITUS WITH DIABETIC NEPHROPATHY, WITHOUT LONG-TERM CURRENT USE OF INSULIN (H): ICD-10-CM

## 2022-07-05 DIAGNOSIS — E78.2 MIXED HYPERLIPIDEMIA: ICD-10-CM

## 2022-07-05 DIAGNOSIS — E53.8 B12 DEFICIENCY: Primary | ICD-10-CM

## 2022-07-05 LAB
ALBUMIN SERPL-MCNC: 4.5 G/DL (ref 3.5–5.7)
ALBUMIN UR-MCNC: NEGATIVE MG/DL
ALP SERPL-CCNC: 48 U/L (ref 34–104)
ALT SERPL W P-5'-P-CCNC: 14 U/L (ref 7–52)
ANION GAP SERPL CALCULATED.3IONS-SCNC: 7 MMOL/L (ref 3–14)
APPEARANCE UR: CLEAR
AST SERPL W P-5'-P-CCNC: 17 U/L (ref 13–39)
BILIRUB SERPL-MCNC: 0.9 MG/DL (ref 0.3–1)
BILIRUB UR QL STRIP: NEGATIVE
BUN SERPL-MCNC: 15 MG/DL (ref 7–25)
CALCIUM SERPL-MCNC: 9.4 MG/DL (ref 8.6–10.3)
CHLORIDE BLD-SCNC: 105 MMOL/L (ref 98–107)
CHOLEST SERPL-MCNC: 122 MG/DL
CO2 SERPL-SCNC: 28 MMOL/L (ref 21–31)
COLOR UR AUTO: NORMAL
CREAT SERPL-MCNC: 0.83 MG/DL (ref 0.7–1.3)
CREAT UR-MCNC: 87.3 MG/DL
ERYTHROCYTE [DISTWIDTH] IN BLOOD BY AUTOMATED COUNT: 12.8 % (ref 10–15)
FASTING STATUS PATIENT QL REPORTED: NORMAL
GFR SERPL CREATININE-BSD FRML MDRD: 89 ML/MIN/1.73M2
GLUCOSE BLD-MCNC: 100 MG/DL (ref 70–105)
GLUCOSE UR STRIP-MCNC: NEGATIVE MG/DL
HBA1C MFR BLD: 6.6 % (ref 4–6.2)
HCT VFR BLD AUTO: 44.6 % (ref 40–53)
HDLC SERPL-MCNC: 46 MG/DL (ref 23–92)
HGB BLD-MCNC: 14.6 G/DL (ref 13.3–17.7)
HGB UR QL STRIP: NEGATIVE
KETONES UR STRIP-MCNC: NEGATIVE MG/DL
LDLC SERPL CALC-MCNC: 66 MG/DL
LEUKOCYTE ESTERASE UR QL STRIP: NEGATIVE
MCH RBC QN AUTO: 30 PG (ref 26.5–33)
MCHC RBC AUTO-ENTMCNC: 32.7 G/DL (ref 31.5–36.5)
MCV RBC AUTO: 92 FL (ref 78–100)
MICROALBUMIN UR-MCNC: <12 MG/L
MICROALBUMIN/CREAT UR: NORMAL MG/G{CREAT}
NITRATE UR QL: NEGATIVE
NONHDLC SERPL-MCNC: 76 MG/DL
PH UR STRIP: 6.5 [PH] (ref 5–9)
PLATELET # BLD AUTO: 228 10E3/UL (ref 150–450)
POTASSIUM BLD-SCNC: 3.8 MMOL/L (ref 3.5–5.1)
PROT SERPL-MCNC: 7 G/DL (ref 6.4–8.9)
RBC # BLD AUTO: 4.87 10E6/UL (ref 4.4–5.9)
SODIUM SERPL-SCNC: 140 MMOL/L (ref 134–144)
SP GR UR STRIP: 1.01 (ref 1–1.03)
TRIGL SERPL-MCNC: 51 MG/DL
TSH SERPL DL<=0.005 MIU/L-ACNC: 0.9 MU/L (ref 0.4–4)
UROBILINOGEN UR STRIP-MCNC: NORMAL MG/DL
WBC # BLD AUTO: 5.1 10E3/UL (ref 4–11)

## 2022-07-05 PROCEDURE — 84443 ASSAY THYROID STIM HORMONE: CPT | Mod: ZL

## 2022-07-05 PROCEDURE — 81003 URINALYSIS AUTO W/O SCOPE: CPT | Mod: ZL

## 2022-07-05 PROCEDURE — 80053 COMPREHEN METABOLIC PANEL: CPT | Mod: ZL

## 2022-07-05 PROCEDURE — 85027 COMPLETE CBC AUTOMATED: CPT | Mod: ZL

## 2022-07-05 PROCEDURE — 80061 LIPID PANEL: CPT | Mod: ZL

## 2022-07-05 PROCEDURE — 82043 UR ALBUMIN QUANTITATIVE: CPT | Mod: ZL

## 2022-07-05 PROCEDURE — 96372 THER/PROPH/DIAG INJ SC/IM: CPT | Performed by: INTERNAL MEDICINE

## 2022-07-05 PROCEDURE — 250N000011 HC RX IP 250 OP 636: Performed by: INTERNAL MEDICINE

## 2022-07-05 PROCEDURE — 83036 HEMOGLOBIN GLYCOSYLATED A1C: CPT | Mod: ZL

## 2022-07-05 PROCEDURE — 36415 COLL VENOUS BLD VENIPUNCTURE: CPT | Mod: ZL

## 2022-07-05 RX ADMIN — CYANOCOBALAMIN 1000 MCG: 1000 INJECTION, SOLUTION INTRAMUSCULAR at 09:06

## 2022-07-05 NOTE — PROGRESS NOTES
Verified patient's first and last name, and . Patient stated reason for visit today is to receive a B12 injection. Patient denied any concerns with previous injections. B12 prepared and administered IM as ordered. Administration of medication documented in MAR (see MAR for further information regarding dose, lot #, NDC #, expiration date). Patient encouraged to wait in lobby for 15 minutes post-injection and notify staff immediately of any reaction.     Stacey Kumar RN ....................  2022   9:03 AM

## 2022-07-06 ENCOUNTER — OFFICE VISIT (OUTPATIENT)
Dept: INTERNAL MEDICINE | Facility: OTHER | Age: 77
End: 2022-07-06
Attending: INTERNAL MEDICINE
Payer: COMMERCIAL

## 2022-07-06 VITALS
TEMPERATURE: 97.1 F | DIASTOLIC BLOOD PRESSURE: 62 MMHG | HEART RATE: 66 BPM | BODY MASS INDEX: 30.51 KG/M2 | OXYGEN SATURATION: 97 % | RESPIRATION RATE: 16 BRPM | WEIGHT: 189 LBS | SYSTOLIC BLOOD PRESSURE: 132 MMHG

## 2022-07-06 DIAGNOSIS — J41.8 MIXED SIMPLE AND MUCOPURULENT CHRONIC BRONCHITIS (H): ICD-10-CM

## 2022-07-06 DIAGNOSIS — E11.21 TYPE 2 DIABETES MELLITUS WITH DIABETIC NEPHROPATHY, WITHOUT LONG-TERM CURRENT USE OF INSULIN (H): ICD-10-CM

## 2022-07-06 DIAGNOSIS — E03.4 HYPOTHYROIDISM DUE TO ACQUIRED ATROPHY OF THYROID: ICD-10-CM

## 2022-07-06 DIAGNOSIS — E11.40 CONTROLLED TYPE 2 DIABETES MELLITUS WITH DIABETIC NEUROPATHY, WITHOUT LONG-TERM CURRENT USE OF INSULIN (H): Primary | ICD-10-CM

## 2022-07-06 DIAGNOSIS — I10 BENIGN ESSENTIAL HYPERTENSION: ICD-10-CM

## 2022-07-06 DIAGNOSIS — M21.611 BUNION, RIGHT: ICD-10-CM

## 2022-07-06 DIAGNOSIS — H61.23 BILATERAL HEARING LOSS DUE TO CERUMEN IMPACTION: ICD-10-CM

## 2022-07-06 DIAGNOSIS — E78.2 MIXED HYPERLIPIDEMIA: ICD-10-CM

## 2022-07-06 DIAGNOSIS — I25.10 CORONARY ARTERY DISEASE INVOLVING NATIVE CORONARY ARTERY OF NATIVE HEART WITHOUT ANGINA PECTORIS: ICD-10-CM

## 2022-07-06 DIAGNOSIS — F03.90: ICD-10-CM

## 2022-07-06 DIAGNOSIS — M21.612 BUNION, LEFT: ICD-10-CM

## 2022-07-06 DIAGNOSIS — E53.8 VITAMIN B12 DEFICIENCY: ICD-10-CM

## 2022-07-06 PROCEDURE — 69210 REMOVE IMPACTED EAR WAX UNI: CPT | Performed by: INTERNAL MEDICINE

## 2022-07-06 PROCEDURE — G0463 HOSPITAL OUTPT CLINIC VISIT: HCPCS

## 2022-07-06 PROCEDURE — 99214 OFFICE O/P EST MOD 30 MIN: CPT | Mod: 25 | Performed by: INTERNAL MEDICINE

## 2022-07-06 PROCEDURE — G0463 HOSPITAL OUTPT CLINIC VISIT: HCPCS | Mod: 25

## 2022-07-06 RX ORDER — SIMVASTATIN 10 MG
10 TABLET ORAL AT BEDTIME
Qty: 90 TABLET | Refills: 3 | Status: SHIPPED | OUTPATIENT
Start: 2022-07-06 | End: 2022-10-05

## 2022-07-06 RX ORDER — GABAPENTIN 100 MG/1
100 CAPSULE ORAL AT BEDTIME
Qty: 90 CAPSULE | Refills: 1 | Status: SHIPPED | OUTPATIENT
Start: 2022-07-06 | End: 2022-10-05

## 2022-07-06 RX ORDER — LEVOTHYROXINE SODIUM 50 UG/1
50 TABLET ORAL DAILY
Qty: 93 TABLET | Refills: 1 | Status: SHIPPED | OUTPATIENT
Start: 2022-07-06 | End: 2022-10-05

## 2022-07-06 ASSESSMENT — ENCOUNTER SYMPTOMS
NERVOUS/ANXIOUS: 1
SHORTNESS OF BREATH: 0
BRUISES/BLEEDS EASILY: 0
BACK PAIN: 1
LIGHT-HEADEDNESS: 1
EYE REDNESS: 1
WEAKNESS: 1
ARTHRALGIAS: 1
DIZZINESS: 1
WHEEZING: 0
DIARRHEA: 0
COUGH: 1
NUMBNESS: 1
ABDOMINAL PAIN: 0
VOMITING: 0
FATIGUE: 1
CHILLS: 0
DYSURIA: 0
EYE PAIN: 1
HEMATURIA: 0
NAUSEA: 0
MYALGIAS: 1
PALPITATIONS: 0
AGITATION: 0
FEVER: 0
SLEEP DISTURBANCE: 0

## 2022-07-06 ASSESSMENT — PAIN SCALES - GENERAL: PAINLEVEL: MILD PAIN (2)

## 2022-07-06 NOTE — PROGRESS NOTES
Assessment & Plan       ICD-10-CM    1. Controlled type 2 diabetes mellitus with diabetic neuropathy, without long-term current use of insulin (H)  E11.40 gabapentin (NEURONTIN) 100 MG capsule     metFORMIN (GLUCOPHAGE) 500 MG tablet     Ankle/Foot Bracing Supplies Order   2. Type 2 diabetes mellitus with diabetic nephropathy, without long-term current use of insulin (H)  E11.21 gabapentin (NEURONTIN) 100 MG capsule   3. Benign essential hypertension  I10    4. Coronary artery disease involving native coronary artery of native heart without angina pectoris  I25.10    5. Mixed hyperlipidemia  E78.2 simvastatin (ZOCOR) 10 MG tablet   6. Mixed simple and mucopurulent chronic bronchitis (H)  J41.8    7. 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 Vitamin B12 deficiency  E53.8    9. Hypothyroidism due to acquired atrophy of thyroid  E03.4 levothyroxine (SYNTHROID/LEVOTHROID) 50 MCG tablet   10. Bilateral hearing loss due to cerumen impaction  H61.23 Removal Impacted Cerumen - [facility] ONLY   11. Bunion, left  M21.612 Ankle/Foot Bracing Supplies Order   12. Bunion, right  M21.611 Ankle/Foot Bracing Supplies Order   Patient presents for follow-up multiple issues.    Type 2 diabetes, ongoing.  Well-controlled with metformin.  No insulin use.  Continues with gabapentin due to diabetic neuropathy.  He has diabetic nephropathy.  Denies hypoglycemia.  Tolerating well.  Occasionally chills.    Patient has neuropathy and foot deformity with bunions.  He needs new diabetic shoes and inserts.    Hypertension, blood pressure is currently well controlled.  Doing well with her medications.  Denies syncope or presyncope.  No changes for now.    Coronary disease.  Denies exertional angina.  Doing well with current medications.  No changes for now.    Mixed hyperlipidemia.  Cluster cholesterol levels are well controlled with simvastatin.  All of his labs are at goal.  No  changes.  Refills sent to pharmacy.    Mixed chronic bronchitis, reports breathing is doing well at this time.  No changes for now.    Alzheimer's disease without behavioral disturbance.  Reports his memory is not great and it is getting slightly worse.  He is quite active.  He tries to keep his brain busy.    Vitamin B12 deficiency.  Continues with replacement.  Ongoing dosing.    Hypothyroidism.  New diagnosis at his last appointment.  We started Synthroid.  TSH is now at goal range.  Continue current dosing.  Needs refills.    Hearing loss, cerumen impaction - bilaterally noted on exam.  He would like his ears cleaned.  See below.    Encouraged regular exercise.     Return in about 3 months (around 10/6/2022) for - Labs every 91+ days, DM Follow-up 1-2 days later, with Dr. Pickens.      Referral placed for orthotics consult.    Patient was: Referred for Diabetic Shoes and Diabetic Inserts.      Cm Pickens MD  [facility] AND \[facility] [place] [facility]\""     Alisha Graff is a 77 year old, presenting for the following health issues:  Follow Up (3 month diabetic check)      HPI     Diabetes Follow-up    How often are you checking your blood sugar? One time daily  What time of day are you checking your blood sugars (select all that apply)?  between 7am - 8am   Have you had any blood sugars above 200?  No  Have you had any blood sugars below 70?  No    What symptoms do you notice when your blood sugar is low?   Weak    What concerns do you have today about your diabetes? None     Do you have any of these symptoms? (Select all that apply)  Numbness in feet, Burning in feet, Blurry vision and Weight loss        How many servings of fruits and vegetables do you eat daily?  2-3    On average, how many sweetened beverages do you drink each day (Examples: soda, juice, sweet tea, etc.  Do NOT count diet or artificially sweetened beverages)?   0    How many days per week do you exercise enough to make your heart beat faster?  6    How many minutes a day do you exercise enough to make your heart beat faster? 60 or more    How many days per week do you miss taking your medication? 0    Review of Systems   Constitutional: Positive for fatigue. Negative for chills and fever.   HENT: Positive for hearing loss. Negative for congestion and nosebleeds.    Eyes: Positive for pain and redness. Negative for visual disturbance.        + chronic dry eyes, tried many eye drops, not much improvement - told needs eye surgery   Respiratory: Positive for cough (clear phlegm - months). Negative for shortness of breath and wheezing.    Cardiovascular: Positive for chest pain. Negative for palpitations.        Cardiac stress testing 3/2020 - negative/normal results.   Gastrointestinal: Negative for abdominal pain, diarrhea, nausea and vomiting.        + left inguinal pains intermittently   Endocrine: Negative for cold intolerance and heat intolerance.   Genitourinary: Negative for dysuria and hematuria.   Musculoskeletal: Positive for arthralgias (left knee), back pain (left low back / buttocks pain), gait problem (staggering at times, low back and left knee pain) and myalgias.        Bilateral feet with bunions   Skin: Negative for pallor.   Allergic/Immunologic: Negative for immunocompromised state.   Neurological: Positive for dizziness (+ Spinning with change in position), weakness, light-headedness and numbness (right > left feet).   Hematological: Does not bruise/bleed easily.   Psychiatric/Behavioral: Negative for agitation and sleep disturbance. The patient is nervous/anxious.         + short term memory loss -- stopped Exelon and Namenda -- due to side effects.   + reporting less anxiety / depression - virus pandemic had worsened everything          Objective    /62 (BP Location: Right arm, Patient Position: Sitting, Cuff Size: Adult Regular)   Pulse 66   Temp 97.1  F (36.2  C) (Tympanic)   Resp 16   Wt 85.7 kg (189 lb)   SpO2 97%   BMI  30.51 kg/m    Body mass index is 30.51 kg/m .  Physical Exam  Constitutional:       General: He is not in acute distress.     Appearance: He is well-developed. He is not diaphoretic.   HENT:      Head: Normocephalic and atraumatic.      Right Ear: There is impacted cerumen.      Left Ear: There is impacted cerumen.      Ears:      Comments: Right - abnormal external canal exam - cerumenosis impacting canal, cerumen removed with manual debridement (lighted loop and Alligator forceps), Mild erythema of canal and normal TM noted after wax removal     Left - abnormal external canal exam - cerumenosis impacting canal, cerumen removed with manual debridement (lighted loop and Alligator forceps), Mild erythema of canal and normal TM noted after wax removal   Eyes:      General: No scleral icterus.     Comments: + dry, irritated eyes   Neck:      Vascular: No carotid bruit.   Cardiovascular:      Rate and Rhythm: Normal rate and regular rhythm.      Pulses: Normal pulses.   Pulmonary:      Effort: Pulmonary effort is normal.      Breath sounds: Normal breath sounds.   Abdominal:      Palpations: Abdomen is soft.      Tenderness: There is no abdominal tenderness.   Musculoskeletal:         General: Tenderness (left > right gluteal and SI joint pain to palpation) present. No deformity.      Cervical back: Neck supple.      Right lower leg: No edema.      Left lower leg: No edema.   Lymphadenopathy:      Cervical: No cervical adenopathy.   Skin:     General: Skin is warm and dry.      Findings: No rash.   Neurological:      Mental Status: He is alert and oriented to person, place, and time. Mental status is at baseline.      Comments: short term memory loss   Psychiatric:         Behavior: Behavior is slowed.         Thought Content: Thought content normal.         Judgment: Judgment normal.      Comments: Anxious and flat affect / depressed.          Recent Labs   Lab Test 07/05/22  0848 03/29/22  0953 12/02/21  0956  08/31/21  0848 03/17/21  1659 01/26/21  1238   A1C 6.6* 6.6* 6.6*   < > 6.4*  --    LDL 66 76 69   < > 55  --    HDL 46 41 43   < > 43  --    TRIG 51 107 57   < > 112  --    ALT 14 16 14   < > 11 13   CR 0.83 0.83 0.82   < > 0.80 0.78   GFRESTIMATED 89 90 85   < > >90 >90   GFRESTBLACK  --   --   --   --  >90 >90   POTASSIUM 3.8 3.9 3.9   < > 4.6 3.9   TSH 0.90 4.14* 2.37   < >  --   --     < > = values in this interval not displayed.   Hemoglobin A1c is well controlled.  LDL HDL and triglycerides are all at goal.  ALT is normal.  Creatinine is at baseline.  Calcium and TSH are normal.  Potassium is normal.              .  ..

## 2022-07-06 NOTE — PATIENT INSTRUCTIONS
Blood pressure is well controlled.   Diabetes is well controlled.     Medications refilled.   Labs are stable.     --- Ears cleared out by Dr. Pickens today.     Lab on 07/05/2022   Component Date Value Ref Range Status    Sodium 07/05/2022 140  134 - 144 mmol/L Final    Potassium 07/05/2022 3.8  3.5 - 5.1 mmol/L Final    Chloride 07/05/2022 105  98 - 107 mmol/L Final    Carbon Dioxide (CO2) 07/05/2022 28  21 - 31 mmol/L Final    Anion Gap 07/05/2022 7  3 - 14 mmol/L Final    Urea Nitrogen 07/05/2022 15  7 - 25 mg/dL Final    Creatinine 07/05/2022 0.83  0.70 - 1.30 mg/dL Final    Calcium 07/05/2022 9.4  8.6 - 10.3 mg/dL Final    Glucose 07/05/2022 100  70 - 105 mg/dL Final    Alkaline Phosphatase 07/05/2022 48  34 - 104 U/L Final    AST 07/05/2022 17  13 - 39 U/L Final    ALT 07/05/2022 14  7 - 52 U/L Final    Protein Total 07/05/2022 7.0  6.4 - 8.9 g/dL Final    Albumin 07/05/2022 4.5  3.5 - 5.7 g/dL Final    Bilirubin Total 07/05/2022 0.9  0.3 - 1.0 mg/dL Final    GFR Estimate 07/05/2022 89  >60 mL/min/1.73m2 Final    Effective December 21, 2021 eGFRcr in adults is calculated using the 2021 CKD-EPI creatinine equation which includes age and gender (Orion et al., NEJM, DOI: 10.1056/RIMLlp4117655)    Cholesterol 07/05/2022 122  <200 mg/dL Final    Triglycerides 07/05/2022 51  <150 mg/dL Final    Direct Measure HDL 07/05/2022 46  23 - 92 mg/dL Final    LDL Cholesterol Calculated 07/05/2022 66  <=100 mg/dL Final    Non HDL Cholesterol 07/05/2022 76  <130 mg/dL Final    Patient Fasting > 8hrs? 07/05/2022 Unknown   Final    WBC Count 07/05/2022 5.1  4.0 - 11.0 10e3/uL Final    RBC Count 07/05/2022 4.87  4.40 - 5.90 10e6/uL Final    Hemoglobin 07/05/2022 14.6  13.3 - 17.7 g/dL Final    Hematocrit 07/05/2022 44.6  40.0 - 53.0 % Final    MCV 07/05/2022 92  78 - 100 fL Final    MCH 07/05/2022 30.0  26.5 - 33.0 pg Final    MCHC 07/05/2022 32.7  31.5 - 36.5 g/dL Final    RDW 07/05/2022 12.8  10.0 - 15.0 % Final    Platelet  Count 07/05/2022 228  150 - 450 10e3/uL Final    Hemoglobin A1C 07/05/2022 6.6 (A) 4.0 - 6.2 % Final    TSH 07/05/2022 0.90  0.40 - 4.00 mU/L Final    Albumin Urine mg/L 07/05/2022 <12.0  mg/L Final    The reference ranges have not been established in urine albumin. The results should be integrated into the clinical context for interpretation.    Albumin Urine mg/g Cr 07/05/2022    Final    Unable to calculate, urine albumin and/or urine creatinine is outside detectable limits.  Microalbuminuria is defined as an albumin:creatinine ratio of 17 to 299 for males and 25 to 299 for females. A ratio of albumin:creatinine of 300 or higher is indicative of overt proteinuria.  Due to biologic variability, positive results should be confirmed by a second, first-morning random or 24-hour timed urine specimen. If there is discrepancy, a third specimen is recommended. When 2 out of 3 results are in the microalbuminuria range, this is evidence for incipient nephropathy and warrants increased efforts at glucose control, blood pressure control, and institution of therapy with an angiotensin-converting-enzyme (ACE) inhibitor (if the patient can tolerate it).      Creatinine Urine mg/dL 07/05/2022 87.3  mg/dL Final    The reference ranges have not been established in urine creatinine. The results should be integrated into the clinical context for interpretation.    Color Urine 07/05/2022 Light Yellow  Colorless, Straw, Light Yellow, Yellow Final    Appearance Urine 07/05/2022 Clear  Clear Final    Glucose Urine 07/05/2022 Negative  Negative mg/dL Final    Bilirubin Urine 07/05/2022 Negative  Negative Final    Ketones Urine 07/05/2022 Negative  Negative mg/dL Final    Specific Gravity Urine 07/05/2022 1.015  1.000 - 1.030 Final    Blood Urine 07/05/2022 Negative  Negative Final    pH Urine 07/05/2022 6.5  5.0 - 9.0 Final    Protein Albumin Urine 07/05/2022 Negative  Negative mg/dL Final    Urobilinogen Urine 07/05/2022 Normal  Normal,  2.0 mg/dL Final    Nitrite Urine 07/05/2022 Negative  Negative Final    Leukocyte Esterase Urine 07/05/2022 Negative  Negative Final      Aspects of Diabetes:   Recent Labs   Lab Test 07/05/22  0848 03/29/22  0953 12/02/21  0956 08/31/21  0848 03/17/21  1659 01/26/21  1238   A1C 6.6* 6.6* 6.6*   < > 6.4*  --    LDL 66 76 69   < > 55  --    HDL 46 41 43   < > 43  --    TRIG 51 107 57   < > 112  --    ALT 14 16 14   < > 11 13   CR 0.83 0.83 0.82   < > 0.80 0.78   GFRESTIMATED 89 90 85   < > >90 >90   GFRESTBLACK  --   --   --   --  >90 >90   POTASSIUM 3.8 3.9 3.9   < > 4.6 3.9   TSH 0.90 4.14* 2.37   < >  --   --     < > = values in this interval not displayed.      Hemoglobin A1c  Goal range is under 8%. Best is 6.5 to 7   Blood Pressure 132/62 Goal to keep less than 140/90   Tobacco  reports that he quit smoking about 38 years ago. His smoking use included cigarettes. He has never used smokeless tobacco. Goal to abstain from tobacco   Aspirin or Plavix Anti-platelet therapy Aspirin or Plavix reduces risk of heart disease and stroke  -- sometimes used with other blood thinners, depending on bleeding risk and risk factors.    ACE/ARB Specific blood pressure meds These medications can reduce risk of kidney disease   Cholesterol Statins (Lipitor, Crestor, vs others) Statins reduce risk of heart disease and stroke   Eye Exam -- Do Yearly -- Annual diabetic eye exam   Healthy weight Wt Readings from Last 3 Encounters:   07/06/22 85.7 kg (189 lb)   06/21/22 87.7 kg (193 lb 6.4 oz)   03/31/22 86.7 kg (191 lb 3.2 oz)     Body mass index is 30.51 kg/m .  Goal BMI under 30, best is under 25.      -- Trying to exercise daily (goal at least 20 min/day) with moderate aerobic activity   -- Eat healthy (resources from ADA at http://www.diabetes.org/)   -- Taking good care of my feet. Consider seeing the Podiatrist   -- Check blood sugars as directed, record in log book and bring to every appointment    Insurance companies are  grading you and I on your blood sugar control -- Goal is to get your A1c down to 7.9% or lower and NO Smoking!  -- Medicare and most insurance companies, will only cover Hemoglobin A1c labs to be rechecked every 91+ days.      Return for Diabetes labs and clinic follow-up appointment every 3 to 4 months.    Schedule lab only appointment --- A few days AFTER: 10/4/22   Schedule clinic appointment with Dr. Pickens -- Same day as labs, or 1-2 days later.

## 2022-07-06 NOTE — NURSING NOTE
"Chief Complaint   Patient presents with     Follow Up     3 month diabetic check         Initial /62 (BP Location: Right arm, Patient Position: Sitting, Cuff Size: Adult Regular)   Pulse 66   Temp 97.1  F (36.2  C) (Tympanic)   Resp 16   Wt 85.7 kg (189 lb)   SpO2 97%   BMI 30.51 kg/m   Estimated body mass index is 30.51 kg/m  as calculated from the following:    Height as of 3/31/22: 1.676 m (5' 6\").    Weight as of this encounter: 85.7 kg (189 lb).       Medication Reconciliation: Complete    Deirdre Bailey LPN .......  7/6/2022  4:00 PM   "

## 2022-08-02 ENCOUNTER — TELEPHONE (OUTPATIENT)
Dept: INTERNAL MEDICINE | Facility: OTHER | Age: 77
End: 2022-08-02

## 2022-08-02 DIAGNOSIS — F33.42 RECURRENT MAJOR DEPRESSIVE DISORDER, IN FULL REMISSION (H): Primary | ICD-10-CM

## 2022-08-02 RX ORDER — CITALOPRAM HYDROBROMIDE 10 MG/1
10 TABLET ORAL DAILY
Qty: 90 TABLET | Refills: 3 | Status: SHIPPED | OUTPATIENT
Start: 2022-08-02 | End: 2023-04-04

## 2022-08-02 NOTE — TELEPHONE ENCOUNTER
"  Last Prescription Date: 9/1/21, DC'd 3/31/22 Reason for Discontinue: Stopped by Patient. Per note 3/31/22 office visit: \"History of major depression.  Currently in remission.  Doing well with BuSpar, citalopram.\"  Last Qty/Refills: 90 / 3  Last Office Visit: 7/6/22  Future Office Visit: 8/4/22    "

## 2022-08-02 NOTE — TELEPHONE ENCOUNTER
Reason for call: Medication or medication refill    Name of medication requested: Citalopram    Are you out of the medication? Yes, this was an old prescription, wants to restart this    What pharmacy do you use? [facility]    Preferred method for responding to this message: Telephone Call    Phone number patient can be reached at: Cell number on file:    Telephone Information:   Mobile [phone]       If we cannot reach you directly, may we leave a detailed response at the number you provided? Yes

## 2022-08-02 NOTE — TELEPHONE ENCOUNTER
Refill request for Citalopram 10 mg, previously completed earlier today, will close encounter. Evy Mercado RN on 8/2/2022 at 12:50 PM

## 2022-08-10 ENCOUNTER — OFFICE VISIT (OUTPATIENT)
Dept: FAMILY MEDICINE | Facility: OTHER | Age: 77
End: 2022-08-10
Attending: NURSE PRACTITIONER
Payer: COMMERCIAL

## 2022-08-10 ENCOUNTER — HOSPITAL ENCOUNTER (OUTPATIENT)
Dept: GENERAL RADIOLOGY | Facility: OTHER | Age: 77
Discharge: HOME | End: 2022-08-10
Attending: PHYSICIAN ASSISTANT
Payer: MEDICARE

## 2022-08-10 ENCOUNTER — NURSE TRIAGE (OUTPATIENT)
Dept: INTERNAL MEDICINE | Facility: OTHER | Age: 77
End: 2022-08-10

## 2022-08-10 VITALS
BODY MASS INDEX: 30.26 KG/M2 | WEIGHT: 188.3 LBS | DIASTOLIC BLOOD PRESSURE: 70 MMHG | OXYGEN SATURATION: 95 % | RESPIRATION RATE: 18 BRPM | TEMPERATURE: 98.1 F | HEART RATE: 65 BPM | SYSTOLIC BLOOD PRESSURE: 140 MMHG | HEIGHT: 66 IN

## 2022-08-10 DIAGNOSIS — R05.3 CHRONIC COUGH: ICD-10-CM

## 2022-08-10 DIAGNOSIS — R09.89 CHEST CONGESTION: Primary | ICD-10-CM

## 2022-08-10 PROCEDURE — 99214 OFFICE O/P EST MOD 30 MIN: CPT | Performed by: PHYSICIAN ASSISTANT

## 2022-08-10 PROCEDURE — G0463 HOSPITAL OUTPT CLINIC VISIT: HCPCS

## 2022-08-10 PROCEDURE — 71046 X-RAY EXAM CHEST 2 VIEWS: CPT

## 2022-08-10 PROCEDURE — G0463 HOSPITAL OUTPT CLINIC VISIT: HCPCS | Mod: 25

## 2022-08-10 ASSESSMENT — PAIN SCALES - GENERAL: PAINLEVEL: MILD PAIN (2)

## 2022-08-10 NOTE — PATIENT INSTRUCTIONS
ENT (ear, nose and throat) referral - Dr. Fallon who comes to [facility]     Pulmonology referral     They will call you to schedule these visits    Good news - chest x-ray looks great today - no signs of bronchitis, no signs of pneumonia. Heart is normal in size. No signs of mass.

## 2022-08-10 NOTE — TELEPHONE ENCOUNTER
Due to pt age and his wife home recovering from a recent heart attack along with pt symptoms worsening over the past week, did [facility], there were no opening in clinic this date.  Verbal update given to [facility] clinic.  Pt was in agreement with plan of care.  Chanel Ko RN on 8/10/2022 at 12:10 PM    Reason for Disposition    [1] HIGH RISK for severe COVID complications (e.g., weak immune system, age > 64 years, obesity with BMI of 30 or higher, pregnant, chronic lung disease or other chronic medical condition) AND [2] COVID symptoms (e.g., cough, fever)  (Exceptions: Already seen by PCP and no new or worsening symptoms.)    Additional Information    Negative: SEVERE difficulty breathing (e.g., struggling for each breath, speaks in single words)    Negative: Difficult to awaken or acting confused (e.g., disoriented, slurred speech)    Negative: Bluish (or gray) lips or face now    Negative: Shock suspected (e.g., cold/pale/clammy skin, too weak to stand, low BP, rapid pulse)    Negative: Sounds like a life-threatening emergency to the triager    Negative: [1] Lives with someone known to have influenza (flu test positive) AND [2] flu-like symptoms (e.g., cough, runny nose, sore throat, SOB; with or without fever)    Negative: COVID-19 vaccine, questions about    Negative: COVID-19 vaccine reaction suspected (e.g., fever, headache, muscle aches) occurring 1 to 3 days after getting vaccine    Negative: [1] COVID-19 exposure AND [2] no symptoms    Negative: [1] Diagnosed or suspected COVID-19 AND [2] symptoms lasting 3 or more weeks    Negative: [1] Adult with possible COVID-19 symptoms AND [2] triager concerned about severity of symptoms or other causes    Negative: COVID-19 and breastfeeding, questions about    Negative: SEVERE or constant chest pain or pressure  (Exception: Mild central chest pain, present only when coughing.)    Negative: MODERATE difficulty breathing (e.g.,  "speaks in phrases, SOB even at rest, pulse 100-120)    Negative: Headache and stiff neck (can't touch chin to chest)    Negative: Oxygen level (e.g., pulse oximetry) 90 percent or lower    Negative: Chest pain or pressure  (Exception: MILD central chest pain, present only when coughing)    Negative: Patient sounds very sick or weak to the triager    Negative: MILD difficulty breathing (e.g., minimal/no SOB at rest, SOB with walking, pulse <100)    Negative: Fever > 103 F (39.4 C)    Negative: [1] Fever > 101 F (38.3 C) AND [2] over 60 years of age    Negative: [1] Fever > 100.0 F (37.8 C) AND [2] bedridden (e.g., nursing home patient, CVA, chronic illness, recovering from surgery)    Answer Assessment - Initial Assessment Questions  1. COVID-19 DIAGNOSIS: \"Who made your COVID-19 diagnosis?\" \"Was it confirmed by a positive lab test or self-test?\" If not diagnosed by a doctor (or NP/PA), ask \"Are there lots of cases (community spread) where you live?\" Note: See public health department website, if unsure.      Pt was dx with covid, all symptoms are relating to the virus  2. COVID-19 EXPOSURE: \"Was there any known exposure to COVID before the symptoms began?\" CDC Definition of close contact: within 6 feet (2 meters) for a total of 15 minutes or more over a 24-hour period.       Unsure  3. ONSET: \"When did the COVID-19 symptoms start?\"       Symptoms started for awhile, may be longer than a wee  4. WORST SYMPTOM: \"What is your worst symptom?\" (e.g., cough, fever, shortness of breath, muscle aches)      Continuous cough unable to sleep at night   5. COUGH: \"Do you have a cough?\" If Yes, ask: \"How bad is the cough?\"        Yes, severe both day and night, some sputum light yellow  6. FEVER: \"Do you have a fever?\" If Yes, ask: \"What is your temperature, how was it measured, and when did it start?\"      No doesn't think so  7. RESPIRATORY STATUS: \"Describe your breathing?\" (e.g., shortness of breath, wheezing, unable to " "speak)       Shallow  8. BETTER-SAME-WORSE: \"Are you getting better, staying the same or getting worse compared to yesterday?\"  If getting worse, ask, \"In what way?\"      Getting worse  9. HIGH RISK DISEASE: \"Do you have any chronic medical problems?\" (e.g., asthma, heart or lung disease, weak immune system, obesity, etc.)      Age, overweight for height  10. VACCINE: \"Have you had the COVID-19 vaccine?\" If Yes, ask: \"Which one, how many shots, when did you get it?\"        Yes  11. BOOSTER: \"Have you received your COVID-19 booster?\" If Yes, ask: \"Which one and when did you get it?\"        Yes one, pfizer  12. PREGNANCY: \"Is there any chance you are pregnant?\" \"When was your last menstrual period?\"        NA  13. OTHER SYMPTOMS: \"Do you have any other symptoms?\"  (e.g., chills, fatigue, headache, loss of smell or taste, muscle pain, sore throat)        Slight headache, fatigue, slight sore throat  14. O2 SATURATION MONITOR:  \"Do you use an oxygen saturation monitor (pulse oximeter) at home?\" If Yes, ask \"What is your reading (oxygen level) today?\" \"What is your usual oxygen saturation reading?\" (e.g., 95%)        No    Protocols used: CORONAVIRUS (COVID-19) DIAGNOSED OR [id number]    "

## 2022-08-10 NOTE — PROGRESS NOTES
ASSESSMENT/PLAN:    I have reviewed the nursing notes.  I have reviewed the findings, diagnosis, plan and need for follow up with the patient.    1. Chest congestion  - XR Chest 2 Views  -Chest x-ray was obtained today to rule out acute pathology.  Normal cardiac silhouette stable pulmonary vasculature, no evidence of pneumonia, pneumothorax, pleural effusion or other worrisome etiology.    2. Chronic cough  - Adult ENT  Referral; Future  - Adult Pulmonary Medicine Referral; Future  -Chronic cough with symptoms onset earlier this winter.  I do not believe there is a bacterial etiology to his cough.  No wheezing or abnormalities to be consistent with bronchitis at this time.  I did discuss with patient that antibiotics are not indicated, he is in agreement with this as he had no prior relief from his antibiotic prescription midwinter.  I did discuss with patient that I did not feel COVID testing was warranted at this time his cough is chronic in nature and this would provide no additional insight as he has not had any change or worsening of his symptoms recently.  I discussed patient's case with his primary care provider who is recommending ENT and pulmonology referrals to assess for etiology of cough.  These referrals were currently placed to patient.  The following department will contact patient to schedule.  In the interim for his postnasal drip I did recommend an over-the-counter antihistamine such as Claritin or Zyrtec (did note to patient that this could worsen his dry eyes and he may benefit from using dry eyedrops that he does try an oral antihistamine) as well as a nasal spray over-the-counter such as Nasacort or Flonase, do not use for more than 2 weeks.  He should follow-up with fevers, chills, worsening cough, shortness of breath, chest pain or other worrisome signs or symptoms occur. Patient is in agreement and understanding of the above treatment plan. All questions and concerns were addressed  and answered to patient's satisfaction. AVS reviewed with patient.     Discussed warning signs/symptoms indicative of need to f/u    Follow up if symptoms persist or worsen or concerns    I explained my diagnostic considerations and recommendations to the patient, who voiced understanding and agreement with the treatment plan. All questions were answered. We discussed potential side effects of any prescribed or recommended therapies, as well as expectations for response to treatments.    32 minutes spent with patient face-to-face and on activities per review of chart    Linn Perry PA-C  8/10/2022  3:01 PM    HPI:    Dajuan Basilio is a 77 year old male  who [facility] for concerns of URI, x a few months duration. Cough is steady throughout the day and night time, worse over the last few works. Swollen glands and feels pressure - mild sore throat. Cough often has phlegm production.  He states that he has followed with his primary care provider and other physician during this timeframe who believed he had bronchitis, he was placed on antibiotic midwinter without relief of symptoms.  He has no history of asthma or COPD.  He reports an associated light headache, mild congestion and mild clear rhinorrhea. Feels weakness and unsteadiness which has been ongoing for a while. Urination increased, due to age. Normal bowel movements. No substantial weight loss or gain.     No tobacco use over the last 50 years.     Treatments tried: None tried    Site of exposure: not known.  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 Johnson negative.     COLONOSCOPY  2013,WNL     COLONOSCOPY  2019    [facility]-no follow up     HERNIA REPAIR  2000     RELEASE CARPAL TUNNEL      2014     VASECTOMY      No Comments Provided     Social History     Tobacco Use     Smoking status: Former Smoker     Types: Cigarettes     Quit date: 10/25/1983     Years since quittin.8     Smokeless tobacco: Never Used   Substance Use Topics     Alcohol use: No     Current Outpatient Medications   Medication Sig Dispense Refill     ascorbic acid (VITAMIN C) 500 MG tablet Take 1 tablet by mouth daily. For 60 days       aspirin 81 MG tablet Take 81 mg by mouth daily       blood glucose (ACCU-CHEK ONEL PLUS) test strip USE  STRIP TO CHECK GLUCOSE Once DAILY 100 strip 11     citalopram (CELEXA) 10 MG tablet Take 1 tablet (10 mg) by mouth daily 90 tablet 3     gabapentin (NEURONTIN) 100 MG capsule Take 1 capsule (100 mg) by mouth At Bedtime - for pain 90 capsule 1     levothyroxine (SYNTHROID/LEVOTHROID) 50 MCG tablet Take 1 tablet (50 mcg) by mouth daily - for Thyroid Replacement 93 tablet 1     metFORMIN (GLUCOPHAGE) 500 MG tablet Take 2 tablets (1,000 mg) by mouth daily (with breakfast) AND 1 tablet (500 mg) daily (with dinner). 270 tablet 0     nitroGLYcerin (NITROSTAT) 0.4 MG sublingual tablet Use every 5 minutes as needed for chest pain, if needing more than 3 doses then call 911 10 tablet 0      "simvastatin (ZOCOR) 10 MG tablet Take 1 tablet (10 mg) by mouth At Bedtime -- Note pill size change -- No dose change -- 90 tablet 3     vitamin B complex with vitamin C (VITAMIN  B COMPLEX) TABS tablet Take 1 tablet by mouth daily Monday, Wednesday, Friday -- 3 days weekly -- make sure it has B12 in tablet -- Dose Reduced 7/11/2018 100 tablet 3     vitamin D3 (CHOLECALCIFEROL) 125 MCG (5000 UT) tablet Take 5,000 Units by mouth daily       Allergies   Allergen Reactions     Memantine Other (See Comments)     Dizzy, lightheaded  Dizzy, lightheaded     Rivastigmine Other (See Comments)     Dizzy, lightheaded  Dizzy, lightheaded     Ace Inhibitors Cough     Atorvastatin Calcium Muscle Pain (Myalgia)     Lipitor     Hmg-Coa-R Inhibitors Muscle Pain (Myalgia)     Higher doses cause myalgias     Past medical history, past surgical history, current medications and allergies reviewed and accurate to the best of my knowledge.      ROS:  Refer to HPI    BP (!) 140/70 (BP Location: Right arm, Patient Position: Sitting, Cuff Size: Adult Regular)   Pulse 65   Temp 98.1  F (36.7  C) (Tympanic)   Resp 18   Ht 1.676 m (5' 6\")   Wt 85.4 kg (188 lb 4.8 oz)   SpO2 95%   BMI 30.39 kg/m      EXAM:  General Appearance: Well appearing 77 year old male, appropriate appearance for age. No acute distress   Ears: Left TM intact, translucent with bony landmarks appreciated, no erythema, no effusion, no bulging, no purulence.  Right TM intact, translucent with bony landmarks appreciated, no erythema, no effusion, no bulging, no purulence.  Left auditory canal clear.  Right auditory canal clear.  Normal external ears, non tender.  Eyes: conjunctivae normal without erythema or irritation, corneas clear, no drainage or crusting, no eyelid swelling, pupils equal   Oropharynx: moist mucous membranes, posterior pharynx without erythema, tonsils symmetric, no erythema, no exudates or petechiae, + clear post nasal drip seen, no trismus, voice " clear.    Sinuses:  No sinus tenderness upon palpation of the frontal or maxillary sinuses  Nose:  Bilateral nares: no erythema, no edema, no drainage or congestion   Neck: supple without adenopathy  Respiratory: normal chest wall and respirations.  Normal effort.  Clear to auscultation bilaterally, no wheezing, crackles or rhonchi.  No increased work of breathing.  No cough appreciated.  Cardiac: RRR with no murmurs  Dermatological: no rashes noted of exposed skin  Psychological: normal affect, alert, oriented, and pleasant.     Labs:  none    Xray:  Results for orders placed or performed in visit on 08/10/22   XR Chest 2 Views     Status: None    Narrative    PROCEDURE:  XR CHEST 2 VIEWS    HISTORY:  cough; Chest congestion.     COMPARISON:  February 2022    FINDINGS:   The cardiac silhouette is normal in size. The pulmonary vasculature is  normal.  There are nodular densities in both lung bases most likely  representing nipple shadows. No pleural effusion or pneumothorax.      Impression    IMPRESSION:  Bi basilar nipple shadows.  No active pulmonary  infiltrates.    KELBY BENITO MD         SYSTEM ID:  [id number]

## 2022-08-10 NOTE — TELEPHONE ENCOUNTER
DJS-Caller reporting the following red-flag symptom(s): something going on with throat and having a tough time breathing it has been going on for a bit     Per the system red-flag symptom policy, patient was instructed to:  tried to set up appointment with clinic and RC     Action:  Message sent to the clinic

## 2022-08-10 NOTE — NURSING NOTE
Patient presents to clinic today for Cough     Medication Reconciliation: complete      FOOD SECURITY SCREENING QUESTIONS:    The next two questions are to help us understand your food security.  If you are feeling you need any assistance in this area, we have resources available to support you today.    Hunger Vital Signs:  Within the past 12 months we worried whether our food would run out before we got money to buy more. Never  Within the past 12 months the food we bought just didn't last and we didn't have money to get more. Never        Do you have an advance care directive on file? Yes      Jesusita Brewster LPN.......8/10/03396:44 PM

## 2022-08-15 ENCOUNTER — TELEPHONE (OUTPATIENT)
Dept: FAMILY MEDICINE | Facility: OTHER | Age: 77
End: 2022-08-15

## 2022-08-15 ENCOUNTER — TELEPHONE (OUTPATIENT)
Dept: INTERNAL MEDICINE | Facility: OTHER | Age: 77
End: 2022-08-15

## 2022-08-15 NOTE — TELEPHONE ENCOUNTER
Please call the patient.  He needs Linn Springer in [facility] to call him back.  He needs her to set up his visit with pulmonary.  He wanted this note to go to her.    He did not want this to go to PCP.  He stated he has a lot of medical concerns she is helping him with.      Anita Mccracken on 8/15/2022 at 8:05 AM

## 2022-08-15 NOTE — TELEPHONE ENCOUNTER
Patient called to talk to Dr. Pickens regarding whether he has cancer in his throat. Please call.    Shira Galvez on 8/15/2022 at 11:09 AM

## 2022-08-16 ENCOUNTER — OFFICE VISIT (OUTPATIENT)
Dept: INTERNAL MEDICINE | Facility: OTHER | Age: 77
End: 2022-08-16
Attending: INTERNAL MEDICINE
Payer: COMMERCIAL

## 2022-08-16 VITALS
OXYGEN SATURATION: 97 % | WEIGHT: 188 LBS | SYSTOLIC BLOOD PRESSURE: 134 MMHG | RESPIRATION RATE: 18 BRPM | HEART RATE: 61 BPM | DIASTOLIC BLOOD PRESSURE: 74 MMHG | HEIGHT: 67 IN | TEMPERATURE: 98 F | BODY MASS INDEX: 29.51 KG/M2

## 2022-08-16 DIAGNOSIS — J41.8 MIXED SIMPLE AND MUCOPURULENT CHRONIC BRONCHITIS (H): ICD-10-CM

## 2022-08-16 DIAGNOSIS — E11.40 CONTROLLED TYPE 2 DIABETES MELLITUS WITH DIABETIC NEUROPATHY, WITHOUT LONG-TERM CURRENT USE OF INSULIN (H): ICD-10-CM

## 2022-08-16 DIAGNOSIS — R05.3 CHRONIC COUGH: Primary | ICD-10-CM

## 2022-08-16 DIAGNOSIS — H10.13 PERENNIAL ALLERGIC CONJUNCTIVITIS OF BOTH EYES: ICD-10-CM

## 2022-08-16 PROCEDURE — G0463 HOSPITAL OUTPT CLINIC VISIT: HCPCS

## 2022-08-16 PROCEDURE — 99214 OFFICE O/P EST MOD 30 MIN: CPT | Performed by: INTERNAL MEDICINE

## 2022-08-16 RX ORDER — MONTELUKAST SODIUM 10 MG/1
10 TABLET ORAL AT BEDTIME
Qty: 30 TABLET | Refills: 1 | Status: SHIPPED | OUTPATIENT
Start: 2022-08-16 | End: 2022-10-05

## 2022-08-16 RX ORDER — CODEINE PHOSPHATE AND GUAIFENESIN 10; 100 MG/5ML; MG/5ML
1-2 SOLUTION ORAL
Qty: 180 ML | Refills: 0 | Status: SHIPPED | OUTPATIENT
Start: 2022-08-16 | End: 2023-06-14

## 2022-08-16 ASSESSMENT — ENCOUNTER SYMPTOMS
COUGH: 1
WHEEZING: 1
SHORTNESS OF BREATH: 1

## 2022-08-16 ASSESSMENT — PAIN SCALES - GENERAL: PAINLEVEL: MILD PAIN (2)

## 2022-08-16 NOTE — PROGRESS NOTES
Assessment & Plan       ICD-10-CM    1. Chronic cough  R05.3 Pulmonary Function Test ()     montelukast (SINGULAIR) 10 MG tablet     guaiFENesin-codeine (ROBITUSSIN AC) 100-10 MG/5ML solution     Adult Pulmonary Medicine Referral   2. Mixed simple and mucopurulent chronic bronchitis (H)  J41.8 Pulmonary Function Test ()     montelukast (SINGULAIR) 10 MG tablet     guaiFENesin-codeine (ROBITUSSIN AC) 100-10 MG/5ML solution     Adult Pulmonary Medicine Referral   3. Perennial allergic conjunctivitis of both eyes  H10.13 montelukast (SINGULAIR) 10 MG tablet   4. Controlled type 2 diabetes mellitus with diabetic neuropathy, without long-term current use of insulin (H)  E11.40 metFORMIN (GLUCOPHAGE) 500 MG tablet     Patient presents for follow-up multiple issues.    Chronic cough.  States that this is been going on for months.  States that coughing has been much more severe recently.  He has slept poorly over the last 3 nights.  He tried some generic Mucinex over-the-counter medication but that did not help much.  Also tried some melatonin and ibuprofen but did not really help his sleeping abdominal pain has not really stopped coughing.    He was seen in [facility] recently and pulmonary referral was requested to [facility] but certainly he is not able to be evaluated until sometime in October.    Indicates that sometimes when he goes outside things seem a little bit better.  Seems to have worsened his cough in the evening.  He sleeps at about 30 degrees elevated, sleeps in his recliner at night.    Recommend pulmonary function testing.  Recently diagnosed with mixed simple and mucopurulent chronic bronchitis.  There may be some allergy associated symptoms.  He has had chronic eye irritation, redness and dryness.  Start trial of Singulair.  He will make a small prescription of Robitussin with codeine to help with his coughing.  Pulmonary referral requested, second referral to within the [facility] system in the  "direction of [place].  He is open to be seen sooner than 2 months from now.    Given his potential for allergic conjunctivitis, start trial of Singulair.    He is scheduled for eye surgery in October and is hoping to see the pulmonologist prior.    Type 2 diabetes, chronic, ongoing.  Has diabetic neuropathy.  Continues with metformin.  Tolerating well.  Needs refills.    Encouraged regular exercise.     Return for Return as needed for follow-up with Dr. Pickens., Appointments: [phone].    Cm Pickens MD  [facility]     Alisha Graff is a 77 year old, presenting for the following health issues:  cough, throat concerns      History of Present Illness       Reason for visit:  Throat issues  Symptom onset:  More than a month  Symptoms include:  Cough   Symptom intensity:  Severe  Symptom progression:  Worsening  What makes it worse:  Unsure  What makes it better:  Not really    He eats 2-3 servings of fruits and vegetables daily.He consumes 0 sweetened beverage(s) daily.He exercises with enough effort to increase his heart rate 9 or less minutes per day.  He exercises with enough effort to increase his heart rate 3 or less days per week.   He is taking medications regularly.     Review of Systems   Respiratory: Positive for cough, shortness of breath and wheezing.           Objective    /74 (BP Location: Right arm, Patient Position: Sitting, Cuff Size: Adult Large)   Pulse 61   Temp 98  F (36.7  C)   Resp 18   Ht 1.689 m (5' 6.5\")   Wt 85.3 kg (188 lb)   SpO2 97%   BMI 29.89 kg/m    Body mass index is 29.89 kg/m .  Physical Exam  Constitutional:       General: He is not in acute distress.     Appearance: He is well-developed. He is not diaphoretic.   HENT:      Head: Normocephalic and atraumatic.      Nose: Nose normal. No congestion or rhinorrhea.      Mouth/Throat:      Mouth: Mucous membranes are moist.      Pharynx: Oropharynx is clear. No oropharyngeal exudate or " posterior oropharyngeal erythema.   Eyes:      General: No scleral icterus.     Conjunctiva/sclera: Conjunctivae normal.      Comments: Bilateral eye irritation.   Cardiovascular:      Rate and Rhythm: Normal rate and regular rhythm.   Pulmonary:      Effort: Pulmonary effort is normal.      Breath sounds: Normal breath sounds.   Musculoskeletal:         General: No deformity.      Cervical back: Neck supple.   Lymphadenopathy:      Cervical: No cervical adenopathy.   Skin:     General: Skin is warm and dry.      Coloration: Skin is not jaundiced.      Findings: No rash.   Neurological:      Mental Status: He is alert and oriented to person, place, and time. Mental status is at baseline.   Psychiatric:         Mood and Affect: Mood normal.         Behavior: Behavior is slowed.                    .  ..

## 2022-08-16 NOTE — TELEPHONE ENCOUNTER
Date of birth and last name verified.  Has a terrible cough. OTC cough medication does not help.  Patient told he can come today at 11:20 a.m. for an appointment.  He will call us back either way if this works or if it doesn't.    Francisco Carrasquillo .......  8/16/2022  9:50 AM

## 2022-08-16 NOTE — NURSING NOTE
"Chief Complaint   Patient presents with     cough, throat concerns         Initial /74 (BP Location: Right arm, Patient Position: Sitting, Cuff Size: Adult Large)   Pulse 61   Temp 98  F (36.7  C)   Resp 18   Ht 1.689 m (5' 6.5\")   Wt 85.3 kg (188 lb)   SpO2 97%   BMI 29.89 kg/m   Estimated body mass index is 29.89 kg/m  as calculated from the following:    Height as of this encounter: 1.689 m (5' 6.5\").    Weight as of this encounter: 85.3 kg (188 lb).       FOOD SECURITY SCREENING QUESTIONS:    The next two questions are to help us understand your food security.  If you are feeling you need any assistance in this area, we have resources available to support you today.    Hunger Vital Signs:  Within the past 12 months we worried whether our food would run out before we got money to buy more. Never  Within the past 12 months the food we bought just didn't last and we didn't have money to get more. Never    Advance Care Directive on file? yes      Medication reconciliation complete.      Francisco Carrasquillo,on 8/16/2022 at 11:37 AM        "

## 2022-08-16 NOTE — PATIENT INSTRUCTIONS
Start trial of Singulair at bedtime - for eyes and cough.     Continue Metformin.     Pulmonary function tests (PFTs) ordered  - they will call with date/time of appointment.      Pulmonary consultation requested  - they will call with date/time of appointment.      Return as needed for follow-up with Dr. Pickens.    Clinic : [phone]  Appointment line: [phone]

## 2022-08-16 NOTE — TELEPHONE ENCOUNTER
Would you like him worked in today or shall I call him?    Francisco Carrasquillo .......  8/16/2022  9:47 AM

## 2022-08-17 NOTE — TELEPHONE ENCOUNTER
After patient's name and date of birth were verified, Spoke to patient who states that he does have someone helping him to get in to a pulmonologist.     Chanel Stein LPN on 8/17/2022 at 9:06 AM

## 2022-08-27 ENCOUNTER — ALLIED HEALTH/NURSE VISIT (OUTPATIENT)
Dept: FAMILY MEDICINE | Facility: OTHER | Age: 77
End: 2022-08-27
Attending: FAMILY MEDICINE
Payer: MEDICARE

## 2022-08-27 DIAGNOSIS — Z20.822 COVID-19 RULED OUT: Primary | ICD-10-CM

## 2022-08-27 PROCEDURE — U0003 INFECTIOUS AGENT DETECTION BY NUCLEIC ACID (DNA OR RNA); SEVERE ACUTE RESPIRATORY SYNDROME CORONAVIRUS 2 (SARS-COV-2) (CORONAVIRUS DISEASE [COVID-19]), AMPLIFIED PROBE TECHNIQUE, MAKING USE OF HIGH THROUGHPUT TECHNOLOGIES AS DESCRIBED BY CMS-2020-01-R: HCPCS | Mod: ZL

## 2022-08-27 PROCEDURE — C9803 HOPD COVID-19 SPEC COLLECT: HCPCS

## 2022-08-28 LAB — SARS-COV-2 RNA RESP QL NAA+PROBE: NEGATIVE

## 2022-08-29 ENCOUNTER — TELEPHONE (OUTPATIENT)
Dept: INTERNAL MEDICINE | Facility: OTHER | Age: 77
End: 2022-08-29

## 2022-08-29 NOTE — TELEPHONE ENCOUNTER
Patient would like his covid results from Saturday 8/27/22.     Samara Vallejo on 8/29/2022 at 10:50 AM

## 2022-08-29 NOTE — TELEPHONE ENCOUNTER
Called patient and verified name and date of birth. Notified of results and no further questions.  Linn Espinoza RN on 8/29/2022 at 10:57 AM

## 2022-08-30 ENCOUNTER — OFFICE VISIT (OUTPATIENT)
Dept: OTOLARYNGOLOGY | Facility: OTHER | Age: 77
End: 2022-08-30
Attending: OTOLARYNGOLOGY
Payer: MEDICARE

## 2022-08-30 DIAGNOSIS — R05.3 CHRONIC COUGH: Primary | ICD-10-CM

## 2022-08-30 PROCEDURE — G0463 HOSPITAL OUTPT CLINIC VISIT: HCPCS

## 2022-09-01 NOTE — PROGRESS NOTES
document embedded image  Patient Name: Dajuan Basilio    Address: [address] [place] [address]     YOB: 1945    [place] [address]    MR Number: [id number]    Phone: [phone]  PCP: Cm Pickens MD            Appointment Date: 08/30/22   Visit Provider: Brian Fallon MD    cc: Cm Pickens MD; ~    ENT Progress Note  Intake  Visit Reasons: Chronic cough    HPI  History of Present Illness  Chief complaint:  Chronic cough    History  The patient is a 77-year-old man who has been suffering from chronic cough for at least the last 5 or 6 months.  Sounds as though this may have followed an episode of COVID.  He denies voice change, hemoptysis, hematemesis, dysphagia, odynophagia, otalgia.  He is a former smoker but quit in his 30s.  He sleeps in a recliner because of back issues.  He admits to notable acid stomach and reflux.  He is not on any medicines for this.    Exam  Oral cavity oropharynx free of lesion or inflammation  Nasal-there is no purulence or polyps noted new line neck no mass or adenopathy  Head and neck integument-Clear  General the patient appears well and in no distress  Indirect laryngoscopy-no notable vocal cord lesions.  There is no evidence of paresis or paralysis.   Neuro are no focal cranial nerve deficits    Allergies    atorvastatin Adverse Reaction (Intermediate, Verified 05/17/18 10:11)  Myalgia  lisinopril Adverse Reaction (Intermediate, Verified 05/17/18 10:11)  Cough    A&P  Assessment & Plan  (1) Chronic cough:        Status: Acute        Code(s):  R05.3 - Chronic cough    Plan  That I see no primary laryngeal pathology that would be contributing to his cough.  There is certainly no evidence of neoplasm, nodule, or polyp.  There is no neurologic deficits.  Given his chronic reflux I think if no other cause for his cough is identified he should be on at least a 3 month trial of acid suppression to see if it helped does have arrangements to visit with 1 of the  pulmonologists.        Medications:  New  omeprazole 20 mg  PO DAILY 90 caps 0RF            Brian Fallon MD    08/30/22 0825    <Electronically signed by Brian Fallon MD> 08/31/22 1075

## 2022-09-22 ENCOUNTER — IMMUNIZATION (OUTPATIENT)
Dept: FAMILY MEDICINE | Facility: OTHER | Age: 77
End: 2022-09-22
Attending: INTERNAL MEDICINE
Payer: MEDICARE

## 2022-09-22 DIAGNOSIS — Z23 HIGH PRIORITY FOR 2019-NCOV VACCINE: Primary | ICD-10-CM

## 2022-09-22 PROCEDURE — 91312 COVID-19,PF,PFIZER BOOSTER BIVALENT: CPT

## 2022-10-04 ENCOUNTER — LAB (OUTPATIENT)
Dept: LAB | Facility: OTHER | Age: 77
End: 2022-10-04
Attending: INTERNAL MEDICINE
Payer: MEDICARE

## 2022-10-04 DIAGNOSIS — E78.2 MIXED HYPERLIPIDEMIA: ICD-10-CM

## 2022-10-04 DIAGNOSIS — E11.21 TYPE 2 DIABETES MELLITUS WITH DIABETIC NEPHROPATHY, WITHOUT LONG-TERM CURRENT USE OF INSULIN (H): ICD-10-CM

## 2022-10-04 LAB
ALBUMIN SERPL-MCNC: 4.4 G/DL (ref 3.5–5.7)
ALBUMIN UR-MCNC: NEGATIVE MG/DL
ALP SERPL-CCNC: 70 U/L (ref 34–104)
ALT SERPL W P-5'-P-CCNC: 13 U/L (ref 7–52)
ANION GAP SERPL CALCULATED.3IONS-SCNC: 6 MMOL/L (ref 3–14)
APPEARANCE UR: CLEAR
AST SERPL W P-5'-P-CCNC: 14 U/L (ref 13–39)
BILIRUB SERPL-MCNC: 0.7 MG/DL (ref 0.3–1)
BILIRUB UR QL STRIP: NEGATIVE
BUN SERPL-MCNC: 15 MG/DL (ref 7–25)
CALCIUM SERPL-MCNC: 9.5 MG/DL (ref 8.6–10.3)
CHLORIDE BLD-SCNC: 105 MMOL/L (ref 98–107)
CHOLEST SERPL-MCNC: 117 MG/DL
CO2 SERPL-SCNC: 29 MMOL/L (ref 21–31)
COLOR UR AUTO: NORMAL
CREAT SERPL-MCNC: 0.8 MG/DL (ref 0.7–1.3)
CREAT UR-MCNC: 63.7 MG/DL
ERYTHROCYTE [DISTWIDTH] IN BLOOD BY AUTOMATED COUNT: 13.5 % (ref 10–15)
FASTING STATUS PATIENT QL REPORTED: NO
GFR SERPL CREATININE-BSD FRML MDRD: 90 ML/MIN/1.73M2
GLUCOSE BLD-MCNC: 131 MG/DL (ref 70–105)
GLUCOSE UR STRIP-MCNC: NEGATIVE MG/DL
HBA1C MFR BLD: 6.8 % (ref 4–6.2)
HCT VFR BLD AUTO: 44.4 % (ref 40–53)
HDLC SERPL-MCNC: 39 MG/DL (ref 23–92)
HGB BLD-MCNC: 14.6 G/DL (ref 13.3–17.7)
HGB UR QL STRIP: NEGATIVE
KETONES UR STRIP-MCNC: NEGATIVE MG/DL
LDLC SERPL CALC-MCNC: 61 MG/DL
LEUKOCYTE ESTERASE UR QL STRIP: NEGATIVE
MCH RBC QN AUTO: 30 PG (ref 26.5–33)
MCHC RBC AUTO-ENTMCNC: 32.9 G/DL (ref 31.5–36.5)
MCV RBC AUTO: 91 FL (ref 78–100)
MICROALBUMIN UR-MCNC: <12 MG/L
MICROALBUMIN/CREAT UR: NORMAL MG/G{CREAT}
NITRATE UR QL: NEGATIVE
NONHDLC SERPL-MCNC: 78 MG/DL
PH UR STRIP: 5.5 [PH] (ref 5–9)
PLATELET # BLD AUTO: 212 10E3/UL (ref 150–450)
POTASSIUM BLD-SCNC: 4 MMOL/L (ref 3.5–5.1)
PROT SERPL-MCNC: 7.1 G/DL (ref 6.4–8.9)
RBC # BLD AUTO: 4.87 10E6/UL (ref 4.4–5.9)
SODIUM SERPL-SCNC: 140 MMOL/L (ref 134–144)
SP GR UR STRIP: 1.01 (ref 1–1.03)
TRIGL SERPL-MCNC: 86 MG/DL
TSH SERPL DL<=0.005 MIU/L-ACNC: 2.52 MU/L (ref 0.4–4)
UROBILINOGEN UR STRIP-MCNC: NORMAL MG/DL
WBC # BLD AUTO: 7.3 10E3/UL (ref 4–11)

## 2022-10-04 PROCEDURE — 81003 URINALYSIS AUTO W/O SCOPE: CPT | Mod: ZL

## 2022-10-04 PROCEDURE — 84443 ASSAY THYROID STIM HORMONE: CPT | Mod: ZL

## 2022-10-04 PROCEDURE — 83036 HEMOGLOBIN GLYCOSYLATED A1C: CPT | Mod: ZL

## 2022-10-04 PROCEDURE — 36415 COLL VENOUS BLD VENIPUNCTURE: CPT | Mod: ZL

## 2022-10-04 PROCEDURE — 85027 COMPLETE CBC AUTOMATED: CPT | Mod: ZL

## 2022-10-04 PROCEDURE — 80053 COMPREHEN METABOLIC PANEL: CPT | Mod: ZL

## 2022-10-04 PROCEDURE — 82043 UR ALBUMIN QUANTITATIVE: CPT | Mod: ZL

## 2022-10-04 PROCEDURE — 80061 LIPID PANEL: CPT | Mod: ZL

## 2022-10-05 ENCOUNTER — OFFICE VISIT (OUTPATIENT)
Dept: INTERNAL MEDICINE | Facility: OTHER | Age: 77
End: 2022-10-05
Attending: INTERNAL MEDICINE
Payer: COMMERCIAL

## 2022-10-05 VITALS
WEIGHT: 189.4 LBS | HEART RATE: 64 BPM | DIASTOLIC BLOOD PRESSURE: 74 MMHG | BODY MASS INDEX: 29.73 KG/M2 | HEIGHT: 67 IN | TEMPERATURE: 97.1 F | RESPIRATION RATE: 20 BRPM | SYSTOLIC BLOOD PRESSURE: 136 MMHG

## 2022-10-05 DIAGNOSIS — Z01.818 PREOP GENERAL PHYSICAL EXAM: Primary | ICD-10-CM

## 2022-10-05 DIAGNOSIS — J41.8 MIXED SIMPLE AND MUCOPURULENT CHRONIC BRONCHITIS (H): ICD-10-CM

## 2022-10-05 DIAGNOSIS — I10 BENIGN ESSENTIAL HYPERTENSION: ICD-10-CM

## 2022-10-05 DIAGNOSIS — H04.123 CHRONICALLY DRY EYES, BILATERAL: ICD-10-CM

## 2022-10-05 DIAGNOSIS — E11.40 CONTROLLED TYPE 2 DIABETES MELLITUS WITH DIABETIC NEUROPATHY, WITHOUT LONG-TERM CURRENT USE OF INSULIN (H): ICD-10-CM

## 2022-10-05 DIAGNOSIS — I25.10 CORONARY ARTERY DISEASE INVOLVING NATIVE CORONARY ARTERY OF NATIVE HEART WITHOUT ANGINA PECTORIS: ICD-10-CM

## 2022-10-05 DIAGNOSIS — E78.2 MIXED HYPERLIPIDEMIA: ICD-10-CM

## 2022-10-05 DIAGNOSIS — E11.21 TYPE 2 DIABETES MELLITUS WITH DIABETIC NEPHROPATHY, WITHOUT LONG-TERM CURRENT USE OF INSULIN (H): ICD-10-CM

## 2022-10-05 DIAGNOSIS — E53.8 VITAMIN B12 DEFICIENCY: ICD-10-CM

## 2022-10-05 DIAGNOSIS — R53.81 MALAISE AND FATIGUE: ICD-10-CM

## 2022-10-05 DIAGNOSIS — R53.83 MALAISE AND FATIGUE: ICD-10-CM

## 2022-10-05 DIAGNOSIS — K21.9 GASTROESOPHAGEAL REFLUX DISEASE WITHOUT ESOPHAGITIS: ICD-10-CM

## 2022-10-05 DIAGNOSIS — R53.83 EXCESSIVE POSTEXERTIONAL FATIGUE: ICD-10-CM

## 2022-10-05 DIAGNOSIS — E03.4 HYPOTHYROIDISM DUE TO ACQUIRED ATROPHY OF THYROID: ICD-10-CM

## 2022-10-05 PROBLEM — F02.80 MAJOR NEUROCOGNITIVE DISORDER DUE TO ANOTHER MEDICAL CONDITION (H): Status: ACTIVE | Noted: 2021-05-05

## 2022-10-05 PROCEDURE — 96372 THER/PROPH/DIAG INJ SC/IM: CPT | Performed by: INTERNAL MEDICINE

## 2022-10-05 PROCEDURE — G0463 HOSPITAL OUTPT CLINIC VISIT: HCPCS | Mod: 25

## 2022-10-05 PROCEDURE — G0463 HOSPITAL OUTPT CLINIC VISIT: HCPCS

## 2022-10-05 PROCEDURE — 250N000011 HC RX IP 250 OP 636: Performed by: INTERNAL MEDICINE

## 2022-10-05 PROCEDURE — 99214 OFFICE O/P EST MOD 30 MIN: CPT | Performed by: INTERNAL MEDICINE

## 2022-10-05 RX ORDER — GABAPENTIN 100 MG/1
100-200 CAPSULE ORAL AT BEDTIME
Qty: 150 CAPSULE | Refills: 1 | Status: SHIPPED | OUTPATIENT
Start: 2022-10-05 | End: 2022-12-16

## 2022-10-05 RX ORDER — LEVOTHYROXINE SODIUM 50 UG/1
50 TABLET ORAL DAILY
Qty: 93 TABLET | Refills: 1 | Status: SHIPPED | OUTPATIENT
Start: 2022-10-05 | End: 2022-12-16

## 2022-10-05 RX ORDER — ATORVASTATIN CALCIUM 10 MG/1
10 TABLET, FILM COATED ORAL DAILY
Qty: 90 TABLET | Refills: 1 | Status: SHIPPED | OUTPATIENT
Start: 2022-10-05 | End: 2022-11-18

## 2022-10-05 RX ORDER — DEXTROAMPHETAMINE SACCHARATE, AMPHETAMINE ASPARTATE, DEXTROAMPHETAMINE SULFATE AND AMPHETAMINE SULFATE 2.5; 2.5; 2.5; 2.5 MG/1; MG/1; MG/1; MG/1
10 TABLET ORAL DAILY
Qty: 30 TABLET | Refills: 0 | Status: SHIPPED | OUTPATIENT
Start: 2022-11-02 | End: 2022-11-18

## 2022-10-05 RX ORDER — CYANOCOBALAMIN 1000 UG/ML
1000 INJECTION, SOLUTION INTRAMUSCULAR; SUBCUTANEOUS ONCE
Status: COMPLETED | OUTPATIENT
Start: 2022-10-05 | End: 2022-10-05

## 2022-10-05 RX ORDER — DEXTROAMPHETAMINE SACCHARATE, AMPHETAMINE ASPARTATE, DEXTROAMPHETAMINE SULFATE AND AMPHETAMINE SULFATE 2.5; 2.5; 2.5; 2.5 MG/1; MG/1; MG/1; MG/1
10 TABLET ORAL DAILY
Qty: 30 TABLET | Refills: 0 | Status: SHIPPED | OUTPATIENT
Start: 2022-10-05 | End: 2022-11-04

## 2022-10-05 RX ADMIN — CYANOCOBALAMIN 1000 MCG: 1000 INJECTION, SOLUTION INTRAMUSCULAR at 12:07

## 2022-10-05 ASSESSMENT — ENCOUNTER SYMPTOMS
ARTHRALGIAS: 1
SHORTNESS OF BREATH: 0
BRUISES/BLEEDS EASILY: 0
PALPITATIONS: 0
NERVOUS/ANXIOUS: 1
NUMBNESS: 1
DIARRHEA: 0
DIZZINESS: 1
LIGHT-HEADEDNESS: 1
ABDOMINAL PAIN: 0
VOMITING: 0
DYSURIA: 0
NAUSEA: 0
AGITATION: 0
WHEEZING: 0
FATIGUE: 1
BACK PAIN: 1
CHILLS: 0
EYE REDNESS: 1
COUGH: 1
SLEEP DISTURBANCE: 0
MYALGIAS: 1
FEVER: 0
WEAKNESS: 1
HEMATURIA: 0
EYE PAIN: 1

## 2022-10-05 ASSESSMENT — ANXIETY QUESTIONNAIRES
GAD7 TOTAL SCORE: 0
8. IF YOU CHECKED OFF ANY PROBLEMS, HOW DIFFICULT HAVE THESE MADE IT FOR YOU TO DO YOUR WORK, TAKE CARE OF THINGS AT HOME, OR GET ALONG WITH OTHER PEOPLE?: NOT DIFFICULT AT ALL
GAD7 TOTAL SCORE: 0
GAD7 TOTAL SCORE: 0
6. BECOMING EASILY ANNOYED OR IRRITABLE: NOT AT ALL
1. FEELING NERVOUS, ANXIOUS, OR ON EDGE: NOT AT ALL
2. NOT BEING ABLE TO STOP OR CONTROL WORRYING: NOT AT ALL
7. FEELING AFRAID AS IF SOMETHING AWFUL MIGHT HAPPEN: NOT AT ALL
IF YOU CHECKED OFF ANY PROBLEMS ON THIS QUESTIONNAIRE, HOW DIFFICULT HAVE THESE PROBLEMS MADE IT FOR YOU TO DO YOUR WORK, TAKE CARE OF THINGS AT HOME, OR GET ALONG WITH OTHER PEOPLE: NOT DIFFICULT AT ALL
5. BEING SO RESTLESS THAT IT IS HARD TO SIT STILL: NOT AT ALL
4. TROUBLE RELAXING: NOT AT ALL
7. FEELING AFRAID AS IF SOMETHING AWFUL MIGHT HAPPEN: NOT AT ALL
3. WORRYING TOO MUCH ABOUT DIFFERENT THINGS: NOT AT ALL

## 2022-10-05 ASSESSMENT — PAIN SCALES - GENERAL: PAINLEVEL: NO PAIN (0)

## 2022-10-05 ASSESSMENT — PATIENT HEALTH QUESTIONNAIRE - PHQ9
10. IF YOU CHECKED OFF ANY PROBLEMS, HOW DIFFICULT HAVE THESE PROBLEMS MADE IT FOR YOU TO DO YOUR WORK, TAKE CARE OF THINGS AT HOME, OR GET ALONG WITH OTHER PEOPLE: NOT DIFFICULT AT ALL
SUM OF ALL RESPONSES TO PHQ QUESTIONS 1-9: 0
SUM OF ALL RESPONSES TO PHQ QUESTIONS 1-9: 0

## 2022-10-05 NOTE — NURSING NOTE
"Chief Complaint   Patient presents with     Pre op px - . [facility]  low lid surgery  10/13/2022  [place] [facility]         Initial /74 (BP Location: Right arm, Patient Position: Standing, Cuff Size: Adult Regular)   Pulse 64   Temp 97.1  F (36.2  C) (Temporal)   Resp 20   Ht 1.689 m (5' 6.5\")   Wt 85.9 kg (189 lb 6.4 oz)   BMI 30.11 kg/m   Estimated body mass index is 30.11 kg/m  as calculated from the following:    Height as of this encounter: 1.689 m (5' 6.5\").    Weight as of this encounter: 85.9 kg (189 lb 6.4 oz).       FOOD SECURITY SCREENING QUESTIONS:    The next two questions are to help us understand your food security.  If you are feeling you need any assistance in this area, we have resources available to support you today.    Hunger Vital Signs:  Within the past 12 months we worried whether our food would run out before we got money to buy more. Never  Within the past 12 months the food we bought just didn't last and we didn't have money to get more. Never    Advance Care Directive on file? no      Medication reconciliation complete.      Francisco Carrasquillo,on 10/5/2022 at 11:24 AM        "

## 2022-10-05 NOTE — PROGRESS NOTES
[facility] AND [facility]  [address]  [place] [address]  Phone: [phone]  Primary Provider: Trenton Florez  Pre-op Performing Provider: TRENTON FLOREZ    PREOPERATIVE EVALUATION:  Today's date: 10/5/2022    Dajuan Basilio is a 77 year old male who presents for a preoperative evaluation.    Surgical Information:  Surgery/Procedure:lower eye lids  Surgery Location: *[place] Consultant  Surgeon: Dr. Boyle  Surgery Date: 10/13/2022  Time of Surgery: TBD  Where patient plans to recover: At home with family  Fax number for surgical facility: [phone]    Type of Anesthesia Anticipated: Choice    Assessment & Plan     The proposed surgical procedure is considered LOW risk.      ICD-10-CM    1. Preop general physical exam  Z01.818    2. Chronically dry eyes, bilateral  H04.123    3. Excessive postexertional fatigue  R53.83 amphetamine-dextroamphetamine (ADDERALL) 10 MG tablet     amphetamine-dextroamphetamine (ADDERALL) 10 MG tablet   4. Malaise and fatigue  R53.81 amphetamine-dextroamphetamine (ADDERALL) 10 MG tablet    R53.83 amphetamine-dextroamphetamine (ADDERALL) 10 MG tablet   5. Vitamin B12 deficiency  E53.8 cyanocobalamin injection 1,000 mcg   6. Benign essential hypertension  I10    7. Controlled type 2 diabetes mellitus with diabetic neuropathy, without long-term current use of insulin (H)  E11.40 metFORMIN (GLUCOPHAGE) 500 MG tablet     gabapentin (NEURONTIN) 100 MG capsule   8. Type 2 diabetes mellitus with diabetic nephropathy, without long-term current use of insulin (H)  E11.21 gabapentin (NEURONTIN) 100 MG capsule   9. Coronary artery disease involving native coronary artery of native heart without angina pectoris  I25.10 atorvastatin (LIPITOR) 10 MG tablet   10. Hypothyroidism due to acquired atrophy of thyroid  E03.4 levothyroxine (SYNTHROID/LEVOTHROID) 50 MCG tablet   11. Mixed hyperlipidemia  E78.2 atorvastatin (LIPITOR) 10 MG tablet   12. Gastroesophageal reflux disease  without esophagitis  K21.9 omeprazole (PRILOSEC) 20 MG DR capsule   13. Mixed simple and mucopurulent chronic bronchitis (H)  J41.8    HPI related to upcoming procedure: Patient is having surgery on both of his lower eyelids due to chronic dry eye.  He has been dealing with this for quite a while and it is believed that this surgical procedure should help his dry eye symptoms, irritated eye symptoms.    He also indicates he has cataracts and plans to get those dealt with after his dry eye problems have improved.    He has been dealing with excessive fatigue and malaise, exertional fatigue with chronic tiredness for a long time.  He can be outside and being very active but is soon as he comes in the house and sits down he falls asleep.  We discussed a few treatment options.  He has had some cardiac and lung work-up - denies exertional chest pain/heaviness. Start trial of adderall 10 mg once daily.     Hypertension, currently well controlled. Denies syncope/pre-syncope. Continue current medications. No changes for now.     Diabetes, well controlled. Doing well with Metformin. Needs refills. Continues with gabapentin for neuropathy pain, mostly taking 1 capsule at night, but occasionally 2nd during the day. Needs refills.     Coronary artery disease, currently stable. Denies exertional chest pain/heaviness.  Exercise tolerance is greater than or equal to 4 METS.    Vitamin B12 deficiency.  Was getting B12 shots regularly.  He is not entirely sure how much these are helping with his fatigue and malaise.  He would like another one today.    Type 2 diabetes, see above.  Has neuropathy.  Taking gabapentin.    Coronary disease, see above.  Has mixed hyperlipidemia.  He would like to switch from simvastatin over to atorvastatin.  His wife just recently switched and is doing well.  At one point in time in the past he was on atorvastatin.  He feels like this is fine at this point and would like to retry this.  At this point  recommend starting Lipitor 10 mg every other day initially and increasing up to daily if tolerated.  If this is not tolerated or not effective we will consider switching over to Crestor/rosuvastatin instead.    Hypothyroidism.  Labs are stable.  Doing well with 50 mcg daily dosing.  Needs refills.    Heartburn and reflux, this is been quite bad recently.  Has not been using the omeprazole very often.  Saw different doctor recently.  He has a lot of acid in upset stomach.  Was started on omeprazole.  Advised that it works best if he takes it daily 30 to 60 minutes before a meal.  He plans to start taking daily and make these changes as directed.  Close follow-up advised.    Mixed mucopurulent chronic orchitis.  Appears stable and at baseline.  No changes for now.    Risks and Recommendations:  The patient has the following additional risks and recommendations for perioperative complications:   - No identified additional risk factors other than previously addressed    Medication Instructions:  Patient is to take all scheduled medications on the day of surgery    RECOMMENDATION:  APPROVAL GIVEN to proceed with proposed procedure, without further diagnostic evaluation.    Subjective     Preop Questions 10/5/2022   1. Have you ever had a heart attack or stroke? No   2. Have you ever had surgery on your heart or blood vessels, such as a stent placement, a coronary artery bypass, or surgery on an artery in your head, neck, heart, or legs? YES -    3. Do you have chest pain with activity? No   4. Do you have a history of  heart failure? No   5. Do you currently have a cold, bronchitis or symptoms of other infection? No   6. Do you have a cough, shortness of breath, or wheezing? YES - chronic cough   7. Do you or anyone in your family have previous history of blood clots? No   8. Do you or does anyone in your family have a serious bleeding problem such as prolonged bleeding following surgeries or cuts? No   9. Have you ever  had problems with anemia or been told to take iron pills? No   10. Have you had any abnormal blood loss such as black, tarry or bloody stools? No   11. Have you ever had a blood transfusion? No   12. Are you willing to have a blood transfusion if it is medically needed before, during, or after your surgery? NO -    13. Have you or any of your relatives ever had problems with anesthesia? No   14. Do you have sleep apnea, excessive snoring or daytime drowsiness? No   15. Do you have any artifical heart valves or other implanted medical devices like a pacemaker, defibrillator, or continuous glucose monitor? No   16. Do you have artificial joints? YES - Right knee   17. Are you allergic to latex? No       Health Care Directive:  Patient does not have a Health Care Directive or Living Will: Discussed advance care planning with patient; information given to patient to review.    Preoperative Review of :   reviewed - no record of controlled substances prescribed.    Review of Systems   Constitutional: Positive for fatigue. Negative for chills and fever.   HENT: Positive for hearing loss. Negative for congestion and nosebleeds.    Eyes: Positive for pain and redness. Negative for visual disturbance.        + chronic dry eyes, tried many eye drops, not much improvement - told needs eye surgery   Respiratory: Positive for cough (clear phlegm - months). Negative for shortness of breath and wheezing.    Cardiovascular: Negative for chest pain and palpitations.        Cardiac stress testing 3/2020 - negative/normal results.   Gastrointestinal: Negative for abdominal pain, diarrhea, nausea and vomiting.        + left inguinal pains intermittently   Endocrine: Negative for cold intolerance and heat intolerance.   Genitourinary: Negative for dysuria and hematuria.   Musculoskeletal: Positive for arthralgias (left knee), back pain (left low back / buttocks pain), gait problem (staggering at times, low back and left knee pain)  and myalgias.        Bilateral feet with bunions   Skin: Negative for pallor.   Allergic/Immunologic: Negative for immunocompromised state.   Neurological: Positive for dizziness, weakness, light-headedness and numbness (right > left feet).   Hematological: Does not bruise/bleed easily.   Psychiatric/Behavioral: Negative for agitation and sleep disturbance. The patient is nervous/anxious.         + short term memory loss  + reports anxiety / depression are stable. Virus pandemic had worsened things for him.       Patient Active Problem List    Diagnosis Date Noted     Chronic cough 08/16/2022     Priority: Medium     Type 2 diabetes mellitus with diabetic nephropathy, without long-term current use of insulin (H) 07/06/2022     Priority: Medium     Recurrent major depressive disorder, in full remission (H) 04/02/2022     Priority: Medium     Hypothyroidism due to acquired atrophy of thyroid 03/31/2022     Priority: Medium     Benign paroxysmal positional vertigo, unspecified laterality 12/03/2021     Priority: Medium     Elevated prostate specific antigen (PSA) 12/03/2021     Priority: Medium     Mixed simple and mucopurulent chronic bronchitis (H) 11/04/2021     Priority: Medium     Major neurocognitive disorder due to another medical condition (H) 05/05/2021     Priority: Medium     Bunion, right 03/26/2021     Priority: Medium     Onychomycosis 03/26/2021     Priority: Medium     Bunion, left 03/26/2021     Priority: Medium     Diabetic peripheral neuropathy (H) 01/08/2021     Priority: Medium     Unspecified inflammatory spondylopathy, sacral and sacrococcygeal region (H) 12/18/2020     Priority: Medium     Chronic left-sided low back pain without sciatica 09/17/2020     Priority: Medium     Vitamin B12 deficiency 09/17/2020     Priority: Medium     Benign essential hypertension 09/17/2020     Priority: Medium     Lumbar facet arthropathy 07/07/2020     Priority: Medium     Chronically dry eyes, bilateral  03/17/2020     Priority: Medium     Left sided sciatica 03/17/2020     Priority: Medium     Mixed hyperlipidemia 03/19/2019     Priority: Medium     S/P cardiac cath 5/17/18 06/07/2018     Priority: Medium     Unsteady gait 06/07/2018     Priority: Medium     Stented coronary artery to his LCX and OM1 on 5/17/18 06/07/2018     Priority: Medium     Status post insertion of drug eluting coronary artery stent - x2 stents - 1st [facility] - 5/17/2018 - [facility] 05/31/2018     Priority: Medium     Controlled type 2 diabetes mellitus with diabetic neuropathy, without long-term current use of insulin (H) 05/31/2018     Priority: Medium     Benign prostatic hyperplasia with weak urinary stream 05/31/2018     Priority: Medium     Coronary artery disease involving native coronary artery of native heart without angina pectoris 04/26/2018     Priority: Medium     Microalbuminuria 01/26/2018     Priority: Medium     Generalized anxiety disorder 06/05/2017     Priority: Medium     GERD (gastroesophageal reflux disease) 05/03/2013     Priority: Medium     Nephrolithiasis 04/23/2012     Priority: Medium     BPH (benign prostatic hyperplasia) 03/27/2012     Priority: Medium     Diverticular disease of colon 02/17/2010     Priority: Medium     Obesity 02/17/2010     Priority: Medium     Chronic anxiety 02/17/2010     Priority: Medium      Past Medical History:   Diagnosis Date     Abnormal CT scan, bladder 6/29/2016     Diverticulosis of intestine without perforation or abscess without bleeding     No Comments Provided     Fatty (change of) liver, not elsewhere classified     non alcoholic     Fibromyalgia     No Comments Provided     Nodular prostate without lower urinary tract symptoms     2012,US confirms benign calcifications     Obesity     No Comments Provided     Panic disorder without agoraphobia     No Comments Provided     Positive colorectal cancer screening using Cologuard test 12/06/2018     Past Surgical History:    Procedure Laterality Date     ARTHROPLASTY KNEE      2015     ARTHROSCOPY KNEE      right knee X2     BIOPSY PROSTATE TRANSRECTAL      2013     COLONOSCOPY      2003,and UGI [place] normal     COLONOSCOPY      2009,and UGI repeat Dr. Johnson negative.     COLONOSCOPY  01/01/2013 2013,WNL     COLONOSCOPY  05/23/2019    [place]-no follow up     HERNIA REPAIR  2000     RELEASE CARPAL TUNNEL      2014     VASECTOMY      No Comments Provided     Current Outpatient Medications   Medication Sig Dispense Refill     amphetamine-dextroamphetamine (ADDERALL) 10 MG tablet Take 1 tablet (10 mg) by mouth daily for 30 days 30 tablet 0     [START ON 11/2/2022] amphetamine-dextroamphetamine (ADDERALL) 10 MG tablet Take 1 tablet (10 mg) by mouth daily for 30 days 30 tablet 0     ascorbic acid (VITAMIN C) 500 MG tablet Take 1 tablet by mouth daily. For 60 days       aspirin 81 MG tablet Take 81 mg by mouth daily       atorvastatin (LIPITOR) 10 MG tablet Take 1 tablet (10 mg) by mouth daily Or every other day -- adjust dose as tolerated -- Stop Simvastatin -- 90 tablet 1     blood glucose (ACCU-CHEK ONEL PLUS) test strip USE  STRIP TO CHECK GLUCOSE Once DAILY 100 strip 11     citalopram (CELEXA) 10 MG tablet Take 1 tablet (10 mg) by mouth daily 90 tablet 3     gabapentin (NEURONTIN) 100 MG capsule Take 1-2 capsules (100-200 mg) by mouth At Bedtime - for pain 150 capsule 1     guaiFENesin-codeine (ROBITUSSIN AC) 100-10 MG/5ML solution Take 5-10 mLs by mouth at bedtime as needed, may repeat once for cough 180 mL 0     levothyroxine (SYNTHROID/LEVOTHROID) 50 MCG tablet Take 1 tablet (50 mcg) by mouth daily - for Thyroid Replacement 93 tablet 1     metFORMIN (GLUCOPHAGE) 500 MG tablet Take 2 tablets (1,000 mg) by mouth daily (with breakfast) AND 1 tablet (500 mg) daily (with dinner). 270 tablet 1     omeprazole (PRILOSEC) 20 MG DR capsule Take 1 capsule (20 mg) by mouth daily Take 30 to 60 minutes prior to a meal -- for stomach acid        "vitamin B complex with vitamin C (VITAMIN  B COMPLEX) TABS tablet Take 1 tablet by mouth daily Monday, Wednesday, Friday -- 3 days weekly -- make sure it has B12 in tablet -- Dose Reduced 2018 100 tablet 3     vitamin D3 (CHOLECALCIFEROL) 125 MCG (5000 UT) tablet Take 5,000 Units by mouth daily       nitroGLYcerin (NITROSTAT) 0.4 MG sublingual tablet Use every 5 minutes as needed for chest pain, if needing more than 3 doses then call 911 10 tablet 0       Allergies   Allergen Reactions     Memantine Other (See Comments)     Dizzy, lightheaded  Dizzy, lightheaded     Rivastigmine Other (See Comments)     Dizzy, lightheaded  Dizzy, lightheaded     Ace Inhibitors Cough     Atorvastatin Calcium Muscle Pain (Myalgia)     Lipitor     Hmg-Coa-R Inhibitors Muscle Pain (Myalgia)     Higher doses cause myalgias        Social History     Tobacco Use     Smoking status: Former Smoker     Types: Cigarettes     Quit date: 10/25/1983     Years since quittin.9     Smokeless tobacco: Never Used   Substance Use Topics     Alcohol use: No     Family History   Problem Relation Age of Onset     Cancer Father         Cancer, mesothelioma     Colon Cancer Mother         Cancer-colon,     Genitourinary Problems Mother         Genitourinary Disease,renal failure     Diabetes Mother         Diabetes     Other - See Comments Sister         Renal transplant     Diabetes Type 2  Sister         Diabetes type II     Diabetes Brother         Diabetes     History   Drug Use No         Objective     /74 (BP Location: Right arm, Patient Position: Standing, Cuff Size: Adult Regular)   Pulse 64   Temp 97.1  F (36.2  C) (Temporal)   Resp 20   Ht 1.689 m (5' 6.5\")   Wt 85.9 kg (189 lb 6.4 oz)   BMI 30.11 kg/m      Physical Exam  Constitutional:       General: He is not in acute distress.     Appearance: He is well-developed. He is not diaphoretic.   HENT:      Head: Normocephalic and atraumatic.      Nose: Nose normal. No " congestion or rhinorrhea.      Mouth/Throat:      Mouth: Mucous membranes are moist.      Pharynx: Oropharynx is clear. No oropharyngeal exudate or posterior oropharyngeal erythema.   Eyes:      General: No scleral icterus.     Conjunctiva/sclera: Conjunctivae normal.      Comments: Bilateral eye irritation.   Cardiovascular:      Rate and Rhythm: Normal rate and regular rhythm.   Pulmonary:      Effort: Pulmonary effort is normal.      Breath sounds: Normal breath sounds.   Musculoskeletal:         General: No deformity.      Cervical back: Neck supple.   Lymphadenopathy:      Cervical: No cervical adenopathy.   Skin:     General: Skin is warm and dry.      Coloration: Skin is not jaundiced.      Findings: No rash.   Neurological:      Mental Status: He is alert and oriented to person, place, and time. Mental status is at baseline.   Psychiatric:         Mood and Affect: Mood normal.         Behavior: Behavior is slowed.         Recent Labs   Lab Test 10/04/22  0900 07/05/22  0848 03/17/21  1659 01/26/21  1238   HGB 14.6 14.6   < > 15.2    228   < > 223   INR  --   --   --  0.90    140   < > 140   POTASSIUM 4.0 3.8   < > 3.9   CR 0.80 0.83   < > 0.78   A1C 6.8* 6.6*   < >  --     < > = values in this interval not displayed.        Diagnostics:    No EKG required for low risk surgery (cataract, skin procedure, breast biopsy, etc).    Revised Cardiac Risk Index (RCRI):  The patient has the following serious cardiovascular risks for perioperative complications:   - Coronary Artery Disease (MI, positive stress test, angina, Qs on EKG) = 1 point     RCRI Interpretation: 1 point: Class II (low risk - 0.9% complication rate)           Signed Electronically by: Cm Pickens MD  Copy of this evaluation report is provided to requesting physician.    Answers for HPI/ROS submitted by the patient on 10/5/2022  If you checked off any problems, how difficult have these problems made it for you to do your work, take  care of things at home, or get along with other people?: Not difficult at all  PHQ9 TOTAL SCORE: 0  NEERAJ 7 TOTAL SCORE: 0

## 2022-10-05 NOTE — PATIENT INSTRUCTIONS
Before Your Procedure or Hospital Admission  Testing for COVID-19  Thank you for choosing [facility] for your health care needs. Our goal is to keep you and our team here at [facility] safe and healthy. We've taken many steps to make this happen. For example:  We test and screen our staff, care teams and patients for COVID-19.  Everyone at [facility] must wear a mask.  We are limiting hospital and clinic visitors.  Before you come in, you must get tested for COVID-19, even if you've been vaccinated.   What kind of COVID-19 test should I have?  At-home rapid antigen test  You must have a rapid-antigen test 1 to 2 days before your procedure (surgery) if you are:  Over age 2 and  Planning to go home the same day as your procedure (surgery)  Rapid antigen tests are not recommended for children age 2 and under. These patients should schedule a PCR test unless you have otherwise discussed performing an at-home antigen test with your surgeon.  You can buy this test at many pharmacy stores. Or, you may order a free test at covid.gov/tests.  If your test is negative: please take a photo of the test. Show the nurse the photo when you come in for your procedure. We can't accept rapid antigen tests that are more than 2 days old.  PCR lab test   You must have a PCR test in a lab within 4 days of your procedure (surgery) if you are:  Age 2 or under (unless your surgeon gives you different instructions).  Planning to stay overnight in the hospital  Unable to find an at-home test.  While you don't have to use our lab, we recommend it. You can get your results more quickly and easily this way. To schedule a test at an [facility] lab, please call [phone]. Or, visit [email or web address].  If you have your test somewhere else, ask the testing location to fax your results to [phone].  If we don't get your results within 4 days of your procedure (surgery), we may have to delay or  cancel your treatment.  We can't accept PCR tests that are more than 4 days old.  If your test shows you have COVID-19  If your test is positive, tell your surgeon's office right away. A positive test means that you have COVID-19.  We'll probably have to postpone your admission, surgery or procedure. Your care team will discuss this with you. We'll let you know what to do and when you can reschedule.  If your test shows you DON'T have COVID-19  A negative test result means you don't have COVID-19, but it is still possible that you might get it. It's rare, but sometimes the test result is wrong. Or, you could catch the virus after taking the test. There is also a very small chance that you could catch COVID-19 in the hospital or surgery center. [facility] has taken many steps to prevent this from happening.   To reduce your risk of getting COVID-19 before your procedure (surgery), follow the precautions below.  COVID-19 precautions  After your test and before your procedure, please follow these safety guidelines:  Limit trips outside your home.  Limit the number of people you see.  Always wear a face covering outside your home.  Use social distancing. Stay 6 feet away from others whenever you can.  Wash your hands often.  Possible surgery delay   Like you, we want your surgery to happen when it's scheduled. But sometimes the hospital is so full that it's not safe for you to have your surgery. This is especially true during the pandemic. Your surgery may need to be rescheduled to a later date. If this happens, we will call and tell you.   Day of your surgery or procedure  Please come wearing a face covering that covers both your nose and mouth.  When you arrive, we'll ask you some questions to find out if you've had any exposures to COVID-19 or have any signs of COVID-19.  Ask your care team if you can have visitors. All visitors must wear face coverings and will be screened for exposure to, or signs of,  "COVID-19.  The rules for visitors change often, depending on how much the virus is spreading. To learn more, see \"Visiting a Loved One in the Hospital during the COVID-19 Outbreak,\" at: [email or web address].pdf.   Please call your care team, hospital or surgery center if you have any questions. We thank you for your understanding and for choosing [facility] for your care.     For informational purposes only. Not to replace the advice of your health care provider. Copyright   2020 [facility]. All rights reserved. Clinically reviewed by Infection Prevention and the [facility] COVID-19 Clinical Team. [employer] [id number] - Rev 08/01/22.    How to Take Your Medication Before Surgery  - Take all of your medications before surgery as usual    Blood pressure is well controlled.   Diabetes is well controlled.     Medications refilled.   Labs are stable.     Lab on 10/04/2022   Component Date Value Ref Range Status    Sodium 10/04/2022 140  134 - 144 mmol/L Final    Potassium 10/04/2022 4.0  3.5 - 5.1 mmol/L Final    Chloride 10/04/2022 105  98 - 107 mmol/L Final    Carbon Dioxide (CO2) 10/04/2022 29  21 - 31 mmol/L Final    Anion Gap 10/04/2022 6  3 - 14 mmol/L Final    Urea Nitrogen 10/04/2022 15  7 - 25 mg/dL Final    Creatinine 10/04/2022 0.80  0.70 - 1.30 mg/dL Final    Calcium 10/04/2022 9.5  8.6 - 10.3 mg/dL Final    Glucose 10/04/2022 131 (A) 70 - 105 mg/dL Final    Alkaline Phosphatase 10/04/2022 70  34 - 104 U/L Final    AST 10/04/2022 14  13 - 39 U/L Final    ALT 10/04/2022 13  7 - 52 U/L Final    Protein Total 10/04/2022 7.1  6.4 - 8.9 g/dL Final    Albumin 10/04/2022 4.4  3.5 - 5.7 g/dL Final    Bilirubin Total 10/04/2022 0.7  0.3 - 1.0 mg/dL Final    GFR Estimate 10/04/2022 90  >60 mL/min/1.73m2 Final    Effective December 21, 2021 eGFRcr in adults is calculated using the 2021 CKD-EPI creatinine equation which includes age and gender (Orion et al., NEJM, DOI: 10.1056/AIUGxa9002822)    " Cholesterol 10/04/2022 117  <200 mg/dL Final    Triglycerides 10/04/2022 86  <150 mg/dL Final    Direct Measure HDL 10/04/2022 39  23 - 92 mg/dL Final    LDL Cholesterol Calculated 10/04/2022 61  <=100 mg/dL Final    Non HDL Cholesterol 10/04/2022 78  <130 mg/dL Final    Patient Fasting > 8hrs? 10/04/2022 No   Final    WBC Count 10/04/2022 7.3  4.0 - 11.0 10e3/uL Final    RBC Count 10/04/2022 4.87  4.40 - 5.90 10e6/uL Final    Hemoglobin 10/04/2022 14.6  13.3 - 17.7 g/dL Final    Hematocrit 10/04/2022 44.4  40.0 - 53.0 % Final    MCV 10/04/2022 91  78 - 100 fL Final    MCH 10/04/2022 30.0  26.5 - 33.0 pg Final    MCHC 10/04/2022 32.9  31.5 - 36.5 g/dL Final    RDW 10/04/2022 13.5  10.0 - 15.0 % Final    Platelet Count 10/04/2022 212  150 - 450 10e3/uL Final    Hemoglobin A1C 10/04/2022 6.8 (A) 4.0 - 6.2 % Final    TSH 10/04/2022 2.52  0.40 - 4.00 mU/L Final    Color Urine 10/04/2022 Light Yellow  Colorless, Straw, Light Yellow, Yellow Final    Appearance Urine 10/04/2022 Clear  Clear Final    Glucose Urine 10/04/2022 Negative  Negative mg/dL Final    Bilirubin Urine 10/04/2022 Negative  Negative Final    Ketones Urine 10/04/2022 Negative  Negative mg/dL Final    Specific Gravity Urine 10/04/2022 1.013  1.000 - 1.030 Final    Blood Urine 10/04/2022 Negative  Negative Final    pH Urine 10/04/2022 5.5  5.0 - 9.0 Final    Protein Albumin Urine 10/04/2022 Negative  Negative mg/dL Final    Urobilinogen Urine 10/04/2022 Normal  Normal, 2.0 mg/dL Final    Nitrite Urine 10/04/2022 Negative  Negative Final    Leukocyte Esterase Urine 10/04/2022 Negative  Negative Final    Albumin Urine mg/L 10/04/2022 <12.0  mg/L Final    The reference ranges have not been established in urine albumin. The results should be integrated into the clinical context for interpretation.    Albumin Urine mg/g Cr 10/04/2022    Final    Unable to calculate, urine albumin and/or urine creatinine is outside detectable limits.  Microalbuminuria is defined  as an albumin:creatinine ratio of 17 to 299 for males and 25 to 299 for females. A ratio of albumin:creatinine of 300 or higher is indicative of overt proteinuria.  Due to biologic variability, positive results should be confirmed by a second, first-morning random or 24-hour timed urine specimen. If there is discrepancy, a third specimen is recommended. When 2 out of 3 results are in the microalbuminuria range, this is evidence for incipient nephropathy and warrants increased efforts at glucose control, blood pressure control, and institution of therapy with an angiotensin-converting-enzyme (ACE) inhibitor (if the patient can tolerate it).      Creatinine Urine mg/dL 10/04/2022 63.7  mg/dL Final    The reference ranges have not been established in urine creatinine. The results should be integrated into the clinical context for interpretation.      Aspects of Diabetes:   Recent Labs   Lab Test 10/04/22  0900 07/05/22  0848 03/29/22  0953   A1C 6.8* 6.6* 6.6*   LDL 61 66 76   HDL 39 46 41   TRIG 86 51 107   ALT 13 14 16   CR 0.80 0.83 0.83   GFRESTIMATED 90 89 90   POTASSIUM 4.0 3.8 3.9   TSH 2.52 0.90 4.14*   T4  --   --  0.68   WBC 7.3 5.1 5.6   HGB 14.6 14.6 14.8    228 220   ALBUMIN 4.4 4.5 4.6      Hemoglobin A1c  Goal range is under 8%. Best is 6.5 to 7   Blood Pressure 136/74 Goal to keep less than 140/90   Tobacco  reports that he quit smoking about 38 years ago. His smoking use included cigarettes. He has never used smokeless tobacco. Goal to abstain from tobacco   Aspirin or Plavix Anti-platelet therapy Aspirin or Plavix reduces risk of heart disease and stroke  -- sometimes used with other blood thinners, depending on bleeding risk and risk factors.    ACE/ARB Specific blood pressure meds These medications can reduce risk of kidney disease   Cholesterol Statins (Lipitor, Crestor, vs others) Statins reduce risk of heart disease and stroke   Eye Exam -- Do Yearly -- Annual diabetic eye exam   Healthy  weight Wt Readings from Last 4 Encounters:   10/05/22 85.9 kg (189 lb 6.4 oz)   08/16/22 85.3 kg (188 lb)   08/10/22 85.4 kg (188 lb 4.8 oz)   07/06/22 85.7 kg (189 lb)      Body mass index is 30.11 kg/m .  Goal BMI under 30, best is under 25.      -- Trying to exercise daily (goal at least 20 min/day) with moderate aerobic activity   -- Eat healthy (resources from ADA at http://www.diabetes.org/)   -- Taking good care of my feet. Consider seeing the Podiatrist   -- Check blood sugars as directed, record in log book and bring to every appointment    Insurance companies are grading you and I on your blood sugar control -- Goal is to get your A1c down to 7.9% or lower and NO Smoking!  -- Medicare and most insurance companies, will only cover Hemoglobin A1c labs to be rechecked every 91+ days.      Return for Diabetes labs and clinic follow-up appointment every 3 to 4 months.    Schedule lab only appointment --- A few days AFTER: 1/3/23   Schedule clinic appointment with Dr. Pickens -- Same day as labs, or 1-2 days later.

## 2022-10-11 ENCOUNTER — OFFICE VISIT (OUTPATIENT)
Dept: FAMILY MEDICINE | Facility: OTHER | Age: 77
End: 2022-10-11
Attending: PHYSICIAN ASSISTANT
Payer: COMMERCIAL

## 2022-10-11 VITALS
RESPIRATION RATE: 16 BRPM | OXYGEN SATURATION: 96 % | TEMPERATURE: 97.1 F | WEIGHT: 187.3 LBS | BODY MASS INDEX: 29.78 KG/M2 | HEART RATE: 64 BPM | SYSTOLIC BLOOD PRESSURE: 138 MMHG | DIASTOLIC BLOOD PRESSURE: 78 MMHG

## 2022-10-11 DIAGNOSIS — N39.0 ACUTE UTI: Primary | ICD-10-CM

## 2022-10-11 DIAGNOSIS — R30.0 DYSURIA: ICD-10-CM

## 2022-10-11 LAB
ALBUMIN UR-MCNC: 50 MG/DL
APPEARANCE UR: ABNORMAL
BACTERIA #/AREA URNS HPF: ABNORMAL /HPF
BILIRUB UR QL STRIP: NEGATIVE
COLOR UR AUTO: YELLOW
GLUCOSE UR STRIP-MCNC: NEGATIVE MG/DL
HGB UR QL STRIP: ABNORMAL
KETONES UR STRIP-MCNC: NEGATIVE MG/DL
LEUKOCYTE ESTERASE UR QL STRIP: ABNORMAL
MUCOUS THREADS #/AREA URNS LPF: PRESENT /LPF
NITRATE UR QL: POSITIVE
PH UR STRIP: 6 [PH] (ref 5–9)
RBC URINE: 15 /HPF
SP GR UR STRIP: 1.02 (ref 1–1.03)
UROBILINOGEN UR STRIP-MCNC: NORMAL MG/DL
WBC URINE: >182 /HPF

## 2022-10-11 PROCEDURE — G0463 HOSPITAL OUTPT CLINIC VISIT: HCPCS

## 2022-10-11 PROCEDURE — 99213 OFFICE O/P EST LOW 20 MIN: CPT | Performed by: PHYSICIAN ASSISTANT

## 2022-10-11 PROCEDURE — 87086 URINE CULTURE/COLONY COUNT: CPT | Mod: ZL | Performed by: PHYSICIAN ASSISTANT

## 2022-10-11 PROCEDURE — 81001 URINALYSIS AUTO W/SCOPE: CPT | Mod: ZL | Performed by: PHYSICIAN ASSISTANT

## 2022-10-11 RX ORDER — SULFAMETHOXAZOLE/TRIMETHOPRIM 800-160 MG
1 TABLET ORAL 2 TIMES DAILY
Qty: 14 TABLET | Refills: 0 | Status: SHIPPED | OUTPATIENT
Start: 2022-10-11 | End: 2022-10-18

## 2022-10-11 ASSESSMENT — PAIN SCALES - GENERAL: PAINLEVEL: MILD PAIN (2)

## 2022-10-11 NOTE — PROGRESS NOTES
"  Assessment & Plan   Problem List Items Addressed This Visit    None  Visit Diagnoses     Acute UTI    -  Primary    Relevant Medications    sulfamethoxazole-trimethoprim (BACTRIM DS) 800-160 MG tablet    Dysuria        Relevant Orders    UA reflex to Microscopic and Culture (Completed)    Urine Culture        Completed urinalysis and urine culture to rule out infection concerns.  Patient's urine sample was positive for urinary nitrites which is a bacterial waste product.  Also positive for red blood cells and white blood cells.  Patient was started on Bactrim for treatment of an acute UTI.  Urine culture is pending.    Antibiotic has been sent to pharmacy. Please take full course of antibiotic even if symptoms have completely resolved. This helps prevent against antibiotic resistance.     We will culture the urine to see what bacteria grows out of the urine.  We will call if a change of antibiotic is necessary per the culture.  Please increase fluids including water and cranberry juice.  Monitor for fevers, chills, back pain.  Return to clinic after antibiotic completion if symptoms are not resolved.  Call clinic if symptoms change/worsen.      BMI:   Estimated body mass index is 29.78 kg/m  as calculated from the following:    Height as of 10/5/22: 1.689 m (5' 6.5\").    Weight as of this encounter: 85 kg (187 lb 4.8 oz).       See Patient Instructions    Return if symptoms worsen or fail to improve.    Anita Murray PA-C  [facility]    Alisha Graff is a 77 year old, presenting for the following health issues:  UTI      HPI     Patient comes in today with urinary frequency and a burning sensation.  Having urinary pain.  Symptoms have been present for couple weeks.  History of bladder infections in the past.  No hematuria, fevers, chills, abdominal pain, new back pain.  Has some gurgling in his stomach.  Keeping food and fluids down.  Otherwise feeling fine.    Review of Systems "   Constitutional, HEENT, cardiovascular, pulmonary, gi and gu systems are negative, except as otherwise noted.      Objective    /78   Pulse 64   Temp 97.1  F (36.2  C) (Tympanic)   Resp 16   Wt 85 kg (187 lb 4.8 oz)   SpO2 96%   BMI 29.78 kg/m    Body mass index is 29.78 kg/m .  Physical Exam  Vitals and nursing note reviewed.   Constitutional:       Appearance: Normal appearance.   HENT:      Head: Normocephalic and atraumatic.   Cardiovascular:      Rate and Rhythm: Normal rate and regular rhythm.      Heart sounds: Normal heart sounds.   Pulmonary:      Effort: Pulmonary effort is normal.      Breath sounds: Normal breath sounds.   Abdominal:      General: Abdomen is flat. Bowel sounds are normal. There is no distension.      Palpations: Abdomen is soft. There is no mass.      Tenderness: There is no abdominal tenderness. There is no right CVA tenderness, left CVA tenderness, guarding or rebound.      Hernia: No hernia is present.   Musculoskeletal:         General: Normal range of motion.   Skin:     General: Skin is warm and dry.   Neurological:      General: No focal deficit present.      Mental Status: He is alert and oriented to person, place, and time.   Psychiatric:         Mood and Affect: Mood normal.         Behavior: Behavior normal.         Thought Content: Thought content normal.         Judgment: Judgment normal.            Results for orders placed or performed in visit on 10/11/22   UA reflex to Microscopic and Culture     Status: Abnormal    Specimen: Urine, Midstream   Result Value Ref Range    Color Urine Yellow Colorless, Straw, Light Yellow, Yellow    Appearance Urine Slightly Cloudy (A) Clear    Glucose Urine Negative Negative mg/dL    Bilirubin Urine Negative Negative    Ketones Urine Negative Negative mg/dL    Specific Gravity Urine 1.019 1.000 - 1.030    Blood Urine Trace (A) Negative    pH Urine 6.0 5.0 - 9.0    Protein Albumin Urine 50 (A) Negative mg/dL    Urobilinogen  Urine Normal Normal, 2.0 mg/dL    Nitrite Urine Positive (A) Negative    Leukocyte Esterase Urine Large (A) Negative    Bacteria Urine Few (A) None Seen /HPF    Mucus Urine Present (A) None Seen /LPF    RBC Urine 15 (H) <=2 /HPF    WBC Urine >182 (H) <=5 /HPF    Narrative    Urine Culture ordered based on laboratory criteria

## 2022-10-11 NOTE — PATIENT INSTRUCTIONS
Antibiotic has been sent to pharmacy. Please take full course of antibiotic even if symptoms have completely resolved. This helps prevent against antibiotic resistance.     We will culture the urine to see what bacteria grows out of the urine.  We will call if a change of antibiotic is necessary per the culture.  Please increase fluids including water and cranberry juice.  Monitor for fevers, chills, back pain.  Return to clinic after antibiotic completion if symptoms are not resolved.  Call clinic if symptoms change/worsen.

## 2022-10-11 NOTE — NURSING NOTE
"Chief Complaint   Patient presents with     UTI   Patient here today with a possible UTI. Symptoms of pain, burning and frequency. Symptoms began a couple weeks ago.    Initial /78   Pulse 64   Temp 97.1  F (36.2  C) (Tympanic)   Resp 16   Wt 85 kg (187 lb 4.8 oz)   SpO2 96%   BMI 29.78 kg/m   Estimated body mass index is 29.78 kg/m  as calculated from the following:    Height as of 10/5/22: 1.689 m (5' 6.5\").    Weight as of this encounter: 85 kg (187 lb 4.8 oz).      Medication Reconciliation: complete    Effie Morataya LPN   10/11/2022  4:36 PM  "

## 2022-10-13 LAB — BACTERIA UR CULT: ABNORMAL

## 2022-10-24 ENCOUNTER — OFFICE VISIT (OUTPATIENT)
Dept: UROLOGY | Facility: OTHER | Age: 77
End: 2022-10-24
Attending: UROLOGY
Payer: COMMERCIAL

## 2022-10-24 VITALS
OXYGEN SATURATION: 95 % | RESPIRATION RATE: 16 BRPM | BODY MASS INDEX: 29.73 KG/M2 | WEIGHT: 187 LBS | HEART RATE: 65 BPM | DIASTOLIC BLOOD PRESSURE: 72 MMHG | SYSTOLIC BLOOD PRESSURE: 122 MMHG

## 2022-10-24 DIAGNOSIS — N40.1 BENIGN PROSTATIC HYPERPLASIA WITH LOWER URINARY TRACT SYMPTOMS, SYMPTOM DETAILS UNSPECIFIED: Primary | ICD-10-CM

## 2022-10-24 LAB
ALBUMIN UR-MCNC: NEGATIVE MG/DL
APPEARANCE UR: CLEAR
BILIRUB UR QL STRIP: NEGATIVE
CAOX CRY #/AREA URNS HPF: ABNORMAL /HPF
COLOR UR AUTO: ABNORMAL
GLUCOSE UR STRIP-MCNC: NEGATIVE MG/DL
HGB UR QL STRIP: NEGATIVE
KETONES UR STRIP-MCNC: NEGATIVE MG/DL
LEUKOCYTE ESTERASE UR QL STRIP: ABNORMAL
MUCOUS THREADS #/AREA URNS LPF: PRESENT /LPF
NITRATE UR QL: NEGATIVE
PH UR STRIP: 6.5 [PH] (ref 5–9)
RBC URINE: 1 /HPF
SP GR UR STRIP: 1.01 (ref 1–1.03)
UROBILINOGEN UR STRIP-MCNC: NORMAL MG/DL
WBC URINE: 4 /HPF

## 2022-10-24 PROCEDURE — 81001 URINALYSIS AUTO W/SCOPE: CPT | Mod: ZL | Performed by: UROLOGY

## 2022-10-24 PROCEDURE — G0463 HOSPITAL OUTPT CLINIC VISIT: HCPCS

## 2022-10-24 PROCEDURE — 99214 OFFICE O/P EST MOD 30 MIN: CPT | Performed by: UROLOGY

## 2022-10-24 PROCEDURE — 51798 US URINE CAPACITY MEASURE: CPT | Performed by: UROLOGY

## 2022-10-24 RX ORDER — TADALAFIL 5 MG/1
5 TABLET ORAL DAILY
Qty: 90 TABLET | Refills: 3 | Status: SHIPPED | OUTPATIENT
Start: 2022-10-24 | End: 2023-04-04

## 2022-10-24 ASSESSMENT — PAIN SCALES - GENERAL: PAINLEVEL: SEVERE PAIN (7)

## 2022-10-24 NOTE — PROGRESS NOTES
Type of Visit  Established    Chief Complaint  UTI  Urinary frequency    HPI  Mr. Basilio is a 77 year old male follows up with a recent UTI with persistent symptoms.  The patient presented to clinic almost 2 weeks ago with painful urination.  He was diagnosed with a UTI and prescribed a week of Bactrim.  Urine culture revealed sensitivity to Bactrim.  Following the course of antibiotics the patient complains of persistent mixed urinary symptoms.  He has complaints of obstructive as well as overactive bladder symptoms.  He does not take any chronic medication and is asking if there are options available.  He used Flomax in the past without benefit.  He discontinued medication and no longer takes Flomax.      Review of Systems  I reviewed the ROS with the patient.    Nursing Notes:   Jessy Rocha LPN  10/24/2022  1:09 PM  Addendum  Chief Complaint   Patient presents with     Follow Up     Difficulty urinating   Patient presents to the clinic today for a follow up for difficulty urinating.    Review of Systems:    Weight loss:    No     Recent fever/chills:  No   Night sweats:   No  Current skin rash:  No   Recent hair loss:  No  Heat intolerance:  No   Cold intolerance:  No  Chest pain:   No   Palpitations:   No  Shortness of breath:  No   Wheezing:   No  Constipation:    No   Diarrhea:   No   Nausea:   No   Vomiting:   No   Kidney/side pain:  No   Back pain:   No  Frequent headaches:  No   Dizziness:     Yes  Leg swelling:   No   Calf pain:    No      Post-Void Residual  A post-void residual was measured by ultrasonic bladder scanner.  >125 mL  Jessy Rocha LPN  10/24/2022 1:06 PM    Medication Reconciliation: completed   Jessy Rocha LPN  10/24/2022 12:55 PM     Physical Exam  Vitals:    10/24/22 1300   BP: 122/72   BP Location: Right arm   Patient Position: Sitting   Cuff Size: Adult Regular   Pulse: 65   Resp: 16   SpO2: 95%   Weight: 84.8 kg (187 lb)   Constitutional: NAD, WDWN.  Cardiovascular: Regular  rate.  Pulmonary/Chest: Respirations are even and non-labored bilaterally.  Abdominal: Soft. No distension, tenderness, masses or guarding. No CVA tenderness.  Extremities: JONNY x 4, Warm. No clubbing.  No cyanosis.    Skin: Pink, warm and dry.  No rashes noted.  Genitourinary: nonpalpable bladder    Labs  Results for orders placed or performed in visit on 10/24/22   UA reflex to Microscopic     Status: Abnormal   Result Value Ref Range    Color Urine Dark Yellow (A) Colorless, Straw, Light Yellow, Yellow    Appearance Urine Clear Clear    Glucose Urine Negative Negative mg/dL    Bilirubin Urine Negative Negative    Ketones Urine Negative Negative mg/dL    Specific Gravity Urine 1.014 1.000 - 1.030    Blood Urine Negative Negative    pH Urine 6.5 5.0 - 9.0    Protein Albumin Urine Negative Negative mg/dL    Urobilinogen Urine Normal Normal, 2.0 mg/dL    Nitrite Urine Negative Negative    Leukocyte Esterase Urine Small (A) Negative    RBC Urine 1 <=2 /HPF    WBC Urine 4 <=5 /HPF    Mucus Urine Present (A) None Seen /LPF    Calcium Oxalate Crystals Urine Few (A) None Seen /HPF        10/04/22 09:02 10/11/22 16:30   Color Urine Light Yellow Yellow   Appearance Urine Clear Slightly Cloudy !   Glucose Urine Negative Negative   Bilirubin Urine Negative Negative   Ketones Urine Negative Negative   Specific Gravity Urine 1.013 1.019   pH Urine 5.5 6.0   Protein Albumin Urine Negative 50 !   Urobilinogen mg/dL Normal Normal   Nitrite Urine Negative Positive !   Blood Urine Negative Trace !   Leukocyte Esterase Urine Negative Large !   WBC Urine  >182 (H)   RBC Urine  15 (H)   Bacteria Urine  Few !   Mucus Urine  Present !     10/11/2022  Urine Culture >100,000 CFU/mL Escherichia coli Abnormal             Susceptibility     Escherichia coli     ALONDRA     Amoxicillin/Clavulanate <=8  Susceptible     Ampicillin <=8  Susceptible     Ampicillin/ Sulbactam <=8  Susceptible     Aztreonam <=4  Susceptible     Cefazolin <=2  Susceptible      Cefepime <=2  Susceptible     Cefoxitin <=8  Susceptible     Cefpodoxime <=2  Susceptible     Ceftazidime <=1  Susceptible     Ceftriaxone <=1  Susceptible     Cefuroxime <=4  Susceptible     Ciprofloxacin  See below 1     Ertapenem <=0.5  Susceptible     Gentamicin <=4  Susceptible     Imipenem <=1  Susceptible     Levofloxacin  See below 2     Nitrofurantoin <=32  Susceptible     Piperacillin/Tazobactam <=16  Susceptible     Tetracycline <=4  Susceptible     Tobramycin <=4  Susceptible     Trimethoprim <=8  Susceptible     Trimethoprim/Sulfamethoxazole <=2/38  Susceptible                03/29/22 09:53   PSA 3.12     Radiographic Studies  12/2/2020  CT Stone  IMPRESSION:    Massive prostatomegaly may result in chronic bladder outlet  obstruction. 3 mm right posterolateral bladder/ureterovesicular  junction calculus. No hydronephrosis.    Post-Void Residual  A post-void residual was measured by ultrasonic bladder scanner.  >125 mL today  95 mL (previously recorded)    Assessment & Plan  Mr. Basilio is a 77 year old male follows up with concerns regarding ongoing urinary symptoms in spite of successful antibiotic treatment.  Reviewed the past notes labs and imaging to conduct the visit.  UA is clear and PVR is stable.  Relatively recent PSA is low.    The patient previously used Flomax which was ineffective.  We discussed daily Cialis as an additional BPH medication option.  We also discussed TURP as a surgical option.  He would like to trial Cialis.  He was informed of the most common side effects of Cialis such as headache, flushing and back pain    Plan  Start Cialis 5mg by mouth once daily for BPH  Follow-up in 2 months for cystoscopy and a review of symptoms

## 2022-10-24 NOTE — NURSING NOTE
Chief Complaint   Patient presents with     Follow Up     Difficulty urinating   Patient presents to the clinic today for a follow up for difficulty urinating.    Review of Systems:    Weight loss:    No     Recent fever/chills:  No   Night sweats:   No  Current skin rash:  No   Recent hair loss:  No  Heat intolerance:  No   Cold intolerance:  No  Chest pain:   No   Palpitations:   No  Shortness of breath:  No   Wheezing:   No  Constipation:    No   Diarrhea:   No   Nausea:   No   Vomiting:   No   Kidney/side pain:  No   Back pain:   No  Frequent headaches:  No   Dizziness:     Yes  Leg swelling:   No   Calf pain:    No      Post-Void Residual  A post-void residual was measured by ultrasonic bladder scanner.  >125 mL  Jessy Rocha LPN  10/24/2022 1:06 PM    Medication Reconciliation: completed   Jessy Rocha LPN  10/24/2022 12:55 PM

## 2022-10-25 ENCOUNTER — TELEPHONE (OUTPATIENT)
Dept: UROLOGY | Facility: OTHER | Age: 77
End: 2022-10-25

## 2022-10-25 DIAGNOSIS — N40.1 BENIGN PROSTATIC HYPERPLASIA WITH LOWER URINARY TRACT SYMPTOMS, SYMPTOM DETAILS UNSPECIFIED: ICD-10-CM

## 2022-10-25 NOTE — TELEPHONE ENCOUNTER
Patient called regarding status of Tadalafil prescription sent to [facility] pharmacy by Dr. Connell on 10/24/22. Per chart review, prescription was never released to its destination. Order released.    10/24/22 1317 Sign Olivier Connell MD       Disp Refills Start End KIKI   tadalafil (CIALIS) 5 MG tablet 90 tablet 3 10/24/2022       Sig - Route: Take 1 tablet (5 mg) by mouth daily - Oral   Sent to pharmacy as: Tadalafil 5 MG Oral Tablet (CIALIS)   Class: E-Prescribe   Notes to Pharmacy: If not covered for BPH please assist patient with GoodRx   Thank you.   Order: [id number]   E-Prescribing Status: Receipt confirmed by pharmacy (10/25/2022 11:39 AM CDT)     [facility] [address] [place] [address]    Called and spoke to Patient after verifying last name and date of birth. Pt informed of status. Alanna Patterson RN .............. 10/25/2022  11:43 AM

## 2022-10-25 NOTE — TELEPHONE ENCOUNTER
Patient called and is asking about a prescription that was to be sent to [facility] yesterday. He did not say what it for .[facility] states they have not received it. Please contact patient.    Carol Ann Workman on 10/25/2022 at 7:32 AM

## 2022-11-18 ENCOUNTER — OFFICE VISIT (OUTPATIENT)
Dept: INTERNAL MEDICINE | Facility: OTHER | Age: 77
End: 2022-11-18
Attending: INTERNAL MEDICINE
Payer: COMMERCIAL

## 2022-11-18 VITALS
HEIGHT: 66 IN | HEART RATE: 72 BPM | OXYGEN SATURATION: 100 % | TEMPERATURE: 98.4 F | RESPIRATION RATE: 18 BRPM | DIASTOLIC BLOOD PRESSURE: 74 MMHG | WEIGHT: 188.8 LBS | SYSTOLIC BLOOD PRESSURE: 134 MMHG | BODY MASS INDEX: 30.34 KG/M2

## 2022-11-18 DIAGNOSIS — I25.10 CORONARY ARTERY DISEASE INVOLVING NATIVE CORONARY ARTERY OF NATIVE HEART WITHOUT ANGINA PECTORIS: ICD-10-CM

## 2022-11-18 DIAGNOSIS — H02.9 EYELID ABNORMALITY: ICD-10-CM

## 2022-11-18 DIAGNOSIS — R53.83 MALAISE AND FATIGUE: ICD-10-CM

## 2022-11-18 DIAGNOSIS — R53.83 EXCESSIVE POSTEXERTIONAL FATIGUE: Primary | ICD-10-CM

## 2022-11-18 DIAGNOSIS — R53.81 MALAISE AND FATIGUE: ICD-10-CM

## 2022-11-18 DIAGNOSIS — H04.122 DRY EYE OF LEFT SIDE: ICD-10-CM

## 2022-11-18 DIAGNOSIS — E78.2 MIXED HYPERLIPIDEMIA: ICD-10-CM

## 2022-11-18 PROCEDURE — 99214 OFFICE O/P EST MOD 30 MIN: CPT | Performed by: INTERNAL MEDICINE

## 2022-11-18 PROCEDURE — G0463 HOSPITAL OUTPT CLINIC VISIT: HCPCS

## 2022-11-18 RX ORDER — OMEGA-3 FATTY ACIDS/FISH OIL 300-1000MG
1 CAPSULE ORAL DAILY
COMMUNITY
End: 2024-07-24

## 2022-11-18 RX ORDER — ERYTHROMYCIN 5 MG/G
OINTMENT OPHTHALMIC
COMMUNITY
Start: 2022-10-13 | End: 2022-11-18

## 2022-11-18 RX ORDER — ROSUVASTATIN CALCIUM 5 MG/1
5 TABLET, COATED ORAL DAILY
Qty: 90 TABLET | Refills: 1 | Status: SHIPPED | OUTPATIENT
Start: 2022-11-18 | End: 2023-04-04

## 2022-11-18 RX ORDER — DEXTROAMPHETAMINE SACCHARATE, AMPHETAMINE ASPARTATE, DEXTROAMPHETAMINE SULFATE AND AMPHETAMINE SULFATE 5; 5; 5; 5 MG/1; MG/1; MG/1; MG/1
20 TABLET ORAL 2 TIMES DAILY
Qty: 60 TABLET | Refills: 0 | Status: SHIPPED | OUTPATIENT
Start: 2022-11-18 | End: 2023-01-27

## 2022-11-18 RX ORDER — ERYTHROMYCIN 5 MG/G
OINTMENT OPHTHALMIC
Qty: 3.5 G | Refills: 3 | Status: SHIPPED | OUTPATIENT
Start: 2022-11-18 | End: 2023-06-14

## 2022-11-18 ASSESSMENT — PAIN SCALES - GENERAL: PAINLEVEL: MILD PAIN (2)

## 2022-11-18 ASSESSMENT — ENCOUNTER SYMPTOMS
ABDOMINAL PAIN: 0
HEMATURIA: 0
FATIGUE: 1
WHEEZING: 0
SLEEP DISTURBANCE: 0
SHORTNESS OF BREATH: 0
BRUISES/BLEEDS EASILY: 0
NERVOUS/ANXIOUS: 1
PALPITATIONS: 0
WEAKNESS: 1
ARTHRALGIAS: 1
CHILLS: 0
DIZZINESS: 1
VOMITING: 0
MYALGIAS: 1
COUGH: 1
EYE REDNESS: 1
DIARRHEA: 0
LIGHT-HEADEDNESS: 1
FEVER: 0
EYE PAIN: 1
BACK PAIN: 1
DYSURIA: 0
NUMBNESS: 1
NAUSEA: 0
AGITATION: 0

## 2022-11-18 ASSESSMENT — PATIENT HEALTH QUESTIONNAIRE - PHQ9
SUM OF ALL RESPONSES TO PHQ QUESTIONS 1-9: 5
SUM OF ALL RESPONSES TO PHQ QUESTIONS 1-9: 5
10. IF YOU CHECKED OFF ANY PROBLEMS, HOW DIFFICULT HAVE THESE PROBLEMS MADE IT FOR YOU TO DO YOUR WORK, TAKE CARE OF THINGS AT HOME, OR GET ALONG WITH OTHER PEOPLE: NOT DIFFICULT AT ALL

## 2022-11-18 ASSESSMENT — ANXIETY QUESTIONNAIRES
GAD7 TOTAL SCORE: 6
4. TROUBLE RELAXING: NOT AT ALL
GAD7 TOTAL SCORE: 6
7. FEELING AFRAID AS IF SOMETHING AWFUL MIGHT HAPPEN: NOT AT ALL
1. FEELING NERVOUS, ANXIOUS, OR ON EDGE: NOT AT ALL
8. IF YOU CHECKED OFF ANY PROBLEMS, HOW DIFFICULT HAVE THESE MADE IT FOR YOU TO DO YOUR WORK, TAKE CARE OF THINGS AT HOME, OR GET ALONG WITH OTHER PEOPLE?: NOT DIFFICULT AT ALL
6. BECOMING EASILY ANNOYED OR IRRITABLE: NOT AT ALL
GAD7 TOTAL SCORE: 6
7. FEELING AFRAID AS IF SOMETHING AWFUL MIGHT HAPPEN: NOT AT ALL
5. BEING SO RESTLESS THAT IT IS HARD TO SIT STILL: MORE THAN HALF THE DAYS
IF YOU CHECKED OFF ANY PROBLEMS ON THIS QUESTIONNAIRE, HOW DIFFICULT HAVE THESE PROBLEMS MADE IT FOR YOU TO DO YOUR WORK, TAKE CARE OF THINGS AT HOME, OR GET ALONG WITH OTHER PEOPLE: NOT DIFFICULT AT ALL
2. NOT BEING ABLE TO STOP OR CONTROL WORRYING: MORE THAN HALF THE DAYS
3. WORRYING TOO MUCH ABOUT DIFFERENT THINGS: MORE THAN HALF THE DAYS

## 2022-11-18 NOTE — PROGRESS NOTES
Assessment & Plan   Dajuan Basilio is a 77 year old, presenting for the following health issues:      ICD-10-CM    1. Excessive postexertional fatigue  R53.83 amphetamine-dextroamphetamine (ADDERALL) 20 MG tablet      2. Malaise and fatigue  R53.81 amphetamine-dextroamphetamine (ADDERALL) 20 MG tablet    R53.83       3. Coronary artery disease involving native coronary artery of native heart without angina pectoris  I25.10 rosuvastatin (CRESTOR) 5 MG tablet      4. Mixed hyperlipidemia  E78.2 rosuvastatin (CRESTOR) 5 MG tablet      5. Dry eye of left side  H04.122 erythromycin (ROMYCIN) 5 MG/GM ophthalmic ointment      6. Eyelid abnormality  H02.9 erythromycin (ROMYCIN) 5 MG/GM ophthalmic ointment      Patient presents for follow-up multiple issues.    Excessive postexertional fatigue, fatigue and malaise.  We tried 10 milligrams Adderall daily without any improvement in symptoms.  He has found B12 supplements quite effective.  He is still having a lot of problems.  He can be busy and active and doing fine but as soon as he comes in and sits down he falls asleep.  Start trial of slight dose of Adderall increased, 20 mg twice daily.  Morning and early afternoon.  Plan for clinic follow-up in 3 to 4 weeks.    Coronary artery disease, has mixed hyperlipidemia.  Has been having some trouble with Lipitor, causing muscle aches and pains.  He would like to try different statin therapy.  Had problems with Lipitor in the past.  Was on simvastatin previously.  Start Crestor.     History of dry eyes, had left eyelid surgery and now his eyelid is droopy.  He needs refills of his ophthalmic ointment.  He is going to end up needing surgery again later this month.    Encouraged regular exercise.     Return in about 4 weeks (around 12/16/2022) for -- follow-up Cholesterol / Fatigue.    Cm Pickens MD  [facility]     Subjective   Follow up fatigue / adderall      History of Present Illness       Reason for  visit:  Follow up fatigue  Symptom onset:  More than a month  Symptoms include:  Fatigue  Symptom intensity:  Moderate  Symptom progression:  Improving  Had these symptoms before:  Yes  Has tried/received treatment for these symptoms:  No    He eats 2-3 servings of fruits and vegetables daily.He consumes 0 sweetened beverage(s) daily.He exercises with enough effort to increase his heart rate 9 or less minutes per day.  He exercises with enough effort to increase his heart rate 3 or less days per week.   He is taking medications regularly.    Today's PHQ-9         PHQ-9 Total Score: 5    PHQ-9 Q9 Thoughts of better off dead/self-harm past 2 weeks :   Not at all    How difficult have these problems made it for you to do your work, take care of things at home, or get along with other people: Not difficult at all  Today's NEERAJ-7 Score: 6     Review of Systems   Constitutional: Positive for fatigue. Negative for chills and fever.   HENT: Positive for hearing loss. Negative for congestion and nosebleeds.    Eyes: Positive for pain and redness. Negative for visual disturbance.        + chronic dry eyes, tried many eye drops, not much improvement - had left eye surgery - now with chronic droopy lower lid   Respiratory: Positive for cough (clear phlegm - months). Negative for shortness of breath and wheezing.    Cardiovascular: Positive for chest pain. Negative for palpitations.        Cardiac stress testing 3/2020 - negative/normal results.   Gastrointestinal: Negative for abdominal pain, diarrhea, nausea and vomiting.        + left inguinal pains intermittently   Endocrine: Negative for cold intolerance and heat intolerance.   Genitourinary: Negative for dysuria and hematuria.   Musculoskeletal: Positive for arthralgias (left knee), back pain (left low back / buttocks pain), gait problem (staggering at times, low back and left knee pain) and myalgias.        Bilateral feet with bunions   Skin: Negative for pallor.  "  Allergic/Immunologic: Negative for immunocompromised state.   Neurological: Positive for dizziness (+ Spinning with change in position), weakness, light-headedness and numbness (right > left feet).   Hematological: Does not bruise/bleed easily.   Psychiatric/Behavioral: Negative for agitation and sleep disturbance. The patient is nervous/anxious.         + short term memory loss -- stopped Exelon and Namenda -- due to side effects.   + reporting less anxiety / depression - virus pandemic had worsened everything          Objective    /74 (BP Location: Right arm, Patient Position: Sitting, Cuff Size: Adult Regular)   Pulse 72   Temp 98.4  F (36.9  C)   Resp 18   Ht 1.683 m (5' 6.25\")   Wt 85.6 kg (188 lb 12.8 oz)   SpO2 100%   BMI 30.24 kg/m    Body mass index is 30.24 kg/m .  Physical Exam  Constitutional:       General: He is not in acute distress.     Appearance: He is well-developed. He is not diaphoretic.   HENT:      Head: Normocephalic and atraumatic.      Nose: Nose normal. No congestion or rhinorrhea.      Mouth/Throat:      Mouth: Mucous membranes are moist.      Pharynx: Oropharynx is clear. No oropharyngeal exudate or posterior oropharyngeal erythema.   Eyes:      General: No scleral icterus.     Conjunctiva/sclera: Conjunctivae normal.      Comments: Left eye, with very droopy, irritated left lower lid   Cardiovascular:      Rate and Rhythm: Normal rate and regular rhythm.   Pulmonary:      Effort: Pulmonary effort is normal.      Breath sounds: Normal breath sounds.   Musculoskeletal:         General: No deformity.      Cervical back: Neck supple.   Lymphadenopathy:      Cervical: No cervical adenopathy.   Skin:     General: Skin is warm and dry.      Coloration: Skin is not jaundiced.      Findings: No rash.   Neurological:      Mental Status: He is alert and oriented to person, place, and time. Mental status is at baseline.   Psychiatric:         Mood and Affect: Mood normal.         " Behavior: Behavior is slowed.

## 2022-11-18 NOTE — PATIENT INSTRUCTIONS
1. Excessive postexertional fatigue  2. Malaise and fatigue    START higer dose:  - amphetamine-dextroamphetamine (ADDERALL) 20 MG tablet; Take 1 tablet (20 mg) by mouth 2 times daily AM and Noon  Dispense: 60 tablet; Refill: 0    3. Coronary artery disease involving native coronary artery of native heart without angina pectoris  4. Mixed hyperlipidemia  - rosuvastatin (CRESTOR) 5 MG tablet; Take 1 tablet (5 mg) by mouth daily - Stop Lipitor -  Dispense: 90 tablet; Refill: 1    5. Dry eye of left side  6. Eyelid abnormality  - erythromycin (ROMYCIN) 5 MG/GM ophthalmic ointment; APPLY 1CM RIBBON ONTO INCISION LINE(S) 3 TIMES A DAY Strength: 5 MG/GM  Dispense: 3.5 g; Refill: 3    Return in approximately 4 week(s), or sooner as needed for follow-up with Dr. Pickens.  -- follow-up Cholesterol / Fatigue    Clinic : [phone]  Appointment line: [phone]

## 2022-11-18 NOTE — NURSING NOTE
"Chief Complaint   Patient presents with     Follow up fatigue / adderall         Initial /74 (BP Location: Right arm, Patient Position: Sitting, Cuff Size: Adult Regular)   Pulse 72   Temp 98.4  F (36.9  C)   Resp 18   Ht 1.683 m (5' 6.25\")   Wt 85.6 kg (188 lb 12.8 oz)   SpO2 100%   BMI 30.24 kg/m   Estimated body mass index is 30.24 kg/m  as calculated from the following:    Height as of this encounter: 1.683 m (5' 6.25\").    Weight as of this encounter: 85.6 kg (188 lb 12.8 oz).       FOOD SECURITY SCREENING QUESTIONS:    The next two questions are to help us understand your food security.  If you are feeling you need any assistance in this area, we have resources available to support you today.    Hunger Vital Signs:  Within the past 12 months we worried whether our food would run out before we got money to buy more. Never  Within the past 12 months the food we bought just didn't last and we didn't have money to get more. Never    Advance Care Directive on file? yes      Medication reconciliation complete.      Francisco Carrasquillo,on 11/18/2022 at 8:46 AM        "

## 2022-12-16 ENCOUNTER — OFFICE VISIT (OUTPATIENT)
Dept: INTERNAL MEDICINE | Facility: OTHER | Age: 77
End: 2022-12-16
Attending: INTERNAL MEDICINE
Payer: COMMERCIAL

## 2022-12-16 VITALS
RESPIRATION RATE: 18 BRPM | WEIGHT: 185.8 LBS | DIASTOLIC BLOOD PRESSURE: 72 MMHG | BODY MASS INDEX: 29.76 KG/M2 | SYSTOLIC BLOOD PRESSURE: 130 MMHG | HEART RATE: 83 BPM | OXYGEN SATURATION: 97 % | TEMPERATURE: 98 F

## 2022-12-16 DIAGNOSIS — R53.83 MALAISE AND FATIGUE: Primary | ICD-10-CM

## 2022-12-16 DIAGNOSIS — Z12.11 COLON CANCER SCREENING: ICD-10-CM

## 2022-12-16 DIAGNOSIS — E03.4 HYPOTHYROIDISM DUE TO ACQUIRED ATROPHY OF THYROID: ICD-10-CM

## 2022-12-16 DIAGNOSIS — E11.21 TYPE 2 DIABETES MELLITUS WITH DIABETIC NEPHROPATHY, WITHOUT LONG-TERM CURRENT USE OF INSULIN (H): ICD-10-CM

## 2022-12-16 DIAGNOSIS — R53.81 MALAISE AND FATIGUE: Primary | ICD-10-CM

## 2022-12-16 DIAGNOSIS — R53.83 EXCESSIVE POSTEXERTIONAL FATIGUE: ICD-10-CM

## 2022-12-16 DIAGNOSIS — E11.40 CONTROLLED TYPE 2 DIABETES MELLITUS WITH DIABETIC NEUROPATHY, WITHOUT LONG-TERM CURRENT USE OF INSULIN (H): ICD-10-CM

## 2022-12-16 PROCEDURE — G0463 HOSPITAL OUTPT CLINIC VISIT: HCPCS

## 2022-12-16 PROCEDURE — 99214 OFFICE O/P EST MOD 30 MIN: CPT | Performed by: INTERNAL MEDICINE

## 2022-12-16 RX ORDER — GABAPENTIN 100 MG/1
100-200 CAPSULE ORAL AT BEDTIME
Qty: 150 CAPSULE | Refills: 1 | Status: SHIPPED | OUTPATIENT
Start: 2022-12-16 | End: 2023-04-04

## 2022-12-16 RX ORDER — LEVOTHYROXINE SODIUM 50 UG/1
50 TABLET ORAL DAILY
Qty: 93 TABLET | Refills: 1 | Status: SHIPPED | OUTPATIENT
Start: 2022-12-16 | End: 2023-04-04

## 2022-12-16 ASSESSMENT — ENCOUNTER SYMPTOMS
DIARRHEA: 0
NERVOUS/ANXIOUS: 1
CHILLS: 0
WEAKNESS: 1
WHEEZING: 0
ABDOMINAL PAIN: 0
FATIGUE: 1
PALPITATIONS: 0
VOMITING: 0
MYALGIAS: 1
EYE REDNESS: 1
BACK PAIN: 1
DYSURIA: 0
AGITATION: 0
NUMBNESS: 1
SLEEP DISTURBANCE: 0
DIZZINESS: 1
LIGHT-HEADEDNESS: 1
COUGH: 1
FEVER: 0
NAUSEA: 0
ARTHRALGIAS: 1
SHORTNESS OF BREATH: 0
HEMATURIA: 0
BRUISES/BLEEDS EASILY: 0
EYE PAIN: 1

## 2022-12-16 NOTE — PATIENT INSTRUCTIONS
Cologuard Patient Instructions    An order was placed for a Cologuard stool blood testing kit.  You will receive your kit in the mail. Please follow the instructions that are provided in the kit.      Reminder: Ship Cologuard test the same day or the next day to allow enough delivery time. The lab must receive your specimen within 4 days for successful testing. If not delivered in time, you may have to complete the process again.    Once you have completed the collection and have it ready to be shipped, bring it to one of the following sites listed below. *Note: none of the sites ship out later than mid-morning. Please do not drop off Fridays, as they will not go out until Monday which will make your specimen inacceptable.     - [facility] ([address], [place] [address])      Drop off: Monday-Thursday, 8:00am-4:30pm at the [address] check-in desk      - [facility] ([address], [place] [address])   Drop off: Monday-Thursday, 8:00am-5:45pm     - [facility] ([address], [place] [address])     Drop off: Monday-Thursday, 3:00pm-5:30pm       -- If positive test and blood is found in the stool, we will need to schedule a colonoscopy.   -- If negative, no blood noted, recheck in 3 years.        Try stopping the Adderall for a few days, see if you notice any difference in your energy levels and fatigue.       Return as needed for follow-up with Dr. Pickens.    Clinic : [phone]  Appointment line: [phone]

## 2022-12-16 NOTE — NURSING NOTE
"Chief Complaint   Patient presents with     follow up fatigue  / cholesterol         Initial /72 (BP Location: Right arm, Patient Position: Sitting, Cuff Size: Adult Regular)   Pulse 83   Temp 98  F (36.7  C) (Temporal)   Resp 18   Wt 84.3 kg (185 lb 12.8 oz)   SpO2 97%   BMI 29.76 kg/m   Estimated body mass index is 29.76 kg/m  as calculated from the following:    Height as of 11/18/22: 1.683 m (5' 6.25\").    Weight as of this encounter: 84.3 kg (185 lb 12.8 oz).       FOOD SECURITY SCREENING QUESTIONS:    The next two questions are to help us understand your food security.  If you are feeling you need any assistance in this area, we have resources available to support you today.    Hunger Vital Signs:  Within the past 12 months we worried whether our food would run out before we got money to buy more. Never  Within the past 12 months the food we bought just didn't last and we didn't have money to get more. Never    Advance Care Directive on file? no      Medication reconciliation complete.      Francisco Carrasquillo,on 12/16/2022 at 2:49 PM        "

## 2022-12-17 DIAGNOSIS — Z12.11 COLON CANCER SCREENING: ICD-10-CM

## 2023-01-09 LAB — NONINV COLON CA DNA+OCC BLD SCRN STL QL: NEGATIVE

## 2023-01-10 DIAGNOSIS — K21.9 GASTROESOPHAGEAL REFLUX DISEASE WITHOUT ESOPHAGITIS: ICD-10-CM

## 2023-01-11 NOTE — TELEPHONE ENCOUNTER
[id number] of [place] sent Rx request for the following:      Requested Prescriptions   Pending Prescriptions Disp Refills     omeprazole (PRILOSEC) 20 MG DR capsule       Sig: Take 1 capsule (20 mg) by mouth daily Take 30 to 60 minutes prior to a meal -- for stomach acid     Last Prescription Date:   Historically reported  Last Office Visit:              12/16/22   Future Office visit:           None    Called and spoke with pharmacist at [facility]. Omeprazole was previously prescribed by a Dr. Fallon. Patient called pharmacy and requested this refill. Attempted to call patient prior to speaking with pharmacist.     FREDDY FOOTE RN on 1/11/2023 at 9:09 AM

## 2023-01-13 DIAGNOSIS — E11.21 TYPE 2 DIABETES MELLITUS WITH DIABETIC NEPHROPATHY, WITHOUT LONG-TERM CURRENT USE OF INSULIN (H): ICD-10-CM

## 2023-01-13 RX ORDER — BLOOD SUGAR DIAGNOSTIC
STRIP MISCELLANEOUS
Qty: 200 STRIP | Refills: 11 | Status: SHIPPED | OUTPATIENT
Start: 2023-01-13 | End: 2024-02-29

## 2023-01-17 RX ORDER — BLOOD SUGAR DIAGNOSTIC
STRIP MISCELLANEOUS
Refills: 0 | OUTPATIENT
Start: 2023-01-17

## 2023-01-17 NOTE — TELEPHONE ENCOUNTER
"  Last Prescription Date: 1/13/23  Last Qty/Refills: 200 / 11  Last Office Visit: 12/16/22 Nelia  Future Office Visit: none     Requested Prescriptions   Pending Prescriptions Disp Refills     blood glucose (ACCU-CHEK ONEL PLUS) test strip [Pharmacy Med Name: ACCU-CHEK ONEL PLUS STRIP]  0     Sig: USE TO TEST BLOOD SUGAR ONCE DAILY       Diabetic Supplies Protocol Passed - 1/13/2023 12:06 PM        Passed - Medication is active on med list        Passed - Patient is 18 years of age or older        Passed - Recent (6 mo) or future (30 days) visit within the authorizing provider's specialty     Patient had office visit in the last 6 months or has a visit in the next 30 days with authorizing provider.  See \"Patient Info\" tab in inbasket, or \"Choose Columns\" in Meds & Orders section of the refill encounter.                   "

## 2023-01-20 ENCOUNTER — TELEPHONE (OUTPATIENT)
Dept: INTERNAL MEDICINE | Facility: OTHER | Age: 78
End: 2023-01-20
Payer: COMMERCIAL

## 2023-01-20 NOTE — TELEPHONE ENCOUNTER
Reason for call: Patient wanting a work in appointment.    Is the appointment for a Hospital Follow up?  No     Patient is having the following symptoms:  Pre op for surgery on 02/08    The patient is requesting an appointment with  [facility]    Was an appointment offered for this call? Yes    If Yes, what is the date of the appointment?  02/07/2023     Preferred method for responding to this message: Telephone Call    Phone number patient can be reached at? Home number on file [phone] (home)    If we can't reach you directly, may we leave a detailed response at the number you provided? Yes    Can this message wait until your PCP/provider returns if unavailable today? Yes    Patient called and requests a call back from [facility] nurse regarding whether his pre op appointment on 02/07/2023 is too soon to his 02/08/2023 procedure or not. Patient would like a work in appointment if 02/07 appointment is too soon. Patient does not want to see any other provider. Please call back.    Gracie Ospina on 1/20/2023 at 3:49 PM

## 2023-01-23 NOTE — TELEPHONE ENCOUNTER
Appointment moved to 1/26.    Electronically signed by:  Cm Pickens MD  on January 22, 2023 6:09 PM

## 2023-01-26 ENCOUNTER — OFFICE VISIT (OUTPATIENT)
Dept: INTERNAL MEDICINE | Facility: OTHER | Age: 78
End: 2023-01-26
Attending: INTERNAL MEDICINE
Payer: MEDICARE

## 2023-01-26 VITALS
DIASTOLIC BLOOD PRESSURE: 68 MMHG | TEMPERATURE: 97.7 F | RESPIRATION RATE: 18 BRPM | HEIGHT: 66 IN | SYSTOLIC BLOOD PRESSURE: 126 MMHG | WEIGHT: 192.8 LBS | HEART RATE: 60 BPM | OXYGEN SATURATION: 95 % | BODY MASS INDEX: 30.98 KG/M2

## 2023-01-26 DIAGNOSIS — I25.10 CORONARY ARTERY DISEASE INVOLVING NATIVE CORONARY ARTERY OF NATIVE HEART WITHOUT ANGINA PECTORIS: ICD-10-CM

## 2023-01-26 DIAGNOSIS — E03.4 HYPOTHYROIDISM DUE TO ACQUIRED ATROPHY OF THYROID: ICD-10-CM

## 2023-01-26 DIAGNOSIS — M46.98 UNSPECIFIED INFLAMMATORY SPONDYLOPATHY, SACRAL AND SACROCOCCYGEAL REGION (H): ICD-10-CM

## 2023-01-26 DIAGNOSIS — H04.123 CHRONICALLY DRY EYES, BILATERAL: ICD-10-CM

## 2023-01-26 DIAGNOSIS — H02.403 DROOPY EYELID, BILATERAL: ICD-10-CM

## 2023-01-26 DIAGNOSIS — E78.2 MIXED HYPERLIPIDEMIA: ICD-10-CM

## 2023-01-26 DIAGNOSIS — I10 BENIGN ESSENTIAL HYPERTENSION: ICD-10-CM

## 2023-01-26 DIAGNOSIS — Z01.818 PREOP GENERAL PHYSICAL EXAM: Primary | ICD-10-CM

## 2023-01-26 DIAGNOSIS — R39.14 FEELING OF INCOMPLETE BLADDER EMPTYING: ICD-10-CM

## 2023-01-26 DIAGNOSIS — E11.40 CONTROLLED TYPE 2 DIABETES MELLITUS WITH DIABETIC NEUROPATHY, WITHOUT LONG-TERM CURRENT USE OF INSULIN (H): ICD-10-CM

## 2023-01-26 DIAGNOSIS — E11.21 TYPE 2 DIABETES MELLITUS WITH DIABETIC NEPHROPATHY, WITHOUT LONG-TERM CURRENT USE OF INSULIN (H): ICD-10-CM

## 2023-01-26 DIAGNOSIS — G47.19 EXCESSIVE DAYTIME SLEEPINESS: ICD-10-CM

## 2023-01-26 DIAGNOSIS — J41.8 MIXED SIMPLE AND MUCOPURULENT CHRONIC BRONCHITIS (H): ICD-10-CM

## 2023-01-26 DIAGNOSIS — F02.80 MAJOR NEUROCOGNITIVE DISORDER DUE TO ANOTHER MEDICAL CONDITION (H): ICD-10-CM

## 2023-01-26 DIAGNOSIS — R35.0 URINE FREQUENCY: ICD-10-CM

## 2023-01-26 DIAGNOSIS — F33.42 RECURRENT MAJOR DEPRESSIVE DISORDER, IN FULL REMISSION (H): ICD-10-CM

## 2023-01-26 DIAGNOSIS — R82.71 BACTERIA IN URINE: ICD-10-CM

## 2023-01-26 LAB
ALBUMIN SERPL BCG-MCNC: 4.7 G/DL (ref 3.5–5.2)
ALBUMIN UR-MCNC: 20 MG/DL
ALP SERPL-CCNC: 98 U/L (ref 40–129)
ALT SERPL W P-5'-P-CCNC: 21 U/L (ref 10–50)
ANION GAP SERPL CALCULATED.3IONS-SCNC: 10 MMOL/L (ref 7–15)
APPEARANCE UR: ABNORMAL
AST SERPL W P-5'-P-CCNC: 19 U/L (ref 10–50)
BACTERIA #/AREA URNS HPF: ABNORMAL /HPF
BILIRUB SERPL-MCNC: 0.4 MG/DL
BILIRUB UR QL STRIP: NEGATIVE
BUN SERPL-MCNC: 20.3 MG/DL (ref 8–23)
CALCIUM SERPL-MCNC: 10 MG/DL (ref 8.8–10.2)
CHLORIDE SERPL-SCNC: 102 MMOL/L (ref 98–107)
CHOLEST SERPL-MCNC: 117 MG/DL
COLOR UR AUTO: ABNORMAL
CREAT SERPL-MCNC: 0.77 MG/DL (ref 0.67–1.17)
CREAT UR-MCNC: 69.5 MG/DL
DEPRECATED HCO3 PLAS-SCNC: 29 MMOL/L (ref 22–29)
ERYTHROCYTE [DISTWIDTH] IN BLOOD BY AUTOMATED COUNT: 13.5 % (ref 10–15)
GFR SERPL CREATININE-BSD FRML MDRD: >90 ML/MIN/1.73M2
GLUCOSE SERPL-MCNC: 161 MG/DL (ref 70–99)
GLUCOSE UR STRIP-MCNC: NEGATIVE MG/DL
HBA1C MFR BLD: 6.7 % (ref 4–6.2)
HCT VFR BLD AUTO: 44.1 % (ref 40–53)
HDLC SERPL-MCNC: 49 MG/DL
HGB BLD-MCNC: 14.5 G/DL (ref 13.3–17.7)
HGB UR QL STRIP: NEGATIVE
KETONES UR STRIP-MCNC: NEGATIVE MG/DL
LDLC SERPL CALC-MCNC: 55 MG/DL
LEUKOCYTE ESTERASE UR QL STRIP: ABNORMAL
MCH RBC QN AUTO: 29.9 PG (ref 26.5–33)
MCHC RBC AUTO-ENTMCNC: 32.9 G/DL (ref 31.5–36.5)
MCV RBC AUTO: 91 FL (ref 78–100)
MICROALBUMIN UR-MCNC: 103.5 MG/L
MICROALBUMIN/CREAT UR: 148.92 MG/G CR (ref 0–17)
MUCOUS THREADS #/AREA URNS LPF: PRESENT /LPF
NITRATE UR QL: POSITIVE
NONHDLC SERPL-MCNC: 68 MG/DL
PH UR STRIP: 5.5 [PH] (ref 5–9)
PLATELET # BLD AUTO: 215 10E3/UL (ref 150–450)
POTASSIUM SERPL-SCNC: 4.4 MMOL/L (ref 3.4–5.3)
PROT SERPL-MCNC: 7.6 G/DL (ref 6.4–8.3)
RBC # BLD AUTO: 4.85 10E6/UL (ref 4.4–5.9)
RBC URINE: 33 /HPF
RENAL TUB EPI: 2 /HPF
SODIUM SERPL-SCNC: 141 MMOL/L (ref 136–145)
SP GR UR STRIP: 1.02 (ref 1–1.03)
TRIGL SERPL-MCNC: 67 MG/DL
TSH SERPL DL<=0.005 MIU/L-ACNC: 1.5 UIU/ML (ref 0.3–4.2)
UROBILINOGEN UR STRIP-MCNC: NORMAL MG/DL
WBC # BLD AUTO: 7.1 10E3/UL (ref 4–11)
WBC CLUMPS #/AREA URNS HPF: PRESENT /HPF
WBC URINE: >182 /HPF

## 2023-01-26 PROCEDURE — 36415 COLL VENOUS BLD VENIPUNCTURE: CPT | Mod: ZL | Performed by: INTERNAL MEDICINE

## 2023-01-26 PROCEDURE — 99214 OFFICE O/P EST MOD 30 MIN: CPT | Performed by: INTERNAL MEDICINE

## 2023-01-26 PROCEDURE — 82570 ASSAY OF URINE CREATININE: CPT | Mod: ZL | Performed by: INTERNAL MEDICINE

## 2023-01-26 PROCEDURE — 85027 COMPLETE CBC AUTOMATED: CPT | Mod: ZL | Performed by: INTERNAL MEDICINE

## 2023-01-26 PROCEDURE — 80053 COMPREHEN METABOLIC PANEL: CPT | Mod: ZL | Performed by: INTERNAL MEDICINE

## 2023-01-26 PROCEDURE — 80061 LIPID PANEL: CPT | Mod: ZL | Performed by: INTERNAL MEDICINE

## 2023-01-26 PROCEDURE — G0463 HOSPITAL OUTPT CLINIC VISIT: HCPCS

## 2023-01-26 PROCEDURE — 81001 URINALYSIS AUTO W/SCOPE: CPT | Mod: ZL | Performed by: INTERNAL MEDICINE

## 2023-01-26 PROCEDURE — 84443 ASSAY THYROID STIM HORMONE: CPT | Mod: ZL | Performed by: INTERNAL MEDICINE

## 2023-01-26 PROCEDURE — 87086 URINE CULTURE/COLONY COUNT: CPT | Mod: ZL | Performed by: INTERNAL MEDICINE

## 2023-01-26 PROCEDURE — 83036 HEMOGLOBIN GLYCOSYLATED A1C: CPT | Mod: ZL | Performed by: INTERNAL MEDICINE

## 2023-01-26 ASSESSMENT — ENCOUNTER SYMPTOMS
EYE PAIN: 1
EYE REDNESS: 1
AGITATION: 0
BACK PAIN: 1
COUGH: 1
ABDOMINAL PAIN: 0
FATIGUE: 1
DIZZINESS: 1
FREQUENCY: 1
BRUISES/BLEEDS EASILY: 0
SLEEP DISTURBANCE: 0
VOMITING: 0
SHORTNESS OF BREATH: 0
LIGHT-HEADEDNESS: 1
MYALGIAS: 1
PALPITATIONS: 0
NUMBNESS: 1
DIFFICULTY URINATING: 1
HEMATURIA: 0
ARTHRALGIAS: 1
CHILLS: 0
NERVOUS/ANXIOUS: 1
DIARRHEA: 0
WHEEZING: 0
NAUSEA: 0
DYSURIA: 0
FEVER: 0
WEAKNESS: 1

## 2023-01-26 ASSESSMENT — PAIN SCALES - GENERAL: PAINLEVEL: NO PAIN (0)

## 2023-01-26 NOTE — PATIENT INSTRUCTIONS
For informational purposes only. Not to replace the advice of your health care provider. Copyright   2003,  [employer] [facility]. All rights reserved. Clinically reviewed by Belinda Meza MD. [employer] [id number] - REV .  Preparing for Your Surgery  Getting started  A nurse will call you to review your health history and instructions. They will give you an arrival time based on your scheduled surgery time. Please be ready to share:  Your doctor's clinic name and phone number  Your medical, surgical, and anesthesia history  A list of allergies and sensitivities  A list of medicines, including herbal treatments and over-the-counter drugs  Whether the patient has a legal guardian (ask how to send us the papers in advance)  Please tell us if you're pregnant--or if there's any chance you might be pregnant. Some surgeries may injure a fetus (unborn baby), so they require a pregnancy test. Surgeries that are safe for a fetus don't always need a test, and you can choose whether to have one.   If you have a child who's having surgery, please ask for a copy of Preparing for Your Child's Surgery.    Preparing for surgery  Within 10 to 30 days of surgery: Have a pre-op exam (sometimes called an H&P, or History and Physical). This can be done at a clinic or pre-operative center.  If you're having a , you may not need this exam. Talk to your care team.  At your pre-op exam, talk to your care team about all medicines you take. If you need to stop any medicines before surgery, ask when to start taking them again.  We do this for your safety. Many medicines can make you bleed too much during surgery. Some change how well surgery (anesthesia) drugs work.  Call your insurance company to let them know you're having surgery. (If you don't have insurance, call [phone].)  Call your clinic if there's any change in your health. This includes signs of a cold or flu (sore throat, runny nose, cough, rash, fever). It  also includes a scrape or scratch near the surgery site.  If you have questions on the day of surgery, call your hospital or surgery center.  Eating and drinking guidelines  For your safety: Unless your surgeon tells you otherwise, follow the guidelines below.  Eat and drink as usual until 8 hours before you arrive for surgery. After that, no food or milk.  Drink clear liquids until 2 hours before you arrive. These are liquids you can see through, like water, Gatorade, and Propel Water. They also include plain black coffee and tea (no cream or milk), candy, and breath mints. You can spit out gum when you arrive.  If you drink alcohol: Stop drinking it the night before surgery.  If your care team tells you to take medicine on the morning of surgery, it's okay to take it with a sip of water.  Preventing infection  Shower or bathe the night before and morning of your surgery. Follow the instructions your clinic gave you. (If no instructions, use regular soap.)  Don't shave or clip hair near your surgery site. We'll remove the hair if needed.  Don't smoke or vape the morning of surgery. You may chew nicotine gum up to 2 hours before surgery. A nicotine patch is okay.  Note: Some surgeries require you to completely quit smoking and nicotine. Check with your surgeon.  Your care team will make every effort to keep you safe from infection. We will:  Clean our hands often with soap and water (or an alcohol-based hand rub).  Clean the skin at your surgery site with a special soap that kills germs.  Give you a special gown to keep you warm. (Cold raises the risk of infection.)  Wear special hair covers, masks, gowns and gloves during surgery.  Give antibiotic medicine, if prescribed. Not all surgeries need antibiotics.  What to bring on the day of surgery  Photo ID and insurance card  Copy of your health care directive, if you have one  Glasses and hearing aids (bring cases)  You can't wear contacts during surgery  Inhaler and  eye drops, if you use them (tell us about these when you arrive)  CPAP machine or breathing device, if you use them  A few personal items, if spending the night  If you have . . .  A pacemaker, ICD (cardiac defibrillator) or other implant: Bring the ID card.  An implanted stimulator: Bring the remote control.  A legal guardian: Bring a copy of the certified (court-stamped) guardianship papers.  Please remove any jewelry, including body piercings. Leave jewelry and other valuables at home.  If you're going home the day of surgery  You must have a responsible adult drive you home. They should stay with you overnight as well.  If you don't have someone to stay with you, and you aren't safe to go home alone, we may keep you overnight. Insurance often won't pay for this.  After surgery  If it's hard to control your pain or you need more pain medicine, please call your surgeon's office.  Questions?   If you have any questions for your care team, list them here: _________________________________________________________________________________________________________________________________________________________________________ ____________________________________ ____________________________________ ____________________________________    How to Take Your Medication Before Surgery  - HOLD (do not take) your METFORMIN on the morning of surgery.  - HOLD (do not take) Aspirin for 7  days    Labs today.     Sleep medicine consult requested.     May need to consider FLOMAX or similar medication for bladder.   -- Can send urology referral if needed.

## 2023-01-26 NOTE — NURSING NOTE
"Chief Complaint   Patient presents with     Pre-Op Exam     02/08/2023 [facility]  Dr. Delia Escobedo, Right Eye         Initial /68 (BP Location: Right arm, Patient Position: Right side, Cuff Size: Adult Regular)   Pulse 60   Temp 97.7  F (36.5  C) (Temporal)   Resp 18   Ht 1.676 m (5' 6\")   Wt 87.5 kg (192 lb 12.8 oz)   SpO2 95%   BMI 31.12 kg/m   Estimated body mass index is 31.12 kg/m  as calculated from the following:    Height as of this encounter: 1.676 m (5' 6\").    Weight as of this encounter: 87.5 kg (192 lb 12.8 oz).       FOOD SECURITY SCREENING QUESTIONS:    The next two questions are to help us understand your food security.  If you are feeling you need any assistance in this area, we have resources available to support you today.    Hunger Vital Signs:  Within the past 12 months we worried whether our food would run out before we got money to buy more. Never  Within the past 12 months the food we bought just didn't last and we didn't have money to get more. Never    Advance Care Directive on file? no      Medication reconciliation complete.      Francisco Carrasquillo,on 1/26/2023 at 9:01 AM        "

## 2023-01-26 NOTE — LETTER
January 27, 2023      Dajuan Basilio  [address] [facility] [id number]        Dear ,    We are writing to inform you of your test results.    Abnormal urinalysis.  Normal urine culture.  Start Augmentin.    Diabetes is well controlled.    Kidney function/creatinine is stable.    Hemoglobin is normal.    Cholesterol levels are all at goal.  Continue current medication.    Plan to recheck labs again in 3 to 4 months.     Electronically signed by:  Cm Pickens MD  on January 27, 2023       Resulted Orders   Comprehensive metabolic panel   Result Value Ref Range    Sodium 141 136 - 145 mmol/L    Potassium 4.4 3.4 - 5.3 mmol/L    Chloride 102 98 - 107 mmol/L    Carbon Dioxide (CO2) 29 22 - 29 mmol/L    Anion Gap 10 7 - 15 mmol/L    Urea Nitrogen 20.3 8.0 - 23.0 mg/dL    Creatinine 0.77 0.67 - 1.17 mg/dL    Calcium 10.0 8.8 - 10.2 mg/dL    Glucose 161 (H) 70 - 99 mg/dL    Alkaline Phosphatase 98 40 - 129 U/L    AST 19 10 - 50 U/L    ALT 21 10 - 50 U/L    Protein Total 7.6 6.4 - 8.3 g/dL    Albumin 4.7 3.5 - 5.2 g/dL    Bilirubin Total 0.4 <=1.2 mg/dL    GFR Estimate >90 >60 mL/min/1.73m2      Comment:      eGFR calculated using 2021 CKD-EPI equation.   Lipid Profile   Result Value Ref Range    Cholesterol 117 <200 mg/dL    Triglycerides 67 <150 mg/dL    Direct Measure HDL 49 >=40 mg/dL    LDL Cholesterol Calculated 55 <=100 mg/dL    Non HDL Cholesterol 68 <130 mg/dL    Narrative    Cholesterol  Desirable:  <200 mg/dL    Triglycerides  Normal:  Less than 150 mg/dL  Borderline High:  150-199 mg/dL  High:  200-499 mg/dL  Very High:  Greater than or equal to 500 mg/dL    Direct Measure HDL  Female:  Greater than or equal to 50 mg/dL   Male:  Greater than or equal to 40 mg/dL    LDL Cholesterol  Desirable:  <100mg/dL  Above Desirable:  100-129 mg/dL   Borderline High:  130-159 mg/dL   High:  160-189 mg/dL   Very High:  >= 190 mg/dL    Non HDL Cholesterol  Desirable:  130 mg/dL  Above Desirable:   130-159 mg/dL  Borderline High:  160-189 mg/dL  High:  190-219 mg/dL  Very High:  Greater than or equal to 220 mg/dL   CBC with platelets   Result Value Ref Range    WBC Count 7.1 4.0 - 11.0 10e3/uL    RBC Count 4.85 4.40 - 5.90 10e6/uL    Hemoglobin 14.5 13.3 - 17.7 g/dL    Hematocrit 44.1 40.0 - 53.0 %    MCV 91 78 - 100 fL    MCH 29.9 26.5 - 33.0 pg    MCHC 32.9 31.5 - 36.5 g/dL    RDW 13.5 10.0 - 15.0 %    Platelet Count 215 150 - 450 10e3/uL   Hemoglobin A1c   Result Value Ref Range    Hemoglobin A1C 6.7 (H) 4.0 - 6.2 %   TSH with free T4 reflex   Result Value Ref Range    TSH 1.50 0.30 - 4.20 uIU/mL   UA with Microscopic reflex to Culture   Result Value Ref Range    Color Urine Dark Yellow (A) Colorless, Straw, Light Yellow, Yellow    Appearance Urine Slightly Cloudy (A) Clear    Glucose Urine Negative Negative mg/dL    Bilirubin Urine Negative Negative    Ketones Urine Negative Negative mg/dL    Specific Gravity Urine 1.018 1.000 - 1.030    Blood Urine Negative Negative    pH Urine 5.5 5.0 - 9.0    Protein Albumin Urine 20 (A) Negative mg/dL    Urobilinogen Urine Normal Normal, 2.0 mg/dL    Nitrite Urine Positive (A) Negative    Leukocyte Esterase Urine Large (A) Negative    Bacteria Urine Many (A) None Seen /HPF    WBC Clumps Urine Present (A) None Seen /HPF    Mucus Urine Present (A) None Seen /LPF    RBC Urine 33 (H) <=2 /HPF    WBC Urine >182 (H) <=5 /HPF    Renal Tubular Epithelials Urine 2 (H) None Seen /HPF    Narrative    Urine Culture ordered based on laboratory criteria   Albumin Random Urine Quantitative with Creat Ratio   Result Value Ref Range    Creatinine Urine mg/dL 69.5 mg/dL      Comment:      The reference ranges have not been established in urine creatinine. The results should be integrated into the clinical context for interpretation.    Albumin Urine mg/L 103.5 mg/L      Comment:      The reference ranges have not been established in urine albumin. The results should be integrated into  the clinical context for interpretation.    Albumin Urine mg/g Cr 148.92 (H) 0.00 - 17.00 mg/g Cr      Comment:      Microalbuminuria is defined as an albumin:creatinine ratio of 17 to 299 for males and 25 to 299 for females. A ratio of albumin:creatinine of 300 or higher is indicative of overt proteinuria.  Due to biologic variability, positive results should be confirmed by a second, first-morning random or 24-hour timed urine specimen. If there is discrepancy, a third specimen is recommended. When 2 out of 3 results are in the microalbuminuria range, this is evidence for incipient nephropathy and warrants increased efforts at glucose control, blood pressure control, and institution of therapy with an angiotensin-converting-enzyme (ACE) inhibitor (if the patient can tolerate it).     Urine Culture   Result Value Ref Range    Culture >100,000 CFU/mL Gram negative bacilli (A)        If you have any questions or concerns, please call the clinic at the number listed above.       Sincerely,      Cm Pickens MD

## 2023-01-26 NOTE — PROGRESS NOTES
[facility]  [address]  [place] [address]  Phone: [phone]  Primary Provider: Trenton Florez  Pre-op Performing Provider: TRENTON FLOREZ    PREOPERATIVE EVALUATION:  Today's date: 1/26/2023    Dajuan Basilio is a 77 year old male who presents for a preoperative evaluation.    Surgical Information:  Surgery/Procedure: right eye  Surgery Location: [place]  Surgeon: Dr. Delia Boyle  Surgery Date: 02/08/2023  Time of Surgery: TBA  Where patient plans to recover: At home with family  Fax number for surgical facility: [phone]    Type of Anesthesia Anticipated: Choice    Assessment & Plan     The proposed surgical procedure is considered LOW risk.      ICD-10-CM    1. Preop general physical exam  Z01.818       2. Chronically dry eyes, bilateral  H04.123       3. Droopy eyelid, bilateral  H02.403       4. Excessive daytime sleepiness  G47.19 Adult Sleep Eval & Management  Referral      5. Benign essential hypertension  I10       6. Controlled type 2 diabetes mellitus with diabetic neuropathy, without long-term current use of insulin (H)  E11.40       7. Coronary artery disease involving native coronary artery of native heart without angina pectoris  I25.10       8. Hypothyroidism due to acquired atrophy of thyroid  E03.4       9. Mixed simple and mucopurulent chronic bronchitis (H)  J41.8       10. Feeling of incomplete bladder emptying  R39.14 UA with Microscopic reflex to Culture     UA with Microscopic reflex to Culture     Urine Culture      11. Type 2 diabetes mellitus with diabetic nephropathy, without long-term current use of insulin (H)  E11.21 Comprehensive metabolic panel     CBC with platelets     Hemoglobin A1c     TSH with free T4 reflex     Albumin Random Urine Quantitative with Creat Ratio      12. Mixed hyperlipidemia  E78.2 Lipid Profile      13. Urine frequency  R35.0 UA with Microscopic reflex to Culture     UA with Microscopic reflex to  Culture     Urine Culture     amoxicillin-clavulanate (AUGMENTIN) 875-125 MG tablet      14. Bacteria in urine  R82.71 amoxicillin-clavulanate (AUGMENTIN) 875-125 MG tablet      15. Unspecified inflammatory spondylopathy, sacral and sacrococcygeal region (H)  M46.98       16. Recurrent major depressive disorder, in full remission (H)  F33.42       17. Major neurocognitive disorder due to another medical condition (H)  F02.80       HPI related to upcoming procedure: Patient has droopy eyelid and chronic dry eyes.  Scheduled for eye surgery.    Excessive daytime sleepiness.  He reports that he gets moving and does relatively okay but then he will sit down and fall asleep.  He has quite concerned.  We tried Adderall for a few months with different doses but that did not help his symptoms at all.  He still chronically feels tired.  Recommend consultation with sleep medicine.  May end up needing different medication trial.    Hypertension, blood pressure is currently well controlled.  Doing well with current medications.  No changes for now.    Type 2 diabetes, has diabetic polyneuropathy.  Seems to be doing well with current medication regimen.  No changes for now.  Hold metformin prior to surgery.    Coronary disease, denies exertional angina.  Doing well with current medications.  No changes.    Hypothyroidism, currently stable.  Doing well with current labs and medication dosing.  Continue current dosing.    Mixed chronic bronchitis.  Appears stable.  No changes for now.    Incomplete bladder emptying with feelings of urinary urgency.  Bacteria noted on urinalysis.  Start Augmentin.  Culture pending.  May need antibiotic changes, pending culture results.    Patient has history of depression, currently in remission.  Continue to monitor.    Unspecified spondylopathy of the sacral region.  Has some chronic low back pain.  Continue to monitor.    History of major neurocognitive disorder.  Continue to monitor.    Risks  and Recommendations:  The patient has the following additional risks and recommendations for perioperative complications:   - No identified additional risk factors other than previously addressed    Medication Instructions:  Patient is to take all scheduled medications on the day of surgery EXCEPT for modifications listed below:   - aspirin: Discontinue aspirin 7-10 days prior to procedure to reduce bleeding risk. It should be resumed postoperatively.    - metformin: HOLD day of surgery.    RECOMMENDATION:  APPROVAL GIVEN to proceed with proposed procedure, without further diagnostic evaluation.    Subjective       Preop Questions 1/26/2023   1. Have you ever had a heart attack or stroke? No   2. Have you ever had surgery on your heart or blood vessels, such as a stent placement, a coronary artery bypass, or surgery on an artery in your head, neck, heart, or legs? YES - Stents   3. Do you have chest pain with activity? No   4. Do you have a history of  heart failure? No   5. Do you currently have a cold, bronchitis or symptoms of other infection? No   6. Do you have a cough, shortness of breath, or wheezing? YES - chronic cough since COVID   7. Do you or anyone in your family have previous history of blood clots? No   8. Do you or does anyone in your family have a serious bleeding problem such as prolonged bleeding following surgeries or cuts? No   9. Have you ever had problems with anemia or been told to take iron pills? No   10. Have you had any abnormal blood loss such as black, tarry or bloody stools? No   11. Have you ever had a blood transfusion? No   12. Are you willing to have a blood transfusion if it is medically needed before, during, or after your surgery? Yes   13. Have you or any of your relatives ever had problems with anesthesia? No   14. Do you have sleep apnea, excessive snoring or daytime drowsiness? No   15. Do you have any artifical heart valves or other implanted medical devices like a  pacemaker, defibrillator, or continuous glucose monitor? No   16. Do you have artificial joints? YES - Right knee   17. Are you allergic to latex? No     Health Care Directive:  Patient does not have a Health Care Directive or Living Will: Discussed advance care planning with patient; information given to patient to review.    Preoperative Review of :   reviewed - no record of controlled substances prescribed.    Review of Systems   Constitutional: Positive for fatigue (Falls asleep quickly after sitting in his recliner). Negative for chills and fever.   HENT: Positive for hearing loss. Negative for congestion and nosebleeds.    Eyes: Positive for pain and redness. Negative for visual disturbance.        + chronic dry eyes, tried many eye drops, not much improvement - had left eye surgery - now with chronic droopy lower lid   Respiratory: Positive for cough (clear phlegm - months). Negative for shortness of breath and wheezing.    Cardiovascular: Positive for chest pain. Negative for palpitations.        Cardiac stress testing 3/2020 - negative/normal results.   Gastrointestinal: Negative for abdominal pain, diarrhea, nausea and vomiting.        + left inguinal pains intermittently   Endocrine: Negative for cold intolerance and heat intolerance.   Genitourinary: Positive for difficulty urinating (Feeling of incomplete bladder emptying and urinary frequency) and frequency. Negative for dysuria and hematuria.   Musculoskeletal: Positive for arthralgias (left knee), back pain (left low back / buttocks pain), gait problem (staggering at times, low back and left knee pain) and myalgias.        Bilateral feet with bunions   Skin: Negative for pallor.   Allergic/Immunologic: Negative for immunocompromised state.   Neurological: Positive for dizziness (+ Spinning with change in position), weakness, light-headedness and numbness (right > left feet).   Hematological: Does not bruise/bleed easily.    Psychiatric/Behavioral: Negative for agitation and sleep disturbance. The patient is nervous/anxious.         + short term memory loss -- stopped Exelon and Namenda -- due to side effects.   + reporting less anxiety / depression - virus pandemic had worsened everything  -- Seems much improved with Citalopram.       Patient Active Problem List    Diagnosis Date Noted     Droopy eyelid, bilateral 01/26/2023     Priority: Medium     Chronic cough 08/16/2022     Priority: Medium     Type 2 diabetes mellitus with diabetic nephropathy, without long-term current use of insulin (H) 07/06/2022     Priority: Medium     Recurrent major depressive disorder, in full remission (H) 04/02/2022     Priority: Medium     Hypothyroidism due to acquired atrophy of thyroid 03/31/2022     Priority: Medium     Benign paroxysmal positional vertigo, unspecified laterality 12/03/2021     Priority: Medium     Elevated prostate specific antigen (PSA) 12/03/2021     Priority: Medium     Mixed simple and mucopurulent chronic bronchitis (H) 11/04/2021     Priority: Medium     Major neurocognitive disorder due to another medical condition (H) 05/05/2021     Priority: Medium     Bunion, right 03/26/2021     Priority: Medium     Onychomycosis 03/26/2021     Priority: Medium     Bunion, left 03/26/2021     Priority: Medium     Diabetic peripheral neuropathy (H) 01/08/2021     Priority: Medium     Unspecified inflammatory spondylopathy, sacral and sacrococcygeal region (H) 12/18/2020     Priority: Medium     Chronic left-sided low back pain without sciatica 09/17/2020     Priority: Medium     Vitamin B12 deficiency 09/17/2020     Priority: Medium     Benign essential hypertension 09/17/2020     Priority: Medium     Lumbar facet arthropathy 07/07/2020     Priority: Medium     Chronically dry eyes, bilateral 03/17/2020     Priority: Medium     Left sided sciatica 03/17/2020     Priority: Medium     Mixed hyperlipidemia 03/19/2019     Priority: Medium      S/P cardiac cath 5/17/18 06/07/2018     Priority: Medium     Unsteady gait 06/07/2018     Priority: Medium     Stented coronary artery to his LCX and OM1 on 5/17/18 06/07/2018     Priority: Medium     Status post insertion of drug eluting coronary artery stent - x2 stents - 1st OMB - 5/17/2018 - [facility] 05/31/2018     Priority: Medium     Controlled type 2 diabetes mellitus with diabetic neuropathy, without long-term current use of insulin (H) 05/31/2018     Priority: Medium     Benign prostatic hyperplasia with weak urinary stream 05/31/2018     Priority: Medium     Coronary artery disease involving native coronary artery of native heart without angina pectoris 04/26/2018     Priority: Medium     Microalbuminuria 01/26/2018     Priority: Medium     Generalized anxiety disorder 06/05/2017     Priority: Medium     GERD (gastroesophageal reflux disease) 05/03/2013     Priority: Medium     Nephrolithiasis 04/23/2012     Priority: Medium     BPH (benign prostatic hyperplasia) 03/27/2012     Priority: Medium     Diverticular disease of colon 02/17/2010     Priority: Medium     Obesity 02/17/2010     Priority: Medium     Chronic anxiety 02/17/2010     Priority: Medium      Past Medical History:   Diagnosis Date     Abnormal CT scan, bladder 6/29/2016     Diverticulosis of intestine without perforation or abscess without bleeding     No Comments Provided     Fatty (change of) liver, not elsewhere classified     non alcoholic     Fibromyalgia     No Comments Provided     Nodular prostate without lower urinary tract symptoms     2012,US confirms benign calcifications     Obesity     No Comments Provided     Panic disorder without agoraphobia     No Comments Provided     Positive colorectal cancer screening using Cologuard test 12/06/2018     Past Surgical History:   Procedure Laterality Date     ARTHROPLASTY KNEE      2015     ARTHROSCOPY KNEE      right knee X2     BIOPSY PROSTATE TRANSRECTAL      2013      COLONOSCOPY      2003,and UGI [place] normal     COLONOSCOPY      2009,and UGI repeat Dr. Johnson negative.     COLONOSCOPY  01/01/2013 2013,WNL     COLONOSCOPY  05/23/2019    [place]-no follow up     HERNIA REPAIR  2000     RELEASE CARPAL TUNNEL      2014     VASECTOMY      No Comments Provided     Current Outpatient Medications   Medication Sig Dispense Refill     amoxicillin-clavulanate (AUGMENTIN) 875-125 MG tablet Take 1 tablet by mouth 2 times daily for 10 days 20 tablet 0     ascorbic acid (VITAMIN C) 500 MG tablet Take 1 tablet by mouth daily. For 60 days       aspirin 81 MG tablet Take 81 mg by mouth daily       blood glucose (ACCU-CHEK ONEL PLUS) test strip USE  STRIP TO CHECK GLUCOSE Once DAILY 200 strip 11     citalopram (CELEXA) 10 MG tablet Take 1 tablet (10 mg) by mouth daily 90 tablet 3     erythromycin (ROMYCIN) 5 MG/GM ophthalmic ointment APPLY 1CM RIBBON ONTO INCISION LINE(S) 3 TIMES A DAY Strength: 5 MG/GM 3.5 g 3     gabapentin (NEURONTIN) 100 MG capsule Take 1-2 capsules (100-200 mg) by mouth At Bedtime - for pain 150 capsule 1     levothyroxine (SYNTHROID/LEVOTHROID) 50 MCG tablet Take 1 tablet (50 mcg) by mouth daily - for Thyroid Replacement 93 tablet 1     metFORMIN (GLUCOPHAGE) 500 MG tablet Take 2 tablets (1,000 mg) by mouth daily (with breakfast) AND 1 tablet (500 mg) daily (with dinner). 270 tablet 1     omega 3 1000 MG CAPS Take 1 capsule by mouth daily       omeprazole (PRILOSEC) 20 MG DR capsule Take 1 capsule (20 mg) by mouth daily Take 30 to 60 minutes prior to a meal -- for stomach acid 90 capsule 3     rosuvastatin (CRESTOR) 5 MG tablet Take 1 tablet (5 mg) by mouth daily - Stop Lipitor - 90 tablet 1     vitamin B complex with vitamin C (VITAMIN  B COMPLEX) TABS tablet Take 1 tablet by mouth daily Monday, Wednesday, Friday -- 3 days weekly -- make sure it has B12 in tablet -- Dose Reduced 7/11/2018 100 tablet 3     vitamin D3 (CHOLECALCIFEROL) 125 MCG (5000 UT) tablet Take 5,000  "Units by mouth daily       guaiFENesin-codeine (ROBITUSSIN AC) 100-10 MG/5ML solution Take 5-10 mLs by mouth at bedtime as needed, may repeat once for cough 180 mL 0     tadalafil (CIALIS) 5 MG tablet Take 1 tablet (5 mg) by mouth daily 90 tablet 3       Allergies   Allergen Reactions     Memantine Other (See Comments)     Dizzy, lightheaded  Dizzy, lightheaded     Rivastigmine Other (See Comments)     Dizzy, lightheaded  Dizzy, lightheaded     Ace Inhibitors Cough     Atorvastatin Calcium Muscle Pain (Myalgia)     Lipitor     Hmg-Coa-R Inhibitors Muscle Pain (Myalgia)     Higher doses cause myalgias        Social History     Tobacco Use     Smoking status: Former     Types: Cigarettes     Quit date: 10/25/1983     Years since quittin.2     Smokeless tobacco: Never   Substance Use Topics     Alcohol use: No     Family History   Problem Relation Age of Onset     Cancer Father         Cancer, mesothelioma     Colon Cancer Mother         Cancer-colon,     Genitourinary Problems Mother         Genitourinary Disease,renal failure     Diabetes Mother         Diabetes     Other - See Comments Sister         Renal transplant     Diabetes Type 2  Sister         Diabetes type II     Diabetes Brother         Diabetes     History   Drug Use No         Objective     /68 (BP Location: Right arm, Patient Position: Right side, Cuff Size: Adult Regular)   Pulse 60   Temp 97.7  F (36.5  C) (Temporal)   Resp 18   Ht 1.676 m (5' 6\")   Wt 87.5 kg (192 lb 12.8 oz)   SpO2 95%   BMI 31.12 kg/m      Physical Exam  Constitutional:       General: He is not in acute distress.     Appearance: He is well-developed. He is not diaphoretic.   HENT:      Head: Normocephalic and atraumatic.      Nose: Nose normal. No congestion or rhinorrhea.      Mouth/Throat:      Mouth: Mucous membranes are moist.      Pharynx: Oropharynx is clear. No oropharyngeal exudate or posterior oropharyngeal erythema.   Eyes:      General: No " scleral icterus.     Conjunctiva/sclera: Conjunctivae normal.      Comments: Left eye, with very droopy, irritated left lower lid   Cardiovascular:      Rate and Rhythm: Normal rate and regular rhythm.   Pulmonary:      Effort: Pulmonary effort is normal.      Breath sounds: Normal breath sounds.   Musculoskeletal:         General: No deformity.      Cervical back: Neck supple.   Lymphadenopathy:      Cervical: No cervical adenopathy.   Skin:     General: Skin is warm and dry.      Coloration: Skin is not jaundiced.      Findings: No rash.   Neurological:      Mental Status: He is alert and oriented to person, place, and time. Mental status is at baseline.   Psychiatric:         Mood and Affect: Mood normal.         Behavior: Behavior normal.         Recent Labs   Lab Test 10/04/22  0900 07/05/22  0848 03/17/21  1659 01/26/21  1238   HGB 14.6 14.6   < > 15.2    228   < > 223   INR  --   --   --  0.90    140   < > 140   POTASSIUM 4.0 3.8   < > 3.9   CR 0.80 0.83   < > 0.78   A1C 6.8* 6.6*   < >  --     < > = values in this interval not displayed.     Results for orders placed or performed in visit on 01/26/23   Comprehensive metabolic panel     Status: Abnormal   Result Value Ref Range    Sodium 141 136 - 145 mmol/L    Potassium 4.4 3.4 - 5.3 mmol/L    Chloride 102 98 - 107 mmol/L    Carbon Dioxide (CO2) 29 22 - 29 mmol/L    Anion Gap 10 7 - 15 mmol/L    Urea Nitrogen 20.3 8.0 - 23.0 mg/dL    Creatinine 0.77 0.67 - 1.17 mg/dL    Calcium 10.0 8.8 - 10.2 mg/dL    Glucose 161 (H) 70 - 99 mg/dL    Alkaline Phosphatase 98 40 - 129 U/L    AST 19 10 - 50 U/L    ALT 21 10 - 50 U/L    Protein Total 7.6 6.4 - 8.3 g/dL    Albumin 4.7 3.5 - 5.2 g/dL    Bilirubin Total 0.4 <=1.2 mg/dL    GFR Estimate >90 >60 mL/min/1.73m2   Lipid Profile     Status: Normal   Result Value Ref Range    Cholesterol 117 <200 mg/dL    Triglycerides 67 <150 mg/dL    Direct Measure HDL 49 >=40 mg/dL    LDL Cholesterol Calculated 55 <=100  mg/dL    Non HDL Cholesterol 68 <130 mg/dL    Narrative    Cholesterol  Desirable:  <200 mg/dL    Triglycerides  Normal:  Less than 150 mg/dL  Borderline High:  150-199 mg/dL  High:  200-499 mg/dL  Very High:  Greater than or equal to 500 mg/dL    Direct Measure HDL  Female:  Greater than or equal to 50 mg/dL   Male:  Greater than or equal to 40 mg/dL    LDL Cholesterol  Desirable:  <100mg/dL  Above Desirable:  100-129 mg/dL   Borderline High:  130-159 mg/dL   High:  160-189 mg/dL   Very High:  >= 190 mg/dL    Non HDL Cholesterol  Desirable:  130 mg/dL  Above Desirable:  130-159 mg/dL  Borderline High:  160-189 mg/dL  High:  190-219 mg/dL  Very High:  Greater than or equal to 220 mg/dL   CBC with platelets     Status: Normal   Result Value Ref Range    WBC Count 7.1 4.0 - 11.0 10e3/uL    RBC Count 4.85 4.40 - 5.90 10e6/uL    Hemoglobin 14.5 13.3 - 17.7 g/dL    Hematocrit 44.1 40.0 - 53.0 %    MCV 91 78 - 100 fL    MCH 29.9 26.5 - 33.0 pg    MCHC 32.9 31.5 - 36.5 g/dL    RDW 13.5 10.0 - 15.0 %    Platelet Count 215 150 - 450 10e3/uL   Hemoglobin A1c     Status: Abnormal   Result Value Ref Range    Hemoglobin A1C 6.7 (H) 4.0 - 6.2 %   TSH with free T4 reflex     Status: Normal   Result Value Ref Range    TSH 1.50 0.30 - 4.20 uIU/mL   UA with Microscopic reflex to Culture     Status: Abnormal    Specimen: Urine, Midstream   Result Value Ref Range    Color Urine Dark Yellow (A) Colorless, Straw, Light Yellow, Yellow    Appearance Urine Slightly Cloudy (A) Clear    Glucose Urine Negative Negative mg/dL    Bilirubin Urine Negative Negative    Ketones Urine Negative Negative mg/dL    Specific Gravity Urine 1.018 1.000 - 1.030    Blood Urine Negative Negative    pH Urine 5.5 5.0 - 9.0    Protein Albumin Urine 20 (A) Negative mg/dL    Urobilinogen Urine Normal Normal, 2.0 mg/dL    Nitrite Urine Positive (A) Negative    Leukocyte Esterase Urine Large (A) Negative    Bacteria Urine Many (A) None Seen /HPF    WBC Clumps Urine  Present (A) None Seen /HPF    Mucus Urine Present (A) None Seen /LPF    RBC Urine 33 (H) <=2 /HPF    WBC Urine >182 (H) <=5 /HPF    Renal Tubular Epithelials Urine 2 (H) None Seen /HPF    Narrative    Urine Culture ordered based on laboratory criteria   Albumin Random Urine Quantitative with Creat Ratio     Status: Abnormal   Result Value Ref Range    Creatinine Urine mg/dL 69.5 mg/dL    Albumin Urine mg/L 103.5 mg/L    Albumin Urine mg/g Cr 148.92 (H) 0.00 - 17.00 mg/g Cr   Urine Culture     Status: Abnormal (Preliminary result)    Specimen: Urine, Midstream   Result Value Ref Range    Culture >100,000 CFU/mL Gram negative bacilli (A)        Urinalysis is abnormal with greater than 100,000 gram-negative bacilli.  TSH is normal.  Hemoglobin A1c is well controlled.  CBC is normal.  LDL HDL and triglycerides are at goal.  Random glucose is 161.  Otherwise CMP is normal.    Diagnostics:  No EKG required for low risk surgery (cataract, skin procedure, breast biopsy, etc).    Revised Cardiac Risk Index (RCRI):  The patient has the following serious cardiovascular risks for perioperative complications:   - Coronary Artery Disease (MI, positive stress test, angina, Qs on EKG) = 1 point     RCRI Interpretation: 1 point: Class II (low risk - 0.9% complication rate)           Signed Electronically by: Cm Pickens MD  Copy of this evaluation report is provided to requesting physician.

## 2023-01-27 ENCOUNTER — TELEPHONE (OUTPATIENT)
Dept: INTERNAL MEDICINE | Facility: OTHER | Age: 78
End: 2023-01-27
Payer: COMMERCIAL

## 2023-01-27 NOTE — RESULT ENCOUNTER NOTE
Date of birth and last name verified.    Dr. Pickens comments regarding patient jyoti hernandez relayed to patient.    He states understanding.    When I asked, he has no questions or concerns at this time.  Francisco Carrasquillo .......  1/27/2023  10:48 AM

## 2023-01-27 NOTE — TELEPHONE ENCOUNTER
Date of birth and last verified by Sandy, his wife.    Per teams message from Dr. Pickens I relayed that he was to start an anti-biotic and that is was sent to [facility].    She does stale understanding.    When I asked, she has no questions or concerns at this time.    Francisco Carrasquillo .......  1/27/2023  2:17 PM

## 2023-01-28 LAB — BACTERIA UR CULT: ABNORMAL

## 2023-02-01 ENCOUNTER — TELEPHONE (OUTPATIENT)
Dept: INTERNAL MEDICINE | Facility: OTHER | Age: 78
End: 2023-02-01
Payer: COMMERCIAL

## 2023-02-01 NOTE — TELEPHONE ENCOUNTER
Erik stops at the clinic and states that his upcoming surgery location has changed.    He gives me a fax number for the [facility] in [place].  [phone].    I talked with Ruthie in [facility] and she will fax it to them.    Francisco Carrasquillo .......  2/1/2023  10:36 AM

## 2023-02-08 ENCOUNTER — TELEPHONE (OUTPATIENT)
Dept: INTERNAL MEDICINE | Facility: OTHER | Age: 78
End: 2023-02-08
Payer: COMMERCIAL

## 2023-02-08 DIAGNOSIS — Z98.890 HISTORY OF EYELID SURGERY: ICD-10-CM

## 2023-02-08 DIAGNOSIS — H04.123 CHRONICALLY DRY EYES, BILATERAL: Primary | ICD-10-CM

## 2023-02-08 NOTE — TELEPHONE ENCOUNTER
Patient states that he had eye surgery today and is needing to discuss eye drop (Xiidra).    Sandy Bowman on 2/8/2023 at 2:47 PM

## 2023-02-08 NOTE — TELEPHONE ENCOUNTER
Called and spoke with the patient. He states he had eye surgery today. He is was prescribed Xiidra and it is to expensive. He was told some doctors have samples. I informed him to contact whoever prescribed it and he stated they did not have samples.   Eunice Flores LPN on 2/8/2023 at 3:45 PM

## 2023-02-08 NOTE — TELEPHONE ENCOUNTER
1. Chronically dry eyes, bilateral  2. History of eyelid surgery    Rx sent to [facility] pharmacy.     - lifitegrast (XIIDRA) 5 % opthalmic solution; Place 1 drop into the right eye 2 times daily -- Use in your eye after surgery --  Dispense: 60 each; Refill: 1        Electronically signed by:  Cm Pickens MD  on February 8, 2023 5:47 PM

## 2023-02-10 NOTE — TELEPHONE ENCOUNTER
"Called and spoke to Dajuan. Did  an \"eye gel\" from the pharmacy. Xiidra was to expensive. Is doing well after the eye surgery    Luiza Campbell LPN on 2/10/2023 at 9:56 AM      "

## 2023-03-30 ENCOUNTER — TELEPHONE (OUTPATIENT)
Dept: INTERNAL MEDICINE | Facility: OTHER | Age: 78
End: 2023-03-30
Payer: COMMERCIAL

## 2023-03-30 DIAGNOSIS — E78.2 MIXED HYPERLIPIDEMIA: ICD-10-CM

## 2023-03-30 DIAGNOSIS — I10 BENIGN ESSENTIAL HYPERTENSION: Primary | ICD-10-CM

## 2023-03-30 DIAGNOSIS — E11.40 CONTROLLED TYPE 2 DIABETES MELLITUS WITH DIABETIC NEUROPATHY, WITHOUT LONG-TERM CURRENT USE OF INSULIN (H): ICD-10-CM

## 2023-03-30 DIAGNOSIS — E03.4 HYPOTHYROIDISM DUE TO ACQUIRED ATROPHY OF THYROID: ICD-10-CM

## 2023-03-30 NOTE — TELEPHONE ENCOUNTER
The patient would like to get his labs done the day before his appointment.  Can Dr Pickens please put orders in for him.

## 2023-03-30 NOTE — TELEPHONE ENCOUNTER
Lab scheduled 4/3/23; wellness exam scheduled 4/4/23.     On 1/26/23 patient had the following labs checked: UC, albumin, UA, TSH with Free T4; CBC, Lipid, and CMP.     Please enter labs, if any, you would like checked    Grace Vivar CMA on 3/30/2023 at 3:23 PM

## 2023-04-03 ENCOUNTER — LAB (OUTPATIENT)
Dept: LAB | Facility: OTHER | Age: 78
End: 2023-04-03
Attending: INTERNAL MEDICINE
Payer: MEDICARE

## 2023-04-03 DIAGNOSIS — E11.40 CONTROLLED TYPE 2 DIABETES MELLITUS WITH DIABETIC NEUROPATHY, WITHOUT LONG-TERM CURRENT USE OF INSULIN (H): ICD-10-CM

## 2023-04-03 DIAGNOSIS — Z12.5 ENCOUNTER FOR SCREENING FOR MALIGNANT NEOPLASM OF PROSTATE: ICD-10-CM

## 2023-04-03 DIAGNOSIS — E03.4 HYPOTHYROIDISM DUE TO ACQUIRED ATROPHY OF THYROID: ICD-10-CM

## 2023-04-03 DIAGNOSIS — E78.2 MIXED HYPERLIPIDEMIA: ICD-10-CM

## 2023-04-03 LAB
ALBUMIN SERPL BCG-MCNC: 4.6 G/DL (ref 3.5–5.2)
ALBUMIN UR-MCNC: NEGATIVE MG/DL
ALP SERPL-CCNC: 75 U/L (ref 40–129)
ALT SERPL W P-5'-P-CCNC: 16 U/L (ref 10–50)
ANION GAP SERPL CALCULATED.3IONS-SCNC: 9 MMOL/L (ref 7–15)
APPEARANCE UR: CLEAR
AST SERPL W P-5'-P-CCNC: 18 U/L (ref 10–50)
BILIRUB SERPL-MCNC: 0.5 MG/DL
BILIRUB UR QL STRIP: NEGATIVE
BUN SERPL-MCNC: 11.4 MG/DL (ref 8–23)
CALCIUM SERPL-MCNC: 9.3 MG/DL (ref 8.8–10.2)
CHLORIDE SERPL-SCNC: 103 MMOL/L (ref 98–107)
CHOLEST SERPL-MCNC: 117 MG/DL
COLOR UR AUTO: NORMAL
CREAT SERPL-MCNC: 0.76 MG/DL (ref 0.67–1.17)
CREAT UR-MCNC: 70.9 MG/DL
DEPRECATED HCO3 PLAS-SCNC: 29 MMOL/L (ref 22–29)
ERYTHROCYTE [DISTWIDTH] IN BLOOD BY AUTOMATED COUNT: 13.2 % (ref 10–15)
GFR SERPL CREATININE-BSD FRML MDRD: >90 ML/MIN/1.73M2
GLUCOSE SERPL-MCNC: 116 MG/DL (ref 70–99)
GLUCOSE UR STRIP-MCNC: NEGATIVE MG/DL
HBA1C MFR BLD: 6.7 % (ref 4–6.2)
HCT VFR BLD AUTO: 45.8 % (ref 40–53)
HDLC SERPL-MCNC: 46 MG/DL
HGB BLD-MCNC: 15.3 G/DL (ref 13.3–17.7)
HGB UR QL STRIP: NEGATIVE
HOLD SPECIMEN: NORMAL
KETONES UR STRIP-MCNC: NEGATIVE MG/DL
LDLC SERPL CALC-MCNC: 58 MG/DL
LEUKOCYTE ESTERASE UR QL STRIP: NEGATIVE
MCH RBC QN AUTO: 30.3 PG (ref 26.5–33)
MCHC RBC AUTO-ENTMCNC: 33.4 G/DL (ref 31.5–36.5)
MCV RBC AUTO: 91 FL (ref 78–100)
MICROALBUMIN UR-MCNC: 17.2 MG/L
MICROALBUMIN/CREAT UR: 24.26 MG/G CR (ref 0–17)
NITRATE UR QL: NEGATIVE
NONHDLC SERPL-MCNC: 71 MG/DL
PH UR STRIP: 7 [PH] (ref 5–9)
PLATELET # BLD AUTO: 215 10E3/UL (ref 150–450)
POTASSIUM SERPL-SCNC: 4.2 MMOL/L (ref 3.4–5.3)
PROT SERPL-MCNC: 7.4 G/DL (ref 6.4–8.3)
RBC # BLD AUTO: 5.05 10E6/UL (ref 4.4–5.9)
SODIUM SERPL-SCNC: 141 MMOL/L (ref 136–145)
SP GR UR STRIP: 1.01 (ref 1–1.03)
TRIGL SERPL-MCNC: 66 MG/DL
TSH SERPL DL<=0.005 MIU/L-ACNC: 1.76 UIU/ML (ref 0.3–4.2)
UROBILINOGEN UR STRIP-MCNC: NORMAL MG/DL
WBC # BLD AUTO: 5.1 10E3/UL (ref 4–11)

## 2023-04-03 PROCEDURE — 80061 LIPID PANEL: CPT | Mod: ZL

## 2023-04-03 PROCEDURE — 80053 COMPREHEN METABOLIC PANEL: CPT | Mod: ZL

## 2023-04-03 PROCEDURE — 84443 ASSAY THYROID STIM HORMONE: CPT | Mod: ZL

## 2023-04-03 PROCEDURE — 82570 ASSAY OF URINE CREATININE: CPT | Mod: ZL

## 2023-04-03 PROCEDURE — 85027 COMPLETE CBC AUTOMATED: CPT | Mod: ZL

## 2023-04-03 PROCEDURE — G0103 PSA SCREENING: HCPCS | Mod: ZL

## 2023-04-03 PROCEDURE — 81003 URINALYSIS AUTO W/O SCOPE: CPT | Mod: ZL

## 2023-04-03 PROCEDURE — 36415 COLL VENOUS BLD VENIPUNCTURE: CPT | Mod: ZL

## 2023-04-03 PROCEDURE — 83036 HEMOGLOBIN GLYCOSYLATED A1C: CPT | Mod: ZL

## 2023-04-04 ENCOUNTER — OFFICE VISIT (OUTPATIENT)
Dept: INTERNAL MEDICINE | Facility: OTHER | Age: 78
End: 2023-04-04
Attending: INTERNAL MEDICINE
Payer: COMMERCIAL

## 2023-04-04 VITALS
OXYGEN SATURATION: 95 % | WEIGHT: 188.8 LBS | HEART RATE: 66 BPM | RESPIRATION RATE: 18 BRPM | TEMPERATURE: 97 F | HEIGHT: 66 IN | BODY MASS INDEX: 30.34 KG/M2 | SYSTOLIC BLOOD PRESSURE: 138 MMHG | DIASTOLIC BLOOD PRESSURE: 74 MMHG

## 2023-04-04 DIAGNOSIS — Z00.00 ENCOUNTER FOR MEDICARE ANNUAL WELLNESS EXAM: ICD-10-CM

## 2023-04-04 DIAGNOSIS — Z71.85 VACCINE COUNSELING: ICD-10-CM

## 2023-04-04 DIAGNOSIS — I25.10 CORONARY ARTERY DISEASE INVOLVING NATIVE CORONARY ARTERY OF NATIVE HEART WITHOUT ANGINA PECTORIS: ICD-10-CM

## 2023-04-04 DIAGNOSIS — N40.1 BENIGN PROSTATIC HYPERPLASIA WITH LOWER URINARY TRACT SYMPTOMS, SYMPTOM DETAILS UNSPECIFIED: ICD-10-CM

## 2023-04-04 DIAGNOSIS — F33.42 RECURRENT MAJOR DEPRESSIVE DISORDER, IN FULL REMISSION (H): ICD-10-CM

## 2023-04-04 DIAGNOSIS — E53.8 VITAMIN B12 DEFICIENCY: ICD-10-CM

## 2023-04-04 DIAGNOSIS — I10 BENIGN ESSENTIAL HYPERTENSION: Primary | ICD-10-CM

## 2023-04-04 DIAGNOSIS — H04.123 CHRONICALLY DRY EYES, BILATERAL: ICD-10-CM

## 2023-04-04 DIAGNOSIS — Z12.5 ENCOUNTER FOR SCREENING FOR MALIGNANT NEOPLASM OF PROSTATE: ICD-10-CM

## 2023-04-04 DIAGNOSIS — K21.9 GASTROESOPHAGEAL REFLUX DISEASE WITHOUT ESOPHAGITIS: ICD-10-CM

## 2023-04-04 DIAGNOSIS — E11.21 TYPE 2 DIABETES MELLITUS WITH DIABETIC NEPHROPATHY, WITHOUT LONG-TERM CURRENT USE OF INSULIN (H): ICD-10-CM

## 2023-04-04 DIAGNOSIS — E03.4 HYPOTHYROIDISM DUE TO ACQUIRED ATROPHY OF THYROID: ICD-10-CM

## 2023-04-04 DIAGNOSIS — E11.40 CONTROLLED TYPE 2 DIABETES MELLITUS WITH DIABETIC NEUROPATHY, WITHOUT LONG-TERM CURRENT USE OF INSULIN (H): ICD-10-CM

## 2023-04-04 DIAGNOSIS — Z95.5 STATUS POST INSERTION OF DRUG ELUTING CORONARY ARTERY STENT: ICD-10-CM

## 2023-04-04 DIAGNOSIS — J41.8 MIXED SIMPLE AND MUCOPURULENT CHRONIC BRONCHITIS (H): ICD-10-CM

## 2023-04-04 DIAGNOSIS — E78.2 MIXED HYPERLIPIDEMIA: ICD-10-CM

## 2023-04-04 LAB — PSA SERPL DL<=0.01 NG/ML-MCNC: 4.38 NG/ML (ref 0–6.5)

## 2023-04-04 PROCEDURE — G0439 PPPS, SUBSEQ VISIT: HCPCS | Performed by: INTERNAL MEDICINE

## 2023-04-04 PROCEDURE — G0463 HOSPITAL OUTPT CLINIC VISIT: HCPCS

## 2023-04-04 PROCEDURE — 99214 OFFICE O/P EST MOD 30 MIN: CPT | Mod: 25 | Performed by: INTERNAL MEDICINE

## 2023-04-04 RX ORDER — LEVOTHYROXINE SODIUM 50 UG/1
50 TABLET ORAL DAILY
Qty: 93 TABLET | Refills: 1 | Status: SHIPPED | OUTPATIENT
Start: 2023-04-04 | End: 2023-08-01

## 2023-04-04 RX ORDER — TADALAFIL 5 MG/1
5 TABLET ORAL DAILY
Qty: 90 TABLET | Refills: 1 | Status: SHIPPED | OUTPATIENT
Start: 2023-04-04 | End: 2024-01-11

## 2023-04-04 RX ORDER — LACTOBACILLUS RHAMNOSUS GG 10B CELL
1 CAPSULE ORAL DAILY
COMMUNITY
End: 2024-07-24

## 2023-04-04 RX ORDER — ROSUVASTATIN CALCIUM 5 MG/1
5 TABLET, COATED ORAL DAILY
Qty: 90 TABLET | Refills: 1 | Status: SHIPPED | OUTPATIENT
Start: 2023-04-04 | End: 2023-08-01

## 2023-04-04 RX ORDER — CITALOPRAM HYDROBROMIDE 10 MG/1
10 TABLET ORAL DAILY
Qty: 90 TABLET | Refills: 1 | Status: SHIPPED | OUTPATIENT
Start: 2023-04-04 | End: 2023-08-01

## 2023-04-04 RX ORDER — GABAPENTIN 100 MG/1
100-200 CAPSULE ORAL AT BEDTIME
Qty: 150 CAPSULE | Refills: 1 | Status: SHIPPED | OUTPATIENT
Start: 2023-04-04 | End: 2023-08-01

## 2023-04-04 ASSESSMENT — ENCOUNTER SYMPTOMS
LIGHT-HEADEDNESS: 0
EYE PAIN: 1
EYE REDNESS: 1
NERVOUS/ANXIOUS: 1
DYSURIA: 0
DIFFICULTY URINATING: 1
MYALGIAS: 0
DIZZINESS: 0
PARESTHESIAS: 0
FREQUENCY: 0
HEMATURIA: 0
DIARRHEA: 0
SHORTNESS OF BREATH: 0
NUMBNESS: 1
COUGH: 1
WHEEZING: 0
BACK PAIN: 1
HEADACHES: 1
BRUISES/BLEEDS EASILY: 0
HEARTBURN: 0
CHILLS: 0
CONSTIPATION: 0
ABDOMINAL PAIN: 0
ARTHRALGIAS: 1
VOMITING: 0
NAUSEA: 0
HEMATOCHEZIA: 0
JOINT SWELLING: 0
AGITATION: 0
FATIGUE: 1
SLEEP DISTURBANCE: 0
PALPITATIONS: 0
FEVER: 0
SORE THROAT: 0
WEAKNESS: 1

## 2023-04-04 ASSESSMENT — PAIN SCALES - GENERAL: PAINLEVEL: MODERATE PAIN (4)

## 2023-04-04 ASSESSMENT — ACTIVITIES OF DAILY LIVING (ADL): CURRENT_FUNCTION: NO ASSISTANCE NEEDED

## 2023-04-04 NOTE — NURSING NOTE
"Chief Complaint   Patient presents with     Medicare Wellness Visit         Initial /74 (BP Location: Right arm, Patient Position: Sitting, Cuff Size: Adult Regular)   Pulse 66   Temp 97  F (36.1  C) (Temporal)   Resp 18   Ht 1.664 m (5' 5.5\")   Wt 85.6 kg (188 lb 12.8 oz)   SpO2 95%   BMI 30.94 kg/m   Estimated body mass index is 30.94 kg/m  as calculated from the following:    Height as of this encounter: 1.664 m (5' 5.5\").    Weight as of this encounter: 85.6 kg (188 lb 12.8 oz).       FOOD SECURITY SCREENING QUESTIONS:    The next two questions are to help us understand your food security.  If you are feeling you need any assistance in this area, we have resources available to support you today.    Hunger Vital Signs:  Within the past 12 months we worried whether our food would run out before we got money to buy more. Often  Within the past 12 months the food we bought just didn't last and we didn't have money to get more. Often    Advance Care Directive on file? no      Medication reconciliation complete.      Francisco Carrasquillo,on 4/4/2023 at 4:07 PM        "

## 2023-04-04 NOTE — PATIENT INSTRUCTIONS
Blood pressure is well controlled.   Diabetes is well controlled.     Medications refilled.   Labs are stable.     Lab on 04/03/2023   Component Date Value Ref Range Status     Sodium 04/03/2023 141  136 - 145 mmol/L Final     Potassium 04/03/2023 4.2  3.4 - 5.3 mmol/L Final     Chloride 04/03/2023 103  98 - 107 mmol/L Final     Carbon Dioxide (CO2) 04/03/2023 29  22 - 29 mmol/L Final     Anion Gap 04/03/2023 9  7 - 15 mmol/L Final     Urea Nitrogen 04/03/2023 11.4  8.0 - 23.0 mg/dL Final     Creatinine 04/03/2023 0.76  0.67 - 1.17 mg/dL Final     Calcium 04/03/2023 9.3  8.8 - 10.2 mg/dL Final     Glucose 04/03/2023 116 (H)  70 - 99 mg/dL Final     Alkaline Phosphatase 04/03/2023 75  40 - 129 U/L Final     AST 04/03/2023 18  10 - 50 U/L Final     ALT 04/03/2023 16  10 - 50 U/L Final     Protein Total 04/03/2023 7.4  6.4 - 8.3 g/dL Final     Albumin 04/03/2023 4.6  3.5 - 5.2 g/dL Final     Bilirubin Total 04/03/2023 0.5  <=1.2 mg/dL Final     GFR Estimate 04/03/2023 >90  >60 mL/min/1.73m2 Final    eGFR calculated using 2021 CKD-EPI equation.     Cholesterol 04/03/2023 117  <200 mg/dL Final     Triglycerides 04/03/2023 66  <150 mg/dL Final     Direct Measure HDL 04/03/2023 46  >=40 mg/dL Final     LDL Cholesterol Calculated 04/03/2023 58  <=100 mg/dL Final     Non HDL Cholesterol 04/03/2023 71  <130 mg/dL Final     Creatinine Urine mg/dL 04/03/2023 70.9  mg/dL Final    The reference ranges have not been established in urine creatinine. The results should be integrated into the clinical context for interpretation.     Albumin Urine mg/L 04/03/2023 17.2  mg/L Final    The reference ranges have not been established in urine albumin. The results should be integrated into the clinical context for interpretation.     Albumin Urine mg/g Cr 04/03/2023 24.26 (H)  0.00 - 17.00 mg/g Cr Final    Microalbuminuria is defined as an albumin:creatinine ratio of 17 to 299 for males and 25 to 299 for females. A ratio of  albumin:creatinine of 300 or higher is indicative of overt proteinuria.  Due to biologic variability, positive results should be confirmed by a second, first-morning random or 24-hour timed urine specimen. If there is discrepancy, a third specimen is recommended. When 2 out of 3 results are in the microalbuminuria range, this is evidence for incipient nephropathy and warrants increased efforts at glucose control, blood pressure control, and institution of therapy with an angiotensin-converting-enzyme (ACE) inhibitor (if the patient can tolerate it).       WBC Count 04/03/2023 5.1  4.0 - 11.0 10e3/uL Final     RBC Count 04/03/2023 5.05  4.40 - 5.90 10e6/uL Final     Hemoglobin 04/03/2023 15.3  13.3 - 17.7 g/dL Final     Hematocrit 04/03/2023 45.8  40.0 - 53.0 % Final     MCV 04/03/2023 91  78 - 100 fL Final     MCH 04/03/2023 30.3  26.5 - 33.0 pg Final     MCHC 04/03/2023 33.4  31.5 - 36.5 g/dL Final     RDW 04/03/2023 13.2  10.0 - 15.0 % Final     Platelet Count 04/03/2023 215  150 - 450 10e3/uL Final     Hemoglobin A1C 04/03/2023 6.7 (H)  4.0 - 6.2 % Final     TSH 04/03/2023 1.76  0.30 - 4.20 uIU/mL Final     Color Urine 04/03/2023 Light Yellow  Colorless, Straw, Light Yellow, Yellow Final     Appearance Urine 04/03/2023 Clear  Clear Final     Glucose Urine 04/03/2023 Negative  Negative mg/dL Final     Bilirubin Urine 04/03/2023 Negative  Negative Final     Ketones Urine 04/03/2023 Negative  Negative mg/dL Final     Specific Gravity Urine 04/03/2023 1.013  1.000 - 1.030 Final     Blood Urine 04/03/2023 Negative  Negative Final     pH Urine 04/03/2023 7.0  5.0 - 9.0 Final     Protein Albumin Urine 04/03/2023 Negative  Negative mg/dL Final     Urobilinogen Urine 04/03/2023 Normal  Normal, 2.0 mg/dL Final     Nitrite Urine 04/03/2023 Negative  Negative Final     Leukocyte Esterase Urine 04/03/2023 Negative  Negative Final     Hold Specimen 04/03/2023 [facility]   Final      Aspects of Diabetes:   Recent Labs   Lab Test  04/03/23  0835 01/26/23  0956 10/04/22  0900 07/05/22  0848 03/29/22  0953   A1C 6.7* 6.7* 6.8*   < > 6.6*   LDL 58 55 61   < > 76   HDL 46 49 39   < > 41   TRIG 66 67 86   < > 107   ALT 16 21 13   < > 16   CR 0.76 0.77 0.80   < > 0.83   GFRESTIMATED >90 >90 90   < > 90   POTASSIUM 4.2 4.4 4.0   < > 3.9   TSH 1.76 1.50 2.52   < > 4.14*   T4  --   --   --   --  0.68   WBC 5.1 7.1 7.3   < > 5.6   HGB 15.3 14.5 14.6   < > 14.8    215 212   < > 220   ALBUMIN 4.6 4.7 4.4   < > 4.6    < > = values in this interval not displayed.      Hemoglobin A1c  Goal range is under 8%. Best is 6.5 to 7   Blood Pressure 138/74 Goal to keep less than 140/90   Tobacco  reports that he quit smoking about 39 years ago. His smoking use included cigarettes. He has never used smokeless tobacco. Goal to abstain from tobacco   Aspirin or Plavix Anti-platelet therapy Aspirin or Plavix reduces risk of heart disease and stroke  -- sometimes used with other blood thinners, depending on bleeding risk and risk factors.    ACE/ARB Specific blood pressure meds These medications can reduce risk of kidney disease   Cholesterol Statins (Lipitor, Crestor, vs others) Statins reduce risk of heart disease and stroke   Eye Exam -- Do Yearly -- Annual diabetic eye exam   Healthy weight Wt Readings from Last 4 Encounters:   04/04/23 85.6 kg (188 lb 12.8 oz)   01/26/23 87.5 kg (192 lb 12.8 oz)   12/16/22 84.3 kg (185 lb 12.8 oz)   11/18/22 85.6 kg (188 lb 12.8 oz)      Body mass index is 30.94 kg/m .  Goal BMI under 30, best is under 25.      -- Trying to exercise daily (goal at least 20 min/day) with moderate aerobic activity   -- Eat healthy (resources from ADA at http://www.diabetes.org/)   -- Taking good care of my feet. Consider seeing the Podiatrist   -- Check blood sugars as directed, record in log book and bring to every appointment    Insurance companies are grading you and I on your blood sugar control -- Goal is to get your A1c down to 7.9% or  lower and NO Smoking!  -- Medicare and most insurance companies, will only cover Hemoglobin A1c labs to be rechecked every 91+ days.      Return for Diabetes labs and clinic follow-up appointment every 3 to 4 months.    Schedule lab only appointment --- A few days AFTER: 7/3/23   Schedule clinic appointment with Dr. Pickens -- Same day as labs, or 1-2 days later.    Patient Education   Personalized Prevention Plan  You are due for the preventive services outlined below.  Your care team is available to assist you in scheduling these services.  If you have already completed any of these items, please share that information with your care team to update in your medical record.  Health Maintenance Due   Topic Date Due     Breathing Capacity Test  Never done     Diabetic Foot Exam  03/26/2022     Eye Exam  10/14/2022     Preventive Health Recommendations  See your health care provider every year to    Review health changes.     Discuss preventive care.      Review your medicines if your doctor has prescribed any.    Talk with your health care provider about whether you should have a test to screen for prostate cancer (PSA).    Every 3 years, have a diabetes test (fasting glucose). If you are at risk for diabetes, you should have this test more often.    Every 5 years, have a cholesterol test. Have this test more often if you are at risk for high cholesterol or heart disease.     Every 10 years, have a colonoscopy. Or, have a yearly FIT test (stool test). These exams will check for colon cancer.    Talk to with your health care provider about screening for Abdominal Aortic Aneurysm if you have a family history of AAA or have a history of smoking.    Shots:     Get a flu shot each year.     Get a tetanus shot every 10 years.     Talk to your doctor about your pneumonia vaccines. There are now two you should receive - Pneumovax (PPSV 23) and Prevnar (PCV 13).    Talk to your pharmacist about a shingles vaccine.     Talk to  your doctor about the hepatitis B vaccine.    Nutrition:     Eat at least 5 servings of fruits and vegetables each day.     Eat whole-grain bread, whole-wheat pasta and brown rice instead of white grains and rice.     Get adequate Calcium and Vitamin D.     Lifestyle    Exercise for at least 150 minutes a week (30 minutes a day, 5 days a week). This will help you control your weight and prevent disease.     Limit alcohol to one drink per day.     No smoking.     Wear sunscreen to prevent skin cancer.     See your dentist every six months for an exam and cleaning.     See your eye doctor every 1 to 2 years to screen for conditions such as glaucoma, macular degeneration and cataracts.    Personalized Prevention Plan  You are due for the preventive services outlined below.  Your care team is available to assist you in scheduling these services.  If you have already completed any of these items, please share that information with your care team to update in your medical record.  Health Maintenance   Topic Date Due     SPIROMETRY  Never done     DIABETIC FOOT EXAM  03/26/2022     EYE EXAM  10/14/2022     DTAP/TDAP/TD IMMUNIZATION (2 - Td or Tdap) 05/03/2023     A1C  10/03/2023     PHQ-9  10/04/2023     BMP  04/03/2024     LIPID  04/03/2024     MICROALBUMIN  04/03/2024     TSH W/FREE T4 REFLEX  04/03/2024     MEDICARE ANNUAL WELLNESS VISIT  04/04/2024     FALL RISK ASSESSMENT  04/04/2024     ADVANCE CARE PLANNING  04/04/2028     INFLUENZA VACCINE  Completed     Pneumococcal Vaccine: 65+ Years  Completed     ZOSTER IMMUNIZATION  Completed     COVID-19 Vaccine  Completed     HEPATITIS C SCREENING  Addressed     COPD ACTION PLAN  Addressed     DEPRESSION ACTION PLAN  Addressed     IPV IMMUNIZATION  Aged Out     MENINGITIS IMMUNIZATION  Aged Out     COLORECTAL CANCER SCREENING  Discontinued

## 2023-04-04 NOTE — PROGRESS NOTES
Assessment & Plan     ICD-10-CM    1. Benign essential hypertension  I10       2. Controlled type 2 diabetes mellitus with diabetic neuropathy, without long-term current use of insulin (H)  E11.40 gabapentin (NEURONTIN) 100 MG capsule     metFORMIN (GLUCOPHAGE) 500 MG tablet      3. Mixed hyperlipidemia  E78.2 rosuvastatin (CRESTOR) 5 MG tablet      4. Mixed simple and mucopurulent chronic bronchitis (H)  J41.8       5. Recurrent major depressive disorder, in full remission (H)  F33.42 citalopram (CELEXA) 10 MG tablet      6. Type 2 diabetes mellitus with diabetic nephropathy, without long-term current use of insulin (H)  E11.21 gabapentin (NEURONTIN) 100 MG capsule      7. Vitamin B12 deficiency  E53.8       8. Chronically dry eyes, bilateral  H04.123       9. Coronary artery disease involving native coronary artery of native heart without angina pectoris  I25.10 rosuvastatin (CRESTOR) 5 MG tablet      10. Status post insertion of drug eluting coronary artery stent - x2 stents - 1st OMB - 5/17/2018 - [facility]  Z95.5       11. Encounter for screening for malignant neoplasm of prostate  Z12.5 PSA Screen GH      12. Hypothyroidism due to acquired atrophy of thyroid  E03.4 levothyroxine (SYNTHROID/LEVOTHROID) 50 MCG tablet      13. Gastroesophageal reflux disease without esophagitis  K21.9 omeprazole (PRILOSEC) 20 MG DR capsule      14. Benign prostatic hyperplasia with lower urinary tract symptoms, symptom details unspecified  N40.1 tadalafil (CIALIS) 5 MG tablet      15. Vaccine counseling  Z71.85       16. Encounter for Medicare annual wellness exam  Z00.00       Patient presents for Medicare annual wellness visit as well as follow-up multiple issues.    Type 2 diabetes, currently well controlled.  Managing with metformin.  Has diabetic polyneuropathy, continues with gabapentin.  Has been tolerating well.  Denies hypoglycemia.  Still periodically checking his blood sugars.  Needs refills.    Mixed  simple/mucopurulent chronic bronchitis.  Reports breathing is at baseline.  Continue to monitor.  Declines need for inhalers at this time.    History of depression, currently in remission.  Doing well with citalopram.  Needs refills.    Vitamin B12 deficiency.  Ongoing.  Continues with B12 supplement.    Chronic dry eyes, doing better after having plugs placed in his tear ducts.  Still following with Dr. Hernandez.    Coronary artery disease, appears stable.  He denies exertional angina.  Continues with aspirin, Crestor.  History of drug-eluting stents placed in the past.    Prostate lab cancer screening today.  Orders placed.    Hypothyroidism, doing well with current thyroid replacement.  TSH is at goal.  No changes.  Refill sent to pharmacy.    Heartburn and reflux, ongoing.  Still takes omeprazole regularly.  Finds helpful and effective.  Needs to watch his diet, avoid tomato and acidic foods.    BPH with lower urinary tract symptoms.  Cialis 5 mg daily has been well-tolerated and quite effective.  Needs refills.    Vaccine counseling completed.  Encouraged annual vaccinations.    Hypertension. Ongoing. Blood pressure is currently well controlled.  Medication side effects: None. Denies syncope or presyncope.  No changes for now. Continue with dietary management.     Mixed hyperlipidemia.  Ongoing. LDL is at goal: Yes. Triglycerides are at goal: Yes.    Medication side effects reported: None. Continue current medications - Crestor. Medication list reviewed/updated. Refills completed as needed.  Recent Labs   Lab Test 04/03/23  0835 01/26/23  0956   CHOL 117 117   HDL 46 49   LDL 58 55   TRIG 66 67       Encouraged weight loss and regular exercise.     Return in about 3 months (around 7/4/2023) for - Labs every 91+ days, with DM Follow-up, Same Day or 1-2 days later with Dr. Pickens.    Cm Pickens MD  [facility]     SUBJECTIVE:   Dajuan is a 77 year old who presents for Preventive Visit.       "4/4/2023     3:59 PM   Additional Questions   Roomed by Francisco CHRISTIAN / CAMERON     Patient has been advised of split billing requirements and indicates understanding: Yes  Are you in the first 12 months of your Medicare coverage?  No    Healthy Habits:     In general, how would you rate your overall health?  Fair    Frequency of exercise:  6-7 days/week    Duration of exercise:  Greater than 60 minutes    Do you usually eat at least 4 servings of fruit and vegetables a day, include whole grains    & fiber and avoid regularly eating high fat or \"junk\" foods?  Yes    Taking medications regularly:  Yes    Barriers to taking medications:  None    Medication side effects:  None    Ability to successfully perform activities of daily living:  No assistance needed    Home Safety:  Lack of grab bars in the bathroom and throw rugs in the hallway    Hearing Impairment:  Difficulty following a conversation in a noisy restaurant or crowded room, feel that people are mumbling or not speaking clearly, need to ask people to speak up or repeat themselves and difficulty understanding soft or whispered speech    In the past 6 months, have you been bothered by leaking of urine?  No    In general, how would you rate your overall mental or emotional health?  Fair      PHQ-2 Total Score: 0    Additional concerns today:  Yes      Have you ever done Advance Care Planning? (For example, a Health Directive, POLST, or a discussion with a medical provider or your loved ones about your wishes): No, advance care planning information given to patient to review.  Patient plans to discuss their wishes with loved ones or provider.       Fall risk  Fallen 2 or more times in the past year?: No  Any fall with injury in the past year?: No    Cognitive Screening   1) Repeat 3 items (Leader, Season, Table)    2) Clock draw: NORMAL  3) 3 item recall: Recalls NO objects   Results: 0 items recalled: PROBABLE COGNITIVE IMPAIRMENT, **INFORM PROVIDER**    Mini-CogTM " Copyright EVA Gray. Licensed by the author for use in [facility]; reprinted with permission (maico@[facility]). All rights reserved.      Do you have sleep apnea, excessive snoring or daytime drowsiness?: no    Reviewed and updated as needed this visit by clinical staff   Tobacco  Allergies  Meds  Problems  Med Hx  Surg Hx  Fam Hx  Soc   Hx        Reviewed and updated as needed this visit by Provider   Tobacco  Allergies  Meds  Problems  Med Hx  Surg Hx  Fam Hx         Social History     Tobacco Use     Smoking status: Former     Types: Cigarettes     Quit date: 10/25/1983     Years since quittin.4     Smokeless tobacco: Never   Vaping Use     Vaping status: Never Used   Substance Use Topics     Alcohol use: No         2023     4:13 PM   Alcohol Use   Prescreen: >3 drinks/day or >7 drinks/week? Yes     Do you have a current opioid prescription? No  Do you use any other controlled substances or medications that are not prescribed by a provider? None    Current providers sharing in care for this patient include:   Patient Care Team:  Cm Pickens MD as PCP - General (Internal Medicine)  Cm Pickens MD as Assigned PCP  Olivier Connell MD as Assigned Surgical Provider    The following health maintenance items are reviewed in Epic and correct as of today:  Health Maintenance   Topic Date Due     SPIROMETRY  Never done     DIABETIC FOOT EXAM  2022     EYE EXAM  10/14/2022     DTAP/TDAP/TD IMMUNIZATION (2 - Td or Tdap) 2023     A1C  10/03/2023     PHQ-9  10/04/2023     BMP  2024     LIPID  2024     MICROALBUMIN  2024     TSH W/FREE T4 REFLEX  2024     MEDICARE ANNUAL WELLNESS VISIT  2024     FALL RISK ASSESSMENT  2024     ADVANCE CARE PLANNING  2028     INFLUENZA VACCINE  Completed     Pneumococcal Vaccine: 65+ Years  Completed     ZOSTER IMMUNIZATION  Completed     COVID-19 Vaccine  Completed     HEPATITIS C SCREENING  Addressed      COPD ACTION PLAN  Addressed     DEPRESSION ACTION PLAN  Addressed     IPV IMMUNIZATION  Aged Out     MENINGITIS IMMUNIZATION  Aged Out     COLORECTAL CANCER SCREENING  Discontinued     Review of Systems   Constitutional: Positive for fatigue (Falls asleep quickly after sitting in his recliner). Negative for chills and fever.   HENT: Positive for hearing loss. Negative for congestion, ear pain, nosebleeds and sore throat.    Eyes: Positive for pain, redness and visual disturbance.        + chronic dry eyes, tried many eye drops, not much improvement - had left eye surgery - now with chronic droopy lower lid   Respiratory: Positive for cough. Negative for shortness of breath and wheezing.    Cardiovascular: Negative for chest pain, palpitations and peripheral edema.        Cardiac stress testing 3/2020 - negative/normal results.   Gastrointestinal: Negative for abdominal pain, constipation, diarrhea, heartburn, hematochezia, nausea and vomiting.        + left inguinal pains intermittently   Endocrine: Negative for cold intolerance and heat intolerance.   Genitourinary: Positive for difficulty urinating (Feeling of incomplete bladder emptying and urinary frequency). Negative for dysuria, frequency, genital sores, hematuria, impotence, penile discharge and urgency.   Musculoskeletal: Positive for arthralgias, back pain (left low back / buttocks pain) and gait problem (staggering at times, low back and left knee pain). Negative for joint swelling and myalgias.        Bilateral feet with bunions   Skin: Negative for pallor and rash.   Allergic/Immunologic: Negative for immunocompromised state.   Neurological: Positive for weakness, numbness (right > left feet) and headaches. Negative for dizziness, light-headedness and paresthesias.   Hematological: Does not bruise/bleed easily.   Psychiatric/Behavioral: Positive for mood changes. Negative for agitation and sleep disturbance. The patient is nervous/anxious.         +  "short term memory loss -- stopped Exelon and Namenda -- due to side effects.   + reporting less anxiety / depression - virus pandemic had worsened everything  -- Seems much improved with Citalopram.     OBJECTIVE:   /74 (BP Location: Right arm, Patient Position: Sitting, Cuff Size: Adult Regular)   Pulse 66   Temp 97  F (36.1  C) (Temporal)   Resp 18   Ht 1.664 m (5' 5.5\")   Wt 85.6 kg (188 lb 12.8 oz)   SpO2 95%   BMI 30.94 kg/m   Estimated body mass index is 30.94 kg/m  as calculated from the following:    Height as of this encounter: 1.664 m (5' 5.5\").    Weight as of this encounter: 85.6 kg (188 lb 12.8 oz).  Physical Exam  Constitutional:       General: He is not in acute distress.     Appearance: He is well-developed. He is not diaphoretic.   HENT:      Head: Normocephalic and atraumatic.      Nose: Nose normal. No congestion or rhinorrhea.      Mouth/Throat:      Mouth: Mucous membranes are moist.      Pharynx: Oropharynx is clear. No oropharyngeal exudate or posterior oropharyngeal erythema.   Eyes:      General: No scleral icterus.     Conjunctiva/sclera: Conjunctivae normal.      Comments: Left eye, with very droopy, irritated left lower lid   Cardiovascular:      Rate and Rhythm: Normal rate and regular rhythm.   Pulmonary:      Effort: Pulmonary effort is normal.      Breath sounds: Normal breath sounds.   Musculoskeletal:         General: No deformity.      Cervical back: Neck supple.   Lymphadenopathy:      Cervical: No cervical adenopathy.   Skin:     General: Skin is warm and dry.      Coloration: Skin is not jaundiced.      Findings: No rash.   Neurological:      Mental Status: He is alert and oriented to person, place, and time. Mental status is at baseline.   Psychiatric:         Mood and Affect: Mood normal.         Behavior: Behavior normal.       Recent Labs   Lab Test 04/03/23  0835 01/26/23  0956 10/04/22  0900 07/05/22  0848 03/29/22  0953   A1C 6.7* 6.7* 6.8*   < > 6.6*   LDL " "58 55 61   < > 76   HDL 46 49 39   < > 41   TRIG 66 67 86   < > 107   ALT 16 21 13   < > 16   CR 0.76 0.77 0.80   < > 0.83   GFRESTIMATED >90 >90 90   < > 90   POTASSIUM 4.2 4.4 4.0   < > 3.9   TSH 1.76 1.50 2.52   < > 4.14*   T4  --   --   --   --  0.68   WBC 5.1 7.1 7.3   < > 5.6   HGB 15.3 14.5 14.6   < > 14.8    215 212   < > 220   ALBUMIN 4.6 4.7 4.4   < > 4.6    < > = values in this interval not displayed.   Hemoglobin A1c is well controlled.  LDL HDL and triglycerides are at goal.  ALT normal.  Creatinine at baseline.  Potassium normal.  TSH normal.  CBC normal.  Albumin normal.    Patient has been advised of split billing requirements and indicates understanding: Yes      COUNSELING:  Reviewed preventive health counseling, as reflected in patient instructions  Special attention given to:       Consider AAA screening for ages 65-75 and smoking history       Regular exercise       Healthy diet/nutrition       Vision screening       Hearing screening       Dental care       Bladder control       Fall risk prevention       Immunizations    BMI:   Estimated body mass index is 30.94 kg/m  as calculated from the following:    Height as of this encounter: 1.664 m (5' 5.5\").    Weight as of this encounter: 85.6 kg (188 lb 12.8 oz).   Weight management plan: Discussed healthy diet and exercise guidelines      He reports that he quit smoking about 39 years ago. His smoking use included cigarettes. He has never used smokeless tobacco.      Appropriate preventive services were discussed with this patient, including applicable screening as appropriate for cardiovascular disease, diabetes, osteopenia/osteoporosis, and glaucoma.  As appropriate for age/gender, discussed screening for colorectal cancer, prostate cancer, breast cancer, and cervical cancer. Checklist reviewing preventive services available has been given to the patient.    Reviewed patients plan of care and provided an AVS. The Complex Care Plan (for " patients with higher acuity and needing more deliberate coordination of services) for Dajuan meets the Care Plan requirement. This Care Plan has been established and reviewed with the Patient.          Cm Pickens MD  [facility]    Identified Health Risks:    I have reviewed Opioid Use Disorder and Substance Use Disorder risk factors and made any needed referrals.     Answers for HPI/ROS submitted by the patient on 4/4/2023  If you checked off any problems, how difficult have these problems made it for you to do your work, take care of things at home, or get along with other people?: Not difficult at all  PHQ9 TOTAL SCORE: 4

## 2023-04-13 ENCOUNTER — TELEPHONE (OUTPATIENT)
Dept: INTERNAL MEDICINE | Facility: OTHER | Age: 78
End: 2023-04-13
Payer: COMMERCIAL

## 2023-04-13 NOTE — TELEPHONE ENCOUNTER
Please see if you can find an opening for him, if not please schedule him with Lashaun.    Francisco Carrasquillo .......  4/13/2023  3:38 PM

## 2023-04-13 NOTE — TELEPHONE ENCOUNTER
Please call the patient  He needs a pre-op  appointment for eye surgery, with his PCP only.  His surgery is 05/04/2023.  He would not see any other provider.        Anita Mccracken on 4/13/2023 at 12:36 PM

## 2023-04-14 NOTE — TELEPHONE ENCOUNTER
Patient was contacted and a Pre-op appointment was made with Lashaun Montero for 4.19.2023.  Lea White on 4/14/2023 at 9:18 AM

## 2023-04-19 ENCOUNTER — OFFICE VISIT (OUTPATIENT)
Dept: INTERNAL MEDICINE | Facility: OTHER | Age: 78
End: 2023-04-19
Payer: MEDICARE

## 2023-04-19 VITALS
WEIGHT: 193.13 LBS | OXYGEN SATURATION: 98 % | HEART RATE: 58 BPM | DIASTOLIC BLOOD PRESSURE: 70 MMHG | HEIGHT: 67 IN | SYSTOLIC BLOOD PRESSURE: 128 MMHG | TEMPERATURE: 97.7 F | RESPIRATION RATE: 20 BRPM | BODY MASS INDEX: 30.31 KG/M2

## 2023-04-19 DIAGNOSIS — R53.83 MALAISE AND FATIGUE: ICD-10-CM

## 2023-04-19 DIAGNOSIS — Z79.4 TYPE 2 DIABETES MELLITUS WITHOUT COMPLICATION, WITH LONG-TERM CURRENT USE OF INSULIN (H): ICD-10-CM

## 2023-04-19 DIAGNOSIS — E03.4 HYPOTHYROIDISM DUE TO ACQUIRED ATROPHY OF THYROID: ICD-10-CM

## 2023-04-19 DIAGNOSIS — R53.81 MALAISE AND FATIGUE: ICD-10-CM

## 2023-04-19 DIAGNOSIS — E11.9 TYPE 2 DIABETES MELLITUS WITHOUT COMPLICATION, WITH LONG-TERM CURRENT USE OF INSULIN (H): ICD-10-CM

## 2023-04-19 DIAGNOSIS — E55.9 VITAMIN D DEFICIENCY: ICD-10-CM

## 2023-04-19 DIAGNOSIS — R79.9 ABNORMAL FINDING OF BLOOD CHEMISTRY, UNSPECIFIED: ICD-10-CM

## 2023-04-19 DIAGNOSIS — E11.21 TYPE 2 DIABETES MELLITUS WITH DIABETIC NEPHROPATHY, WITHOUT LONG-TERM CURRENT USE OF INSULIN (H): ICD-10-CM

## 2023-04-19 DIAGNOSIS — Z01.818 PREOP GENERAL PHYSICAL EXAM: Primary | ICD-10-CM

## 2023-04-19 LAB
ANION GAP SERPL CALCULATED.3IONS-SCNC: 8 MMOL/L (ref 7–15)
BASOPHILS # BLD AUTO: 0.1 10E3/UL (ref 0–0.2)
BASOPHILS NFR BLD AUTO: 1 %
BUN SERPL-MCNC: 12.9 MG/DL (ref 8–23)
CALCIUM SERPL-MCNC: 9.6 MG/DL (ref 8.8–10.2)
CHLORIDE SERPL-SCNC: 102 MMOL/L (ref 98–107)
CREAT SERPL-MCNC: 0.71 MG/DL (ref 0.67–1.17)
DEPRECATED HCO3 PLAS-SCNC: 29 MMOL/L (ref 22–29)
EOSINOPHIL # BLD AUTO: 0.4 10E3/UL (ref 0–0.7)
EOSINOPHIL NFR BLD AUTO: 6 %
ERYTHROCYTE [DISTWIDTH] IN BLOOD BY AUTOMATED COUNT: 13.1 % (ref 10–15)
FERRITIN SERPL-MCNC: 43 NG/ML (ref 31–409)
GFR SERPL CREATININE-BSD FRML MDRD: >90 ML/MIN/1.73M2
GLUCOSE SERPL-MCNC: 159 MG/DL (ref 70–99)
HCT VFR BLD AUTO: 44 % (ref 40–53)
HGB BLD-MCNC: 14.5 G/DL (ref 13.3–17.7)
IMM GRANULOCYTES # BLD: 0 10E3/UL
IMM GRANULOCYTES NFR BLD: 0 %
IRON BINDING CAPACITY (ROCHE): 287 UG/DL (ref 240–430)
IRON SATN MFR SERPL: 25 % (ref 15–46)
IRON SERPL-MCNC: 73 UG/DL (ref 61–157)
LYMPHOCYTES # BLD AUTO: 1.3 10E3/UL (ref 0.8–5.3)
LYMPHOCYTES NFR BLD AUTO: 24 %
MCH RBC QN AUTO: 29.7 PG (ref 26.5–33)
MCHC RBC AUTO-ENTMCNC: 33 G/DL (ref 31.5–36.5)
MCV RBC AUTO: 90 FL (ref 78–100)
MONOCYTES # BLD AUTO: 0.5 10E3/UL (ref 0–1.3)
MONOCYTES NFR BLD AUTO: 9 %
NEUTROPHILS # BLD AUTO: 3.3 10E3/UL (ref 1.6–8.3)
NEUTROPHILS NFR BLD AUTO: 60 %
NRBC # BLD AUTO: 0 10E3/UL
NRBC BLD AUTO-RTO: 0 /100
PLATELET # BLD AUTO: 198 10E3/UL (ref 150–450)
POTASSIUM SERPL-SCNC: 4.2 MMOL/L (ref 3.4–5.3)
RBC # BLD AUTO: 4.88 10E6/UL (ref 4.4–5.9)
SODIUM SERPL-SCNC: 139 MMOL/L (ref 136–145)
VIT B12 SERPL-MCNC: >2000 PG/ML (ref 232–1245)
WBC # BLD AUTO: 5.6 10E3/UL (ref 4–11)

## 2023-04-19 PROCEDURE — 80048 BASIC METABOLIC PNL TOTAL CA: CPT | Mod: ZL

## 2023-04-19 PROCEDURE — 82607 VITAMIN B-12: CPT | Mod: ZL

## 2023-04-19 PROCEDURE — 82728 ASSAY OF FERRITIN: CPT | Mod: ZL

## 2023-04-19 PROCEDURE — 85025 COMPLETE CBC W/AUTO DIFF WBC: CPT | Mod: ZL

## 2023-04-19 PROCEDURE — 82306 VITAMIN D 25 HYDROXY: CPT | Mod: ZL

## 2023-04-19 PROCEDURE — G0463 HOSPITAL OUTPT CLINIC VISIT: HCPCS

## 2023-04-19 PROCEDURE — 83550 IRON BINDING TEST: CPT | Mod: ZL

## 2023-04-19 PROCEDURE — 99214 OFFICE O/P EST MOD 30 MIN: CPT

## 2023-04-19 PROCEDURE — 36415 COLL VENOUS BLD VENIPUNCTURE: CPT | Mod: ZL

## 2023-04-19 RX ORDER — NEOMYCIN SULFATE, POLYMYXIN B SULFATE AND DEXAMETHASONE 3.5; 10000; 1 MG/ML; [USP'U]/ML; MG/ML
SUSPENSION/ DROPS OPHTHALMIC
COMMUNITY
Start: 2023-04-07 | End: 2023-06-14

## 2023-04-19 ASSESSMENT — ANXIETY QUESTIONNAIRES
6. BECOMING EASILY ANNOYED OR IRRITABLE: NOT AT ALL
3. WORRYING TOO MUCH ABOUT DIFFERENT THINGS: NOT AT ALL
7. FEELING AFRAID AS IF SOMETHING AWFUL MIGHT HAPPEN: NOT AT ALL
7. FEELING AFRAID AS IF SOMETHING AWFUL MIGHT HAPPEN: NOT AT ALL
IF YOU CHECKED OFF ANY PROBLEMS ON THIS QUESTIONNAIRE, HOW DIFFICULT HAVE THESE PROBLEMS MADE IT FOR YOU TO DO YOUR WORK, TAKE CARE OF THINGS AT HOME, OR GET ALONG WITH OTHER PEOPLE: NOT DIFFICULT AT ALL
8. IF YOU CHECKED OFF ANY PROBLEMS, HOW DIFFICULT HAVE THESE MADE IT FOR YOU TO DO YOUR WORK, TAKE CARE OF THINGS AT HOME, OR GET ALONG WITH OTHER PEOPLE?: NOT DIFFICULT AT ALL
4. TROUBLE RELAXING: NOT AT ALL
GAD7 TOTAL SCORE: 0
1. FEELING NERVOUS, ANXIOUS, OR ON EDGE: NOT AT ALL
2. NOT BEING ABLE TO STOP OR CONTROL WORRYING: NOT AT ALL
GAD7 TOTAL SCORE: 0
GAD7 TOTAL SCORE: 0
5. BEING SO RESTLESS THAT IT IS HARD TO SIT STILL: NOT AT ALL

## 2023-04-19 ASSESSMENT — PAIN SCALES - GENERAL: PAINLEVEL: NO PAIN (0)

## 2023-04-19 ASSESSMENT — PATIENT HEALTH QUESTIONNAIRE - PHQ9
SUM OF ALL RESPONSES TO PHQ QUESTIONS 1-9: 0
10. IF YOU CHECKED OFF ANY PROBLEMS, HOW DIFFICULT HAVE THESE PROBLEMS MADE IT FOR YOU TO DO YOUR WORK, TAKE CARE OF THINGS AT HOME, OR GET ALONG WITH OTHER PEOPLE: NOT DIFFICULT AT ALL
SUM OF ALL RESPONSES TO PHQ QUESTIONS 1-9: 0

## 2023-04-19 NOTE — PROGRESS NOTES
[facility] AND [facility]  [address]  [place] [address]  Phone: [phone]  Primary Provider: Cm Pickens  Pre-op Performing Provider: ANT GALEANO      PREOPERATIVE EVALUATION:  Today's date: 4/19/2023    Dajuan Basilio is a 77 year old male who presents for a preoperative evaluation.    Surgical Information:  Surgery/Procedure: Left eye cataract surgery on 05/04/23 and right eye cataract surgery on 05/11/23  Surgery Location: [facility]  Surgeon: John Pretty  Surgery Date: 5-4-23 and 5-11-23  Time of Surgery: TBD  Where patient plans to recover: At home with family  Fax number for surgical facility: [phone]    Assessment & Plan     The proposed surgical procedure is considered LOW risk.          ICD-10-CM    1. Preop general physical exam  Z01.818 CBC and Differential     Basic Metabolic Panel     Vitamin D Total     Vitamin B12     Vitamin B12     Vitamin D Total     Basic Metabolic Panel     CBC and Differential      2. Malaise and fatigue  R53.81 Ferritin    R53.83 Iron & Iron Binding Capacity     Vitamin D Total     Vitamin B12     Vitamin B12     Vitamin D Total     Iron & Iron Binding Capacity     Ferritin      3. Type 2 diabetes mellitus with diabetic nephropathy, without long-term current use of insulin (H)  E11.21 Vitamin D Total     Vitamin B12     Vitamin B12     Vitamin D Total      4. Hypothyroidism due to acquired atrophy of thyroid  E03.4 Ferritin     Iron & Iron Binding Capacity     Vitamin D Total     Vitamin B12     Vitamin B12     Vitamin D Total     Iron & Iron Binding Capacity     Ferritin      5. Abnormal finding of blood chemistry, unspecified  R79.9 Ferritin     Vitamin D Total     Vitamin D Total     Ferritin      6. Type 2 diabetes mellitus without complication, with long-term current use of insulin (H)  E11.9 Iron & Iron Binding Capacity    Z79.4 Iron & Iron Binding Capacity      7. Vitamin D deficiency  E55.9 Vitamin D Total     Vitamin  D Total        Patient is as optimized as possible for upcoming procedure.  Denies any new chest pain, shortness of breath.  Able to walk up a flight of steps without feeling short of breath.  Denies having difficulty with anesthesia in the past, denies any loose or chipped teeth.    Ear flush was performed today by nurse for impacted cerumen on right ear.    Patient has complained of ongoing fatigue for years.  He would like additional lab work for this to rule out possible etiologies.  CBC and iron binding panel within normal limits.  Ferritin, B12, vitamin D pending at this time we will call patient with results as they become available.      Medication Instructions:   - Statins: Continue taking on the day of surgery.    - metformin: HOLD day of surgery.    RECOMMENDATION:  APPROVAL GIVEN to proceed with proposed procedure, without further diagnostic evaluation.    Questions.  Subjective     HPI related to upcoming procedure: Patient is scheduled for cataract surgery at [facility] in [place].  He will have his left eye done on 5-4-2023 and his right eye done on 5-.        4/19/2023    10:04 AM   Preop Questions   1. Have you ever had a heart attack or stroke? No   2. Have you ever had surgery on your heart or blood vessels, such as a stent placement, a coronary artery bypass, or surgery on an artery in your head, neck, heart, or legs? YES - Stented coronary artery to his LCX and OM1 on 05/17/2018   3. Do you have chest pain with activity? No   4. Do you have a history of  heart failure? No   5. Do you currently have a cold, bronchitis or symptoms of other infection? No   6. Do you have a cough, shortness of breath, or wheezing? No   7. Do you or anyone in your family have previous history of blood clots? No   8. Do you or does anyone in your family have a serious bleeding problem such as prolonged bleeding following surgeries or cuts? No   9. Have you ever had problems with anemia or been  told to take iron pills? No   10. Have you had any abnormal blood loss such as black, tarry or bloody stools? No   11. Have you ever had a blood transfusion? No   12. Are you willing to have a blood transfusion if it is medically needed before, during, or after your surgery? Yes   13. Have you or any of your relatives ever had problems with anesthesia? No   14. Do you have sleep apnea, excessive snoring or daytime drowsiness? No   15. Do you have any artifical heart valves or other implanted medical devices like a pacemaker, defibrillator, or continuous glucose monitor? No   16. Do you have artificial joints? YES - Right knee   17. Are you allergic to latex? No       Health Care Directive:  Patient does not have a Health Care Directive or Living Will: Discussed advance care planning with patient; however, patient declined at this time.    Preoperative Review of :   reviewed - controlled substances reflected in medication list.          Review of Systems  CONSTITUTIONAL: NEGATIVE for fever, chills, change in weight  ENT/MOUTH: NEGATIVE for ear, mouth and throat problems  RESP: NEGATIVE for significant cough or SOB  CV: NEGATIVE for chest pain, palpitations or peripheral edema    Patient Active Problem List    Diagnosis Date Noted     History of eyelid surgery 02/08/2023     Priority: Medium     Droopy eyelid, bilateral 01/26/2023     Priority: Medium     Chronic cough 08/16/2022     Priority: Medium     Type 2 diabetes mellitus with diabetic nephropathy, without long-term current use of insulin (H) 07/06/2022     Priority: Medium     Recurrent major depressive disorder, in full remission (H) 04/02/2022     Priority: Medium     Hypothyroidism due to acquired atrophy of thyroid 03/31/2022     Priority: Medium     Benign paroxysmal positional vertigo, unspecified laterality 12/03/2021     Priority: Medium     Elevated prostate specific antigen (PSA) 12/03/2021     Priority: Medium     Mixed simple and mucopurulent  chronic bronchitis (H) 11/04/2021     Priority: Medium     Major neurocognitive disorder due to another medical condition (H) 05/05/2021     Priority: Medium     Bunion, right 03/26/2021     Priority: Medium     Onychomycosis 03/26/2021     Priority: Medium     Bunion, left 03/26/2021     Priority: Medium     Diabetic peripheral neuropathy (H) 01/08/2021     Priority: Medium     Unspecified inflammatory spondylopathy, sacral and sacrococcygeal region (H) 12/18/2020     Priority: Medium     Chronic left-sided low back pain without sciatica 09/17/2020     Priority: Medium     Vitamin B12 deficiency 09/17/2020     Priority: Medium     Benign essential hypertension 09/17/2020     Priority: Medium     Lumbar facet arthropathy 07/07/2020     Priority: Medium     Chronically dry eyes, bilateral 03/17/2020     Priority: Medium     Left sided sciatica 03/17/2020     Priority: Medium     Mixed hyperlipidemia 03/19/2019     Priority: Medium     S/P cardiac cath 5/17/18 06/07/2018     Priority: Medium     Unsteady gait 06/07/2018     Priority: Medium     Stented coronary artery to his LCX and OM1 on 5/17/18 06/07/2018     Priority: Medium     Status post insertion of drug eluting coronary artery stent - x2 stents - 1st OMB - 5/17/2018 - [facility] 05/31/2018     Priority: Medium     Controlled type 2 diabetes mellitus with diabetic neuropathy, without long-term current use of insulin (H) 05/31/2018     Priority: Medium     Benign prostatic hyperplasia with weak urinary stream 05/31/2018     Priority: Medium     Coronary artery disease involving native coronary artery of native heart without angina pectoris 04/26/2018     Priority: Medium     Microalbuminuria 01/26/2018     Priority: Medium     Generalized anxiety disorder 06/05/2017     Priority: Medium     GERD (gastroesophageal reflux disease) 05/03/2013     Priority: Medium     Nephrolithiasis 04/23/2012     Priority: Medium     BPH (benign prostatic hyperplasia)  03/27/2012     Priority: Medium     Diverticular disease of colon 02/17/2010     Priority: Medium     Obesity 02/17/2010     Priority: Medium     Chronic anxiety 02/17/2010     Priority: Medium      Past Medical History:   Diagnosis Date     Abnormal CT scan, bladder 6/29/2016     Diverticulosis of intestine without perforation or abscess without bleeding     No Comments Provided     Fatty (change of) liver, not elsewhere classified     non alcoholic     Fibromyalgia     No Comments Provided     Nodular prostate without lower urinary tract symptoms     2012,US confirms benign calcifications     Obesity     No Comments Provided     Panic disorder without agoraphobia     No Comments Provided     Positive colorectal cancer screening using Cologuard test 12/06/2018     Past Surgical History:   Procedure Laterality Date     ARTHROPLASTY KNEE      2015     ARTHROSCOPY KNEE      right knee X2     BIOPSY PROSTATE TRANSRECTAL      2013     COLONOSCOPY      2003,and UGI [place] normal     COLONOSCOPY      2009,and UGI repeat Dr. Johnson negative.     COLONOSCOPY  01/01/2013 2013,WNL     COLONOSCOPY  05/23/2019    Elizabeth-gilda follow up     HERNIA REPAIR  2000     RELEASE CARPAL TUNNEL      2014     VASECTOMY      No Comments Provided     Current Outpatient Medications   Medication Sig Dispense Refill     ascorbic acid (VITAMIN C) 500 MG tablet Take 1 tablet by mouth daily. For 60 days       aspirin 81 MG tablet Take 81 mg by mouth daily       blood glucose (ACCU-CHEK ONEL PLUS) test strip USE  STRIP TO CHECK GLUCOSE Once DAILY 200 strip 11     citalopram (CELEXA) 10 MG tablet Take 1 tablet (10 mg) by mouth daily 90 tablet 1     gabapentin (NEURONTIN) 100 MG capsule Take 1-2 capsules (100-200 mg) by mouth At Bedtime - for pain 150 capsule 1     guaiFENesin-codeine (ROBITUSSIN AC) 100-10 MG/5ML solution Take 5-10 mLs by mouth at bedtime as needed, may repeat once for cough 180 mL 0     lactobacillus rhamnosus, GG, (CULTURELL)  capsule Take 1 capsule by mouth daily       levothyroxine (SYNTHROID/LEVOTHROID) 50 MCG tablet Take 1 tablet (50 mcg) by mouth daily - for Thyroid Replacement 93 tablet 1     metFORMIN (GLUCOPHAGE) 500 MG tablet Take 2 tablets (1,000 mg) by mouth daily (with breakfast) AND 1 tablet (500 mg) daily (with dinner). 270 tablet 1     neomycin-polymyxin-dexamethasone (MAXITROL) 3.5-10012-7.1 SUSP ophthalmic susp INSTILL 1 DROP INTO SURGICAL EYE THREE TIMES DAILY FOR 1 WEEK STARTING THE DAY OF PROCEDURE THEN STOP       omega 3 1000 MG CAPS Take 1 capsule by mouth daily       omeprazole (PRILOSEC) 20 MG DR capsule Take 1 capsule (20 mg) by mouth daily Take 30 to 60 minutes prior to a meal -- for stomach acid 90 capsule 1     rosuvastatin (CRESTOR) 5 MG tablet Take 1 tablet (5 mg) by mouth daily - Stop Lipitor - 90 tablet 1     tadalafil (CIALIS) 5 MG tablet Take 1 tablet (5 mg) by mouth daily 90 tablet 1     vitamin B complex with vitamin C (VITAMIN  B COMPLEX) TABS tablet Take 1 tablet by mouth daily Monday, Wednesday, Friday -- 3 days weekly -- make sure it has B12 in tablet -- Dose Reduced 2018 100 tablet 3     vitamin D3 (CHOLECALCIFEROL) 125 MCG (5000 UT) tablet Take 5,000 Units by mouth daily       erythromycin (ROMYCIN) 5 MG/GM ophthalmic ointment APPLY 1CM RIBBON ONTO INCISION LINE(S) 3 TIMES A DAY Strength: 5 MG/GM (Patient not taking: Reported on 2023) 3.5 g 3       Allergies   Allergen Reactions     Memantine Other (See Comments)     Dizzy, lightheaded  Dizzy, lightheaded     Rivastigmine Other (See Comments)     Dizzy, lightheaded  Dizzy, lightheaded     Ace Inhibitors Cough     Atorvastatin Calcium Muscle Pain (Myalgia)     Lipitor     Hmg-Coa-R Inhibitors Muscle Pain (Myalgia)     Higher doses cause myalgias        Social History     Tobacco Use     Smoking status: Former     Types: Cigarettes     Quit date: 10/25/1983     Years since quittin.5     Smokeless tobacco: Never   Vaping Use     Vaping  "status: Never Used   Substance Use Topics     Alcohol use: No       History   Drug Use No         Objective     /70 (BP Location: Right arm, Patient Position: Sitting, Cuff Size: Adult Large)   Pulse 58   Temp 97.7  F (36.5  C)   Resp 20   Ht 1.702 m (5' 7\")   Wt 87.6 kg (193 lb 2 oz)   SpO2 98%   BMI 30.25 kg/m      Physical Exam  Constitutional:       General: He is not in acute distress.     Appearance: He is well-developed. He is not diaphoretic.   HENT:      Head: Normocephalic and atraumatic.      Right Ear: There is impacted cerumen.      Left Ear: Tympanic membrane, ear canal and external ear normal.      Nose: Nose normal. No congestion or rhinorrhea.      Mouth/Throat:      Mouth: Mucous membranes are moist.      Pharynx: Oropharynx is clear. No oropharyngeal exudate or posterior oropharyngeal erythema.   Eyes:      General: No scleral icterus.        Right eye: No discharge.         Left eye: No discharge.      Extraocular Movements: Extraocular movements intact.      Conjunctiva/sclera: Conjunctivae normal.      Pupils: Pupils are equal, round, and reactive to light.   Cardiovascular:      Rate and Rhythm: Normal rate and regular rhythm.   Pulmonary:      Effort: Pulmonary effort is normal.      Breath sounds: Normal breath sounds.   Musculoskeletal:         General: No deformity.      Cervical back: Neck supple.   Lymphadenopathy:      Cervical: No cervical adenopathy.   Skin:     General: Skin is warm and dry.      Coloration: Skin is not jaundiced.      Findings: No rash.   Neurological:      Mental Status: He is alert and oriented to person, place, and time. Mental status is at baseline.   Psychiatric:         Mood and Affect: Mood normal.         Behavior: Behavior normal.         Diagnostics:  Recent Results (from the past 24 hour(s))   Basic Metabolic Panel    Collection Time: 04/19/23 10:55 AM   Result Value Ref Range    Sodium 139 136 - 145 mmol/L    Potassium 4.2 3.4 - 5.3 mmol/L "    Chloride 102 98 - 107 mmol/L    Carbon Dioxide (CO2) 29 22 - 29 mmol/L    Anion Gap 8 7 - 15 mmol/L    Urea Nitrogen 12.9 8.0 - 23.0 mg/dL    Creatinine 0.71 0.67 - 1.17 mg/dL    Calcium 9.6 8.8 - 10.2 mg/dL    Glucose 159 (H) 70 - 99 mg/dL    GFR Estimate >90 >60 mL/min/1.73m2   Iron & Iron Binding Capacity    Collection Time: 04/19/23 10:55 AM   Result Value Ref Range    Iron 73 61 - 157 ug/dL    Iron Binding Capacity 287 240 - 430 ug/dL    Iron Sat Index 25 15 - 46 %   CBC with platelets and differential    Collection Time: 04/19/23 10:55 AM   Result Value Ref Range    WBC Count 5.6 4.0 - 11.0 10e3/uL    RBC Count 4.88 4.40 - 5.90 10e6/uL    Hemoglobin 14.5 13.3 - 17.7 g/dL    Hematocrit 44.0 40.0 - 53.0 %    MCV 90 78 - 100 fL    MCH 29.7 26.5 - 33.0 pg    MCHC 33.0 31.5 - 36.5 g/dL    RDW 13.1 10.0 - 15.0 %    Platelet Count 198 150 - 450 10e3/uL    % Neutrophils 60 %    % Lymphocytes 24 %    % Monocytes 9 %    % Eosinophils 6 %    % Basophils 1 %    % Immature Granulocytes 0 %    NRBCs per 100 WBC 0 <1 /100    Absolute Neutrophils 3.3 1.6 - 8.3 10e3/uL    Absolute Lymphocytes 1.3 0.8 - 5.3 10e3/uL    Absolute Monocytes 0.5 0.0 - 1.3 10e3/uL    Absolute Eosinophils 0.4 0.0 - 0.7 10e3/uL    Absolute Basophils 0.1 0.0 - 0.2 10e3/uL    Absolute Immature Granulocytes 0.0 <=0.4 10e3/uL    Absolute NRBCs 0.0 10e3/uL      No EKG required for low risk surgery (cataract, skin procedure, breast biopsy, etc).    Revised Cardiac Risk Index (RCRI):  The patient has the following serious cardiovascular risks for perioperative complications:   - Coronary Artery Disease (MI, positive stress test, angina, Qs on EKG) = 1 point     RCRI Interpretation: 1 point: Class II (low risk - 0.9% complication rate)       Signed Electronically by: MARCOS Patrick CNP  Copy of this evaluation report is provided to requesting physician.

## 2023-04-19 NOTE — PATIENT INSTRUCTIONS
For informational purposes only. Not to replace the advice of your health care provider. Copyright   2003,  [place] [employer] [facility]. All rights reserved. Clinically reviewed by Belinda Meza MD. [employer] [id number] - REV .  Preparing for Your Surgery  Getting started  A nurse will call you to review your health history and instructions. They will give you an arrival time based on your scheduled surgery time. Please be ready to share:  Your doctor's clinic name and phone number  Your medical, surgical, and anesthesia history  A list of allergies and sensitivities  A list of medicines, including herbal treatments and over-the-counter drugs  Whether the patient has a legal guardian (ask how to send us the papers in advance)  Please tell us if you're pregnant--or if there's any chance you might be pregnant. Some surgeries may injure a fetus (unborn baby), so they require a pregnancy test. Surgeries that are safe for a fetus don't always need a test, and you can choose whether to have one.   If you have a child who's having surgery, please ask for a copy of Preparing for Your Child's Surgery.    Preparing for surgery  Within 10 to 30 days of surgery: Have a pre-op exam (sometimes called an H&P, or History and Physical). This can be done at a clinic or pre-operative center.  If you're having a , you may not need this exam. Talk to your care team.  At your pre-op exam, talk to your care team about all medicines you take. If you need to stop any medicines before surgery, ask when to start taking them again.  We do this for your safety. Many medicines can make you bleed too much during surgery. Some change how well surgery (anesthesia) drugs work.  Call your insurance company to let them know you're having surgery. (If you don't have insurance, call [phone].)  Call your clinic if there's any change in your health. This includes signs of a cold or flu (sore throat, runny nose, cough, rash, fever). It  also includes a scrape or scratch near the surgery site.  If you have questions on the day of surgery, call your hospital or surgery center.  Eating and drinking guidelines  For your safety: Unless your surgeon tells you otherwise, follow the guidelines below.  Eat and drink as usual until 8 hours before you arrive for surgery. After that, no food or milk.  Drink clear liquids until 2 hours before you arrive. These are liquids you can see through, like water, Gatorade, and Propel Water. They also include plain black coffee and tea (no cream or milk), candy, and breath mints. You can spit out gum when you arrive.  If you drink alcohol: Stop drinking it the night before surgery.  If your care team tells you to take medicine on the morning of surgery, it's okay to take it with a sip of water.  Preventing infection  Shower or bathe the night before and morning of your surgery. Follow the instructions your clinic gave you. (If no instructions, use regular soap.)  Don't shave or clip hair near your surgery site. We'll remove the hair if needed.  Don't smoke or vape the morning of surgery. You may chew nicotine gum up to 2 hours before surgery. A nicotine patch is okay.  Note: Some surgeries require you to completely quit smoking and nicotine. Check with your surgeon.  Your care team will make every effort to keep you safe from infection. We will:  Clean our hands often with soap and water (or an alcohol-based hand rub).  Clean the skin at your surgery site with a special soap that kills germs.  Give you a special gown to keep you warm. (Cold raises the risk of infection.)  Wear special hair covers, masks, gowns and gloves during surgery.  Give antibiotic medicine, if prescribed. Not all surgeries need antibiotics.  What to bring on the day of surgery  Photo ID and insurance card  Copy of your health care directive, if you have one  Glasses and hearing aids (bring cases)  You can't wear contacts during surgery  Inhaler and  eye drops, if you use them (tell us about these when you arrive)  CPAP machine or breathing device, if you use them  A few personal items, if spending the night  If you have . . .  A pacemaker, ICD (cardiac defibrillator) or other implant: Bring the ID card.  An implanted stimulator: Bring the remote control.  A legal guardian: Bring a copy of the certified (court-stamped) guardianship papers.  Please remove any jewelry, including body piercings. Leave jewelry and other valuables at home.  If you're going home the day of surgery  You must have a responsible adult drive you home. They should stay with you overnight as well.  If you don't have someone to stay with you, and you aren't safe to go home alone, we may keep you overnight. Insurance often won't pay for this.  After surgery  If it's hard to control your pain or you need more pain medicine, please call your surgeon's office.  Questions?   If you have any questions for your care team, list them here: _________________________________________________________________________________________________________________________________________________________________________ ____________________________________ ____________________________________ ____________________________________    How to Take Your Medication Before Surgery  - HOLD (do not take) your METFORMIN on the morning of surgery.      You can use debrox eardrops for earwax buildup

## 2023-04-20 LAB — DEPRECATED CALCIDIOL+CALCIFEROL SERPL-MC: 70 UG/L (ref 20–75)

## 2023-05-26 ENCOUNTER — TRANSFERRED RECORDS (OUTPATIENT)
Dept: HEALTH INFORMATION MANAGEMENT | Facility: OTHER | Age: 78
End: 2023-05-26

## 2023-06-01 NOTE — TELEPHONE ENCOUNTER
"Requested Prescriptions   Pending Prescriptions Disp Refills     blood glucose (ACCU-CHEK ONEL PLUS) test strip [Pharmacy Med Name: ACCU-CHEK ONEL PLUS  KYRA] 200 strip 2     Sig: USE  STRIP TO CHECK GLUCOSE TWICE DAILY       Diabetic Supplies Protocol Passed - 7/26/2019 10:18 AM        Passed - Medication is active on med list        Passed - Patient is 18 years of age or older        Passed - Recent (6 mo) or future (30 days) visit within the authorizing provider's specialty     Patient had office visit in the last 6 months or has a visit in the next 30 days with authorizing provider.  See \"Patient Info\" tab in inbasket, or \"Choose Columns\" in Meds & Orders section of the refill encounter.            Last OV 5/28/2019. Prescription approved per [facility] Refill Protocol.  Elizabeth Renee RN on 7/26/2019 at 1:37 PM   "
-- DO NOT REPLY / DO NOT REPLY ALL --  -- Message is from Engagement Center Operations (ECO) --    Order Request  Mammogram and a Dexa study    Message / reason: screening    Insurance type:   Payor: BLUE CROSS BLUE [employer]IELD [place] / Plan: PPO [id number] / Product Type: PPO MISC    Preferred Delivery Method   Input in Epic     Caller Information       Type Contact Phone/Fax    06/01/2023 08:32 AM CDT Phone (Incoming)          Patient will be scheduling at [facility].  Please return her call once orders are available.    Alternative phone number: [phone]    Can a detailed message be left? Yes    Message Turnaround:     [place]:    Please give this turnaround time to the caller:   \"This message will be sent to [state Provider's name]. The clinical team will fulfill your request as soon as they review your message.\"  
no tender lymph nodes/no enlarged lymph nodes

## 2023-06-14 ENCOUNTER — HOSPITAL ENCOUNTER (OUTPATIENT)
Dept: GENERAL RADIOLOGY | Facility: OTHER | Age: 78
Discharge: HOME | End: 2023-06-14
Attending: NURSE PRACTITIONER
Payer: MEDICARE

## 2023-06-14 ENCOUNTER — OFFICE VISIT (OUTPATIENT)
Dept: FAMILY MEDICINE | Facility: OTHER | Age: 78
End: 2023-06-14
Attending: NURSE PRACTITIONER
Payer: MEDICARE

## 2023-06-14 ENCOUNTER — TELEPHONE (OUTPATIENT)
Dept: INTERNAL MEDICINE | Facility: OTHER | Age: 78
End: 2023-06-14

## 2023-06-14 VITALS
DIASTOLIC BLOOD PRESSURE: 76 MMHG | RESPIRATION RATE: 20 BRPM | HEART RATE: 56 BPM | SYSTOLIC BLOOD PRESSURE: 138 MMHG | TEMPERATURE: 96.7 F | WEIGHT: 189.6 LBS | OXYGEN SATURATION: 96 % | HEIGHT: 67 IN | BODY MASS INDEX: 29.76 KG/M2

## 2023-06-14 DIAGNOSIS — G89.29 CHRONIC RIGHT SHOULDER PAIN: ICD-10-CM

## 2023-06-14 DIAGNOSIS — G89.29 CHRONIC RIGHT SHOULDER PAIN: Primary | ICD-10-CM

## 2023-06-14 DIAGNOSIS — M25.511 CHRONIC RIGHT SHOULDER PAIN: ICD-10-CM

## 2023-06-14 DIAGNOSIS — M25.511 CHRONIC RIGHT SHOULDER PAIN: Primary | ICD-10-CM

## 2023-06-14 PROCEDURE — G0463 HOSPITAL OUTPT CLINIC VISIT: HCPCS

## 2023-06-14 PROCEDURE — 73030 X-RAY EXAM OF SHOULDER: CPT | Mod: RT

## 2023-06-14 PROCEDURE — 99213 OFFICE O/P EST LOW 20 MIN: CPT | Performed by: NURSE PRACTITIONER

## 2023-06-14 RX ORDER — MELOXICAM 15 MG/1
15 TABLET ORAL DAILY
Qty: 30 TABLET | Refills: 0 | Status: SHIPPED | OUTPATIENT
Start: 2023-06-14 | End: 2023-10-03

## 2023-06-14 ASSESSMENT — PAIN SCALES - GENERAL: PAINLEVEL: MODERATE PAIN (5)

## 2023-06-14 ASSESSMENT — PATIENT HEALTH QUESTIONNAIRE - PHQ9
SUM OF ALL RESPONSES TO PHQ QUESTIONS 1-9: 0
SUM OF ALL RESPONSES TO PHQ QUESTIONS 1-9: 0
10. IF YOU CHECKED OFF ANY PROBLEMS, HOW DIFFICULT HAVE THESE PROBLEMS MADE IT FOR YOU TO DO YOUR WORK, TAKE CARE OF THINGS AT HOME, OR GET ALONG WITH OTHER PEOPLE: NOT DIFFICULT AT ALL

## 2023-06-14 NOTE — TELEPHONE ENCOUNTER
"Patient is requesting a prescription for a pain medication and is requesting a call from the provider. Patient states,\"the pharmacist that he talked to said, he would get pain relief from a prescribed pain medication\". He is not going take naproxen or ibuprofen because it doesn't help with the pain. Patient states,\"the pharmacist knows more about what pain medication would work for him than any providers\".    Nneka Fiore LPN on 6/14/2023 at 4:17 PM    "

## 2023-06-14 NOTE — TELEPHONE ENCOUNTER
Patient has been taking 2 tylenol and 2 ibuprofin every 6hrs and doesn't touch pain- forgot to ask for something stronger until figure this out- uses [facility]- Please call and let know

## 2023-06-14 NOTE — PROGRESS NOTES
Assessment & Plan   Problem List Items Addressed This Visit    None  Visit Diagnoses     Chronic right shoulder pain    -  Primary    Relevant Orders    XR Shoulder Right G/E 3 Views (Completed)    Orthopedic  Referral    Physical Therapy Referral         X-ray obtained, osteoarthritis appreciated.  Discussed ongoing symptomatic management, referral to physical therapy and referral to sports medicine for consideration of joint injection.  I do not see a need for MRI at this time, will defer this to sports medicine if felt appropriate.  He is in agreement with plan.    Review of the result(s) of each unique test - xray  Ordering of each unique test  23 minutes spent by me on the date of the encounter doing chart review, history and exam, documentation and further activities per the note           No follow-ups on file.    MARCOS Cherry [facility]    Alisha Graff is a 78 year old, presenting for the following health issues:  Shoulder Pain (right)    Shoulder Pain    History of Present Illness       Reason for visit:  Right shoulder pain     Today's PHQ-9         PHQ-9 Total Score: 0    PHQ-9 Q9 Thoughts of better off dead/self-harm past 2 weeks :   Not at all    How difficult have these problems made it for you to do your work, take care of things at home, or get along with other people: Not difficult at all     He comes in today for evaluation of right shoulder pain.  He reports this started over the winter when he was doing a lot of heavy shoveling.  Does not feel that this is gotten much better.  He did have an appointment with his primary care provider in April, he states he did not bring it up because he was feeling better then but has progressively worsened.  He has not had any evaluation of the shoulder.  No history of falls or significant trauma.  He has been taking 2 ibuprofen and 2 Tylenol 3 times daily for pain management which has been helpful.  He is  "right-handed, denies any numbness or tingling into his right arm.        Review of Systems   See above      Objective    /76   Pulse 56   Temp (!) 96.7  F (35.9  C)   Resp 20   Ht 1.702 m (5' 7\")   Wt 86 kg (189 lb 9.6 oz)   SpO2 96%   BMI 29.70 kg/m    Body mass index is 29.7 kg/m .  Physical Exam   GENERAL: healthy, alert and no distress  MS: Normal range of motion of bilateral shoulders, he is slow with full abduction.  He does have a crepitus noted to right shoulder with passive range of motion.  Negative provocative movements, normal strength.  SKIN: no suspicious lesions or rashes  NEURO: Normal strength and tone, mentation intact and speech normal  PSYCH: mentation appears normal, affect normal/bright      Right shoulder x-ray obtained, osteoarthritis appreciated, no other acute findings.            "

## 2023-06-14 NOTE — TELEPHONE ENCOUNTER
When I talked to him this morning, he stated that these medications were helping with the shoulder pain. I would recommend stopping the ibuprofen, start naproxen 2 tablets twice daily as well as arthritis strength Tylenol 2 tablets every 6 hours as needed. MARCOS Cherry CNP on 6/14/2023 at 2:15 PM

## 2023-06-14 NOTE — TELEPHONE ENCOUNTER
I reviewed his x-ray and case with his primary care provider.  Narcotics are not appropriate and would not be prescribed.  I did send in a prescription for meloxicam 1 tablet daily.  This is an anti-inflammatory type medication and needs to avoid ibuprofen while taking this.  He continues to take Tylenol as he has been.  I also sent in diclofenac gel to be used topically 4 times a day as needed. MARCOS Cherry CNP on 6/14/2023 at 4:26 PM

## 2023-06-14 NOTE — NURSING NOTE
Patient presents today for right shoulder pain. Patient reports that he did a lot of shoveling over the winter and he has been having pain on and off since then.    Medication Reconciliation Complete    Nneka Fiore LPN  6/14/2023 9:48 AM

## 2023-06-22 NOTE — PROGRESS NOTES
Prior to injection, verified patient identity using patient's name and date of birth.  Due to injection administration, patient instructed to remain in clinic for 15 minutes  afterwards, and to report any adverse reaction to me immediately.    B12    Drug Amount Wasted:  None.  Vial/Syringe: Single dose vial  Expiration Date:  08/2020  Carli Negrete RN on 1/3/2019 at 11:03 AM     Provider E-Visit time total (minutes): 10     No

## 2023-06-26 ENCOUNTER — TRANSFERRED RECORDS (OUTPATIENT)
Dept: MULTI SPECIALTY CLINIC | Facility: CLINIC | Age: 78
End: 2023-06-26

## 2023-06-26 LAB
RETINOPATHY: NORMAL
RETINOPATHY: NORMAL

## 2023-07-03 ENCOUNTER — OFFICE VISIT (OUTPATIENT)
Dept: FAMILY MEDICINE | Facility: OTHER | Age: 78
End: 2023-07-03
Attending: FAMILY MEDICINE
Payer: COMMERCIAL

## 2023-07-03 VITALS
RESPIRATION RATE: 18 BRPM | DIASTOLIC BLOOD PRESSURE: 64 MMHG | BODY MASS INDEX: 30.22 KG/M2 | HEART RATE: 58 BPM | TEMPERATURE: 97.1 F | HEIGHT: 66 IN | SYSTOLIC BLOOD PRESSURE: 132 MMHG | OXYGEN SATURATION: 95 % | WEIGHT: 188 LBS

## 2023-07-03 DIAGNOSIS — M75.41 SUBACROMIAL IMPINGEMENT OF RIGHT SHOULDER: Primary | ICD-10-CM

## 2023-07-03 DIAGNOSIS — M75.51 SUBACROMIAL BURSITIS OF RIGHT SHOULDER JOINT: ICD-10-CM

## 2023-07-03 PROCEDURE — G0463 HOSPITAL OUTPT CLINIC VISIT: HCPCS

## 2023-07-03 PROCEDURE — 99214 OFFICE O/P EST MOD 30 MIN: CPT | Performed by: FAMILY MEDICINE

## 2023-07-03 ASSESSMENT — PAIN SCALES - GENERAL: PAINLEVEL: MODERATE PAIN (5)

## 2023-07-03 NOTE — NURSING NOTE
"Chief Complaint   Patient presents with     Shoulder Pain     Right shoulder pain started about three months or more        Medication reconciliation completed.    FOOD SECURITY SCREENING QUESTIONS:    The next two questions are to help us understand your food security.  If you are feeling you need any assistance in this area, we have resources available to support you today.    Hunger Vital Signs:  Within the past 12 months we worried whether our food would run out before we got money to buy more. Never  Within the past 12 months the food we bought just didn't last and we didn't have money to get more. Never    Initial /64 (BP Location: Right arm, Patient Position: Sitting, Cuff Size: Adult Regular)   Pulse 58   Temp 97.1  F (36.2  C) (Temporal)   Resp 18   Ht 1.676 m (5' 6\")   Wt 85.3 kg (188 lb)   SpO2 95%   BMI 30.34 kg/m   Estimated body mass index is 30.34 kg/m  as calculated from the following:    Height as of this encounter: 1.676 m (5' 6\").    Weight as of this encounter: 85.3 kg (188 lb).       Elena Diaz LPN .......  7/3/2023  8:46 AM  "

## 2023-07-03 NOTE — PROGRESS NOTES
"Sports Medicine Office Note    HPI:  78-year-old male coming in for evaluation of right shoulder pain.  His pain started without inciting event or injury approximately 3 months ago.  He thinks it may have been related to shoveling snow.  His pain has persisted since that time.  He rates his pain at 6/10.  He characterizes the pain as aching.  He has pain with lifting.  He has been using OTC ibuprofen and Tylenol.  He does get some pain on the right side of his neck which leads to headaches.  He is unsure if this is related.      EXAM:  /64 (BP Location: Right arm, Patient Position: Sitting, Cuff Size: Adult Regular)   Pulse 58   Temp 97.1  F (36.2  C) (Temporal)   Resp 18   Ht 1.676 m (5' 6\")   Wt 85.3 kg (188 lb)   SpO2 95%   BMI 30.34 kg/m    MUSCULOSKELETAL EXAM:  RIGHT SHOULDER  Inspection:  -No gross deformity  -No bruising or swelling  -Small, hard subcutaneous nodule of the right posterior neck measuring approximately 1 cm in diameter  -Scars:  None    Tenderness to palpation of the:  -SC joint:  Negative  -AC joint:   Negative  -Clavicle:   Negative  -Biceps tendon in bicipital groove:   Negative  -Deltoid musculature:   Negative  -Upper trapezius musculature:   Negative    Range of Motion:  -Active flexion:  160 left, 160 right  -Active abduction:  160 left, 160 right  -Passive external rotation in 90 of abduction:  85  -Passive internal rotation in 90 of abduction:  45    Strength:  -Supraspinatus:  5/5  -Infraspinatus:  5/5  -Subscapularis:  5/5  -Deltoid:  5/5  -Biceps:  5/5  -Triceps:  5/5    Special Tests:  -Konrad test: Negative  -Kevin test:   Positive  -Neer test:   Negative  -Mid-arc pain: Absent   -Crossarm adduction test:   Negative  -Lag sign:   Negative    Other:  -Intact sensation to light touch distally.  -No signs of cyanosis. Normal skin temperature of the upper extremity.  -Elbow:  No gross deformity. Full range of motion.  -Hand/wrist:  No gross deformity. Full range of " motion.  -Left shoulder:  No gross deformity. No palpable tenderness. Normal strength.      IMAGIN2023: 3 view right shoulder x-ray  - No fracture, dislocation, or bony lesion.  Mild-moderate degenerative changes of the AC joint.      ASSESSMENT/PLAN:  Diagnoses and all orders for this visit:  Subacromial impingement of right shoulder  -     Physical Therapy Referral  Subacromial bursitis of right shoulder joint    78-year-old male with subacromial impingement.  No findings on exam to suggest significant arthritis or rotator cuff pathology.  X-rays from  were personally reviewed in the office with the findings as demonstrated above by my interpretation.  - Continue OTC ibuprofen and APAP as needed  - Discussed using ice for acute flares as needed  - Handout given with home exercises for subacromial bursitis  - Offered referral to physical therapy, patient will call if he would like to move forward with this  - If home exercises are not providing desired symptom relief, consider CSI  - If CSI does not provide desired symptom relief, likely proceed with MRI      Elkin Jackson MD  7/3/2023  8:37 AM    Total time spent with this patient was 34 minutes which included chart review, visualization and independent interpretation of images, time spent with the patient, and documentation.    Procedure time:  0 minute(s)

## 2023-07-26 ENCOUNTER — LAB (OUTPATIENT)
Dept: LAB | Facility: OTHER | Age: 78
End: 2023-07-26
Attending: INTERNAL MEDICINE
Payer: MEDICARE

## 2023-07-26 DIAGNOSIS — E03.4 HYPOTHYROIDISM DUE TO ACQUIRED ATROPHY OF THYROID: ICD-10-CM

## 2023-07-26 DIAGNOSIS — E11.40 CONTROLLED TYPE 2 DIABETES MELLITUS WITH DIABETIC NEUROPATHY, WITHOUT LONG-TERM CURRENT USE OF INSULIN (H): ICD-10-CM

## 2023-07-26 DIAGNOSIS — E78.2 MIXED HYPERLIPIDEMIA: ICD-10-CM

## 2023-07-26 LAB
ALBUMIN SERPL BCG-MCNC: 4.6 G/DL (ref 3.5–5.2)
ALBUMIN UR-MCNC: NEGATIVE MG/DL
ALP SERPL-CCNC: 77 U/L (ref 40–129)
ALT SERPL W P-5'-P-CCNC: 17 U/L (ref 0–70)
ANION GAP SERPL CALCULATED.3IONS-SCNC: 10 MMOL/L (ref 7–15)
APPEARANCE UR: CLEAR
AST SERPL W P-5'-P-CCNC: 18 U/L (ref 0–45)
BILIRUB SERPL-MCNC: 0.6 MG/DL
BILIRUB UR QL STRIP: NEGATIVE
BUN SERPL-MCNC: 17 MG/DL (ref 8–23)
CALCIUM SERPL-MCNC: 9.3 MG/DL (ref 8.8–10.2)
CHLORIDE SERPL-SCNC: 105 MMOL/L (ref 98–107)
CHOLEST SERPL-MCNC: 124 MG/DL
COLOR UR AUTO: NORMAL
CREAT SERPL-MCNC: 0.79 MG/DL (ref 0.67–1.17)
CREAT UR-MCNC: 117.1 MG/DL
DEPRECATED HCO3 PLAS-SCNC: 28 MMOL/L (ref 22–29)
ERYTHROCYTE [DISTWIDTH] IN BLOOD BY AUTOMATED COUNT: 12.9 % (ref 10–15)
GFR SERPL CREATININE-BSD FRML MDRD: 90 ML/MIN/1.73M2
GLUCOSE SERPL-MCNC: 112 MG/DL (ref 70–99)
GLUCOSE UR STRIP-MCNC: NEGATIVE MG/DL
HBA1C MFR BLD: 6.4 % (ref 4–6.2)
HCT VFR BLD AUTO: 44.4 % (ref 40–53)
HDLC SERPL-MCNC: 42 MG/DL
HGB BLD-MCNC: 14.7 G/DL (ref 13.3–17.7)
HGB UR QL STRIP: NEGATIVE
KETONES UR STRIP-MCNC: NEGATIVE MG/DL
LDLC SERPL CALC-MCNC: 68 MG/DL
LEUKOCYTE ESTERASE UR QL STRIP: NEGATIVE
MCH RBC QN AUTO: 30.2 PG (ref 26.5–33)
MCHC RBC AUTO-ENTMCNC: 33.1 G/DL (ref 31.5–36.5)
MCV RBC AUTO: 91 FL (ref 78–100)
MICROALBUMIN UR-MCNC: <12 MG/L
MICROALBUMIN/CREAT UR: NORMAL MG/G{CREAT}
NITRATE UR QL: NEGATIVE
NONHDLC SERPL-MCNC: 82 MG/DL
PH UR STRIP: 7 [PH] (ref 5–9)
PLATELET # BLD AUTO: 204 10E3/UL (ref 150–450)
POTASSIUM SERPL-SCNC: 3.7 MMOL/L (ref 3.4–5.3)
PROT SERPL-MCNC: 7 G/DL (ref 6.4–8.3)
RBC # BLD AUTO: 4.87 10E6/UL (ref 4.4–5.9)
SODIUM SERPL-SCNC: 143 MMOL/L (ref 136–145)
SP GR UR STRIP: 1.02 (ref 1–1.03)
TRIGL SERPL-MCNC: 69 MG/DL
TSH SERPL DL<=0.005 MIU/L-ACNC: 1.39 UIU/ML (ref 0.3–4.2)
UROBILINOGEN UR STRIP-MCNC: NORMAL MG/DL
WBC # BLD AUTO: 5.5 10E3/UL (ref 4–11)

## 2023-07-26 PROCEDURE — 80053 COMPREHEN METABOLIC PANEL: CPT | Mod: ZL

## 2023-07-26 PROCEDURE — 83036 HEMOGLOBIN GLYCOSYLATED A1C: CPT | Mod: ZL

## 2023-07-26 PROCEDURE — 84443 ASSAY THYROID STIM HORMONE: CPT | Mod: ZL

## 2023-07-26 PROCEDURE — 80061 LIPID PANEL: CPT | Mod: ZL

## 2023-07-26 PROCEDURE — 36415 COLL VENOUS BLD VENIPUNCTURE: CPT | Mod: ZL

## 2023-07-26 PROCEDURE — 81003 URINALYSIS AUTO W/O SCOPE: CPT | Mod: ZL

## 2023-07-26 PROCEDURE — 82570 ASSAY OF URINE CREATININE: CPT | Mod: ZL

## 2023-07-26 PROCEDURE — 85027 COMPLETE CBC AUTOMATED: CPT | Mod: ZL

## 2023-07-27 ENCOUNTER — OFFICE VISIT (OUTPATIENT)
Dept: INTERNAL MEDICINE | Facility: OTHER | Age: 78
End: 2023-07-27
Attending: INTERNAL MEDICINE
Payer: COMMERCIAL

## 2023-07-27 VITALS
TEMPERATURE: 97.8 F | BODY MASS INDEX: 29.79 KG/M2 | HEART RATE: 59 BPM | DIASTOLIC BLOOD PRESSURE: 78 MMHG | RESPIRATION RATE: 12 BRPM | WEIGHT: 189.8 LBS | SYSTOLIC BLOOD PRESSURE: 130 MMHG | HEIGHT: 67 IN | OXYGEN SATURATION: 96 %

## 2023-07-27 DIAGNOSIS — E11.21 TYPE 2 DIABETES MELLITUS WITH DIABETIC NEPHROPATHY, WITHOUT LONG-TERM CURRENT USE OF INSULIN (H): ICD-10-CM

## 2023-07-27 DIAGNOSIS — G30.9 MODERATE ALZHEIMER'S DEMENTIA WITHOUT BEHAVIORAL DISTURBANCE, PSYCHOTIC DISTURBANCE, MOOD DISTURBANCE, OR ANXIETY, UNSPECIFIED TIMING OF DEMENTIA ONSET (H): ICD-10-CM

## 2023-07-27 DIAGNOSIS — E53.8 VITAMIN B12 DEFICIENCY: ICD-10-CM

## 2023-07-27 DIAGNOSIS — N40.1 BENIGN PROSTATIC HYPERPLASIA WITH WEAK URINARY STREAM: ICD-10-CM

## 2023-07-27 DIAGNOSIS — F33.42 RECURRENT MAJOR DEPRESSIVE DISORDER, IN FULL REMISSION (H): ICD-10-CM

## 2023-07-27 DIAGNOSIS — F02.B0 MODERATE ALZHEIMER'S DEMENTIA WITHOUT BEHAVIORAL DISTURBANCE, PSYCHOTIC DISTURBANCE, MOOD DISTURBANCE, OR ANXIETY, UNSPECIFIED TIMING OF DEMENTIA ONSET (H): ICD-10-CM

## 2023-07-27 DIAGNOSIS — E03.4 HYPOTHYROIDISM DUE TO ACQUIRED ATROPHY OF THYROID: ICD-10-CM

## 2023-07-27 DIAGNOSIS — J41.8 MIXED SIMPLE AND MUCOPURULENT CHRONIC BRONCHITIS (H): ICD-10-CM

## 2023-07-27 DIAGNOSIS — R39.12 BENIGN PROSTATIC HYPERPLASIA WITH WEAK URINARY STREAM: ICD-10-CM

## 2023-07-27 DIAGNOSIS — I10 BENIGN ESSENTIAL HYPERTENSION: ICD-10-CM

## 2023-07-27 DIAGNOSIS — R26.81 UNSTEADY GAIT: ICD-10-CM

## 2023-07-27 DIAGNOSIS — E78.2 MIXED HYPERLIPIDEMIA: ICD-10-CM

## 2023-07-27 DIAGNOSIS — I25.10 CORONARY ARTERY DISEASE INVOLVING NATIVE CORONARY ARTERY OF NATIVE HEART WITHOUT ANGINA PECTORIS: ICD-10-CM

## 2023-07-27 DIAGNOSIS — K21.9 GASTROESOPHAGEAL REFLUX DISEASE WITHOUT ESOPHAGITIS: ICD-10-CM

## 2023-07-27 DIAGNOSIS — E11.40 CONTROLLED TYPE 2 DIABETES MELLITUS WITH DIABETIC NEUROPATHY, WITHOUT LONG-TERM CURRENT USE OF INSULIN (H): Primary | ICD-10-CM

## 2023-07-27 PROCEDURE — G0463 HOSPITAL OUTPT CLINIC VISIT: HCPCS

## 2023-07-27 PROCEDURE — 99214 OFFICE O/P EST MOD 30 MIN: CPT | Performed by: INTERNAL MEDICINE

## 2023-07-27 ASSESSMENT — ENCOUNTER SYMPTOMS
CHILLS: 0
EYE PAIN: 1
VOMITING: 0
SHORTNESS OF BREATH: 0
NAUSEA: 0
SORE THROAT: 0
HEMATOCHEZIA: 0
DIARRHEA: 0
FEVER: 0
WHEEZING: 0
DIFFICULTY URINATING: 1
NUMBNESS: 1
EYE REDNESS: 1
LIGHT-HEADEDNESS: 0
PARESTHESIAS: 0
CONSTIPATION: 0
AGITATION: 0
NERVOUS/ANXIOUS: 1
FREQUENCY: 0
ARTHRALGIAS: 1
JOINT SWELLING: 0
WEAKNESS: 1
BRUISES/BLEEDS EASILY: 0
PALPITATIONS: 0
MYALGIAS: 0
ABDOMINAL PAIN: 0
HEMATURIA: 0
DYSURIA: 0
HEADACHES: 1
SLEEP DISTURBANCE: 0
BACK PAIN: 1
COUGH: 1
FATIGUE: 1
DIZZINESS: 0
HEARTBURN: 0

## 2023-07-27 ASSESSMENT — ANXIETY QUESTIONNAIRES
7. FEELING AFRAID AS IF SOMETHING AWFUL MIGHT HAPPEN: NOT AT ALL
8. IF YOU CHECKED OFF ANY PROBLEMS, HOW DIFFICULT HAVE THESE MADE IT FOR YOU TO DO YOUR WORK, TAKE CARE OF THINGS AT HOME, OR GET ALONG WITH OTHER PEOPLE?: SOMEWHAT DIFFICULT
6. BECOMING EASILY ANNOYED OR IRRITABLE: NOT AT ALL
2. NOT BEING ABLE TO STOP OR CONTROL WORRYING: SEVERAL DAYS
GAD7 TOTAL SCORE: 3
7. FEELING AFRAID AS IF SOMETHING AWFUL MIGHT HAPPEN: NOT AT ALL
GAD7 TOTAL SCORE: 3
1. FEELING NERVOUS, ANXIOUS, OR ON EDGE: SEVERAL DAYS
IF YOU CHECKED OFF ANY PROBLEMS ON THIS QUESTIONNAIRE, HOW DIFFICULT HAVE THESE PROBLEMS MADE IT FOR YOU TO DO YOUR WORK, TAKE CARE OF THINGS AT HOME, OR GET ALONG WITH OTHER PEOPLE: SOMEWHAT DIFFICULT
3. WORRYING TOO MUCH ABOUT DIFFERENT THINGS: SEVERAL DAYS
GAD7 TOTAL SCORE: 3
5. BEING SO RESTLESS THAT IT IS HARD TO SIT STILL: NOT AT ALL
4. TROUBLE RELAXING: NOT AT ALL

## 2023-07-27 ASSESSMENT — PATIENT HEALTH QUESTIONNAIRE - PHQ9
10. IF YOU CHECKED OFF ANY PROBLEMS, HOW DIFFICULT HAVE THESE PROBLEMS MADE IT FOR YOU TO DO YOUR WORK, TAKE CARE OF THINGS AT HOME, OR GET ALONG WITH OTHER PEOPLE: NOT DIFFICULT AT ALL
SUM OF ALL RESPONSES TO PHQ QUESTIONS 1-9: 6
SUM OF ALL RESPONSES TO PHQ QUESTIONS 1-9: 6

## 2023-07-27 ASSESSMENT — PAIN SCALES - GENERAL: PAINLEVEL: NO PAIN (0)

## 2023-07-27 NOTE — NURSING NOTE
"Chief Complaint   Patient presents with    Diabetes     3 month DM check       Initial /78 (BP Location: Right arm, Patient Position: Sitting, Cuff Size: Adult Regular)   Pulse 59   Temp 97.8  F (36.6  C) (Temporal)   Resp 12   Ht 1.689 m (5' 6.5\")   Wt 86.1 kg (189 lb 12.8 oz)   SpO2 96%   BMI 30.18 kg/m   Estimated body mass index is 30.18 kg/m  as calculated from the following:    Height as of this encounter: 1.689 m (5' 6.5\").    Weight as of this encounter: 86.1 kg (189 lb 12.8 oz).    Medication Reconciliation: complete  Previous A1C is at goal of <8  Lab Results   Component Value Date    A1C 6.4 07/26/2023    A1C 6.7 04/03/2023    A1C 6.7 01/26/2023    A1C 6.8 10/04/2022    A1C 6.6 07/05/2022    A1C 6.4 03/17/2021    A1C 6.5 12/18/2020    A1C 6.5 09/17/2020    A1C 6.4 03/17/2020    A1C 6.2 03/19/2019     Urine microalbumin:creatine: 7/26/23  Foot exam : 3/26/21  Eye exam : 6/26/23    Tobacco User : NO  Patient is on a daily aspirin  Patient is on a Statin.  Blood pressure today of:     BP Readings from Last 1 Encounters:   07/27/23 130/78      is at the goal of <139/89 for diabetics.    Susan Andrade LPN on 7/27/2023 at 10:47 AM        FOOD SECURITY SCREENING QUESTIONS:    The next two questions are to help us understand your food security.  If you are feeling you need any assistance in this area, we have resources available to support you today.    Hunger Vital Signs:  Within the past 12 months we worried whether our food would run out before we got money to buy more. Never  Within the past 12 months the food we bought just didn't last and we didn't have money to get more. Never        Advance care plan reviewed      Susan Andrade LPN on 7/27/2023 at 10:47 AM      "

## 2023-07-27 NOTE — PROGRESS NOTES
Assessment & Plan     ICD-10-CM    1. Controlled type 2 diabetes mellitus with diabetic neuropathy, without long-term current use of insulin (H)  E11.40 gabapentin (NEURONTIN) 100 MG capsule     metFORMIN (GLUCOPHAGE) 500 MG tablet     Orthotics and Prosthetics Order Other (Comments); Call back #: DM shoes and DM inserts      2. Benign essential hypertension  I10       3. Benign prostatic hyperplasia with weak urinary stream  N40.1     R39.12       4. Coronary artery disease involving native coronary artery of native heart without angina pectoris  I25.10 rosuvastatin (CRESTOR) 5 MG tablet      5. Hypothyroidism due to acquired atrophy of thyroid  E03.4 DISCONTINUED: levothyroxine (SYNTHROID/LEVOTHROID) 50 MCG tablet      6. Mixed hyperlipidemia  E78.2 rosuvastatin (CRESTOR) 5 MG tablet      7. Mixed simple and mucopurulent chronic bronchitis (H)  J41.8       8. Vitamin B12 deficiency  E53.8       9. Unsteady gait  R26.81       10. Moderate Alzheimer's dementia without behavioral disturbance, psychotic disturbance, mood disturbance, or anxiety, unspecified timing of dementia onset (H)  G30.9     F02.B0       11. Recurrent major depressive disorder, in full remission (H)  F33.42 citalopram (CELEXA) 10 MG tablet      12. Type 2 diabetes mellitus with diabetic nephropathy, without long-term current use of insulin (H)  E11.21 gabapentin (NEURONTIN) 100 MG capsule      13. Gastroesophageal reflux disease without esophagitis  K21.9 omeprazole (PRILOSEC) 20 MG DR capsule        Patient presents for follow-up multiple issues.    Type 2 diabetes, has diabetic neuropathy, diabetic nephropathy.  Continues to manage blood sugar levels with oral metformin and diet control.  Takes gabapentin due to neuropathy symptoms.  Has found reasonably effective, reasonably well-tolerated.  No significant side effects.  Needs refills.  Denies hypoglycemia.    Recommend orthotics consult.  Please call Vadim Negron at [facility] ([place]  [address] in [place]) -- [phone]    Patient was: Referred for Diabetic Shoes and Diabetic Inserts.        BPH with weak urine stream.  Seems to be an ongoing issue.  Had previous visit prescribed Cialis 5 mg daily to help with BPH symptoms.  Continue to follow.    Coronary disease, currently taking Crestor 5 mg daily.  Did not tolerate higher dosing of previous statin.  Was on Lipitor previously.  Crestor refilled.    Vitamin B12 efficiency.  Ongoing.  Continues with oral replacement.    Moderate Alzheimer's dementia, has unsteady gait.  Encouraged caution at home, encouraged regular walking for maintaining core strength.    Heartburn reflux, ongoing but controlled with omeprazole taking regularly.  Needs refills.    HYPERTENSION - Ongoing. Blood pressure is currently well controlled.  Diet controlled.      MIXED HYPERLIPIDEMIA.  Ongoing. LDL is at goal: Yes. Triglycerides are at goal: Yes.    Medication side effects reported: None.   Continue current medications for now - Crestor. Medication list reviewed/updated. Refills completed as needed.  Recent Labs   Lab Test 07/26/23  0835 04/03/23  0835   CHOL 124 117   HDL 42 46   LDL 68 58   TRIG 69 66        HYPOTHYROIDISM - Patient has a longstanding history of chronic hypothyroidism. Patient has been doing reasonably well. Reports: denies fatigue, weight changes, heat/cold intolerance, bowel/skin changes or CVS symptoms.  Continue: Synthroid 50 mcg daily  oral thyroid replacement, denies adverse medication reactions or side effects.                       TSH   Date Value Ref Range Status   10/02/2023 1.55 0.30 - 4.20 uIU/mL Final   10/04/2022 2.52 0.40 - 4.00 mU/L Final        COPD - Patient has a longstanding history of COPD: Mild to Moderate = FeV1 < 79% -50%. Patient has been doing reasonably well overall noting SOB and COUGH and continues on NO medication regimen without adverse reactions or side effects.      OBESITY - Ongoing. Discussed need for  reduced oral caloric intake, weight loss, regular exercise and reduce carbohydrate/sugar intake.      Return in about 3 months (around 10/27/2023) for - Labs every 91+ days, with DM Follow-up, Same Day or 1-2 days later with Dr. Pickens.    Cm Pickens MD  [facility]   Dajuan is a 78 year old, presenting for the following health issues:  Diabetes (3 month DM check)        7/27/2023    10:44 AM   Additional Questions   Roomed by Susan WHITE LPN       History of Present Illness       Diabetes:   He presents for follow up of diabetes.  He is checking home blood glucose one time daily.   He checks blood glucose before meals.  Blood glucose is never over 200 and never under 70. He is aware of hypoglycemia symptoms including weakness.    He has no concerns regarding his diabetes at this time.  He is having numbness in feet.  The patient has had a diabetic eye exam in the last 12 months. Eye exam performed on 6/26/23. Location of last eye exam DR. Hernandez [place], [place].        He eats 2-3 servings of fruits and vegetables daily.He consumes 0 sweetened beverage(s) daily.He exercises with enough effort to increase his heart rate 60 or more minutes per day.  He exercises with enough effort to increase his heart rate 7 days per week. He is missing 1 dose(s) of medications per week.  He is not taking prescribed medications regularly due to remembering to take.         BP Readings from Last 2 Encounters:   12/06/23 (!) 144/76   10/18/23 139/73     Hemoglobin A1C (%)   Date Value   10/02/2023 7.0 (H)   07/26/2023 6.4 (H)   03/17/2021 6.4 (H)   12/18/2020 6.5 (H)     LDL Cholesterol Calculated (mg/dL)   Date Value   10/02/2023 51   07/26/2023 68   03/17/2021 55   12/18/2020 77             Review of Systems   Constitutional:  Positive for fatigue (Falls asleep quickly after sitting in his recliner). Negative for chills and fever.   HENT:  Positive for hearing loss. Negative for congestion, ear pain,  nosebleeds and sore throat.    Eyes:  Positive for pain, redness and visual disturbance.        + chronic dry eyes, tried many eye drops, not much improvement - had left eye surgery - now with chronic droopy lower lid   Respiratory:  Positive for cough. Negative for shortness of breath and wheezing.    Cardiovascular:  Negative for chest pain, palpitations and peripheral edema.        Cardiac stress testing 3/2020 - negative/normal results.   Gastrointestinal:  Negative for abdominal pain, constipation, diarrhea, heartburn, hematochezia, nausea and vomiting.        + left inguinal pains intermittently   Endocrine: Negative for cold intolerance and heat intolerance.   Genitourinary:  Positive for difficulty urinating (Feeling of incomplete bladder emptying and urinary frequency). Negative for dysuria, frequency, genital sores, hematuria, impotence, penile discharge and urgency.   Musculoskeletal:  Positive for arthralgias, back pain (left low back / buttocks pain) and gait problem (staggering at times, low back and left knee pain). Negative for joint swelling and myalgias.        Bilateral feet with bunions   Skin:  Negative for pallor and rash.   Allergic/Immunologic: Negative for immunocompromised state.   Neurological:  Positive for weakness, numbness (right > left feet) and headaches. Negative for dizziness, light-headedness and paresthesias.   Hematological:  Does not bruise/bleed easily.   Psychiatric/Behavioral:  Positive for mood changes. Negative for agitation and sleep disturbance. The patient is nervous/anxious.         + short term memory loss -- stopped Exelon and Namenda -- due to side effects.   + reporting less anxiety / depression - virus pandemic had worsened everything  -- Seems much improved with Citalopram.          Objective    /78 (BP Location: Right arm, Patient Position: Sitting, Cuff Size: Adult Regular)   Pulse 59   Temp 97.8  F (36.6  C) (Temporal)   Resp 12   Ht 1.689 m (5'  "6.5\")   Wt 86.1 kg (189 lb 12.8 oz)   SpO2 96%   BMI 30.18 kg/m    Body mass index is 30.18 kg/m .  Physical Exam  Constitutional:       General: He is not in acute distress.     Appearance: He is well-developed. He is obese. He is not diaphoretic.   HENT:      Head: Normocephalic and atraumatic.      Nose: Nose normal. No congestion or rhinorrhea.      Mouth/Throat:      Mouth: Mucous membranes are moist.      Pharynx: Oropharynx is clear. No oropharyngeal exudate or posterior oropharyngeal erythema.   Eyes:      General: No scleral icterus.     Conjunctiva/sclera: Conjunctivae normal.      Comments: Left eye, with very droopy, irritated left lower lid   Cardiovascular:      Rate and Rhythm: Normal rate and regular rhythm.   Pulmonary:      Effort: Pulmonary effort is normal.      Breath sounds: Normal breath sounds.   Musculoskeletal:         General: No deformity.      Cervical back: Neck supple.   Lymphadenopathy:      Cervical: No cervical adenopathy.   Skin:     General: Skin is warm and dry.      Coloration: Skin is not jaundiced.      Findings: No rash.   Neurological:      Mental Status: He is alert and oriented to person, place, and time. Mental status is at baseline.   Psychiatric:         Mood and Affect: Mood normal.         Behavior: Behavior normal.          Recent Labs   Lab Test 10/02/23  0915 08/24/23  1154 07/26/23  0835 04/19/23  1055 04/03/23  0835 07/05/22  0848 03/29/22  0953   A1C 7.0*  --  6.4*  --  6.7*   < > 6.6*   LDL 51  --  68  --  58   < > 76   HDL 45  --  42  --  46   < > 41   TRIG 88  --  69  --  66   < > 107   ALT 15 18 17  --  16   < > 16   CR 0.72 0.72 0.79   < > 0.76   < > 0.83   GFRESTIMATED >90 >90 90   < > >90   < > 90   POTASSIUM 4.4 4.3 3.7   < > 4.2   < > 3.9   TSH 1.55  --  1.39  --  1.76   < > 4.14*   T4  --   --   --   --   --   --  0.68   WBC 6.1 5.5 5.5   < > 5.1   < > 5.6   HGB 14.1 14.0 14.7   < > 15.3   < > 14.8    198 204   < > 215   < > 220   ALBUMIN 4.4 " 4.4 4.6  --  4.6   < > 4.6    < > = values in this interval not displayed.     Hemoglobin A1c is well controlled.  LDL HDL and triglycerides are at goal.  ALT normal.  Creatinine is at baseline.  Potassium normal.  TSH normal.  CBC normal.  Albumin normal.

## 2023-07-27 NOTE — PATIENT INSTRUCTIONS
Blood pressure is well controlled.   Diabetes is well controlled.     Medications refilled.   Labs are stable.       Immunization History   Administered Date(s) Administered    COVID-19 Bivalent 12+ (Pfizer) 09/22/2022    COVID-19 Bivalent 18+ (Moderna) 05/30/2023    COVID-19 MONOVALENT 12+ (Pfizer) 02/16/2021, 03/09/2021, 10/04/2021    COVID-19 Monovalent 12+ (Pfizer 2022) 04/11/2022    FLU 6-35 months 09/25/2010    FLUAD(HD)65+ QUAD 09/09/2021, 09/08/2022    Flu 65+ Years 09/06/2017    Influenza (H1N1) 11/24/2009    Influenza (High Dose) 3 valent vaccine 09/23/2015, 10/05/2016, 09/30/2018, 09/21/2019, 09/09/2020    Influenza (IIV3) PF 11/09/2006, 10/18/2007, 10/20/2008, 09/09/2020    Pneumo Conj 13-V (2010&after) 04/18/2017    Pneumococcal 23 valent 05/03/2013    TDAP Vaccine (Boostrix) 05/03/2013    Zoster recombinant adjuvanted (SHINGRIX) 09/23/2021, 12/02/2021    Zoster vaccine, live 10/28/2014      Consider:  -- Yearly BIVALENT - COVID-19 Vaccination shot   -- Annual Flu / Influenza vaccination - Every Fall (Starting about October 1st)    -- Pneumonia / Pneumococcal PCV vaccines are done / completed.       -- Get your tetanus shot updated - from one of the local pharmacies, at your convenience or the Local Public Health Department.        Talk with your insurance about possible infusion treatment for Alzheimers.   --  ADUCANUMAB - AVWA 170 MG/1.7ML IV SOLN      Lab on 07/26/2023   Component Date Value Ref Range Status    Color Urine 07/26/2023 Light Yellow  Colorless, Straw, Light Yellow, Yellow Final    Appearance Urine 07/26/2023 Clear  Clear Final    Glucose Urine 07/26/2023 Negative  Negative mg/dL Final    Bilirubin Urine 07/26/2023 Negative  Negative Final    Ketones Urine 07/26/2023 Negative  Negative mg/dL Final    Specific Gravity Urine 07/26/2023 1.018  1.000 - 1.030 Final    Blood Urine 07/26/2023 Negative  Negative Final    pH Urine 07/26/2023 7.0  5.0 - 9.0 Final    Protein Albumin Urine  07/26/2023 Negative  Negative mg/dL Final    Urobilinogen Urine 07/26/2023 Normal  Normal, 2.0 mg/dL Final    Nitrite Urine 07/26/2023 Negative  Negative Final    Leukocyte Esterase Urine 07/26/2023 Negative  Negative Final    TSH 07/26/2023 1.39  0.30 - 4.20 uIU/mL Final    Hemoglobin A1C 07/26/2023 6.4 (H)  4.0 - 6.2 % Final    WBC Count 07/26/2023 5.5  4.0 - 11.0 10e3/uL Final    RBC Count 07/26/2023 4.87  4.40 - 5.90 10e6/uL Final    Hemoglobin 07/26/2023 14.7  13.3 - 17.7 g/dL Final    Hematocrit 07/26/2023 44.4  40.0 - 53.0 % Final    MCV 07/26/2023 91  78 - 100 fL Final    MCH 07/26/2023 30.2  26.5 - 33.0 pg Final    MCHC 07/26/2023 33.1  31.5 - 36.5 g/dL Final    RDW 07/26/2023 12.9  10.0 - 15.0 % Final    Platelet Count 07/26/2023 204  150 - 450 10e3/uL Final    Creatinine Urine mg/dL 07/26/2023 117.1  mg/dL Final    The reference ranges have not been established in urine creatinine. The results should be integrated into the clinical context for interpretation.    Albumin Urine mg/L 07/26/2023 <12.0  mg/L Final    The reference ranges have not been established in urine albumin. The results should be integrated into the clinical context for interpretation.    Albumin Urine mg/g Cr 07/26/2023    Final    Unable to calculate, urine albumin and/or urine creatinine is outside detectable limits.  Microalbuminuria is defined as an albumin:creatinine ratio of 17 to 299 for males and 25 to 299 for females. A ratio of albumin:creatinine of 300 or higher is indicative of overt proteinuria.  Due to biologic variability, positive results should be confirmed by a second, first-morning random or 24-hour timed urine specimen. If there is discrepancy, a third specimen is recommended. When 2 out of 3 results are in the microalbuminuria range, this is evidence for incipient nephropathy and warrants increased efforts at glucose control, blood pressure control, and institution of therapy with an angiotensin-converting-enzyme  (ACE) inhibitor (if the patient can tolerate it).      Cholesterol 07/26/2023 124  <200 mg/dL Final    Triglycerides 07/26/2023 69  <150 mg/dL Final    Direct Measure HDL 07/26/2023 42  >=40 mg/dL Final    LDL Cholesterol Calculated 07/26/2023 68  <=100 mg/dL Final    Non HDL Cholesterol 07/26/2023 82  <130 mg/dL Final    Sodium 07/26/2023 143  136 - 145 mmol/L Final    Potassium 07/26/2023 3.7  3.4 - 5.3 mmol/L Final    Chloride 07/26/2023 105  98 - 107 mmol/L Final    Carbon Dioxide (CO2) 07/26/2023 28  22 - 29 mmol/L Final    Anion Gap 07/26/2023 10  7 - 15 mmol/L Final    Urea Nitrogen 07/26/2023 17.0  8.0 - 23.0 mg/dL Final    Creatinine 07/26/2023 0.79  0.67 - 1.17 mg/dL Final    Calcium 07/26/2023 9.3  8.8 - 10.2 mg/dL Final    Glucose 07/26/2023 112 (H)  70 - 99 mg/dL Final    Alkaline Phosphatase 07/26/2023 77  40 - 129 U/L Final    AST 07/26/2023 18  0 - 45 U/L Final    Reference intervals for this test were updated on 6/12/2023 to more accurately reflect our healthy population. There may be differences in the flagging of prior results with similar values performed with this method. Interpretation of those prior results can be made in the context of the updated reference intervals.    ALT 07/26/2023 17  0 - 70 U/L Final    Reference intervals for this test were updated on 6/12/2023 to more accurately reflect our healthy population. There may be differences in the flagging of prior results with similar values performed with this method. Interpretation of those prior results can be made in the context of the updated reference intervals.      Protein Total 07/26/2023 7.0  6.4 - 8.3 g/dL Final    Albumin 07/26/2023 4.6  3.5 - 5.2 g/dL Final    Bilirubin Total 07/26/2023 0.6  <=1.2 mg/dL Final    GFR Estimate 07/26/2023 90  >60 mL/min/1.73m2 Final        Aspects of Diabetes:   Recent Labs   Lab Test 07/26/23  0835 04/19/23  1055 04/03/23  0835 01/26/23  0956 07/05/22  0848 03/29/22  0953   A1C 6.4*  --  6.7*  6.7*   < > 6.6*   LDL 68  --  58 55   < > 76   HDL 42  --  46 49   < > 41   TRIG 69  --  66 67   < > 107   ALT 17  --  16 21   < > 16   CR 0.79 0.71 0.76 0.77   < > 0.83   GFRESTIMATED 90 >90 >90 >90   < > 90   POTASSIUM 3.7 4.2 4.2 4.4   < > 3.9   TSH 1.39  --  1.76 1.50   < > 4.14*   T4  --   --   --   --   --  0.68   WBC 5.5 5.6 5.1 7.1   < > 5.6   HGB 14.7 14.5 15.3 14.5   < > 14.8    198 215 215   < > 220   ALBUMIN 4.6  --  4.6 4.7   < > 4.6    < > = values in this interval not displayed.      Hemoglobin A1c  Goal range is under 8%. Best is 6.5 to 7   Blood Pressure 130/78 Goal to keep less than 140/90   Tobacco  reports that he quit smoking about 39 years ago. His smoking use included cigarettes. He has never been exposed to tobacco smoke. He has never used smokeless tobacco. Goal to abstain from tobacco   Aspirin or Plavix Anti-platelet therapy Aspirin or Plavix reduces risk of heart disease and stroke  -- sometimes used with other blood thinners, depending on bleeding risk and risk factors.    ACE/ARB Specific blood pressure meds These medications can reduce risk of kidney disease   Cholesterol Statins (Lipitor, Crestor, vs others) Statins reduce risk of heart disease and stroke   Eye Exam -- Do Yearly -- Annual diabetic eye exam   Healthy weight Wt Readings from Last 4 Encounters:   07/27/23 86.1 kg (189 lb 12.8 oz)   07/03/23 85.3 kg (188 lb)   06/14/23 86 kg (189 lb 9.6 oz)   04/19/23 87.6 kg (193 lb 2 oz)      Body mass index is 30.18 kg/m .  Goal BMI under 30, best is under 25.      -- Trying to exercise daily (goal at least 20 min/day) with moderate aerobic activity   -- Eat healthy (resources from ADA at http://www.diabetes.org/)   -- Taking good care of my feet. Consider seeing the Podiatrist   -- Check blood sugars as directed, record in log book and bring to every appointment    Insurance companies are grading you and I on your blood sugar control -- Goal is to get your A1c down to 7.9% or  lower and NO Smoking!  -- Medicare and most insurance companies, will only cover Hemoglobin A1c labs to be rechecked every 91+ days.      Return for Diabetes labs and clinic follow-up appointment every 3 to 4 months.    Schedule lab only appointment --- A few days AFTER: 10/25/23   Schedule clinic appointment with Dr. Pickens -- Same day as labs, or 1-2 days later.

## 2023-08-01 RX ORDER — GABAPENTIN 100 MG/1
100-200 CAPSULE ORAL AT BEDTIME
Qty: 150 CAPSULE | Refills: 1 | Status: SHIPPED | OUTPATIENT
Start: 2023-08-01 | End: 2024-01-11

## 2023-08-01 RX ORDER — ROSUVASTATIN CALCIUM 5 MG/1
5 TABLET, COATED ORAL DAILY
Qty: 90 TABLET | Refills: 1 | Status: SHIPPED | OUTPATIENT
Start: 2023-08-01 | End: 2024-01-11

## 2023-08-01 RX ORDER — LEVOTHYROXINE SODIUM 50 UG/1
50 TABLET ORAL DAILY
Qty: 93 TABLET | Refills: 1 | Status: SHIPPED | OUTPATIENT
Start: 2023-08-01 | End: 2023-10-03

## 2023-08-01 RX ORDER — CITALOPRAM HYDROBROMIDE 10 MG/1
10 TABLET ORAL DAILY
Qty: 90 TABLET | Refills: 1 | Status: SHIPPED | OUTPATIENT
Start: 2023-08-01 | End: 2024-01-11

## 2023-08-02 NOTE — PROGRESS NOTES
"SUBJECTIVE:   Dajuan Basilio is a 75 year old male who presents for Preventive Visit.  Patient has been advised of split billing requirements and indicates understanding: Yes  Are you in the first 12 months of your Medicare Part B coverage?  No    Physical Health:    In general, how would you rate your overall physical health? poor    Outside of work, how many days during the week do you exercise? 4-5 days/week    Outside of work, approximately how many minutes a day do you exercise?greater than 60 minutes    If you drink alcohol do you typically have >3 drinks per day or >7 drinks per week? Not Applicable    Do you usually eat at least 4 servings of fruit and vegetables a day, include whole grains & fiber and avoid regularly eating high fat or \"junk\" foods? NO    Do you have any problems taking medications regularly?  No    Do you have any side effects from medications? none    Needs assistance for the following daily activities: no assistance needed    Which of the following safety concerns are present in your home?  none identified     Hearing impairment: Yes, Feel that people are mumbling or not speaking clearly.    Difficult to understand a speaker at a public meeting or Congregation service.    Need to ask people to speak up or repeat themselves.    Find that men's voices are easier to understand than women's.    Difficulty understanding soft or whispered speech.    Difficulty understanding speech on the telephone.    In the past 6 months, have you been bothered by leaking of urine? yes    Mental Health:    In general, how would you rate your overall mental or emotional health? poor  PHQ-2 Score:      Do you feel safe in your environment? Yes    Have you ever done Advance Care Planning? (For example, a Health Directive, POLST, or a discussion with a medical provider or your loved ones about your wishes): No, advance care planning information given to patient to review.  Patient declined advance care planning " discussion at this time.    Additional concerns to address?  No    Fall risk:  Fallen 2 or more times in the past year?: No  Any fall with injury in the past year?: No    Cognitive Screenin) Repeat 3 items (Leader, Season, Table)    2) Clock draw: ABNORMAL wrong hands  3) 3 item recall: Recalls 1 object   Results: ABNORMAL clock, 1-2 items recalled: PROBABLE COGNITIVE IMPAIRMENT, **INFORM PROVIDER**    Mini-CogTM Copyright EVA Gary. Licensed by the author for use in [facility]; reprinted with permission (maico@[facility]). All rights reserved.      Do you have sleep apnea, excessive snoring or daytime drowsiness?: yes    Reviewed and updated as needed this visit by clinical staff  Tobacco  Allergies  Meds  Problems  Med Hx  Surg Hx  Fam Hx  Soc Hx          Reviewed and updated as needed this visit by Provider  Tobacco  Allergies  Meds  Problems  Med Hx  Surg Hx  Fam Hx         Social History     Tobacco Use     Smoking status: Former Smoker     Types: Cigarettes     Quit date: 10/25/1983     Years since quittin.4     Smokeless tobacco: Never Used   Substance Use Topics     Alcohol use: No                           Current providers sharing in care for this patient include:   Patient Care Team:  Cm Pickens MD as PCP - General (Internal Medicine)  Cm Pickens MD as Assigned PCP  Olivier Connell MD as Assigned Surgical Provider    The following health maintenance items are reviewed in Epic and correct as of today:  Health Maintenance   Topic Date Due     HEPATITIS C SCREENING  Never done     ZOSTER IMMUNIZATION (2 of 3) 2014     DIABETIC FOOT EXAM  2019     EYE EXAM  2021     A1C  2021     PHQ-9  2021     FALL RISK ASSESSMENT  2022     MEDICARE ANNUAL WELLNESS VISIT  2022     BMP  2022     LIPID  2022     MICROALBUMIN  2022     DTAP/TDAP/TD IMMUNIZATION (2 - Td) 2023     ADVANCE CARE PLANNING  2026      INFLUENZA VACCINE  Completed     Pneumococcal Vaccine: Pediatrics (0 to 5 Years) and At-Risk Patients (6 to 64 Years)  Completed     Pneumococcal Vaccine: 65+ Years  Completed     COVID-19 Vaccine  Completed     DEPRESSION ACTION PLAN  Addressed     IPV IMMUNIZATION  Aged Out     MENINGITIS IMMUNIZATION  Aged Out     Review of Systems   Constitutional: Positive for fatigue. Negative for chills and fever.   HENT: Positive for hearing loss. Negative for congestion.    Eyes: Positive for pain and redness. Negative for visual disturbance.        + chronic dry eyes, tried many eye drops, not much improvement - told needs eye surgery   Respiratory: Negative for cough, shortness of breath and wheezing.    Cardiovascular: Positive for chest pain (rare, left sided - sporatic, then resolves spontaneously - not worse with walking). Negative for palpitations.        Cardiac stress testing 3/2020 - negative/normal results.   Gastrointestinal: Negative for abdominal pain, diarrhea, nausea and vomiting.   Endocrine: Negative for cold intolerance and heat intolerance.   Genitourinary: Negative for dysuria and hematuria.   Musculoskeletal: Positive for arthralgias (left knee), back pain (left low back / buttocks pain), gait problem (staggering at times, low back and left knee pain) and myalgias.   Skin: Negative for pallor.   Allergic/Immunologic: Negative for immunocompromised state.   Neurological: Positive for dizziness, weakness and light-headedness.   Hematological: Does not bruise/bleed easily.   Psychiatric/Behavioral: Negative for agitation and sleep disturbance (+ thinks he is sleeping too much -- 8 to 10 hours/day). The patient is nervous/anxious.         + short term memory loss -- stopped Exelon and Namenda -- due to side effects.   + reporting anxiety / depression - virus pandemic has worsened everything        OBJECTIVE:   BP (!) 154/88 (BP Location: Right arm, Patient Position: Sitting, Cuff Size: Adult Regular)    "Pulse 64   Temp 97.7  F (36.5  C) (Tympanic)   Resp 16   Ht 1.69 m (5' 6.54\")   Wt 86.8 kg (191 lb 6.4 oz)   SpO2 97%   BMI 30.40 kg/m   Estimated body mass index is 30.4 kg/m  as calculated from the following:    Height as of this encounter: 1.69 m (5' 6.54\").    Weight as of this encounter: 86.8 kg (191 lb 6.4 oz).    Physical Exam  Constitutional:       General: He is not in acute distress.     Appearance: He is well-developed. He is not diaphoretic.   HENT:      Head: Normocephalic and atraumatic.   Eyes:      General: No scleral icterus.     Comments: + dry, irritated eyes   Neck:      Musculoskeletal: Neck supple.      Vascular: No carotid bruit.   Cardiovascular:      Rate and Rhythm: Normal rate and regular rhythm.      Pulses: Normal pulses.   Pulmonary:      Effort: Pulmonary effort is normal.      Breath sounds: Normal breath sounds.   Abdominal:      Palpations: Abdomen is soft.      Tenderness: There is no abdominal tenderness.   Musculoskeletal:         General: Tenderness (left > right gluteal and SI joint pain to palpation) present. No deformity.      Right lower leg: No edema.      Left lower leg: No edema.   Lymphadenopathy:      Cervical: No cervical adenopathy.   Skin:     General: Skin is warm and dry.      Findings: No rash.   Neurological:      Mental Status: He is alert and oriented to person, place, and time. Mental status is at baseline.      Comments: short term memory loss   Psychiatric:         Behavior: Behavior is slowed.         Thought Content: Thought content normal.         Judgment: Judgment normal.      Comments: Anxious and flat affect / depressed.        Diagnostic Test Results:  Labs reviewed in Epic     Results for orders placed or performed in visit on 03/17/21   TSH Reflex GH     Status: None   Result Value Ref Range    TSH Reflex 3.27 0.34 - 5.60 IU/mL   Hemoglobin A1c     Status: Abnormal   Result Value Ref Range    Hemoglobin A1C 6.4 (H) 4.0 - 6.0 %   CBC with " (2) meets goals/outcomes platelets     Status: None   Result Value Ref Range    WBC 7.4 4.0 - 11.0 10e9/L    RBC Count 4.99 4.4 - 5.9 10e12/L    Hemoglobin 15.0 13.3 - 17.7 g/dL    Hematocrit 45.5 40.0 - 53.0 %    MCV 91 78 - 100 fl    MCH 30.1 26.5 - 33.0 pg    MCHC 33.0 31.5 - 36.5 g/dL    RDW 12.8 10.0 - 15.0 %    Platelet Count 226 150 - 450 10e9/L   Comprehensive metabolic panel     Status: Abnormal   Result Value Ref Range    Sodium 140 134 - 144 mmol/L    Potassium 4.6 3.5 - 5.1 mmol/L    Chloride 104 98 - 107 mmol/L    Carbon Dioxide 30 21 - 31 mmol/L    Anion Gap 6 3 - 14 mmol/L    Glucose 114 (H) 70 - 105 mg/dL    Urea Nitrogen 16 7 - 25 mg/dL    Creatinine 0.80 0.70 - 1.30 mg/dL    GFR Estimate >90 >60 mL/min/[1.73_m2]    GFR Estimate If Black >90 >60 mL/min/[1.73_m2]    Calcium 9.6 8.6 - 10.3 mg/dL    Bilirubin Total 0.5 0.3 - 1.0 mg/dL    Albumin 4.6 3.5 - 5.7 g/dL    Protein Total 7.1 6.4 - 8.9 g/dL    Alkaline Phosphatase 60 34 - 104 U/L    ALT 11 7 - 52 U/L    AST 14 13 - 39 U/L   Lipid Profile     Status: None   Result Value Ref Range    Cholesterol 120 <200 mg/dL    Triglycerides 112 <150 mg/dL    HDL Cholesterol 43 23 - 92 mg/dL    LDL Cholesterol Calculated 55 <100 mg/dL    Non HDL Cholesterol 77 <130 mg/dL        ASSESSMENT / PLAN:       ICD-10-CM    1. Anxiety and depression  F41.9 busPIRone (BUSPAR) 5 MG tablet    F32.9 clonazePAM (KLONOPIN) 0.25 MG TBDP ODT tab   2. Recurrent major depressive disorder, in partial remission (H)  F33.41    3. Coronary artery disease involving native coronary artery of native heart without angina pectoris  I25.10 clopidogrel (PLAVIX) 75 MG tablet   4. Status post insertion of drug eluting coronary artery stent - x2 stents - 1st [facility] - 5/17/2018 - [facility]  Z95.5 clopidogrel (PLAVIX) 75 MG tablet   5. Brain fog  F48.8    6. Chronically dry eyes, bilateral  H04.123    7. Benign essential hypertension  I10    8. Controlled type 2 diabetes mellitus with diabetic neuropathy,  without long-term current use of insulin (H)  E11.40 Hemoglobin A1c     UA reflex to Microscopic     Albumin Random Urine Quantitative with Creat Ratio     CBC with platelets     Comprehensive metabolic panel     TSH Reflex GH   9. Type 2 diabetes mellitus with diabetic nephropathy, without long-term current use of insulin (H)  E11.21 Albumin Random Urine Quantitative with Creat Ratio     Hemoglobin A1c   10. Memory loss  R41.3    11. Mixed hyperlipidemia  E78.2 Lipid Profile     Lipid Profile   12. Vitamin B12 deficiency  E53.8    13. Encounter for Medicare annual wellness exam  Z00.00    14. Malaise and fatigue  R53.81 TSH Reflex GH    R53.83 CBC with platelets     Comprehensive metabolic panel   15. Vaccine counseling  Z71.89    Patient presents for annual Medicare wellness visit as well as follow-up multiple issues.    He was previously using Paxil, years ago.  Now using citalopram.  In the past he would cut the Paxil dose in half because it was causing him too much morning sedation.  He was doing relatively well with citalopram until the virus pandemic gets.  Now he is quite anxious and nervous and frustrated and upset.  States that he has a hard time falling asleep.  He does sleep fairly well once he falls asleep.  If he does wake up in the middle of the night, he on occasion will have some difficulty falling back to sleep.    Overall he is quite anxious and feels like he is worked up.  He is scheduled to see [facility] on 2 different occasions over the next month or so.  He is quite upset by his eyes that are irritated dry and quite bothersome.  He and I surgical clinic and they were talking about doing surgery to lift his lower lids.  Unfortunately he would end up with slit openings for vision and he was not excited about that.  He is hoping [facility] will have some other treatment options or plans.    Chronic anxiety and depression.  Given his age and comorbidities, medications were reviewed.  He has  "used Xanax in the past, lorazepam in the past.  Start BuSpar 5 mg twice daily for 5 days then 3 times daily.  15 tablets of 0.25 mg clonazepam sent to pharmacy.  Advised no driving after use.  -Hopefully cutting down on his anxiety with BuSpar will make a considerable help.  Overall he reports brain fog and memory issues and his eyes are really bothersome.    Coronary artery disease, history of stent placement in the past.  Continues on Plavix.  Needs refills.    Hypertension, blood pressure initially was elevated, recheck was improved.  Advised to monitor blood pressures at home and call if these are found to be elevated.    Type 2 diabetes, he has diabetic neuropathy and diabetic nephropathy.  Continues with oral medication management.  Labs today.    Memory loss, history of vitamin B12 deficiency.  Now on vitamin B-12 replacement.  Memory loss appears without significant, recent change.    Mixed hyperlipidemia, currently on statin therapy with simvastatin.  Seems to be tolerating well without side effects.  Continue current dosing.    Fatigue and malaise.  He would like his thyroid levels checked today and metabolic panel, Hemoglobin.    Vaccine counseling completed today.  Health screenings are up-to-date.    Patient has been advised of split billing requirements and indicates understanding: Yes    COUNSELING:  Reviewed preventive health counseling, as reflected in patient instructions  Special attention given to:       Regular exercise       Healthy diet/nutrition       Vision screening       Hearing screening       Dental care       Bladder control       Fall risk prevention       Immunizations    Estimated body mass index is 30.4 kg/m  as calculated from the following:    Height as of this encounter: 1.69 m (5' 6.54\").    Weight as of this encounter: 86.8 kg (191 lb 6.4 oz).    Weight management plan: Discussed healthy diet and exercise guidelines    He reports that he quit smoking about 37 years ago. His " smoking use included cigarettes. He has never used smokeless tobacco.    Appropriate preventive services were discussed with this patient, including applicable screening as appropriate for cardiovascular disease, diabetes, osteopenia/osteoporosis, and glaucoma.  As appropriate for age/gender, discussed screening for colorectal cancer, prostate cancer, breast cancer, and cervical cancer. Checklist reviewing preventive services available has been given to the patient.    Reviewed patients plan of care and provided an AVS. The Complex Care Plan (for patients with higher acuity and needing more deliberate coordination of services) for Dajuan meets the Care Plan requirement. This Care Plan has been established and reviewed with the Patient.    Counseling Resources:  ATP IV Guidelines  Pooled Cohorts Equation Calculator  Breast Cancer Risk Calculator  BRCA-Related Cancer Risk Assessment: FHS-7 Tool  FRAX Risk Assessment  ICSI Preventive Guidelines  Dietary Guidelines for Americans, 2010  USDA's MyPlate  ASA Prophylaxis  Lung CA Screening    Cm Pickens MD  [facility]      The patient s PHQ-9 score is consistent with moderate depression. He was provided with information regarding depression and was advised to schedule a follow up appointment in 3-4 weeks to further address this issue.

## 2023-08-24 ENCOUNTER — TELEPHONE (OUTPATIENT)
Dept: INTERNAL MEDICINE | Facility: OTHER | Age: 78
End: 2023-08-24

## 2023-08-24 ENCOUNTER — OFFICE VISIT (OUTPATIENT)
Dept: INTERNAL MEDICINE | Facility: OTHER | Age: 78
End: 2023-08-24
Payer: COMMERCIAL

## 2023-08-24 VITALS
RESPIRATION RATE: 16 BRPM | HEIGHT: 67 IN | OXYGEN SATURATION: 96 % | BODY MASS INDEX: 29.03 KG/M2 | HEART RATE: 54 BPM | WEIGHT: 185 LBS | SYSTOLIC BLOOD PRESSURE: 130 MMHG | DIASTOLIC BLOOD PRESSURE: 60 MMHG

## 2023-08-24 DIAGNOSIS — H02.403 DROOPY EYELID, BILATERAL: ICD-10-CM

## 2023-08-24 DIAGNOSIS — E66.3 OVERWEIGHT (BMI 25.0-29.9): ICD-10-CM

## 2023-08-24 DIAGNOSIS — E78.2 MIXED HYPERLIPIDEMIA: ICD-10-CM

## 2023-08-24 DIAGNOSIS — I10 BENIGN ESSENTIAL HYPERTENSION: ICD-10-CM

## 2023-08-24 DIAGNOSIS — Z01.818 PREOP GENERAL PHYSICAL EXAM: Primary | ICD-10-CM

## 2023-08-24 DIAGNOSIS — G30.9 MODERATE ALZHEIMER'S DEMENTIA WITHOUT BEHAVIORAL DISTURBANCE, PSYCHOTIC DISTURBANCE, MOOD DISTURBANCE, OR ANXIETY, UNSPECIFIED TIMING OF DEMENTIA ONSET (H): ICD-10-CM

## 2023-08-24 DIAGNOSIS — E11.40 CONTROLLED TYPE 2 DIABETES MELLITUS WITH DIABETIC NEUROPATHY, WITHOUT LONG-TERM CURRENT USE OF INSULIN (H): ICD-10-CM

## 2023-08-24 DIAGNOSIS — I25.10 CORONARY ARTERY DISEASE INVOLVING NATIVE CORONARY ARTERY OF NATIVE HEART WITHOUT ANGINA PECTORIS: ICD-10-CM

## 2023-08-24 DIAGNOSIS — Z95.5 STATUS POST INSERTION OF DRUG ELUTING CORONARY ARTERY STENT: ICD-10-CM

## 2023-08-24 DIAGNOSIS — F02.B0 MODERATE ALZHEIMER'S DEMENTIA WITHOUT BEHAVIORAL DISTURBANCE, PSYCHOTIC DISTURBANCE, MOOD DISTURBANCE, OR ANXIETY, UNSPECIFIED TIMING OF DEMENTIA ONSET (H): ICD-10-CM

## 2023-08-24 DIAGNOSIS — E03.4 HYPOTHYROIDISM DUE TO ACQUIRED ATROPHY OF THYROID: ICD-10-CM

## 2023-08-24 LAB
ALBUMIN SERPL BCG-MCNC: 4.4 G/DL (ref 3.5–5.2)
ALP SERPL-CCNC: 78 U/L (ref 40–129)
ALT SERPL W P-5'-P-CCNC: 18 U/L (ref 0–70)
ANION GAP SERPL CALCULATED.3IONS-SCNC: 8 MMOL/L (ref 7–15)
AST SERPL W P-5'-P-CCNC: 19 U/L (ref 0–45)
BASOPHILS # BLD AUTO: 0 10E3/UL (ref 0–0.2)
BASOPHILS NFR BLD AUTO: 1 %
BILIRUB SERPL-MCNC: 0.4 MG/DL
BUN SERPL-MCNC: 16.9 MG/DL (ref 8–23)
CALCIUM SERPL-MCNC: 9.7 MG/DL (ref 8.8–10.2)
CHLORIDE SERPL-SCNC: 105 MMOL/L (ref 98–107)
CREAT SERPL-MCNC: 0.72 MG/DL (ref 0.67–1.17)
DEPRECATED HCO3 PLAS-SCNC: 27 MMOL/L (ref 22–29)
EOSINOPHIL # BLD AUTO: 0.2 10E3/UL (ref 0–0.7)
EOSINOPHIL NFR BLD AUTO: 3 %
ERYTHROCYTE [DISTWIDTH] IN BLOOD BY AUTOMATED COUNT: 13.2 % (ref 10–15)
GFR SERPL CREATININE-BSD FRML MDRD: >90 ML/MIN/1.73M2
GLUCOSE SERPL-MCNC: 118 MG/DL (ref 70–99)
HCT VFR BLD AUTO: 42 % (ref 40–53)
HGB BLD-MCNC: 14 G/DL (ref 13.3–17.7)
IMM GRANULOCYTES # BLD: 0 10E3/UL
IMM GRANULOCYTES NFR BLD: 0 %
LYMPHOCYTES # BLD AUTO: 1.3 10E3/UL (ref 0.8–5.3)
LYMPHOCYTES NFR BLD AUTO: 24 %
MCH RBC QN AUTO: 30.6 PG (ref 26.5–33)
MCHC RBC AUTO-ENTMCNC: 33.3 G/DL (ref 31.5–36.5)
MCV RBC AUTO: 92 FL (ref 78–100)
MONOCYTES # BLD AUTO: 0.7 10E3/UL (ref 0–1.3)
MONOCYTES NFR BLD AUTO: 12 %
NEUTROPHILS # BLD AUTO: 3.3 10E3/UL (ref 1.6–8.3)
NEUTROPHILS NFR BLD AUTO: 60 %
NRBC # BLD AUTO: 0 10E3/UL
NRBC BLD AUTO-RTO: 0 /100
PLATELET # BLD AUTO: 198 10E3/UL (ref 150–450)
POTASSIUM SERPL-SCNC: 4.3 MMOL/L (ref 3.4–5.3)
PROT SERPL-MCNC: 6.7 G/DL (ref 6.4–8.3)
RBC # BLD AUTO: 4.58 10E6/UL (ref 4.4–5.9)
SODIUM SERPL-SCNC: 140 MMOL/L (ref 136–145)
WBC # BLD AUTO: 5.5 10E3/UL (ref 4–11)

## 2023-08-24 PROCEDURE — 93005 ELECTROCARDIOGRAM TRACING: CPT

## 2023-08-24 PROCEDURE — 93010 ELECTROCARDIOGRAM REPORT: CPT | Performed by: INTERNAL MEDICINE

## 2023-08-24 PROCEDURE — 36415 COLL VENOUS BLD VENIPUNCTURE: CPT | Mod: ZL

## 2023-08-24 PROCEDURE — 85025 COMPLETE CBC W/AUTO DIFF WBC: CPT | Mod: ZL

## 2023-08-24 PROCEDURE — 80053 COMPREHEN METABOLIC PANEL: CPT | Mod: ZL

## 2023-08-24 PROCEDURE — 99214 OFFICE O/P EST MOD 30 MIN: CPT

## 2023-08-24 PROCEDURE — G0463 HOSPITAL OUTPT CLINIC VISIT: HCPCS

## 2023-08-24 ASSESSMENT — PAIN SCALES - GENERAL: PAINLEVEL: NO PAIN (0)

## 2023-08-24 ASSESSMENT — PATIENT HEALTH QUESTIONNAIRE - PHQ9
SUM OF ALL RESPONSES TO PHQ QUESTIONS 1-9: 3
SUM OF ALL RESPONSES TO PHQ QUESTIONS 1-9: 3
10. IF YOU CHECKED OFF ANY PROBLEMS, HOW DIFFICULT HAVE THESE PROBLEMS MADE IT FOR YOU TO DO YOUR WORK, TAKE CARE OF THINGS AT HOME, OR GET ALONG WITH OTHER PEOPLE: NOT DIFFICULT AT ALL

## 2023-08-24 NOTE — PROGRESS NOTES
[facility] AND [facility]  [address]  [place] [address]  Phone: [phone]  Primary Provider: Cm Pickens  Pre-op Performing Provider: ANT GALEANO      PREOPERATIVE EVALUATION:  Today's date: 8/24/2023    Dajuan Basilio is a 78 year old male who presents for a preoperative evaluation.  Surgical Information:  Surgery/Procedure: Upper Eyelid lift bilateral  Surgery Location: [facility]  Surgeon: Dr. Johnson  Surgery Date: 08/25/23  Time of Surgery: TBD  Where patient plans to recover: At home with family  Fax number for surgical facility: Will fax     Assessment & Plan     The proposed surgical procedure is considered LOW risk.      ICD-10-CM    1. Preop general physical exam  Z01.818 EKG 12-lead ([facility] and [facility] Clinics Only)     CBC with Platelets & Differential ([facility] Only)     Comprehensive Metabolic Panel     CBC with Platelets & Differential ([facility] Only)     Comprehensive Metabolic Panel      2. Droopy eyelid, bilateral  H02.403       3. Moderate Alzheimer's dementia without behavioral disturbance, psychotic disturbance, mood disturbance, or anxiety, unspecified timing of dementia onset (H)  G30.9     F02.B0       4. Controlled type 2 diabetes mellitus with diabetic neuropathy, without long-term current use of insulin (H)  E11.40       5. Hypothyroidism due to acquired atrophy of thyroid  E03.4       6. Coronary artery disease involving native coronary artery of native heart without angina pectoris  I25.10       7. Benign essential hypertension  I10       8. Mixed hyperlipidemia  E78.2       9. Overweight (BMI 25.0-29.9)  E66.3       10. Status post insertion of drug eluting coronary artery stent - x2 stents - 1st OMB - 5/17/2018 - [facility]  Z95.5              Possible Sleep Apnea:     - No identified additional risk factors other than previously addressed    Antiplatelet or Anticoagulation Medication Instructions:   - Bleeding risk is low for this  procedure (e.g. dental, skin, cataract).    Additional Medication Instructions:  Patient is to take all scheduled medications on the day of surgery   - Statins: Continue taking on the day of surgery.    - metformin: HOLD day of surgery.    RECOMMENDATION:  APPROVAL GIVEN to proceed with proposed procedure, without further diagnostic evaluation.    CBC, CMP, and EKG all stable today.    Subjective       HPI related to upcoming procedure: Patient is scheduled for a bilateral blepharoplasty at [facility] in [place] by .  He denies any new changes in his health, has tolerated anesthesia without any difficulties.  Is able to walk up a flight of stairs without feeling short of breath.          8/24/2023    11:23 AM   Preop Questions   1. Have you ever had a heart attack or stroke? No   2. Have you ever had surgery on your heart or blood vessels, such as a stent placement, a coronary artery bypass, or surgery on an artery in your head, neck, heart, or legs? YES - Stented coronary artery to LCX and OM1 on 5/17/18   3. Do you have chest pain with activity? No   4. Do you have a history of  heart failure? No   5. Do you currently have a cold, bronchitis or symptoms of other infection? No   6. Do you have a cough, shortness of breath, or wheezing? No   7. Do you or anyone in your family have previous history of blood clots? No   8. Do you or does anyone in your family have a serious bleeding problem such as prolonged bleeding following surgeries or cuts? No   9. Have you ever had problems with anemia or been told to take iron pills? No   10. Have you had any abnormal blood loss such as black, tarry or bloody stools? No   11. Have you ever had a blood transfusion? No   12. Are you willing to have a blood transfusion if it is medically needed before, during, or after your surgery? Yes   13. Have you or any of your relatives ever had problems with anesthesia? No   14. Do you have sleep apnea, excessive  snoring or daytime drowsiness? YES    14a. Do you have a CPAP machine? No   15. Do you have any artifical heart valves or other implanted medical devices like a pacemaker, defibrillator, or continuous glucose monitor? No   16. Do you have artificial joints? YES - Right knee   17. Are you allergic to latex? No     Health Care Directive:  Patient does not have a Health Care Directive or Living Will: Discussed advance care planning with patient; however, patient declined at this time.    Preoperative Review of :   reviewed - controlled substances reflected in medication list.      Status of Chronic Conditions:  See problem list for active medical problems.  Problems all longstanding and stable, except as noted/documented.  See ROS for pertinent symptoms related to these conditions.    DIABETES - Patient has a longstanding history of DiabetesType Type II . Patient is being treated with diet and oral agents and denies significant side effects. Control has been good. Complicating factors include but are not limited to: hypertension, hyperlipidemia, and CAD/PVD.     HYPERLIPIDEMIA - Patient has a long history of significant Hyperlipidemia requiring medication for treatment with recent good control. Patient reports no problems or side effects with the medication.     HYPERTENSION - Patient has longstanding history of HTN , currently denies any symptoms referable to elevated blood pressure. Specifically denies chest pain, palpitations, dyspnea, orthopnea, PND or peripheral edema. Blood pressure readings have been in normal range. Current medication regimen is as listed below. Patient denies any side effects of medication.     HYPOTHYROIDISM: TSH has been stable- 1.39 4 weeks ago. Continues on 50 mcg of synthroid daily    Review of Systems  CONSTITUTIONAL: NEGATIVE for fever, chills, change in weight  ENT/MOUTH: NEGATIVE for ear, mouth and throat problems  RESP: NEGATIVE for significant cough or SOB  CV: NEGATIVE for  chest pain, palpitations or peripheral edema    Patient Active Problem List    Diagnosis Date Noted    Moderate Alzheimer's dementia without behavioral disturbance, psychotic disturbance, mood disturbance, or anxiety, unspecified timing of dementia onset (H) 07/27/2023     Priority: Medium    History of eyelid surgery 02/08/2023     Priority: Medium    Droopy eyelid, bilateral 01/26/2023     Priority: Medium    Chronic cough 08/16/2022     Priority: Medium    Type 2 diabetes mellitus with diabetic nephropathy, without long-term current use of insulin (H) 07/06/2022     Priority: Medium    Recurrent major depressive disorder, in full remission (H) 04/02/2022     Priority: Medium    Hypothyroidism due to acquired atrophy of thyroid 03/31/2022     Priority: Medium    Benign paroxysmal positional vertigo, unspecified laterality 12/03/2021     Priority: Medium    Elevated prostate specific antigen (PSA) 12/03/2021     Priority: Medium    Mixed simple and mucopurulent chronic bronchitis (H) 11/04/2021     Priority: Medium    Major neurocognitive disorder due to another medical condition (H) 05/05/2021     Priority: Medium    Bunion, right 03/26/2021     Priority: Medium    Onychomycosis 03/26/2021     Priority: Medium    Bunion, left 03/26/2021     Priority: Medium    Diabetic peripheral neuropathy (H) 01/08/2021     Priority: Medium    Unspecified inflammatory spondylopathy, sacral and sacrococcygeal region (H) 12/18/2020     Priority: Medium    Chronic left-sided low back pain without sciatica 09/17/2020     Priority: Medium    Vitamin B12 deficiency 09/17/2020     Priority: Medium    Benign essential hypertension 09/17/2020     Priority: Medium    Lumbar facet arthropathy 07/07/2020     Priority: Medium    Chronically dry eyes, bilateral 03/17/2020     Priority: Medium    Left sided sciatica 03/17/2020     Priority: Medium    Mixed hyperlipidemia 03/19/2019     Priority: Medium    S/P cardiac cath 5/17/18 06/07/2018      Priority: Medium    Unsteady gait 06/07/2018     Priority: Medium    Stented coronary artery to his LCX and OM1 on 5/17/18 06/07/2018     Priority: Medium    Status post insertion of drug eluting coronary artery stent - x2 stents - 1st OMB - 5/17/2018 - [facility] 05/31/2018     Priority: Medium    Controlled type 2 diabetes mellitus with diabetic neuropathy, without long-term current use of insulin (H) 05/31/2018     Priority: Medium    Benign prostatic hyperplasia with weak urinary stream 05/31/2018     Priority: Medium    Coronary artery disease involving native coronary artery of native heart without angina pectoris 04/26/2018     Priority: Medium    Microalbuminuria 01/26/2018     Priority: Medium    Generalized anxiety disorder 06/05/2017     Priority: Medium    GERD (gastroesophageal reflux disease) 05/03/2013     Priority: Medium    Nephrolithiasis 04/23/2012     Priority: Medium    BPH (benign prostatic hyperplasia) 03/27/2012     Priority: Medium    Diverticular disease of colon 02/17/2010     Priority: Medium    Overweight (BMI 25.0-29.9) 02/17/2010     Priority: Medium    Chronic anxiety 02/17/2010     Priority: Medium      Past Medical History:   Diagnosis Date    Abnormal CT scan, bladder 6/29/2016    Diverticulosis of intestine without perforation or abscess without bleeding     No Comments Provided    Fatty (change of) liver, not elsewhere classified     non alcoholic    Fibromyalgia     No Comments Provided    Nodular prostate without lower urinary tract symptoms     2012,US confirms benign calcifications    Obesity     No Comments Provided    Panic disorder without agoraphobia     No Comments Provided    Positive colorectal cancer screening using Cologuard test 12/06/2018     Past Surgical History:   Procedure Laterality Date    ARTHROPLASTY KNEE      2015    ARTHROSCOPY KNEE      right knee X2    BIOPSY PROSTATE TRANSRECTAL      2013    COLONOSCOPY      2003,and UGI [facility] normal     COLONOSCOPY      2009,and UGI repeat Dr. Johnson negative.    COLONOSCOPY  01/01/2013 2013,WNL    COLONOSCOPY  05/23/2019    Elizabeth-no follow up    HERNIA REPAIR  2000    RELEASE CARPAL TUNNEL      2014    VASECTOMY      No Comments Provided     Current Outpatient Medications   Medication Sig Dispense Refill    ascorbic acid (VITAMIN C) 500 MG tablet Take 1 tablet by mouth daily. For 60 days      aspirin 81 MG tablet Take 81 mg by mouth daily      blood glucose (ACCU-CHEK ONEL PLUS) test strip USE  STRIP TO CHECK GLUCOSE Once DAILY 200 strip 11    citalopram (CELEXA) 10 MG tablet Take 1 tablet (10 mg) by mouth daily 90 tablet 1    diclofenac (VOLTAREN) 1 % topical gel Apply 2 g topically 4 times daily 100 g 1    gabapentin (NEURONTIN) 100 MG capsule Take 1-2 capsules (100-200 mg) by mouth At Bedtime - for pain 150 capsule 1    lactobacillus rhamnosus, GG, (CULTURELL) capsule Take 1 capsule by mouth daily      levothyroxine (SYNTHROID/LEVOTHROID) 50 MCG tablet Take 1 tablet (50 mcg) by mouth daily - for Thyroid Replacement 93 tablet 1    meloxicam (MOBIC) 15 MG tablet Take 1 tablet (15 mg) by mouth daily 30 tablet 0    metFORMIN (GLUCOPHAGE) 500 MG tablet Take 2 tablets (1,000 mg) by mouth daily (with breakfast) AND 1 tablet (500 mg) daily (with dinner). 270 tablet 1    omega 3 1000 MG CAPS Take 1 capsule by mouth daily      omeprazole (PRILOSEC) 20 MG DR capsule Take 1 capsule (20 mg) by mouth daily Take 30 to 60 minutes prior to a meal -- for stomach acid 90 capsule 1    rosuvastatin (CRESTOR) 5 MG tablet Take 1 tablet (5 mg) by mouth daily - Stop Lipitor - 90 tablet 1    tadalafil (CIALIS) 5 MG tablet Take 1 tablet (5 mg) by mouth daily 90 tablet 1    vitamin B complex with vitamin C (VITAMIN  B COMPLEX) TABS tablet Take 1 tablet by mouth daily Monday, Wednesday, Friday -- 3 days weekly -- make sure it has B12 in tablet -- Dose Reduced 7/11/2018 100 tablet 3    vitamin D3 (CHOLECALCIFEROL) 125 MCG (5000 UT)  "tablet Take 5,000 Units by mouth daily         Allergies   Allergen Reactions    Memantine Other (See Comments)     Dizzy, lightheaded  Dizzy, lightheaded    Rivastigmine Other (See Comments)     Dizzy, lightheaded  Dizzy, lightheaded    Ace Inhibitors Cough    Atorvastatin Calcium Muscle Pain (Myalgia)     Lipitor    Statins Muscle Pain (Myalgia)     Higher doses cause myalgias        Social History     Tobacco Use    Smoking status: Former     Types: Cigarettes     Quit date: 10/25/1983     Years since quittin.8     Passive exposure: Never    Smokeless tobacco: Never   Substance Use Topics    Alcohol use: No       History   Drug Use No         Objective     /60 (BP Location: Right arm, Patient Position: Sitting, Cuff Size: Adult Large)   Pulse 54   Resp 16   Ht 1.689 m (5' 6.5\")   Wt 83.9 kg (185 lb)   SpO2 96%   BMI 29.41 kg/m      Physical Exam  Constitutional:       General: He is not in acute distress.     Appearance: He is well-developed. He is obese. He is not diaphoretic.   HENT:      Head: Normocephalic and atraumatic.      Nose: Nose normal. No congestion or rhinorrhea.      Mouth/Throat:      Mouth: Mucous membranes are moist.      Pharynx: Oropharynx is clear. No oropharyngeal exudate or posterior oropharyngeal erythema.   Eyes:      General: No scleral icterus.     Conjunctiva/sclera: Conjunctivae normal.      Comments: Left eye, with very droopy, irritated left lower lid   Cardiovascular:      Rate and Rhythm: Normal rate and regular rhythm.   Pulmonary:      Effort: Pulmonary effort is normal.      Breath sounds: Normal breath sounds.   Musculoskeletal:         General: No deformity.      Cervical back: Neck supple.   Lymphadenopathy:      Cervical: No cervical adenopathy.   Skin:     General: Skin is warm and dry.      Coloration: Skin is not jaundiced.      Findings: No rash.   Neurological:      Mental Status: He is alert and oriented to person, place, and time. Mental status is " at baseline.   Psychiatric:         Mood and Affect: Mood normal.         Behavior: Behavior normal.            Diagnostics:  Recent Results (from the past 24 hour(s))   EKG 12-lead (E and GI Clinics Only)    Collection Time: 08/24/23 11:52 AM   Result Value Ref Range    Systolic Blood Pressure  mmHg    Diastolic Blood Pressure  mmHg    Ventricular Rate 53 BPM    Atrial Rate 53 BPM    NC Interval 170 ms    QRS Duration 96 ms     ms    QTc 386 ms    P Axis 35 degrees    R AXIS -18 degrees    T Axis 6 degrees    Interpretation ECG       Sinus bradycardia  Nonspecific ST and T wave abnormality  Abnormal ECG  When compared with ECG of 19-OCT-2021 11:50,  Nonspecific T wave abnormality, worse in Lateral leads     Comprehensive Metabolic Panel    Collection Time: 08/24/23 11:54 AM   Result Value Ref Range    Sodium 140 136 - 145 mmol/L    Potassium 4.3 3.4 - 5.3 mmol/L    Chloride 105 98 - 107 mmol/L    Carbon Dioxide (CO2) 27 22 - 29 mmol/L    Anion Gap 8 7 - 15 mmol/L    Urea Nitrogen 16.9 8.0 - 23.0 mg/dL    Creatinine 0.72 0.67 - 1.17 mg/dL    Calcium 9.7 8.8 - 10.2 mg/dL    Glucose 118 (H) 70 - 99 mg/dL    Alkaline Phosphatase 78 40 - 129 U/L    AST 19 0 - 45 U/L    ALT 18 0 - 70 U/L    Protein Total 6.7 6.4 - 8.3 g/dL    Albumin 4.4 3.5 - 5.2 g/dL    Bilirubin Total 0.4 <=1.2 mg/dL    GFR Estimate >90 >60 mL/min/1.73m2   CBC with platelets and differential    Collection Time: 08/24/23 11:54 AM   Result Value Ref Range    WBC Count 5.5 4.0 - 11.0 10e3/uL    RBC Count 4.58 4.40 - 5.90 10e6/uL    Hemoglobin 14.0 13.3 - 17.7 g/dL    Hematocrit 42.0 40.0 - 53.0 %    MCV 92 78 - 100 fL    MCH 30.6 26.5 - 33.0 pg    MCHC 33.3 31.5 - 36.5 g/dL    RDW 13.2 10.0 - 15.0 %    Platelet Count 198 150 - 450 10e3/uL    % Neutrophils 60 %    % Lymphocytes 24 %    % Monocytes 12 %    % Eosinophils 3 %    % Basophils 1 %    % Immature Granulocytes 0 %    NRBCs per 100 WBC 0 <1 /100    Absolute Neutrophils 3.3 1.6 - 8.3  10e3/uL    Absolute Lymphocytes 1.3 0.8 - 5.3 10e3/uL    Absolute Monocytes 0.7 0.0 - 1.3 10e3/uL    Absolute Eosinophils 0.2 0.0 - 0.7 10e3/uL    Absolute Basophils 0.0 0.0 - 0.2 10e3/uL    Absolute Immature Granulocytes 0.0 <=0.4 10e3/uL    Absolute NRBCs 0.0 10e3/uL      EKG: sinus bradycardia, nospecific ST and T wave abnormality, unchanged from previous tracings    Revised Cardiac Risk Index (RCRI):  The patient has the following serious cardiovascular risks for perioperative complications:   - Coronary Artery Disease (MI, positive stress test, angina, Qs on EKG) = 1 point     RCRI Interpretation: 1 point: Class II (low risk - 0.9% complication rate)         Signed Electronically by: MARCOS Patrick CNP  Copy of this evaluation report is provided to requesting physician.

## 2023-08-24 NOTE — TELEPHONE ENCOUNTER
Called patient and discussed issue with pre-op and explained to patient.  See other notes.    Susan Andrade LPN on 8/24/2023 at 3:20 PM

## 2023-08-24 NOTE — NURSING NOTE
"Chief Complaint   Patient presents with    Pre-Op Exam   Patient presents to the clinic today for a Pre op exam    Initial There were no vitals taken for this visit. Estimated body mass index is 30.18 kg/m  as calculated from the following:    Height as of 7/27/23: 1.689 m (5' 6.5\").    Weight as of 7/27/23: 86.1 kg (189 lb 12.8 oz).  Meds Reconciled: complete        FOOD SECURITY SCREENING QUESTIONS:    The next two questions are to help us understand your food security.  If you are feeling you need any assistance in this area, we have resources available to support you today.    Hunger Vital Signs:  Within the past 12 months we worried whether our food would run out before we got money to buy more. Never  Within the past 12 months the food we bought just didn't last and we didn't have money to get more. Never  Jessy Rocha LPN,LPN on 8/24/2023 at 11:19 AM      Jessy Rocha LPN  "

## 2023-08-24 NOTE — TELEPHONE ENCOUNTER
Patient wanted to know why he needed a pre-op .  Explained that the pre-ops need to be within 30 days of procedure.    Susan Andrade, LPN on 8/24/2023 at 2:18 PM

## 2023-08-24 NOTE — TELEPHONE ENCOUNTER
Wants to talk to someone.  He has left notes for both donnie nurse and braden nurse.    Subhash Carlson on 8/24/2023 at 2:03 PM

## 2023-08-24 NOTE — PATIENT INSTRUCTIONS
For informational purposes only. Not to replace the advice of your health care provider. Copyright   2003,  [facility]. All rights reserved. Clinically reviewed by Belinda Meza MD. SparCode [id number] - REV .  Preparing for Your Surgery  Getting started  A nurse will call you to review your health history and instructions. They will give you an arrival time based on your scheduled surgery time. Please be ready to share:  Your doctor's clinic name and phone number  Your medical, surgical, and anesthesia history  A list of allergies and sensitivities  A list of medicines, including herbal treatments and over-the-counter drugs  Whether the patient has a legal guardian (ask how to send us the papers in advance)  Please tell us if you're pregnant--or if there's any chance you might be pregnant. Some surgeries may injure a fetus (unborn baby), so they require a pregnancy test. Surgeries that are safe for a fetus don't always need a test, and you can choose whether to have one.   If you have a child who's having surgery, please ask for a copy of Preparing for Your Child's Surgery.    Preparing for surgery  Within 10 to 30 days of surgery: Have a pre-op exam (sometimes called an H&P, or History and Physical). This can be done at a clinic or pre-operative center.  If you're having a , you may not need this exam. Talk to your care team.  At your pre-op exam, talk to your care team about all medicines you take. If you need to stop any medicines before surgery, ask when to start taking them again.  We do this for your safety. Many medicines can make you bleed too much during surgery. Some change how well surgery (anesthesia) drugs work.  Call your insurance company to let them know you're having surgery. (If you don't have insurance, call [phone].)  Call your clinic if there's any change in your health. This includes signs of a cold or flu (sore throat, runny nose, cough, rash, fever). It also  includes a scrape or scratch near the surgery site.  If you have questions on the day of surgery, call your hospital or surgery center.  Eating and drinking guidelines  For your safety: Unless your surgeon tells you otherwise, follow the guidelines below.  Eat and drink as usual until 8 hours before you arrive for surgery. After that, no food or milk.  Drink clear liquids until 2 hours before you arrive. These are liquids you can see through, like water, Gatorade, and Propel Water. They also include plain black coffee and tea (no cream or milk), candy, and breath mints. You can spit out gum when you arrive.  If you drink alcohol: Stop drinking it the night before surgery.  If your care team tells you to take medicine on the morning of surgery, it's okay to take it with a sip of water.  Preventing infection  Shower or bathe the night before and morning of your surgery. Follow the instructions your clinic gave you. (If no instructions, use regular soap.)  Don't shave or clip hair near your surgery site. We'll remove the hair if needed.  Don't smoke or vape the morning of surgery. You may chew nicotine gum up to 2 hours before surgery. A nicotine patch is okay.  Note: Some surgeries require you to completely quit smoking and nicotine. Check with your surgeon.  Your care team will make every effort to keep you safe from infection. We will:  Clean our hands often with soap and water (or an alcohol-based hand rub).  Clean the skin at your surgery site with a special soap that kills germs.  Give you a special gown to keep you warm. (Cold raises the risk of infection.)  Wear special hair covers, masks, gowns and gloves during surgery.  Give antibiotic medicine, if prescribed. Not all surgeries need antibiotics.  What to bring on the day of surgery  Photo ID and insurance card  Copy of your health care directive, if you have one  Glasses and hearing aids (bring cases)  You can't wear contacts during surgery  Inhaler and eye  drops, if you use them (tell us about these when you arrive)  CPAP machine or breathing device, if you use them  A few personal items, if spending the night  If you have . . .  A pacemaker, ICD (cardiac defibrillator) or other implant: Bring the ID card.  An implanted stimulator: Bring the remote control.  A legal guardian: Bring a copy of the certified (court-stamped) guardianship papers.  Please remove any jewelry, including body piercings. Leave jewelry and other valuables at home.  If you're going home the day of surgery  You must have a responsible adult drive you home. They should stay with you overnight as well.  If you don't have someone to stay with you, and you aren't safe to go home alone, we may keep you overnight. Insurance often won't pay for this.  After surgery  If it's hard to control your pain or you need more pain medicine, please call your surgeon's office.  Questions?   If you have any questions for your care team, list them here: _________________________________________________________________________________________________________________________________________________________________________ ____________________________________ ____________________________________ ____________________________________    How to Take Your Medication Before Surgery  - Take all of your medications before surgery as usual

## 2023-08-27 LAB
ATRIAL RATE - MUSE: 53 BPM
DIASTOLIC BLOOD PRESSURE - MUSE: NORMAL MMHG
INTERPRETATION ECG - MUSE: NORMAL
P AXIS - MUSE: 35 DEGREES
PR INTERVAL - MUSE: 170 MS
QRS DURATION - MUSE: 96 MS
QT - MUSE: 412 MS
QTC - MUSE: 386 MS
R AXIS - MUSE: -18 DEGREES
SYSTOLIC BLOOD PRESSURE - MUSE: NORMAL MMHG
T AXIS - MUSE: 6 DEGREES
VENTRICULAR RATE- MUSE: 53 BPM

## 2023-10-02 ENCOUNTER — LAB (OUTPATIENT)
Dept: LAB | Facility: OTHER | Age: 78
End: 2023-10-02
Attending: INTERNAL MEDICINE
Payer: MEDICARE

## 2023-10-02 DIAGNOSIS — E03.4 HYPOTHYROIDISM DUE TO ACQUIRED ATROPHY OF THYROID: ICD-10-CM

## 2023-10-02 DIAGNOSIS — E11.40 CONTROLLED TYPE 2 DIABETES MELLITUS WITH DIABETIC NEUROPATHY, WITHOUT LONG-TERM CURRENT USE OF INSULIN (H): ICD-10-CM

## 2023-10-02 DIAGNOSIS — E78.2 MIXED HYPERLIPIDEMIA: ICD-10-CM

## 2023-10-02 LAB
ALBUMIN SERPL BCG-MCNC: 4.4 G/DL (ref 3.5–5.2)
ALBUMIN UR-MCNC: NEGATIVE MG/DL
ALP SERPL-CCNC: 75 U/L (ref 40–129)
ALT SERPL W P-5'-P-CCNC: 15 U/L (ref 0–70)
ANION GAP SERPL CALCULATED.3IONS-SCNC: 8 MMOL/L (ref 7–15)
APPEARANCE UR: CLEAR
AST SERPL W P-5'-P-CCNC: 23 U/L (ref 0–45)
BILIRUB SERPL-MCNC: 0.6 MG/DL
BILIRUB UR QL STRIP: NEGATIVE
BUN SERPL-MCNC: 13.4 MG/DL (ref 8–23)
CALCIUM SERPL-MCNC: 9.4 MG/DL (ref 8.8–10.2)
CHLORIDE SERPL-SCNC: 105 MMOL/L (ref 98–107)
CHOLEST SERPL-MCNC: 114 MG/DL
COLOR UR AUTO: NORMAL
CREAT SERPL-MCNC: 0.72 MG/DL (ref 0.67–1.17)
CREAT UR-MCNC: 86.3 MG/DL
DEPRECATED HCO3 PLAS-SCNC: 27 MMOL/L (ref 22–29)
EGFRCR SERPLBLD CKD-EPI 2021: >90 ML/MIN/1.73M2
ERYTHROCYTE [DISTWIDTH] IN BLOOD BY AUTOMATED COUNT: 12.8 % (ref 10–15)
GLUCOSE SERPL-MCNC: 125 MG/DL (ref 70–99)
GLUCOSE UR STRIP-MCNC: NEGATIVE MG/DL
HBA1C MFR BLD: 7 % (ref 4–6.2)
HCT VFR BLD AUTO: 43.1 % (ref 40–53)
HDLC SERPL-MCNC: 45 MG/DL
HGB BLD-MCNC: 14.1 G/DL (ref 13.3–17.7)
HGB UR QL STRIP: NEGATIVE
HOLD SPECIMEN: NORMAL
KETONES UR STRIP-MCNC: NEGATIVE MG/DL
LDLC SERPL CALC-MCNC: 51 MG/DL
LEUKOCYTE ESTERASE UR QL STRIP: NEGATIVE
MCH RBC QN AUTO: 30.2 PG (ref 26.5–33)
MCHC RBC AUTO-ENTMCNC: 32.7 G/DL (ref 31.5–36.5)
MCV RBC AUTO: 92 FL (ref 78–100)
MICROALBUMIN UR-MCNC: 12.2 MG/L
MICROALBUMIN/CREAT UR: 14.14 MG/G CR (ref 0–17)
NITRATE UR QL: NEGATIVE
NONHDLC SERPL-MCNC: 69 MG/DL
PH UR STRIP: 7.5 [PH] (ref 5–9)
PLATELET # BLD AUTO: 196 10E3/UL (ref 150–450)
POTASSIUM SERPL-SCNC: 4.4 MMOL/L (ref 3.4–5.3)
PROT SERPL-MCNC: 7 G/DL (ref 6.4–8.3)
RBC # BLD AUTO: 4.67 10E6/UL (ref 4.4–5.9)
SODIUM SERPL-SCNC: 140 MMOL/L (ref 135–145)
SP GR UR STRIP: 1.02 (ref 1–1.03)
TRIGL SERPL-MCNC: 88 MG/DL
TSH SERPL DL<=0.005 MIU/L-ACNC: 1.55 UIU/ML (ref 0.3–4.2)
UROBILINOGEN UR STRIP-MCNC: NORMAL MG/DL
WBC # BLD AUTO: 6.1 10E3/UL (ref 4–11)

## 2023-10-02 PROCEDURE — 81003 URINALYSIS AUTO W/O SCOPE: CPT | Mod: ZL

## 2023-10-02 PROCEDURE — 80053 COMPREHEN METABOLIC PANEL: CPT | Mod: ZL

## 2023-10-02 PROCEDURE — 82570 ASSAY OF URINE CREATININE: CPT | Mod: ZL

## 2023-10-02 PROCEDURE — 84443 ASSAY THYROID STIM HORMONE: CPT | Mod: ZL

## 2023-10-02 PROCEDURE — 36415 COLL VENOUS BLD VENIPUNCTURE: CPT | Mod: ZL

## 2023-10-02 PROCEDURE — 83036 HEMOGLOBIN GLYCOSYLATED A1C: CPT | Mod: ZL

## 2023-10-02 PROCEDURE — 85041 AUTOMATED RBC COUNT: CPT | Mod: ZL

## 2023-10-02 PROCEDURE — 84484 ASSAY OF TROPONIN QUANT: CPT | Mod: ZL | Performed by: STUDENT IN AN ORGANIZED HEALTH CARE EDUCATION/TRAINING PROGRAM

## 2023-10-02 PROCEDURE — 80061 LIPID PANEL: CPT | Mod: ZL

## 2023-10-03 ENCOUNTER — OFFICE VISIT (OUTPATIENT)
Dept: INTERNAL MEDICINE | Facility: OTHER | Age: 78
End: 2023-10-03
Attending: STUDENT IN AN ORGANIZED HEALTH CARE EDUCATION/TRAINING PROGRAM
Payer: COMMERCIAL

## 2023-10-03 VITALS
HEART RATE: 59 BPM | OXYGEN SATURATION: 95 % | TEMPERATURE: 97.2 F | DIASTOLIC BLOOD PRESSURE: 72 MMHG | RESPIRATION RATE: 18 BRPM | BODY MASS INDEX: 30.73 KG/M2 | WEIGHT: 191.2 LBS | SYSTOLIC BLOOD PRESSURE: 160 MMHG | HEIGHT: 66 IN

## 2023-10-03 DIAGNOSIS — H90.3 ASYMMETRICAL SENSORINEURAL HEARING LOSS: ICD-10-CM

## 2023-10-03 DIAGNOSIS — E03.4 HYPOTHYROIDISM DUE TO ACQUIRED ATROPHY OF THYROID: ICD-10-CM

## 2023-10-03 DIAGNOSIS — M62.81 GENERALIZED MUSCLE WEAKNESS: Primary | ICD-10-CM

## 2023-10-03 DIAGNOSIS — G62.9 NEUROPATHY: ICD-10-CM

## 2023-10-03 DIAGNOSIS — I10 HYPERTENSION, UNSPECIFIED TYPE: ICD-10-CM

## 2023-10-03 LAB — TROPONIN T SERPL HS-MCNC: 10 NG/L

## 2023-10-03 PROCEDURE — 84165 PROTEIN E-PHORESIS SERUM: CPT | Mod: 26

## 2023-10-03 PROCEDURE — 84165 PROTEIN E-PHORESIS SERUM: CPT | Mod: ZL | Performed by: PATHOLOGY

## 2023-10-03 PROCEDURE — 84155 ASSAY OF PROTEIN SERUM: CPT | Mod: ZL | Performed by: STUDENT IN AN ORGANIZED HEALTH CARE EDUCATION/TRAINING PROGRAM

## 2023-10-03 PROCEDURE — 99215 OFFICE O/P EST HI 40 MIN: CPT | Performed by: STUDENT IN AN ORGANIZED HEALTH CARE EDUCATION/TRAINING PROGRAM

## 2023-10-03 PROCEDURE — G0463 HOSPITAL OUTPT CLINIC VISIT: HCPCS

## 2023-10-03 PROCEDURE — 36415 COLL VENOUS BLD VENIPUNCTURE: CPT | Mod: ZL | Performed by: STUDENT IN AN ORGANIZED HEALTH CARE EDUCATION/TRAINING PROGRAM

## 2023-10-03 RX ORDER — LOSARTAN POTASSIUM 25 MG/1
25 TABLET ORAL DAILY
Qty: 90 TABLET | Refills: 4 | Status: SHIPPED | OUTPATIENT
Start: 2023-10-03 | End: 2024-07-24

## 2023-10-03 RX ORDER — LEVOTHYROXINE SODIUM 50 UG/1
50 TABLET ORAL DAILY
Qty: 93 TABLET | Refills: 1 | Status: SHIPPED | OUTPATIENT
Start: 2023-10-03 | End: 2024-01-11

## 2023-10-03 ASSESSMENT — PAIN SCALES - GENERAL: PAINLEVEL: NO PAIN (0)

## 2023-10-03 ASSESSMENT — PATIENT HEALTH QUESTIONNAIRE - PHQ9
SUM OF ALL RESPONSES TO PHQ QUESTIONS 1-9: 8
SUM OF ALL RESPONSES TO PHQ QUESTIONS 1-9: 8
10. IF YOU CHECKED OFF ANY PROBLEMS, HOW DIFFICULT HAVE THESE PROBLEMS MADE IT FOR YOU TO DO YOUR WORK, TAKE CARE OF THINGS AT HOME, OR GET ALONG WITH OTHER PEOPLE: NOT DIFFICULT AT ALL

## 2023-10-03 NOTE — Clinical Note
He will follow up with you. He started to be quite rude by the end of the visit that nothing was found. My only other thoughts would be an echo and consider pan scan CT.

## 2023-10-03 NOTE — PROGRESS NOTES
Assessment & Plan         ICD-10-CM    1. Generalized muscle weakness  M62.81       2. Hypothyroidism due to acquired atrophy of thyroid  E03.4 levothyroxine (SYNTHROID/LEVOTHROID) 50 MCG tablet     Troponin T, High Sensitivity     Troponin T, High Sensitivity      3. Hypertension, unspecified type  I10       4. Neuropathy  G62.9 Protein electrophoresis      5. Asymmetrical sensorineural hearing loss  H90.3 Adult ENT  Referral        Generalized muscle weakness, hypertension, lightheadedness: Unsure the cause of his symptoms and he does give me different stories at the duration initially being only 1 to 2 weeks but then states that the weakness and fatigue has been going on for years.  He was quite frustrated that labs were unrevealing.  I did add on a troponin was negative.  Heart rate is regular without ectopy on my examination no focal symptoms to suspect stroke at this time.  There is a possibility with his exposure to [place] that he could have some sort of hematologic malignancy but his CBC is essentially normal.  We will start with an SPEP for the possibility of myeloma/amyloidosis with his nonspecific neuropathy and cardiac concerns.  I also offered to start the patient on an antihypertensive agent to see if some of his symptoms could be from hypertension and he declines this today and states that it has not been abnormal in the past.  Ultimately patient was starting to be frustrated at the point of being rude and demanding expecting multiple scans etc.  We discussed that with nonspecific symptoms that have been so prolonged we should perform a systematic approach and start with laboratory testing and treating hypertension and then reassessing patient in clinic in about 2 weeks to evaluate his blood pressure and consider pan scanning his chest abdomen pelvis for any potential malignancy etc. no other particular findings at this time.  Troponin was negative.  Refilled his levothyroxine and TSH was  normal yesterday.  Chest pain is nonexertional and seems to be in the soft tissue.  No need for stress testing at this time.  Low threshold to obtain echocardiogram though weight is essentially unchanged no peripheral edema.  Blood sugars have been under good control no hyper or hypoglycemia.  Reviewed his medication list and no culprit medications or new new medication changes to explain his symptoms.  Gabapentin could potentially be playing a role.      Patient will follow-up with his PCP in 2 weeks for reassessment of symptoms, review of his SPEP and consideration of imaging either chest abdomen pelvis or repeat an echocardiogram      Done: Note was left on my computer that Dajuan would like a antihypertensive agent called into the pharmacy.  We will start losartan 25 daily as he is diabetic.        64 minutes spent by me on the date of the encounter doing chart review, history and exam, discussing with PCP, ordering labs, documentation and further activities per the note        No follow-ups on file.    Stanley Mcarthur MD  [facility]      Subjective   Dajuan is a 78 year old, presenting for the following health issues:  Dizziness (Weak  headaches and lump on neck )        8/24/2023    11:18 AM   Additional Questions   Roomed by Jessy BARNES       History of Present Illness       Reason for visit:  Headaches dizziness sleeping alot   bump on neck    He eats 2-3 servings of fruits and vegetables daily.He consumes 1 sweetened beverage(s) daily.He exercises with enough effort to increase his heart rate 9 or less minutes per day.  He exercises with enough effort to increase his heart rate 3 or less days per week.   He is taking medications regularly.         Headache weak  dizzy     lump on neck         Feeling weak and dizzy for past week or so  Has been light headed rather than dizzy.   No shortness of breath.     Chest pain comes on randomly. Left breast.   Not associated with exercise.   Might be in  "soft tissue or muscle.     Feels like he is tired all the time.   No fevers or chills.   Chronic cough.     Has a small lesion on back of neck.     No changes in vision.     Blood sugar this .   Has been on citalpram for a long time.     Sleeping all the time. 7-8 hours at a time at night and comes in the hose after doing something and feels like he can sleep again.       Wt Readings from Last 5 Encounters:   10/03/23 86.7 kg (191 lb 3.2 oz)   08/24/23 83.9 kg (185 lb)   07/27/23 86.1 kg (189 lb 12.8 oz)   07/03/23 85.3 kg (188 lb)   06/14/23 86 kg (189 lb 9.6 oz)         Review of Systems         Objective    BP (!) 160/72 (BP Location: Right arm, Patient Position: Sitting, Cuff Size: Adult Regular)   Pulse 59   Temp 97.2  F (36.2  C) (Temporal)   Resp 18   Ht 1.676 m (5' 6\")   Wt 86.7 kg (191 lb 3.2 oz)   SpO2 95%   BMI 30.86 kg/m    Body mass index is 30.86 kg/m .  Physical Exam   General: Pleasant 78-year-old man sitting clinic no acute distress  HEENT: Normal oropharynx approximately 3 cm firm mass on the posterior aspect of his neck mobile.  Nontender. Tympanic membrane's clear nonocclusive wax in the right ear  CV: Regular rate and rhythm, grade 2/6 systolic murmur  Pulmonary: Clear to auscultation  GI: Soft nontender abdomen  Skin: No rashes or sores                    "

## 2023-10-03 NOTE — LETTER
October 6, 2023      Dajuan Basilio  [address] [place]  [place] [place] [address]        Dear ,    We are writing to inform you of your test results.    Your SPEP or serum protein electrophoresis was negative ruling out the possibility of myeloma or a condition known as amyloidosis.  Overall if you are not feeling better with starting the blood pressure medicine I recommend considering imaging with Dr. Pickens at your follow-up appointment later this month.    Resulted Orders   Troponin T, High Sensitivity   Result Value Ref Range    Troponin T, High Sensitivity 10 <=22 ng/L      Comment:      Either a High Sensitivity Troponin T baseline (0 hours) value = 100 ng/L, or an increase in High Sensitivity Troponin T = 7 ng/L at 2 hours compared to 0 hours (2-0 hours), suggests myocardial injury, and urgent clinical attention is required.    If the 2-0 hours increase is <7 ng/L, a High Sensitivity Troponin T result above gender-specific reference ranges warrants further evaluation.   Recommendations for further evaluation include correlation with clinical decision-making tool (e.g., HEART), a 3rd High Sensitivity Troponin T test 2 hours after the 2nd (a 20% change from baseline would represent concern), admission for observation, close PCC/cardiology follow-up, or urgent outpatient provocative testing.   Total Protein, Serum for ELP   Result Value Ref Range    Total Protein Serum for ELP 7.0 6.4 - 8.3 g/dL   Protein Electrophoresis, Serum   Result Value Ref Range    Albumin 4.5 3.7 - 5.1 g/dL    Alpha 1 0.2 0.2 - 0.4 g/dL    Alpha 2 0.7 0.5 - 0.9 g/dL    Beta Globulin 0.7 0.6 - 1.0 g/dL    Gamma Globulin 0.9 0.7 - 1.6 g/dL    Monoclonal Peak 0.0 <=0.0 g/dL    ELP Interpretation       Essentially normal electrophoretic pattern. No obvious monoclonal proteins seen. Pathologic significance requires clinical correlation. Tomas Loomis M.D., Ph.D., Pathologist.       If you have any questions or concerns,  please call the clinic at the number listed above.       Sincerely,      Stanley Mcarthur MD

## 2023-10-03 NOTE — NURSING NOTE
"Chief Complaint   Patient presents with    Dizziness     Weak  headaches and lump on neck        Medication reconciliation completed.    FOOD SECURITY SCREENING QUESTIONS:    The next two questions are to help us understand your food security.  If you are feeling you need any assistance in this area, we have resources available to support you today.    Hunger Vital Signs:  Within the past 12 months we worried whether our food would run out before we got money to buy more. Never  Within the past 12 months the food we bought just didn't last and we didn't have money to get more. Never    Initial BP (!) 160/72 (BP Location: Right arm, Patient Position: Sitting, Cuff Size: Adult Regular)   Pulse 59   Temp 97.2  F (36.2  C) (Temporal)   Resp 18   Ht 1.676 m (5' 6\")   Wt 86.7 kg (191 lb 3.2 oz)   SpO2 95%   BMI 30.86 kg/m   Estimated body mass index is 30.86 kg/m  as calculated from the following:    Height as of this encounter: 1.676 m (5' 6\").    Weight as of this encounter: 86.7 kg (191 lb 3.2 oz).       Elena Diaz LPN .......  10/3/2023  9:06 AM    "

## 2023-10-04 LAB
ALBUMIN SERPL ELPH-MCNC: 4.5 G/DL (ref 3.7–5.1)
ALPHA1 GLOB SERPL ELPH-MCNC: 0.2 G/DL (ref 0.2–0.4)
ALPHA2 GLOB SERPL ELPH-MCNC: 0.7 G/DL (ref 0.5–0.9)
B-GLOBULIN SERPL ELPH-MCNC: 0.7 G/DL (ref 0.6–1)
GAMMA GLOB SERPL ELPH-MCNC: 0.9 G/DL (ref 0.7–1.6)
M PROTEIN SERPL ELPH-MCNC: 0 G/DL
PROT PATTERN SERPL ELPH-IMP: NORMAL
TOTAL PROTEIN SERUM FOR ELP: 7 G/DL (ref 6.4–8.3)

## 2023-10-05 DIAGNOSIS — E03.4 HYPOTHYROIDISM DUE TO ACQUIRED ATROPHY OF THYROID: ICD-10-CM

## 2023-10-10 RX ORDER — LEVOTHYROXINE SODIUM 50 UG/1
TABLET ORAL
Qty: 93 TABLET | Refills: 0 | OUTPATIENT
Start: 2023-10-10

## 2023-10-10 NOTE — TELEPHONE ENCOUNTER
[facility] requesting refill for:         levothyroxine (SYNTHROID/LEVOTHROID) 50 MCG tablet     Medication filled 10/3/23. 93 tablets dispensed with 1 refill. Medication request denied.  Rossi Guevara RN on 10/10/2023 at 9:09 AM

## 2023-10-18 ENCOUNTER — OFFICE VISIT (OUTPATIENT)
Dept: INTERNAL MEDICINE | Facility: OTHER | Age: 78
End: 2023-10-18
Attending: INTERNAL MEDICINE
Payer: COMMERCIAL

## 2023-10-18 VITALS
DIASTOLIC BLOOD PRESSURE: 73 MMHG | HEIGHT: 66 IN | TEMPERATURE: 97.6 F | HEART RATE: 60 BPM | WEIGHT: 187.8 LBS | OXYGEN SATURATION: 97 % | RESPIRATION RATE: 18 BRPM | SYSTOLIC BLOOD PRESSURE: 139 MMHG | BODY MASS INDEX: 30.18 KG/M2

## 2023-10-18 DIAGNOSIS — G47.19 EXCESSIVE DAYTIME SLEEPINESS: Primary | ICD-10-CM

## 2023-10-18 DIAGNOSIS — R51.9 FREQUENT HEADACHES: ICD-10-CM

## 2023-10-18 DIAGNOSIS — M25.511 TRIGGER POINT OF RIGHT SHOULDER REGION: ICD-10-CM

## 2023-10-18 DIAGNOSIS — R53.82 CHRONIC FATIGUE: ICD-10-CM

## 2023-10-18 DIAGNOSIS — G47.419 PRIMARY NARCOLEPSY WITHOUT CATAPLEXY: ICD-10-CM

## 2023-10-18 PROCEDURE — G0463 HOSPITAL OUTPT CLINIC VISIT: HCPCS

## 2023-10-18 PROCEDURE — 99214 OFFICE O/P EST MOD 30 MIN: CPT | Performed by: INTERNAL MEDICINE

## 2023-10-18 RX ORDER — MODAFINIL 100 MG/1
200 TABLET ORAL DAILY
Qty: 60 TABLET | Refills: 1 | Status: SHIPPED | OUTPATIENT
Start: 2023-10-18 | End: 2024-01-11

## 2023-10-18 ASSESSMENT — ENCOUNTER SYMPTOMS
FEVER: 0
SHORTNESS OF BREATH: 0
COUGH: 0
FATIGUE: 1
CHILLS: 0
ABDOMINAL PAIN: 0
SLEEP DISTURBANCE: 1

## 2023-10-18 ASSESSMENT — PATIENT HEALTH QUESTIONNAIRE - PHQ9
SUM OF ALL RESPONSES TO PHQ QUESTIONS 1-9: 6
SUM OF ALL RESPONSES TO PHQ QUESTIONS 1-9: 6
10. IF YOU CHECKED OFF ANY PROBLEMS, HOW DIFFICULT HAVE THESE PROBLEMS MADE IT FOR YOU TO DO YOUR WORK, TAKE CARE OF THINGS AT HOME, OR GET ALONG WITH OTHER PEOPLE: SOMEWHAT DIFFICULT

## 2023-10-18 ASSESSMENT — PAIN SCALES - GENERAL: PAINLEVEL: MODERATE PAIN (4)

## 2023-10-18 NOTE — PROGRESS NOTES
Assessment & Plan     ICD-10-CM    1. Excessive daytime sleepiness  G47.19 modafinil (PROVIGIL) 100 MG tablet      2. Chronic fatigue  R53.82 modafinil (PROVIGIL) 100 MG tablet      3. Frequent headaches  R51.9       4. Trigger point of right shoulder region  M25.511       5. Primary narcolepsy without cataplexy  G47.419 modafinil (PROVIGIL) 100 MG tablet        Patient presents for chronic fatigue, excessive daytime sleepiness.  Appears to have primary narcolepsy without cataplexy.  States that he can sit down and fall asleep in his recliner chair in short order.  This has been going on for quite some time now.  We did do a small trial of Adderall which did not help at all.  Uncertain how much modafinil/Provigil will be helpful but he is desperate to try something that will help with his excessive tiredness.  Does not feel that he has issues with sleep apnea at all.  That would be the next step would be to have him have consultation with sleep medicine.  May need to consider sleep study, rule out sleep apnea, other sleep-related issues.    Has been having frequent headaches which are also new in the last couple of years.  -Further work-up pending clinical response to modafinil/Provigil.    Neck and upper back and shoulder related soft tissue pain.  Has trigger points on examination.  Home care instructions provided.  Recommend topical Voltaren gel or warm packs, massage.    Encouraged regular exercise.     Return in about 1 month (around 11/21/2023) for -- follow-up fatigue / modafinil start.    Cm Pickens MD  [facility]     Alisha Graff is a 78 year old, presenting for the following health issues:  Follow Up (2 week follow up)        10/18/2023     3:15 PM   Additional Questions   Roomed by Claritza LARA CNA/NIEVES       History of Present Illness       Reason for visit:  Not feeling well  Symptom onset:  More than a month  Symptoms include:  Weak, tired  Symptom intensity:   "Moderate  Symptom progression:  Worsening  Prior treatment description:  None  What makes it worse:  Pain    He eats 4 or more servings of fruits and vegetables daily.He consumes 0 sweetened beverage(s) daily.He exercises with enough effort to increase his heart rate 60 or more minutes per day.  He exercises with enough effort to increase his heart rate 5 days per week.   He is taking medications regularly.       Review of Systems   Constitutional:  Positive for fatigue. Negative for chills and fever.        Falls asleep quickly after sitting in his recliner   Respiratory:  Negative for cough and shortness of breath.    Cardiovascular:  Negative for chest pain.   Gastrointestinal:  Negative for abdominal pain.   Psychiatric/Behavioral:  Positive for sleep disturbance (+ Excessive fatigue and tiredness, falls asleep almost anywhere).          Objective    /73 (BP Location: Right arm, Patient Position: Sitting, Cuff Size: Adult Large)   Pulse 60   Temp 97.6  F (36.4  C) (Temporal)   Resp 18   Ht 1.676 m (5' 6\")   Wt 85.2 kg (187 lb 12.8 oz)   SpO2 97%   BMI 30.31 kg/m    Body mass index is 30.31 kg/m .  Physical Exam  Constitutional:       General: He is not in acute distress.     Appearance: He is well-developed. He is obese. He is not diaphoretic.   HENT:      Head: Normocephalic and atraumatic.      Nose: No rhinorrhea.   Eyes:      General: No scleral icterus.     Conjunctiva/sclera: Conjunctivae normal.   Cardiovascular:      Rate and Rhythm: Normal rate and regular rhythm.   Pulmonary:      Effort: Pulmonary effort is normal.      Breath sounds: Normal breath sounds.   Abdominal:      Palpations: Abdomen is soft.      Tenderness: There is no abdominal tenderness.   Musculoskeletal:         General: No deformity.      Cervical back: Neck supple.   Lymphadenopathy:      Cervical: No cervical adenopathy.   Skin:     General: Skin is warm and dry.      Coloration: Skin is not jaundiced.      Findings: " No rash.   Neurological:      Mental Status: He is alert. Mental status is at baseline.   Psychiatric:         Mood and Affect: Mood normal.         Behavior: Behavior normal.

## 2023-10-18 NOTE — PATIENT INSTRUCTIONS
Chronic fatigue  Excessive daytime sleepiness  Primary narcolepsy without cataplexy    START:   - modafinil (PROVIGIL) 100 MG tablet; Take 2 tablets (200 mg) by mouth daily -- in the Morning.       Paraspinal Muscle Spasm Instructions  Heat packs to affected areas 20 min on, 20 min off as tolerated.  Take tylenol as needed for mild-moderate pain. (If able to use these).     Start direct pressure or massage to the affected areas. -- Trigger point in mid right back, paraspinal muscles.     Consider Topical muscle rubs - Icy Hot, Aspercreme (Advanced) with or without lidocaine, Biofreeze, Lidocaine gel, Diclofenac gel / Voltaren gel, Binu Emu from [employer], or [employer] spray.    Get a Theracane -- or other muscle massage tool to massage the trigger points (painful muscle spots).           Return in approximately 5 week(s), or sooner as needed for follow-up with Dr. Pickens.  -- follow-up fatigue / modafinil start     Clinic : [phone]  Appointment line: [phone]

## 2023-10-18 NOTE — NURSING NOTE
"Chief Complaint   Patient presents with    Follow Up     2 week follow up     Patient reports that he is following up regarding not feeling well as in feeling tired and weak.     Initial /73 (BP Location: Right arm, Patient Position: Sitting, Cuff Size: Adult Large)   Pulse 60   Temp 97.6  F (36.4  C) (Temporal)   Resp 18   Ht 1.676 m (5' 6\")   Wt 85.2 kg (187 lb 12.8 oz)   SpO2 97%   BMI 30.31 kg/m   Estimated body mass index is 30.31 kg/m  as calculated from the following:    Height as of this encounter: 1.676 m (5' 6\").    Weight as of this encounter: 85.2 kg (187 lb 12.8 oz).       FOOD SECURITY SCREENING QUESTIONS:    The next two questions are to help us understand your food security.  If you are feeling you need any assistance in this area, we have resources available to support you today.    Hunger Vital Signs:  Within the past 12 months we worried whether our food would run out before we got money to buy more. Never  Within the past 12 months the food we bought just didn't last and we didn't have money to get more. Never  Claritza Quezada LPN on 10/18/2023 at 3:19 PM     Claritza Quezada   "

## 2023-12-06 ENCOUNTER — OFFICE VISIT (OUTPATIENT)
Dept: FAMILY MEDICINE | Facility: OTHER | Age: 78
End: 2023-12-06
Attending: FAMILY MEDICINE
Payer: MEDICARE

## 2023-12-06 ENCOUNTER — HOSPITAL ENCOUNTER (OUTPATIENT)
Dept: GENERAL RADIOLOGY | Facility: OTHER | Age: 78
Discharge: HOME | End: 2023-12-06
Attending: FAMILY MEDICINE
Payer: MEDICARE

## 2023-12-06 VITALS
BODY MASS INDEX: 31.6 KG/M2 | TEMPERATURE: 97.1 F | SYSTOLIC BLOOD PRESSURE: 144 MMHG | WEIGHT: 196.6 LBS | OXYGEN SATURATION: 96 % | HEIGHT: 66 IN | DIASTOLIC BLOOD PRESSURE: 76 MMHG | HEART RATE: 61 BPM | RESPIRATION RATE: 18 BRPM

## 2023-12-06 DIAGNOSIS — Z96.651 HISTORY OF TOTAL RIGHT KNEE REPLACEMENT: ICD-10-CM

## 2023-12-06 DIAGNOSIS — S80.01XA CONTUSION OF RIGHT PATELLA, INITIAL ENCOUNTER: Primary | ICD-10-CM

## 2023-12-06 PROCEDURE — 99213 OFFICE O/P EST LOW 20 MIN: CPT | Performed by: FAMILY MEDICINE

## 2023-12-06 PROCEDURE — G0463 HOSPITAL OUTPT CLINIC VISIT: HCPCS

## 2023-12-06 PROCEDURE — G0463 HOSPITAL OUTPT CLINIC VISIT: HCPCS | Mod: 25

## 2023-12-06 PROCEDURE — 73560 X-RAY EXAM OF KNEE 1 OR 2: CPT | Mod: LT

## 2023-12-06 ASSESSMENT — PAIN SCALES - GENERAL: PAINLEVEL: SEVERE PAIN (7)

## 2023-12-06 NOTE — NURSING NOTE
"Chief Complaint   Patient presents with    Follow Up     Right Knee Pain, DOI: 2 weeks ago         Initial BP (!) 144/76 (BP Location: Right arm, Patient Position: Sitting, Cuff Size: Adult Regular)   Pulse 61   Temp 97.1  F (36.2  C) (Temporal)   Resp 18   Ht 1.676 m (5' 6\")   Wt 89.2 kg (196 lb 9.6 oz)   SpO2 96%   BMI 31.73 kg/m   Estimated body mass index is 31.73 kg/m  as calculated from the following:    Height as of this encounter: 1.676 m (5' 6\").    Weight as of this encounter: 89.2 kg (196 lb 9.6 oz).       FOOD SECURITY SCREENING QUESTIONS:    The next two questions are to help us understand your food security.  If you are feeling you need any assistance in this area, we have resources available to support you today.    Hunger Vital Signs:  Within the past 12 months we worried whether our food would run out before we got money to buy more. Never  Within the past 12 months the food we bought just didn't last and we didn't have money to get more. Never  Claritza Quezada LPN on 12/6/2023 at 2:22 PM     Claritza Quezada   "

## 2023-12-06 NOTE — PROGRESS NOTES
"Sports Medicine Office Note    HPI:  78-year-old male coming in for evaluation of a right knee injury that took place around 11/22.  His foot got caught on a crack in the sidewalk while walking.  He fell forward onto his knee.  He thinks he may have favored landing on the lateral aspect of his knee as he did have some right-sided upper body pain.  His current pain is 7/10.  He characterizes the pain as achy.  He has been using Tylenol and ibuprofen to help with his symptoms.  Walking is his most bothersome activity.  He has a history of a TKA in 2015 with Dr. Overton.      EXAM:  BP (!) 144/76 (BP Location: Right arm, Patient Position: Sitting, Cuff Size: Adult Regular)   Pulse 61   Temp 97.1  F (36.2  C) (Temporal)   Resp 18   Ht 1.676 m (5' 6\")   Wt 89.2 kg (196 lb 9.6 oz)   SpO2 96%   BMI 31.73 kg/m    MUSCULOSKELETAL EXAM:  RIGHT KNEE  Inspection:  -No gross deformity  -No bruising or soft tissue swelling  -Scars: Well-healed vertically oriented surgical scar over the anterior knee    Tenderness to palpation of the:  -Quadriceps musculature:  Negative  -Quadriceps tendon:  Negative  -Patella: Mild pain  -Medial patellar facet:  Negative  -Lateral patellar facet: Positive  -Inferior pole of the patella:  Negative  -Patellar tendon:  Negative  -Tibial tuberosity:  Negative  -Medial joint line: Positive  -Medial collateral ligament:  Negative  -Medial hamstring tendons:  Negative  -Medial femoral condyle:  Negative  -Medial tibial plateau:  Negative  -Pes anserine bursa:  Negative  -Lateral joint line:  Negative  -Distal IT band:  Negative  -Gerdy's tubercle:  Negative  -Lateral collateral ligament:  Negative  -Lateral hamstring tendons:  Negative  -Lateral femoral condyle:  Negative  -Lateral tibial plateau:  Negative  -Posterior lateral corner:  Negative  -Popliteal fossa:  Negative    Range of Motion:  -Passive extension:  0    Strength:  -Extension:  5/5  -Flexion:  5/5    Special Tests:  -Effusion:  " Absent  -Valgus stress:  Negative  -Varus stress:  Negative    Other:  -Intact sensation to light touch distally.  -No signs of cyanosis. Normal skin temperature of the lower extremity.  -Foot/ankle:  No gross deformity. Full range of motion.  -Left knee:  No gross deformity. No palpable tenderness. Normal strength and ROM.      IMAGIN2023: 4 view right knee x-ray  - Hardware from previous TKA is noted.  No signs of fracture or loosening.  No bony fracture.  Residual osteophytosis present.      ASSESSMENT/PLAN:  Diagnoses and all orders for this visit:  Contusion of right patella, initial encounter  -     XR Knee Standing 2v  Bilateral & 2v Right  History of total right knee replacement    78-year-old male with a contusion of the right patella.  X-rays were performed in the office today and personally reviewed by me with the findings as demonstrated above by my interpretation.  No findings to suggest significant structural pathology on exam or radiographs.  Suspect a contusion that will improve over the coming weeks/month or two.  - Continue OTC APAP  - Continue OTC NSAIDs as needed (try to use sparingly and maximize APAP first)  - Discussed adding OTC Voltaren 1% gel topically 3-4 times per day as needed  - Ice throughout the day as needed  - Follow-up as needed  - If symptoms or not improving over the coming 3-4 weeks, patient can call and we will place an order for advanced imaging (likely CT with hardware from previous TKA present)      Elkin Jackson MD  2023  2:28 PM    Total time spent with this patient was 24 minutes which included chart review, visualization and independent interpretation of images, time spent with the patient, and documentation.    Procedure time:  0 minute(s)

## 2023-12-07 ENCOUNTER — TELEPHONE (OUTPATIENT)
Dept: INTERNAL MEDICINE | Facility: OTHER | Age: 78
End: 2023-12-07
Payer: COMMERCIAL

## 2023-12-07 DIAGNOSIS — E11.21 TYPE 2 DIABETES MELLITUS WITH DIABETIC NEPHROPATHY, WITHOUT LONG-TERM CURRENT USE OF INSULIN (H): Primary | ICD-10-CM

## 2023-12-07 NOTE — TELEPHONE ENCOUNTER
Patient is requesting an order for diabetic shoes to be [facility] [facility] in [place], [place].  Phone [phone].      Samara Vallejo on 12/7/2023 at 8:27 AM

## 2023-12-13 ENCOUNTER — MEDICAL CORRESPONDENCE (OUTPATIENT)
Dept: HEALTH INFORMATION MANAGEMENT | Facility: OTHER | Age: 78
End: 2023-12-13
Payer: COMMERCIAL

## 2024-01-05 ENCOUNTER — NURSE TRIAGE (OUTPATIENT)
Dept: INTERNAL MEDICINE | Facility: OTHER | Age: 79
End: 2024-01-05
Payer: COMMERCIAL

## 2024-01-05 ENCOUNTER — TELEPHONE (OUTPATIENT)
Dept: INTERNAL MEDICINE | Facility: OTHER | Age: 79
End: 2024-01-05
Payer: COMMERCIAL

## 2024-01-05 NOTE — TELEPHONE ENCOUNTER
S-(situation): patient calling and states he is having abdominal pain    B-(background): denies have abdomen pain prior.    A-(assessment): patient states that he was having lower abdomin pain that last about 25 minutes.  Patient states he did take Tylenol and Ibuprofen  States when it started it rates a 8/10 now by the time we are done talking he states it is pretty much gone.  Denies any other symptoms    R-(recommendations): patient was advised to be seen today but now pain has subsided so patient states he will wait and see what happens.  Informed patient will route message to provider for review and consideration.  Jacklyn Vazquez RN on 1/5/2024 at 2:37 PM        Reason for Disposition   Age > 60 years    Additional Information   Negative: Passed out (i.e., fainted, collapsed and was not responding)   Negative: Shock suspected (e.g., cold/pale/clammy skin, too weak to stand, low BP, rapid pulse)   Negative: Sounds like a life-threatening emergency to the triager   Negative: Followed an abdomen (stomach) injury   Negative: Chest pain   Negative: Pain is mainly in upper abdomen (if needed ask: 'is it mainly above the belly button?')   Negative: Abdomen bloating or swelling are main symptoms   Negative: SEVERE abdominal pain (e.g., excruciating)   Negative: Vomiting red blood or black (coffee ground) material   Negative: Blood in bowel movements  (Exception: Blood on surface of BM with constipation.)   Negative: Black or tarry bowel movements  (Exception: Chronic-unchanged black-grey BMs AND is taking iron pills or Pepto-Bismol.)   Negative: Unable to urinate (or only a few drops) and bladder feels very full   Negative: Pain in scrotum persists > 1 hour   Negative: Vomiting and abdomen looks much more swollen than usual   Negative: White of the eyes have turned yellow (i.e., jaundice)   Negative: Blood in urine (red, pink, or tea-colored)   Negative: Fever > 103 F (39.4 C)   Negative: Fever > 101 F (38.3 C) and  "over 60 years of age   Negative: Fever > 100.0 F (37.8 C) and has diabetes mellitus or a weak immune system (e.g., HIV positive, cancer chemotherapy, organ transplant, splenectomy, chronic steroids)   Negative: Fever > 100.0 F (37.8 C) and bedridden (e.g., CVA, chronic illness, recovering from surgery)   Negative: MILD TO MODERATE constant pain lasting > 2 hours   Negative: Vomiting bile (green color)   Negative: Patient sounds very sick or weak to the triager   Negative: MODERATE pain (e.g., interferes with normal activities) that comes and goes (cramps) lasts > 24 hours  (Exception: Pain with Vomiting or Diarrhea - see that Protocol.)    Answer Assessment - Initial Assessment Questions  1. LOCATION: \"Where does it hurt?\"       Lower abdomen  2. RADIATION: \"Does the pain shoot anywhere else?\" (e.g., chest, back)      denies  3. ONSET: \"When did the pain begin?\" (Minutes, hours or days ago)       30 min ago  4. SUDDEN: \"Gradual or sudden onset?\"      suddenly  5. PATTERN \"Does the pain come and go, or is it constant?\"     - If it comes and goes: \"How long does it last?\" \"Do you have pain now?\"      (Note: Comes and goes means the pain is intermittent. It goes away completely between bouts.)     - If constant: \"Is it getting better, staying the same, or getting worse?\"       (Note: Constant means the pain never goes away completely; most serious pain is constant and gets worse.)       constant  6. SEVERITY: \"How bad is the pain?\"  (e.g., Scale 1-10; mild, moderate, or severe)     - MILD (1-3): Doesn't interfere with normal activities, abdomen soft and not tender to touch.      - MODERATE (4-7): Interferes with normal activities or awakens from sleep, abdomen tender to touch.      - SEVERE (8-10): Excruciating pain, doubled over, unable to do any normal activities.        Initial pain 8/10 and now 2/10  7. RECURRENT SYMPTOM: \"Have you ever had this type of stomach pain before?\" If Yes, ask: \"When was the last time?\" " "and \"What happened that time?\"       denies  8. CAUSE: \"What do you think is causing the stomach pain?\"      unsure  9. RELIEVING/AGGRAVATING FACTORS: \"What makes it better or worse?\" (e.g., antacids, bending or twisting motion, bowel movement)      denies  10. OTHER SYMPTOMS: \"Do you have any other symptoms?\" (e.g., back pain, diarrhea, fever, urination pain, vomiting)        denies    Protocols used: Abdominal Pain - Male-A-[place]    "

## 2024-01-05 NOTE — TELEPHONE ENCOUNTER
Called and informed patient of Lashaun's response and patient verbalized understanding.  Patient states he probably wont come in unless something changes  States he feels a little soreness but otherwise feels fine so will probably wait.    Jacklyn Vazquez RN on 1/5/2024 at 3:52 PM

## 2024-01-05 NOTE — TELEPHONE ENCOUNTER
Reason for call: Request a lab order.    Order requested for what kind of lab? Diabetic follow up    Who is your PCP? [facility]    Preferred method for responding to this message: Telephone Call    Phone number patient can be reached at: Home number on file [phone] (home)    If we cannot reach you directly, may we leave a detailed response at the number you provided? No      Lab scheduled for 01/10/2024.        Gracie Ospina on 1/5/2024 at 12:26 PM

## 2024-01-10 ENCOUNTER — LAB (OUTPATIENT)
Dept: LAB | Facility: OTHER | Age: 79
End: 2024-01-10
Attending: INTERNAL MEDICINE
Payer: MEDICARE

## 2024-01-10 DIAGNOSIS — E78.2 MIXED HYPERLIPIDEMIA: ICD-10-CM

## 2024-01-10 DIAGNOSIS — E11.40 CONTROLLED TYPE 2 DIABETES MELLITUS WITH DIABETIC NEUROPATHY, WITHOUT LONG-TERM CURRENT USE OF INSULIN (H): ICD-10-CM

## 2024-01-10 DIAGNOSIS — E03.4 HYPOTHYROIDISM DUE TO ACQUIRED ATROPHY OF THYROID: ICD-10-CM

## 2024-01-10 LAB
ALBUMIN SERPL BCG-MCNC: 4.6 G/DL (ref 3.5–5.2)
ALBUMIN UR-MCNC: NEGATIVE MG/DL
ALP SERPL-CCNC: 63 U/L (ref 40–150)
ALT SERPL W P-5'-P-CCNC: 16 U/L (ref 0–70)
ANION GAP SERPL CALCULATED.3IONS-SCNC: 10 MMOL/L (ref 7–15)
APPEARANCE UR: CLEAR
AST SERPL W P-5'-P-CCNC: 23 U/L (ref 0–45)
BILIRUB SERPL-MCNC: 0.8 MG/DL
BILIRUB UR QL STRIP: NEGATIVE
BUN SERPL-MCNC: 13.9 MG/DL (ref 8–23)
CALCIUM SERPL-MCNC: 9.4 MG/DL (ref 8.8–10.2)
CHLORIDE SERPL-SCNC: 104 MMOL/L (ref 98–107)
CHOLEST SERPL-MCNC: 112 MG/DL
COLOR UR AUTO: NORMAL
CREAT SERPL-MCNC: 0.85 MG/DL (ref 0.67–1.17)
CREAT UR-MCNC: 96.1 MG/DL
DEPRECATED HCO3 PLAS-SCNC: 27 MMOL/L (ref 22–29)
EGFRCR SERPLBLD CKD-EPI 2021: 88 ML/MIN/1.73M2
ERYTHROCYTE [DISTWIDTH] IN BLOOD BY AUTOMATED COUNT: 13.2 % (ref 10–15)
FASTING STATUS PATIENT QL REPORTED: NORMAL
GLUCOSE SERPL-MCNC: 111 MG/DL (ref 70–99)
GLUCOSE UR STRIP-MCNC: NEGATIVE MG/DL
HBA1C MFR BLD: 6.5 % (ref 4–6.2)
HCT VFR BLD AUTO: 43 % (ref 40–53)
HDLC SERPL-MCNC: 47 MG/DL
HGB BLD-MCNC: 14.2 G/DL (ref 13.3–17.7)
HGB UR QL STRIP: NEGATIVE
KETONES UR STRIP-MCNC: NEGATIVE MG/DL
LDLC SERPL CALC-MCNC: 46 MG/DL
LEUKOCYTE ESTERASE UR QL STRIP: NEGATIVE
MCH RBC QN AUTO: 29.9 PG (ref 26.5–33)
MCHC RBC AUTO-ENTMCNC: 33 G/DL (ref 31.5–36.5)
MCV RBC AUTO: 91 FL (ref 78–100)
MICROALBUMIN UR-MCNC: <12 MG/L
MICROALBUMIN/CREAT UR: NORMAL MG/G{CREAT}
NITRATE UR QL: NEGATIVE
NONHDLC SERPL-MCNC: 65 MG/DL
PH UR STRIP: 6.5 [PH] (ref 5–9)
PLATELET # BLD AUTO: 194 10E3/UL (ref 150–450)
POTASSIUM SERPL-SCNC: 4.2 MMOL/L (ref 3.4–5.3)
PROT SERPL-MCNC: 7.2 G/DL (ref 6.4–8.3)
RBC # BLD AUTO: 4.75 10E6/UL (ref 4.4–5.9)
SODIUM SERPL-SCNC: 141 MMOL/L (ref 135–145)
SP GR UR STRIP: 1.01 (ref 1–1.03)
TRIGL SERPL-MCNC: 95 MG/DL
TSH SERPL DL<=0.005 MIU/L-ACNC: 1.38 UIU/ML (ref 0.3–4.2)
UROBILINOGEN UR STRIP-MCNC: NORMAL MG/DL
WBC # BLD AUTO: 4.4 10E3/UL (ref 4–11)

## 2024-01-10 PROCEDURE — 36415 COLL VENOUS BLD VENIPUNCTURE: CPT | Mod: ZL

## 2024-01-10 PROCEDURE — 81003 URINALYSIS AUTO W/O SCOPE: CPT | Mod: ZL

## 2024-01-10 PROCEDURE — 80053 COMPREHEN METABOLIC PANEL: CPT | Mod: ZL

## 2024-01-10 PROCEDURE — 80061 LIPID PANEL: CPT | Mod: ZL

## 2024-01-10 PROCEDURE — 85027 COMPLETE CBC AUTOMATED: CPT | Mod: ZL

## 2024-01-10 PROCEDURE — 84443 ASSAY THYROID STIM HORMONE: CPT | Mod: ZL

## 2024-01-10 PROCEDURE — 83036 HEMOGLOBIN GLYCOSYLATED A1C: CPT | Mod: ZL

## 2024-01-10 PROCEDURE — 82043 UR ALBUMIN QUANTITATIVE: CPT | Mod: ZL

## 2024-01-11 ENCOUNTER — OFFICE VISIT (OUTPATIENT)
Dept: INTERNAL MEDICINE | Facility: OTHER | Age: 79
End: 2024-01-11
Attending: INTERNAL MEDICINE
Payer: COMMERCIAL

## 2024-01-11 VITALS
SYSTOLIC BLOOD PRESSURE: 128 MMHG | WEIGHT: 185.4 LBS | BODY MASS INDEX: 29.8 KG/M2 | TEMPERATURE: 98 F | HEIGHT: 66 IN | HEART RATE: 60 BPM | DIASTOLIC BLOOD PRESSURE: 70 MMHG | OXYGEN SATURATION: 95 % | RESPIRATION RATE: 20 BRPM

## 2024-01-11 DIAGNOSIS — E66.811 CLASS 1 OBESITY DUE TO EXCESS CALORIES WITH SERIOUS COMORBIDITY AND BODY MASS INDEX (BMI) OF 30.0 TO 30.9 IN ADULT: ICD-10-CM

## 2024-01-11 DIAGNOSIS — E03.4 HYPOTHYROIDISM DUE TO ACQUIRED ATROPHY OF THYROID: ICD-10-CM

## 2024-01-11 DIAGNOSIS — F33.42 RECURRENT MAJOR DEPRESSIVE DISORDER, IN FULL REMISSION (H): ICD-10-CM

## 2024-01-11 DIAGNOSIS — F02.B0 MODERATE ALZHEIMER'S DEMENTIA WITHOUT BEHAVIORAL DISTURBANCE, PSYCHOTIC DISTURBANCE, MOOD DISTURBANCE, OR ANXIETY, UNSPECIFIED TIMING OF DEMENTIA ONSET (H): ICD-10-CM

## 2024-01-11 DIAGNOSIS — I25.10 CORONARY ARTERY DISEASE INVOLVING NATIVE CORONARY ARTERY OF NATIVE HEART WITHOUT ANGINA PECTORIS: ICD-10-CM

## 2024-01-11 DIAGNOSIS — E11.40 CONTROLLED TYPE 2 DIABETES MELLITUS WITH DIABETIC NEUROPATHY, WITHOUT LONG-TERM CURRENT USE OF INSULIN (H): Primary | ICD-10-CM

## 2024-01-11 DIAGNOSIS — K21.9 GASTROESOPHAGEAL REFLUX DISEASE WITHOUT ESOPHAGITIS: ICD-10-CM

## 2024-01-11 DIAGNOSIS — N18.2 CKD (CHRONIC KIDNEY DISEASE) STAGE 2, GFR 60-89 ML/MIN: ICD-10-CM

## 2024-01-11 DIAGNOSIS — J41.8 MIXED SIMPLE AND MUCOPURULENT CHRONIC BRONCHITIS (H): ICD-10-CM

## 2024-01-11 DIAGNOSIS — M46.98 UNSPECIFIED INFLAMMATORY SPONDYLOPATHY, SACRAL AND SACROCOCCYGEAL REGION (H): ICD-10-CM

## 2024-01-11 DIAGNOSIS — E66.09 CLASS 1 OBESITY DUE TO EXCESS CALORIES WITH SERIOUS COMORBIDITY AND BODY MASS INDEX (BMI) OF 30.0 TO 30.9 IN ADULT: ICD-10-CM

## 2024-01-11 DIAGNOSIS — E78.2 MIXED HYPERLIPIDEMIA: ICD-10-CM

## 2024-01-11 DIAGNOSIS — I10 BENIGN ESSENTIAL HYPERTENSION: ICD-10-CM

## 2024-01-11 DIAGNOSIS — G30.9 MODERATE ALZHEIMER'S DEMENTIA WITHOUT BEHAVIORAL DISTURBANCE, PSYCHOTIC DISTURBANCE, MOOD DISTURBANCE, OR ANXIETY, UNSPECIFIED TIMING OF DEMENTIA ONSET (H): ICD-10-CM

## 2024-01-11 DIAGNOSIS — E11.21 TYPE 2 DIABETES MELLITUS WITH DIABETIC NEPHROPATHY, WITHOUT LONG-TERM CURRENT USE OF INSULIN (H): ICD-10-CM

## 2024-01-11 PROCEDURE — 99214 OFFICE O/P EST MOD 30 MIN: CPT | Performed by: INTERNAL MEDICINE

## 2024-01-11 PROCEDURE — G0463 HOSPITAL OUTPT CLINIC VISIT: HCPCS

## 2024-01-11 RX ORDER — CITALOPRAM HYDROBROMIDE 10 MG/1
10 TABLET ORAL DAILY
Qty: 90 TABLET | Refills: 1 | Status: SHIPPED | OUTPATIENT
Start: 2024-01-11 | End: 2024-04-19

## 2024-01-11 RX ORDER — ROSUVASTATIN CALCIUM 5 MG/1
5 TABLET, COATED ORAL DAILY
Qty: 90 TABLET | Refills: 1 | Status: SHIPPED | OUTPATIENT
Start: 2024-01-11 | End: 2024-04-19

## 2024-01-11 RX ORDER — GABAPENTIN 100 MG/1
100-200 CAPSULE ORAL AT BEDTIME
Qty: 150 CAPSULE | Refills: 1 | Status: SHIPPED | OUTPATIENT
Start: 2024-01-11 | End: 2024-04-19

## 2024-01-11 RX ORDER — LEVOTHYROXINE SODIUM 50 UG/1
50 TABLET ORAL DAILY
Qty: 93 TABLET | Refills: 1 | Status: SHIPPED | OUTPATIENT
Start: 2024-01-11 | End: 2024-04-19

## 2024-01-11 ASSESSMENT — ENCOUNTER SYMPTOMS
DIARRHEA: 0
FEVER: 0
DIZZINESS: 0
DYSURIA: 0
LIGHT-HEADEDNESS: 0
PARESTHESIAS: 0
NAUSEA: 0
AGITATION: 0
HEARTBURN: 0
HEMATURIA: 0
ABDOMINAL PAIN: 0
BRUISES/BLEEDS EASILY: 0
WHEEZING: 0
DIFFICULTY URINATING: 1
EYE REDNESS: 1
MYALGIAS: 0
WEAKNESS: 1
CHILLS: 0
JOINT SWELLING: 0
FREQUENCY: 0
VOMITING: 0
SHORTNESS OF BREATH: 0
NUMBNESS: 1
BACK PAIN: 1
FATIGUE: 1
COUGH: 1
EYE PAIN: 1
HEMATOCHEZIA: 0
SLEEP DISTURBANCE: 0
ARTHRALGIAS: 1
NERVOUS/ANXIOUS: 1
HEADACHES: 1
CONSTIPATION: 0
SORE THROAT: 0
PALPITATIONS: 0

## 2024-01-11 ASSESSMENT — PAIN SCALES - GENERAL: PAINLEVEL: MILD PAIN (3)

## 2024-01-11 NOTE — PROGRESS NOTES
Assessment & Plan     ICD-10-CM    1. Controlled type 2 diabetes mellitus with diabetic neuropathy, without long-term current use of insulin (H)  E11.40 gabapentin (NEURONTIN) 100 MG capsule     metFORMIN (GLUCOPHAGE) 500 MG tablet      2. Benign essential hypertension  I10       3. Hypothyroidism due to acquired atrophy of thyroid  E03.4 levothyroxine (SYNTHROID/LEVOTHROID) 50 MCG tablet      4. Mixed hyperlipidemia  E78.2 rosuvastatin (CRESTOR) 5 MG tablet      5. Moderate Alzheimer's dementia without behavioral disturbance, psychotic disturbance, mood disturbance, or anxiety, unspecified timing of dementia onset (H)  G30.9     F02.B0       6. Coronary artery disease involving native coronary artery of native heart without angina pectoris  I25.10 rosuvastatin (CRESTOR) 5 MG tablet      7. Type 2 diabetes mellitus with diabetic nephropathy, without long-term current use of insulin (H)  E11.21 metFORMIN (GLUCOPHAGE) 500 MG tablet      8. CKD (chronic kidney disease) stage 2, GFR 60-89 ml/min  N18.2       9. Class 1 obesity due to excess calories with serious comorbidity and body mass index (BMI) of 30.0 to 30.9 in adult  E66.09     Z68.30       10. Recurrent major depressive disorder, in full remission (H24)  F33.42 citalopram (CELEXA) 10 MG tablet      11. Gastroesophageal reflux disease without esophagitis  K21.9 omeprazole (PRILOSEC) 20 MG DR capsule      12. Unspecified inflammatory spondylopathy, sacral and sacrococcygeal region (H24)  M46.98       13. Mixed simple and mucopurulent chronic bronchitis (H)  J41.8         Patient presents for follow-up multiple issues.    Moderate Alzheimer's dementia without behavioral disturbance.  Appears relatively stable.  Reports having previously tried Aricept but did not think it helped and discontinued use.  Declines additional medication trial at this time.    Coronary artery disease, appears stable.  Denies exertional angina.  Continues with Crestor 5 mg daily.  Needs  refills.    Diabetic polyneuropathy, chronic.  Ongoing.  Continues with gabapentin to help with neuropathy pain.  Doing well with metformin.  Denies medication side effects.  Refill sent to pharmacy.    Heartburn and reflux, ongoing but has noted improvement with regular dosing of omeprazole.  Needs to take regularly or gets breakthrough symptoms.  Needs refills.     chronic intermittent low back pain.  Has inflammatory spondylopathy.  No recent changes.  Continue Tylenol as needed.    Mixed simple and mucopurulent chronic bronchitis, appears relatively stable.  No new or worsening symptoms.  No inhalers or nebulizer treatments.  Continue close monitoring.    HYPERTENSION - Ongoing. Blood pressure is currently well controlled.  Medication side effects: None. Denies syncope or presyncope.  No changes for now. Continue current medications.   Medication list reviewed/updated. Refills completed as needed.      MIXED HYPERLIPIDEMIA.  Ongoing. LDL is at goal: Yes. Triglycerides are at goal: Yes.    Medication side effects reported: None.   Continue current medications for now. Medication list reviewed/updated. Refills completed as needed.  Recent Labs   Lab Test 01/10/24  0851 10/02/23  0915   CHOL 112 114   HDL 47 45   LDL 46 51   TRIG 95 88        HYPOTHYROIDISM - Patient has a longstanding history of chronic hypothyroidism. Patient has been doing reasonably well. Reports: denies changes in fatigue, no weight changes, heat/cold intolerance, bowel/skin changes or CVS symptoms.  Continue: Synthroid oral thyroid replacement, denies adverse medication reactions or side effects.                       TSH   Date Value Ref Range Status   01/10/2024 1.38 0.30 - 4.20 uIU/mL Final   10/04/2022 2.52 0.40 - 4.00 mU/L Final        Chronic Kidney Disease, Stage 2 (GFR 60-89), chronic, ongoing.  Kidney function had been slowly declining.  Encouraged NSAID avoidance.       Vaccine counseling completed.  Encouraged routine / annual  vaccinations.     Type 2 Diabetes Mellitus, with neuropathy, Reports ongoing/previous: numbness.  Blood sugar control has been good with minimal hyperglycemia. Doing well with diet and oral agents.  Medication list reviewed/updated. Refills completed as needed.    Complicating factors include but are not limited to: hypertension, hyperlipidemia, and neuropathy, nephropathy.     Recent Labs   Lab Test 01/10/24  0851 10/02/23  0915 08/24/23  1154 07/26/23  0835 07/05/22  0848 03/29/22  0953   A1C 6.5* 7.0*  --  6.4*   < > 6.6*   LDL 46 51  --  68   < > 76   HDL 47 45  --  42   < > 41   TRIG 95 88  --  69   < > 107   ALT 16 15 18 17   < > 16   CR 0.85 0.72 0.72 0.79   < > 0.83   GFRESTIMATED 88 >90 >90 90   < > 90   POTASSIUM 4.2 4.4 4.3 3.7   < > 3.9   TSH 1.38 1.55  --  1.39   < > 4.14*   T4  --   --   --   --   --  0.68   WBC 4.4 6.1 5.5 5.5   < > 5.6   HGB 14.2 14.1 14.0 14.7   < > 14.8    196 198 204   < > 220   ALBUMIN 4.6 4.4 4.4 4.6   < > 4.6    < > = values in this interval not displayed.     Hemoglobin A1c is well-controlled.  LDL HDL and triglycerides are at goal.  ALT normal.  Creatinine is at baseline.  Potassium normal.  TSH normal.  CBC normal.  Albumin normal.    Return in about 3 months (around 4/11/2024) for Annual Medicare Wellness Visit, + Get Diabetic labs prior to clinic appointment.    Cm Pickens MD  [facility]     Alisha Graff is a 78 year old, presenting for the following health issues:  Diabetes        1/11/2024     9:31 AM   Additional Questions   Roomed by Francisco Estrada LPN   Accompanied by n/a       History of Present Illness       Diabetes:   He presents for follow up of diabetes.  He is checking home blood glucose one time daily.   He checks blood glucose before meals.  Blood glucose is never over 200 and never under 70. He is aware of hypoglycemia symptoms including none.      He is having numbness in feet and blurry vision.  The patient has had a  diabetic eye exam in the last 12 months. Eye exam performed on 06/26/2023. Location of last eye exam [facility].        He eats 2-3 servings of fruits and vegetables daily.He consumes 0 sweetened beverage(s) daily.He exercises with enough effort to increase his heart rate 20 to 29 minutes per day.  He exercises with enough effort to increase his heart rate 5 days per week.   He is taking medications regularly.     Review of Systems   Constitutional:  Positive for fatigue (chronic, but stable). Negative for chills and fever.   HENT:  Positive for hearing loss. Negative for congestion, ear pain, nosebleeds and sore throat.    Eyes:  Positive for pain, redness and visual disturbance.        + Droopy left eyelid.    Respiratory:  Positive for cough. Negative for shortness of breath and wheezing.    Cardiovascular:  Negative for chest pain, palpitations and peripheral edema.        Cardiac stress testing 3/2020 - negative/normal results.   Gastrointestinal:  Negative for abdominal pain, constipation, diarrhea, heartburn, hematochezia, nausea and vomiting.        + left inguinal pains intermittently   Endocrine: Negative for cold intolerance and heat intolerance.   Genitourinary:  Positive for difficulty urinating (Feeling of incomplete bladder emptying and urinary frequency). Negative for dysuria, frequency, genital sores, hematuria, impotence, penile discharge and urgency.   Musculoskeletal:  Positive for arthralgias, back pain (left low back / buttocks pain) and gait problem (staggering at times, low back and left knee pain). Negative for joint swelling and myalgias.        Bilateral feet with bunions   Skin:  Negative for pallor and rash.   Allergic/Immunologic: Negative for immunocompromised state.   Neurological:  Positive for weakness, numbness (right > left feet) and headaches. Negative for dizziness, light-headedness and paresthesias.   Hematological:  Does not bruise/bleed easily.   Psychiatric/Behavioral:   "Positive for mood changes. Negative for agitation and sleep disturbance. The patient is nervous/anxious.         + short term memory loss -- stopped Exelon and Namenda -- due to side effects.   + reporting less anxiety / depression - virus pandemic had worsened everything  -- Seems much improved with Citalopram.          Objective    /70 (BP Location: Right arm, Patient Position: Sitting, Cuff Size: Adult Regular)   Pulse 60   Temp 98  F (36.7  C) (Temporal)   Resp 20   Ht 1.664 m (5' 5.5\")   Wt 84.1 kg (185 lb 6.4 oz)   SpO2 95%   BMI 30.38 kg/m    Body mass index is 30.38 kg/m .  Physical Exam  Constitutional:       General: He is not in acute distress.     Appearance: He is well-developed. He is obese. He is not diaphoretic.   HENT:      Head: Normocephalic and atraumatic.      Nose: No rhinorrhea.   Eyes:      General: No scleral icterus.     Conjunctiva/sclera: Conjunctivae normal.   Cardiovascular:      Rate and Rhythm: Normal rate and regular rhythm.   Pulmonary:      Effort: Pulmonary effort is normal.      Breath sounds: Normal breath sounds.   Abdominal:      Palpations: Abdomen is soft.      Tenderness: There is no abdominal tenderness.   Musculoskeletal:         General: No deformity.      Cervical back: Neck supple.   Lymphadenopathy:      Cervical: No cervical adenopathy.   Skin:     General: Skin is warm and dry.      Coloration: Skin is not jaundiced.      Findings: No rash.   Neurological:      Mental Status: He is alert. Mental status is at baseline.   Psychiatric:         Mood and Affect: Mood normal.         Behavior: Behavior normal.                      "

## 2024-01-11 NOTE — PATIENT INSTRUCTIONS
Blood pressure is well controlled.   Diabetes is well controlled.     Medications refilled.   Labs are stable.     Lab on 01/10/2024   Component Date Value Ref Range Status    Color Urine 01/10/2024 Light Yellow  Colorless, Straw, Light Yellow, Yellow Final    Appearance Urine 01/10/2024 Clear  Clear Final    Glucose Urine 01/10/2024 Negative  Negative mg/dL Final    Bilirubin Urine 01/10/2024 Negative  Negative Final    Ketones Urine 01/10/2024 Negative  Negative mg/dL Final    Specific Gravity Urine 01/10/2024 1.015  1.000 - 1.030 Final    Blood Urine 01/10/2024 Negative  Negative Final    pH Urine 01/10/2024 6.5  5.0 - 9.0 Final    Protein Albumin Urine 01/10/2024 Negative  Negative mg/dL Final    Urobilinogen Urine 01/10/2024 Normal  Normal, 2.0 mg/dL Final    Nitrite Urine 01/10/2024 Negative  Negative Final    Leukocyte Esterase Urine 01/10/2024 Negative  Negative Final    TSH 01/10/2024 1.38  0.30 - 4.20 uIU/mL Final    Hemoglobin A1C 01/10/2024 6.5 (H)  4.0 - 6.2 % Final    WBC Count 01/10/2024 4.4  4.0 - 11.0 10e3/uL Final    RBC Count 01/10/2024 4.75  4.40 - 5.90 10e6/uL Final    Hemoglobin 01/10/2024 14.2  13.3 - 17.7 g/dL Final    Hematocrit 01/10/2024 43.0  40.0 - 53.0 % Final    MCV 01/10/2024 91  78 - 100 fL Final    MCH 01/10/2024 29.9  26.5 - 33.0 pg Final    MCHC 01/10/2024 33.0  31.5 - 36.5 g/dL Final    RDW 01/10/2024 13.2  10.0 - 15.0 % Final    Platelet Count 01/10/2024 194  150 - 450 10e3/uL Final    Creatinine Urine mg/dL 01/10/2024 96.1  mg/dL Final    The reference ranges have not been established in urine creatinine. The results should be integrated into the clinical context for interpretation.    Albumin Urine mg/L 01/10/2024 <12.0  mg/L Final    The reference ranges have not been established in urine albumin. The results should be integrated into the clinical context for interpretation.    Albumin Urine mg/g Cr 01/10/2024    Final    Unable to calculate, urine albumin and/or urine  creatinine is outside detectable limits.  Microalbuminuria is defined as an albumin:creatinine ratio of 17 to 299 for males and 25 to 299 for females. A ratio of albumin:creatinine of 300 or higher is indicative of overt proteinuria.  Due to biologic variability, positive results should be confirmed by a second, first-morning random or 24-hour timed urine specimen. If there is discrepancy, a third specimen is recommended. When 2 out of 3 results are in the microalbuminuria range, this is evidence for incipient nephropathy and warrants increased efforts at glucose control, blood pressure control, and institution of therapy with an angiotensin-converting-enzyme (ACE) inhibitor (if the patient can tolerate it).      Cholesterol 01/10/2024 112  <200 mg/dL Final    Triglycerides 01/10/2024 95  <150 mg/dL Final    Direct Measure HDL 01/10/2024 47  >=40 mg/dL Final    LDL Cholesterol Calculated 01/10/2024 46  <=100 mg/dL Final    Non HDL Cholesterol 01/10/2024 65  <130 mg/dL Final    Patient Fasting > 8hrs? 01/10/2024 Unknown   Final    Sodium 01/10/2024 141  135 - 145 mmol/L Final    Reference intervals for this test were updated on 09/26/2023 to more accurately reflect our healthy population. There may be differences in the flagging of prior results with similar values performed with this method. Interpretation of those prior results can be made in the context of the updated reference intervals.     Potassium 01/10/2024 4.2  3.4 - 5.3 mmol/L Final    Carbon Dioxide (CO2) 01/10/2024 27  22 - 29 mmol/L Final    Anion Gap 01/10/2024 10  7 - 15 mmol/L Final    Urea Nitrogen 01/10/2024 13.9  8.0 - 23.0 mg/dL Final    Creatinine 01/10/2024 0.85  0.67 - 1.17 mg/dL Final    GFR Estimate 01/10/2024 88  >60 mL/min/1.73m2 Final    Calcium 01/10/2024 9.4  8.8 - 10.2 mg/dL Final    Chloride 01/10/2024 104  98 - 107 mmol/L Final    Glucose 01/10/2024 111 (H)  70 - 99 mg/dL Final    Alkaline Phosphatase 01/10/2024 63  40 - 150 U/L  Final    Reference intervals for this test were updated on 11/14/2023 to more accurately reflect our healthy population. There may be differences in the flagging of prior results with similar values performed with this method. Interpretation of those prior results can be made in the context of the updated reference intervals.    AST 01/10/2024 23  0 - 45 U/L Final    Reference intervals for this test were updated on 6/12/2023 to more accurately reflect our healthy population. There may be differences in the flagging of prior results with similar values performed with this method. Interpretation of those prior results can be made in the context of the updated reference intervals.    ALT 01/10/2024 16  0 - 70 U/L Final    Reference intervals for this test were updated on 6/12/2023 to more accurately reflect our healthy population. There may be differences in the flagging of prior results with similar values performed with this method. Interpretation of those prior results can be made in the context of the updated reference intervals.      Protein Total 01/10/2024 7.2  6.4 - 8.3 g/dL Final    Albumin 01/10/2024 4.6  3.5 - 5.2 g/dL Final    Bilirubin Total 01/10/2024 0.8  <=1.2 mg/dL Final      Aspects of Diabetes:   Recent Labs   Lab Test 01/10/24  0851 10/02/23  0915 08/24/23  1154 07/26/23  0835 07/05/22  0848 03/29/22  0953   A1C 6.5* 7.0*  --  6.4*   < > 6.6*   LDL 46 51  --  68   < > 76   HDL 47 45  --  42   < > 41   TRIG 95 88  --  69   < > 107   ALT 16 15 18 17   < > 16   CR 0.85 0.72 0.72 0.79   < > 0.83   GFRESTIMATED 88 >90 >90 90   < > 90   POTASSIUM 4.2 4.4 4.3 3.7   < > 3.9   TSH 1.38 1.55  --  1.39   < > 4.14*   T4  --   --   --   --   --  0.68   WBC 4.4 6.1 5.5 5.5   < > 5.6   HGB 14.2 14.1 14.0 14.7   < > 14.8    196 198 204   < > 220   ALBUMIN 4.6 4.4 4.4 4.6   < > 4.6    < > = values in this interval not displayed.      Hemoglobin A1c  Goal range is under 8%. Best is 6.5 to 7   Blood Pressure  128/70 Goal to keep less than 140/90   Tobacco  reports that he quit smoking about 40 years ago. His smoking use included cigarettes. He started smoking about 67 years ago. He smoked an average of 1 pack per day. He has never been exposed to tobacco smoke. He has never used smokeless tobacco. Goal to abstain from tobacco   Aspirin or Plavix Anti-platelet therapy Aspirin or Plavix reduces risk of heart disease and stroke  -- sometimes used with other blood thinners, depending on bleeding risk and risk factors.    ACE/ARB Specific blood pressure meds These medications can reduce risk of kidney disease   Cholesterol Statins (Lipitor, Crestor, vs others) Statins reduce risk of heart disease and stroke   Eye Exam -- Do Yearly -- Annual diabetic eye exam   Healthy weight Wt Readings from Last 4 Encounters:   01/11/24 84.1 kg (185 lb 6.4 oz)   12/06/23 89.2 kg (196 lb 9.6 oz)   10/18/23 85.2 kg (187 lb 12.8 oz)   10/03/23 86.7 kg (191 lb 3.2 oz)      Body mass index is 30.38 kg/m .  Goal BMI under 30, best is under 25.      -- Trying to exercise daily (goal at least 20 min/day) with moderate aerobic activity   -- Eat healthy (resources from ADA at http://www.diabetes.org/)   -- Taking good care of my feet. Consider seeing the Podiatrist   -- Check blood sugars as directed, record in log book and bring to every appointment    Insurance companies are grading you and I on your blood sugar control -- Goal is to get your A1c down to 7.9% or lower and NO Smoking!  -- Medicare and most insurance companies, will only cover Hemoglobin A1c labs to be rechecked every 91+ days.      Return for Diabetes labs and clinic follow-up appointment every 3 to 4 months.    Schedule lab only appointment --- A few days AFTER: 4/10/24   Schedule clinic appointment with Dr. Pickens -- Same day as labs, or 1-2 days later.

## 2024-01-11 NOTE — NURSING NOTE
"Chief Complaint   Patient presents with    Diabetes       Initial /70 (BP Location: Right arm, Patient Position: Sitting, Cuff Size: Adult Regular)   Pulse 60   Temp 98  F (36.7  C) (Temporal)   Resp 20   Ht 1.664 m (5' 5.5\")   Wt 84.1 kg (185 lb 6.4 oz)   SpO2 95%   BMI 30.38 kg/m   Estimated body mass index is 30.38 kg/m  as calculated from the following:    Height as of this encounter: 1.664 m (5' 5.5\").    Weight as of this encounter: 84.1 kg (185 lb 6.4 oz).  Medication Review: complete    The next two questions are to help us understand your food security.  If you are feeling you need any assistance in this area, we have resources available to support you today.          1/11/2024   SDOH- Food Insecurity   Within the past 12 months, did you worry that your food would run out before you got money to buy more? N   Within the past 12 months, did the food you bought just not last and you didn t have money to get more? N         Health Care Directive:  Patient does not have a Health Care Directive or Living Will: Discussed advance care planning with patient; however, patient declined at this time.    Francisco Tay      "

## 2024-02-27 DIAGNOSIS — E11.21 TYPE 2 DIABETES MELLITUS WITH DIABETIC NEPHROPATHY, WITHOUT LONG-TERM CURRENT USE OF INSULIN (H): ICD-10-CM

## 2024-02-29 RX ORDER — BLOOD SUGAR DIAGNOSTIC
STRIP MISCELLANEOUS
Qty: 100 STRIP | Refills: 0 | Status: SHIPPED | OUTPATIENT
Start: 2024-02-29 | End: 2024-06-03

## 2024-02-29 NOTE — TELEPHONE ENCOUNTER
Reason for call: Medication or medication refill    Name of medication requested: Test strips    How many days of medication do you have left? 0    What pharmacy do you use? [facility]    Preferred method for responding to this message: Telephone Call    Phone number patient can be reached at: Other phone number [phone]    If we cannot reach you directly, may we leave a detailed response at the number you provided? Yes    Effie Steiner on 2/29/2024 at 11:30 AM

## 2024-02-29 NOTE — TELEPHONE ENCOUNTER
[id number] ([employer]) of [place] sent Rx request for the following:      Requested Prescriptions   Pending Prescriptions Disp Refills    blood glucose (ACCU-CHEK ONEL PLUS) test strip [Pharmacy Med Name: ACCU-CHEK ONEL PLUS STRIP] 200 strip 11     Sig: USE STRIP TO CHECK GLUCOSEONCE DAILY   Last Prescription Date:   1/13/23  Last Fill Qty/Refills:         200, R-11    Last Office Visit:              1/11/24   Future Office visit:             Next 5 appointments (look out 90 days)      Apr 16, 2024  4:00 PM  (Arrive by 3:45 PM)  SHORT with Cm Pickens MD  [facility] ([facility] ) [address]  [place] [id number] [phone]          Per LOV note:  Return in about 3 months (around 4/11/2024) for Annual Medicare Wellness Visit, + Get Diabetic labs prior to clinic appointment.     Prescription approved per [facility] Refill Protocol.    Alanna Patterson RN .............. 2/29/2024  11:42 AM

## 2024-04-10 ENCOUNTER — TELEPHONE (OUTPATIENT)
Dept: INTERNAL MEDICINE | Facility: OTHER | Age: 79
End: 2024-04-10
Payer: COMMERCIAL

## 2024-04-10 DIAGNOSIS — E03.4 HYPOTHYROIDISM DUE TO ACQUIRED ATROPHY OF THYROID: ICD-10-CM

## 2024-04-10 DIAGNOSIS — E11.21 TYPE 2 DIABETES MELLITUS WITH DIABETIC NEPHROPATHY, WITHOUT LONG-TERM CURRENT USE OF INSULIN (H): Primary | ICD-10-CM

## 2024-04-10 DIAGNOSIS — E78.2 MIXED HYPERLIPIDEMIA: ICD-10-CM

## 2024-04-15 ENCOUNTER — LAB (OUTPATIENT)
Dept: LAB | Facility: OTHER | Age: 79
End: 2024-04-15
Attending: INTERNAL MEDICINE
Payer: MEDICARE

## 2024-04-15 DIAGNOSIS — E11.21 TYPE 2 DIABETES MELLITUS WITH DIABETIC NEPHROPATHY, WITHOUT LONG-TERM CURRENT USE OF INSULIN (H): ICD-10-CM

## 2024-04-15 DIAGNOSIS — E03.4 HYPOTHYROIDISM DUE TO ACQUIRED ATROPHY OF THYROID: ICD-10-CM

## 2024-04-15 DIAGNOSIS — Z12.5 ENCOUNTER FOR SCREENING FOR MALIGNANT NEOPLASM OF PROSTATE: ICD-10-CM

## 2024-04-15 DIAGNOSIS — E78.2 MIXED HYPERLIPIDEMIA: ICD-10-CM

## 2024-04-15 LAB
ALBUMIN SERPL BCG-MCNC: 4.7 G/DL (ref 3.5–5.2)
ALBUMIN UR-MCNC: NEGATIVE MG/DL
ALP SERPL-CCNC: 73 U/L (ref 40–150)
ALT SERPL W P-5'-P-CCNC: 14 U/L (ref 0–70)
ANION GAP SERPL CALCULATED.3IONS-SCNC: 10 MMOL/L (ref 7–15)
APPEARANCE UR: CLEAR
AST SERPL W P-5'-P-CCNC: 18 U/L (ref 0–45)
BILIRUB SERPL-MCNC: 0.8 MG/DL
BILIRUB UR QL STRIP: NEGATIVE
BUN SERPL-MCNC: 15.4 MG/DL (ref 8–23)
CALCIUM SERPL-MCNC: 9.5 MG/DL (ref 8.8–10.2)
CHLORIDE SERPL-SCNC: 105 MMOL/L (ref 98–107)
CHOLEST SERPL-MCNC: 127 MG/DL
COLOR UR AUTO: YELLOW
CREAT SERPL-MCNC: 0.8 MG/DL (ref 0.67–1.17)
CREAT UR-MCNC: 114.3 MG/DL
DEPRECATED HCO3 PLAS-SCNC: 27 MMOL/L (ref 22–29)
EGFRCR SERPLBLD CKD-EPI 2021: 89 ML/MIN/1.73M2
ERYTHROCYTE [DISTWIDTH] IN BLOOD BY AUTOMATED COUNT: 13.2 % (ref 10–15)
FASTING STATUS PATIENT QL REPORTED: YES
GLUCOSE SERPL-MCNC: 124 MG/DL (ref 70–99)
GLUCOSE UR STRIP-MCNC: NEGATIVE MG/DL
HBA1C MFR BLD: 6.9 % (ref 4–6.2)
HCT VFR BLD AUTO: 44.6 % (ref 40–53)
HDLC SERPL-MCNC: 46 MG/DL
HGB BLD-MCNC: 14.6 G/DL (ref 13.3–17.7)
HGB UR QL STRIP: NEGATIVE
KETONES UR STRIP-MCNC: NEGATIVE MG/DL
LDLC SERPL CALC-MCNC: 67 MG/DL
LEUKOCYTE ESTERASE UR QL STRIP: NEGATIVE
MCH RBC QN AUTO: 30.5 PG (ref 26.5–33)
MCHC RBC AUTO-ENTMCNC: 32.7 G/DL (ref 31.5–36.5)
MCV RBC AUTO: 93 FL (ref 78–100)
MICROALBUMIN UR-MCNC: 21.3 MG/L
MICROALBUMIN/CREAT UR: 18.64 MG/G CR (ref 0–17)
NITRATE UR QL: NEGATIVE
NONHDLC SERPL-MCNC: 81 MG/DL
PH UR STRIP: 7 [PH] (ref 5–9)
PLATELET # BLD AUTO: 207 10E3/UL (ref 150–450)
POTASSIUM SERPL-SCNC: 4.3 MMOL/L (ref 3.4–5.3)
PROT SERPL-MCNC: 7.2 G/DL (ref 6.4–8.3)
RBC # BLD AUTO: 4.79 10E6/UL (ref 4.4–5.9)
SODIUM SERPL-SCNC: 142 MMOL/L (ref 135–145)
SP GR UR STRIP: 1.02 (ref 1–1.03)
TRIGL SERPL-MCNC: 69 MG/DL
TSH SERPL DL<=0.005 MIU/L-ACNC: 1.96 UIU/ML (ref 0.3–4.2)
UROBILINOGEN UR STRIP-MCNC: NORMAL MG/DL
WBC # BLD AUTO: 5.1 10E3/UL (ref 4–11)

## 2024-04-15 PROCEDURE — 36415 COLL VENOUS BLD VENIPUNCTURE: CPT | Mod: ZL

## 2024-04-15 PROCEDURE — 82043 UR ALBUMIN QUANTITATIVE: CPT | Mod: ZL

## 2024-04-15 PROCEDURE — 83036 HEMOGLOBIN GLYCOSYLATED A1C: CPT | Mod: ZL

## 2024-04-15 PROCEDURE — 84443 ASSAY THYROID STIM HORMONE: CPT | Mod: ZL

## 2024-04-15 PROCEDURE — 85027 COMPLETE CBC AUTOMATED: CPT | Mod: ZL

## 2024-04-15 PROCEDURE — 80061 LIPID PANEL: CPT | Mod: ZL

## 2024-04-15 PROCEDURE — 81003 URINALYSIS AUTO W/O SCOPE: CPT | Mod: ZL

## 2024-04-15 PROCEDURE — 80053 COMPREHEN METABOLIC PANEL: CPT | Mod: ZL

## 2024-04-15 PROCEDURE — G0103 PSA SCREENING: HCPCS | Mod: ZL

## 2024-04-16 ENCOUNTER — OFFICE VISIT (OUTPATIENT)
Dept: INTERNAL MEDICINE | Facility: OTHER | Age: 79
End: 2024-04-16
Attending: INTERNAL MEDICINE
Payer: COMMERCIAL

## 2024-04-16 VITALS
OXYGEN SATURATION: 97 % | HEIGHT: 66 IN | WEIGHT: 191.2 LBS | RESPIRATION RATE: 18 BRPM | BODY MASS INDEX: 30.73 KG/M2 | DIASTOLIC BLOOD PRESSURE: 88 MMHG | HEART RATE: 96 BPM | TEMPERATURE: 97.1 F | SYSTOLIC BLOOD PRESSURE: 138 MMHG

## 2024-04-16 DIAGNOSIS — Z00.00 MEDICARE ANNUAL WELLNESS VISIT, SUBSEQUENT: ICD-10-CM

## 2024-04-16 DIAGNOSIS — N18.2 CKD (CHRONIC KIDNEY DISEASE) STAGE 2, GFR 60-89 ML/MIN: ICD-10-CM

## 2024-04-16 DIAGNOSIS — Z71.85 VACCINE COUNSELING: ICD-10-CM

## 2024-04-16 DIAGNOSIS — Z95.5 STATUS POST INSERTION OF DRUG ELUTING CORONARY ARTERY STENT: ICD-10-CM

## 2024-04-16 DIAGNOSIS — E66.09 CLASS 1 OBESITY DUE TO EXCESS CALORIES WITH SERIOUS COMORBIDITY AND BODY MASS INDEX (BMI) OF 30.0 TO 30.9 IN ADULT: ICD-10-CM

## 2024-04-16 DIAGNOSIS — J41.8 MIXED SIMPLE AND MUCOPURULENT CHRONIC BRONCHITIS (H): ICD-10-CM

## 2024-04-16 DIAGNOSIS — Z23 NEED FOR COVID-19 VACCINE: ICD-10-CM

## 2024-04-16 DIAGNOSIS — Z95.5 STENTED CORONARY ARTERY: ICD-10-CM

## 2024-04-16 DIAGNOSIS — K21.9 GASTROESOPHAGEAL REFLUX DISEASE WITHOUT ESOPHAGITIS: ICD-10-CM

## 2024-04-16 DIAGNOSIS — G30.9 MODERATE ALZHEIMER'S DEMENTIA WITHOUT BEHAVIORAL DISTURBANCE, PSYCHOTIC DISTURBANCE, MOOD DISTURBANCE, OR ANXIETY, UNSPECIFIED TIMING OF DEMENTIA ONSET (H): ICD-10-CM

## 2024-04-16 DIAGNOSIS — E11.42 CONTROLLED TYPE 2 DIABETES MELLITUS WITH DIABETIC POLYNEUROPATHY, WITHOUT LONG-TERM CURRENT USE OF INSULIN (H): ICD-10-CM

## 2024-04-16 DIAGNOSIS — F33.42 RECURRENT MAJOR DEPRESSIVE DISORDER, IN FULL REMISSION (H): ICD-10-CM

## 2024-04-16 DIAGNOSIS — E11.21 TYPE 2 DIABETES MELLITUS WITH DIABETIC NEPHROPATHY, WITHOUT LONG-TERM CURRENT USE OF INSULIN (H): ICD-10-CM

## 2024-04-16 DIAGNOSIS — I10 BENIGN ESSENTIAL HYPERTENSION: Primary | ICD-10-CM

## 2024-04-16 DIAGNOSIS — E03.4 HYPOTHYROIDISM DUE TO ACQUIRED ATROPHY OF THYROID: ICD-10-CM

## 2024-04-16 DIAGNOSIS — E66.811 CLASS 1 OBESITY DUE TO EXCESS CALORIES WITH SERIOUS COMORBIDITY AND BODY MASS INDEX (BMI) OF 30.0 TO 30.9 IN ADULT: ICD-10-CM

## 2024-04-16 DIAGNOSIS — I25.10 CORONARY ARTERY DISEASE INVOLVING NATIVE CORONARY ARTERY OF NATIVE HEART WITHOUT ANGINA PECTORIS: ICD-10-CM

## 2024-04-16 DIAGNOSIS — F02.B0 MODERATE ALZHEIMER'S DEMENTIA WITHOUT BEHAVIORAL DISTURBANCE, PSYCHOTIC DISTURBANCE, MOOD DISTURBANCE, OR ANXIETY, UNSPECIFIED TIMING OF DEMENTIA ONSET (H): ICD-10-CM

## 2024-04-16 DIAGNOSIS — Z12.5 ENCOUNTER FOR SCREENING FOR MALIGNANT NEOPLASM OF PROSTATE: ICD-10-CM

## 2024-04-16 DIAGNOSIS — E78.2 MIXED HYPERLIPIDEMIA: ICD-10-CM

## 2024-04-16 LAB — PSA SERPL DL<=0.01 NG/ML-MCNC: 4.72 NG/ML

## 2024-04-16 PROCEDURE — G0439 PPPS, SUBSEQ VISIT: HCPCS | Performed by: INTERNAL MEDICINE

## 2024-04-16 PROCEDURE — G0463 HOSPITAL OUTPT CLINIC VISIT: HCPCS

## 2024-04-16 PROCEDURE — 99214 OFFICE O/P EST MOD 30 MIN: CPT | Mod: 25 | Performed by: INTERNAL MEDICINE

## 2024-04-16 PROCEDURE — 90480 ADMN SARSCOV2 VAC 1/ONLY CMP: CPT

## 2024-04-16 PROCEDURE — G0463 HOSPITAL OUTPT CLINIC VISIT: HCPCS | Mod: 25

## 2024-04-16 SDOH — HEALTH STABILITY: PHYSICAL HEALTH: ON AVERAGE, HOW MANY DAYS PER WEEK DO YOU ENGAGE IN MODERATE TO STRENUOUS EXERCISE (LIKE A BRISK WALK)?: 3 DAYS

## 2024-04-16 SDOH — HEALTH STABILITY: PHYSICAL HEALTH: ON AVERAGE, HOW MANY MINUTES DO YOU ENGAGE IN EXERCISE AT THIS LEVEL?: 60 MIN

## 2024-04-16 ASSESSMENT — ANXIETY QUESTIONNAIRES
GAD7 TOTAL SCORE: 8
GAD7 TOTAL SCORE: 8
3. WORRYING TOO MUCH ABOUT DIFFERENT THINGS: MORE THAN HALF THE DAYS
IF YOU CHECKED OFF ANY PROBLEMS ON THIS QUESTIONNAIRE, HOW DIFFICULT HAVE THESE PROBLEMS MADE IT FOR YOU TO DO YOUR WORK, TAKE CARE OF THINGS AT HOME, OR GET ALONG WITH OTHER PEOPLE: NOT DIFFICULT AT ALL
2. NOT BEING ABLE TO STOP OR CONTROL WORRYING: MORE THAN HALF THE DAYS
5. BEING SO RESTLESS THAT IT IS HARD TO SIT STILL: NOT AT ALL
1. FEELING NERVOUS, ANXIOUS, OR ON EDGE: SEVERAL DAYS
2. NOT BEING ABLE TO STOP OR CONTROL WORRYING: MORE THAN HALF THE DAYS
7. FEELING AFRAID AS IF SOMETHING AWFUL MIGHT HAPPEN: SEVERAL DAYS
7. FEELING AFRAID AS IF SOMETHING AWFUL MIGHT HAPPEN: SEVERAL DAYS
6. BECOMING EASILY ANNOYED OR IRRITABLE: NOT AT ALL
GAD7 TOTAL SCORE: 8
8. IF YOU CHECKED OFF ANY PROBLEMS, HOW DIFFICULT HAVE THESE MADE IT FOR YOU TO DO YOUR WORK, TAKE CARE OF THINGS AT HOME, OR GET ALONG WITH OTHER PEOPLE?: NOT DIFFICULT AT ALL
GAD7 TOTAL SCORE: 8
3. WORRYING TOO MUCH ABOUT DIFFERENT THINGS: MORE THAN HALF THE DAYS
IF YOU CHECKED OFF ANY PROBLEMS ON THIS QUESTIONNAIRE, HOW DIFFICULT HAVE THESE PROBLEMS MADE IT FOR YOU TO DO YOUR WORK, TAKE CARE OF THINGS AT HOME, OR GET ALONG WITH OTHER PEOPLE: NOT DIFFICULT AT ALL
5. BEING SO RESTLESS THAT IT IS HARD TO SIT STILL: NOT AT ALL
6. BECOMING EASILY ANNOYED OR IRRITABLE: NOT AT ALL
4. TROUBLE RELAXING: MORE THAN HALF THE DAYS
1. FEELING NERVOUS, ANXIOUS, OR ON EDGE: SEVERAL DAYS
7. FEELING AFRAID AS IF SOMETHING AWFUL MIGHT HAPPEN: SEVERAL DAYS
4. TROUBLE RELAXING: MORE THAN HALF THE DAYS

## 2024-04-16 ASSESSMENT — ENCOUNTER SYMPTOMS
DYSURIA: 0
HEADACHES: 1
WHEEZING: 0
ABDOMINAL PAIN: 0
SORE THROAT: 0
DIFFICULTY URINATING: 1
CONSTIPATION: 0
NAUSEA: 0
EYE REDNESS: 1
HEMATOCHEZIA: 0
VOMITING: 0
FEVER: 0
WEAKNESS: 1
HEMATURIA: 0
SHORTNESS OF BREATH: 0
JOINT SWELLING: 0
PALPITATIONS: 0
ARTHRALGIAS: 1
FATIGUE: 1
HEARTBURN: 0
DIARRHEA: 0
MYALGIAS: 0
EYE PAIN: 1
BACK PAIN: 1
DIZZINESS: 0
AGITATION: 0
SLEEP DISTURBANCE: 0
BRUISES/BLEEDS EASILY: 0
FREQUENCY: 0
COUGH: 1
NERVOUS/ANXIOUS: 1
PARESTHESIAS: 0
CHILLS: 0
NUMBNESS: 1
LIGHT-HEADEDNESS: 0

## 2024-04-16 ASSESSMENT — PATIENT HEALTH QUESTIONNAIRE - PHQ9
SUM OF ALL RESPONSES TO PHQ QUESTIONS 1-9: 27
10. IF YOU CHECKED OFF ANY PROBLEMS, HOW DIFFICULT HAVE THESE PROBLEMS MADE IT FOR YOU TO DO YOUR WORK, TAKE CARE OF THINGS AT HOME, OR GET ALONG WITH OTHER PEOPLE: EXTREMELY DIFFICULT
SUM OF ALL RESPONSES TO PHQ QUESTIONS 1-9: 27
SUM OF ALL RESPONSES TO PHQ QUESTIONS 1-9: 27

## 2024-04-16 ASSESSMENT — PAIN SCALES - GENERAL: PAINLEVEL: MILD PAIN (3)

## 2024-04-16 ASSESSMENT — COLUMBIA-SUICIDE SEVERITY RATING SCALE - C-SSRS
2. IN THE PAST MONTH, HAVE YOU ACTUALLY HAD ANY THOUGHTS OF KILLING YOURSELF?: NO
1. WITHIN THE PAST MONTH, HAVE YOU WISHED YOU WERE DEAD OR WISHED YOU COULD GO TO SLEEP AND NOT WAKE UP?: NO
6. HAVE YOU EVER DONE ANYTHING, STARTED TO DO ANYTHING, OR PREPARED TO DO ANYTHING TO END YOUR LIFE?: NO

## 2024-04-16 ASSESSMENT — SOCIAL DETERMINANTS OF HEALTH (SDOH): HOW OFTEN DO YOU GET TOGETHER WITH FRIENDS OR RELATIVES?: THREE TIMES A WEEK

## 2024-04-16 NOTE — PROGRESS NOTES
"Assessment & Plan     ICD-10-CM    1. Benign essential hypertension  I10       2. CKD (chronic kidney disease) stage 2, GFR 60-89 ml/min  N18.2       3. Class 1 obesity due to excess calories with serious comorbidity and body mass index (BMI) of 30.0 to 30.9 in adult  E66.09     Z68.30       4. Moderate Alzheimer's dementia without behavioral disturbance, psychotic disturbance, mood disturbance, or anxiety, unspecified timing of dementia onset (H)  G30.9     F02.B0       5. Mixed simple and mucopurulent chronic bronchitis (H)  J41.8       6. Encounter for screening for malignant neoplasm of prostate  Z12.5 PSA Screen GH      7. Recurrent major depressive disorder, in full remission (H24)  F33.42       8. Controlled type 2 diabetes mellitus with diabetic polyneuropathy, without long-term current use of insulin (H)  E11.42       9. Status post insertion of drug eluting coronary artery stent - x2 stents - 1st OMB - 5/17/2018 - [facility]  Z95.5       10. Stented coronary artery to his LCX and OM1 on 5/17/18  Z95.5       11. Hypothyroidism due to acquired atrophy of thyroid  E03.4       12. Vaccine counseling  Z71.85       13. Medicare annual wellness visit, subsequent  Z00.00          Patient presents for ***    ***    {GI Chronic Problems List:355105}     ***    {[facility] 2021 Documentation (Optional):425170}  {2021 E&M time (Optional):977624}     BMI  Estimated body mass index is 30.86 kg/m  as calculated from the following:    Height as of this encounter: 1.676 m (5' 6\").    Weight as of this encounter: 86.7 kg (191 lb 3.2 oz).   {Weight Management Plan needed for ACO:686846}    Depression Screening Follow Up        { Link to C-SSRS ([facility]) Flowsheet :613213}  {C-SSRS results from today (Required):785646}      {After C-SRSS completed-Select to see recommended follow up based on results (Optional):502571}  Follow Up Actions Taken  {ACTIONS TAKEN:408267::\"Crisis resource information provided in the " "After Visit Summary\"}    Discussed the following ways the patient can remain in a safe environment:  {SAFE ENVIRONMENT:886196}  {FOLLOW UP PLANS (Optional) Includes COVID19 Treatment Plan:898967}    No follow-ups on file.      Cm Pickens MD  [facility]    Review of Systems    {DME Order Documentation:868098}     Alisha Graff is a 78 year old, presenting for the following health issues:  Diabetes and Medicare Visit        4/16/2024    11:08 AM   Additional Questions   Roomed by Clint Caicedo LPN     History of Present Illness       Reason for visit:  Diabetes    He eats 2-3 servings of fruits and vegetables daily.He consumes 0 sweetened beverage(s) daily.He exercises with enough effort to increase his heart rate 10 to 19 minutes per day.  He exercises with enough effort to increase his heart rate 3 or less days per week.   Healthy Habits:     Taking medications regularly:  Yes     {ALERT  Recent PHQ-9/EPDS score indicates suicidal ideations, document follow-up within the A/P section of the note :476081}{Provider Documentation  [facility] Flow[facility] :472962}  {MA/LPN/RN Pre-Provider Visit Orders- hCG/UA/Strep (Optional):685275}  Annual Wellness Visit   {Split Bill scripting  The purpose of this visit is to discuss your medical history and prevent health problems before you are sick. You may be responsible for a co-pay, coinsurance, or deductible if your visit today includes services such as checking on a sore throat, having an x-ray or lab test, or treating and evaluating a new or existing condition :201716}  Patient has been advised of split billing requirements and indicates understanding: {Yes and No:355168}   {Provider  Link to SmartSet :324892}{When using the AWV add on template, you must click accept below to accept the note after the patient responses are documented.  This must be done before the smartset is selected.  Click on the edit button to reopen " "the note to complete your documentation.  This will assure the patient specific education is prompted within the smartset based on patient response :610860}    {ROOMER if patient is in their first year of Medicare a vision screen is required click here to document the Vison screen and then refresh the note to pull in results  :395716}  Health Care Directive  Patient does not have a Health Care Directive or Living Will: {ADVANCE_DIRECTIVE_STATUS:046334}  In general, how would you rate your overall physical health? {Physical Health:208384}  Do you have a special diet?  {Diet:182349}  { Click here to complete exercise, social connection and stress questions After questions have been completed, refresh note to pull answers into note  :197605}      2024   Exercise, Social Connection, Stress   Days per week of moderate/strenous exercise 3 days   Average minutes spent exercising at this level 60 min   Frequency of gathering with friends or relatives Three times   Feel stress (tense, anxious, or unable to sleep) To some exte     Do you see a dentist two times every year?  {Dentist:321720}  Have you been more tired than usual lately?  {Energy level:679144}  If you drink alcohol do you typically have >3 drinks per day or >7 drinks per week? { :382008}  Do you have a current opioid prescription? { :852506}  Do you use any other controlled substances or medications that are not prescribed by a provider? {Substance Use :781763::\"None\"}  Social History     Tobacco Use    Smoking status: Former     Current packs/day: 0.00     Average packs/day: 1 pack/day for 26.8 years (26.8 ttl pk-yrs)     Types: Cigarettes     Start date:      Quit date: 10/25/1983     Years since quittin.5     Passive exposure: Never    Smokeless tobacco: Never   Vaping Use    Vaping status: Never Used   Substance Use Topics    Alcohol use: No    Drug use: No     {Provider  If there are gaps in the social history shown above, please follow the " "link to update and then refresh the note Link to Social and Substance History :833637}  Needs assistance for the following daily activities: { :434931}  Which of the following safety concerns are present in your home?  { :085727::\"none identified\"}   Do you (or your family members) have any concerns about your safety while driving?  {yes/no:735762}  Do you have any of the following hearing concerns?: { :428254}  In the past 6 months, have you been bothered by leaking of urine? {yes/no:259567}        1/11/2024   Social Factors   Worry food won't last until get money to buy more No   Food not last or not have enough money for food? No   Do you have housing?  Yes   Are you worried about losing your housing? No   Lack of transportation? No   Unable to get utilities (heat,electricity)? No          4/16/2024   Fall Risk   Fallen 2 or more times in the past year? No   Trouble with walking or balance? No        Today's PHQ-9 Score:       4/16/2024    11:46 AM   PHQ-9 SCORE   PHQ-9 Total Score MyChart 27 (Severe depression)   PHQ-9 Total Score 27       {Link to Fracture Risk Assessment Tool (Optional):727915}    {Provider  Use the storyboard to review patient history, after sections have been marked as reviewed, refresh note to capture documentation:690572}  {Provider   REQUIRED AWV use this link to review and update sexual activity history  after section has been marked as reviewed, refresh note to capture documentation:900891}  Reviewed and updated as needed this visit by Provider   Tobacco  Allergies  Meds  Problems  Med Hx  Surg Hx  Fam Hx            {HISTORY OPTIONS (Optional):277193}    Current providers sharing in care for this patient include:  Patient Care Team:  Cm Pickens MD as PCP - General (Internal Medicine)  Cm Pickens MD as Assigned PCP  Olivier Connell MD as Assigned Surgical Provider    The following health maintenance items are reviewed in Epic and correct as of today:  Health Maintenance " "  Topic Date Due    SPIROMETRY  Never done    RSV VACCINE (Pregnancy & 60+) (1 - 1-dose 60+ series) Never done    DIABETIC FOOT EXAM  03/26/2022    COVID-19 Vaccine (7 - 2023-24 season) 09/01/2023    MEDICARE ANNUAL WELLNESS VISIT  04/04/2024    EYE EXAM  06/26/2024    A1C  10/15/2024    PHQ-9  10/16/2024    BMP  04/15/2025    LIPID  04/15/2025    MICROALBUMIN  04/15/2025    TSH W/FREE T4 REFLEX  04/15/2025    FALL RISK ASSESSMENT  04/16/2025    ADVANCE CARE PLANNING  04/04/2028    DTAP/TDAP/TD IMMUNIZATION (3 - Td or Tdap) 12/06/2033    INFLUENZA VACCINE  Completed    Pneumococcal Vaccine: 65+ Years  Completed    URINALYSIS  Completed    ZOSTER IMMUNIZATION  Completed    COPD ACTION PLAN  Addressed    DEPRESSION ACTION PLAN  Addressed    IPV IMMUNIZATION  Aged Out    HPV IMMUNIZATION  Aged Out    MENINGITIS IMMUNIZATION  Aged Out    RSV MONOCLONAL ANTIBODY  Aged Out    COLORECTAL CANCER SCREENING  Discontinued       Appropriate preventive services were discussed with this patient, including applicable screening as appropriate for fall prevention, nutrition, physical activity, Tobacco-use cessation, weight loss and cognition.  Checklist reviewing preventive services available has been given to the patient.           4/16/2024   Mini Cog   Clock Draw Score 0 Abnormal   3 Item Recall 2 objects recalled   Mini Cog Total Score 2     {A Mini-Cog total score of 0-2 suggests the possibility of dementia, score of 3-5 suggests no dementia:077102}       {additonal problems for provider to add (Optional):988657}    {ROS Picklists (Optional):077177}      Objective    /88   Pulse 96   Temp 97.1  F (36.2  C)   Resp 18   Ht 1.676 m (5' 6\")   Wt 86.7 kg (191 lb 3.2 oz)   SpO2 97%   BMI 30.86 kg/m    Body mass index is 30.86 kg/m .  Physical Exam   {Exam List (Optional):543087}    {Diagnostic Test Results (Optional):036661}        Signed Electronically by: Cm Pickens MD  {Email feedback regarding this note to " [email or web address]   :036823}

## 2024-04-16 NOTE — NURSING NOTE
"Chief Complaint   Patient presents with    Diabetes       Initial /88   Pulse 96   Temp 97.1  F (36.2  C)   Resp 18   Ht 1.676 m (5' 6\")   Wt 86.7 kg (191 lb 3.2 oz)   SpO2 97%   BMI 30.86 kg/m   Estimated body mass index is 30.86 kg/m  as calculated from the following:    Height as of this encounter: 1.676 m (5' 6\").    Weight as of this encounter: 86.7 kg (191 lb 3.2 oz).  Medication Review: complete    The next two questions are to help us understand your food security.  If you are feeling you need any assistance in this area, we have resources available to support you today.          1/11/2024   SDOH- Food Insecurity   Within the past 12 months, did you worry that your food would run out before you got money to buy more? N   Within the past 12 months, did the food you bought just not last and you didn t have money to get more? N         Health Care Directive:  Patient does not have a Health Care Directive or Living Will: Discussed advance care planning with patient; however, patient declined at this time.    Yas Caicedo LPN      "

## 2024-04-16 NOTE — PROGRESS NOTES
Assessment & Plan     ICD-10-CM    1. Benign essential hypertension  I10       2. CKD (chronic kidney disease) stage 2, GFR 60-89 ml/min  N18.2       3. Class 1 obesity due to excess calories with serious comorbidity and body mass index (BMI) of 30.0 to 30.9 in adult  E66.09     Z68.30       4. Moderate Alzheimer's dementia without behavioral disturbance, psychotic disturbance, mood disturbance, or anxiety, unspecified timing of dementia onset (H)  G30.9     F02.B0       5. Mixed simple and mucopurulent chronic bronchitis (H)  J41.8       6. Encounter for screening for malignant neoplasm of prostate  Z12.5 PSA Screen GH      7. Recurrent major depressive disorder, in full remission (H24)  F33.42 citalopram (CELEXA) 10 MG tablet      8. Controlled type 2 diabetes mellitus with diabetic polyneuropathy, without long-term current use of insulin (H)  E11.42 metFORMIN (GLUCOPHAGE) 500 MG tablet      9. Status post insertion of drug eluting coronary artery stent - x2 stents - [facility]st [facility] - [facility]17/2018 [facility]  Z95.5       10. Stented coronary artery to his LCX and OM1 on 5/17/18  Z95.5       11. Hypothyroidism due to acquired atrophy of thyroid  E03.4 levothyroxine (SYNTHROID/LEVOTHROID) 50 MCG tablet      12. Vaccine counseling  Z71.85       13. Medicare annual wellness visit, subsequent  Z00.00       14. Need for COVID-19 vaccine  Z23 COVID-19 12+ (2023-24) (PFIZER)      15. Type 2 diabetes mellitus with diabetic nephropathy, without long-term current use of insulin (H)  E11.21 metFORMIN (GLUCOPHAGE) 500 MG tablet      16. Gastroesophageal reflux disease without esophagitis  K21.9 omeprazole (PRILOSEC) 20 MG DR capsule      17. Mixed hyperlipidemia  E78.2 rosuvastatin (CRESTOR) 5 MG tablet      18. Coronary artery disease involving native coronary artery of native heart without angina pectoris  I25.10 rosuvastatin (CRESTOR) 5 MG tablet         Patient presents for Medicare annual wellness visit as well as  follow-up multiple issues.    Patient has moderate Alzheimer's dementia.  History of depression, currently remission.    Coronary disease, history of multiple stent placement in the past.  Currently stable.  Denies exertional angina.  Continue current medications.  Doing well with Crestor, aspirin, losartan.    Diabetic polyneuropathy.  Chronic.  Ongoing.  Continues with gabapentin.  Has found helpful beneficial.  Tolerating well.  Refill sent to pharmacy.    Heartburn and reflux, chronic, ongoing.  Continues with omeprazole.  Needs to take regularly or gets recurrent symptoms.  Refill sent to pharmacy.    HYPERTENSION - Ongoing. Blood pressure is currently well controlled.  Medication side effects: None. Denies syncope or presyncope.  Continue current medications.   Medication list reviewed/updated. Refills completed as needed.      MIXED HYPERLIPIDEMIA.  Ongoing. LDL is at goal: Yes. Triglycerides are at goal: Yes.    Medication side effects reported: None.   Continue current medications for now. Medication list reviewed/updated. Refills completed as needed.  Recent Labs   Lab Test 04/15/24  0903 01/10/24  0851   CHOL 127 112   HDL 46 47   LDL 67 46   TRIG 69 95        HYPOTHYROIDISM - Patient has a longstanding history of hypothyroidism. Reports doing reasonably well. Reports: denies fatigue, weight changes, heat/cold intolerance, bowel/skin changes or CVS symptoms.  Continue: Synthroid oral thyroid replacement.  Denies adverse medication reactions or side effects.                       TSH   Date Value Ref Range Status   04/15/2024 1.96 0.30 - 4.20 uIU/mL Final   10/04/2022 2.52 0.40 - 4.00 mU/L Final        COPD - Patient has a longstanding history of COPD: Moderate = FeV1 < 79% -50%. Patient has been doing reasonably well overall noting NO SYMPTOMS and continues on NO medication regimen without adverse reactions or side effects.      OBESITY - Ongoing.  (See Encounter Diagnosis list above for obesity class /  "severity).  - Encourage continued maintenance / improvement in diet and exercise.   - Consider Nutrition / Dietician appointment.  - Weight loss would improve Hypertension, Cholesterol and Diabetes.      Chronic Kidney Disease, Stage 2 (GFR 60-89), chronic, ongoing.  Kidney function had been slowly declining.  Encourage NSAID avoidance.       Vaccine counseling completed.  Encourage routine / annual vaccinations.    Type 2 Diabetes Mellitus, with nephropathy, with neuropathy, Reports ongoing/previous: numbness.  Blood sugar control has been good with minimal hyperglycemia. Doing well with diet, oral agents, and exercise.  Medication list reviewed/updated. Refills completed as needed.    Complicating factors include but are not limited to: hypertension, hyperlipidemia, neuropathy, and chronic kidney disease.     Recent Labs   Lab Test 04/15/24  0903 01/10/24  0851 10/02/23  0915 07/05/22  0848 03/29/22  0953   A1C 6.9* 6.5* 7.0*   < > 6.6*   LDL 67 46 51   < > 76   HDL 46 47 45   < > 41   TRIG 69 95 88   < > 107   ALT 14 16 15   < > 16   CR 0.80 0.85 0.72   < > 0.83   GFRESTIMATED 89 88 >90   < > 90   POTASSIUM 4.3 4.2 4.4   < > 3.9   TSH 1.96 1.38 1.55   < > 4.14*   T4  --   --   --   --  0.68   WBC 5.1 4.4 6.1   < > 5.6   HGB 14.6 14.2 14.1   < > 14.8    194 196   < > 220   ALBUMIN 4.7 4.6 4.4   < > 4.6    < > = values in this interval not displayed.     Hemoglobin A1c is well-controlled.  LDL HDL and triglycerides are at goal.  ALT normal.  Creatinine is at baseline.  Potassium normal.  TSH normal.  CBC normal, albumin normal.               BMI  Estimated body mass index is 30.86 kg/m  as calculated from the following:    Height as of this encounter: 1.676 m (5' 6\").    Weight as of this encounter: 86.7 kg (191 lb 3.2 oz).     Depression Screening Follow Up            4/16/2024    12:11 PM   C-SSRS (Brief [place])   Within the last month, have you wished you were dead or wished you could go to sleep and " not wake up? No   Within the last month, have you had any actual thoughts of killing yourself? No   Within the last month, have you ever done anything, started to do anything, or prepared to do anything to end your life? No       Follow Up Actions Taken  Patient declined referral.    Discussed the following ways the patient can remain in a safe environment:  remove alcohol and remove drugs  Counseling  Appropriate preventive services were discussed with this patient, including applicable screening as appropriate for fall prevention, nutrition, physical activity, Tobacco-use cessation, weight loss and cognition.  Checklist reviewing preventive services available has been given to the patient.  Reviewed patient's diet, addressing concerns and/or questions.   He is at risk for lack of exercise and has been provided with information to increase physical activity for the benefit of his well-being.   Discussed possible causes of fatigue. Addressed any concerns about safety while driving.  The patient's PHQ-9 score is consistent with severe depression. He was provided with information regarding depression.           Return in about 3 months (around 7/16/2024) for - Labs every 91+ days, with DM Follow-up, Same Day or 1-2 days later with Dr. Pickens.      Cm Pickens MD  [facility]    Review of Systems   Constitutional:  Positive for fatigue (chronic, but stable). Negative for chills and fever.   HENT:  Positive for hearing loss. Negative for congestion, ear pain, nosebleeds and sore throat.    Eyes:  Positive for pain, redness and visual disturbance.        + Droopy left eyelid.    Respiratory:  Positive for cough. Negative for shortness of breath and wheezing.    Cardiovascular:  Negative for chest pain, palpitations and peripheral edema.        Cardiac stress testing 3/2020 - negative/normal results.   Gastrointestinal:  Negative for abdominal pain, constipation, diarrhea, heartburn, hematochezia,  nausea and vomiting.        + left inguinal pains intermittently   Endocrine: Negative for cold intolerance and heat intolerance.   Genitourinary:  Positive for difficulty urinating (Feeling of incomplete bladder emptying and urinary frequency). Negative for dysuria, frequency, genital sores, hematuria, impotence, penile discharge and urgency.   Musculoskeletal:  Positive for arthralgias, back pain (left low back / buttocks pain) and gait problem (staggering at times, low back and left knee pain). Negative for joint swelling and myalgias.        Bilateral feet with bunions   Skin:  Negative for pallor and rash.   Allergic/Immunologic: Negative for immunocompromised state.   Neurological:  Positive for weakness, numbness (right > left feet) and headaches. Negative for dizziness, light-headedness and paresthesias.   Hematological:  Does not bruise/bleed easily.   Psychiatric/Behavioral:  Positive for mood changes. Negative for agitation and sleep disturbance. The patient is nervous/anxious.         + short term memory loss -- stopped Exelon and Namenda -- due to side effects.   + reporting less anxiety / depression - virus pandemic had worsened everything  -- Seems much improved with Citalopram.           Preventive Care Visit  [facility]  Cm Pickens MD, Internal Medicine  Apr 16, 2024      Alisha Graff is a 78 year old, presenting for the following:  Diabetes and Medicare Visit        4/16/2024    11:08 AM   Additional Questions   Roomed by Clint Caicedo LPN         Health Care Directive  Patient does not have a Health Care Directive or Living Will: Discussed advance care planning with patient; information given to patient to review.    History of Present Illness       Diabetes:   He presents for follow up of diabetes.  He is checking home blood glucose one time daily.   He checks blood glucose before meals.  Blood glucose is never over 200 and never under 70.     He has no concerns  regarding his diabetes at this time.   He is not experiencing numbness or burning in feet, excessive thirst, blurry vision, weight changes or redness, sores or blisters on feet.           Reason for visit:  Diabetes    He eats 2-3 servings of fruits and vegetables daily.He consumes 0 sweetened beverage(s) daily.He exercises with enough effort to increase his heart rate 10 to 19 minutes per day.  He exercises with enough effort to increase his heart rate 3 or less days per week.   He is taking medications regularly.              4/16/2024   General Health   How would you rate your overall physical health? (!) FAIR   Feel stress (tense, anxious, or unable to sleep) To some extent   (!) STRESS CONCERN      4/16/2024   Nutrition   Diet: Regular (no restrictions)         4/16/2024   Exercise   Days per week of moderate/strenous exercise 3 days   Average minutes spent exercising at this level 60 min         4/16/2024   Social Factors   Frequency of gathering with friends or relatives Three times a week   Worry food won't last until get money to buy more No   Food not last or not have enough money for food? No   Do you have housing?  Yes   Are you worried about losing your housing? No   Lack of transportation? No   Unable to get utilities (heat,electricity)? No         4/16/2024   Fall Risk   Fallen 2 or more times in the past year? No   Trouble with walking or balance? No          4/16/2024   Activities of Daily Living- Home Safety   Needs help with the following daily activites None of the above   Safety concerns in the home None of the above         4/16/2024   Dental   Dentist two times every year? Yes         4/16/2024   Hearing Screening   Hearing concerns? None of the above         4/16/2024   Driving Risk Screening   Patient/family members have concerns about driving (!) No         4/16/2024   General Alertness/Fatigue Screening   Have you been more tired than usual lately? (!) YES         4/16/2024   Urinary  Incontinence Screening   Bothered by leaking urine in past 6 months No         2024   TB Screening   Were you born outside of the [place]? No       Today's PHQ-9 Score:       2024    11:46 AM   PHQ-9 SCORE   PHQ-9 Total Score MyChart 27 (Severe depression)   PHQ-9 Total Score 27         2024   Substance Use   Alcohol more than 3/day or more than 7/wk No   Do you have a current opioid prescription? No   How severe/bad is pain from 1 to 10? 3/10   Do you use any other substances recreationally? No     Social History     Tobacco Use    Smoking status: Former     Current packs/day: 0.00     Average packs/day: 1 pack/day for 26.8 years (26.8 ttl pk-yrs)     Types: Cigarettes     Start date:      Quit date: 10/25/1983     Years since quittin.5     Passive exposure: Never    Smokeless tobacco: Never   Vaping Use    Vaping status: Never Used   Substance Use Topics    Alcohol use: No    Drug use: No                 Reviewed and updated as needed this visit by Provider   Tobacco  Allergies  Meds  Problems  Med Hx  Surg Hx  Fam Hx     Sexual Activity            Current providers sharing in care for this patient include:  Patient Care Team:  Cm Pickens MD as PCP - General (Internal Medicine)  Cm Pickens MD as Assigned PCP  Olivier Connell MD as Assigned Surgical Provider    The following health maintenance items are reviewed in Epic and correct as of today:  Health Maintenance   Topic Date Due    SPIROMETRY  Never done    RSV VACCINE (Pregnancy & 60+) (1 - 1-dose 60+ series) Never done    DIABETIC FOOT EXAM  2022    EYE EXAM  2024    A1C  10/15/2024    PHQ-9  10/16/2024    BMP  04/15/2025    LIPID  04/15/2025    MICROALBUMIN  04/15/2025    TSH W/FREE T4 REFLEX  04/15/2025    MEDICARE ANNUAL WELLNESS VISIT  2025    FALL RISK ASSESSMENT  2025    ADVANCE CARE PLANNING  2028    DTAP/TDAP/TD IMMUNIZATION (3 - Td or Tdap) 2033    INFLUENZA VACCINE  Completed     "Pneumococcal Vaccine: 65+ Years  Completed    URINALYSIS  Completed    ZOSTER IMMUNIZATION  Completed    COVID-19 Vaccine  Completed    COPD ACTION PLAN  Addressed    DEPRESSION ACTION PLAN  Addressed    IPV IMMUNIZATION  Aged Out    HPV IMMUNIZATION  Aged Out    MENINGITIS IMMUNIZATION  Aged Out    RSV MONOCLONAL ANTIBODY  Aged Out    COLORECTAL CANCER SCREENING  Discontinued            Objective    Exam  /88   Pulse 96   Temp 97.1  F (36.2  C)   Resp 18   Ht 1.676 m (5' 6\")   Wt 86.7 kg (191 lb 3.2 oz)   SpO2 97%   BMI 30.86 kg/m     Estimated body mass index is 30.86 kg/m  as calculated from the following:    Height as of this encounter: 1.676 m (5' 6\").    Weight as of this encounter: 86.7 kg (191 lb 3.2 oz).    Physical Exam  Constitutional:       General: He is not in acute distress.     Appearance: He is well-developed. He is obese. He is not diaphoretic.   HENT:      Head: Normocephalic and atraumatic.      Nose: No rhinorrhea.   Eyes:      General: No scleral icterus.     Conjunctiva/sclera: Conjunctivae normal.   Cardiovascular:      Rate and Rhythm: Normal rate and regular rhythm.   Pulmonary:      Effort: Pulmonary effort is normal.      Breath sounds: Normal breath sounds.   Abdominal:      Palpations: Abdomen is soft.      Tenderness: There is no abdominal tenderness.   Musculoskeletal:         General: No deformity.      Cervical back: Neck supple.      Right lower leg: No edema.      Left lower leg: No edema.   Lymphadenopathy:      Cervical: No cervical adenopathy.   Skin:     General: Skin is warm and dry.      Coloration: Skin is not jaundiced.      Findings: No rash.   Neurological:      Mental Status: He is alert. Mental status is at baseline.   Psychiatric:         Mood and Affect: Mood normal.         Behavior: Behavior normal.                4/16/2024   Mini Cog   Clock Draw Score 0 Abnormal   3 Item Recall 2 objects recalled   Mini Cog Total Score 2              Signed " Electronically by: Cm Pickens MD

## 2024-04-16 NOTE — PATIENT INSTRUCTIONS
Blood pressure is well controlled.   Diabetes is well controlled.     Medications refilled.   Labs are stable.       COVID shot today.  --- Twice yearly, fall and spring.       Get your RSV shot at the pharmacy -- this Fall.       Lab on 04/15/2024   Component Date Value Ref Range Status    Color Urine 04/15/2024 Yellow  Colorless, Straw, Light Yellow, Yellow Final    Appearance Urine 04/15/2024 Clear  Clear Final    Glucose Urine 04/15/2024 Negative  Negative mg/dL Final    Bilirubin Urine 04/15/2024 Negative  Negative Final    Ketones Urine 04/15/2024 Negative  Negative mg/dL Final    Specific Gravity Urine 04/15/2024 1.019  1.000 - 1.030 Final    Blood Urine 04/15/2024 Negative  Negative Final    pH Urine 04/15/2024 7.0  5.0 - 9.0 Final    Protein Albumin Urine 04/15/2024 Negative  Negative mg/dL Final    Urobilinogen Urine 04/15/2024 Normal  Normal, 2.0 mg/dL Final    Nitrite Urine 04/15/2024 Negative  Negative Final    Leukocyte Esterase Urine 04/15/2024 Negative  Negative Final    TSH 04/15/2024 1.96  0.30 - 4.20 uIU/mL Final    Hemoglobin A1C 04/15/2024 6.9 (H)  4.0 - 6.2 % Final    WBC Count 04/15/2024 5.1  4.0 - 11.0 10e3/uL Final    RBC Count 04/15/2024 4.79  4.40 - 5.90 10e6/uL Final    Hemoglobin 04/15/2024 14.6  13.3 - 17.7 g/dL Final    Hematocrit 04/15/2024 44.6  40.0 - 53.0 % Final    MCV 04/15/2024 93  78 - 100 fL Final    MCH 04/15/2024 30.5  26.5 - 33.0 pg Final    MCHC 04/15/2024 32.7  31.5 - 36.5 g/dL Final    RDW 04/15/2024 13.2  10.0 - 15.0 % Final    Platelet Count 04/15/2024 207  150 - 450 10e3/uL Final    Creatinine Urine mg/dL 04/15/2024 114.3  mg/dL Final    The reference ranges have not been established in urine creatinine. The results should be integrated into the clinical context for interpretation.    Albumin Urine mg/L 04/15/2024 21.3  mg/L Final    The reference ranges have not been established in urine albumin. The results should be integrated into the clinical context for  interpretation.    Albumin Urine mg/g Cr 04/15/2024 18.64 (H)  0.00 - 17.00 mg/g Cr Final    Microalbuminuria is defined as an albumin:creatinine ratio of 17 to 299 for males and 25 to 299 for females. A ratio of albumin:creatinine of 300 or higher is indicative of overt proteinuria.  Due to biologic variability, positive results should be confirmed by a second, first-morning random or 24-hour timed urine specimen. If there is discrepancy, a third specimen is recommended. When 2 out of 3 results are in the microalbuminuria range, this is evidence for incipient nephropathy and warrants increased efforts at glucose control, blood pressure control, and institution of therapy with an angiotensin-converting-enzyme (ACE) inhibitor (if the patient can tolerate it).      Cholesterol 04/15/2024 127  <200 mg/dL Final    Triglycerides 04/15/2024 69  <150 mg/dL Final    Direct Measure HDL 04/15/2024 46  >=40 mg/dL Final    LDL Cholesterol Calculated 04/15/2024 67  <=100 mg/dL Final    Non HDL Cholesterol 04/15/2024 81  <130 mg/dL Final    Patient Fasting > 8hrs? 04/15/2024 Yes   Final    Sodium 04/15/2024 142  135 - 145 mmol/L Final    Reference intervals for this test were updated on 09/26/2023 to more accurately reflect our healthy population. There may be differences in the flagging of prior results with similar values performed with this method. Interpretation of those prior results can be made in the context of the updated reference intervals.     Potassium 04/15/2024 4.3  3.4 - 5.3 mmol/L Final    Carbon Dioxide (CO2) 04/15/2024 27  22 - 29 mmol/L Final    Anion Gap 04/15/2024 10  7 - 15 mmol/L Final    Urea Nitrogen 04/15/2024 15.4  8.0 - 23.0 mg/dL Final    Creatinine 04/15/2024 0.80  0.67 - 1.17 mg/dL Final    GFR Estimate 04/15/2024 89  >60 mL/min/1.73m2 Final    Calcium 04/15/2024 9.5  8.8 - 10.2 mg/dL Final    Chloride 04/15/2024 105  98 - 107 mmol/L Final    Glucose 04/15/2024 124 (H)  70 - 99 mg/dL Final     Alkaline Phosphatase 04/15/2024 73  40 - 150 U/L Final    Reference intervals for this test were updated on 11/14/2023 to more accurately reflect our healthy population. There may be differences in the flagging of prior results with similar values performed with this method. Interpretation of those prior results can be made in the context of the updated reference intervals.    AST 04/15/2024 18  0 - 45 U/L Final    Reference intervals for this test were updated on 6/12/2023 to more accurately reflect our healthy population. There may be differences in the flagging of prior results with similar values performed with this method. Interpretation of those prior results can be made in the context of the updated reference intervals.    ALT 04/15/2024 14  0 - 70 U/L Final    Reference intervals for this test were updated on 6/12/2023 to more accurately reflect our healthy population. There may be differences in the flagging of prior results with similar values performed with this method. Interpretation of those prior results can be made in the context of the updated reference intervals.      Protein Total 04/15/2024 7.2  6.4 - 8.3 g/dL Final    Albumin 04/15/2024 4.7  3.5 - 5.2 g/dL Final    Bilirubin Total 04/15/2024 0.8  <=1.2 mg/dL Final    Prostate Specific Antigen Screen 04/15/2024 4.72  ng/mL Final    No reference ranges have been established for patients over 80 years.         Aspects of Diabetes:   Recent Labs   Lab Test 04/15/24  0903 01/10/24  0851 10/02/23  0915 07/05/22  0848 03/29/22  0953   A1C 6.9* 6.5* 7.0*   < > 6.6*   LDL 67 46 51   < > 76   HDL 46 47 45   < > 41   TRIG 69 95 88   < > 107   ALT 14 16 15   < > 16   CR 0.80 0.85 0.72   < > 0.83   GFRESTIMATED 89 88 >90   < > 90   POTASSIUM 4.3 4.2 4.4   < > 3.9   TSH 1.96 1.38 1.55   < > 4.14*   T4  --   --   --   --  0.68   WBC 5.1 4.4 6.1   < > 5.6   HGB 14.6 14.2 14.1   < > 14.8    194 196   < > 220   ALBUMIN 4.7 4.6 4.4   < > 4.6    < > = values  in this interval not displayed.      Hemoglobin A1c  Goal range is under 8%. Best is 6.5 to 7   Blood Pressure 138/88 Goal to keep less than 140/90   Tobacco  reports that he quit smoking about 40 years ago. His smoking use included cigarettes. He started smoking about 67 years ago. He has a 26.8 pack-year smoking history. He has never been exposed to tobacco smoke. He has never used smokeless tobacco. Goal to abstain from tobacco   Aspirin or Plavix Anti-platelet therapy Aspirin or Plavix reduces risk of heart disease and stroke  -- sometimes used with other blood thinners, depending on bleeding risk and risk factors.    ACE/ARB Specific blood pressure meds These medications can reduce risk of kidney disease   Cholesterol Statins (Lipitor, Crestor, vs others) Statins reduce risk of heart disease and stroke   Eye Exam -- Do Yearly -- Annual diabetic eye exam   Healthy weight Wt Readings from Last 4 Encounters:   04/16/24 86.7 kg (191 lb 3.2 oz)   01/11/24 84.1 kg (185 lb 6.4 oz)   12/06/23 89.2 kg (196 lb 9.6 oz)   10/18/23 85.2 kg (187 lb 12.8 oz)      Body mass index is 30.86 kg/m .  Goal BMI under 30, best is under 25.      -- Trying to exercise daily (goal at least 20 min/day) with moderate aerobic activity   -- Eat healthy (resources from ADA at http://www.diabetes.org/)   -- Taking good care of my feet. Consider seeing the Podiatrist   -- Check blood sugars as directed, record in log book and bring to every appointment    Insurance companies are grading you and I on your blood sugar control -- Goal is to get your A1c down to 7.9% or lower and NO Smoking!  -- Medicare and most insurance companies, will only cover Hemoglobin A1c labs to be rechecked every 91+ days.      Return for Diabetes labs and clinic follow-up appointment every 3 to 4 months.    Schedule lab only appointment --- A few days AFTER: 7/15/24   Schedule clinic appointment with Dr. Pickens -- Same day as labs, or 1-2 days later.

## 2024-04-19 RX ORDER — LEVOTHYROXINE SODIUM 50 UG/1
50 TABLET ORAL DAILY
Qty: 93 TABLET | Refills: 4 | Status: SHIPPED | OUTPATIENT
Start: 2024-04-19

## 2024-04-19 RX ORDER — GABAPENTIN 100 MG/1
100-200 CAPSULE ORAL AT BEDTIME
Qty: 150 CAPSULE | Refills: 1 | Status: SHIPPED | OUTPATIENT
Start: 2024-04-19 | End: 2024-07-24

## 2024-04-19 RX ORDER — CITALOPRAM HYDROBROMIDE 10 MG/1
10 TABLET ORAL DAILY
Qty: 90 TABLET | Refills: 4 | Status: SHIPPED | OUTPATIENT
Start: 2024-04-19

## 2024-04-19 RX ORDER — ROSUVASTATIN CALCIUM 5 MG/1
5 TABLET, COATED ORAL DAILY
Qty: 90 TABLET | Refills: 4 | Status: SHIPPED | OUTPATIENT
Start: 2024-04-19

## 2024-04-24 ENCOUNTER — THERAPY VISIT (OUTPATIENT)
Dept: CHIROPRACTIC MEDICINE | Facility: OTHER | Age: 79
End: 2024-04-24
Attending: CHIROPRACTOR
Payer: COMMERCIAL

## 2024-04-24 VITALS — RESPIRATION RATE: 18 BRPM | TEMPERATURE: 96.8 F | OXYGEN SATURATION: 97 % | HEART RATE: 98 BPM

## 2024-04-24 DIAGNOSIS — M99.01 SEGMENTAL AND SOMATIC DYSFUNCTION OF CERVICAL REGION: ICD-10-CM

## 2024-04-24 DIAGNOSIS — R42 VERTIGO: Primary | ICD-10-CM

## 2024-04-24 PROCEDURE — 98940 CHIROPRACT MANJ 1-2 REGIONS: CPT | Mod: AT | Performed by: CHIROPRACTOR

## 2024-04-24 PROCEDURE — 99203 OFFICE O/P NEW LOW 30 MIN: CPT | Mod: 25 | Performed by: CHIROPRACTOR

## 2024-04-24 PROCEDURE — G0463 HOSPITAL OUTPT CLINIC VISIT: HCPCS

## 2024-04-24 NOTE — PROGRESS NOTES
Neck is having no discomfort. Having vertigo which has gradually worsened again. Transitional movement cause significant dizziness. 6/10 W24 6/10.   Gosia Villa on 4/24/2024 at 9:36 AM    Reviewed by EW    PATIENT:  Dajuan Basilio is a 78 year old male presenting for vertigo    PROBLEM:   Date of Initial Visit for this Episode:  4/24/2024    Visit #1    SUBJECTIVE / HPI: Patient presents with primary concern of vertigo.  Patient informs me that he has had a long history of vertigo symptoms including 1 such time which landed him in the hospital for treatment.  Review of chart shows that patient has worked with physical therapy on several occasions for dizziness.  Most recently in December and January 2021/2022.  Dizziness seem to improve with treatment.  Patient continued to have feelings of unsteadiness and balance issues.    Began to notice exacerbation of vertigo recently especially when going from sit to stand or laying supine to standing.  Reports that he did lose his balance and fell outside in the grass a couple of weeks ago, no injuries were sustained around that time.    Was assessed by primary care provider about a week ago.  States that vertigo symptoms were not discussed at that time.    Denies any past history of neck and/or head trauma, has worked with local chiropractor Dr. De León for chiropractic adjustments and acupuncture.  Reports beneficial results with adjustments, acupuncture was not shown to provide benefit.    Patient does have history of eye concerns as well as diabetic peripheral neuropathy.      (DVPRS) Pain Rating Score : (not recorded)        See flowsheets in chart for details.  4/24/2024 4/24/2024     9:36 AM   Neck Disability Index (  Alexis H. and Uzma C. 1991. All rights reserved.; used with permission)   SECTION 1 - PAIN INTENSITY 0   SECTION 2 - PERSONAL CARE 0   SECTION 3 - LIFTING 0   SECTION 4 - READING 0   SECTION 5 - HEADACHES 1   SECTION 6 - CONCENTRATION 1    SECTION 7 - WORK 0   SECTION 8 - DRIVING 0   SECTION 9 - SLEEPING 0   SECTION 10 - RECREATION 0   Count 10   Sum 2   Raw Score: /50 2   Neck Disability Index Score: (%) 4 %            PAST MEDICAL HISTORY:  Past Medical History:   Diagnosis Date    Abnormal CT scan, bladder 6/29/2016    Diverticulosis of intestine without perforation or abscess without bleeding     No Comments Provided    Fatty (change of) liver, not elsewhere classified     non alcoholic    Fibromyalgia     No Comments Provided    Nodular prostate without lower urinary tract symptoms     2012,US confirms benign calcifications    Obesity     No Comments Provided    Panic disorder without agoraphobia     No Comments Provided    Positive colorectal cancer screening using Cologuard test 12/06/2018       PAST SURGICAL HISTORY:  Past Surgical History:   Procedure Laterality Date    ARTHROPLASTY KNEE      2015    ARTHROSCOPY KNEE      right knee X2    BIOPSY PROSTATE TRANSRECTAL      2013    COLONOSCOPY      2003,and [facility]GI [place] normal    COLONOSCOPY      2009,and UGI repeat Dr. Johnson negative.    COLONOSCOPY  01/01/2013 2013,WNL    COLONOSCOPY  05/23/2019    [facility]-no follow up    HERNIA REPAIR  2000    RELEASE CARPAL TUNNEL      2014    VASECTOMY      No Comments Provided       ALLERGIES:  Allergies   Allergen Reactions    Memantine Other (See Comments)     Dizzy, lightheaded  Dizzy, lightheaded    Rivastigmine Other (See Comments)     Dizzy, lightheaded  Dizzy, lightheaded    Ace Inhibitors Cough    Atorvastatin Calcium Muscle Pain (Myalgia)     Lipitor    Statins Muscle Pain (Myalgia)     Higher doses cause myalgias       CURRENT MEDICATIONS:  Current Outpatient Medications   Medication Sig Dispense Refill    aspirin 81 MG tablet Take 81 mg by mouth daily      blood glucose (ACCU-CHEK ONEL PLUS) test strip USE STRIP TO CHECK GLUCOSEONCE DAILY 100 strip 0    citalopram (CELEXA) 10 MG tablet Take 1 tablet (10 mg) by mouth daily 90 tablet 4     diclofenac (VOLTAREN) 1 % topical gel Apply 2 g topically 4 times daily 100 g 1    gabapentin (NEURONTIN) 100 MG capsule Take 1-2 capsules (100-200 mg) by mouth at bedtime - for pain 150 capsule 1    lactobacillus rhamnosus, GG, (CULTURELL) capsule Take 1 capsule by mouth daily      levothyroxine (SYNTHROID/LEVOTHROID) 50 MCG tablet Take 1 tablet (50 mcg) by mouth daily - for Thyroid Replacement 93 tablet 4    losartan (COZAAR) 25 MG tablet Take 1 tablet (25 mg) by mouth daily 90 tablet 4    metFORMIN (GLUCOPHAGE) 500 MG tablet Take 2 tablets (1,000 mg) by mouth daily (with breakfast) AND 1 tablet (500 mg) daily (with dinner). 270 tablet 1    omega 3 1000 MG CAPS Take 1 capsule by mouth daily      omeprazole (PRILOSEC) 20 MG DR capsule Take 1 capsule (20 mg) by mouth daily Take 30 to 60 minutes prior to a meal -- for stomach acid 90 capsule 4    rosuvastatin (CRESTOR) 5 MG tablet Take 1 tablet (5 mg) by mouth daily - Stop Lipitor - 90 tablet 4    vitamin B complex with vitamin C (VITAMIN  B COMPLEX) TABS tablet Take 1 tablet by mouth daily Monday, Wednesday, Friday -- 3 days weekly -- make sure it has B12 in tablet -- Dose Reduced 2018 100 tablet 3    vitamin D3 (CHOLECALCIFEROL) 125 MCG (5000 UT) tablet Take 5,000 Units by mouth daily         SOCIAL HISTORY:  Social History     Socioeconomic History    Marital status:    Tobacco Use    Smoking status: Former     Current packs/day: 0.00     Average packs/day: 1 pack/day for 26.8 years (26.8 ttl pk-yrs)     Types: Cigarettes     Start date:      Quit date: 10/25/1983     Years since quittin.5     Passive exposure: Never    Smokeless tobacco: Never   Vaping Use    Vaping status: Never Used   Substance and Sexual Activity    Alcohol use: No    Drug use: No    Sexual activity: Not Currently     Partners: Female     Comment: doctor to address    Social History Narrative    , retired from [employer].  Lives in [facility].     Social Determinants of  Health     Financial Resource Strain: Low Risk  (2024)    Financial Resource Strain     Within the past 12 months, have you or your family members you live with been unable to get utilities (heat, electricity) when it was really needed?: No   Food Insecurity: Low Risk  (2024)    Food Insecurity     Within the past 12 months, did you worry that your food would run out before you got money to buy more?: No     Within the past 12 months, did the food you bought just not last and you didn t have money to get more?: No   Transportation Needs: Low Risk  (2024)    Transportation Needs     Within the past 12 months, has lack of transportation kept you from medical appointments, getting your medicines, non-medical meetings or appointments, work, or from getting things that you need?: No   Physical Activity: Sufficiently Active (2024)    Exercise Vital Sign     Days of Exercise per Week: 3 days     Minutes of Exercise per Session: 60 min   Stress: Stress Concern Present (2024)    [employer] - Occupational Stress Questionnaire     Feeling of Stress : To some extent   Social Connections: Unknown (2024)    Social Connection and Isolation Panel [NHANES]     Frequency of Social Gatherings with Friends and Family: Three times a week   Interpersonal Safety: Low Risk  (2024)    Interpersonal Safety     Do you feel physically and emotionally safe where you currently live?: Yes     Within the past 12 months, have you been hit, slapped, kicked or otherwise physically hurt by someone?: No     Within the past 12 months, have you been humiliated or emotionally abused in other ways by your partner or ex-partner?: No   Housing Stability: Low Risk  (2024)    Housing Stability     Do you have housing? : Yes     Are you worried about losing your housing?: No        FAMILY HISTORY:  Family History   Problem Relation Age of Onset    Cancer Father         Cancer,  mesothelioma    Colon Cancer Mother         Cancer-colon,    Genitourinary Problems Mother         Genitourinary Disease,renal failure    Diabetes Mother         Diabetes    Other - See Comments Sister         Renal transplant    Diabetes Type 2  Sister         Diabetes type II    Diabetes Brother         Diabetes       Patient Active Problem List   Diagnosis    Diverticular disease of colon    Generalized anxiety disorder    GERD (gastroesophageal reflux disease)    Nephrolithiasis    BPH (benign prostatic hyperplasia)    Overweight (BMI 25.0-29.9)    Chronic anxiety    Microalbuminuria    Coronary artery disease involving native coronary artery of native heart without angina pectoris    Status post insertion of drug eluting coronary artery stent - x2 stents - 1st OMB - 2018 - [facility]    Controlled type 2 diabetes mellitus with diabetic polyneuropathy, without long-term current use of insulin (H)    Benign prostatic hyperplasia with weak urinary stream    S/P cardiac cath 18    Unsteady gait    Stented coronary artery to his LCX and OM1 on 18    Mixed hyperlipidemia    Chronically dry eyes, bilateral    Left sided sciatica    Lumbar facet arthropathy    Chronic left-sided low back pain without sciatica    Vitamin B12 deficiency    Benign essential hypertension    Unspecified inflammatory spondylopathy, sacral and sacrococcygeal region (H24)    Diabetic peripheral neuropathy (H)    Bunion, right    Onychomycosis    Bunion, left    Major neurocognitive disorder due to another medical condition (H)    Mixed simple and mucopurulent chronic bronchitis (H)    Benign paroxysmal positional vertigo, unspecified laterality    Elevated prostate specific antigen (PSA)    Hypothyroidism due to acquired atrophy of thyroid    Recurrent major depressive disorder, in full remission (H24)    Chronic cough    Droopy eyelid, bilateral    History of eyelid surgery    Moderate Alzheimer's dementia without  behavioral disturbance, psychotic disturbance, mood disturbance, or anxiety, unspecified timing of dementia onset (H)    Trigger point of right shoulder region    Excessive daytime sleepiness    Frequent headaches    Chronic fatigue    Primary narcolepsy without cataplexy    CKD (chronic kidney disease) stage 2, GFR 60-89 ml/min    Class 1 obesity due to excess calories with serious comorbidity and body mass index (BMI) of 30.0 to 30.9 in adult         ROS:  The patient denies any fevers, chills, nausea, vomiting, diarrhea.  No shortness of breath, chest pain, or rashes.    OBJECTIVE:    DIAGNOSTICS:  No current spinal imaging taken.     PHYSICAL EXAM:   Pulse 98   Temp 96.8  F (36  C) (Tympanic)   Resp 18   SpO2 97%    GENERAL APPEARANCE: healthy, alert, no distress, and cooperative   GAIT: NORMAL  NEURO:Eye tracking shows jumping from look up to looking down without nystagmus. Lateral eye tracking slight jumping bilaterally  PSYCH:  mentation appears normal, affect normal/bright, and patient did repeat same questions during course of exam    MUSCULOSKELETAL:   Posture: Anterior head carriage, rounded shoulders.    Gait:  unremarkable.     Cervical farmed actively, measured approximately  ROM:   smooth/halting arc of motion   50/50 flexion    40/45 extension    30/45 RLF  30/45 LLF    80/85 RR         80/85 LR   Cervical rotation recreates dizziness, no nystagmus       -Maximal Foraminal Compression: -focal mild neck pain.   -Distraction:-    Swivel chair: reports vertigo symptoms, no nystagmus  Hallpike maneuver: negative, no nystagmus  Headshake: no nystagmus, eyes are able to track    +Tenderness:  +Muscle spasm: suboccipitals, trapezius, thoracic, lumbar paraspinals, glutes.   +Joint asymmetry and restriction: C1 right lateral flexion and right rotation restriction, C2 extension and left rotation restriction.    ASSESSMENT: Dajuan Basilio is a 78 year old male presenting with primary concern of vertigo.   Review of patient's chart shows this has been an ongoing issue and treated successfully with physical therapy.  Chiropractic assessment possibly suggestive of some cervicogenic component of the upper cervical spine.  With exam findings today chiropractic care does seem appropriate for a trial.  However for more in-depth treatment I do strongly recommend that patient follow-up with physical therapy for vestibular training.  I was able to personally consult with patient's prior physical therapist who worked with him for similar concern.  It was recommended that patient be referred for further assessment and treatment by physical therapy.    Suspect vertigo to be multifactorial, possible cervicogenic component, eye tracking also seems to be an issue for patient, history of diabetic neuropathy also likely piece of cause of vertigo.  Review of chart does show left posterior canal involvement.  We will work to coordinate care efforts to refer patient for physical therapy.  I will personally contact patient within the next day or so to determine effectiveness of today's treatment and to discuss next steps of care.    Patient was in agreement with this.  At this time we will plan to follow-up with patient if needed for additional visits.     1. Vertigo    2. Segmental and somatic dysfunction of cervical region        PLAN    Evaluation and Management:  40685 Moderate exam established patient 20 min    Procedures:  Modalities:  None performed this visit    CMT:  21940 Chiropractic manipulative treatment 1-2 regions performed   Cervical: Activator, C1 , C2, Supine    Therapeutic procedures:  None    Response to Treatment  Reports feeling dizzy going from supine to sitting position, this did seem to resolve quickly.    Prognosis: Guarded    4/24/2024 Plan of Care:  4-6 visits of Chiropractic Care including Spinal Adjustments and/or physiotherapy and active rehabilitation, to include exercises in the office and/or at home to  meet care plan goals.     Frequency: As needed for up to 4 weeks. A reevaluation would be clinically appropriate in 6 visits, to determine progress and further course of care.    POC discussed and patient agreeable to plan of care.      4/24/2024 Goals:      Patient will report improved dizziness by 50% within 4 weeks.   Patient will report perform sit to stand movements with 50% improvement within 4 weeks.   Patient will demonstrate an improved ability to complete Activities of Daily Living  as shown by a reported 10-30% reduced score on neck and/or back index.    Patient will demonstrate improved ROM.        INSTRUCTIONS   Monitor symptoms.    Follow-up:  Pending response to today's treatment

## 2024-04-25 ENCOUNTER — TELEPHONE (OUTPATIENT)
Dept: CHIROPRACTIC MEDICINE | Facility: OTHER | Age: 79
End: 2024-04-25
Payer: COMMERCIAL

## 2024-04-25 ENCOUNTER — TELEPHONE (OUTPATIENT)
Dept: CHIROPRACTIC MEDICINE | Facility: OTHER | Age: 79
End: 2024-04-25

## 2024-05-21 ENCOUNTER — HOSPITAL ENCOUNTER (EMERGENCY)
Facility: OTHER | Age: 79
Discharge: HOME | End: 2024-05-21
Attending: FAMILY MEDICINE | Admitting: FAMILY MEDICINE
Payer: MEDICARE

## 2024-05-21 VITALS
WEIGHT: 191 LBS | SYSTOLIC BLOOD PRESSURE: 174 MMHG | DIASTOLIC BLOOD PRESSURE: 78 MMHG | TEMPERATURE: 98.6 F | HEART RATE: 61 BPM | HEIGHT: 66 IN | BODY MASS INDEX: 30.7 KG/M2 | RESPIRATION RATE: 19 BRPM | OXYGEN SATURATION: 97 %

## 2024-05-21 DIAGNOSIS — K43.9 HERNIA OF ANTERIOR ABDOMINAL WALL: ICD-10-CM

## 2024-05-21 PROCEDURE — 99283 EMERGENCY DEPT VISIT LOW MDM: CPT | Performed by: FAMILY MEDICINE

## 2024-05-21 PROCEDURE — 99282 EMERGENCY DEPT VISIT SF MDM: CPT | Performed by: FAMILY MEDICINE

## 2024-05-21 ASSESSMENT — COLUMBIA-SUICIDE SEVERITY RATING SCALE - C-SSRS
6. HAVE YOU EVER DONE ANYTHING, STARTED TO DO ANYTHING, OR PREPARED TO DO ANYTHING TO END YOUR LIFE?: NO
2. HAVE YOU ACTUALLY HAD ANY THOUGHTS OF KILLING YOURSELF IN THE PAST MONTH?: NO
1. IN THE PAST MONTH, HAVE YOU WISHED YOU WERE DEAD OR WISHED YOU COULD GO TO SLEEP AND NOT WAKE UP?: NO

## 2024-05-21 ASSESSMENT — ACTIVITIES OF DAILY LIVING (ADL): ADLS_ACUITY_SCORE: 33

## 2024-05-21 ASSESSMENT — ENCOUNTER SYMPTOMS: ABDOMINAL PAIN: 1

## 2024-05-21 NOTE — ED TRIAGE NOTES
"ED Nursing Triage Note (General)   ________________________________    Dajuan Basilio is a 79 year old Male that presents to triage via private vehicle with complaints of lower abdominal pain and states, \"theres a hard spot there just above the private\".  Patient states he has a hx of umbilical hernia and states he was doing heavy lifting yesterday. Patient states prior surgical repair of prior hernia.   Significant symptoms had onset 1 hour(s) ago.  Vital signs:  Temp: 98.6  F (37  C) Temp src: Tympanic BP: (!) 174/78 Pulse: 61   Resp: 19 SpO2: 97 %     Height: 167.6 cm (5' 6\") Weight: 86.6 kg (191 lb)  Estimated body mass index is 30.83 kg/m  as calculated from the following:    Height as of this encounter: 1.676 m (5' 6\").    Weight as of this encounter: 86.6 kg (191 lb).  PRE HOSPITAL PRIOR LIVING SITUATION Alone      Triage Assessment (Adult)       Row Name 05/21/24 1245          Triage Assessment    Airway WDL WDL        Respiratory WDL    Respiratory WDL WDL        Skin Circulation/Temperature WDL    Skin Circulation/Temperature WDL WDL        Cardiac WDL    Cardiac WDL WDL     Cardiac Rhythm NSR        Peripheral/Neurovascular WDL    Peripheral Neurovascular WDL WDL        Cognitive/Neuro/Behavioral WDL    Cognitive/Neuro/Behavioral WDL WDL                     "

## 2024-05-21 NOTE — DISCHARGE INSTRUCTIONS
Dajuan    I spoke with the surgeon on call who recommends that you see them in clinic and get this repaired electively.    Thank you for choosing our Emergency Department for your care.     You may receive a phone call or letter for a survey about your care in our ED.  Please complete this as this is how we improve care for our patients.     If you have any questions after leaving the ED you can call or text me on my cell phone at [phone].  This does not mean that I am on call, but I will get back to you.  If you are not doing well please return to the ED.     Sincerely,    Dr Chele De León M.D.

## 2024-05-21 NOTE — ED PROVIDER NOTES
History     Chief Complaint   Patient presents with    Abdominal Pain    Hernia     The history is provided by the patient and a relative.     Dajuan Basilio is a 79 year old male who has an abdominal wall hernia that is out and painful. He had been doing some heavy lifting yesterday but this only started to cause pain about an hour ago. He has a history of umbilical hernia but this is in a different location. Before I came in to see him he had reduced it while lying on the bed in room 10. He has no pain now.     Allergies:  Allergies   Allergen Reactions    Memantine Other (See Comments)     Dizzy, lightheaded  Dizzy, lightheaded    Rivastigmine Other (See Comments)     Dizzy, lightheaded  Dizzy, lightheaded    Ace Inhibitors Cough    Atorvastatin Calcium Muscle Pain (Myalgia)     Lipitor    Statins Muscle Pain (Myalgia)     Higher doses cause myalgias       Problem List:    Patient Active Problem List    Diagnosis Date Noted    Hernia of anterior abdominal wall 05/21/2024     Priority: Medium    CKD (chronic kidney disease) stage 2, GFR 60-89 ml/min 01/11/2024     Priority: Medium    Class 1 obesity due to excess calories with serious comorbidity and body mass index (BMI) of 30.0 to 30.9 in adult 01/11/2024     Priority: Medium    Trigger point of right shoulder region 10/18/2023     Priority: Medium    Excessive daytime sleepiness 10/18/2023     Priority: Medium    Frequent headaches 10/18/2023     Priority: Medium    Chronic fatigue 10/18/2023     Priority: Medium    Primary narcolepsy without cataplexy 10/18/2023     Priority: Medium    Moderate Alzheimer's dementia without behavioral disturbance, psychotic disturbance, mood disturbance, or anxiety, unspecified timing of dementia onset (H) 07/27/2023     Priority: Medium    History of eyelid surgery 02/08/2023     Priority: Medium    Droopy eyelid, bilateral 01/26/2023     Priority: Medium    Chronic cough 08/16/2022     Priority: Medium    Recurrent major  depressive disorder, in full remission (H24) 04/02/2022     Priority: Medium    Hypothyroidism due to acquired atrophy of thyroid 03/31/2022     Priority: Medium    Benign paroxysmal positional vertigo, unspecified laterality 12/03/2021     Priority: Medium    Elevated prostate specific antigen (PSA) 12/03/2021     Priority: Medium    Mixed simple and mucopurulent chronic bronchitis (H) 11/04/2021     Priority: Medium    Major neurocognitive disorder due to another medical condition (H) 05/05/2021     Priority: Medium    Bunion, right 03/26/2021     Priority: Medium    Onychomycosis 03/26/2021     Priority: Medium    Bunion, left 03/26/2021     Priority: Medium    Diabetic peripheral neuropathy (H) 01/08/2021     Priority: Medium    Unspecified inflammatory spondylopathy, sacral and sacrococcygeal region (H24) 12/18/2020     Priority: Medium    Chronic left-sided low back pain without sciatica 09/17/2020     Priority: Medium    Vitamin B12 deficiency 09/17/2020     Priority: Medium    Benign essential hypertension 09/17/2020     Priority: Medium    Lumbar facet arthropathy 07/07/2020     Priority: Medium    Chronically dry eyes, bilateral 03/17/2020     Priority: Medium    Left sided sciatica 03/17/2020     Priority: Medium    Mixed hyperlipidemia 03/19/2019     Priority: Medium    S/P cardiac cath 5/17/18 06/07/2018     Priority: Medium    Unsteady gait 06/07/2018     Priority: Medium    Stented coronary artery to his LCX and OM1 on 5/17/18 06/07/2018     Priority: Medium    Status post insertion of drug eluting coronary artery stent - x2 stents - [address] [place] [address] - 5/17/2018 - [facility] 05/31/2018     Priority: Medium    Controlled type 2 diabetes mellitus with diabetic polyneuropathy, without long-term current use of insulin (H) 05/31/2018     Priority: Medium    Benign prostatic hyperplasia with weak urinary stream 05/31/2018     Priority: Medium    Coronary artery disease involving native coronary artery of  native heart without angina pectoris 2018     Priority: Medium    Microalbuminuria 2018     Priority: Medium    Generalized anxiety disorder 2017     Priority: Medium    GERD (gastroesophageal reflux disease) 2013     Priority: Medium    Nephrolithiasis 2012     Priority: Medium    BPH (benign prostatic hyperplasia) 2012     Priority: Medium    Diverticular disease of colon 2010     Priority: Medium    Overweight (BMI 25.0-29.9) 2010     Priority: Medium    Chronic anxiety 2010     Priority: Medium        Past Medical History:    Past Medical History:   Diagnosis Date    Abnormal CT scan, bladder 2016    Diverticulosis of intestine without perforation or abscess without bleeding     Fatty (change of) liver, not elsewhere classified     Fibromyalgia     Nodular prostate without lower urinary tract symptoms     Obesity     Panic disorder without agoraphobia     Positive colorectal cancer screening using Cologuard test 2018       Past Surgical History:    Past Surgical History:   Procedure Laterality Date    ARTHROPLASTY KNEE      2015    ARTHROSCOPY KNEE      right knee X2    BIOPSY PROSTATE TRANSRECTAL      2013    COLONOSCOPY      ,and UGI [place] normal    COLONOSCOPY      ,and UGI repeat Dr. Johnson negative.    COLONOSCOPY  2013,WNL    COLONOSCOPY  2019    [facility]-no follow up    HERNIA REPAIR  2000    RELEASE CARPAL TUNNEL      2014    VASECTOMY      No Comments Provided       Family History:    Family History   Problem Relation Age of Onset    Cancer Father         Cancer, mesothelioma    Colon Cancer Mother         Cancer-colon,    Genitourinary Problems Mother         Genitourinary Disease,renal failure    Diabetes Mother         Diabetes    Other - See Comments Sister         Renal transplant    Diabetes Type 2  Sister         Diabetes type II    Diabetes Brother         Diabetes       Social History:  Marital  "Status:   [2]  Social History     Tobacco Use    Smoking status: Former     Current packs/day: 0.00     Average packs/day: 1 pack/day for 26.8 years (26.8 ttl pk-yrs)     Types: Cigarettes     Start date:      Quit date: 10/25/1983     Years since quittin.6     Passive exposure: Never    Smokeless tobacco: Never   Vaping Use    Vaping status: Never Used   Substance Use Topics    Alcohol use: No    Drug use: No        Medications:    aspirin 81 MG tablet  blood glucose (ACCU-CHEK ONEL PLUS) test strip  citalopram (CELEXA) 10 MG tablet  diclofenac (VOLTAREN) 1 % topical gel  gabapentin (NEURONTIN) 100 MG capsule  lactobacillus rhamnosus, GG, (CULTURELL) capsule  levothyroxine (SYNTHROID/LEVOTHROID) 50 MCG tablet  losartan (COZAAR) 25 MG tablet  metFORMIN (GLUCOPHAGE) 500 MG tablet  omega 3 1000 MG CAPS  omeprazole (PRILOSEC) 20 MG DR capsule  rosuvastatin (CRESTOR) 5 MG tablet  vitamin B complex with vitamin C (VITAMIN  B COMPLEX) TABS tablet  vitamin D3 (CHOLECALCIFEROL) 125 MCG (5000 UT) tablet          Review of Systems   Gastrointestinal:  Positive for abdominal pain.   All other systems reviewed and are negative.      Physical Exam   BP: (!) 174/78  Pulse: 61  Temp: 98.6  F (37  C)  Resp: 19  Height: 167.6 cm (5' 6\")  Weight: 86.6 kg (191 lb)  SpO2: 97 %      Physical Exam  Vitals and nursing note reviewed.   Constitutional:       General: He is not in acute distress.     Appearance: He is well-developed. He is not ill-appearing.   Abdominal:      General: Abdomen is flat. Bowel sounds are normal.      Palpations: Abdomen is soft.      Tenderness: There is no abdominal tenderness.      Hernia: No hernia is present.   Skin:     General: Skin is warm and dry.   Neurological:      Mental Status: He is alert.         Assessments & Plan (with Medical Decision Making)  Dajuan Basilio is a 79 year old male who has an abdominal wall hernia that is out and painful. He had been doing some heavy lifting " "yesterday but this only started to cause pain about an hour ago. He has a history of umbilical hernia but this is in a different location. Before I came in to see him he had reduced it while lying on the bed in room 10. He has no pain now.   VS in the ED BP (!) 174/78   Pulse 61   Temp 98.6  F (37  C) (Tympanic)   Resp 19   Ht 1.676 m (5' 6\")   Wt 86.6 kg (191 lb)   SpO2 97%   BMI 30.83 kg/m    Exam shows no abdominal pain or tenderness.  I did get him set up with a general surgery consult.     I have reviewed the nursing notes.    I have reviewed the findings, diagnosis, plan and need for follow up with the patient.  Medical Decision Making  The patient's presentation was of low complexity (an acute and uncomplicated illness or injury).    The patient's evaluation involved:  an assessment requiring an independent historian (see separate area of note for details)    The patient's management necessitated only low risk treatment.      Final diagnoses:   Hernia of anterior abdominal wall - reduced in the ED by the patient       5/21/2024   [facility] AND [facility], Delfino Madrigal MD  05/21/24 1311    "

## 2024-05-28 ENCOUNTER — OFFICE VISIT (OUTPATIENT)
Dept: SURGERY | Facility: OTHER | Age: 79
End: 2024-05-28
Attending: SURGERY
Payer: COMMERCIAL

## 2024-05-28 VITALS
HEIGHT: 67 IN | BODY MASS INDEX: 29.63 KG/M2 | SYSTOLIC BLOOD PRESSURE: 150 MMHG | RESPIRATION RATE: 18 BRPM | OXYGEN SATURATION: 96 % | HEART RATE: 88 BPM | WEIGHT: 188.8 LBS | TEMPERATURE: 97.9 F | DIASTOLIC BLOOD PRESSURE: 72 MMHG

## 2024-05-28 DIAGNOSIS — K43.9 HERNIA OF ANTERIOR ABDOMINAL WALL: ICD-10-CM

## 2024-05-28 DIAGNOSIS — K42.9 PERIUMBILICAL HERNIA: ICD-10-CM

## 2024-05-28 DIAGNOSIS — K40.90 RIGHT INGUINAL HERNIA: Primary | ICD-10-CM

## 2024-05-28 PROCEDURE — G0463 HOSPITAL OUTPT CLINIC VISIT: HCPCS

## 2024-05-28 PROCEDURE — 99214 OFFICE O/P EST MOD 30 MIN: CPT | Performed by: SURGERY

## 2024-05-28 ASSESSMENT — PAIN SCALES - GENERAL: PAINLEVEL: NO PAIN (0)

## 2024-05-28 NOTE — PROGRESS NOTES
GENERAL SURGERY OFFICE CONSULTATION NOTE    Patient Name: Dajuan Basilio  Address: [address] [id number]  Age:79 year old  Sex: male     Primary Care Physician: Cm Pickens    I was requested to see this patient in consultation by Cm Pickens for evaluation of umbilical pain/bulge. A copy of my findings and recommendations will be sentto Cm Pickens.    HPI:   Dajuan is a 79-year-old male who presents with a recurrent periumbilical bulge.  He previously had a umbilical hernia repair many years ago with Dr. Griffin..  He also noticed a groin twinge on occasion with lifting.  He presented to the ER and was able to reduce his incarcerated hernia once he was laying on a gurney..  He notes that he has lots of heavy lifting to do throughout the summer and would not like to fix it until fall.  We discussed risk of incarceration and strangulation versus watchful waiting.      He is also having some right shoulder pain and concerns of rotator cuff tear and would like to see an orthopedic opinion.    REVIEW OF SYSTEMS  GENERAL: No fevers or chills. Denies fatigue, recent weight loss.  HEENT: No sinus drainage. No changes with vision or hearing. No difficulty swallowing.   LYMPHATICS:  No swollen nodes inaxilla, neck or groin.  CARDIOVASCULAR: Denies chest pain, palpitations and dyspnea on exertion.  PULMONARY: No shortness of breath or cough. No increase in sputum production.  GI: Denies melena, bright red blood instools. No hematemesis. No constipation or diarrhea.  : No dysuria or hematuria.  SKIN: No recent rashes or ulcers.   HEMATOLOGY:  No history of easy bruising or bleeding.  ENDOCRINE:  No history of diabetes orthyroid problems.  NEUROLOGY:  No history of seizures or headaches. No motor or sensory changes.     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    Fibromyalgia     No Comments Provided    Nodular prostate without lower urinary tract symptoms     2012,US confirms benign calcifications    Obesity     No Comments Provided    Panic disorder without agoraphobia     No Comments Provided    Positive colorectal cancer screening using Cologuard test 12/06/2018       PAST SURGICAL HISTORY    Past Surgical History:   Procedure Laterality Date    ARTHROPLASTY KNEE      2015    ARTHROSCOPY KNEE      right knee X2    BIOPSY PROSTATE TRANSRECTAL      2013    COLONOSCOPY      2003,and UGI [place] normal    COLONOSCOPY      2009,and UGI repeat Dr. Johnson negative.    COLONOSCOPY  01/01/2013 2013,WNL    COLONOSCOPY  05/23/2019    [facility]-no follow up    HERNIA REPAIR  2000    RELEASE CARPAL TUNNEL      2014    VASECTOMY      No Comments Provided       CURRENT MEDS    Current Outpatient Medications   Medication Sig Dispense Refill    aspirin 81 MG tablet Take 81 mg by mouth daily      blood glucose (ACCU-CHEK ONEL PLUS) test strip USE STRIP TO CHECK GLUCOSEONCE DAILY 100 strip 0    citalopram (CELEXA) 10 MG tablet Take 1 tablet (10 mg) by mouth daily 90 tablet 4    diclofenac (VOLTAREN) 1 % topical gel Apply 2 g topically 4 times daily 100 g 1    gabapentin (NEURONTIN) 100 MG capsule Take 1-2 capsules (100-200 mg) by mouth at bedtime - for pain 150 capsule 1    lactobacillus rhamnosus, GG, (CULTURELL) capsule Take 1 capsule by mouth daily      levothyroxine (SYNTHROID/LEVOTHROID) 50 MCG tablet Take 1 tablet (50 mcg) by mouth daily - for Thyroid Replacement 93 tablet 4    losartan (COZAAR) 25 MG tablet Take 1 tablet (25 mg) by mouth daily 90 tablet 4    metFORMIN (GLUCOPHAGE) 500 MG tablet Take 2 tablets (1,000 mg) by mouth daily (with breakfast) AND 1 tablet (500 mg) daily (with dinner). 270 tablet 1    omega 3 1000 MG CAPS Take 1 capsule by mouth daily      omeprazole (PRILOSEC) 20  MG DR capsule Take 1 capsule (20 mg) by mouth daily Take 30 to 60 minutes prior to a meal -- for stomach acid 90 capsule 4    rosuvastatin (CRESTOR) 5 MG tablet Take 1 tablet (5 mg) by mouth daily - Stop Lipitor - 90 tablet 4    vitamin B complex with vitamin C (VITAMIN  B COMPLEX) TABS tablet Take 1 tablet by mouth daily Monday, Wednesday, Friday -- 3 days weekly -- make sure it has B12 in tablet -- Dose Reduced 2018 100 tablet 3    vitamin D3 (CHOLECALCIFEROL) 125 MCG (5000 UT) tablet Take 5,000 Units by mouth daily       Current Facility-Administered Medications   Medication Dose Route Frequency Provider Last Rate Last Admin    cyanocobalamin injection 1,000 mcg  1,000 mcg Intramuscular Continuous PRN Cm Pickens MD   1,000 mcg at 22 0906       ALLERGIES/SENSITIVITIES     Allergies   Allergen Reactions    Memantine Other (See Comments)     Dizzy, lightheaded  Dizzy, lightheaded    Rivastigmine Other (See Comments)     Dizzy, lightheaded  Dizzy, lightheaded    Ace Inhibitors Cough    Atorvastatin Calcium Muscle Pain (Myalgia)     Lipitor    Statins Muscle Pain (Myalgia)     Higher doses cause myalgias       FAMILY HISTORY    Family History   Problem Relation Age of Onset    Cancer Father         Cancer, mesothelioma    Colon Cancer Mother         Cancer-colon,    Genitourinary Problems Mother         Genitourinary Disease,renal failure    Diabetes Mother         Diabetes    Other - See Comments Sister         Renal transplant    Diabetes Type 2  Sister         Diabetes type II    Diabetes Brother         Diabetes       SOCIALHISTORY    Social History     Socioeconomic History    Marital status:      Spouse name: Not on file    Number of children: Not on file    Years of education: Not on file    Highest education level: Not on file   Occupational History    Not on file   Tobacco Use    Smoking status: Former     Current packs/day: 0.00     Average packs/day: 1 pack/day for 26.8 years  (26.8 ttl pk-yrs)     Types: Cigarettes     Start date:      Quit date: 10/25/1983     Years since quittin.6     Passive exposure: Never    Smokeless tobacco: Never   Vaping Use    Vaping status: Never Used   Substance and Sexual Activity    Alcohol use: No    Drug use: No    Sexual activity: Not Currently     Partners: Female     Comment: doctor to address    Other Topics Concern    Parent/sibling w/ CABG, MI or angioplasty before 65F 55M? Not Asked   Social History Narrative    , retired from [employer].  Lives in town.     Social Determinants of Health     Financial Resource Strain: Low Risk  (2024)    Financial Resource Strain     Within the past 12 months, have you or your family members you live with been unable to get utilities (heat, electricity) when it was really needed?: No   Food Insecurity: Low Risk  (2024)    Food Insecurity     Within the past 12 months, did you worry that your food would run out before you got money to buy more?: No     Within the past 12 months, did the food you bought just not last and you didn t have money to get more?: No   Transportation Needs: Low Risk  (2024)    Transportation Needs     Within the past 12 months, has lack of transportation kept you from medical appointments, getting your medicines, non-medical meetings or appointments, work, or from getting things that you need?: No   Physical Activity: Sufficiently Active (2024)    Exercise Vital Sign     Days of Exercise per Week: 3 days     Minutes of Exercise per Session: 60 min   Stress: Stress Concern Present (2024)    [employer] - Occupational Stress Questionnaire     Feeling of Stress : To some extent   Social Connections: Unknown (2024)    Social Connection and Isolation Panel [NHANES]     Frequency of Communication with Friends and Family: Not on file     Frequency of Social Gatherings with Friends and Family: Three times a week     Attends  "Congregational Services: Not on file     Active Member of Clubs or Organizations: Not on file     Attends Club or Organization Meetings: Not on file     Marital Status: Not on file   Interpersonal Safety: Low Risk  (5/28/2024)    Interpersonal Safety     Do you feel physically and emotionally safe where you currently live?: Yes     Within the past 12 months, have you been hit, slapped, kicked or otherwise physically hurt by someone?: No     Within the past 12 months, have you been humiliated or emotionally abused in other ways by your partner or ex-partner?: No   Housing Stability: Low Risk  (4/16/2024)    Housing Stability     Do you have housing? : Yes     Are you worried about losing your housing?: No       PHYSICAL EXAM  BP (!) 150/72 (BP Location: Right arm, Patient Position: Sitting, Cuff Size: Adult Regular)   Pulse 88   Temp 97.9  F (36.6  C) (Temporal)   Resp 18   Ht 1.689 m (5' 6.5\")   Wt 85.6 kg (188 lb 12.8 oz)   SpO2 96%   BMI 30.02 kg/m      Body mass index is 30.02 kg/m .    GENERAL: Healthy appearing patient in no acute distress. Pleasant and cooperative with exam and interview.   HEENT: Head-normocephalic. Eyes-no scleralicterus, pupils equal, round, and reactive to light. Nose-no nasal drainage. No lesions. Mouth-oral mucosa pink and moist, no lesions.  NECK: Supple. No thyroid nodules. Trachea midline.  LYMPHATICS:  No cervical,axillary or supraclavicular adenopathy.  CV: Regular rate and rhythm, no murmurs. No peripheral edema.  LUNGS:  No respiratory distress. Clear bilaterally to auscultation.  ABDOMEN: Non distended. Bowel soundsactive. Soft, non-tender, no hepatosplenomegaly. Periumbilical hernia noted, approximately 5 cm defect, no tenderness, reducible. No peritoneal signs.  Nonreducible right inguinal hernia.  Mild tenderness  SKIN: Pink, warm and dry. No jaundice. No rash.  NEURO:  Cranial nerves II-XII grossly intact.Alert and oriented.  PSYCH: Appropriate mood and " affect.    IMAGING/LAB  I personally reviewed patient's CT from 2020.    CONSULTATION ASSESSMENT AND PLAN/RECOMMENDATIONS: I discussed with the patient the pathophysiology of umbilical hernias and inguinal hernias.  Although his right inguinal hernia is not always symptomatic he will benefit from 1 recovery period and combined operation.  I explained the risks, benefits and alternatives to repair of umbilical hernia with mesh.  He specifically wants to have a mesh reinforcement as he is experienced a recurrence with a suture repair.  On CT it was approximately 3 cm previously.  It now is more like 5 cm.  We specifically discussed the risks of infection, bleeding, injury to intra-abdominal organs, mesh complicationsand hernia recurrence. We discussed post operative limitations and expected recovery time. The patient's questions were answered and the patient wishes to proceed with repair. Informed consent paperwork was completed. This will be scheduled at a time that is convenient for the patient. The patient will call with questions or concerns prior to the procedure.    Primary care to the preop approximately 30 days or less prior to procedure.    Gus May MD on 5/28/2024 at 1:29 PM   32 minutes spent in evaluation discussion of inguinal and periumbilical hernia

## 2024-05-28 NOTE — NURSING NOTE
"Chief Complaint   Patient presents with    Consult     Discuss right lower abdominal pain  Hernia?       Medication reconciliation completed.    FOOD SECURITY SCREENING QUESTIONS:    The next two questions are to help us understand your food security.  If you are feeling you need any assistance in this area, we have resources available to support you today.    Hunger Vital Signs:  Within the past 12 months we worried whether our food would run out before we got money to buy more. Never  Within the past 12 months the food we bought just didn't last and we didn't have money to get more. Never    Initial BP (!) 158/74 (BP Location: Right arm, Patient Position: Sitting, Cuff Size: Adult Regular)   Pulse 88   Temp 97.9  F (36.6  C) (Temporal)   Resp 18   Ht 1.689 m (5' 6.5\")   Wt 85.6 kg (188 lb 12.8 oz)   SpO2 96%   BMI 30.02 kg/m   Estimated body mass index is 30.02 kg/m  as calculated from the following:    Height as of this encounter: 1.689 m (5' 6.5\").    Weight as of this encounter: 85.6 kg (188 lb 12.8 oz).       Elena Diaz LPN .......  5/28/2024  12:55 PM    "

## 2024-05-31 DIAGNOSIS — E11.21 TYPE 2 DIABETES MELLITUS WITH DIABETIC NEPHROPATHY, WITHOUT LONG-TERM CURRENT USE OF INSULIN (H): ICD-10-CM

## 2024-06-03 DIAGNOSIS — E11.21 TYPE 2 DIABETES MELLITUS WITH DIABETIC NEPHROPATHY, WITHOUT LONG-TERM CURRENT USE OF INSULIN (H): ICD-10-CM

## 2024-06-03 RX ORDER — BLOOD SUGAR DIAGNOSTIC
STRIP MISCELLANEOUS
Qty: 100 STRIP | Refills: 0 | Status: CANCELLED | OUTPATIENT
Start: 2024-06-03

## 2024-06-03 RX ORDER — BLOOD SUGAR DIAGNOSTIC
STRIP MISCELLANEOUS
Qty: 100 STRIP | Refills: 0 | Status: SHIPPED | OUTPATIENT
Start: 2024-06-03 | End: 2024-09-09

## 2024-06-03 NOTE — TELEPHONE ENCOUNTER
"  Requested Prescriptions   Pending Prescriptions Disp Refills    blood glucose (ACCU-CHEK ONEL PLUS) test strip 100 strip 0     Sig: USE 1 STRIP TO CHECK GLUCOSE ONCE DAILY     Prescription approved at 0843.    Called and spoke with spouse and she states \"if this is about the strips, he got that taken care of.\"    Alanna Patterson RN .............. 6/3/2024  1:06 PM    "

## 2024-06-03 NOTE — TELEPHONE ENCOUNTER
Reason for call: Medication or medication refill    Have you contacted your pharmacy regarding this refill? Yes. Pharmacy states they have sent over the refill request many times.     Name of medication requested: diabetic strips    How many days of medication do you have left? 0. States he has been out for 2 days.    What pharmacy do you use? [facility] [place].    Preferred method for responding to this message: Telephone Call    Phone number patient can be reached at: Cell number on file:    Telephone Information:   Home [phone]     Patient requesting a phone call to know if order has been sent to pharmacy.  If we cannot reach you directly, may we leave a detailed response at the number you provided? Yes     Meseret Rasmussen on 6/3/2024 at 9:36 AM

## 2024-06-03 NOTE — TELEPHONE ENCOUNTER
Prescription refilled per RN Medication Refill Policy..................Jessica Sanchez RN 6/3/2024 8:36 AM

## 2024-07-17 ENCOUNTER — OFFICE VISIT (OUTPATIENT)
Dept: ORTHOPEDICS | Facility: OTHER | Age: 79
End: 2024-07-17
Attending: ORTHOPAEDIC SURGERY
Payer: MEDICARE

## 2024-07-17 VITALS
OXYGEN SATURATION: 97 % | RESPIRATION RATE: 18 BRPM | WEIGHT: 185 LBS | BODY MASS INDEX: 29.03 KG/M2 | SYSTOLIC BLOOD PRESSURE: 122 MMHG | HEIGHT: 67 IN | HEART RATE: 64 BPM | DIASTOLIC BLOOD PRESSURE: 64 MMHG

## 2024-07-17 DIAGNOSIS — M25.511 CHRONIC RIGHT SHOULDER PAIN: Primary | ICD-10-CM

## 2024-07-17 DIAGNOSIS — G89.29 CHRONIC RIGHT SHOULDER PAIN: Primary | ICD-10-CM

## 2024-07-17 PROCEDURE — 250N000009 HC RX 250: Performed by: ORTHOPAEDIC SURGERY

## 2024-07-17 PROCEDURE — G0463 HOSPITAL OUTPT CLINIC VISIT: HCPCS

## 2024-07-17 PROCEDURE — 250N000011 HC RX IP 250 OP 636: Mod: JZ | Performed by: ORTHOPAEDIC SURGERY

## 2024-07-17 PROCEDURE — 99203 OFFICE O/P NEW LOW 30 MIN: CPT | Mod: 25 | Performed by: ORTHOPAEDIC SURGERY

## 2024-07-17 PROCEDURE — 20610 DRAIN/INJ JOINT/BURSA W/O US: CPT | Mod: RT | Performed by: ORTHOPAEDIC SURGERY

## 2024-07-17 RX ORDER — LIDOCAINE HYDROCHLORIDE 10 MG/ML
4 INJECTION, SOLUTION EPIDURAL; INFILTRATION; INTRACAUDAL; PERINEURAL ONCE
Status: COMPLETED | OUTPATIENT
Start: 2024-07-17 | End: 2024-07-17

## 2024-07-17 RX ORDER — TRIAMCINOLONE ACETONIDE 40 MG/ML
40 INJECTION, SUSPENSION INTRA-ARTICULAR; INTRAMUSCULAR ONCE
Status: COMPLETED | OUTPATIENT
Start: 2024-07-17 | End: 2024-07-17

## 2024-07-17 RX ADMIN — LIDOCAINE HYDROCHLORIDE 4 ML: 10 INJECTION, SOLUTION INFILTRATION; PERINEURAL at 13:44

## 2024-07-17 RX ADMIN — TRIAMCINOLONE ACETONIDE 40 MG: 40 INJECTION, SUSPENSION INTRA-ARTICULAR; INTRAMUSCULAR at 13:44

## 2024-07-17 NOTE — PROGRESS NOTES
Surgical Clinic Consult  Primary physician:     Cm Pickens    Chief complaint:   Right shoulder pain    History of present illness:  This is a 79 year old male I am seeing in consultation for chronic right shoulder pain has been ongoing for at least a year in duration.  No injury prior to onset.  Patient reports pain with overhead based activities.  No injections or therapies have been tried.  Patient reports his pain is not overly debilitating but certainly does cause him issues.  X-rays have been completed which shows AC arthrosis plus type II acromion.    Past medical history:   Past Medical History:   Diagnosis Date    Abnormal CT scan, bladder 6/29/2016    Diverticulosis of intestine without perforation or abscess without bleeding     No Comments Provided    Fatty (change of) liver, not elsewhere classified     non alcoholic    Fibromyalgia     No Comments Provided    Nodular prostate without lower urinary tract symptoms     2012,US confirms benign calcifications    Obesity     No Comments Provided    Panic disorder without agoraphobia     No Comments Provided    Positive colorectal cancer screening using Cologuard test 12/06/2018       Pastsurgical history:  Past Surgical History:   Procedure Laterality Date    ARTHROPLASTY KNEE      2015    ARTHROSCOPY KNEE      right knee X2    BIOPSY PROSTATE TRANSRECTAL      2013    COLONOSCOPY      2003,and UGI Martin normal    COLONOSCOPY      2009,and UGI repeat Dr. Johnson negative.    COLONOSCOPY  01/01/2013 2013,WNL    COLONOSCOPY  05/23/2019    Elizabeth-no follow up    HERNIA REPAIR  2000    RELEASE CARPAL TUNNEL      2014    VASECTOMY      No Comments Provided       Current medications:  Current Outpatient Medications   Medication Sig Dispense Refill    aspirin 81 MG tablet Take 81 mg by mouth daily      blood glucose (ACCU-CHEK ONEL PLUS) test strip USE 1 STRIP TO CHECK GLUCOSE ONCE DAILY 100 strip 0    citalopram (CELEXA) 10 MG tablet Take 1 tablet (10 mg) by  mouth daily 90 tablet 4    diclofenac (VOLTAREN) 1 % topical gel Apply 2 g topically 4 times daily 100 g 1    gabapentin (NEURONTIN) 100 MG capsule Take 1-2 capsules (100-200 mg) by mouth at bedtime - for pain 150 capsule 1    lactobacillus rhamnosus, GG, (CULTURELL) capsule Take 1 capsule by mouth daily      levothyroxine (SYNTHROID/LEVOTHROID) 50 MCG tablet Take 1 tablet (50 mcg) by mouth daily - for Thyroid Replacement 93 tablet 4    losartan (COZAAR) 25 MG tablet Take 1 tablet (25 mg) by mouth daily 90 tablet 4    metFORMIN (GLUCOPHAGE) 500 MG tablet Take 2 tablets (1,000 mg) by mouth daily (with breakfast) AND 1 tablet (500 mg) daily (with dinner). 270 tablet 1    omega 3 1000 MG CAPS Take 1 capsule by mouth daily      omeprazole (PRILOSEC) 20 MG DR capsule Take 1 capsule (20 mg) by mouth daily Take 30 to 60 minutes prior to a meal -- for stomach acid 90 capsule 4    rosuvastatin (CRESTOR) 5 MG tablet Take 1 tablet (5 mg) by mouth daily - Stop Lipitor - 90 tablet 4    vitamin B complex with vitamin C (VITAMIN  B COMPLEX) TABS tablet Take 1 tablet by mouth daily Monday, Wednesday, Friday -- 3 days weekly -- make sure it has B12 in tablet -- Dose Reduced 2018 100 tablet 3    vitamin D3 (CHOLECALCIFEROL) 125 MCG (5000 UT) tablet Take 5,000 Units by mouth daily         Allergies:  Allergies   Allergen Reactions    Memantine Other (See Comments)     Dizzy, lightheaded  Dizzy, lightheaded    Rivastigmine Other (See Comments)     Dizzy, lightheaded  Dizzy, lightheaded    Ace Inhibitors Cough    Atorvastatin Calcium Muscle Pain (Myalgia)     Lipitor    Statins Muscle Pain (Myalgia)     Higher doses cause myalgias       Family history:  Family History   Problem Relation Age of Onset    Cancer Father         Cancer, mesothelioma    Colon Cancer Mother         Cancer-colon,    Genitourinary Problems Mother         Genitourinary Disease,renal failure    Diabetes Mother         Diabetes    Other - See Comments  Sister         Renal transplant    Diabetes Type 2  Sister         Diabetes type II    Diabetes Brother         Diabetes       Social history:  Social History     Socioeconomic History    Marital status:      Spouse name: Not on file    Number of children: Not on file    Years of education: Not on file    Highest education level: Not on file   Occupational History    Not on file   Tobacco Use    Smoking status: Former     Current packs/day: 0.00     Average packs/day: 1 pack/day for 26.8 years (26.8 ttl pk-yrs)     Types: Cigarettes     Start date:      Quit date: 10/25/1983     Years since quittin.7     Passive exposure: Never    Smokeless tobacco: Never   Vaping Use    Vaping status: Never Used   Substance and Sexual Activity    Alcohol use: No    Drug use: No    Sexual activity: Not Currently     Partners: Female     Comment: doctor to address    Other Topics Concern    Parent/sibling w/ CABG, MI or angioplasty before 65F 55M? Not Asked   Social History Narrative    , retired from [employer].  Lives in town.     Social Determinants of Health     Financial Resource Strain: Low Risk  (2024)    Financial Resource Strain     Within the past 12 months, have you or your family members you live with been unable to get utilities (heat, electricity) when it was really needed?: No   Food Insecurity: Low Risk  (2024)    Food Insecurity     Within the past 12 months, did you worry that your food would run out before you got money to buy more?: No     Within the past 12 months, did the food you bought just not last and you didn t have money to get more?: No   Transportation Needs: Low Risk  (2024)    Transportation Needs     Within the past 12 months, has lack of transportation kept you from medical appointments, getting your medicines, non-medical meetings or appointments, work, or from getting things that you need?: No   Physical Activity: Sufficiently Active (2024)    Exercise Vital  Sign     Days of Exercise per Week: 3 days     Minutes of Exercise per Session: 60 min   Stress: Stress Concern Present (4/16/2024)    [employer] [employer] - Occupational Stress Questionnaire     Feeling of Stress : To some extent   Social Connections: Unknown (4/16/2024)    Social Connection and Isolation Panel [NHANES]     Frequency of Communication with Friends and Family: Not on file     Frequency of Social Gatherings with Friends and Family: Three times a week     Attends Religion Services: Not on file     Active Member of Clubs or Organizations: Not on file     Attends Club or Organization Meetings: Not on file     Marital Status: Not on file   Interpersonal Safety: Low Risk  (5/28/2024)    Interpersonal Safety     Do you feel physically and emotionally safe where you currently live?: Yes     Within the past 12 months, have you been hit, slapped, kicked or otherwise physically hurt by someone?: No     Within the past 12 months, have you been humiliated or emotionally abused in other ways by your partner or ex-partner?: No   Housing Stability: Low Risk  (4/16/2024)    Housing Stability     Do you have housing? : Yes     Are you worried about losing your housing?: No       PROBLEM LIST:  Patient Active Problem List   Diagnosis    Diverticular disease of colon    Generalized anxiety disorder    GERD (gastroesophageal reflux disease)    Nephrolithiasis    BPH (benign prostatic hyperplasia)    Overweight (BMI 25.0-29.9)    Chronic anxiety    Microalbuminuria    Coronary artery disease involving native coronary artery of native heart without angina pectoris    Status post insertion of drug eluting coronary artery stent - x2 stents - 1st OMB - 5/17/2018 - [facility]    Controlled type 2 diabetes mellitus with diabetic polyneuropathy, without long-term current use of insulin (H)    Benign prostatic hyperplasia with weak urinary stream    S/P cardiac cath 5/17/18    Unsteady gait     "Stented coronary artery to his LCX and OM1 on 5/17/18    Mixed hyperlipidemia    Chronically dry eyes, bilateral    Left sided sciatica    Lumbar facet arthropathy    Chronic left-sided low back pain without sciatica    Vitamin B12 deficiency    Benign essential hypertension    Unspecified inflammatory spondylopathy, sacral and sacrococcygeal region (H24)    Diabetic peripheral neuropathy (H)    Bunion, right    Onychomycosis    Bunion, left    Major neurocognitive disorder due to another medical condition (H)    Mixed simple and mucopurulent chronic bronchitis (H)    Benign paroxysmal positional vertigo, unspecified laterality    Elevated prostate specific antigen (PSA)    Hypothyroidism due to acquired atrophy of thyroid    Recurrent major depressive disorder, in full remission (H24)    Chronic cough    Droopy eyelid, bilateral    History of eyelid surgery    Moderate Alzheimer's dementia without behavioral disturbance, psychotic disturbance, mood disturbance, or anxiety, unspecified timing of dementia onset (H)    Trigger point of right shoulder region    Excessive daytime sleepiness    Frequent headaches    Chronic fatigue    Primary narcolepsy without cataplexy    CKD (chronic kidney disease) stage 2, GFR 60-89 ml/min    Class 1 obesity due to excess calories with serious comorbidity and body mass index (BMI) of 30.0 to 30.9 in adult    Hernia of anterior abdominal wall    Right inguinal hernia    Periumbilical hernia       Review of Systems:  COMPLETE 12 point REVIEW OF SYSTEMS is otherwise negative with the exception of which is stated above.    Physical exam: /64   Pulse 64   Resp 18   Ht 1.689 m (5' 6.5\")   Wt 83.9 kg (185 lb)   SpO2 97%   BMI 29.41 kg/m      General: this is a pleasant male patient in no acute distress.  Patient is awake alert and oriented x3 .   EXAM:  Chest/Respiratory Exam: Normal - Clear to auscultation without rales, rhonchi, or wheezing.  Cardiovascular Exam: " normal  Musculoskeletal: Right shoulder examination shows pain with elevation beyond 90.  Pain with impingement testing.  Neuro exam intact.  Mild decrease in overall rotator cuff strength is noted.  No instability is present at this time.  No pain with Spurling testing.    PROCEDURE NOTE: Right shoulder subacromial space injected with 4 cc 1% lidocaine and 40 mg of Kenalog under sterile conditions using anterior approach.    Imagin views right shoulder show evidence for AC joint arthrosis and type II acromion    Assessment:   Right shoulder AC arthrosis with impingement possibility for small full-thickness rotator cuff tearing    Plan:    Cortisone injection here today.  Should symptoms get worse or not respond to this MRI right shoulder would be recommended.      Foreign Loyd MD

## 2024-07-23 ENCOUNTER — LAB (OUTPATIENT)
Dept: LAB | Facility: OTHER | Age: 79
End: 2024-07-23
Attending: INTERNAL MEDICINE
Payer: MEDICARE

## 2024-07-23 DIAGNOSIS — E03.4 HYPOTHYROIDISM DUE TO ACQUIRED ATROPHY OF THYROID: ICD-10-CM

## 2024-07-23 DIAGNOSIS — E11.21 TYPE 2 DIABETES MELLITUS WITH DIABETIC NEPHROPATHY, WITHOUT LONG-TERM CURRENT USE OF INSULIN (H): ICD-10-CM

## 2024-07-23 DIAGNOSIS — E78.2 MIXED HYPERLIPIDEMIA: ICD-10-CM

## 2024-07-23 DIAGNOSIS — Z12.5 ENCOUNTER FOR SCREENING FOR MALIGNANT NEOPLASM OF PROSTATE: Primary | ICD-10-CM

## 2024-07-23 DIAGNOSIS — Z12.5 ENCOUNTER FOR SCREENING FOR MALIGNANT NEOPLASM OF PROSTATE: ICD-10-CM

## 2024-07-23 LAB
ALBUMIN SERPL BCG-MCNC: 4.4 G/DL (ref 3.5–5.2)
ALBUMIN UR-MCNC: NEGATIVE MG/DL
ALP SERPL-CCNC: 92 U/L (ref 40–150)
ALT SERPL W P-5'-P-CCNC: 16 U/L (ref 0–70)
ANION GAP SERPL CALCULATED.3IONS-SCNC: 11 MMOL/L (ref 7–15)
APPEARANCE UR: CLEAR
AST SERPL W P-5'-P-CCNC: 13 U/L (ref 0–45)
BILIRUB SERPL-MCNC: 0.4 MG/DL
BILIRUB UR QL STRIP: NEGATIVE
BUN SERPL-MCNC: 17.2 MG/DL (ref 8–23)
CALCIUM SERPL-MCNC: 9.8 MG/DL (ref 8.8–10.4)
CHLORIDE SERPL-SCNC: 102 MMOL/L (ref 98–107)
CHOLEST SERPL-MCNC: 130 MG/DL
COLOR UR AUTO: YELLOW
CREAT SERPL-MCNC: 0.71 MG/DL (ref 0.67–1.17)
CREAT UR-MCNC: 30.3 MG/DL
EGFRCR SERPLBLD CKD-EPI 2021: >90 ML/MIN/1.73M2
ERYTHROCYTE [DISTWIDTH] IN BLOOD BY AUTOMATED COUNT: 12.9 % (ref 10–15)
FASTING STATUS PATIENT QL REPORTED: YES
FASTING STATUS PATIENT QL REPORTED: YES
GLUCOSE SERPL-MCNC: 176 MG/DL (ref 70–99)
GLUCOSE UR STRIP-MCNC: NEGATIVE MG/DL
HBA1C MFR BLD: 6.9 % (ref 4–6.2)
HCO3 SERPL-SCNC: 26 MMOL/L (ref 22–29)
HCT VFR BLD AUTO: 43.8 % (ref 40–53)
HDLC SERPL-MCNC: 52 MG/DL
HGB BLD-MCNC: 14.2 G/DL (ref 13.3–17.7)
HGB UR QL STRIP: NEGATIVE
KETONES UR STRIP-MCNC: NEGATIVE MG/DL
LDLC SERPL CALC-MCNC: 65 MG/DL
LEUKOCYTE ESTERASE UR QL STRIP: NEGATIVE
MCH RBC QN AUTO: 29.9 PG (ref 26.5–33)
MCHC RBC AUTO-ENTMCNC: 32.4 G/DL (ref 31.5–36.5)
MCV RBC AUTO: 92 FL (ref 78–100)
MICROALBUMIN UR-MCNC: <12 MG/L
MICROALBUMIN/CREAT UR: NORMAL MG/G{CREAT}
NITRATE UR QL: NEGATIVE
NONHDLC SERPL-MCNC: 78 MG/DL
PH UR STRIP: 7 [PH] (ref 5–9)
PLATELET # BLD AUTO: 228 10E3/UL (ref 150–450)
POTASSIUM SERPL-SCNC: 4.3 MMOL/L (ref 3.4–5.3)
PROT SERPL-MCNC: 7.2 G/DL (ref 6.4–8.3)
PSA SERPL DL<=0.01 NG/ML-MCNC: 4.94 NG/ML
RBC # BLD AUTO: 4.75 10E6/UL (ref 4.4–5.9)
SODIUM SERPL-SCNC: 139 MMOL/L (ref 135–145)
SP GR UR STRIP: 1.01 (ref 1–1.03)
TRIGL SERPL-MCNC: 64 MG/DL
TSH SERPL DL<=0.005 MIU/L-ACNC: 2 UIU/ML (ref 0.3–4.2)
UROBILINOGEN UR STRIP-MCNC: NORMAL MG/DL
WBC # BLD AUTO: 7.6 10E3/UL (ref 4–11)

## 2024-07-23 PROCEDURE — 80061 LIPID PANEL: CPT | Mod: ZL

## 2024-07-23 PROCEDURE — 82040 ASSAY OF SERUM ALBUMIN: CPT | Mod: ZL

## 2024-07-23 PROCEDURE — 82043 UR ALBUMIN QUANTITATIVE: CPT | Mod: ZL

## 2024-07-23 PROCEDURE — 83036 HEMOGLOBIN GLYCOSYLATED A1C: CPT | Mod: ZL

## 2024-07-23 PROCEDURE — G0103 PSA SCREENING: HCPCS | Mod: ZL

## 2024-07-23 PROCEDURE — 85027 COMPLETE CBC AUTOMATED: CPT | Mod: ZL

## 2024-07-23 PROCEDURE — 36415 COLL VENOUS BLD VENIPUNCTURE: CPT | Mod: ZL

## 2024-07-23 PROCEDURE — 81003 URINALYSIS AUTO W/O SCOPE: CPT | Mod: ZL

## 2024-07-23 PROCEDURE — 84443 ASSAY THYROID STIM HORMONE: CPT | Mod: ZL

## 2024-07-24 ENCOUNTER — OFFICE VISIT (OUTPATIENT)
Dept: INTERNAL MEDICINE | Facility: OTHER | Age: 79
End: 2024-07-24
Attending: INTERNAL MEDICINE
Payer: COMMERCIAL

## 2024-07-24 VITALS
SYSTOLIC BLOOD PRESSURE: 126 MMHG | DIASTOLIC BLOOD PRESSURE: 78 MMHG | OXYGEN SATURATION: 100 % | RESPIRATION RATE: 16 BRPM | BODY MASS INDEX: 28.85 KG/M2 | HEIGHT: 67 IN | HEART RATE: 54 BPM | WEIGHT: 183.8 LBS

## 2024-07-24 DIAGNOSIS — E11.42 CONTROLLED TYPE 2 DIABETES MELLITUS WITH DIABETIC POLYNEUROPATHY, WITHOUT LONG-TERM CURRENT USE OF INSULIN (H): Primary | ICD-10-CM

## 2024-07-24 DIAGNOSIS — E66.3 OVERWEIGHT (BMI 25.0-29.9): ICD-10-CM

## 2024-07-24 DIAGNOSIS — N18.2 CKD (CHRONIC KIDNEY DISEASE) STAGE 2, GFR 60-89 ML/MIN: ICD-10-CM

## 2024-07-24 DIAGNOSIS — E11.21 TYPE 2 DIABETES MELLITUS WITH DIABETIC NEPHROPATHY, WITHOUT LONG-TERM CURRENT USE OF INSULIN (H): ICD-10-CM

## 2024-07-24 DIAGNOSIS — I10 BENIGN ESSENTIAL HYPERTENSION: ICD-10-CM

## 2024-07-24 DIAGNOSIS — U09.9 POST-COVID-19 SYNDROME MANIFESTING AS CHRONIC COUGH: ICD-10-CM

## 2024-07-24 DIAGNOSIS — E03.4 HYPOTHYROIDISM DUE TO ACQUIRED ATROPHY OF THYROID: ICD-10-CM

## 2024-07-24 DIAGNOSIS — E78.2 MIXED HYPERLIPIDEMIA: ICD-10-CM

## 2024-07-24 DIAGNOSIS — R05.3 POST-COVID-19 SYNDROME MANIFESTING AS CHRONIC COUGH: ICD-10-CM

## 2024-07-24 PROCEDURE — 99214 OFFICE O/P EST MOD 30 MIN: CPT | Performed by: INTERNAL MEDICINE

## 2024-07-24 PROCEDURE — G2211 COMPLEX E/M VISIT ADD ON: HCPCS | Performed by: INTERNAL MEDICINE

## 2024-07-24 PROCEDURE — G0463 HOSPITAL OUTPT CLINIC VISIT: HCPCS

## 2024-07-24 RX ORDER — GABAPENTIN 100 MG/1
100-200 CAPSULE ORAL AT BEDTIME
Qty: 150 CAPSULE | Refills: 1 | Status: CANCELLED | OUTPATIENT
Start: 2024-07-24

## 2024-07-24 RX ORDER — LOSARTAN POTASSIUM 25 MG/1
25 TABLET ORAL DAILY
Qty: 90 TABLET | Refills: 4 | Status: SHIPPED | OUTPATIENT
Start: 2024-07-24

## 2024-07-24 ASSESSMENT — ENCOUNTER SYMPTOMS
JOINT SWELLING: 0
EYE PAIN: 1
SLEEP DISTURBANCE: 0
DYSURIA: 0
DIARRHEA: 0
NAUSEA: 0
ARTHRALGIAS: 1
HEMATOCHEZIA: 0
ABDOMINAL PAIN: 0
CONSTIPATION: 0
AGITATION: 0
WHEEZING: 0
BRUISES/BLEEDS EASILY: 0
SHORTNESS OF BREATH: 0
VOMITING: 0
BACK PAIN: 1
FREQUENCY: 0
PARESTHESIAS: 0
PALPITATIONS: 0
HEMATURIA: 0
EYE REDNESS: 1
DIFFICULTY URINATING: 1
WEAKNESS: 1
NERVOUS/ANXIOUS: 1
COUGH: 1
LIGHT-HEADEDNESS: 0
CHILLS: 0
HEADACHES: 1
NUMBNESS: 1
MYALGIAS: 0
HEARTBURN: 0
FATIGUE: 1
FEVER: 0
DIZZINESS: 0
SORE THROAT: 0

## 2024-07-24 ASSESSMENT — PATIENT HEALTH QUESTIONNAIRE - PHQ9: SUM OF ALL RESPONSES TO PHQ QUESTIONS 1-9: 0

## 2024-07-24 ASSESSMENT — PAIN SCALES - GENERAL: PAINLEVEL: NO PAIN (0)

## 2024-07-24 NOTE — NURSING NOTE
"Chief Complaint   Patient presents with    Diabetes       Initial /78   Pulse 54   Resp 16   Ht 1.689 m (5' 6.5\")   Wt 83.4 kg (183 lb 12.8 oz)   SpO2 100%   BMI 29.22 kg/m   Estimated body mass index is 29.22 kg/m  as calculated from the following:    Height as of this encounter: 1.689 m (5' 6.5\").    Weight as of this encounter: 83.4 kg (183 lb 12.8 oz).  Medication Review: complete    The next two questions are to help us understand your food security.  If you are feeling you need any assistance in this area, we have resources available to support you today.          4/16/2024   SDOH- Food Insecurity   Within the past 12 months, did you worry that your food would run out before you got money to buy more? N   Within the past 12 months, did the food you bought just not last and you didn t have money to get more? N            Health Care Directive:  Patient does not have a Health Care Directive or Living Will: Discussed advance care planning with patient; however, patient declined at this time.    Yas Caicedo LPN      "

## 2024-07-24 NOTE — PATIENT INSTRUCTIONS
Blood pressure is well controlled.   Diabetes is well controlled.     Medications refilled.   Labs are stable.       Immunization History   Administered Date(s) Administered    COVID-19 12+ (2023-24) (Pfizer) 04/16/2024    COVID-19 Bivalent 12+ (Pfizer) 09/22/2022    COVID-19 Bivalent 18+ (Moderna) 05/30/2023    COVID-19 MONOVALENT 12+ (Pfizer) 02/16/2021, 03/09/2021, 10/04/2021    COVID-19 Monovalent 12+ (Pfizer 2022) 04/11/2022    Flu 65+ Years 09/06/2017    Influenza (H1N1) 11/24/2009    Influenza (High Dose) 3 valent vaccine 09/23/2015, 10/05/2016, 09/30/2018, 09/21/2019, 09/09/2020    Influenza (IIV3) PF 11/09/2006, 10/18/2007, 10/20/2008, 09/09/2020    Influenza Vaccine 65+ (FLUAD) 09/09/2021, 09/08/2022, 09/06/2023    Influenza, seasonal, injectable, PF 09/25/2010    Pneumo Conj 13-V (2010&after) 04/18/2017    Pneumococcal 23 valent 05/03/2013    TDAP (Adacel,Boostrix) 12/06/2023    TDAP Vaccine (Boostrix) 05/03/2013    Zoster recombinant adjuvanted (SHINGRIX) 09/23/2021, 12/02/2021    Zoster vaccine, live 10/28/2014      Consider:  -- Annual Flu / Influenza vaccination - Every Fall (Starting about October 1st)    Consider:  -- COVID-19 Vaccination shot -- TWICE Yearly (Spring and Fall)      One Dose:  -- RSV vaccine one time for all individuals age 75+ -- In the Fall (Starting about October 1st)  (If Medicare insurance - get vaccine at the Pharmacy.     Private insurance may be able to get in clinic with clinic shot nurse.)  --- Check Cost $$$      -- Pneumonia / Pneumococcal PCV vaccines are done / completed.          Stop:   - Omega-3/fish oil  - Gabapentin  - B complex  - Lactobacillus/probiotic        Follow-up appointment in about 2 months.... to see how your neuropathy pain is doing.       Aspects of Diabetes:   Recent Labs   Lab Test 07/23/24  0814 04/15/24  0903 01/10/24  0851 07/05/22  0848 03/29/22  0953   A1C 6.9* 6.9* 6.5*   < > 6.6*   LDL 65 67 46   < > 76   HDL 52 46 47   < > 41   TRIG 64 69 95    < > 107   ALT 16 14 16   < > 16   CR 0.71 0.80 0.85   < > 0.83   GFRESTIMATED >90 89 88   < > 90   POTASSIUM 4.3 4.3 4.2   < > 3.9   TSH 2.00 1.96 1.38   < > 4.14*   T4  --   --   --   --  0.68   WBC 7.6 5.1 4.4   < > 5.6   HGB 14.2 14.6 14.2   < > 14.8    207 194   < > 220   ALBUMIN 4.4 4.7 4.6   < > 4.6    < > = values in this interval not displayed.      Hemoglobin A1c  Goal range is under 8%. Best is 6.5 to 7   Blood Pressure 126/78 Goal to keep less than 140/90   Tobacco  reports that he quit smoking about 40 years ago. His smoking use included cigarettes. He started smoking about 67 years ago. He has a 26.8 pack-year smoking history. He has never been exposed to tobacco smoke. He has never used smokeless tobacco. Goal to abstain from tobacco   Aspirin or Plavix Anti-platelet therapy Aspirin or Plavix reduces risk of heart disease and stroke  -- sometimes used with other blood thinners, depending on bleeding risk and risk factors.    ACE/ARB Specific blood pressure meds These medications can reduce risk of kidney disease   Cholesterol Statins (Lipitor, Crestor, vs others) Statins reduce risk of heart disease and stroke   Eye Exam -- Do Yearly -- Annual diabetic eye exam   Healthy weight Wt Readings from Last 4 Encounters:   07/24/24 83.4 kg (183 lb 12.8 oz)   07/17/24 83.9 kg (185 lb)   05/28/24 85.6 kg (188 lb 12.8 oz)   05/21/24 86.6 kg (191 lb)      Body mass index is 29.22 kg/m .  Goal BMI under 30, best is under 25.      -- Trying to exercise daily (goal at least 20 min/day) with moderate aerobic activity   -- Eat healthy (resources from ADA at http://www.diabetes.org/)   -- Taking good care of my feet. Consider seeing the Podiatrist   -- Check blood sugars as directed, record in log book and bring to every appointment    Insurance [employer] are grading you and I on your blood sugar control -- Goal is to get your A1c down to 7.9% or lower and NO Smoking!  -- Medicare and most insurance companies,  will only cover Hemoglobin A1c labs to be rechecked every 91+ days.      Return for Diabetes labs and clinic follow-up appointment every 3 to 4 months.    Schedule lab only appointment --- A few days AFTER: Early November.  Schedule clinic appointment with Dr. Pickens -- Same day as labs, or 1-2 days later.

## 2024-07-24 NOTE — PROGRESS NOTES
Assessment & Plan     ICD-10-CM    1. Controlled type 2 diabetes mellitus with diabetic polyneuropathy, without long-term current use of insulin (H)  E11.42 metFORMIN (GLUCOPHAGE) 500 MG tablet      2. Type 2 diabetes mellitus with diabetic nephropathy, without long-term current use of insulin (H)  E11.21 metFORMIN (GLUCOPHAGE) 500 MG tablet      3. Benign essential hypertension  I10 losartan (COZAAR) 25 MG tablet      4. CKD (chronic kidney disease) stage 2, GFR 60-89 ml/min  N18.2       5. Overweight (BMI 25.0-29.9)  E66.3       6. 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 Hypothyroidism due to acquired atrophy of thyroid  E03.4          Patient presents for follow-up multiple issues.    Diabetic polyneuropathy.  Has only been taking 1 capsule of gabapentin at bedtime.  Not sure this is really been really helping anymore.  Was having more issues with peripheral neuropathy pain in the past, but seems to be doing better recently.  Recommend trying to discontinue the gabapentin.  He would like to stop multiple other medications today as well.  Recommend stopping the be vitamin, omega-3, and probiotic.  Continue metformin for now.    Overweight, continues to work on exercise and diet.  Encouraged regular walking program.    Post COVID 19 syndrome, manifesting as chronic cough.  Saw pulmonary medicine, symptoms are stable at this time.  No changes for now.    HYPERTENSION - Ongoing. Blood pressure is currently well controlled.  Medication side effects: None. Denies syncope or presyncope.  Continue current medications.   Medication list reviewed/updated. Refills completed as needed.      HYPOTHYROIDISM - Patient has a longstanding history of hypothyroidism. Reports doing reasonably well. Reports: denies fatigue, weight changes, heat/cold intolerance, bowel/skin changes or CVS symptoms.  Continue: Synthroid oral thyroid replacement.  Denies adverse medication reactions or side effects.  "                      TSH   Date Value Ref Range Status   07/23/2024 2.00 0.30 - 4.20 uIU/mL Final   10/04/2022 2.52 0.40 - 4.00 mU/L Final        Chronic Kidney Disease, Stage 2 (GFR 60-89), chronic, ongoing.  Kidney function had been slowly declining.  Encourage NSAID avoidance.       Vaccine counseling completed.  Encourage routine / annual vaccinations.    Type 2 Diabetes Mellitus, with nephropathy, with neuropathy, Reports ongoing/previous: numbness and burning.  Blood sugar control has been good with mild hyperglycemia. Doing well with diet, oral agents, and exercise.  Medication list reviewed/updated. Refills completed as needed.    Complicating factors include but are not limited to: hypertension, hyperlipidemia, neuropathy, and chronic kidney disease.     Recent Labs   Lab Test 07/23/24  0814 04/15/24  0903 01/10/24  0851 07/05/22  0848 03/29/22  0953   A1C 6.9* 6.9* 6.5*   < > 6.6*   LDL 65 67 46   < > 76   HDL 52 46 47   < > 41   TRIG 64 69 95   < > 107   ALT 16 14 16   < > 16   CR 0.71 0.80 0.85   < > 0.83   GFRESTIMATED >90 89 88   < > 90   POTASSIUM 4.3 4.3 4.2   < > 3.9   TSH 2.00 1.96 1.38   < > 4.14*   T4  --   --   --   --  0.68   WBC 7.6 5.1 4.4   < > 5.6   HGB 14.2 14.6 14.2   < > 14.8    207 194   < > 220   ALBUMIN 4.4 4.7 4.6   < > 4.6    < > = values in this interval not displayed.     Hemoglobin A1c is well-controlled.  LDL HDL and triglycerides are at goal.  ALT normal.  Creatinine is at baseline.  Potassium normal.  TSH normal.  CBC normal.  Albumin normal.     The longitudinal plan of care for the diagnosis(es)/condition(s) as documented were addressed during this visit. Due to the added complexity in care, I will continue to support Dajuan in the subsequent management and with ongoing continuity of care.             BMI  Estimated body mass index is 29.22 kg/m  as calculated from the following:    Height as of this encounter: 1.689 m (5' 6.5\").    Weight as of this encounter: 83.4 kg " (183 lb 12.8 oz).         Return in about 8 weeks (around 9/18/2024) for + follow-up neuropathy.      Cm Pickens MD  [facility]    Review of Systems   Constitutional:  Positive for fatigue (chronic, but stable). Negative for chills and fever.   HENT:  Positive for hearing loss. Negative for congestion, ear pain, nosebleeds and sore throat.    Eyes:  Positive for pain, redness and visual disturbance.        + Droopy left eyelid.    Respiratory:  Positive for cough. Negative for shortness of breath and wheezing.    Cardiovascular:  Negative for chest pain, palpitations and peripheral edema.        Cardiac stress testing 3/2020 - negative/normal results.   Gastrointestinal:  Negative for abdominal pain, constipation, diarrhea, heartburn, hematochezia, nausea and vomiting.        + left inguinal pains intermittently   Endocrine: Negative for cold intolerance and heat intolerance.   Genitourinary:  Positive for difficulty urinating (Feeling of incomplete bladder emptying and urinary frequency). Negative for dysuria, frequency, genital sores, hematuria, impotence, penile discharge and urgency.   Musculoskeletal:  Positive for arthralgias, back pain (left low back / buttocks pain) and gait problem (staggering at times, low back and left knee pain). Negative for joint swelling and myalgias.        Bilateral feet with bunions   Skin:  Negative for pallor and rash.   Allergic/Immunologic: Negative for immunocompromised state.   Neurological:  Positive for weakness, numbness (right > left feet) and headaches. Negative for dizziness, light-headedness and paresthesias.   Hematological:  Does not bruise/bleed easily.   Psychiatric/Behavioral:  Positive for mood changes. Negative for agitation and sleep disturbance. The patient is nervous/anxious.         + short term memory loss -- stopped Exelon and Namenda -- due to side effects.   + reporting less anxiety / depression - virus pandemic had worsened  "everything  -- Seems much improved with Citalopram.         Subjective   Dajuan is a 79 year old, presenting for the following health issues:  Diabetes        7/24/2024     3:32 PM   Additional Questions   Roomed by Clint Caicedo LPN     HPI                   Objective    /78   Pulse 54   Resp 16   Ht 1.689 m (5' 6.5\")   Wt 83.4 kg (183 lb 12.8 oz)   SpO2 100%   BMI 29.22 kg/m    Body mass index is 29.22 kg/m .  Physical Exam  Constitutional:       General: He is not in acute distress.     Appearance: He is well-developed. He is obese. He is not diaphoretic.   HENT:      Head: Normocephalic and atraumatic.      Nose: No rhinorrhea.   Eyes:      General: No scleral icterus.     Conjunctiva/sclera: Conjunctivae normal.   Cardiovascular:      Rate and Rhythm: Normal rate and regular rhythm.   Pulmonary:      Effort: Pulmonary effort is normal.      Breath sounds: Normal breath sounds.   Abdominal:      Palpations: Abdomen is soft.      Tenderness: There is no abdominal tenderness.   Musculoskeletal:         General: No deformity.      Cervical back: Neck supple.      Right lower leg: No edema.      Left lower leg: No edema.   Lymphadenopathy:      Cervical: No cervical adenopathy.   Skin:     General: Skin is warm and dry.      Coloration: Skin is not jaundiced.      Findings: No rash.   Neurological:      Mental Status: He is alert. Mental status is at baseline.   Psychiatric:         Mood and Affect: Mood normal.         Behavior: Behavior normal.                    Signed Electronically by: Cm Pickens MD    "

## 2024-08-02 ENCOUNTER — TELEPHONE (OUTPATIENT)
Dept: INTERNAL MEDICINE | Facility: OTHER | Age: 79
End: 2024-08-02
Payer: COMMERCIAL

## 2024-08-02 NOTE — TELEPHONE ENCOUNTER
After proper verification, patient was relayed message from below. Patient will call back in on Tuesday with the blood pressure readings.  Jessy Rocha LPN on 8/2/2024 at 3:51 PM  EXT. [id number]

## 2024-08-02 NOTE — TELEPHONE ENCOUNTER
Med Question: [facility] states rec'd a script in Oct. For Losartan that was never picked up and patient picked up the last refill- are you aware he hasn't been taking it and ok to start- please let them know.

## 2024-08-02 NOTE — TELEPHONE ENCOUNTER
Please call patient and have him check his blood pressure the next few days.        He may NOT need the losartan.      Electronically signed by:  Cm Pickens MD  on August 2, 2024 3:45 PM

## 2024-09-09 DIAGNOSIS — E11.21 TYPE 2 DIABETES MELLITUS WITH DIABETIC NEPHROPATHY, WITHOUT LONG-TERM CURRENT USE OF INSULIN (H): ICD-10-CM

## 2024-09-09 RX ORDER — BLOOD SUGAR DIAGNOSTIC
STRIP MISCELLANEOUS
Qty: 100 STRIP | Refills: 0 | Status: SHIPPED | OUTPATIENT
Start: 2024-09-09 | End: 2024-09-25

## 2024-09-09 NOTE — TELEPHONE ENCOUNTER
DJS-Reason for call: Medication or medication refill    Have you contacted your pharmacy regarding this refill? Yes    If No, direct the patient to contact the Pharmacy and discontinue telephone note using Erroneous Encounter.  If Yes, continue.    Name of medication requested: test strips    How many days of medication do you have left? 2    What pharmacy do you use? [facility]    Preferred method for responding to this message: Telephone Call    Phone number patient can be reached at: Home number on file [phone] (home)    If we cannot reach you directly, may we leave a detailed response at the number you provided? Yes  Gretel Fulton on 9/9/2024 at 9:21 AM

## 2024-09-09 NOTE — TELEPHONE ENCOUNTER
Requested Prescriptions   Pending Prescriptions Disp Refills    blood glucose (ACCU-CHEK ONEL PLUS) test strip 100 strip 0     Sig: USE 1 STRIP TO CHECK GLUCOSE ONCE DAILY   Last Prescription Date:   6/3/24  Last Fill Qty/Refills:         100, R-0    Last Office Visit:              7/24/24 (Diabetes discussed)   Future Office visit:             Next 5 appointments (look out 90 days)      Sep 25, 2024 3:20 PM  (Arrive by 3:05 PM)  Provider Visit with Cm Pickens MD  [facility] ([facility] ) [address]  [place] [address]  [phone]     Nov 05, 2024 3:20 PM  (Arrive by 3:05 PM)  Provider Visit with Cm Pickens MD  [facility] ([facility] ) [address]  [place] [address]  [phone]     Per LOV note:  Return in about 8 weeks (around 9/18/2024) for + follow-up neuropathy.     Prescription refilled per RN Medication Refill Policy.................... Alanna Patterson RN ....................  9/9/2024   9:56 AM

## 2024-09-12 RX ORDER — BLOOD SUGAR DIAGNOSTIC
STRIP MISCELLANEOUS
Qty: 100 STRIP | Refills: 0 | OUTPATIENT
Start: 2024-09-12

## 2024-09-13 ENCOUNTER — HOSPITAL ENCOUNTER (OUTPATIENT)
Dept: MRI IMAGING | Facility: OTHER | Age: 79
Discharge: HOME | End: 2024-09-13
Admitting: INTERNAL MEDICINE
Payer: MEDICARE

## 2024-09-13 DIAGNOSIS — M25.511 CHRONIC RIGHT SHOULDER PAIN: ICD-10-CM

## 2024-09-13 DIAGNOSIS — G89.29 CHRONIC RIGHT SHOULDER PAIN: ICD-10-CM

## 2024-09-13 DIAGNOSIS — G89.29 CHRONIC PAIN: ICD-10-CM

## 2024-09-13 PROCEDURE — 73221 MRI JOINT UPR EXTREM W/O DYE: CPT | Mod: RT

## 2024-09-21 ENCOUNTER — OFFICE VISIT (OUTPATIENT)
Dept: FAMILY MEDICINE | Facility: OTHER | Age: 79
End: 2024-09-21
Attending: PHYSICIAN ASSISTANT
Payer: MEDICARE

## 2024-09-21 VITALS
RESPIRATION RATE: 16 BRPM | WEIGHT: 180.9 LBS | TEMPERATURE: 97.9 F | DIASTOLIC BLOOD PRESSURE: 82 MMHG | HEART RATE: 64 BPM | BODY MASS INDEX: 28.39 KG/M2 | OXYGEN SATURATION: 98 % | SYSTOLIC BLOOD PRESSURE: 130 MMHG | HEIGHT: 67 IN

## 2024-09-21 DIAGNOSIS — R05.1 ACUTE COUGH: ICD-10-CM

## 2024-09-21 DIAGNOSIS — R19.7 DIARRHEA, UNSPECIFIED TYPE: Primary | ICD-10-CM

## 2024-09-21 LAB
ALBUMIN SERPL BCG-MCNC: 4.5 G/DL (ref 3.5–5.2)
ALP SERPL-CCNC: 94 U/L (ref 40–150)
ALT SERPL W P-5'-P-CCNC: 12 U/L (ref 0–70)
ANION GAP SERPL CALCULATED.3IONS-SCNC: 13 MMOL/L (ref 7–15)
AST SERPL W P-5'-P-CCNC: 16 U/L (ref 0–45)
BASOPHILS # BLD AUTO: 0 10E3/UL (ref 0–0.2)
BASOPHILS NFR BLD AUTO: 0 %
BILIRUB SERPL-MCNC: 0.6 MG/DL
BUN SERPL-MCNC: 16.2 MG/DL (ref 8–23)
CALCIUM SERPL-MCNC: 9.3 MG/DL (ref 8.8–10.4)
CHLORIDE SERPL-SCNC: 99 MMOL/L (ref 98–107)
CREAT SERPL-MCNC: 0.79 MG/DL (ref 0.67–1.17)
EGFRCR SERPLBLD CKD-EPI 2021: 89 ML/MIN/1.73M2
EOSINOPHIL # BLD AUTO: 0.1 10E3/UL (ref 0–0.7)
EOSINOPHIL NFR BLD AUTO: 2 %
ERYTHROCYTE [DISTWIDTH] IN BLOOD BY AUTOMATED COUNT: 13 % (ref 10–15)
FLUAV RNA SPEC QL NAA+PROBE: NEGATIVE
FLUBV RNA RESP QL NAA+PROBE: NEGATIVE
GLUCOSE SERPL-MCNC: 122 MG/DL (ref 70–99)
HCO3 SERPL-SCNC: 26 MMOL/L (ref 22–29)
HCT VFR BLD AUTO: 44.9 % (ref 40–53)
HGB BLD-MCNC: 14.5 G/DL (ref 13.3–17.7)
IMM GRANULOCYTES # BLD: 0 10E3/UL
IMM GRANULOCYTES NFR BLD: 0 %
LIPASE SERPL-CCNC: 42 U/L (ref 13–60)
LYMPHOCYTES # BLD AUTO: 1.2 10E3/UL (ref 0.8–5.3)
LYMPHOCYTES NFR BLD AUTO: 15 %
MCH RBC QN AUTO: 29.4 PG (ref 26.5–33)
MCHC RBC AUTO-ENTMCNC: 32.3 G/DL (ref 31.5–36.5)
MCV RBC AUTO: 91 FL (ref 78–100)
MONOCYTES # BLD AUTO: 1 10E3/UL (ref 0–1.3)
MONOCYTES NFR BLD AUTO: 12 %
NEUTROPHILS # BLD AUTO: 5.9 10E3/UL (ref 1.6–8.3)
NEUTROPHILS NFR BLD AUTO: 71 %
NRBC # BLD AUTO: 0 10E3/UL
NRBC BLD AUTO-RTO: 0 /100
PLATELET # BLD AUTO: 293 10E3/UL (ref 150–450)
POTASSIUM SERPL-SCNC: 4.3 MMOL/L (ref 3.4–5.3)
PROT SERPL-MCNC: 8 G/DL (ref 6.4–8.3)
RBC # BLD AUTO: 4.93 10E6/UL (ref 4.4–5.9)
RSV RNA SPEC NAA+PROBE: NEGATIVE
SARS-COV-2 RNA RESP QL NAA+PROBE: NEGATIVE
SODIUM SERPL-SCNC: 138 MMOL/L (ref 135–145)
WBC # BLD AUTO: 8.2 10E3/UL (ref 4–11)

## 2024-09-21 PROCEDURE — G0463 HOSPITAL OUTPT CLINIC VISIT: HCPCS

## 2024-09-21 PROCEDURE — 99213 OFFICE O/P EST LOW 20 MIN: CPT | Performed by: PHYSICIAN ASSISTANT

## 2024-09-21 PROCEDURE — 85025 COMPLETE CBC W/AUTO DIFF WBC: CPT | Mod: ZL | Performed by: PHYSICIAN ASSISTANT

## 2024-09-21 PROCEDURE — 80053 COMPREHEN METABOLIC PANEL: CPT | Mod: ZL | Performed by: PHYSICIAN ASSISTANT

## 2024-09-21 PROCEDURE — 87637 SARSCOV2&INF A&B&RSV AMP PRB: CPT | Mod: ZL | Performed by: PHYSICIAN ASSISTANT

## 2024-09-21 PROCEDURE — 36415 COLL VENOUS BLD VENIPUNCTURE: CPT | Mod: ZL | Performed by: PHYSICIAN ASSISTANT

## 2024-09-21 PROCEDURE — 87507 IADNA-DNA/RNA PROBE TQ 12-25: CPT | Mod: ZL | Performed by: PHYSICIAN ASSISTANT

## 2024-09-21 PROCEDURE — 83690 ASSAY OF LIPASE: CPT | Mod: ZL | Performed by: PHYSICIAN ASSISTANT

## 2024-09-21 ASSESSMENT — PAIN SCALES - GENERAL: PAINLEVEL: MILD PAIN (2)

## 2024-09-21 NOTE — PATIENT INSTRUCTIONS
Abdominal discomfort, nausea, diarrhea  Respiratory PCR pending will call with results/recommendations  Lab today blood count, electrolytes, kidney and liver function normal. Lipase normal  Stool study, collect at home and then drop off at clinic  We will call with results/recommendations    Replace electrolytes: chicken noodle soup, gatorade, electrolyte solution  Soft, bland foods, small frequent meals

## 2024-09-21 NOTE — PROGRESS NOTES
"ASSESSMENT/PLAN:     I have reviewed the nursing notes.  I have reviewed the findings, diagnosis, plan and need for follow up with the patient.    Dajuan was seen today for abdominal discomfort and diarrhea x 4 days.  Febrile.  Vital signs stable.  Abdomen is extended but nontender.  Increased bowel sounds.    Labs: Blood count is normal, CMP normal electrolytes, liver and kidney function.  Lipase is not elevated.  Respiratory PCR negative.  Get a stool study at home and drop off for evaluation.    Discussed symptomatic treatments: Small frequent soft bland foods, clear liquids, soup breath, electrolyte solutions, Tylenol as needed for discomfort.  Return to clinic if symptoms persist or worsen.    Diagnoses and all orders for this visit:    Diarrhea, unspecified type  -     CBC and Differential; Future  -     Comprehensive Metabolic Panel; Future  -     Lipase; Future  -     Enteric Bacteria and Virus Panel PCR    Acute cough  -     Symptomatic Influenza A/B, RSV, & SARS-CoV2 PCR (COVID-19) Nose; Future            I explained my diagnostic considerations and recommendations to the patient, who voiced understanding and agreement with the treatment plan. All questions were answered. We discussed potential side effects of any prescribed or recommended therapies, as well as expectations for response to treatments.    Lois Kong PA-C  [facility]          Nursing Notes:   Haylee Moura LPN  9/21/2024  1:39 PM  Sign at exiting of workspace  Chief Complaint   Patient presents with    GI Problem     Diarrhea, Weak, Tired x 4 Days        Initial /82 (BP Location: Left arm, Patient Position: Chair, Cuff Size: Adult Regular)   Pulse 64   Temp 97.9  F (36.6  C) (Tympanic)   Resp 16   Ht 1.689 m (5' 6.5\")   Wt 82.1 kg (180 lb 14.4 oz)   SpO2 98%   BMI 28.76 kg/m   Estimated body mass index is 28.76 kg/m  as calculated from the following:    Height as of this encounter: 1.689 m (5' " "6.5\").    Weight as of this encounter: 82.1 kg (180 lb 14.4 oz).      Medication Reconciliation: Complete.       Haylee Moura LPN on 9/21/2024 at 1:39 PM          SUBJECTIVE:   Dajuan Basilio is a 79 year old male who presents to clinic today for evaluation of abdominal discomfort and diarrhea.  He feels weak.  Onset: 4 days ago  Course stool was watery and is now becoming more formed but abdominal discomfort persists  Associated symptoms abdominal discomfort and gurgling, increased burping and gas, abdominal bloating, 5 or more stools per day, fatigue  Treatments: Increased fluids  He states that he is able to eat normally, he is increasing his fluids  No blood in stool  Urine is normal    Denies:  fever  No sick exposures, no travel, no suspicious foods  No prior abdominal surgeries            Past Medical History:   Diagnosis Date    Abnormal CT scan, bladder 6/29/2016    Diverticulosis of intestine without perforation or abscess without bleeding     No Comments Provided    Fatty (change of) liver, not elsewhere classified     non alcoholic    Fibromyalgia     No Comments Provided    Nodular prostate without lower urinary tract symptoms     2012,US confirms benign calcifications    Obesity     No Comments Provided    Panic disorder without agoraphobia     No Comments Provided    Positive colorectal cancer screening using Cologuard test 12/06/2018     Past Surgical History:   Procedure Laterality Date    ARTHROPLASTY KNEE      2015    ARTHROSCOPY KNEE      right knee X2    BIOPSY PROSTATE TRANSRECTAL      2013    COLONOSCOPY      2003,and UGI [place] normal    COLONOSCOPY      2009,and UGI repeat Dr. Johnson negative.    COLONOSCOPY  01/01/2013 2013,WNL    COLONOSCOPY  05/23/2019    Elizabeth-no follow up    HERNIA REPAIR  2000    RELEASE CARPAL TUNNEL      2014    VASECTOMY      No Comments Provided     Social History     Tobacco Use    Smoking status: Former     Current packs/day: 0.00     Average packs/day: " 1 pack/day for 26.8 years (26.8 ttl pk-yrs)     Types: Cigarettes     Start date:      Quit date: 10/25/1983     Years since quittin.9     Passive exposure: Never    Smokeless tobacco: Never   Substance Use Topics    Alcohol use: No     Current Outpatient Medications   Medication Sig Dispense Refill    aspirin 81 MG tablet Take 81 mg by mouth daily      blood glucose (ACCU-CHEK ONEL PLUS) test strip USE 1 STRIP TO CHECK GLUCOSE ONCE DAILY 100 strip 0    citalopram (CELEXA) 10 MG tablet Take 1 tablet (10 mg) by mouth daily 90 tablet 4    diclofenac (VOLTAREN) 1 % topical gel Apply 2 g topically 4 times daily 100 g 1    levothyroxine (SYNTHROID/LEVOTHROID) 50 MCG tablet Take 1 tablet (50 mcg) by mouth daily - for Thyroid Replacement 93 tablet 4    losartan (COZAAR) 25 MG tablet Take 1 tablet (25 mg) by mouth daily 90 tablet 4    metFORMIN (GLUCOPHAGE) 500 MG tablet Take 2 tablets (1,000 mg) by mouth daily (with breakfast) AND 1 tablet (500 mg) daily (with dinner). 270 tablet 1    omeprazole (PRILOSEC) 20 MG DR capsule Take 1 capsule (20 mg) by mouth daily Take 30 to 60 minutes prior to a meal -- for stomach acid 90 capsule 4    rosuvastatin (CRESTOR) 5 MG tablet Take 1 tablet (5 mg) by mouth daily - Stop Lipitor - 90 tablet 4    vitamin D3 (CHOLECALCIFEROL) 125 MCG (5000 UT) tablet Take 5,000 Units by mouth daily       Allergies   Allergen Reactions    Memantine Other (See Comments)     Dizzy, lightheaded  Dizzy, lightheaded    Rivastigmine Other (See Comments)     Dizzy, lightheaded  Dizzy, lightheaded    Ace Inhibitors Cough    Atorvastatin Calcium Muscle Pain (Myalgia)     Lipitor    Statins Muscle Pain (Myalgia)     Higher doses cause myalgias         Past medical history, past surgical history, current medications and allergies reviewed and accurate to the best of my knowledge.          OBJECTIVE:     /82 (BP Location: Left arm, Patient Position: Chair, Cuff Size: Adult Regular)   Pulse 64    "Temp 97.9  F (36.6  C) (Tympanic)   Resp 16   Ht 1.689 m (5' 6.5\")   Wt 82.1 kg (180 lb 14.4 oz)   SpO2 98%   BMI 28.76 kg/m    Body mass index is 28.76 kg/m .  Physical Exam    General Appearance: Well appearing male, mild distress  Eyes: no injection, no tearing or drainage, eye lids normal  Mouth: moist mucus membranes, mild posterior oropharynx erythema  Respiratory: normal respiration, no increased work of breathing,  Lungs Clear to auscultation  Cardiac: RRR with no murmurs  Abdomen: soft, nontender, no rigidity, no rebound tenderness or guarding.Aranda's negative, McBurney's negative.  Increased bowel sounds  No CVAT  Psychological: normal affect, alert, oriented, and pleasant.         Results for orders placed or performed in visit on 09/21/24   Lipase     Status: Normal   Result Value Ref Range    Lipase 42 13 - 60 U/L   Comprehensive Metabolic Panel     Status: Abnormal   Result Value Ref Range    Sodium 138 135 - 145 mmol/L    Potassium 4.3 3.4 - 5.3 mmol/L    Carbon Dioxide (CO2) 26 22 - 29 mmol/L    Anion Gap 13 7 - 15 mmol/L    Urea Nitrogen 16.2 8.0 - 23.0 mg/dL    Creatinine 0.79 0.67 - 1.17 mg/dL    GFR Estimate 89 >60 mL/min/1.73m2    Calcium 9.3 8.8 - 10.4 mg/dL    Chloride 99 98 - 107 mmol/L    Glucose 122 (H) 70 - 99 mg/dL    Alkaline Phosphatase 94 40 - 150 U/L    AST 16 0 - 45 U/L    ALT 12 0 - 70 U/L    Protein Total 8.0 6.4 - 8.3 g/dL    Albumin 4.5 3.5 - 5.2 g/dL    Bilirubin Total 0.6 <=1.2 mg/dL   Symptomatic Influenza A/B, RSV, & SARS-CoV2 PCR (COVID-19) Nose     Status: Normal    Specimen: Nose; Swab   Result Value Ref Range    Influenza A PCR Negative Negative    Influenza B PCR Negative Negative    RSV PCR Negative Negative    SARS CoV2 PCR Negative Negative    Narrative    Testing was performed using the Xpert Xpress CoV2/Flu/RSV Assay on the Cepheid GeneXpert Instrument. This test should be ordered for the detection of SARS-CoV2, influenza, and RSV viruses in individuals with " signs and symptoms of respiratory tract infection. This test is for in vitro diagnostic use under the [place] FDA for laboratories certified under CLIA to perform high or moderate complexity testing. This test has been [place] FDA cleared. A negative result does not rule out the presence of PCR inhibitors in the specimen or target RNA in concentration below the limit of detection for the assay. If only one viral target is positive but coinfection with multiple targets is suspected, the sample should be re-tested with another FDA cleared, approved, or authorized test, if coninfection would change clinical management. This test was validated by the [facility]. These laboratories are certified under the Clinical Laboratory Improvement Amendments of 1988 (CLIA-88) as qualified to perfom high complexity laboratory testing.   CBC with platelets and differential     Status: None   Result Value Ref Range    WBC Count 8.2 4.0 - 11.0 10e3/uL    RBC Count 4.93 4.40 - 5.90 10e6/uL    Hemoglobin 14.5 13.3 - 17.7 g/dL    Hematocrit 44.9 40.0 - 53.0 %    MCV 91 78 - 100 fL    MCH 29.4 26.5 - 33.0 pg    MCHC 32.3 31.5 - 36.5 g/dL    RDW 13.0 10.0 - 15.0 %    Platelet Count 293 150 - 450 10e3/uL    % Neutrophils 71 %    % Lymphocytes 15 %    % Monocytes 12 %    % Eosinophils 2 %    % Basophils 0 %    % Immature Granulocytes 0 %    NRBCs per 100 WBC 0 <1 /100    Absolute Neutrophils 5.9 1.6 - 8.3 10e3/uL    Absolute Lymphocytes 1.2 0.8 - 5.3 10e3/uL    Absolute Monocytes 1.0 0.0 - 1.3 10e3/uL    Absolute Eosinophils 0.1 0.0 - 0.7 10e3/uL    Absolute Basophils 0.0 0.0 - 0.2 10e3/uL    Absolute Immature Granulocytes 0.0 <=0.4 10e3/uL    Absolute NRBCs 0.0 10e3/uL   CBC and Differential     Status: None    Narrative    The following orders were created for panel order CBC and Differential.  Procedure                               Abnormality         Status                     ---------                               -----------          ------                     CBC with platelets and d...[[id number]]                      Final result                 Please view results for these tests on the individual orders.

## 2024-09-21 NOTE — NURSING NOTE
"Chief Complaint   Patient presents with    GI Problem     Diarrhea, Weak, Tired x 4 Days        Initial /82 (BP Location: Left arm, Patient Position: Chair, Cuff Size: Adult Regular)   Pulse 64   Temp 97.9  F (36.6  C) (Tympanic)   Resp 16   Ht 1.689 m (5' 6.5\")   Wt 82.1 kg (180 lb 14.4 oz)   SpO2 98%   BMI 28.76 kg/m   Estimated body mass index is 28.76 kg/m  as calculated from the following:    Height as of this encounter: 1.689 m (5' 6.5\").    Weight as of this encounter: 82.1 kg (180 lb 14.4 oz).      Medication Reconciliation: Complete.       Haylee Moura LPN on 9/21/2024 at 1:39 PM     "

## 2024-09-22 LAB
ADV 40+41 DNA STL QL NAA+NON-PROBE: NEGATIVE
ASTRO TYP 1-8 RNA STL QL NAA+NON-PROBE: NEGATIVE
C CAYETANENSIS DNA STL QL NAA+NON-PROBE: NEGATIVE
CAMPYLOBACTER DNA SPEC NAA+PROBE: NEGATIVE
CRYPTOSP DNA STL QL NAA+NON-PROBE: NEGATIVE
E COLI O157 DNA STL QL NAA+NON-PROBE: NORMAL
E HISTOLYT DNA STL QL NAA+NON-PROBE: NEGATIVE
EAEC ASTA GENE ISLT QL NAA+PROBE: NEGATIVE
EC STX1+STX2 GENES STL QL NAA+NON-PROBE: NEGATIVE
EPEC EAE GENE STL QL NAA+NON-PROBE: NEGATIVE
ETEC LTA+ST1A+ST1B TOX ST NAA+NON-PROBE: NEGATIVE
G LAMBLIA DNA STL QL NAA+NON-PROBE: NEGATIVE
NOROVIRUS GI+II RNA STL QL NAA+NON-PROBE: NEGATIVE
P SHIGELLOIDES DNA STL QL NAA+NON-PROBE: NEGATIVE
RVA RNA STL QL NAA+NON-PROBE: NEGATIVE
SALMONELLA SP RPOD STL QL NAA+PROBE: NEGATIVE
SAPO I+II+IV+V RNA STL QL NAA+NON-PROBE: NEGATIVE
SHIGELLA SP+EIEC IPAH ST NAA+NON-PROBE: NEGATIVE
V CHOLERAE DNA SPEC QL NAA+PROBE: NEGATIVE
VIBRIO DNA SPEC NAA+PROBE: NEGATIVE
Y ENTEROCOL DNA STL QL NAA+PROBE: NEGATIVE

## 2024-09-25 ENCOUNTER — OFFICE VISIT (OUTPATIENT)
Dept: INTERNAL MEDICINE | Facility: OTHER | Age: 79
End: 2024-09-25
Attending: INTERNAL MEDICINE
Payer: MEDICARE

## 2024-09-25 VITALS
DIASTOLIC BLOOD PRESSURE: 60 MMHG | TEMPERATURE: 97.5 F | RESPIRATION RATE: 16 BRPM | HEART RATE: 63 BPM | SYSTOLIC BLOOD PRESSURE: 122 MMHG | HEIGHT: 67 IN | BODY MASS INDEX: 28.56 KG/M2 | OXYGEN SATURATION: 95 % | WEIGHT: 182 LBS

## 2024-09-25 DIAGNOSIS — E78.2 MIXED HYPERLIPIDEMIA: ICD-10-CM

## 2024-09-25 DIAGNOSIS — I25.10 CORONARY ARTERY DISEASE INVOLVING NATIVE CORONARY ARTERY OF NATIVE HEART WITHOUT ANGINA PECTORIS: ICD-10-CM

## 2024-09-25 DIAGNOSIS — E11.42 CONTROLLED TYPE 2 DIABETES MELLITUS WITH DIABETIC POLYNEUROPATHY, WITHOUT LONG-TERM CURRENT USE OF INSULIN (H): ICD-10-CM

## 2024-09-25 DIAGNOSIS — I10 BENIGN ESSENTIAL HYPERTENSION: Primary | ICD-10-CM

## 2024-09-25 DIAGNOSIS — E03.4 HYPOTHYROIDISM DUE TO ACQUIRED ATROPHY OF THYROID: ICD-10-CM

## 2024-09-25 DIAGNOSIS — N18.2 CKD (CHRONIC KIDNEY DISEASE) STAGE 2, GFR 60-89 ML/MIN: ICD-10-CM

## 2024-09-25 PROCEDURE — G0463 HOSPITAL OUTPT CLINIC VISIT: HCPCS

## 2024-09-25 RX ORDER — BLOOD SUGAR DIAGNOSTIC
1 STRIP MISCELLANEOUS DAILY
Qty: 100 STRIP | Refills: 5 | Status: SHIPPED | OUTPATIENT
Start: 2024-09-25

## 2024-09-25 ASSESSMENT — ENCOUNTER SYMPTOMS
DYSURIA: 0
JOINT SWELLING: 0
WHEEZING: 0
SLEEP DISTURBANCE: 0
NAUSEA: 0
HEMATOCHEZIA: 0
HEARTBURN: 0
DIZZINESS: 0
VOMITING: 0
SORE THROAT: 0
CONSTIPATION: 0
HEADACHES: 1
NERVOUS/ANXIOUS: 1
EYE REDNESS: 1
PALPITATIONS: 0
ARTHRALGIAS: 1
HEMATURIA: 0
FATIGUE: 1
BACK PAIN: 1
PARESTHESIAS: 0
FEVER: 0
MYALGIAS: 0
DIARRHEA: 0
CHILLS: 0
BRUISES/BLEEDS EASILY: 0
EYE PAIN: 1
COUGH: 1
NUMBNESS: 1
DIFFICULTY URINATING: 1
AGITATION: 0
SHORTNESS OF BREATH: 0
LIGHT-HEADEDNESS: 0
FREQUENCY: 0
ABDOMINAL PAIN: 0

## 2024-09-25 ASSESSMENT — PAIN SCALES - GENERAL: PAINLEVEL: SEVERE PAIN (6)

## 2024-09-25 NOTE — NURSING NOTE
"Chief Complaint   Patient presents with    Hypertension       Initial /60   Pulse 63   Temp 97.5  F (36.4  C) (Temporal)   Resp 16   Ht 1.689 m (5' 6.5\")   Wt 82.6 kg (182 lb)   SpO2 95%   BMI 28.94 kg/m   Estimated body mass index is 28.94 kg/m  as calculated from the following:    Height as of this encounter: 1.689 m (5' 6.5\").    Weight as of this encounter: 82.6 kg (182 lb).  Medication Review: complete    The next two questions are to help us understand your food security.  If you are feeling you need any assistance in this area, we have resources available to support you today.          4/16/2024   SDOH- Food Insecurity   Within the past 12 months, did you worry that your food would run out before you got money to buy more? N   Within the past 12 months, did the food you bought just not last and you didn t have money to get more? N            Health Care Directive:  Patient does not have a Health Care Directive or Living Will: Discussed advance care planning with patient; however, patient declined at this time.    Graciela Mary LPN      "

## 2024-09-25 NOTE — PROGRESS NOTES
Assessment & Plan     ICD-10-CM    1. Benign essential hypertension  I10       2. Controlled type 2 diabetes mellitus with diabetic polyneuropathy, without long-term current use of insulin (H)  E11.42 metFORMIN (GLUCOPHAGE) 500 MG tablet     blood glucose (ACCU-CHEK ONEL PLUS) test strip      3. CKD (chronic kidney disease) stage 2, GFR 60-89 ml/min  N18.2       4. Hypothyroidism due to acquired atrophy of thyroid  E03.4       5. Mixed hyperlipidemia  E78.2       6. Coronary artery disease involving native coronary artery of native heart without angina pectoris  I25.10          Patient presents for follow-up multiple issues.    Was concerned about his blood pressure which is now well-controlled.  Has been taking losartan 25 mg daily.  Tolerating well.  No changes for now.    Type 2 diabetes, has diabetic polyneuropathy, stage II chronic kidney disease.  Has follow-up lab work in about 6 weeks.  Tolerating metformin well without side effects.  Taking 1500 mg daily.  Needs refills of his testing strips.    Coronary artery disease, appears stable.  He is very busy during the day.  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  Denies exertional angina.  Encouraged regular walking and exercise.  Continue current medications.    HYPERTENSION - Ongoing. Blood pressure is currently well controlled.  Medication side effects: None. Denies syncope or presyncope.  Continue current medications.   Medication list reviewed/updated. Refills completed as needed.      MIXED HYPERLIPIDEMIA.  Ongoing. LDL is at goal: Yes. Triglycerides are at goal: Yes.    Medication side effects reported: None.   Continue current medications for now. Medication list reviewed/updated. Refills completed as needed.  Recent Labs   Lab Test 07/23/24  0814 04/15/24  0903   CHOL 130 127   HDL 52 46   LDL 65 67   TRIG 64 69        HYPOTHYROIDISM - Patient has a longstanding history of hypothyroidism. Reports doing reasonably well.  Reports: denies fatigue, weight changes, heat/cold intolerance, bowel/skin changes or CVS symptoms.  Continue: Synthroid oral thyroid replacement.  Denies adverse medication reactions or side effects.                       TSH   Date Value Ref Range Status   07/23/2024 2.00 0.30 - 4.20 uIU/mL Final   10/04/2022 2.52 0.40 - 4.00 mU/L Final        Chronic Kidney Disease, Stage 2 (GFR 60-89), chronic, ongoing.  Kidney function had been slowly declining.  Encourage NSAID avoidance.       Vaccine counseling completed.  Encourage routine / annual vaccinations.    Type 2 Diabetes Mellitus, with nephropathy, with neuropathy, Reports ongoing/previous: mild bilateral foot numbness and burning.  Blood sugar control has been good with minimal hyperglycemia. Doing well with diet, exercise and oral agents.  Medication list reviewed/updated. Refills completed as needed.    Complicating factors include but are not limited to: hypertension, hyperlipidemia, neuropathy, chronic kidney disease, and CAD/PVD.     Recent Labs   Lab Test 09/21/24  1412 07/23/24  0814 04/15/24  0903 01/10/24  0851 07/05/22  0848 03/29/22  0953   A1C  --  6.9* 6.9* 6.5*   < > 6.6*   LDL  --  65 67 46   < > 76   HDL  --  52 46 47   < > 41   TRIG  --  64 69 95   < > 107   ALT 12 16 14 16   < > 16   CR 0.79 0.71 0.80 0.85   < > 0.83   GFRESTIMATED 89 >90 89 88   < > 90   POTASSIUM 4.3 4.3 4.3 4.2   < > 3.9   TSH  --  2.00 1.96 1.38   < > 4.14*   T4  --   --   --   --   --  0.68   WBC 8.2 7.6 5.1 4.4   < > 5.6   HGB 14.5 14.2 14.6 14.2   < > 14.8    228 207 194   < > 220   ALBUMIN 4.5 4.4 4.7 4.6   < > 4.6    < > = values in this interval not displayed.        The longitudinal plan of care for the diagnosis(es)/condition(s) as documented were addressed during this visit. Due to the added complexity in care, I will continue to support Dajuan in the subsequent management and with ongoing continuity of care.               BMI  Estimated body mass index is  "28.94 kg/m  as calculated from the following:    Height as of this encounter: 1.689 m (5' 6.5\").    Weight as of this encounter: 82.6 kg (182 lb).         Return in about 6 weeks (around 11/5/2024) for Diabetes Follow-up, + Get Diabetic labs prior to clinic appointment.      Cm Pickens MD  [facility]    Review of Systems   Constitutional:  Positive for fatigue (chronic, but stable). Negative for chills and fever.   HENT:  Positive for hearing loss. Negative for congestion, ear pain, nosebleeds and sore throat.    Eyes:  Positive for pain, redness and visual disturbance.        + Droopy left eyelid.    Respiratory:  Positive for cough. Negative for shortness of breath and wheezing.    Cardiovascular:  Negative for chest pain, palpitations and peripheral edema.        Cardiac stress testing 3/2020 - negative/normal results.   Gastrointestinal:  Negative for abdominal pain, constipation, diarrhea, heartburn, hematochezia, nausea and vomiting.        + left inguinal pains intermittently   Endocrine: Negative for cold intolerance and heat intolerance.   Genitourinary:  Positive for difficulty urinating (Feeling of incomplete bladder emptying and urinary frequency). Negative for dysuria, frequency, genital sores, hematuria, impotence, penile discharge and urgency.   Musculoskeletal:  Positive for arthralgias, back pain (left low back / buttocks pain) and gait problem (staggering at times, low back and left knee pain). Negative for joint swelling and myalgias.        Bilateral feet with bunions   Skin:  Negative for pallor and rash.   Allergic/Immunologic: Negative for immunocompromised state.   Neurological:  Positive for numbness (right > left feet - better recently.) and headaches. Negative for dizziness, light-headedness and paresthesias.   Hematological:  Does not bruise/bleed easily.   Psychiatric/Behavioral:  Positive for mood changes. Negative for agitation and sleep disturbance. The " "patient is nervous/anxious.         + short term memory loss -- stopped Exelon and Namenda -- due to side effects.   + reporting less anxiety / depression - virus pandemic had worsened everything  -- Seems much improved with Citalopram.         Alisha Graff is a 79 year old, presenting for the following health issues:  Hypertension        9/25/2024     3:01 PM   Additional Questions   Roomed by Mary Mary LPN     [facility]       Hypertension Follow-up    Do you check your blood pressure regularly outside of the clinic? Yes   Are you following a low salt diet? No  Are your blood pressures ever more than 140 on the top number (systolic) OR more   than 90 on the bottom number (diastolic), for example 140/90? No              Objective    /60   Pulse 63   Temp 97.5  F (36.4  C) (Temporal)   Resp 16   Ht 1.689 m (5' 6.5\")   Wt 82.6 kg (182 lb)   SpO2 95%   BMI 28.94 kg/m    Body mass index is 28.94 kg/m .  Physical Exam  Constitutional:       General: He is not in acute distress.     Appearance: Normal appearance. He is well-developed. He is not diaphoretic.   HENT:      Head: Normocephalic and atraumatic.      Nose: No rhinorrhea.   Eyes:      General: No scleral icterus.     Conjunctiva/sclera: Conjunctivae normal.   Neck:      Vascular: No carotid bruit.   Cardiovascular:      Rate and Rhythm: Normal rate and regular rhythm.      Pulses: Normal pulses.   Pulmonary:      Effort: Pulmonary effort is normal.      Breath sounds: Normal breath sounds.   Abdominal:      Palpations: Abdomen is soft.      Tenderness: There is no abdominal tenderness.   Musculoskeletal:         General: No deformity.      Cervical back: Neck supple.      Right lower leg: No edema.      Left lower leg: No edema.   Lymphadenopathy:      Cervical: No cervical adenopathy.   Skin:     General: Skin is warm and dry.      Coloration: Skin is not jaundiced.      Findings: No rash.   Neurological:      Mental Status: He is alert. Mental " status is at baseline.   Psychiatric:         Mood and Affect: Mood normal.         Behavior: Behavior normal.                    Signed Electronically by: Cm Pickens MD

## 2024-09-25 NOTE — PATIENT INSTRUCTIONS
Blood pressure is well controlled.   Diabetes is well controlled.     Medications refilled.     Return as needed for follow-up with Dr. Pickens.    Clinic : [phone]  Appointment line: [phone]

## 2024-10-25 NOTE — PROGRESS NOTES
1.  Has the patient had a previous reaction to IV contrast? no    2.  Does the patient have kidney disease? no    3.  Is the patient on dialysis? no    If YES to any of these questions, exam will be reviewed with a Radiologist before administering contrast.       Aakash Hernandes(Attending)

## 2024-11-04 ENCOUNTER — LAB (OUTPATIENT)
Dept: LAB | Facility: OTHER | Age: 79
End: 2024-11-04
Attending: INTERNAL MEDICINE
Payer: MEDICARE

## 2024-11-04 ENCOUNTER — DOCUMENTATION ONLY (OUTPATIENT)
Dept: INTERNAL MEDICINE | Facility: OTHER | Age: 79
End: 2024-11-04

## 2024-11-04 DIAGNOSIS — E11.21 TYPE 2 DIABETES MELLITUS WITH DIABETIC NEPHROPATHY, WITHOUT LONG-TERM CURRENT USE OF INSULIN (H): ICD-10-CM

## 2024-11-04 DIAGNOSIS — E03.4 HYPOTHYROIDISM DUE TO ACQUIRED ATROPHY OF THYROID: ICD-10-CM

## 2024-11-04 DIAGNOSIS — E78.2 MIXED HYPERLIPIDEMIA: ICD-10-CM

## 2024-11-04 DIAGNOSIS — Z12.5 ENCOUNTER FOR SCREENING FOR MALIGNANT NEOPLASM OF PROSTATE: ICD-10-CM

## 2024-11-04 DIAGNOSIS — N40.1 BENIGN PROSTATIC HYPERPLASIA WITH LOWER URINARY TRACT SYMPTOMS, SYMPTOM DETAILS UNSPECIFIED: Primary | ICD-10-CM

## 2024-11-04 LAB
ALBUMIN SERPL BCG-MCNC: 4.4 G/DL (ref 3.5–5.2)
ALBUMIN UR-MCNC: NEGATIVE MG/DL
ALP SERPL-CCNC: 88 U/L (ref 40–150)
ALT SERPL W P-5'-P-CCNC: 10 U/L (ref 0–70)
ANION GAP SERPL CALCULATED.3IONS-SCNC: 11 MMOL/L (ref 7–15)
APPEARANCE UR: CLEAR
AST SERPL W P-5'-P-CCNC: 13 U/L (ref 0–45)
BILIRUB SERPL-MCNC: 0.6 MG/DL
BILIRUB UR QL STRIP: NEGATIVE
BUN SERPL-MCNC: 13.8 MG/DL (ref 8–23)
CALCIUM SERPL-MCNC: 9.1 MG/DL (ref 8.8–10.4)
CHLORIDE SERPL-SCNC: 105 MMOL/L (ref 98–107)
CHOLEST SERPL-MCNC: 92 MG/DL
COLOR UR AUTO: NORMAL
CREAT SERPL-MCNC: 0.59 MG/DL (ref 0.67–1.17)
CREAT UR-MCNC: 73.9 MG/DL
EGFRCR SERPLBLD CKD-EPI 2021: >90 ML/MIN/1.73M2
ERYTHROCYTE [DISTWIDTH] IN BLOOD BY AUTOMATED COUNT: 13.6 % (ref 10–15)
EST. AVERAGE GLUCOSE BLD GHB EST-MCNC: 140 MG/DL
FASTING STATUS PATIENT QL REPORTED: ABNORMAL
FASTING STATUS PATIENT QL REPORTED: NORMAL
GLUCOSE SERPL-MCNC: 121 MG/DL (ref 70–99)
GLUCOSE UR STRIP-MCNC: NEGATIVE MG/DL
HBA1C MFR BLD: 6.5 %
HCO3 SERPL-SCNC: 26 MMOL/L (ref 22–29)
HCT VFR BLD AUTO: 40.1 % (ref 40–53)
HDLC SERPL-MCNC: 42 MG/DL
HGB BLD-MCNC: 12.7 G/DL (ref 13.3–17.7)
HGB UR QL STRIP: NEGATIVE
KETONES UR STRIP-MCNC: NEGATIVE MG/DL
LDLC SERPL CALC-MCNC: 36 MG/DL
LEUKOCYTE ESTERASE UR QL STRIP: NEGATIVE
MCH RBC QN AUTO: 29.3 PG (ref 26.5–33)
MCHC RBC AUTO-ENTMCNC: 31.7 G/DL (ref 31.5–36.5)
MCV RBC AUTO: 92 FL (ref 78–100)
MICROALBUMIN UR-MCNC: <12 MG/L
MICROALBUMIN/CREAT UR: NORMAL MG/G{CREAT}
NITRATE UR QL: NEGATIVE
NONHDLC SERPL-MCNC: 50 MG/DL
PH UR STRIP: 7 [PH] (ref 5–9)
PLATELET # BLD AUTO: 279 10E3/UL (ref 150–450)
POTASSIUM SERPL-SCNC: 4.1 MMOL/L (ref 3.4–5.3)
PROT SERPL-MCNC: 7.3 G/DL (ref 6.4–8.3)
PSA SERPL DL<=0.01 NG/ML-MCNC: 5.42 NG/ML
RBC # BLD AUTO: 4.34 10E6/UL (ref 4.4–5.9)
SODIUM SERPL-SCNC: 142 MMOL/L (ref 135–145)
SP GR UR STRIP: 1.02 (ref 1–1.03)
TRIGL SERPL-MCNC: 70 MG/DL
TSH SERPL DL<=0.005 MIU/L-ACNC: 1.14 UIU/ML (ref 0.3–4.2)
UROBILINOGEN UR STRIP-MCNC: NORMAL MG/DL
WBC # BLD AUTO: 7.5 10E3/UL (ref 4–11)

## 2024-11-04 PROCEDURE — 81003 URINALYSIS AUTO W/O SCOPE: CPT | Mod: ZL

## 2024-11-04 PROCEDURE — 82043 UR ALBUMIN QUANTITATIVE: CPT | Mod: ZL

## 2024-11-04 PROCEDURE — 80053 COMPREHEN METABOLIC PANEL: CPT | Mod: ZL

## 2024-11-04 PROCEDURE — 80061 LIPID PANEL: CPT | Mod: ZL

## 2024-11-04 PROCEDURE — 36415 COLL VENOUS BLD VENIPUNCTURE: CPT | Mod: ZL

## 2024-11-04 PROCEDURE — 83036 HEMOGLOBIN GLYCOSYLATED A1C: CPT | Mod: ZL

## 2024-11-04 PROCEDURE — G0103 PSA SCREENING: HCPCS | Mod: ZL | Performed by: INTERNAL MEDICINE

## 2024-11-04 PROCEDURE — 85014 HEMATOCRIT: CPT | Mod: ZL

## 2024-11-04 PROCEDURE — 84443 ASSAY THYROID STIM HORMONE: CPT | Mod: ZL

## 2024-11-04 NOTE — PROGRESS NOTES
Patient came in for a lab only appointment on 11/4 and requested a PSA drawn with labs but no order was present at draw. If a PSA is needed could you please place an order, there is blood downstairs until 11/11 for lab requested by patient.   Thank you, Lab

## 2024-11-05 ENCOUNTER — OFFICE VISIT (OUTPATIENT)
Dept: INTERNAL MEDICINE | Facility: OTHER | Age: 79
End: 2024-11-05
Attending: INTERNAL MEDICINE
Payer: COMMERCIAL

## 2024-11-05 VITALS
DIASTOLIC BLOOD PRESSURE: 86 MMHG | WEIGHT: 193.4 LBS | RESPIRATION RATE: 16 BRPM | HEIGHT: 67 IN | OXYGEN SATURATION: 95 % | BODY MASS INDEX: 30.35 KG/M2 | SYSTOLIC BLOOD PRESSURE: 138 MMHG | HEART RATE: 56 BPM

## 2024-11-05 DIAGNOSIS — I25.10 CORONARY ARTERY DISEASE INVOLVING NATIVE CORONARY ARTERY OF NATIVE HEART WITHOUT ANGINA PECTORIS: ICD-10-CM

## 2024-11-05 DIAGNOSIS — Z95.5 STATUS POST INSERTION OF DRUG ELUTING CORONARY ARTERY STENT: ICD-10-CM

## 2024-11-05 DIAGNOSIS — Z01.818 PREOP GENERAL PHYSICAL EXAM: Primary | ICD-10-CM

## 2024-11-05 DIAGNOSIS — F33.42 RECURRENT MAJOR DEPRESSIVE DISORDER, IN FULL REMISSION (H): ICD-10-CM

## 2024-11-05 DIAGNOSIS — E66.811 CLASS 1 OBESITY DUE TO EXCESS CALORIES WITH SERIOUS COMORBIDITY AND BODY MASS INDEX (BMI) OF 30.0 TO 30.9 IN ADULT: ICD-10-CM

## 2024-11-05 DIAGNOSIS — M19.011 ARTHRITIS OF RIGHT SHOULDER REGION: ICD-10-CM

## 2024-11-05 DIAGNOSIS — J41.8 MIXED SIMPLE AND MUCOPURULENT CHRONIC BRONCHITIS (H): ICD-10-CM

## 2024-11-05 DIAGNOSIS — E78.2 MIXED HYPERLIPIDEMIA: ICD-10-CM

## 2024-11-05 DIAGNOSIS — E11.42 CONTROLLED TYPE 2 DIABETES MELLITUS WITH DIABETIC POLYNEUROPATHY, WITHOUT LONG-TERM CURRENT USE OF INSULIN (H): ICD-10-CM

## 2024-11-05 DIAGNOSIS — I10 BENIGN ESSENTIAL HYPERTENSION: ICD-10-CM

## 2024-11-05 DIAGNOSIS — F02.B0 MODERATE ALZHEIMER'S DEMENTIA WITHOUT BEHAVIORAL DISTURBANCE, PSYCHOTIC DISTURBANCE, MOOD DISTURBANCE, OR ANXIETY, UNSPECIFIED TIMING OF DEMENTIA ONSET (H): ICD-10-CM

## 2024-11-05 DIAGNOSIS — G30.9 MODERATE ALZHEIMER'S DEMENTIA WITHOUT BEHAVIORAL DISTURBANCE, PSYCHOTIC DISTURBANCE, MOOD DISTURBANCE, OR ANXIETY, UNSPECIFIED TIMING OF DEMENTIA ONSET (H): ICD-10-CM

## 2024-11-05 DIAGNOSIS — G47.419 PRIMARY NARCOLEPSY WITHOUT CATAPLEXY: ICD-10-CM

## 2024-11-05 DIAGNOSIS — N18.2 CKD (CHRONIC KIDNEY DISEASE) STAGE 2, GFR 60-89 ML/MIN: ICD-10-CM

## 2024-11-05 DIAGNOSIS — E66.09 CLASS 1 OBESITY DUE TO EXCESS CALORIES WITH SERIOUS COMORBIDITY AND BODY MASS INDEX (BMI) OF 30.0 TO 30.9 IN ADULT: ICD-10-CM

## 2024-11-05 DIAGNOSIS — E03.4 HYPOTHYROIDISM DUE TO ACQUIRED ATROPHY OF THYROID: ICD-10-CM

## 2024-11-05 LAB
ATRIAL RATE - MUSE: 55 BPM
DIASTOLIC BLOOD PRESSURE - MUSE: NORMAL MMHG
INTERPRETATION ECG - MUSE: NORMAL
P AXIS - MUSE: 12 DEGREES
PR INTERVAL - MUSE: 176 MS
QRS DURATION - MUSE: 98 MS
QT - MUSE: 440 MS
QTC - MUSE: 420 MS
R AXIS - MUSE: -11 DEGREES
SYSTOLIC BLOOD PRESSURE - MUSE: NORMAL MMHG
T AXIS - MUSE: 22 DEGREES
VENTRICULAR RATE- MUSE: 55 BPM

## 2024-11-05 PROCEDURE — G2211 COMPLEX E/M VISIT ADD ON: HCPCS | Performed by: INTERNAL MEDICINE

## 2024-11-05 PROCEDURE — G0463 HOSPITAL OUTPT CLINIC VISIT: HCPCS

## 2024-11-05 PROCEDURE — 99214 OFFICE O/P EST MOD 30 MIN: CPT | Performed by: INTERNAL MEDICINE

## 2024-11-05 ASSESSMENT — PAIN SCALES - GENERAL: PAINLEVEL_OUTOF10: MODERATE PAIN (4)

## 2024-11-05 NOTE — PATIENT INSTRUCTIONS
Skip Metformin on morning of surgery.     Skip Aspirin prior to surgery.       Labs look very good!        Aspects of Diabetes:   Recent Labs   Lab Test 11/04/24  0938 09/21/24  1412 07/23/24  0814 04/15/24  0903 07/05/22  0848 03/29/22  0953   A1C 6.5*  --  6.9* 6.9*   < > 6.6*   LDL 36  --  65 67   < > 76   HDL 42  --  52 46   < > 41   TRIG 70  --  64 69   < > 107   ALT 10 12 16 14   < > 16   CR 0.59* 0.79 0.71 0.80   < > 0.83   GFRESTIMATED >90 89 >90 89   < > 90   POTASSIUM 4.1 4.3 4.3 4.3   < > 3.9   TSH 1.14  --  2.00 1.96   < > 4.14*   T4  --   --   --   --   --  0.68   WBC 7.5 8.2 7.6 5.1   < > 5.6   HGB 12.7* 14.5 14.2 14.6   < > 14.8    293 228 207   < > 220   ALBUMIN 4.4 4.5 4.4 4.7   < > 4.6    < > = values in this interval not displayed.      Hemoglobin A1c  Goal range is under 8%. Best is 6.5 to 7   Blood Pressure 138/86 Goal to keep less than 140/90   Tobacco  reports that he quit smoking about 41 years ago. His smoking use included cigarettes. He started smoking about 67 years ago. He has a 26.8 pack-year smoking history. He has never been exposed to tobacco smoke. He has never used smokeless tobacco. Goal to abstain from tobacco   Aspirin or Plavix Anti-platelet therapy Aspirin or Plavix reduces risk of heart disease and stroke  -- sometimes used with other blood thinners, depending on bleeding risk and risk factors.    ACE/ARB Specific blood pressure meds These medications can reduce risk of kidney disease   Cholesterol Statins (Lipitor, Crestor, vs others) Statins reduce risk of heart disease and stroke   Eye Exam -- Do Yearly -- Annual diabetic eye exam   Healthy weight Wt Readings from Last 4 Encounters:   11/05/24 87.7 kg (193 lb 6.4 oz)   09/25/24 82.6 kg (182 lb)   09/21/24 82.1 kg (180 lb 14.4 oz)   07/24/24 83.4 kg (183 lb 12.8 oz)      Body mass index is 30.75 kg/m .  Goal BMI under 30, best is under 25.      -- Trying to exercise daily (goal at least 20 min/day) with moderate  aerobic activity   -- Eat healthy (resources from ADA at http://www.diabetes.org/)   -- Taking good care of my feet. Consider seeing the Podiatrist   -- Check blood sugars as directed, record in log book and bring to every appointment    Insurance companies are grading you and I on your blood sugar control -- Goal is to get your A1c down to 7.9% or lower and NO Smoking!  -- Medicare and most insurance companies, will only cover Hemoglobin A1c labs to be rechecked every 91+ days.      Return for Diabetes labs and clinic follow-up appointment every 3 to 4 months.    Schedule lab only appointment --- A few days AFTER: 2/3/25   Schedule clinic appointment with Dr. Pickens -- Same day as labs, or 1-2 days later.

## 2024-11-05 NOTE — NURSING NOTE
"Chief Complaint   Patient presents with    Diabetes       Initial BP (!) 142/86   Pulse 56   Resp 16   Ht 1.689 m (5' 6.5\")   Wt 87.7 kg (193 lb 6.4 oz)   SpO2 95%   BMI 30.75 kg/m   Estimated body mass index is 30.75 kg/m  as calculated from the following:    Height as of this encounter: 1.689 m (5' 6.5\").    Weight as of this encounter: 87.7 kg (193 lb 6.4 oz).  Medication Review: complete    The next two questions are to help us understand your food security.  If you are feeling you need any assistance in this area, we have resources available to support you today.          4/16/2024   SDOH- Food Insecurity   Within the past 12 months, did you worry that your food would run out before you got money to buy more? N    Within the past 12 months, did the food you bought just not last and you didn t have money to get more? N        Patient-reported         Health Care Directive:  Patient does not have a Health Care Directive: Discussed advance care planning with patient; however, patient declined at this time.    Yas Caicedo LPN      "

## 2024-11-05 NOTE — PROGRESS NOTES
Preoperative Evaluation  [facility] AND [facility]  [address]  [place] [address]  Phone: [phone]  Fax: [phone]  Primary Provider: Cm Pickens MD  Pre-op Performing Provider: Cm Pickens MD  Nov 5, 2024 11/5/2024   Surgical Information   What procedure is being done? Total Shoulder Repair - Right    Facility or Hospital where procedure/surgery will be performed: [facility]    Who is doing the procedure / surgery? Dr Mathur    Date of surgery / procedure: Nov 21,2024    Time of surgery / procedure: ?    Where do you plan to recover after surgery? at home with family        Patient-reported     Fax number for surgical facility: [facility]    Assessment & Plan     The proposed surgical procedure is considered INTERMEDIATE risk.      ICD-10-CM    1. Preop general physical exam  Z01.818       2. Arthritis of right shoulder region  M19.011       3. Controlled type 2 diabetes mellitus with diabetic polyneuropathy, without long-term current use of insulin (H)  E11.42 metFORMIN (GLUCOPHAGE) 500 MG tablet      4. CKD (chronic kidney disease) stage 2, GFR 60-89 ml/min  N18.2       5. Benign essential hypertension  I10 EKG 12-lead, tracing only      6. Hypothyroidism due to acquired atrophy of thyroid  E03.4       7. Mixed simple and mucopurulent chronic bronchitis (H)  J41.8       8. Moderate Alzheimer's dementia without behavioral disturbance, psychotic disturbance, mood disturbance, or anxiety, unspecified timing of dementia onset (H)  G30.9     F02.B0       9. Recurrent major depressive disorder, in full remission (H)  F33.42       10. Mixed hyperlipidemia  E78.2       11. Primary narcolepsy without cataplexy  G47.419       12. Coronary artery disease involving native coronary artery of native heart without angina pectoris  I25.10 EKG 12-lead, tracing only      13. Class 1 obesity due to excess calories with serious comorbidity and body mass index (BMI) of 30.0 to 30.9 in adult   E66.811     E66.09     Z68.30       14. Status post insertion of drug eluting coronary artery stent - x2 stents - 1st OMB - 5/17/2018 - [facility]  Z95.5         HPI related to upcoming procedure: Patient has right shoulder arthritis and rotator cuff issues, now scheduled for total reverse shoulder.    Type 2 diabetes she is currently well-controlled.  Needs medication refills.  Advised to hold his metformin prior to surgery.    EKG obtained today showing sinus bradycardia, nonspecific ST-T changes.  Heart rate 55 bpm.  QTc is normal at 420.  Abnormal EKG    History of mixed bronchitis.  Doing well at this time.    Narcolepsy, chronic.  No recent changes.    Coronary disease, currently stable.  Denies exertional angina.  History of stent placement in the past.  No new or recent issues.  States that he is no longer using aspirin.    Moderate Alzheimer's dementia, he is a caregiver of his wife who lives at home with him.  Overall memory is stable at this time.    HYPERTENSION - Ongoing. Blood pressure is currently well controlled.  Medication side effects: None. Denies syncope or presyncope.  Continue current medications.   Medication list reviewed/updated. Refills completed as needed.      MIXED HYPERLIPIDEMIA.  Ongoing. LDL is at goal: Yes. Triglycerides are at goal: Yes.    Medication side effects reported: None.   Continue current medications for now. Medication list reviewed/updated. Refills completed as needed.  Recent Labs   Lab Test 11/04/24  0938 07/23/24  0814   CHOL 92 130   HDL 42 52   LDL 36 65   TRIG 70 64        COPD - Patient has a longstanding history of COPD: Mild = FeV1 > 80%. Patient has been doing reasonably well overall noting NO SYMPTOMS and continues on NO medication regimen without adverse reactions or side effects.      OBESITY - Ongoing.  (See Encounter Diagnosis list above for obesity class / severity).  - Encourage continued maintenance / improvement in diet and exercise.   -  Consider Nutrition / Dietician appointment.  - Weight loss would improve Hypertension, Cholesterol and Diabetes.      Chronic Kidney Disease, Stage 2 (GFR 60-89), chronic, ongoing.  Kidney function had been slowly declining.  Encourage NSAID avoidance.    Recent Labs   Lab Test 11/04/24  0938 09/21/24  1412   CR 0.59* 0.79   GFRESTIMATED >90 89        Vaccine counseling completed.  Encourage routine / annual vaccinations.    Type 2 Diabetes Mellitus, with nephropathy, with neuropathy, Reports ongoing/previous: numbness and burning.  Blood sugar control has been good with minimal hyperglycemia. Doing well with diet, oral agents, and exercise.  Medication list reviewed/updated. Refills completed as needed.    Complicating factors include but are not limited to: hypertension, hyperlipidemia, neuropathy, chronic kidney disease, and CAD/PVD.     Recent Labs   Lab Test 11/04/24  0938 09/21/24  1412 07/23/24  0814 04/15/24  0903 07/05/22  0848 03/29/22  0953   A1C 6.5*  --  6.9* 6.9*   < > 6.6*   LDL 36  --  65 67   < > 76   HDL 42  --  52 46   < > 41   TRIG 70  --  64 69   < > 107   ALT 10 12 16 14   < > 16   CR 0.59* 0.79 0.71 0.80   < > 0.83   GFRESTIMATED >90 89 >90 89   < > 90   POTASSIUM 4.1 4.3 4.3 4.3   < > 3.9   TSH 1.14  --  2.00 1.96   < > 4.14*   T4  --   --   --   --   --  0.68   WBC 7.5 8.2 7.6 5.1   < > 5.6   HGB 12.7* 14.5 14.2 14.6   < > 14.8    293 228 207   < > 220   ALBUMIN 4.4 4.5 4.4 4.7   < > 4.6    < > = values in this interval not displayed.      The longitudinal plan of care for the diagnosis(es)/condition(s) as documented were addressed during this visit. Due to the added complexity in care, I will continue to support Dajuan in the subsequent management and with ongoing continuity of care.              - No identified additional risk factors other than previously addressed    Antiplatelet or Anticoagulation Medication Instructions   - Patient is on no antiplatelet or anticoagulation  medications.    Additional Medication Instructions  Take all scheduled medications on the day of surgery EXCEPT for modifications listed below:   - metformin: DO NOT TAKE day of surgery.    Recommendation  Approval given to proceed with proposed procedure, without further diagnostic evaluation.    Alisha Graff is a 79 year old, presenting for the following:  Diabetes and Pre-Op Exam          11/5/2024     3:05 PM   Additional Questions   Roomed by Clint Caicedo LPN           11/5/2024   Pre-Op Questionnaire   Have you ever had a heart attack or stroke? No    Have you ever had surgery on your heart or blood vessels, such as a stent placement, a coronary artery bypass, or surgery on an artery in your head, neck, heart, or legs? (!) YES     Do you have chest pain with activity? No    Do you have a history of heart failure? No    Do you currently have a cold, bronchitis or symptoms of other infection? No    Do you have a cough, shortness of breath, or wheezing? No    Do you or anyone in your family have previous history of blood clots? No    Do you or does anyone in your family have a serious bleeding problem such as prolonged bleeding following surgeries or cuts? No    Have you ever had problems with anemia or been told to take iron pills? No    Have you had any abnormal blood loss such as black, tarry or bloody stools? No    Have you ever had a blood transfusion? No    Are you willing to have a blood transfusion if it is medically needed before, during, or after your surgery? Yes    Have you or any of your relatives ever had problems with anesthesia? No    Do you have sleep apnea, excessive snoring or daytime drowsiness? No    Do you have any artifical heart valves or other implanted medical devices like a pacemaker, defibrillator, or continuous glucose monitor? No    Do you have artificial joints? (!) YES    Are you allergic to latex? No        Patient-reported     Health Care Directive  Patient does not have a  Health Care Directive: Discussed advance care planning with patient; however, patient declined at this time.    Preoperative Review of    reviewed - controlled substances reflected in medication list.          Patient Active Problem List    Diagnosis Date Noted    Arthritis of right shoulder region 11/05/2024     Priority: Medium    Type 2 diabetes mellitus with diabetic nephropathy, without long-term current use of insulin (H) 07/24/2024     Priority: Medium    Right inguinal hernia 05/28/2024     Priority: Medium    Periumbilical hernia 05/28/2024     Priority: Medium    Hernia of anterior abdominal wall 05/21/2024     Priority: Medium    CKD (chronic kidney disease) stage 2, GFR 60-89 ml/min 01/11/2024     Priority: Medium    Trigger point of right shoulder region 10/18/2023     Priority: Medium    Excessive daytime sleepiness 10/18/2023     Priority: Medium    Frequent headaches 10/18/2023     Priority: Medium    Chronic fatigue 10/18/2023     Priority: Medium    Primary narcolepsy without cataplexy 10/18/2023     Priority: Medium    Moderate Alzheimer's dementia without behavioral disturbance, psychotic disturbance, mood disturbance, or anxiety, unspecified timing of dementia onset (H) 07/27/2023     Priority: Medium    History of eyelid surgery 02/08/2023     Priority: Medium    Droopy eyelid, bilateral 01/26/2023     Priority: Medium    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 08/16/2022     Priority: Medium    Recurrent major depressive disorder, in full remission (H) 04/02/2022     Priority: Medium    Hypothyroidism due to acquired atrophy of thyroid 03/31/2022     Priority: Medium    Benign paroxysmal positional vertigo, unspecified laterality 12/03/2021     Priority: Medium    Elevated prostate specific antigen (PSA) 12/03/2021     Priority: Medium    Mixed simple and mucopurulent chronic bronchitis (H) 11/04/2021     Priority: Medium    Major neurocognitive disorder due to another  medical condition (H) 05/05/2021     Priority: Medium    Bunion, right 03/26/2021     Priority: Medium    Onychomycosis 03/26/2021     Priority: Medium    Bunion, left 03/26/2021     Priority: Medium    Diabetic peripheral neuropathy (H) 01/08/2021     Priority: Medium    Unspecified inflammatory spondylopathy, sacral and sacrococcygeal region (H) 12/18/2020     Priority: Medium    Chronic left-sided low back pain without sciatica 09/17/2020     Priority: Medium    Vitamin B12 deficiency 09/17/2020     Priority: Medium    Benign essential hypertension 09/17/2020     Priority: Medium    Lumbar facet arthropathy 07/07/2020     Priority: Medium    Chronically dry eyes, bilateral 03/17/2020     Priority: Medium    Left sided sciatica 03/17/2020     Priority: Medium    Mixed hyperlipidemia 03/19/2019     Priority: Medium    S/P cardiac cath 5/17/18 06/07/2018     Priority: Medium    Unsteady gait 06/07/2018     Priority: Medium    Stented coronary artery to his LCX and OM1 on 5/17/18 06/07/2018     Priority: Medium    Status post insertion of drug eluting coronary artery stent - x2 stents - [facility]st [facility] 05/31/2018     Priority: Medium    Controlled type 2 diabetes mellitus with diabetic polyneuropathy, without long-term current use of insulin (H) 05/31/2018     Priority: Medium    Benign prostatic hyperplasia with weak urinary stream 05/31/2018     Priority: Medium    Coronary artery disease involving native coronary artery of native heart without angina pectoris 04/26/2018     Priority: Medium    Microalbuminuria 01/26/2018     Priority: Medium    Generalized anxiety disorder 06/05/2017     Priority: Medium    GERD (gastroesophageal reflux disease) 05/03/2013     Priority: Medium    Nephrolithiasis 04/23/2012     Priority: Medium    BPH (benign prostatic hyperplasia) 03/27/2012     Priority: Medium    Diverticular disease of colon 02/17/2010     Priority: Medium    Chronic anxiety 02/17/2010      Priority: Medium      Past Medical History:   Diagnosis Date    Abnormal CT scan, bladder 6/29/2016    Diverticulosis of intestine without perforation or abscess without bleeding     No Comments Provided    Fatty (change of) liver, not elsewhere classified     non alcoholic    Fibromyalgia     No Comments Provided    Nodular prostate without lower urinary tract symptoms     2012,US confirms benign calcifications    Obesity     No Comments Provided    Panic disorder without agoraphobia     No Comments Provided    Positive colorectal cancer screening using Cologuard test 12/06/2018     Past Surgical History:   Procedure Laterality Date    ARTHROPLASTY KNEE      2015    ARTHROSCOPY KNEE      right knee X2    BIOPSY PROSTATE TRANSRECTAL      2013    COLONOSCOPY      2003,and UGI [place] normal    COLONOSCOPY      2009,and UGI repeat Dr. Johnson negative.    COLONOSCOPY  01/01/2013 2013,WNL    COLONOSCOPY  05/23/2019    [facility]-no follow up    HERNIA REPAIR  2000    RELEASE CARPAL TUNNEL      2014    VASECTOMY      No Comments Provided     Current Outpatient Medications   Medication Sig Dispense Refill    blood glucose (ACCU-CHEK ONEL PLUS) test strip 1 strip by In Vitro route daily. 100 strip 5    citalopram (CELEXA) 10 MG tablet Take 1 tablet (10 mg) by mouth daily 90 tablet 4    levothyroxine (SYNTHROID/LEVOTHROID) 50 MCG tablet Take 1 tablet (50 mcg) by mouth daily - for Thyroid Replacement 93 tablet 4    losartan (COZAAR) 25 MG tablet Take 1 tablet (25 mg) by mouth daily 90 tablet 4    metFORMIN (GLUCOPHAGE) 500 MG tablet Take 2 tablets (1,000 mg) by mouth daily (with breakfast) AND 1 tablet (500 mg) daily (with dinner). 270 tablet 1    omeprazole (PRILOSEC) 20 MG DR capsule Take 1 capsule (20 mg) by mouth daily Take 30 to 60 minutes prior to a meal -- for stomach acid 90 capsule 4    reason aspirin not prescribed, intentional, Please choose reason not prescribed from choices below.      rosuvastatin (CRESTOR) 5  "MG tablet Take 1 tablet (5 mg) by mouth daily - Stop Lipitor - 90 tablet 4    vitamin D3 (CHOLECALCIFEROL) 125 MCG (5000 UT) tablet Take 5,000 Units by mouth daily         Allergies   Allergen Reactions    Memantine Other (See Comments)     Dizzy, lightheaded  Dizzy, lightheaded    Rivastigmine Other (See Comments)     Dizzy, lightheaded  Dizzy, lightheaded    Ace Inhibitors Cough    Atorvastatin Calcium Muscle Pain (Myalgia)     Lipitor    Statins Muscle Pain (Myalgia)     Higher doses cause myalgias        Social History     Tobacco Use    Smoking status: Former     Current packs/day: 0.00     Average packs/day: 1 pack/day for 26.8 years (26.8 ttl pk-yrs)     Types: Cigarettes     Start date:      Quit date: 10/25/1983     Years since quittin.0     Passive exposure: Never    Smokeless tobacco: Never   Substance Use Topics    Alcohol use: No       History   Drug Use No               Objective    /86   Pulse 56   Resp 16   Ht 1.689 m (5' 6.5\")   Wt 87.7 kg (193 lb 6.4 oz)   SpO2 95%   BMI 30.75 kg/m     Estimated body mass index is 30.75 kg/m  as calculated from the following:    Height as of this encounter: 1.689 m (5' 6.5\").    Weight as of this encounter: 87.7 kg (193 lb 6.4 oz).  Physical Exam  Constitutional:       General: He is not in acute distress.     Appearance: Normal appearance. He is well-developed. He is not diaphoretic.   HENT:      Nose: No rhinorrhea.   Eyes:      General: No scleral icterus.     Conjunctiva/sclera: Conjunctivae normal.   Neck:      Vascular: No carotid bruit.   Cardiovascular:      Rate and Rhythm: Normal rate and regular rhythm.      Pulses: Normal pulses.   Pulmonary:      Effort: Pulmonary effort is normal.      Breath sounds: Normal breath sounds.   Abdominal:      Palpations: Abdomen is soft.      Tenderness: There is no abdominal tenderness.   Musculoskeletal:         General: No deformity.      Cervical back: Neck supple.      Right lower leg: No edema. "      Left lower leg: No edema.   Skin:     General: Skin is warm and dry.      Coloration: Skin is not jaundiced.      Findings: No rash.   Neurological:      Mental Status: He is alert. Mental status is at baseline.   Psychiatric:         Mood and Affect: Mood normal.         Behavior: Behavior normal.           Diagnostics  Recent Labs   Lab Test 11/04/24  0938 09/21/24  1412 07/23/24  0814 04/15/24  0903 07/05/22  0848 03/29/22  0953   A1C 6.5*  --  6.9* 6.9*   < > 6.6*   LDL 36  --  65 67   < > 76   HDL 42  --  52 46   < > 41   TRIG 70  --  64 69   < > 107   ALT 10 12 16 14   < > 16   CR 0.59* 0.79 0.71 0.80   < > 0.83   GFRESTIMATED >90 89 >90 89   < > 90   POTASSIUM 4.1 4.3 4.3 4.3   < > 3.9   TSH 1.14  --  2.00 1.96   < > 4.14*   T4  --   --   --   --   --  0.68   WBC 7.5 8.2 7.6 5.1   < > 5.6   HGB 12.7* 14.5 14.2 14.6   < > 14.8    293 228 207   < > 220   ALBUMIN 4.4 4.5 4.4 4.7   < > 4.6    < > = values in this interval not displayed.     Results for orders placed or performed in visit on 11/05/24   EKG 12-lead, tracing only     Status: None (Preliminary result)   Result Value Ref Range    Systolic Blood Pressure  mmHg    Diastolic Blood Pressure  mmHg    Ventricular Rate 55 BPM    Atrial Rate 55 BPM    IN Interval 176 ms    QRS Duration 98 ms     ms    QTc 420 ms    P Axis 12 degrees    R AXIS -11 degrees    T Axis 22 degrees    Interpretation ECG       Sinus bradycardia  Nonspecific ST and T wave abnormality  Abnormal ECG  When compared with ECG of 24-Aug-2023 11:52,  No significant change was found              Revised Cardiac Risk Index (RCRI)  The patient has the following serious cardiovascular risks for perioperative complications:   - Coronary Artery Disease (MI, positive stress test, angina, Qs on EKG) = 1 point     RCRI Interpretation: 1 point: Class II (low risk - 0.9% complication rate)         Signed Electronically by: Cm Pickens MD  A copy of this evaluation report is  provided to the requesting physician.         Answers submitted by the patient for this visit:  General Questionnaire (Submitted on 11/5/2024)  Chief Complaint: Chronic problems general questions HPI Form  What is the reason for your visit today? : Pre-op  How many days per week do you miss taking your medication?: 0  Questionnaire about: Chronic problems general questions HPI Form (Submitted on 11/5/2024)  Chief Complaint: Chronic problems general questions HPI Form

## 2024-11-05 NOTE — PROGRESS NOTES
Preoperative Evaluation  [facility]  [address]  [place] [address]  Phone: [phone]  Fax: [phone]  Primary Provider: Cm Pickens MD  Pre-op Performing Provider: Cm Pickens MD  Nov 5, 2024   {Provider  Link to PREOP SmartSet  REQUIRED to apply standard patient instructions and medication directions to the AVS :095554}  {ROOMER review and update patient entered surgical information if needed :169250}  { After Pre-op is completed, use lists to pull documentation into note Link to complete Pre-Op    :607940}         No data to display              Fax number for surgical facility: {:923092}    {Provider Charting Preference for Preop :443000}    Alisha Graff is a 79 year old, presenting for the following:  Diabetes          11/5/2024     3:05 PM   Additional Questions   Roomed by Clint Caicedo LPN     HPI related to upcoming procedure: ***  {Pull Pre-Op Questionnaire into note after flowsheet completed:827912}  Health Care Directive  Patient does not have a Health Care Directive: {ADVANCE_DIRECTIVE_STATUS:999564}    Preoperative Review of   {Mnpmpreport:798387}  {Review MNPMP for all patients per ICSI MNPMP Profile:113068}    {Chronic problem details (Optional) :302854}    Patient Active Problem List    Diagnosis Date Noted    Type 2 diabetes mellitus with diabetic nephropathy, without long-term current use of insulin (H) 07/24/2024     Priority: Medium    Right inguinal hernia 05/28/2024     Priority: Medium    Periumbilical hernia 05/28/2024     Priority: Medium    Hernia of anterior abdominal wall 05/21/2024     Priority: Medium    CKD (chronic kidney disease) stage 2, GFR 60-89 ml/min 01/11/2024     Priority: Medium    Trigger point of right shoulder region 10/18/2023     Priority: Medium    Excessive daytime sleepiness 10/18/2023     Priority: Medium    Frequent headaches 10/18/2023     Priority: Medium    Chronic fatigue 10/18/2023     Priority:  Medium    Primary narcolepsy without cataplexy 10/18/2023     Priority: Medium    Moderate Alzheimer's dementia without behavioral disturbance, psychotic disturbance, mood disturbance, or anxiety, unspecified timing of dementia onset (H) 07/27/2023     Priority: Medium    History of eyelid surgery 02/08/2023     Priority: Medium    Droopy eyelid, bilateral 01/26/2023     Priority: Medium    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 08/16/2022     Priority: Medium    Recurrent major depressive disorder, in full remission (H) 04/02/2022     Priority: Medium    Hypothyroidism due to acquired atrophy of thyroid 03/31/2022     Priority: Medium    Benign paroxysmal positional vertigo, unspecified laterality 12/03/2021     Priority: Medium    Elevated prostate specific antigen (PSA) 12/03/2021     Priority: Medium    Mixed simple and mucopurulent chronic bronchitis (H) 11/04/2021     Priority: Medium    Major neurocognitive disorder due to another medical condition (H) 05/05/2021     Priority: Medium    Bunion, right 03/26/2021     Priority: Medium    Onychomycosis 03/26/2021     Priority: Medium    Bunion, left 03/26/2021     Priority: Medium    Diabetic peripheral neuropathy (H) 01/08/2021     Priority: Medium    Unspecified inflammatory spondylopathy, sacral and sacrococcygeal region (H) 12/18/2020     Priority: Medium    Chronic left-sided low back pain without sciatica 09/17/2020     Priority: Medium    Vitamin B12 deficiency 09/17/2020     Priority: Medium    Benign essential hypertension 09/17/2020     Priority: Medium    Lumbar facet arthropathy 07/07/2020     Priority: Medium    Chronically dry eyes, bilateral 03/17/2020     Priority: Medium    Left sided sciatica 03/17/2020     Priority: Medium    Mixed hyperlipidemia 03/19/2019     Priority: Medium    S/P cardiac cath 5/17/18 06/07/2018     Priority: Medium    Unsteady gait 06/07/2018     Priority: Medium    Stented coronary artery to his LCX and OM1  on 5/17/18 06/07/2018     Priority: Medium    Status post insertion of drug eluting coronary artery stent - x2 stents - 1st [facility] - 5/17/2018 - [facility] 05/31/2018     Priority: Medium    Controlled type 2 diabetes mellitus with diabetic polyneuropathy, without long-term current use of insulin (H) 05/31/2018     Priority: Medium    Benign prostatic hyperplasia with weak urinary stream 05/31/2018     Priority: Medium    Coronary artery disease involving native coronary artery of native heart without angina pectoris 04/26/2018     Priority: Medium    Microalbuminuria 01/26/2018     Priority: Medium    Generalized anxiety disorder 06/05/2017     Priority: Medium    GERD (gastroesophageal reflux disease) 05/03/2013     Priority: Medium    Nephrolithiasis 04/23/2012     Priority: Medium    BPH (benign prostatic hyperplasia) 03/27/2012     Priority: Medium    Diverticular disease of colon 02/17/2010     Priority: Medium    Chronic anxiety 02/17/2010     Priority: Medium      Past Medical History:   Diagnosis Date    Abnormal CT scan, bladder 6/29/2016    Diverticulosis of intestine without perforation or abscess without bleeding     No Comments Provided    Fatty (change of) liver, not elsewhere classified     non alcoholic    Fibromyalgia     No Comments Provided    Nodular prostate without lower urinary tract symptoms     2012,US confirms benign calcifications    Obesity     No Comments Provided    Panic disorder without agoraphobia     No Comments Provided    Positive colorectal cancer screening using Cologuard test 12/06/2018     Past Surgical History:   Procedure Laterality Date    ARTHROPLASTY KNEE      2015    ARTHROSCOPY KNEE      right knee X2    BIOPSY PROSTATE TRANSRECTAL      2013    COLONOSCOPY      2003,and UGI [place] normal    COLONOSCOPY      2009,and UGI repeat Dr. Johnson negative.    COLONOSCOPY  01/01/2013 2013,WNL    COLONOSCOPY  05/23/2019    [place]-no follow up    HERNIA REPAIR  2000     RELEASE CARPAL TUNNEL      2014    VASECTOMY      No Comments Provided     Current Outpatient Medications   Medication Sig Dispense Refill    aspirin 81 MG tablet Take 81 mg by mouth daily      blood glucose (ACCU-CHEK ONEL PLUS) test strip 1 strip by In Vitro route daily. 100 strip 5    citalopram (CELEXA) 10 MG tablet Take 1 tablet (10 mg) by mouth daily 90 tablet 4    levothyroxine (SYNTHROID/LEVOTHROID) 50 MCG tablet Take 1 tablet (50 mcg) by mouth daily - for Thyroid Replacement 93 tablet 4    losartan (COZAAR) 25 MG tablet Take 1 tablet (25 mg) by mouth daily 90 tablet 4    metFORMIN (GLUCOPHAGE) 500 MG tablet Take 2 tablets (1,000 mg) by mouth daily (with breakfast) AND 1 tablet (500 mg) daily (with dinner). 270 tablet 1    omeprazole (PRILOSEC) 20 MG DR capsule Take 1 capsule (20 mg) by mouth daily Take 30 to 60 minutes prior to a meal -- for stomach acid 90 capsule 4    rosuvastatin (CRESTOR) 5 MG tablet Take 1 tablet (5 mg) by mouth daily - Stop Lipitor - 90 tablet 4    vitamin D3 (CHOLECALCIFEROL) 125 MCG (5000 UT) tablet Take 5,000 Units by mouth daily         Allergies   Allergen Reactions    Memantine Other (See Comments)     Dizzy, lightheaded  Dizzy, lightheaded    Rivastigmine Other (See Comments)     Dizzy, lightheaded  Dizzy, lightheaded    Ace Inhibitors Cough    Atorvastatin Calcium Muscle Pain (Myalgia)     Lipitor    Statins Muscle Pain (Myalgia)     Higher doses cause myalgias        Social History     Tobacco Use    Smoking status: Former     Current packs/day: 0.00     Average packs/day: 1 pack/day for 26.8 years (26.8 ttl pk-yrs)     Types: Cigarettes     Start date:      Quit date: 10/25/1983     Years since quittin.0     Passive exposure: Never    Smokeless tobacco: Never   Substance Use Topics    Alcohol use: No     {FAMILY HISTORY (Optional):699070209}  History   Drug Use No           {ROS Picklists (Optional):856857}    Objective    BP (!) 142/86   Pulse 56   Resp 16   Ht  "1.689 m (5' 6.5\")   Wt 87.7 kg (193 lb 6.4 oz)   SpO2 95%   BMI 30.75 kg/m     Estimated body mass index is 30.75 kg/m  as calculated from the following:    Height as of this encounter: 1.689 m (5' 6.5\").    Weight as of this encounter: 87.7 kg (193 lb 6.4 oz).  Physical Exam  {Exam List :163359}    Recent Labs   Lab Test 24  0938 24  1412 24  0814   HGB 12.7* 14.5 14.2    293 228    138 139   POTASSIUM 4.1 4.3 4.3   CR 0.59* 0.79 0.71   A1C 6.5*  --  6.9*        Diagnostics  {LABS:928880}   {EK}    Revised Cardiac Risk Index (RCRI)  The patient has the following serious cardiovascular risks for perioperative complications:  {PREOP REVISED CARDIAC RISK INDEX (RCRI) :000069}     RCRI Interpretation: {REVISED CARDIAC RISK INTERPRETATION :298387}         Signed Electronically by: Cm Pickens MD  A copy of this evaluation report is provided to the requesting physician.    {Provider Resources  Preop [facility] Preop Guidelines  Revised Cardiac Risk Index :814183}   {Email feedback regarding this note to [email or web address]   :560779}  "

## 2024-11-13 ENCOUNTER — NURSE TRIAGE (OUTPATIENT)
Dept: INTERNAL MEDICINE | Facility: OTHER | Age: 79
End: 2024-11-13
Payer: COMMERCIAL

## 2024-11-13 DIAGNOSIS — M19.011 ARTHRITIS OF RIGHT SHOULDER REGION: Primary | ICD-10-CM

## 2024-11-13 RX ORDER — TRAMADOL HYDROCHLORIDE 50 MG/1
25 TABLET ORAL 4 TIMES DAILY
Qty: 14 TABLET | Refills: 0 | Status: SHIPPED | OUTPATIENT
Start: 2024-11-13 | End: 2024-11-20

## 2024-11-13 NOTE — TELEPHONE ENCOUNTER
"Cm Pickens  You saw patient 11/5/24 for a preop (11/21/24 right total shoulder). He has been taking double doses of tylenol along with an appropriate amount of ibuprofen x2 months without adequate pain relief. He is wondering if you would consider replacing these with a stronger pain medication from now until his surgery.     Patient has been taking Tylenol extra strength 500 mg #9 tabs daily and ibuprofen 200 mg #3 tabs daily.    Montserrat Turk RN on 11/13/2024 at 9:24 AM      Additional Information   Negative: Drug overdose and triager unable to answer question   Negative: Caller requesting a renewal or refill of a medicine patient is currently taking   Negative: Caller requesting information unrelated to medicine   Negative: Caller requesting information about COVID-19 Vaccine   Negative: Caller requesting information about Emergency Contraception   Negative: Caller requesting information about Combined Birth Control Pills   Negative: Caller requesting information about Progestin Birth Control Pills   Negative: Caller requesting information about Post-Op pain or medicines   Negative: Caller requesting a prescription antibiotic (such as penicillin) for Strep throat and has a positive culture result   Negative: Caller requesting a prescription anti-viral med (such as Tamiflu) and has influenza (flu) symptoms   Negative: Immunization reaction suspected   Negative: Rash while taking a medicine or within 3 days of stopping it   Negative: Asthma and having symptoms of asthma (cough, wheezing, etc.)   Negative: Symptom of illness (e.g., headache, abdominal pain, earache, vomiting) that are more than mild   Negative: Breastfeeding questions about mother's medicines and diet    Answer Assessment - Initial Assessment Questions  1. NAME of MEDICINE: \"What medicine(s) are you calling about?\"      Has been taking Tylenol extra strength 500 mg x9 daily and ibuprofen 200 mg x3 daily. He has been doing so for a couple of " "months to treat his shoulder pain. He is scheduled for surgery. He is not getting great pain control.     2. QUESTION: \"What is your question?\" (e.g., double dose of medicine, side effect)      Patient is wondering if he can replace tylenol and ibuprofen with a stronger pain medication.     3. PRESCRIBER: \"Who prescribed the medicine?\" Reason: if prescribed by specialist, call should be referred to that group.      N/a     4. SYMPTOMS: \"Do you have any symptoms?\" If Yes, ask: \"What symptoms are you having?\"  \"How bad are the symptoms (e.g., mild, moderate, severe)      Right now shoulder pain is 6/10.     5. PREGNANCY:  \"Is there any chance that you are pregnant?\" \"When was your last menstrual period?\"      No    Protocols used: Medication Question Call-A-OH    "

## 2024-11-13 NOTE — TELEPHONE ENCOUNTER
Patient taking several meds and would like to discuss the effects as he thinks it is too much, He wants some advice.    Xiomara Ann on 11/13/2024 at 9:15 AM

## 2024-11-14 NOTE — TELEPHONE ENCOUNTER
Home phone line was busy. Left message on mobile voicemail for patient to return call to writer. Montserrat Turk RN on 11/14/2024 at 7:52 AM

## 2024-11-14 NOTE — TELEPHONE ENCOUNTER
Patient and spouse notified of below. Recommendations were read back. Advised to call if they have any further questions. Montserrat Turk RN on 11/14/2024 at 8:15 AM

## 2024-11-14 NOTE — TELEPHONE ENCOUNTER
Patient was taking too much Tylenol.    Recommend switching over to Tylenol arthritis 650 mg tablet.  --These are controlled release and will last up to 8 hours.      Take 1 tablet of 650 mg Tylenol arthritis 3 times daily.      Start tramadol/Ultram 25 mg.... Half tablet or 25 mg 4 times daily.  Okay to use scheduled if needed.    These can cause constipation.  Be sure to be taking a fiber supplement.      Okay to continue ibuprofen 3 times daily.       Electronically signed by:  Cm Pickens MD  on November 13, 2024 10:24 PM

## 2024-11-19 ENCOUNTER — VIRTUAL VISIT (OUTPATIENT)
Dept: INTERNAL MEDICINE | Facility: OTHER | Age: 79
End: 2024-11-19
Attending: INTERNAL MEDICINE
Payer: COMMERCIAL

## 2024-11-19 ENCOUNTER — TRANSFERRED RECORDS (OUTPATIENT)
Dept: MULTI SPECIALTY CLINIC | Facility: CLINIC | Age: 79
End: 2024-11-19

## 2024-11-19 DIAGNOSIS — M19.011 ARTHRITIS OF RIGHT SHOULDER REGION: Primary | ICD-10-CM

## 2024-11-19 PROBLEM — H02.32: Status: ACTIVE | Noted: 2024-11-19

## 2024-11-19 PROBLEM — H02.35: Status: ACTIVE | Noted: 2024-11-19

## 2024-11-19 LAB — RETINOPATHY: NORMAL

## 2024-11-19 RX ORDER — TRAMADOL HYDROCHLORIDE 50 MG/1
25 TABLET ORAL 4 TIMES DAILY
Qty: 60 TABLET | Refills: 4 | Status: SHIPPED | OUTPATIENT
Start: 2024-11-19 | End: 2024-11-20

## 2024-11-19 NOTE — PROGRESS NOTES
Dajuan is a 79 year old who is being evaluated via a billable telephone visit.    What phone number would you like to be contacted at? [phone]  How would you like to obtain your AVS? Mail a copy  Originating Location (pt. Location): Home    Distant Location (provider location):  On-site    Patient is unaware that a prescription for tramadol was sent to his pharmacy last week on Thursday 11/14.  Chart indicates that RN had talked with patient or his wife.  He is unaware of any of this.    He had preop appointment today but his shoulder surgery got moved back until March.  He does have eyelid surgery scheduled for early December.    He is supposed to get a reverse total right shoulder replacement.    We updated his tramadol prescription today for up to 2 tablets/day.  Recommend starting with half tablet 4 times daily with some Tylenol.    Advise that he needs to take less than 9 tablets of Tylenol per day as this is too much.  Currently using Exer strength 500 mg Tylenol.  Recommend that he try to drop his Tylenol dose down to 4 or 5 tablets/day in addition to the 2 tablets of tramadol.    Recommend close follow-up.    Subjective   Dajuan is a 79 year old, presenting for the following health issues:  Medication Problem    History of Present Illness       Reason for visit:  Pre-op   He is taking medications regularly.                   Objective           Vitals:  No vitals were obtained today due to virtual visit.    Physical Exam   General: Alert and no distress //Respiratory: No audible wheeze, cough, or shortness of breath // Psychiatric:  Appropriate affect, tone, and pace of words            Phone call duration: 11 minutes  Signed Electronically by: Cm Pickens MD

## 2024-11-19 NOTE — NURSING NOTE
"Chief Complaint   Patient presents with    Medication Problem       Initial There were no vitals taken for this visit. Estimated body mass index is 30.75 kg/m  as calculated from the following:    Height as of 11/5/24: 1.689 m (5' 6.5\").    Weight as of 11/5/24: 87.7 kg (193 lb 6.4 oz).  Medication Review: complete    The next two questions are to help us understand your food security.  If you are feeling you need any assistance in this area, we have resources available to support you today.          4/16/2024   SDOH- Food Insecurity   Within the past 12 months, did you worry that your food would run out before you got money to buy more? N    Within the past 12 months, did the food you bought just not last and you didn t have money to get more? N        Patient-reported         Health Care Directive:  Patient does not have a Health Care Directive: Discussed advance care planning with patient; however, patient declined at this time.    Yas Caicedo LPN      "

## 2024-11-20 DIAGNOSIS — M19.011 ARTHRITIS OF RIGHT SHOULDER REGION: ICD-10-CM

## 2024-11-20 RX ORDER — TRAMADOL HYDROCHLORIDE 50 MG/1
25 TABLET ORAL 4 TIMES DAILY
Qty: 14 TABLET | Refills: 5 | Status: SHIPPED | OUTPATIENT
Start: 2024-11-20

## 2024-11-20 NOTE — TELEPHONE ENCOUNTER
Original prescription:  traMADol (ULTRAM) 50 MG tablet 60 tablet 4 11/19/2024 -- No   Sig - Route: Take 0.5 tablets (25 mg) by mouth 4 times daily. - Oral     Alanna Patterson RN .............. 11/20/2024  11:51 AM

## 2024-12-03 ENCOUNTER — HOSPITAL ENCOUNTER (OUTPATIENT)
Dept: GENERAL RADIOLOGY | Facility: OTHER | Age: 79
Discharge: HOME | End: 2024-12-03
Attending: INTERNAL MEDICINE
Payer: MEDICARE

## 2024-12-03 ENCOUNTER — OFFICE VISIT (OUTPATIENT)
Dept: INTERNAL MEDICINE | Facility: OTHER | Age: 79
End: 2024-12-03
Attending: INTERNAL MEDICINE
Payer: MEDICARE

## 2024-12-03 VITALS
HEIGHT: 67 IN | RESPIRATION RATE: 16 BRPM | HEART RATE: 76 BPM | SYSTOLIC BLOOD PRESSURE: 132 MMHG | BODY MASS INDEX: 30.29 KG/M2 | TEMPERATURE: 96.9 F | WEIGHT: 193 LBS | DIASTOLIC BLOOD PRESSURE: 88 MMHG

## 2024-12-03 DIAGNOSIS — M79.662 BILATERAL CALF PAIN: ICD-10-CM

## 2024-12-03 DIAGNOSIS — G89.29 CHRONIC LEFT SHOULDER PAIN: ICD-10-CM

## 2024-12-03 DIAGNOSIS — G89.29 CHRONIC LEFT-SIDED LOW BACK PAIN WITHOUT SCIATICA: ICD-10-CM

## 2024-12-03 DIAGNOSIS — M25.532 PAIN IN BOTH WRISTS: ICD-10-CM

## 2024-12-03 DIAGNOSIS — M54.50 CHRONIC LEFT-SIDED LOW BACK PAIN WITHOUT SCIATICA: ICD-10-CM

## 2024-12-03 DIAGNOSIS — M25.531 PAIN IN BOTH WRISTS: ICD-10-CM

## 2024-12-03 DIAGNOSIS — M79.661 BILATERAL CALF PAIN: ICD-10-CM

## 2024-12-03 DIAGNOSIS — M47.816 ARTHRITIS OF FACET JOINT OF LUMBAR SPINE: ICD-10-CM

## 2024-12-03 DIAGNOSIS — M25.512 CHRONIC LEFT SHOULDER PAIN: ICD-10-CM

## 2024-12-03 DIAGNOSIS — M25.531 PAIN IN BOTH WRISTS: Primary | ICD-10-CM

## 2024-12-03 DIAGNOSIS — M79.18 MYOFASCIAL MUSCLE PAIN: ICD-10-CM

## 2024-12-03 DIAGNOSIS — M25.532 PAIN IN BOTH WRISTS: Primary | ICD-10-CM

## 2024-12-03 PROCEDURE — 72100 X-RAY EXAM L-S SPINE 2/3 VWS: CPT

## 2024-12-03 PROCEDURE — 73110 X-RAY EXAM OF WRIST: CPT | Mod: LT

## 2024-12-03 PROCEDURE — G0463 HOSPITAL OUTPT CLINIC VISIT: HCPCS

## 2024-12-03 ASSESSMENT — ENCOUNTER SYMPTOMS
WEAKNESS: 1
NECK PAIN: 1
ARTHRALGIAS: 1
FEVER: 0
NECK STIFFNESS: 1
MYALGIAS: 1
CHILLS: 0

## 2024-12-03 NOTE — NURSING NOTE
"Chief Complaint   Patient presents with    Shoulder Pain     Right Shoulder Pain, And Generalized Pain All Over, Intermittent        Initial /88 (BP Location: Right arm, Patient Position: Chair, Cuff Size: Adult Large)   Pulse 76   Temp 96.9  F (36.1  C) (Temporal)   Resp 16   Ht 1.689 m (5' 6.5\")   Wt 87.5 kg (193 lb)   BMI 30.68 kg/m   Estimated body mass index is 30.68 kg/m  as calculated from the following:    Height as of this encounter: 1.689 m (5' 6.5\").    Weight as of this encounter: 87.5 kg (193 lb).      Medication Reconciliation: Complete.       Haylee Moura LPN on 12/3/2024 at 2:07 PM     "

## 2024-12-03 NOTE — PROGRESS NOTES
Assessment & Plan     ICD-10-CM    1. Pain in both wrists  M25.531 XR Wrist Left G/E 3 Views    M25.532 XR Wrist Right G/E 3 Views     Orthopedic  Referral      2. Bilateral calf pain  M79.661 Orthopedic  Referral    M79.662       3. Chronic left-sided low back pain without sciatica  M54.50 XR Lumbar Spine 2/3 Views    G89.29 Orthopedic  Referral      4. Arthritis of facet joint of lumbar spine  M47.816 XR Lumbar Spine 2/3 Views     Orthopedic  Referral      5. Myofascial muscle pain  M79.18 Orthopedic  Referral      6. Chronic left shoulder pain  M25.512 Orthopedic  Referral    G89.29          Patient presents for follow-up multiple issues.    States that he has pain throughout his body at times, comes and goes.    Feels that both of his wrists are swollen and painful.  Sometimes it is left upper shoulder hurts, his right upper shoulder hurts.  No midline cervical pain reported.    He has reproducible left upper shoulder/medial scapular muscle discomfort and tenderness on the left today.  States that intermittently his calf hurts.    He would like to see a specialist in [place].  He is not really sure who he wants to see what specialty he wants to see.    Advised that typically they recommend plain x-rays prior to any type of advanced imaging.  He does have known advanced facet joint arthritis of the lumbar spine.    States that he does get some calf pain with walking.  He has strong symmetric pulses on exam today.  Highly unlikely to be vascular disease related calf pain but could have abnormal exercise ABIs.  We are unable to perform exercise ABIs at our clinic.    Given the bilateral wrist pain, x-rays requested today.  Given the left shoulder pain and myofascial pain, as well as neck and wrist pain, orthopedic referral requested.    Was taking 500 mg Tylenol 9 tablets/day.  Advised that this is too much and recommend that he cut down.  Not taking 600 mg  "tablets per day.  Tried tramadol, low-dose without any improvement in pain.  He has eye lid surgery tomorrow.    Recommend starting ibuprofen plus Tylenol after surgery 3 times daily as needed.    X-rays pending.               BMI  Estimated body mass index is 30.68 kg/m  as calculated from the following:    Height as of this encounter: 1.689 m (5' 6.5\").    Weight as of this encounter: 87.5 kg (193 lb).         Return for follow-up as needed for new or worsening symptoms, Appointments: [phone].      Cm Pickens MD  [facility]    Review of Systems   Constitutional:  Negative for chills and fever.   Musculoskeletal:  Positive for arthralgias, gait problem, myalgias, neck pain and neck stiffness.   Allergic/Immunologic: Negative for immunocompromised state.   Neurological:  Positive for weakness.       Alisha Graff is a 79 year old, presenting for the following health issues:  Shoulder Pain (Right Shoulder Pain, And Generalized Pain All Over, Intermittent )        12/3/2024     2:05 PM   Additional Questions   Roomed by Haylee BEDOYA LPN   Accompanied by Spouse     Via the Health Maintenance questionnaire, the patient has reported the following services have been completed -Eye Exam: [place], [place] 2024-11-19, this information has been sent to the abstraction team.  History of Present Illness       Reason for visit:  Right Arm/Shoulder Pain  Symptom onset:  More than a month  Symptoms include:  Pain, Achiness in Legs, Arms  Symptom intensity:  Moderate  Symptom progression:  Worsening  Had these symptoms before:  No  What makes it worse:  None  What makes it better:  None   He is taking medications regularly.                     Objective    /88 (BP Location: Right arm, Patient Position: Chair, Cuff Size: Adult Large)   Pulse 76   Temp 96.9  F (36.1  C) (Temporal)   Resp 16   Ht 1.689 m (5' 6.5\")   Wt 87.5 kg (193 lb)   BMI 30.68 kg/m    Body mass index is 30.68 " kg/m .  Physical Exam  Constitutional:       Appearance: Normal appearance. He is not ill-appearing.   Eyes:      General: No scleral icterus.     Conjunctiva/sclera: Conjunctivae normal.   Cardiovascular:      Rate and Rhythm: Normal rate and regular rhythm.      Pulses: Normal pulses.   Pulmonary:      Effort: Pulmonary effort is normal.      Breath sounds: No wheezing.   Musculoskeletal:         General: Tenderness present.      Right lower leg: Edema present.      Left lower leg: Edema present.   Skin:     Findings: No rash.   Neurological:      Mental Status: He is alert. Mental status is at baseline.   Psychiatric:         Mood and Affect: Mood normal.                    Signed Electronically by: Cm Pickens MD

## 2024-12-03 NOTE — PATIENT INSTRUCTIONS
Xrays today -- wrists and low back.     After your surgery -- Start using some Ibuprofen with your tylenol....       Orthopedic referral to [facility] requested  - they will call with date/time of appointment.        Return as needed for follow-up for new / worsening symptoms.    Clinic : [phone]  Appointment line: [phone]

## 2024-12-25 ENCOUNTER — HOSPITAL ENCOUNTER (EMERGENCY)
Facility: OTHER | Age: 79
Discharge: HOME | End: 2024-12-25
Attending: PHYSICIAN ASSISTANT
Payer: MEDICARE

## 2024-12-25 VITALS
SYSTOLIC BLOOD PRESSURE: 142 MMHG | HEART RATE: 58 BPM | HEIGHT: 66 IN | OXYGEN SATURATION: 95 % | WEIGHT: 180 LBS | RESPIRATION RATE: 18 BRPM | TEMPERATURE: 98 F | BODY MASS INDEX: 28.93 KG/M2 | DIASTOLIC BLOOD PRESSURE: 70 MMHG

## 2024-12-25 DIAGNOSIS — K40.90 RIGHT INGUINAL HERNIA: ICD-10-CM

## 2024-12-25 PROCEDURE — 99283 EMERGENCY DEPT VISIT LOW MDM: CPT | Performed by: PHYSICIAN ASSISTANT

## 2024-12-25 PROCEDURE — 99282 EMERGENCY DEPT VISIT SF MDM: CPT | Performed by: PHYSICIAN ASSISTANT

## 2024-12-25 ASSESSMENT — ENCOUNTER SYMPTOMS
ABDOMINAL PAIN: 1
ROS GI COMMENTS: HERNIA
VOMITING: 1

## 2024-12-25 ASSESSMENT — ACTIVITIES OF DAILY LIVING (ADL): ADLS_ACUITY_SCORE: 41

## 2024-12-26 NOTE — ED TRIAGE NOTES
Patient comes in with a right inguinal hernia that he cannot get to go back in.  Pain started a few hours ago.  Patient is vomiting in triage.

## 2024-12-26 NOTE — DISCHARGE INSTRUCTIONS
Get plenty of fluids and rest.  As discussed, your right inguinal hernia was able to be reduced in the ED.  Have low suspicion for an emergent complication at this time.  I will place a referral over to the general surgeon you saw before to let them know that you are interested in meeting with them again and possibly wanting to have the hernia repair.  Since you asked, I did place some information about the postop expectations after a inguinal hernia repair.  You should return to the ED if you are having continuous vomiting, inability to have bowel movements, your hernia pops back out is very painful and you are unable to reduce it.

## 2024-12-26 NOTE — ED PROVIDER NOTES
History     Chief Complaint   Patient presents with    Hernia    Vomiting    Abdominal Pain     HPI  Dajuan Basilio is a 79 year old male who presents to the ED for evaluation of hernia, vomiting, abdominal pain. Patient comes in with a right inguinal hernia that he cannot get to go back in.  Pain started a few hours ago.  Patient is vomiting in triage.     Allergies:  Allergies   Allergen Reactions    Memantine Other (See Comments)     Dizzy, lightheaded  Dizzy, lightheaded    Rivastigmine Other (See Comments)     Dizzy, lightheaded  Dizzy, lightheaded    Ace Inhibitors Cough    Atorvastatin Calcium Muscle Pain (Myalgia)     Lipitor    Statins Muscle Pain (Myalgia)     Higher doses cause myalgias       Problem List:    Patient Active Problem List    Diagnosis Date Noted    Chronic left shoulder pain 12/03/2024     Priority: Medium    Pain in both wrists 12/03/2024     Priority: Medium    Excess skin of lower eyelids of both eyes 11/19/2024     Priority: Medium    Arthritis of right shoulder region 11/05/2024     Priority: Medium    Type 2 diabetes mellitus with diabetic nephropathy, without long-term current use of insulin (H) 07/24/2024     Priority: Medium    Right inguinal hernia 05/28/2024     Priority: Medium    Periumbilical hernia 05/28/2024     Priority: Medium    Hernia of anterior abdominal wall 05/21/2024     Priority: Medium    CKD (chronic kidney disease) stage 2, GFR 60-89 ml/min 01/11/2024     Priority: Medium    Frequent headaches 10/18/2023     Priority: Medium    Chronic fatigue 10/18/2023     Priority: Medium    Primary narcolepsy without cataplexy 10/18/2023     Priority: Medium    Moderate Alzheimer's dementia without behavioral disturbance, psychotic disturbance, mood disturbance, or anxiety, unspecified timing of dementia onset (H) 07/27/2023     Priority: Medium    History of eyelid surgery 02/08/2023     Priority: Medium    Droopy eyelid, bilateral 01/26/2023     Priority: Medium     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 08/16/2022     Priority: Medium    Recurrent major depressive disorder, in full remission (H) 04/02/2022     Priority: Medium    Hypothyroidism due to acquired atrophy of thyroid 03/31/2022     Priority: Medium    Benign paroxysmal positional vertigo, unspecified laterality 12/03/2021     Priority: Medium    Elevated prostate specific antigen (PSA) 12/03/2021     Priority: Medium    Mixed simple and mucopurulent chronic bronchitis (H) 11/04/2021     Priority: Medium    Major neurocognitive disorder due to another medical condition (H) 05/05/2021     Priority: Medium    Bunion, right 03/26/2021     Priority: Medium    Onychomycosis 03/26/2021     Priority: Medium    Bunion, left 03/26/2021     Priority: Medium    Diabetic peripheral neuropathy (H) 01/08/2021     Priority: Medium    Unspecified inflammatory spondylopathy, sacral and sacrococcygeal region (H) 12/18/2020     Priority: Medium    Chronic left-sided low back pain without sciatica 09/17/2020     Priority: Medium    Vitamin B12 deficiency 09/17/2020     Priority: Medium    Benign essential hypertension 09/17/2020     Priority: Medium    Arthritis of facet joint of lumbar spine 07/07/2020     Priority: Medium    Chronically dry eyes, bilateral 03/17/2020     Priority: Medium    Left sided sciatica 03/17/2020     Priority: Medium    Mixed hyperlipidemia 03/19/2019     Priority: Medium    S/P cardiac cath 5/17/18 06/07/2018     Priority: Medium    Unsteady gait 06/07/2018     Priority: Medium    Stented coronary artery to his LCX and OM1 on 5/17/18 06/07/2018     Priority: Medium    Status post insertion of drug eluting coronary artery stent - x2 stents - [facility]st [facility] - 5/17/2018 - [facility] 05/31/2018     Priority: Medium    Controlled type 2 diabetes mellitus with diabetic polyneuropathy, without long-term current use of insulin (H) 05/31/2018     Priority: Medium    Benign prostatic hyperplasia with  weak urinary stream 2018     Priority: Medium    Coronary artery disease involving native coronary artery of native heart without angina pectoris 2018     Priority: Medium    Microalbuminuria 2018     Priority: Medium    Generalized anxiety disorder 2017     Priority: Medium    GERD (gastroesophageal reflux disease) 2013     Priority: Medium    Nephrolithiasis 2012     Priority: Medium    Diverticular disease of colon 2010     Priority: Medium    Chronic anxiety 2010     Priority: Medium        Past Medical History:    Past Medical History:   Diagnosis Date    Abnormal CT scan, bladder 2016    Diverticulosis of intestine without perforation or abscess without bleeding     Fatty (change of) liver, not elsewhere classified     Fibromyalgia     Nodular prostate without lower urinary tract symptoms     Obesity     Panic disorder without agoraphobia     Positive colorectal cancer screening using Cologuard test 2018       Past Surgical History:    Past Surgical History:   Procedure Laterality Date    ARTHROPLASTY KNEE      2015    ARTHROSCOPY KNEE      right knee X2    BIOPSY PROSTATE TRANSRECTAL          COLONOSCOPY      ,and UGI [place] normal    COLONOSCOPY      ,and UGI repeat Dr. Johnson negative.    COLONOSCOPY  2013,WNL    COLONOSCOPY  2019    [facility]-no follow up    HERNIA REPAIR  2000    RELEASE CARPAL TUNNEL      2014    VASECTOMY      No Comments Provided       Family History:    Family History   Problem Relation Age of Onset    Cancer Father         Cancer, mesothelioma    Colon Cancer Mother         Cancer-colon,    Genitourinary Problems Mother         Genitourinary Disease,renal failure    Diabetes Mother         Diabetes    Other - See Comments Sister         Renal transplant    Diabetes Type 2  Sister         Diabetes type II    Diabetes Brother         Diabetes       Social History:  Marital Status:    "[2]  Social History     Tobacco Use    Smoking status: Former     Current packs/day: 0.00     Average packs/day: 1 pack/day for 26.8 years (26.8 ttl pk-yrs)     Types: Cigarettes     Start date:      Quit date: 10/25/1983     Years since quittin.1     Passive exposure: Never    Smokeless tobacco: Never   Vaping Use    Vaping status: Never Used   Substance Use Topics    Alcohol use: No    Drug use: No        Medications:    blood glucose (ACCU-CHEK ONEL PLUS) test strip  citalopram (CELEXA) 10 MG tablet  levothyroxine (SYNTHROID/LEVOTHROID) 50 MCG tablet  losartan (COZAAR) 25 MG tablet  metFORMIN (GLUCOPHAGE) 500 MG tablet  omeprazole (PRILOSEC) 20 MG DR capsule  reason aspirin not prescribed, intentional,  rosuvastatin (CRESTOR) 5 MG tablet  vitamin D3 (CHOLECALCIFEROL) 125 MCG (5000 UT) tablet          Review of Systems   Gastrointestinal:  Positive for abdominal pain and vomiting.        Hernia       Physical Exam   BP: (!) 195/84  Pulse: 57  Temp: 98  F (36.7  C)  Resp: 18  Height: 167.6 cm (5' 6\")  Weight: 81.6 kg (180 lb)  SpO2: 95 %      Physical Exam  Constitutional:       General: He is not in acute distress.     Appearance: He is well-developed. He is not diaphoretic.   Eyes:      Conjunctiva/sclera: Conjunctivae normal.   Neck:      Vascular: No carotid bruit.   Abdominal:      Hernia: A hernia is present. Hernia is present in the right inguinal area.   Musculoskeletal:         General: No deformity.      Cervical back: Neck supple.      Right lower leg: No edema.      Left lower leg: No edema.   Neurological:      Mental Status: He is alert. Mental status is at baseline.   Psychiatric:         Mood and Affect: Mood normal.         Behavior: Behavior normal.         ED Course        Procedures              Critical Care time:  none              No results found for this or any previous visit (from the past 24 hours).    Medications - No data to display    Assessments & Plan (with Medical Decision " Making)   Pt has a right inguinal hernia. No Skin changes overlying hernia sac. It does not appear to be very tender with palpation. He reports that due to the pain he had an episode of vomiting. I have a low suspicion for a strangulated hernia.     I applied firm, steady pressure to sides of hernia and it reduced fairly easily. His pain subsided.     We observed him in the ED. Sx's resolved.     Back in May 2024 he had met with Dr. Mariano May, general surgeon in consultation for hernia repair, but the patient never ended up having surgery.  He says he would like to have the surgery now.  I did place a referral for general surgery again and I will send a message to Dr. May as well.    Strict return precautions are given to the pt, they will return if symptoms are worsening or concerning. The pt understands and agrees with the plan and they are discharged.     Jigar Oneal PA-C        I have reviewed the nursing notes.    I have reviewed the findings, diagnosis, plan and need for follow up with the patient.          New Prescriptions    No medications on file       Final diagnoses:   Right inguinal hernia       12/25/2024   [facility]       Jigar Oneal PA  12/25/24 [id number]

## 2025-01-03 PROBLEM — D64.9 ANEMIA, UNSPECIFIED TYPE: Status: ACTIVE | Noted: 2025-01-03

## 2025-01-07 ENCOUNTER — OFFICE VISIT (OUTPATIENT)
Dept: FAMILY MEDICINE | Facility: OTHER | Age: 80
End: 2025-01-07
Attending: STUDENT IN AN ORGANIZED HEALTH CARE EDUCATION/TRAINING PROGRAM
Payer: MEDICARE

## 2025-01-07 VITALS
DIASTOLIC BLOOD PRESSURE: 80 MMHG | HEIGHT: 67 IN | OXYGEN SATURATION: 96 % | BODY MASS INDEX: 29.03 KG/M2 | HEART RATE: 66 BPM | RESPIRATION RATE: 20 BRPM | WEIGHT: 185 LBS | TEMPERATURE: 97.1 F | SYSTOLIC BLOOD PRESSURE: 132 MMHG

## 2025-01-07 DIAGNOSIS — K21.9 GASTROESOPHAGEAL REFLUX DISEASE WITHOUT ESOPHAGITIS: ICD-10-CM

## 2025-01-07 DIAGNOSIS — R35.0 INCREASED FREQUENCY OF URINATION: ICD-10-CM

## 2025-01-07 DIAGNOSIS — N40.0 ENLARGED PROSTATE: Primary | ICD-10-CM

## 2025-01-07 DIAGNOSIS — R39.15 URINARY URGENCY: ICD-10-CM

## 2025-01-07 DIAGNOSIS — N39.9 URINARY PROBLEM IN MALE: Primary | ICD-10-CM

## 2025-01-07 DIAGNOSIS — N40.0 PROSTATE ENLARGEMENT: ICD-10-CM

## 2025-01-07 DIAGNOSIS — R30.0 BURNING WITH URINATION: ICD-10-CM

## 2025-01-07 DIAGNOSIS — R31.29 MICROSCOPIC HEMATURIA: ICD-10-CM

## 2025-01-07 LAB
ALBUMIN UR-MCNC: 20 MG/DL
APPEARANCE UR: CLEAR
BILIRUB UR QL STRIP: NEGATIVE
COLOR UR AUTO: YELLOW
GLUCOSE UR STRIP-MCNC: NEGATIVE MG/DL
HGB UR QL STRIP: NEGATIVE
HYALINE CASTS: 2 /LPF
KETONES UR STRIP-MCNC: NEGATIVE MG/DL
LEUKOCYTE ESTERASE UR QL STRIP: NEGATIVE
MUCOUS THREADS #/AREA URNS LPF: PRESENT /LPF
NITRATE UR QL: NEGATIVE
PH UR STRIP: 6 [PH] (ref 5–9)
RBC URINE: 3 /HPF
SP GR UR STRIP: 1.03 (ref 1–1.03)
UROBILINOGEN UR STRIP-MCNC: NORMAL MG/DL
WBC URINE: 1 /HPF

## 2025-01-07 PROCEDURE — G0463 HOSPITAL OUTPT CLINIC VISIT: HCPCS

## 2025-01-07 PROCEDURE — 81003 URINALYSIS AUTO W/O SCOPE: CPT | Mod: ZL | Performed by: STUDENT IN AN ORGANIZED HEALTH CARE EDUCATION/TRAINING PROGRAM

## 2025-01-07 RX ORDER — PANTOPRAZOLE SODIUM 40 MG/1
40 TABLET, DELAYED RELEASE ORAL
Qty: 28 TABLET | Refills: 0 | Status: SHIPPED | OUTPATIENT
Start: 2025-01-07 | End: 2025-01-21

## 2025-01-07 RX ORDER — ERYTHROMYCIN 5 MG/G
OINTMENT OPHTHALMIC
COMMUNITY
Start: 2024-12-10

## 2025-01-07 ASSESSMENT — PAIN SCALES - GENERAL: PAINLEVEL_OUTOF10: MILD PAIN (2)

## 2025-01-07 NOTE — PATIENT INSTRUCTIONS
Urinary Symptoms    Urine testing looks good today, no signs of infection.    Continue fluids.    Tylenol.    Follow up with urology.    Return to rapid clinic/ER if worsening.

## 2025-01-07 NOTE — NURSING NOTE
"Chief Complaint   Patient presents with    Urinary Problem     Started last night       Patient tx with daily meds - tylenol and ibuprofen with some relief.    Initial /80 (BP Location: Right arm, Patient Position: Sitting, Cuff Size: Adult Regular)   Temp (!) 66  F (18.9  C) (Tympanic)   Resp 20   Ht 1.689 m (5' 6.5\")   Wt 83.9 kg (185 lb)   SpO2 96%   BMI 29.41 kg/m   Estimated body mass index is 29.41 kg/m  as calculated from the following:    Height as of this encounter: 1.689 m (5' 6.5\").    Weight as of this encounter: 83.9 kg (185 lb).     Advance Care Directive on file? y    FOOD SECURITY SCREENING QUESTIONS:    The next two questions are to help us understand your food security.  If you are feeling you need any assistance in this area, we have resources available to support you today.    Hunger Vital Signs:  Within the past 12 months we worried whether our food would run out before we got money to buy more. Never  Within the past 12 months the food we bought just didn't last and we didn't have money to get more. Never  Nena Douglas LPN,CAMERON on 1/7/2025 at 1:12 PM      Nena Douglas LPN     "

## 2025-01-07 NOTE — PROGRESS NOTES
Assessment & Plan     (N39.9) Urinary problem in male  (primary encounter diagnosis)    Comment: Urinary symptoms of burning, frequency x 1 day.  UA with 3 red blood cell, otherwise no indication for infection.  Leukocyte esterase, nitrites, white blood cell negative.  He did have 20 protein.  Vital signs are stable.  He otherwise appears well.  History of prostatic hyperplasia, PSAs have all been normal.    Plan: UA Macroscopic with reflex to Microscopic and         Culture, Adult Urology  Referral          Recommend at this time he try Azo Pyridium.  He did not want to try Azo Pyridium.  Recommended continuing lots of fluids early on in the day.  Follow-up with urology for further evaluation.  Return to rapid clinic if symptoms worsen or change in the next couple of days.  He is comfortable and agreeable with this plan.    (R35.0) Increased frequency of urination  Comment: Uncertain of exact etiology.  Plan: UA Macroscopic with reflex to Microscopic and         Culture            (R30.0) Burning with urination  (R39.15) Urinary urgency      (N40.0) Prostate enlargement  Comment: History of prostate enlargement.  Last CT scan with chart review was done in 2020.  Plan: Adult Urology  Referral            (R31.29) Microscopic hematuria  Comment: 3 red blood cell today.  Plan: Adult Urology  Referral          Follow-up with urology.        Alisha Graff is a 79 year old, presenting for the following health issues:  Urinary Problem (Started last night/)    HPI     Patient presents today with concerns of urgency, frequency, and burning with urination. He notes symptoms started last night, was up every hour or two throughout the night. He notes some improvement of symptoms today. He notes some difficulty with start of stream. This has been more chronic for him.    He also has been having nasal congestion. Colored for the last week. Intermittent headaches. These come and go.        Review  "of Systems  Constitutional, HEENT, cardiovascular, pulmonary, gi and gu systems are negative, except as otherwise noted.        Objective    /80 (BP Location: Right arm, Patient Position: Sitting, Cuff Size: Adult Regular)   Pulse 66   Temp 97.1  F (36.2  C) (Tympanic)   Resp 20   Ht 1.689 m (5' 6.5\")   Wt 83.9 kg (185 lb)   SpO2 96%   BMI 29.41 kg/m    Body mass index is 29.41 kg/m .    Physical Exam   GENERAL: alert and no distress  RESP: lungs clear to auscultation - no rales, rhonchi or wheezes  CV: regular rate and rhythm, normal S1 S2, no S3 or S4, no murmur, click or rub, no peripheral edema  ABDOMEN: soft, nontender, no hepatosplenomegaly, no masses and bowel sounds normal  MS: no gross musculoskeletal defects noted, no edema    Results for orders placed or performed in visit on 01/07/25   UA Macroscopic with reflex to Microscopic and Culture     Status: Abnormal    Specimen: Urine, Clean Catch   Result Value Ref Range    Color Urine Yellow Colorless, Straw, Light Yellow, Yellow    Appearance Urine Clear Clear    Glucose Urine Negative Negative mg/dL    Bilirubin Urine Negative Negative    Ketones Urine Negative Negative mg/dL    Specific Gravity Urine 1.026 1.000 - 1.030    Blood Urine Negative Negative    pH Urine 6.0 5.0 - 9.0    Protein Albumin Urine 20 (A) Negative mg/dL    Urobilinogen Urine Normal Normal, 2.0 mg/dL    Nitrite Urine Negative Negative    Leukocyte Esterase Urine Negative Negative    Mucus Urine Present (A) None Seen /LPF    RBC Urine 3 (H) <=2 /HPF    WBC Urine 1 <=5 /HPF    Hyaline Casts Urine 2 <=2 /LPF    Narrative    Urine Culture not indicated         Signed Electronically by: Anita Whitney PA-C    "

## 2025-01-08 ENCOUNTER — HOSPITAL ENCOUNTER (OUTPATIENT)
Dept: NUCLEAR MEDICINE | Facility: OTHER | Age: 80
Discharge: HOME | End: 2025-01-08
Attending: INTERNAL MEDICINE
Payer: MEDICARE

## 2025-01-08 VITALS — HEIGHT: 65 IN | BODY MASS INDEX: 30.82 KG/M2 | WEIGHT: 185 LBS

## 2025-01-08 DIAGNOSIS — R07.9 CHEST PAIN, UNSPECIFIED TYPE: ICD-10-CM

## 2025-01-08 DIAGNOSIS — I25.10 CORONARY ARTERY DISEASE INVOLVING NATIVE CORONARY ARTERY OF NATIVE HEART WITHOUT ANGINA PECTORIS: ICD-10-CM

## 2025-01-08 LAB
CV BLOOD PRESSURE: 70 MMHG
CV STRESS MAX HR HE: 80
NUC STRESS EJECTION FRACTION: 67 %
RATE PRESSURE PRODUCT: NORMAL
STRESS ECHO BASELINE DIASTOLIC HE: 73
STRESS ECHO BASELINE HR: 58 BPM
STRESS ECHO BASELINE SYSTOLIC BP: 172
STRESS ECHO CALCULATED PERCENT HR: 58 %
STRESS ECHO LAST STRESS DIASTOLIC BP: 80
STRESS ECHO LAST STRESS SYSTOLIC BP: 155
STRESS ECHO POST ESTIMATED WORKLOAD: 1 METS
STRESS ECHO TARGET HR: 139

## 2025-01-08 PROCEDURE — A9500 TC99M SESTAMIBI: HCPCS | Performed by: INTERNAL MEDICINE

## 2025-01-08 PROCEDURE — 343N000001 HC RX 343 MED OP 636: Performed by: INTERNAL MEDICINE

## 2025-01-08 PROCEDURE — 93017 CV STRESS TEST TRACING ONLY: CPT

## 2025-01-08 PROCEDURE — 250N000011 HC RX IP 250 OP 636: Performed by: INTERNAL MEDICINE

## 2025-01-08 PROCEDURE — 78452 HT MUSCLE IMAGE SPECT MULT: CPT

## 2025-01-08 RX ORDER — REGADENOSON 0.08 MG/ML
0.4 INJECTION, SOLUTION INTRAVENOUS ONCE
Status: COMPLETED | OUTPATIENT
Start: 2025-01-08 | End: 2025-01-08

## 2025-01-08 RX ADMIN — KIT FOR THE PREPARATION OF TECHNETIUM TC99M SESTAMIBI 35.9 MILLICURIE: 1 INJECTION, POWDER, LYOPHILIZED, FOR SOLUTION PARENTERAL at 11:40

## 2025-01-08 RX ADMIN — KIT FOR THE PREPARATION OF TECHNETIUM TC99M SESTAMIBI 9.03 MILLICURIE: 1 INJECTION, POWDER, LYOPHILIZED, FOR SOLUTION PARENTERAL at 10:40

## 2025-01-08 RX ADMIN — REGADENOSON 0.4 MG: 0.08 INJECTION, SOLUTION INTRAVENOUS at 11:40

## 2025-01-08 NOTE — PROGRESS NOTES
1044 The patient arrived for a Lexiscan Cardiolite stress test.  The procedure, risks, and benefits were discussed with the patient ,and the consent was signed.  A saline lock was started,and the Cardiolite was injected by Nuclear Medicine.  The patient was taken to the waiting area, to await resting images at 1100.  1130 The patient returned from Nuclear Medicine and was prepped for the stress test.   ROSA MARIA Chau arrived, and the patient was administered the Lexiscan per procedure.  The patient tolerated the procedure.  He was given a snack and taken to Nuclear Medicine in stable condition for stress images.  The saline lock will be removed by Nuclear Medicine for proper disposal.  The patient was instructed that the ordering MD will call with results in one to two days.  Please see the chart for the complete test results.

## 2025-01-17 ENCOUNTER — LAB (OUTPATIENT)
Dept: LAB | Facility: OTHER | Age: 80
End: 2025-01-17
Payer: COMMERCIAL

## 2025-01-17 DIAGNOSIS — N40.0 ENLARGED PROSTATE: ICD-10-CM

## 2025-01-17 LAB
ALBUMIN UR-MCNC: NEGATIVE MG/DL
APPEARANCE UR: CLEAR
BILIRUB UR QL STRIP: NEGATIVE
COLOR UR AUTO: NORMAL
GLUCOSE UR STRIP-MCNC: NEGATIVE MG/DL
HGB UR QL STRIP: NEGATIVE
KETONES UR STRIP-MCNC: NEGATIVE MG/DL
LEUKOCYTE ESTERASE UR QL STRIP: NEGATIVE
NITRATE UR QL: NEGATIVE
PH UR STRIP: 6.5 [PH] (ref 5–9)
SP GR UR STRIP: 1.02 (ref 1–1.03)
UROBILINOGEN UR STRIP-MCNC: NORMAL MG/DL

## 2025-01-17 PROCEDURE — 81003 URINALYSIS AUTO W/O SCOPE: CPT | Mod: ZL

## 2025-01-20 ENCOUNTER — OFFICE VISIT (OUTPATIENT)
Dept: SURGERY | Facility: OTHER | Age: 80
End: 2025-01-20
Attending: SURGERY
Payer: COMMERCIAL

## 2025-01-20 VITALS
OXYGEN SATURATION: 97 % | DIASTOLIC BLOOD PRESSURE: 72 MMHG | BODY MASS INDEX: 29.35 KG/M2 | HEART RATE: 68 BPM | SYSTOLIC BLOOD PRESSURE: 148 MMHG | RESPIRATION RATE: 18 BRPM | TEMPERATURE: 97.6 F | WEIGHT: 187 LBS | HEIGHT: 67 IN

## 2025-01-20 DIAGNOSIS — K40.90 RIGHT INGUINAL HERNIA: ICD-10-CM

## 2025-01-20 PROCEDURE — G0463 HOSPITAL OUTPT CLINIC VISIT: HCPCS

## 2025-01-20 PROCEDURE — 99214 OFFICE O/P EST MOD 30 MIN: CPT | Performed by: SURGERY

## 2025-01-20 ASSESSMENT — PAIN SCALES - GENERAL: PAINLEVEL_OUTOF10: NO PAIN (0)

## 2025-01-20 NOTE — PROGRESS NOTES
GENERAL SURGERY CONSULTATION NOTE    Dajuan Basilio   [address] [facility] [id number]  79 year old  male    Primary Care Provider:  Cm Pickens      HPI: Dajuan Basilio presents to clinic with right groin bulge.  Patient states the bulge is been present for several months, perhaps up to a year.  It is small, soft, easy to reduce.  Does seem to be coming out more he has slight discomfort at times.  No problems with the left.  History of umbilical hernia repair.  Denies obstructive symptoms.  Patient states that he has fatigue and is getting worked up for this, he will be interested in surgery after these visits are completed, he plans to follow-up in a month and then we can talk more about scheduling inguinal hernia repair.    REVIEW OF SYSTEMS:    GENERAL: No fevers or chills. Denies fatigue, recent weight loss.  HEENT: No sinus drainage. No changes with vision or hearing. No difficulty swallowing.   LYMPHATICS:  Noswollen nodes in axilla, neck or groin.  CARDIOVASCULAR: Denies chest pain, palpitations and dyspnea on exertion.  PULMONARY: No shortness of breath or cough. No increase in sputum production.  GI: Denies melena,bright red blood in stools. No hematemesis. No constipation or diarrhea.  : No dysuria or hematuria.  SKIN: No recent rashes or ulcers.   HEMATOLOGY:  No history of easy bruising or bleeding.  ENDOCRINE:  No history of diabetes or thyroid problems.  NEUROLOGY:  No history of seizures or headaches. No motor or sensory changes.        Patient Active Problem List   Diagnosis    Diverticular disease of colon    Generalized anxiety disorder    GERD (gastroesophageal reflux disease)    Nephrolithiasis    Chronic anxiety    Microalbuminuria    Coronary artery disease involving native coronary artery of native heart without angina pectoris    Status post insertion of drug eluting coronary artery stent - x2 stents - 1st OMB - 5/17/2018 - [facility]    Controlled type 2 diabetes  mellitus with diabetic polyneuropathy, without long-term current use of insulin (H)    Benign prostatic hyperplasia with weak urinary stream    S/P cardiac cath 5/17/18    Unsteady gait    Stented coronary artery to his LCX and OM1 on 5/17/18    Mixed hyperlipidemia    Chronically dry eyes, bilateral    Left sided sciatica    Arthritis of facet joint of lumbar spine    Chronic left-sided low back pain without sciatica    Vitamin B12 deficiency    Benign essential hypertension    Unspecified inflammatory spondylopathy, sacral and sacrococcygeal region    Diabetic peripheral neuropathy (H)    Bunion, right    Onychomycosis    Bunion, left    Major neurocognitive disorder due to another medical condition (H)    Mixed simple and mucopurulent chronic bronchitis (H)    Benign paroxysmal positional vertigo, unspecified laterality    Elevated prostate specific antigen (PSA)    Hypothyroidism due to acquired atrophy of thyroid    Recurrent major depressive disorder, in full remission    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    Droopy eyelid, bilateral    History of eyelid surgery    Moderate Alzheimer's dementia without behavioral disturbance, psychotic disturbance, mood disturbance, or anxiety, unspecified timing of dementia onset (H)    Frequent headaches    Chronic fatigue    Primary narcolepsy without cataplexy    CKD (chronic kidney disease) stage 2, GFR 60-89 ml/min    Hernia of anterior abdominal wall    Right inguinal hernia    Periumbilical hernia    Type 2 diabetes mellitus with diabetic nephropathy, without long-term current use of insulin (H)    Arthritis of right shoulder region    Excess skin of lower eyelids of both eyes    Chronic left shoulder pain    Pain in both wrists    Anemia, unspecified type       Past Medical History:   Diagnosis Date    Abnormal CT scan, bladder 6/29/2016    Diverticulosis of intestine without perforation or abscess without bleeding     No Comments Provided    Fatty  (change of) liver, not elsewhere classified     non alcoholic    Fibromyalgia     No Comments Provided    Nodular prostate without lower urinary tract symptoms     2012,US confirms benign calcifications    Obesity     No Comments Provided    Panic disorder without agoraphobia     No Comments Provided    Positive colorectal cancer screening using Cologuard test 2018       Past Surgical History:   Procedure Laterality Date    ARTHROPLASTY KNEE      2015    ARTHROSCOPY KNEE      right knee X2    BIOPSY PROSTATE TRANSRECTAL          COLONOSCOPY      ,and UGI Waucoma normal    COLONOSCOPY      ,and UGI repeat Dr. Johnson negative.    COLONOSCOPY  2013,WNL    COLONOSCOPY  2019    [facility]-no follow up    HERNIA REPAIR  2000    RELEASE CARPAL TUNNEL      2014    VASECTOMY      No Comments Provided       Family History   Problem Relation Age of Onset    Cancer Father         Cancer, mesothelioma    Colon Cancer Mother         Cancer-colon,    Genitourinary Problems Mother         Genitourinary Disease,renal failure    Diabetes Mother         Diabetes    Other - See Comments Sister         Renal transplant    Diabetes Type 2  Sister         Diabetes type II    Diabetes Brother         Diabetes       Social History     Social History Narrative    , retired from [employer].  Lives in town.       Social History     Socioeconomic History    Marital status:      Spouse name: Not on file    Number of children: Not on file    Years of education: Not on file    Highest education level: Not on file   Occupational History    Not on file   Tobacco Use    Smoking status: Former     Current packs/day: 0.00     Average packs/day: 1 pack/day for 26.8 years (26.8 ttl pk-yrs)     Types: Cigarettes     Start date:      Quit date: 10/25/1983     Years since quittin.2     Passive exposure: Never    Smokeless tobacco: Never   Vaping Use    Vaping status: Never Used   Substance and  Sexual Activity    Alcohol use: No    Drug use: No    Sexual activity: Not Currently     Partners: Female     Comment: doctor to address    Other Topics Concern    Parent/sibling w/ CABG, MI or angioplasty before 65F 55M? Not Asked   Social History Narrative    , retired from [employer].  Lives in town.     Social Drivers of Health     Financial Resource Strain: Low Risk  (4/16/2024)    Financial Resource Strain     Within the past 12 months, have you or your family members you live with been unable to get utilities (heat, electricity) when it was really needed?: No   Food Insecurity: Low Risk  (4/16/2024)    Food Insecurity     Within the past 12 months, did you worry that your food would run out before you got money to buy more?: No     Within the past 12 months, did the food you bought just not last and you didn t have money to get more?: No   Transportation Needs: Low Risk  (4/16/2024)    Transportation Needs     Within the past 12 months, has lack of transportation kept you from medical appointments, getting your medicines, non-medical meetings or appointments, work, or from getting things that you need?: No   Physical Activity: Sufficiently Active (4/16/2024)    Exercise Vital Sign     Days of Exercise per Week: 3 days     Minutes of Exercise per Session: 60 min   Stress: Stress Concern Present (4/16/2024)    [employer] - Occupational Stress Questionnaire     Feeling of Stress : To some extent   Social Connections: Unknown (9/11/2024)    [facility]    Social Connections     Frequency of Communication with Friends and Family: Not on file   Interpersonal Safety: Low Risk  (1/20/2025)    Interpersonal Safety     Do you feel physically and emotionally safe where you currently live?: Yes     Within the past 12 months, have you been hit, slapped, kicked or otherwise physically hurt by someone?: No     Within the past 12 months, have  you been humiliated or emotionally abused in other ways by your partner or ex-partner?: No   Housing Stability: Low Risk  (4/16/2024)    Housing Stability     Do you have housing? : Yes     Are you worried about losing your housing?: No       Current Outpatient Medications   Medication Sig Dispense Refill    blood glucose (ACCU-CHEK ONEL PLUS) test strip 1 strip by In Vitro route daily. 100 strip 5    citalopram (CELEXA) 10 MG tablet Take 1 tablet (10 mg) by mouth daily 90 tablet 4    levothyroxine (SYNTHROID/LEVOTHROID) 50 MCG tablet Take 1 tablet (50 mcg) by mouth daily - for Thyroid Replacement 93 tablet 4    losartan (COZAAR) 25 MG tablet Take 1 tablet (25 mg) by mouth daily 90 tablet 4    metFORMIN (GLUCOPHAGE) 500 MG tablet Take 2 tablets (1,000 mg) by mouth daily (with breakfast) AND 1 tablet (500 mg) daily (with dinner). 270 tablet 1    pantoprazole (PROTONIX) 40 MG EC tablet Take 1 tablet (40 mg) by mouth 2 times daily (before meals) for 14 days. - Take 30 to 60 minutes prior to meals 28 tablet 0    polyethylene glycol (MIRALAX) 17 GM/Dose powder Take 17 g (1 Capful) by mouth daily. 238 g 3    reason aspirin not prescribed, intentional, Please choose reason not prescribed from choices below.      rosuvastatin (CRESTOR) 5 MG tablet Take 1 tablet (5 mg) by mouth daily - Stop Lipitor - 90 tablet 4    silodosin (RAPAFLO) 4 MG CAPS capsule Take 1 capsule (4 mg) by mouth daily. Watch for dizziness with standing. Take after dinner to reduce dizziness. 30 capsule 3    vitamin D3 (CHOLECALCIFEROL) 125 MCG (5000 UT) tablet Take 5,000 Units by mouth daily       Current Facility-Administered Medications   Medication Dose Route Frequency Provider Last Rate Last Admin    cyanocobalamin injection 1,000 mcg  1,000 mcg Intramuscular Continuous PRN Cm Pickens MD   1,000 mcg at 07/05/22 0906         ALLERGIES/SENSITIVITIES:   Allergies   Allergen Reactions    Memantine Other (See Comments)     Dizzy, lightheaded  Dizzy,  "lightheaded    Rivastigmine Other (See Comments)     Dizzy, lightheaded  Dizzy, lightheaded    Ace Inhibitors Cough    Atorvastatin Calcium Muscle Pain (Myalgia)     Lipitor    Statins Muscle Pain (Myalgia)     Higher doses cause myalgias       PHYSICAL EXAM:     BP (!) 148/72 (BP Location: Right arm, Patient Position: Sitting, Cuff Size: Adult Regular)   Pulse 68   Temp 97.6  F (36.4  C) (Tympanic)   Resp 18   Ht 1.689 m (5' 6.5\")   Wt 84.8 kg (187 lb)   SpO2 97%   BMI 29.73 kg/m      General Appearance:   Sitting up in the chair, no apparent distress  HEENT: Pupils are equal and reactive, no scleral icterus,   Heart & CV:  RRR no murmur.  Intact distal pulses, good cap refill.  LUNGS: No increased work of breathing.Lugns are CTA B/L, no wheezing or crackles.  Abd: Soft, nontender, nondistended.  Reducible right inguinal hernia, no hernia on the left, no umbilical hernia scar consistent with given surgical history  Ext: Normal bulk and tone, no lower extremity edema   Neuro: alert and oriented, normal speech and mentation        CONSULTATION ASSESSMENT AND PLAN:    79 year old male with right inguinal hernia.  The patient is interested in having this fixed and I feel like he is a reasonable candidate for this.  We discussed open versus laparoscopic approach.  He is interested in the laparoscopic approach.  I think is important for him to go to his scheduled visits for workup for fatigue.  Depending on the outcome of these visits and if they find anything he may need optimization prior to surgery.  In any case, he will follow-up in a month and we will discuss the results of these visits and what ever tests he has and decide what approach is safe for surgery.        Follow-up in surgery clinic on 2/24/2025        Noel Mendoza MD on 1/20/2025 at 9:27 AM      "

## 2025-01-20 NOTE — NURSING NOTE
"Chief Complaint   Patient presents with    Consult     Right inguinal hernia         Medication reconciliation completed.    FOOD SECURITY SCREENING QUESTIONS:    The next two questions are to help us understand your food security.  If you are feeling you need any assistance in this area, we have resources available to support you today.    Hunger Vital Signs:  Within the past 12 months we worried whether our food would run out before we got money to buy more. Never  Within the past 12 months the food we bought just didn't last and we didn't have money to get more. Never    Initial BP (!) 148/72 (BP Location: Right arm, Patient Position: Sitting, Cuff Size: Adult Regular)   Pulse 68   Temp 97.6  F (36.4  C) (Tympanic)   Resp 18   Ht 1.689 m (5' 6.5\")   Wt 84.8 kg (187 lb)   SpO2 97%   BMI 29.73 kg/m   Estimated body mass index is 29.73 kg/m  as calculated from the following:    Height as of this encounter: 1.689 m (5' 6.5\").    Weight as of this encounter: 84.8 kg (187 lb).       Elena Diaz LPN .......  1/20/2025  8:53 AM    "

## 2025-02-06 ENCOUNTER — LAB (OUTPATIENT)
Dept: LAB | Facility: OTHER | Age: 80
End: 2025-02-06
Attending: INTERNAL MEDICINE
Payer: MEDICARE

## 2025-02-06 DIAGNOSIS — E03.4 HYPOTHYROIDISM DUE TO ACQUIRED ATROPHY OF THYROID: ICD-10-CM

## 2025-02-06 DIAGNOSIS — E11.21 TYPE 2 DIABETES MELLITUS WITH DIABETIC NEPHROPATHY, WITHOUT LONG-TERM CURRENT USE OF INSULIN (H): ICD-10-CM

## 2025-02-06 DIAGNOSIS — R31.29 MICROSCOPIC HEMATURIA: Primary | ICD-10-CM

## 2025-02-06 DIAGNOSIS — E78.2 MIXED HYPERLIPIDEMIA: ICD-10-CM

## 2025-02-06 LAB
ALBUMIN SERPL BCG-MCNC: 4.5 G/DL (ref 3.5–5.2)
ALBUMIN UR-MCNC: NEGATIVE MG/DL
ALP SERPL-CCNC: 103 U/L (ref 40–150)
ALT SERPL W P-5'-P-CCNC: 14 U/L (ref 0–70)
ANION GAP SERPL CALCULATED.3IONS-SCNC: 14 MMOL/L (ref 7–15)
APPEARANCE UR: CLEAR
AST SERPL W P-5'-P-CCNC: 16 U/L (ref 0–45)
BILIRUB SERPL-MCNC: 0.6 MG/DL
BILIRUB UR QL STRIP: NEGATIVE
BUN SERPL-MCNC: 11.7 MG/DL (ref 8–23)
CALCIUM SERPL-MCNC: 9.7 MG/DL (ref 8.8–10.4)
CHLORIDE SERPL-SCNC: 100 MMOL/L (ref 98–107)
CHOLEST SERPL-MCNC: 135 MG/DL
COLOR UR AUTO: NORMAL
CREAT SERPL-MCNC: 0.7 MG/DL (ref 0.67–1.17)
CREAT UR-MCNC: 126.9 MG/DL
EGFRCR SERPLBLD CKD-EPI 2021: >90 ML/MIN/1.73M2
ERYTHROCYTE [DISTWIDTH] IN BLOOD BY AUTOMATED COUNT: 14 % (ref 10–15)
EST. AVERAGE GLUCOSE BLD GHB EST-MCNC: 140 MG/DL
FASTING STATUS PATIENT QL REPORTED: YES
FASTING STATUS PATIENT QL REPORTED: YES
GLUCOSE SERPL-MCNC: 138 MG/DL (ref 70–99)
GLUCOSE UR STRIP-MCNC: NEGATIVE MG/DL
HBA1C MFR BLD: 6.5 %
HCO3 SERPL-SCNC: 26 MMOL/L (ref 22–29)
HCT VFR BLD AUTO: 42.7 % (ref 40–53)
HDLC SERPL-MCNC: 49 MG/DL
HGB BLD-MCNC: 13.8 G/DL (ref 13.3–17.7)
HGB UR QL STRIP: NEGATIVE
KETONES UR STRIP-MCNC: NEGATIVE MG/DL
LDLC SERPL CALC-MCNC: 66 MG/DL
LEUKOCYTE ESTERASE UR QL STRIP: NEGATIVE
MCH RBC QN AUTO: 28.9 PG (ref 26.5–33)
MCHC RBC AUTO-ENTMCNC: 32.3 G/DL (ref 31.5–36.5)
MCV RBC AUTO: 89 FL (ref 78–100)
MICROALBUMIN UR-MCNC: 20.1 MG/L
MICROALBUMIN/CREAT UR: 15.84 MG/G CR (ref 0–17)
NITRATE UR QL: NEGATIVE
NONHDLC SERPL-MCNC: 86 MG/DL
PH UR STRIP: 5.5 [PH] (ref 5–9)
PLATELET # BLD AUTO: 264 10E3/UL (ref 150–450)
POTASSIUM SERPL-SCNC: 3.9 MMOL/L (ref 3.4–5.3)
PROT SERPL-MCNC: 7.7 G/DL (ref 6.4–8.3)
RBC # BLD AUTO: 4.78 10E6/UL (ref 4.4–5.9)
SODIUM SERPL-SCNC: 140 MMOL/L (ref 135–145)
SP GR UR STRIP: 1.01 (ref 1–1.03)
TRIGL SERPL-MCNC: 102 MG/DL
TSH SERPL DL<=0.005 MIU/L-ACNC: 1.91 UIU/ML (ref 0.3–4.2)
UROBILINOGEN UR STRIP-MCNC: NORMAL MG/DL
WBC # BLD AUTO: 7.9 10E3/UL (ref 4–11)

## 2025-02-06 PROCEDURE — 83036 HEMOGLOBIN GLYCOSYLATED A1C: CPT | Mod: ZL

## 2025-02-06 PROCEDURE — 82043 UR ALBUMIN QUANTITATIVE: CPT | Mod: ZL

## 2025-02-06 PROCEDURE — 81003 URINALYSIS AUTO W/O SCOPE: CPT | Mod: ZL

## 2025-02-06 PROCEDURE — 82435 ASSAY OF BLOOD CHLORIDE: CPT | Mod: ZL

## 2025-02-06 PROCEDURE — 85041 AUTOMATED RBC COUNT: CPT | Mod: ZL

## 2025-02-06 PROCEDURE — 84460 ALANINE AMINO (ALT) (SGPT): CPT | Mod: ZL

## 2025-02-06 PROCEDURE — 80061 LIPID PANEL: CPT | Mod: ZL

## 2025-02-06 PROCEDURE — 82570 ASSAY OF URINE CREATININE: CPT | Mod: ZL

## 2025-02-06 PROCEDURE — 84443 ASSAY THYROID STIM HORMONE: CPT | Mod: ZL

## 2025-02-06 PROCEDURE — 80053 COMPREHEN METABOLIC PANEL: CPT | Mod: ZL

## 2025-02-06 PROCEDURE — 36415 COLL VENOUS BLD VENIPUNCTURE: CPT | Mod: ZL

## 2025-02-06 PROCEDURE — 85018 HEMOGLOBIN: CPT | Mod: ZL

## 2025-02-07 ENCOUNTER — HOSPITAL ENCOUNTER (OUTPATIENT)
Dept: GENERAL RADIOLOGY | Facility: OTHER | Age: 80
Discharge: HOME | End: 2025-02-07
Attending: INTERNAL MEDICINE
Payer: MEDICARE

## 2025-02-07 DIAGNOSIS — M25.531 PAIN IN BOTH WRISTS: ICD-10-CM

## 2025-02-07 DIAGNOSIS — M25.532 PAIN IN BOTH WRISTS: ICD-10-CM

## 2025-02-07 PROBLEM — M19.031 PRIMARY OSTEOARTHRITIS OF BOTH WRISTS: Status: ACTIVE | Noted: 2025-02-07

## 2025-02-07 PROBLEM — M19.032 PRIMARY OSTEOARTHRITIS OF BOTH WRISTS: Status: ACTIVE | Noted: 2025-02-07

## 2025-02-07 PROCEDURE — 73110 X-RAY EXAM OF WRIST: CPT | Mod: LT

## 2025-02-14 ENCOUNTER — LAB (OUTPATIENT)
Dept: LAB | Facility: OTHER | Age: 80
End: 2025-02-14
Payer: COMMERCIAL

## 2025-02-14 DIAGNOSIS — R31.29 MICROSCOPIC HEMATURIA: ICD-10-CM

## 2025-02-14 LAB
ALBUMIN UR-MCNC: NEGATIVE MG/DL
APPEARANCE UR: CLEAR
BILIRUB UR QL STRIP: NEGATIVE
COLOR UR AUTO: NORMAL
GLUCOSE UR STRIP-MCNC: NEGATIVE MG/DL
HGB UR QL STRIP: NEGATIVE
KETONES UR STRIP-MCNC: NEGATIVE MG/DL
LEUKOCYTE ESTERASE UR QL STRIP: NEGATIVE
NITRATE UR QL: NEGATIVE
PH UR STRIP: 7 [PH] (ref 5–9)
SP GR UR STRIP: 1.01 (ref 1–1.03)
UROBILINOGEN UR STRIP-MCNC: NORMAL MG/DL

## 2025-02-14 PROCEDURE — 81003 URINALYSIS AUTO W/O SCOPE: CPT | Mod: ZL

## 2025-02-18 ENCOUNTER — OFFICE VISIT (OUTPATIENT)
Dept: FAMILY MEDICINE | Facility: OTHER | Age: 80
End: 2025-02-18
Attending: NURSE PRACTITIONER
Payer: MEDICARE

## 2025-02-18 VITALS
HEIGHT: 67 IN | RESPIRATION RATE: 16 BRPM | WEIGHT: 185 LBS | HEART RATE: 62 BPM | OXYGEN SATURATION: 96 % | BODY MASS INDEX: 29.03 KG/M2 | SYSTOLIC BLOOD PRESSURE: 134 MMHG | TEMPERATURE: 97.3 F | DIASTOLIC BLOOD PRESSURE: 64 MMHG

## 2025-02-18 DIAGNOSIS — I25.10 CORONARY ARTERY DISEASE INVOLVING NATIVE CORONARY ARTERY OF NATIVE HEART WITHOUT ANGINA PECTORIS: ICD-10-CM

## 2025-02-18 DIAGNOSIS — N18.2 CKD (CHRONIC KIDNEY DISEASE) STAGE 2, GFR 60-89 ML/MIN: ICD-10-CM

## 2025-02-18 DIAGNOSIS — Z01.818 PREOP GENERAL PHYSICAL EXAM: Primary | ICD-10-CM

## 2025-02-18 DIAGNOSIS — E03.4 HYPOTHYROIDISM DUE TO ACQUIRED ATROPHY OF THYROID: ICD-10-CM

## 2025-02-18 DIAGNOSIS — I10 BENIGN ESSENTIAL HYPERTENSION: ICD-10-CM

## 2025-02-18 DIAGNOSIS — F41.1 GENERALIZED ANXIETY DISORDER: ICD-10-CM

## 2025-02-18 DIAGNOSIS — E11.42 CONTROLLED TYPE 2 DIABETES MELLITUS WITH DIABETIC POLYNEUROPATHY, WITHOUT LONG-TERM CURRENT USE OF INSULIN (H): ICD-10-CM

## 2025-02-18 DIAGNOSIS — E78.2 MIXED HYPERLIPIDEMIA: ICD-10-CM

## 2025-02-18 DIAGNOSIS — K21.9 GASTROESOPHAGEAL REFLUX DISEASE WITHOUT ESOPHAGITIS: ICD-10-CM

## 2025-02-18 DIAGNOSIS — H02.403 DROOPY EYELID, BILATERAL: ICD-10-CM

## 2025-02-18 PROBLEM — F11.20 CONTINUOUS OPIOID DEPENDENCE (H): Status: ACTIVE | Noted: 2025-02-18

## 2025-02-18 PROCEDURE — G0463 HOSPITAL OUTPT CLINIC VISIT: HCPCS

## 2025-02-18 ASSESSMENT — PAIN SCALES - GENERAL: PAINLEVEL_OUTOF10: NO PAIN (0)

## 2025-02-18 NOTE — PROGRESS NOTES
Preoperative Evaluation  [facility]  [address]  [place] [address]  Phone: [phone]  Fax: [phone]  Primary Provider: Cm Pickens MD  Pre-op Performing Provider: Grace Brown NP  Feb 18, 2025 2/18/2025   Surgical Information   What procedure is being done? Blepharoplasty    Facility or Hospital where procedure/surgery will be performed: [facility]    Who is doing the procedure / surgery? Dr. Blanchard    Date of surgery / procedure: 2/21/25    Time of surgery / procedure: TBD    Where do you plan to recover after surgery? at home with family        Proxy-reported     Fax number for surgical facility: [phone] and [phone]    Assessment & Plan     The proposed surgical procedure is considered INTERMEDIATE risk.    Problem List Items Addressed This Visit       Generalized anxiety disorder    GERD (gastroesophageal reflux disease)    Coronary artery disease involving native coronary artery of native heart without angina pectoris    Controlled type 2 diabetes mellitus with diabetic polyneuropathy, without long-term current use of insulin (H)    Mixed hyperlipidemia    Benign essential hypertension    Hypothyroidism due to acquired atrophy of thyroid    Droopy eyelid, bilateral    CKD (chronic kidney disease) stage 2, GFR 60-89 ml/min     Other Visit Diagnoses       Preop general physical exam    -  Primary            1. Preop general physical exam (Primary)  2. Droopy eyelid, bilateral  Indication for upcoming procedure; obstructing vision bilaterally.  Cleared for surgery; no contraindications.     3. Benign essential hypertension  Stable; well controlled on losartan    4. Controlled type 2 diabetes mellitus with diabetic polyneuropathy, without long-term current use of insulin (H)  Controlled on metformin     5. Generalized anxiety disorder  Stable; doing well on citalopram     6. Gastroesophageal reflux disease without esophagitis  Stable; not  currently on medications     7. Mixed hyperlipidemia  Stable; on rosuvastatin     8. Coronary artery disease involving native coronary artery of native heart without angina pectoris  Asymptomatic; history of 2 stents in place without any symptoms or concerns reviewed previous EKG on file. No EKG indicated today. Stable and denies any exertional angina. Blood pressure remains well controlled.     9. CKD (chronic kidney disease) stage 2, GFR 60-89 ml/min  Stable; continue losartan     10. Hypothyroidism due to acquired atrophy of thyroid  Stable; continue levothyroxine       Implanted Device  None       - No identified additional risk factors other than previously addressed    Additional Medication Instructions   - Herbal medications and vitamins: DO NOT TAKE 14 days prior to surgery.   - ACE/ARB/ARNI (lisinopril, enalapril, losartan, valsartan, olmesartan, sacubritril/valsartan) : DO NOT TAKE on day of surgery (minimum 11 hours for general anesthesia).   - Statins (atorvastatin, simvastatin, pravastatin) : Continue taking on the day of surgery.    - metformin: DO NOT TAKE day of surgery.   - ibuprofen (Advil, Motrin): DO NOT TAKE 1 day before surgery.    - naproxen (Aleve, Naprosyn): DO NOT TAKE 4 days before surgery.    - SSRIs, SNRIs, TCAs, Antipsychotics: Continue without modification.      HOLD your blood pressure medication (losartan) for 12 hours prior to surgery. Please check to see that you have this medication at home and are taking it as prescribed Losartan (cozaar) 25 mg tablet. This is for your elevated blood pressure/hypertension.     DO NOT take metformin the day of surgery.     You may take the remainder of your medications as you normally do up until surgery.     Recommendation  Approval given to proceed with proposed procedure, without further diagnostic evaluation.    Alisha Graff is a 79 year old, presenting for the following:  Pre-Op Exam          2/18/2025     8:16 AM   Additional Questions    Roomed by Adilene PICKETT   Accompanied by self     HPI related to upcoming procedure: He is here today for a pre op appointment prior to upcoming blepharoplasty bilateral, upper and lower eyelids. He had the lower lids done not all too long ago with minimal improvement. He feels a bit anxious about the procedure. However, it is difficult for him to see with the eyelids the way they are. Denies chest pain, palpitations, cough, shortness of breath, or recent febrile illness. Feels well overall. No past anesthesia problems.     He does not think he has been taking losartan for blood pressure. He recently saw his PCP on 2/7 and losartan was noted. He will check to see if he has it at home.         2/18/2025   Pre-Op Questionnaire   Have you ever had a heart attack or stroke? No    Have you ever had surgery on your heart or blood vessels, such as a stent placement, a coronary artery bypass, or surgery on an artery in your head, neck, heart, or legs? YES; 2 stents     Do you have chest pain with activity? No    Do you have a history of heart failure? No    Do you currently have a cold, bronchitis or symptoms of other infection? No    Do you have a cough, shortness of breath, or wheezing? No    Do you or anyone in your family have previous history of blood clots? No    Do you or does anyone in your family have a serious bleeding problem such as prolonged bleeding following surgeries or cuts? No    Have you ever had problems with anemia or been told to take iron pills? No    Have you had any abnormal blood loss such as black, tarry or bloody stools? No    Have you ever had a blood transfusion? No    Are you willing to have a blood transfusion if it is medically needed before, during, or after your surgery? Yes    Have you or any of your relatives ever had problems with anesthesia? No    Do you have sleep apnea, excessive snoring or daytime drowsiness? No    Do you have any artifical heart valves or other implanted medical  devices like a pacemaker, defibrillator, or continuous glucose monitor? YES; stents x2 only    What type of device do you have? Stents     Name of the clinic that manages your device [facility]    Do you have artificial joints? YES right total knee     Are you allergic to latex? No        Proxy-reported     Health Care Directive  Patient does not have a Health Care Directive: Discussed advance care planning with patient; however, patient declined at this time.    Preoperative Review of    reviewed - no record of controlled substances prescribed.        Patient Active Problem List    Diagnosis Date Noted    Continuous opioid dependence (H) 02/18/2025     Priority: Medium    Primary osteoarthritis of both wrists 02/07/2025     Priority: Medium    Anemia, unspecified type 01/03/2025     Priority: Medium    Chronic left shoulder pain 12/03/2024     Priority: Medium    Pain in both wrists 12/03/2024     Priority: Medium    Excess skin of lower eyelids of both eyes 11/19/2024     Priority: Medium    Arthritis of right shoulder region 11/05/2024     Priority: Medium    Type 2 diabetes mellitus with diabetic nephropathy, without long-term current use of insulin (H) 07/24/2024     Priority: Medium    Right inguinal hernia 05/28/2024     Priority: Medium    Periumbilical hernia 05/28/2024     Priority: Medium    Hernia of anterior abdominal wall 05/21/2024     Priority: Medium    CKD (chronic kidney disease) stage 2, GFR 60-89 ml/min 01/11/2024     Priority: Medium    Frequent headaches 10/18/2023     Priority: Medium    Chronic fatigue 10/18/2023     Priority: Medium    Primary narcolepsy without cataplexy 10/18/2023     Priority: Medium    Moderate Alzheimer's dementia without behavioral disturbance, psychotic disturbance, mood disturbance, or anxiety, unspecified timing of dementia onset (H) 07/27/2023     Priority: Medium    History of eyelid surgery 02/08/2023     Priority: Medium    Droopy eyelid, bilateral  01/26/2023     Priority: Medium    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 08/16/2022     Priority: Medium    Recurrent major depressive disorder, in full remission 04/02/2022     Priority: Medium    Hypothyroidism due to acquired atrophy of thyroid 03/31/2022     Priority: Medium    Benign paroxysmal positional vertigo, unspecified laterality 12/03/2021     Priority: Medium    Elevated prostate specific antigen (PSA) 12/03/2021     Priority: Medium    Mixed simple and mucopurulent chronic bronchitis (H) 11/04/2021     Priority: Medium    Major neurocognitive disorder due to another medical condition (H) 05/05/2021     Priority: Medium    Bunion, right 03/26/2021     Priority: Medium    Onychomycosis 03/26/2021     Priority: Medium    Bunion, left 03/26/2021     Priority: Medium    Unspecified inflammatory spondylopathy, sacral and sacrococcygeal region 12/18/2020     Priority: Medium    Chronic left-sided low back pain without sciatica 09/17/2020     Priority: Medium    Vitamin B12 deficiency 09/17/2020     Priority: Medium    Benign essential hypertension 09/17/2020     Priority: Medium    Arthritis of facet joint of lumbar spine 07/07/2020     Priority: Medium    Chronically dry eyes, bilateral 03/17/2020     Priority: Medium    Left sided sciatica 03/17/2020     Priority: Medium    Mixed hyperlipidemia 03/19/2019     Priority: Medium    S/P cardiac cath 5/17/18 06/07/2018     Priority: Medium    Unsteady gait 06/07/2018     Priority: Medium    Stented coronary artery to his LCX and OM1 on 5/17/18 06/07/2018     Priority: Medium    Status post insertion of drug eluting coronary artery stent - x2 stents - [address] [place] [address] - 5/17/2018 - [facility] 05/31/2018     Priority: Medium    Controlled type 2 diabetes mellitus with diabetic polyneuropathy, without long-term current use of insulin (H) 05/31/2018     Priority: Medium    Benign prostatic hyperplasia with weak urinary stream 05/31/2018     Priority:  Medium    Coronary artery disease involving native coronary artery of native heart without angina pectoris 04/26/2018     Priority: Medium    Microalbuminuria 01/26/2018     Priority: Medium    Generalized anxiety disorder 06/05/2017     Priority: Medium    GERD (gastroesophageal reflux disease) 05/03/2013     Priority: Medium    Nephrolithiasis 04/23/2012     Priority: Medium    Diverticular disease of colon 02/17/2010     Priority: Medium    Chronic anxiety 02/17/2010     Priority: Medium      Past Medical History:   Diagnosis Date    Abnormal CT scan, bladder 6/29/2016    Diverticulosis of intestine without perforation or abscess without bleeding     No Comments Provided    Fatty (change of) liver, not elsewhere classified     non alcoholic    Fibromyalgia     No Comments Provided    Nodular prostate without lower urinary tract symptoms     2012,US confirms benign calcifications    Obesity     No Comments Provided    Panic disorder without agoraphobia     No Comments Provided    Positive colorectal cancer screening using Cologuard test 12/06/2018     Past Surgical History:   Procedure Laterality Date    ARTHROPLASTY KNEE      2015    ARTHROSCOPY KNEE      right knee X2    BIOPSY PROSTATE TRANSRECTAL      2013    COLONOSCOPY      2003,and UGI [place] normal    COLONOSCOPY      2009,and UGI repeat Dr. Johnson negative.    COLONOSCOPY  01/01/2013 2013,WNL    COLONOSCOPY  05/23/2019    [facility]-no follow up    HERNIA REPAIR  2000    RELEASE CARPAL TUNNEL      2014    VASECTOMY      No Comments Provided     Current Outpatient Medications   Medication Sig Dispense Refill    blood glucose (ACCU-CHEK ONEL PLUS) test strip 1 strip by In Vitro route daily. 100 strip 5    citalopram (CELEXA) 10 MG tablet Take 1 tablet (10 mg) by mouth daily. 90 tablet 4    levothyroxine (SYNTHROID/LEVOTHROID) 75 MCG tablet Take 1 tablet (75 mcg) by mouth daily. - for Thyroid Replacement -- Dose Increase 2/7/2025 90 tablet 1    losartan  "(COZAAR) 25 MG tablet Take 1 tablet (25 mg) by mouth daily. 90 tablet 4    metFORMIN (GLUCOPHAGE) 500 MG tablet Take 2 tablets (1,000 mg) by mouth daily (with breakfast) AND 1 tablet (500 mg) daily (with dinner). 270 tablet 1    polyethylene glycol (MIRALAX) 17 GM/Dose powder Take 17 g (1 Capful) by mouth daily. 238 g 3    reason aspirin not prescribed, intentional, Please choose reason not prescribed from choices below.      rosuvastatin (CRESTOR) 5 MG tablet Take 1 tablet (5 mg) by mouth daily. 90 tablet 4    vitamin D3 (CHOLECALCIFEROL) 125 MCG (5000 UT) tablet Take 5,000 Units by mouth daily         Allergies   Allergen Reactions    Memantine Other (See Comments)     Dizzy, lightheaded  Dizzy, lightheaded    Rivastigmine Other (See Comments)     Dizzy, lightheaded  Dizzy, lightheaded    Ace Inhibitors Cough    Atorvastatin Calcium Muscle Pain (Myalgia)     Lipitor    Statins Muscle Pain (Myalgia)     Higher doses cause myalgias        Social History     Tobacco Use    Smoking status: Former     Current packs/day: 0.00     Average packs/day: 1 pack/day for 26.8 years (26.8 ttl pk-yrs)     Types: Cigarettes     Start date:      Quit date: 10/25/1983     Years since quittin.3     Passive exposure: Never    Smokeless tobacco: Never   Substance Use Topics    Alcohol use: No       History   Drug Use No           Review of Systems  Constitutional, neuro, ENT, endocrine, pulmonary, cardiac, gastrointestinal, genitourinary, musculoskeletal, integument and psychiatric systems are negative, except as otherwise noted.    Objective    /64   Pulse 62   Temp 97.3  F (36.3  C) (Tympanic)   Resp 16   Ht 1.689 m (5' 6.5\")   Wt 83.9 kg (185 lb)   SpO2 96%   BMI 29.41 kg/m     Estimated body mass index is 29.41 kg/m  as calculated from the following:    Height as of this encounter: 1.689 m (5' 6.5\").    Weight as of this encounter: 83.9 kg (185 lb).  Physical Exam  Vitals and nursing note reviewed. "   Constitutional:       General: He is not in acute distress.     Appearance: Normal appearance. He is normal weight. He is not ill-appearing or toxic-appearing.   HENT:      Head: Normocephalic and atraumatic.      Right Ear: Tympanic membrane, ear canal and external ear normal. There is no impacted cerumen.      Left Ear: Tympanic membrane, ear canal and external ear normal. There is no impacted cerumen.      Nose: Nose normal. No congestion or rhinorrhea.      Mouth/Throat:      Mouth: Mucous membranes are moist.      Pharynx: No oropharyngeal exudate or posterior oropharyngeal erythema.   Eyes:      General:         Right eye: No discharge.         Left eye: No discharge.      Extraocular Movements: Extraocular movements intact.      Pupils: Pupils are equal, round, and reactive to light.   Cardiovascular:      Rate and Rhythm: Normal rate and regular rhythm.      Heart sounds: Normal heart sounds. No murmur heard.  Pulmonary:      Effort: Pulmonary effort is normal. No respiratory distress.      Breath sounds: Normal breath sounds. No stridor. No wheezing, rhonchi or rales.   Chest:      Chest wall: No tenderness.   Musculoskeletal:         General: Normal range of motion.      Cervical back: Normal range of motion.   Skin:     General: Skin is warm and dry.   Neurological:      General: No focal deficit present.      Mental Status: He is alert and oriented to person, place, and time.   Psychiatric:         Mood and Affect: Mood normal.         Recent Labs   Lab Test 02/06/25  0806 01/03/25  1100 11/04/24  0938   HGB 13.8 13.4 12.7*    275 279    141 142   POTASSIUM 3.9 4.3 4.1   CR 0.70 0.67 0.59*   A1C 6.5*  --  6.5*        Diagnostics  Stable labs on file from recently; 2/6/2025 and EKG on file from 1/3/2025 and not repeated; asymptomatic of any cardic symptoms     Revised Cardiac Risk Index (RCRI)  The patient has the following serious cardiovascular risks for perioperative complications:   -  No serious cardiac risks = 0 points     RCRI Interpretation: 0 points: Class I (very low risk - 0.4% complication rate)         Signed Electronically by: Grace Brown NP  A copy of this evaluation report is provided to the requesting physician.

## 2025-02-18 NOTE — NURSING NOTE
"Chief Complaint   Patient presents with    Pre-Op Exam   Patient presents to the clinic today for his pre-op exam.     Initial BP (!) 154/75 (BP Location: Right arm, Patient Position: Sitting, Cuff Size: Adult Regular)   Pulse 62   Temp 97.3  F (36.3  C) (Tympanic)   Resp 16   Ht 1.689 m (5' 6.5\")   Wt 83.9 kg (185 lb)   SpO2 96%   BMI 29.41 kg/m   Estimated body mass index is 29.41 kg/m  as calculated from the following:    Height as of this encounter: 1.689 m (5' 6.5\").    Weight as of this encounter: 83.9 kg (185 lb).  Medication Review: complete    The next two questions are to help us understand your food security.  If you are feeling you need any assistance in this area, we have resources available to support you today.          4/16/2024   SDOH- Food Insecurity   Within the past 12 months, did you worry that your food would run out before you got money to buy more? N    Within the past 12 months, did the food you bought just not last and you didn t have money to get more? N        Proxy-reported         Health Care Directive:  Patient does not have a Health Care Directive: Discussed advance care planning with patient; however, patient declined at this time.    Adilene Whyte      "

## 2025-02-18 NOTE — PATIENT INSTRUCTIONS
How to Take Your Medication Before Surgery  Preoperative Medication Instructions   Antiplatelet or Anticoagulation Medication Instructions   - We reviewed the medication list and the patient is not on an antiplatelet or anticoagulation medications.    Additional Medication Instructions   - Herbal medications and vitamins: DO NOT TAKE 14 days prior to surgery.   - ACE/ARB/ARNI (lisinopril, enalapril, losartan, valsartan, olmesartan, sacubritril/valsartan) : DO NOT TAKE on day of surgery (minimum 11 hours for general anesthesia).   - Statins (atorvastatin, simvastatin, pravastatin) : Continue taking on the day of surgery.    - metformin: DO NOT TAKE day of surgery.   - ibuprofen (Advil, Motrin): DO NOT TAKE 1 day before surgery.    - naproxen (Aleve, Naprosyn): DO NOT TAKE 4 days before surgery.    - SSRIs, SNRIs, TCAs, Antipsychotics: Continue without modification.        HOLD your blood pressure medication (losartan) for 12 hours prior to surgery. Please check to see that you have this medication at home and are taking it as prescribed Losartan (cozaar) 25 mg tablet. This is for your elevated blood pressure/hypertension.     DO NOT take metformin the day of surgery.     You may take the remainder of your medications as you normally do up until surgery.     Patient Education   Preparing for Your Surgery  For Adults  Getting started  In most cases, a nurse will call to review your health history and instructions. They will give you an arrival time based on your scheduled surgery time. Please be ready to share:  Your doctor's clinic name and phone number  Your medical, surgical, and anesthesia history  A list of allergies and sensitivities  A list of medicines, including herbal treatments and over-the-counter drugs  Whether the patient has a legal guardian (ask how to send us the papers in advance)  Note: You may not receive a call if you were seen at our PAC (Preoperative Assessment Center).  Please tell us if you're  pregnant--or if there's any chance you might be pregnant. Some surgeries may injure a fetus (unborn baby), so they require a pregnancy test. Surgeries that are safe for a fetus don't always need a test, and you can choose whether to have one.   Preparing for surgery  Within 10 to 30 days of surgery: Have a pre-op exam (sometimes called an H&P, or History and Physical). This can be done at a clinic or pre-operative center.  If you're having a , you may not need this exam. Talk to your care team.  At your pre-op exam, talk to your care team about all medicines you take. (This includes CBD oil and any drugs, such as THC, marijuana, and other forms of cannabis.) If you need to stop any medicine before surgery, ask when to start taking it again.  This is for your safety. Many medicines and drugs can make you bleed too much during surgery. Some change how well surgery (anesthesia) drugs work.  Call your insurance company to let them know you're having surgery. (If you don't have insurance, call [phone].)  Call your clinic if there's any change in your health. This includes a scrape or scratch near the surgery site, or any signs of a cold (sore throat, runny nose, cough, rash, fever).  Eating and drinking guidelines  For your safety: Unless your surgeon tells you otherwise, follow the guidelines below.  Eat and drink as normal until 8 hours before you arrive for surgery. After that, no food or milk. You can spit out gum when you arrive.  Drink clear liquids until 2 hours before you arrive. These are liquids you can see through, like water, Gatorade, and Propel Water. They also include plain black coffee and tea (no cream or milk).  No alcohol for 24 hours before you arrive. The night before surgery, stop any drinks that contain THC.  If your care team tells you to take medicine on the morning of surgery, it's okay to take it with a sip of water. No other medicines or drugs are allowed (including CBD  oil)--follow your care team's instructions.  If you have questions the day of surgery, call your hospital or surgery center.   Preventing infection  Shower or bathe the night before and the morning of surgery. Follow the instructions your clinic gave you. (If no instructions, use regular soap.)  Don't shave or clip hair near your surgery site. We'll remove the hair if needed.  Don't smoke or vape the morning of surgery. No chewing tobacco for 6 hours before you arrive. A nicotine patch is okay. You may spit out nicotine gum when you arrive.  For some surgeries, the surgeon will tell you to fully quit smoking and nicotine.  We will make every effort to keep you safe from infection. We will:  Clean our hands often with soap and water (or an alcohol-based hand rub).  Clean the skin at your surgery site with a special soap that kills germs.  Give you a special gown to keep you warm. (Cold raises the risk of infection.)  Wear hair covers, masks, gowns, and gloves during surgery.  Give antibiotic medicine, if prescribed. Not all surgeries need this medicine.  What to bring on the day of surgery  Photo ID and insurance card  Copy of your health care directive, if you have one  Glasses and hearing aids (bring cases)  You can't wear contacts during surgery  Inhaler and eye drops, if you use them (tell us about these when you arrive)  CPAP machine or breathing device, if you use them  A few personal items, if spending the night  If you have . . .  A pacemaker, ICD (cardiac defibrillator), or other implant: Bring the ID card.  An implanted stimulator: Bring the remote control.  A legal guardian: Bring a copy of the certified (court-stamped) guardianship papers.  Please remove any jewelry, including body piercings. Leave jewelry and other valuables at home.  If you're going home the day of surgery  You must have a responsible adult drive you home. They should stay with you overnight as well.  If you don't have someone to stay  with you, and you aren't safe to go home alone, we may keep you overnight. Insurance often won't pay for this.  After surgery  If it's hard to control your pain or you need more pain medicine, please call your surgeon's office.  Questions?   If you have any questions for your care team, list them here:   ____________________________________________________________________________________________________________________________________________________________________________________________________________________________________________________________  For informational purposes only. Not to replace the advice of your health care provider. Copyright   2003, 2019 [facility]. All rights reserved. Clinically reviewed by Anival Hernandez MD. [employer] [id number] - REV 08/24.

## 2025-02-18 NOTE — Clinical Note
Surgical Information What procedure is being done? Blepharoplasty   Facility or Hospital where procedure/surgery will be performed: [facility]   Who is doing the procedure / surgery? Dr. Blanchard   Date of surgery / procedure: 2/21/25   Time of surgery / procedure: TBD   Where do you plan to recover after surgery? at home with family       Proxy-reported  Fax number for surgical facility: [phone] and [phone]

## 2025-02-27 ENCOUNTER — TELEPHONE (OUTPATIENT)
Dept: UROLOGY | Facility: OTHER | Age: 80
End: 2025-02-27
Payer: COMMERCIAL

## 2025-02-27 NOTE — TELEPHONE ENCOUNTER
Patient returned your call re: blood in his urine. He is currently on hold on my line. Montserrat Turk RN on 2/27/2025 at 1:11 PM

## 2025-03-13 ENCOUNTER — LAB (OUTPATIENT)
Dept: LAB | Facility: OTHER | Age: 80
End: 2025-03-13
Payer: MEDICARE

## 2025-03-13 ENCOUNTER — HOSPITAL ENCOUNTER (OUTPATIENT)
Dept: CT IMAGING | Facility: OTHER | Age: 80
Discharge: HOME | End: 2025-03-13
Payer: MEDICARE

## 2025-03-13 DIAGNOSIS — R31.29 MICROSCOPIC HEMATURIA: ICD-10-CM

## 2025-03-13 LAB
ANION GAP SERPL CALCULATED.3IONS-SCNC: 13 MMOL/L (ref 7–15)
BUN SERPL-MCNC: 16.8 MG/DL (ref 8–23)
CALCIUM SERPL-MCNC: 9.6 MG/DL (ref 8.8–10.4)
CHLORIDE SERPL-SCNC: 103 MMOL/L (ref 98–107)
CREAT SERPL-MCNC: 0.64 MG/DL (ref 0.67–1.17)
EGFRCR SERPLBLD CKD-EPI 2021: >90 ML/MIN/1.73M2
GLUCOSE SERPL-MCNC: 111 MG/DL (ref 70–99)
HCO3 SERPL-SCNC: 25 MMOL/L (ref 22–29)
POTASSIUM SERPL-SCNC: 4.1 MMOL/L (ref 3.4–5.3)
SODIUM SERPL-SCNC: 141 MMOL/L (ref 135–145)

## 2025-03-13 PROCEDURE — 250N000009 HC RX 250

## 2025-03-13 PROCEDURE — 82374 ASSAY BLOOD CARBON DIOXIDE: CPT | Mod: ZL

## 2025-03-13 PROCEDURE — 36415 COLL VENOUS BLD VENIPUNCTURE: CPT | Mod: ZL

## 2025-03-13 PROCEDURE — 80048 BASIC METABOLIC PNL TOTAL CA: CPT | Mod: ZL

## 2025-03-13 PROCEDURE — 74178 CT ABD&PLV WO CNTR FLWD CNTR: CPT

## 2025-03-13 PROCEDURE — 250N000011 HC RX IP 250 OP 636

## 2025-03-13 RX ORDER — IOPAMIDOL 755 MG/ML
107 INJECTION, SOLUTION INTRAVASCULAR ONCE
Status: COMPLETED | OUTPATIENT
Start: 2025-03-13 | End: 2025-03-13

## 2025-03-13 RX ADMIN — IOPAMIDOL 107 ML: 755 INJECTION, SOLUTION INTRAVENOUS at 16:10

## 2025-03-13 RX ADMIN — SODIUM CHLORIDE 60 ML: 9 INJECTION, SOLUTION INTRAVENOUS at 16:10

## 2025-03-27 DIAGNOSIS — R31.29 MICROSCOPIC HEMATURIA: Primary | ICD-10-CM

## 2025-04-02 ENCOUNTER — OFFICE VISIT (OUTPATIENT)
Dept: UROLOGY | Facility: OTHER | Age: 80
End: 2025-04-02
Payer: MEDICARE

## 2025-04-02 ENCOUNTER — LAB (OUTPATIENT)
Dept: LAB | Facility: OTHER | Age: 80
End: 2025-04-02
Payer: MEDICARE

## 2025-04-02 DIAGNOSIS — R31.29 MICROSCOPIC HEMATURIA: ICD-10-CM

## 2025-04-02 DIAGNOSIS — N40.1 BENIGN PROSTATIC HYPERPLASIA WITH WEAK URINARY STREAM: Primary | ICD-10-CM

## 2025-04-02 DIAGNOSIS — R39.12 BENIGN PROSTATIC HYPERPLASIA WITH WEAK URINARY STREAM: Primary | ICD-10-CM

## 2025-04-02 LAB
ALBUMIN UR-MCNC: NEGATIVE MG/DL
APPEARANCE UR: CLEAR
BILIRUB UR QL STRIP: NEGATIVE
COLOR UR AUTO: NORMAL
GLUCOSE UR STRIP-MCNC: NEGATIVE MG/DL
HGB UR QL STRIP: NEGATIVE
KETONES UR STRIP-MCNC: NEGATIVE MG/DL
LEUKOCYTE ESTERASE UR QL STRIP: NEGATIVE
NITRATE UR QL: NEGATIVE
PH UR STRIP: 6 [PH] (ref 5–9)
SP GR UR STRIP: 1.02 (ref 1–1.03)
UROBILINOGEN UR STRIP-MCNC: NORMAL MG/DL

## 2025-04-02 PROCEDURE — 52000 CYSTOURETHROSCOPY: CPT | Performed by: UROLOGY

## 2025-04-02 PROCEDURE — 250N000009 HC RX 250: Performed by: UROLOGY

## 2025-04-02 PROCEDURE — 81003 URINALYSIS AUTO W/O SCOPE: CPT | Mod: ZL

## 2025-04-02 PROCEDURE — 250N000013 HC RX MED GY IP 250 OP 250 PS 637: Performed by: UROLOGY

## 2025-04-02 PROCEDURE — G0463 HOSPITAL OUTPT CLINIC VISIT: HCPCS | Mod: 25

## 2025-04-02 RX ORDER — LIDOCAINE HYDROCHLORIDE 20 MG/ML
JELLY TOPICAL ONCE
Status: COMPLETED | OUTPATIENT
Start: 2025-04-02 | End: 2025-04-02

## 2025-04-02 RX ADMIN — CEPHALEXIN 250 MG: 250 CAPSULE ORAL at 14:54

## 2025-04-02 RX ADMIN — LIDOCAINE HYDROCHLORIDE: 20 JELLY TOPICAL at 14:27

## 2025-04-02 NOTE — NURSING NOTE
Patient positioned in supine position, perineum area prepped with chlorhexidene Gluconate, betadine swabs, patient draped per sterile technique. Per verbal order read back by Jorge Ferguson MD, Urojet 10mL 2% lidocaine jelly to be instilled into urethra.      Universal Protocol    A. Pre-procedure verification complete Yes  1-relevant information / documentation available, reviewed and properly matched to the patient; 2-consent accurate and complete, 3-equipment and supplies available    B. Site marking complete Yes  Site marked if not in continuous attendance with patient    C. TIME OUT completed Yes  Time Out was conducted just prior to starting procedure to verify the eight required elements: 1-patient identity, 2-consent accurate and complete, 3-position, 4-correct side/site marked (if applicable), 5-procedure, 6-relevant images / results properly labeled and displayed (if applicable), 7-antibiotics / irrigation fluids (if applicable), 8-safety precautions.    After procedure perineum area rinsed. Discharge instructions reviewed with patient. Patient verbalized understanding of discharge instructions and discharged ambulatory.  Evy Mercado RN..................4/2/2025  4:36 PM

## 2025-04-02 NOTE — PROGRESS NOTES
79-year-old male seen by Josse Voss, 2/14/2025 for evaluation of BPH with moderate LUTS including weak stream and microscopic hematuria.    He had been started on Scilla dose and 4 mg daily as well as MiraLAX for his bowels.    No improvement on the silodosin.  He ended up stopping this medication.    Stream is still weak and he feels as though he does not empty his bladder.  There is frequency every 2 hours during the day and he gets up 2 times at night.  He cannot postpone urination and there is hesitancy when starting his stream.    Residual urine was only 92.    Here today for cystoscopy given his microscopic hematuria and refractory voiding complaints.    CT urogram with marked prostatomegaly and trabeculation of the bladder secondary to chronic outlet obstruction.  Some questionable calcifications in the bladder wall.    Prostate calculated to be 150 g on CT measurement by me.    Radiology  CT UROGRAM WO & W CONTRAST, 3/13/2025 4:09 PM     HISTORY: Symptomatic microscopic hematuria, distance history of  stones.; Microscopic hematuria     COMPARISON: CT abdomen pelvis 12/2/2020     TECHNIQUE:    Imaging protocol: Computed tomography images of the abdomen and pelvis  acquired per CT urogram protocol with administration of intravenous  contrast. Contrast: Isovue 370, 107 mL  Acquisition: This CT exam was performed using one or more the  following dose reduction techniques: automated exposure control,  adjustment of the mA and/or kV according to patient size, and/or  iterative reconstruction technique.  Processing: 3D rendering on independent workstation using Maximum  Intensity Projection (MIP) was performed and archived to PACS. 3D  reconstructions are interpreted and reported by supervising  radiologist.     FINDINGS:     RIGHT KIDNEY AND URETER:  No renal cortical soft tissue mass, hydronephrosis or urothelial  lesion. Bilobed cortical cyst. Punctate nonobstructing 2 mm calyceal  stone in the inferior  pole.     LEFT KIDNEY AND URETER:  No renal cortical soft tissue mass, hydronephrosis or urothelial  lesion. Punctate nonobstructing 2 mm calyceal stone in the inferior  pole.     URINARY BLADDER:  Normally distended. Trabecular wall with calcifications within the  dependent trabeculations. Number of calcifications within the bladder  have increased since comparison.     REPRODUCTIVE ORGANS: Marked prostatomegaly.     NON-URINARY TRACT FINDINGS:  LIVER: Normal contour. No suspicious liver lesions.  GALLBLADDER: No radio-opaque stones. No gallbladder wall thickening.  BILE DUCTS: No biliary tract dilatation.  PANCREAS: Within normal limits.  SPLEEN: Within normal limits.  ADRENALS: 10 mm hypodense right adrenal nodule, consistent with benign  adenoma. No left adrenal nodule.     BOWEL: No bowel dilatation or wall thickening. Colonic diverticulosis  without diverticulitis. Normal appendix. Mobile cecum located in the  right upper quadrant. Aerated gastric contents.  PERITONEUM/RETROPERITONEUM: No free air or free fluid. Fat-containing  periumbilical hernias. Large right inguinal fat containing hernia.  VESSELS: Atherosclerotic calcification of the aorta without aneurysmal  dilation or dissection.  LYMPH NODES: There are no pathologically enlarged lymph nodes.  BONES AND SOFT TISSUES: No suspicious osseous lesions. Degenerative  periarticular changes and spinal spondylosis.     LOWER CHEST:  Bibasilar atelectasis. No pulmonary mass or focal consolidation.                                                                      IMPRESSION:     Marked prostatomegaly with trabecular urinary bladder, likely  secondary to chronic outlet obstruction. Increased number of  calcifications within the urinary bladder, these appear located within  the bladder wall trabeculations.     No mucosal lesions of the urinary tract.     Nonobstructing calyceal stones bilaterally.     RICHIE MOMIN MD     Male Cystoscopy Procedure Note      PRE-PROCEDURE DIAGNOSIS: Microhematuria, BPH  POST-PROCEDURE DIAGNOSIS: Same  PROCEDURE: cystoscopy    RISKS DISCUSSED:  Bleeding, infection, urethral stricture, irritative voiding symptoms, retention of urine.      DESCRIPTION OF PROCEDURE:  After informed consent was obtained, the patient was brought to the procedure room where he was placed in the supine position with all pressure points well padded.  The penis and scrotum were prepped and draped in a sterile fashion. A flexible cystoscope was introduced through a well-lubricated urethra.      FINDINGS:    Meatus: normal  Urethra: Normal without stricture  Prostate: Moderate to severe obstruction with kissing lateral lobes, high riding bladder neck  Bladder Neck: Obstructed with donut like protrusion of his prostate into the bladder  Bladder: No visible tumor, stones or lesions, several cellules and moderate trabeculation  Trigone:  Normal ureters, normal efflux    The flexible cystoscope was removed and the findings were described to the patient.     FINDINGS: Consistent with bladder outlet obstruction.  No visible stone could be seen in the bladder.    Assessment:  Patient with significant lower urinary tract symptoms and a very enlarged prostate amenable only to HoLEP.    He will be referred to Dr. Stout for consideration of HoLEP.

## 2025-04-03 ENCOUNTER — TELEPHONE (OUTPATIENT)
Dept: UROLOGY | Facility: CLINIC | Age: 80
End: 2025-04-03
Payer: COMMERCIAL

## 2025-04-03 NOTE — TELEPHONE ENCOUNTER
Spoke with patient regarding HoLEP consult. Can't do VV, hesitant to drive to [place] d/t age.     RN will discuss with Dr. Stout and return call to patient.     ROSA MARIA Patterson  Care Coordinator- Urology   [phone]

## 2025-04-08 NOTE — TELEPHONE ENCOUNTER
"Reason for visit: hoLEP consult      Relevant information: Referred by Dr. Ferguson. \"Patient with significant lower urinary tract symptoms and a very enlarged prostate amenable only to HoLEP. \" [birth year]. Last PVR 2/14/25 -92 mls     Records/imaging/labs/orders: IN [facility], CARE EVERYWHERE, AND PACS     At Rooming:   Standard rooming  No MyChart or email contact - so no AUA sent during pre-visits.     Yamilet Vasquez  4/8/2025  3:39 PM  "

## 2025-04-10 NOTE — TELEPHONE ENCOUNTER
MEDICAL RECORDS REQUEST   [facility]  Urology Clinic  [address] [place] [address]  PHONE: [phone]  Fax: [phone]        FUTURE VISIT INFORMATION                                                   Dajuan Basilio, : 1945 scheduled for future visit at [facility]    APPOINTMENT INFORMATION:  Date: 04/15/2025  Provider:  Maynor Stout MD  Reason for Visit/Diagnosis: BPH consult    REFERRAL INFORMATION:  Referring provider:  Jorge Ferguson MD in  UROLOGY      RECORDS REQUESTED FOR VISIT                                                     NOTES  STATUS/DETAILS   OFFICE NOTE from referring provider  2025 -- Jorge Ferguson MD in  UROLOGY   OFFICE NOTE from other specialist  yes   MEDICATION LIST  yes   LABS     URINALYSIS (UA)  yes   PSA  YES   images  yes, 2025 -- CT UROGRAM       PRE-VISIT CHECKLIST      Joint diagnostic appointment coordinated correctly          (ensure right order & amount of time) Yes   RECORD COLLECTION COMPLETE Yes

## 2025-04-15 ENCOUNTER — VIRTUAL VISIT (OUTPATIENT)
Dept: UROLOGY | Facility: CLINIC | Age: 80
End: 2025-04-15
Payer: COMMERCIAL

## 2025-04-15 ENCOUNTER — PRE VISIT (OUTPATIENT)
Dept: UROLOGY | Facility: CLINIC | Age: 80
End: 2025-04-15

## 2025-04-15 DIAGNOSIS — R33.9 URINARY RETENTION: Primary | ICD-10-CM

## 2025-04-15 NOTE — LETTER
4/15/2025       RE: Dajuan Basilio  [address]  [place] [address]     Dear Colleague,    Thank you for referring your patient, Dajuan Basilio, to the [facility] [place] at [facility]. Please see a copy of my visit note below.        UROLOGY OUTPATIENT VISIT     ASSESSMENT/PLAN   79 year old year old being seen today for obstructive lower urinary tract symptoms  - We discussed available management options for his enlarged prostate.  While his prostate is very large his symptoms do not appear to be extremely bothersome.  He is emptying adequately and he prefers to try a minimally invasive alternative to HoLEP.  Reviewed available management options and his preference would be to consider prostate arterial embolization.  -Next follow-up after PAE    CHIEF COMPLAINT   Enlarged prostate      Synopsis    Dajuan Basilio is a very pleasant AGE: 79 year old year old person    He is being referred from Dr. Ferguson for an enlarged prostate.  A recent CT showed a prostate with volume about 150 g and some posterior trabeculations and calcifications.  Underwent cystoscopy which did not reveal any calcification or stone, suspect this was debris within the bladder, however prostate was markedly enlarged and obstructive.  He has had microscopic hematuria in the past without gross hematuria.  Main issue is urinary symptoms.  Has every 2 hours frequency during the daytime and nocturia 2 times at night.  He has some hesitancy and difficulty postponing urination when the urge occurs.    Last PSA was over 1 year ago and was 4.72         Medications     Current Outpatient Medications   Medication Sig Dispense Refill     blood glucose (ACCU-CHEK ONEL PLUS) test strip 1 strip by In Vitro route daily. 100 strip 5     citalopram (CELEXA) 10 MG tablet Take 1 tablet (10 mg) by mouth daily. 90 tablet 4     levothyroxine (SYNTHROID/LEVOTHROID) 75 MCG tablet  Take 1 tablet (75 mcg) by mouth daily. - for Thyroid Replacement -- Dose Increase 2/7/2025 90 tablet 1     losartan (COZAAR) 25 MG tablet Take 1 tablet (25 mg) by mouth daily. 90 tablet 4     metFORMIN (GLUCOPHAGE) 500 MG tablet Take 2 tablets (1,000 mg) by mouth daily (with breakfast) AND 1 tablet (500 mg) daily (with dinner). 270 tablet 1     rosuvastatin (CRESTOR) 5 MG tablet Take 1 tablet (5 mg) by mouth daily. 90 tablet 4     vitamin D3 (CHOLECALCIFEROL) 125 MCG (5000 UT) tablet Take 5,000 Units by mouth daily       polyethylene glycol (MIRALAX) 17 GM/Dose powder Take 17 g (1 Capful) by mouth daily. (Patient not taking: Reported on 4/15/2025) 238 g 3     reason aspirin not prescribed, intentional, Please choose reason not prescribed from choices below.       Current Facility-Administered Medications   Medication Dose Route Frequency Provider Last Rate Last Admin     cyanocobalamin injection 1,000 mcg  1,000 mcg Intramuscular Once          cyanocobalamin injection 1,000 mcg  1,000 mcg Intramuscular Continuous PRN Cm Pickens MD   1,000 mcg at 02/07/25 1027         The following  distinct labs were reviewed    I personally reviewed all applicable laboratory data and went over findings with patient  Significant for:    CBC RESULTS:  Recent Labs   Lab Test 02/06/25  0806 01/03/25  1100 11/04/24  0938 09/21/24  1412   WBC 7.9 9.0 7.5 8.2   HGB 13.8 13.4 12.7* 14.5    275 279 293        BMP RESULTS:  Recent Labs   Lab Test 03/13/25  1334 02/06/25  0806 01/03/25  1100 11/04/24  0938 08/31/21  0848 03/17/21  1659 01/26/21  1238 12/18/20  0922 09/17/20  0659    140 141 142   < > 140 140 140 140   POTASSIUM 4.1 3.9 4.3 4.1   < > 4.6 3.9 3.7 3.9   CHLORIDE 103 100 102 105   < > 104 104 102 104   CO2 25 26 26 26   < > 30 28 30 29   ANIONGAP 13 14 13 11   < > 6 8 8 7   * 138* 138* 121*   < > 114* 111* 171* 122*   BUN 16.8 11.7 16.8 13.8   < > 16 17 18 16   CR 0.64* 0.70 0.67 0.59*   < > 0.80 0.78  "0.83 0.82   GFRESTIMATED >90 >90 >90 >90   < > >90 >90 90 >90   GFRESTBLACK  --   --   --   --   --  >90 >90 >90 >90    < > = values in this interval not displayed.       CALCIUM RESULTS:  Recent Labs   Lab Test 03/13/25  1334 02/06/25  0806 01/03/25  1100 11/04/24  0938   ADAMA 9.6 9.7 9.5 9.1       PTH RESULTS:  No results for input(s): \"PTHI\" in the last 03214 hours.    HGB A1C RESULTS:  Lab Results   Component Value Date    A1C 6.5 02/06/2025    A1C 6.5 11/04/2024    A1C 6.9 07/23/2024    A1C 6.9 04/15/2024    A1C 6.5 01/10/2024    A1C 6.4 03/17/2021    A1C 6.5 12/18/2020    A1C 6.5 09/17/2020    A1C 6.4 03/17/2020    A1C 6.2 03/19/2019       UA RESULTS:   Recent Labs   Lab Test 04/02/25  1334 02/14/25  1005 02/06/25  0806 01/17/25  1219 01/07/25  1303 04/03/23  0835 01/26/23  1001 10/24/22  1256   SG 1.017 1.015 1.014   < > 1.026   < > 1.018 1.014   URINEPH 6.0 7.0 5.5   < > 6.0   < > 5.5 6.5   NITRITE Negative Negative Negative   < > Negative   < > Positive* Negative   RBCU  --   --   --   --  3*  --  33* 1   WBCU  --   --   --   --  1  --  >182* 4    < > = values in this interval not displayed.       PSA RESULTS  Prostate Specific Antigen Screen   Date Value Ref Range Status   11/04/2024 5.42 ng/mL Final     Comment:     No reference ranges have been established for patients over 80 years.   07/23/2024 4.94 ng/mL Final     Comment:     No reference ranges have been established for patients over 80 years.   04/15/2024 4.72 ng/mL Final     Comment:     No reference ranges have been established for patients over 80 years.   04/03/2023 4.38 0.00 - 6.50 ng/mL Final     PSA Tumor Marker   Date Value Ref Range Status   03/29/2022 3.12 0.00 - 4.00 ug/L Final   12/02/2021 3.51 0.00 - 4.00 ug/L Final   09/08/2021 4.39 (H) 0.00 - 4.00 ug/L Final         Recent Imaging Report    I personally reviewed all applicable imaging and went over the below findings with patient.    Results for orders placed or performed during the " hospital encounter of 03/13/25   CT Urogram wo & w Contrast    Narrative    EXAM: CT UROGRAM WO & W CONTRAST, 3/13/2025 4:09 PM    HISTORY: Symptomatic microscopic hematuria, distance history of  stones.; Microscopic hematuria    COMPARISON: CT abdomen pelvis 12/2/2020    TECHNIQUE:    Imaging protocol: Computed tomography images of the abdomen and pelvis  acquired per CT urogram protocol with administration of intravenous  contrast. Contrast: Isovue 370, 107 mL  Acquisition: This CT exam was performed using one or more the  following dose reduction techniques: automated exposure control,  adjustment of the mA and/or kV according to patient size, and/or  iterative reconstruction technique.  Processing: 3D rendering on independent workstation using Maximum  Intensity Projection (MIP) was performed and archived to PACS. 3D  reconstructions are interpreted and reported by supervising  radiologist.    FINDINGS:    RIGHT KIDNEY AND URETER:  No renal cortical soft tissue mass, hydronephrosis or urothelial  lesion. Bilobed cortical cyst. Punctate nonobstructing 2 mm calyceal  stone in the inferior pole.    LEFT KIDNEY AND URETER:  No renal cortical soft tissue mass, hydronephrosis or urothelial  lesion. Punctate nonobstructing 2 mm calyceal stone in the inferior  pole.    URINARY BLADDER:  Normally distended. Trabecular wall with calcifications within the  dependent trabeculations. Number of calcifications within the bladder  have increased since comparison.    REPRODUCTIVE ORGANS: Marked prostatomegaly.    NON-URINARY TRACT FINDINGS:  LIVER: Normal contour. No suspicious liver lesions.  GALLBLADDER: No radio-opaque stones. No gallbladder wall thickening.  BILE DUCTS: No biliary tract dilatation.  PANCREAS: Within normal limits.  SPLEEN: Within normal limits.  ADRENALS: 10 mm hypodense right adrenal nodule, consistent with benign  adenoma. No left adrenal nodule.    BOWEL: No bowel dilatation or wall thickening. Colonic  diverticulosis  without diverticulitis. Normal appendix. Mobile cecum located in the  right upper quadrant. Aerated gastric contents.  PERITONEUM/RETROPERITONEUM: No free air or free fluid. Fat-containing  periumbilical hernias. Large right inguinal fat containing hernia.  VESSELS: Atherosclerotic calcification of the aorta without aneurysmal  dilation or dissection.  LYMPH NODES: There are no pathologically enlarged lymph nodes.  BONES AND SOFT TISSUES: No suspicious osseous lesions. Degenerative  periarticular changes and spinal spondylosis.    LOWER CHEST:  Bibasilar atelectasis. No pulmonary mass or focal consolidation.      Impression    IMPRESSION:    Marked prostatomegaly with trabecular urinary bladder, likely  secondary to chronic outlet obstruction. Increased number of  calcifications within the urinary bladder, these appear located within  the bladder wall trabeculations.    No mucosal lesions of the urinary tract.    Nonobstructing calyceal stones bilaterally.    RICHIE MOMIN MD         SYSTEM ID:  [id number]     CC:  Cm Pickens      Again, thank you for allowing me to participate in the care of your patient.      Sincerely,    Maynor Stout MD

## 2025-04-15 NOTE — NURSING NOTE
Current patient location: [address] [address]    Is the patient currently in the state of [place]? YES    Visit mode: CONVERT TO TELEPHONEper sppt notes- Ok'd by provider     If the visit is dropped, the patient can be reconnected by:TELEPHONE VISIT: Phone number: [phone]    Will anyone else be joining the visit? NO  (If patient encounters technical issues they should call [phone])    Are changes needed to the allergy or medication list? Yes pt has medications he is not taking     Are refills needed on medications prescribed by this physician? NO- new pt     Rooming Documentation:  Not applicable    Reason for visit: Consult (BPH consult, ref by Dr. Ferguson. OK to do phone visit per Dr. Stout, PREVISIT COMPLETE -- JTV)    Jolie MARY

## 2025-04-16 NOTE — PROGRESS NOTES
UROLOGY OUTPATIENT VISIT     ASSESSMENT/PLAN   79 year old year old being seen today for obstructive lower urinary tract symptoms  - We discussed available management options for his enlarged prostate.  While his prostate is very large his symptoms do not appear to be extremely bothersome.  He is emptying adequately and he prefers to try a minimally invasive alternative to HoLEP.  Reviewed available management options and his preference would be to consider prostate arterial embolization.  -Next follow-up after PAE    CHIEF COMPLAINT   Enlarged prostate      Synopsis    Dajuan Basilio is a very pleasant AGE: 79 year old year old person    He is being referred from Dr. Ferguson for an enlarged prostate.  A recent CT showed a prostate with volume about 150 g and some posterior trabeculations and calcifications.  Underwent cystoscopy which did not reveal any calcification or stone, suspect this was debris within the bladder, however prostate was markedly enlarged and obstructive.  He has had microscopic hematuria in the past without gross hematuria.  Main issue is urinary symptoms.  Has every 2 hours frequency during the daytime and nocturia 2 times at night.  He has some hesitancy and difficulty postponing urination when the urge occurs.    Last PSA was over 1 year ago and was 4.72         Medications     Current Outpatient Medications   Medication Sig Dispense Refill    blood glucose (ACCU-CHEK ONEL PLUS) test strip 1 strip by In Vitro route daily. 100 strip 5    citalopram (CELEXA) 10 MG tablet Take 1 tablet (10 mg) by mouth daily. 90 tablet 4    levothyroxine (SYNTHROID/LEVOTHROID) 75 MCG tablet Take 1 tablet (75 mcg) by mouth daily. - for Thyroid Replacement -- Dose Increase 2/7/2025 90 tablet 1    losartan (COZAAR) 25 MG tablet Take 1 tablet (25 mg) by mouth daily. 90 tablet 4    metFORMIN (GLUCOPHAGE) 500 MG tablet Take 2 tablets (1,000 mg) by mouth daily (with breakfast) AND 1 tablet (500 mg) daily (with  dinner). 270 tablet 1    rosuvastatin (CRESTOR) 5 MG tablet Take 1 tablet (5 mg) by mouth daily. 90 tablet 4    vitamin D3 (CHOLECALCIFEROL) 125 MCG (5000 UT) tablet Take 5,000 Units by mouth daily      polyethylene glycol (MIRALAX) 17 GM/Dose powder Take 17 g (1 Capful) by mouth daily. (Patient not taking: Reported on 4/15/2025) 238 g 3    reason aspirin not prescribed, intentional, Please choose reason not prescribed from choices below.       Current Facility-Administered Medications   Medication Dose Route Frequency Provider Last Rate Last Admin    cyanocobalamin injection 1,000 mcg  1,000 mcg Intramuscular Once         cyanocobalamin injection 1,000 mcg  1,000 mcg Intramuscular Continuous PRN Cm Pickens MD   1,000 mcg at 02/07/25 1027         The following  distinct labs were reviewed    I personally reviewed all applicable laboratory data and went over findings with patient  Significant for:    CBC RESULTS:  Recent Labs   Lab Test 02/06/25  0806 01/03/25  1100 11/04/24  0938 09/21/24  1412   WBC 7.9 9.0 7.5 8.2   HGB 13.8 13.4 12.7* 14.5    275 279 293        BMP RESULTS:  Recent Labs   Lab Test 03/13/25  1334 02/06/25  0806 01/03/25  1100 11/04/24  0938 08/31/21  0848 03/17/21  1659 01/26/21  1238 12/18/20  0922 09/17/20  0659    140 141 142   < > 140 140 140 140   POTASSIUM 4.1 3.9 4.3 4.1   < > 4.6 3.9 3.7 3.9   CHLORIDE 103 100 102 105   < > 104 104 102 104   CO2 25 26 26 26   < > 30 28 30 29   ANIONGAP 13 14 13 11   < > 6 8 8 7   * 138* 138* 121*   < > 114* 111* 171* 122*   BUN 16.8 11.7 16.8 13.8   < > 16 17 18 16   CR 0.64* 0.70 0.67 0.59*   < > 0.80 0.78 0.83 0.82   GFRESTIMATED >90 >90 >90 >90   < > >90 >90 90 >90   GFRESTBLACK  --   --   --   --   --  >90 >90 >90 >90    < > = values in this interval not displayed.       CALCIUM RESULTS:  Recent Labs   Lab Test 03/13/25  1334 02/06/25  0806 01/03/25  1100 11/04/24  0938   ADAMA 9.6 9.7 9.5 9.1       PTH RESULTS:  No results for  "input(s): \"PTHI\" in the last 77388 hours.    HGB A1C RESULTS:  Lab Results   Component Value Date    A1C 6.5 02/06/2025    A1C 6.5 11/04/2024    A1C 6.9 07/23/2024    A1C 6.9 04/15/2024    A1C 6.5 01/10/2024    A1C 6.4 03/17/2021    A1C 6.5 12/18/2020    A1C 6.5 09/17/2020    A1C 6.4 03/17/2020    A1C 6.2 03/19/2019       UA RESULTS:   Recent Labs   Lab Test 04/02/25  1334 02/14/25  1005 02/06/25  0806 01/17/25  1219 01/07/25  1303 04/03/23  0835 01/26/23  1001 10/24/22  1256   SG 1.017 1.015 1.014   < > 1.026   < > 1.018 1.014   URINEPH 6.0 7.0 5.5   < > 6.0   < > 5.5 6.5   NITRITE Negative Negative Negative   < > Negative   < > Positive* Negative   RBCU  --   --   --   --  3*  --  33* 1   WBCU  --   --   --   --  1  --  >182* 4    < > = values in this interval not displayed.       PSA RESULTS  Prostate Specific Antigen Screen   Date Value Ref Range Status   11/04/2024 5.42 ng/mL Final     Comment:     No reference ranges have been established for patients over 80 years.   07/23/2024 4.94 ng/mL Final     Comment:     No reference ranges have been established for patients over 80 years.   04/15/2024 4.72 ng/mL Final     Comment:     No reference ranges have been established for patients over 80 years.   04/03/2023 4.38 0.00 - 6.50 ng/mL Final     PSA Tumor Marker   Date Value Ref Range Status   03/29/2022 3.12 0.00 - 4.00 ug/L Final   12/02/2021 3.51 0.00 - 4.00 ug/L Final   09/08/2021 4.39 (H) 0.00 - 4.00 ug/L Final         Recent Imaging Report    I personally reviewed all applicable imaging and went over the below findings with patient.    Results for orders placed or performed during the hospital encounter of 03/13/25   CT Urogram wo & w Contrast    Narrative    EXAM: CT UROGRAM WO & W CONTRAST, 3/13/2025 4:09 PM    HISTORY: Symptomatic microscopic hematuria, distance history of  stones.; Microscopic hematuria    COMPARISON: CT abdomen pelvis 12/2/2020    TECHNIQUE:    Imaging protocol: Computed tomography " images of the abdomen and pelvis  acquired per CT urogram protocol with administration of intravenous  contrast. Contrast: Isovue 370, 107 mL  Acquisition: This CT exam was performed using one or more the  following dose reduction techniques: automated exposure control,  adjustment of the mA and/or kV according to patient size, and/or  iterative reconstruction technique.  Processing: 3D rendering on independent workstation using Maximum  Intensity Projection (MIP) was performed and archived to PACS. 3D  reconstructions are interpreted and reported by supervising  radiologist.    FINDINGS:    RIGHT KIDNEY AND URETER:  No renal cortical soft tissue mass, hydronephrosis or urothelial  lesion. Bilobed cortical cyst. Punctate nonobstructing 2 mm calyceal  stone in the inferior pole.    LEFT KIDNEY AND URETER:  No renal cortical soft tissue mass, hydronephrosis or urothelial  lesion. Punctate nonobstructing 2 mm calyceal stone in the inferior  pole.    URINARY BLADDER:  Normally distended. Trabecular wall with calcifications within the  dependent trabeculations. Number of calcifications within the bladder  have increased since comparison.    REPRODUCTIVE ORGANS: Marked prostatomegaly.    NON-URINARY TRACT FINDINGS:  LIVER: Normal contour. No suspicious liver lesions.  GALLBLADDER: No radio-opaque stones. No gallbladder wall thickening.  BILE DUCTS: No biliary tract dilatation.  PANCREAS: Within normal limits.  SPLEEN: Within normal limits.  ADRENALS: 10 mm hypodense right adrenal nodule, consistent with benign  adenoma. No left adrenal nodule.    BOWEL: No bowel dilatation or wall thickening. Colonic diverticulosis  without diverticulitis. Normal appendix. Mobile cecum located in the  right upper quadrant. Aerated gastric contents.  PERITONEUM/RETROPERITONEUM: No free air or free fluid. Fat-containing  periumbilical hernias. Large right inguinal fat containing hernia.  VESSELS: Atherosclerotic calcification of the aorta  without aneurysmal  dilation or dissection.  LYMPH NODES: There are no pathologically enlarged lymph nodes.  BONES AND SOFT TISSUES: No suspicious osseous lesions. Degenerative  periarticular changes and spinal spondylosis.    LOWER CHEST:  Bibasilar atelectasis. No pulmonary mass or focal consolidation.      Impression    IMPRESSION:    Marked prostatomegaly with trabecular urinary bladder, likely  secondary to chronic outlet obstruction. Increased number of  calcifications within the urinary bladder, these appear located within  the bladder wall trabeculations.    No mucosal lesions of the urinary tract.    Nonobstructing calyceal stones bilaterally.    RICHIE MOMIN MD         SYSTEM ID:  [id number]     CC:  mC Pickens   Yes

## 2025-04-17 ENCOUNTER — TELEPHONE (OUTPATIENT)
Dept: UROLOGY | Facility: CLINIC | Age: 80
End: 2025-04-17
Payer: COMMERCIAL

## 2025-04-17 NOTE — TELEPHONE ENCOUNTER
Faxed referral to [facility] for PAE consult per Dr. Stout.     Yesenia, RN  Care Coordinator- Urology   [phone]

## 2025-04-22 ENCOUNTER — TRANSFERRED RECORDS (OUTPATIENT)
Dept: HEALTH INFORMATION MANAGEMENT | Facility: CLINIC | Age: 80
End: 2025-04-22
Payer: COMMERCIAL

## 2025-04-22 ENCOUNTER — TELEPHONE (OUTPATIENT)
Dept: INTERNAL MEDICINE | Facility: OTHER | Age: 80
End: 2025-04-22
Payer: COMMERCIAL

## 2025-04-22 DIAGNOSIS — Z12.11 COLON CANCER SCREENING: Primary | ICD-10-CM

## 2025-04-22 NOTE — TELEPHONE ENCOUNTER
This is being addressed in another phone note, also from today. Will close when referral is sent.    Alanna Douglas on 4/22/2025 at 2:22 PM

## 2025-04-22 NOTE — TELEPHONE ENCOUNTER
Patient states that he needs a referral to have a colonoscopy by Dr Johnson. Please call patient as he is trying to get it done on May 2nd    Sandi Doss on 4/22/2025 at 10:33 AM

## 2025-04-22 NOTE — TELEPHONE ENCOUNTER
Last colonoscopy 5/23/2019 at [facility], recommended 5 year follow up.    Alanna Douglas on 4/22/2025 at 1:37 PM

## 2025-04-22 NOTE — TELEPHONE ENCOUNTER
DJS-patient is looking for a referral for Dr Johnson for a colonscopy     Please call and advise    Thank You        Gretel Fulton on 4/22/2025 at 2:13 PM

## 2025-04-23 ENCOUNTER — TRANSFERRED RECORDS (OUTPATIENT)
Dept: HEALTH INFORMATION MANAGEMENT | Facility: CLINIC | Age: 80
End: 2025-04-23
Payer: COMMERCIAL

## 2025-05-22 ENCOUNTER — TRANSFERRED RECORDS (OUTPATIENT)
Dept: HEALTH INFORMATION MANAGEMENT | Facility: OTHER | Age: 80
End: 2025-05-22
Payer: COMMERCIAL

## 2025-06-03 ENCOUNTER — TELEPHONE (OUTPATIENT)
Dept: INTERNAL MEDICINE | Facility: OTHER | Age: 80
End: 2025-06-03
Payer: COMMERCIAL

## 2025-06-03 DIAGNOSIS — E11.42 CONTROLLED TYPE 2 DIABETES MELLITUS WITH DIABETIC POLYNEUROPATHY, WITHOUT LONG-TERM CURRENT USE OF INSULIN (H): Primary | ICD-10-CM

## 2025-06-03 DIAGNOSIS — E78.2 MIXED HYPERLIPIDEMIA: ICD-10-CM

## 2025-06-03 NOTE — TELEPHONE ENCOUNTER
Pt would like to get orders for labs to have them done before his upcoming appt.  Please call when done.      Subhash Carlson on 6/3/2025 at 2:42 PM

## 2025-06-04 NOTE — TELEPHONE ENCOUNTER
DM labs pending. Has AW scheduled on 7/22. Will need a lab appt.     Albumin completed on 2/6/25. UA completed on 4/2/25 by Dr. Ferguson.     Would you like another urine?     Grace Hall LPN on 6/4/2025 at 8:41 AM

## 2025-06-12 ENCOUNTER — TELEPHONE (OUTPATIENT)
Dept: INTERNAL MEDICINE | Facility: OTHER | Age: 80
End: 2025-06-12

## 2025-06-12 ENCOUNTER — OFFICE VISIT (OUTPATIENT)
Dept: FAMILY MEDICINE | Facility: OTHER | Age: 80
End: 2025-06-12
Payer: COMMERCIAL

## 2025-06-12 VITALS
RESPIRATION RATE: 20 BRPM | HEART RATE: 61 BPM | DIASTOLIC BLOOD PRESSURE: 82 MMHG | TEMPERATURE: 98 F | SYSTOLIC BLOOD PRESSURE: 138 MMHG | HEIGHT: 67 IN | BODY MASS INDEX: 28.25 KG/M2 | OXYGEN SATURATION: 98 % | WEIGHT: 180 LBS

## 2025-06-12 DIAGNOSIS — F51.02 ADJUSTMENT INSOMNIA: Primary | ICD-10-CM

## 2025-06-12 RX ORDER — TRAZODONE HYDROCHLORIDE 50 MG/1
50 TABLET ORAL
Qty: 15 TABLET | Refills: 0 | Status: SHIPPED | OUTPATIENT
Start: 2025-06-12

## 2025-06-12 ASSESSMENT — ENCOUNTER SYMPTOMS
RESPIRATORY NEGATIVE: 1
CARDIOVASCULAR NEGATIVE: 1
GASTROINTESTINAL NEGATIVE: 1
ENDOCRINE NEGATIVE: 1
SLEEP DISTURBANCE: 1
NEUROLOGICAL NEGATIVE: 1
MUSCULOSKELETAL NEGATIVE: 1
CONSTITUTIONAL NEGATIVE: 1
NERVOUS/ANXIOUS: 1
EYES NEGATIVE: 1

## 2025-06-12 ASSESSMENT — ANXIETY QUESTIONNAIRES
4. TROUBLE RELAXING: NOT AT ALL
6. BECOMING EASILY ANNOYED OR IRRITABLE: NOT AT ALL
5. BEING SO RESTLESS THAT IT IS HARD TO SIT STILL: NOT AT ALL
GAD7 TOTAL SCORE: 0
8. IF YOU CHECKED OFF ANY PROBLEMS, HOW DIFFICULT HAVE THESE MADE IT FOR YOU TO DO YOUR WORK, TAKE CARE OF THINGS AT HOME, OR GET ALONG WITH OTHER PEOPLE?: NOT DIFFICULT AT ALL
7. FEELING AFRAID AS IF SOMETHING AWFUL MIGHT HAPPEN: NOT AT ALL
GAD7 TOTAL SCORE: 0
IF YOU CHECKED OFF ANY PROBLEMS ON THIS QUESTIONNAIRE, HOW DIFFICULT HAVE THESE PROBLEMS MADE IT FOR YOU TO DO YOUR WORK, TAKE CARE OF THINGS AT HOME, OR GET ALONG WITH OTHER PEOPLE: NOT DIFFICULT AT ALL
7. FEELING AFRAID AS IF SOMETHING AWFUL MIGHT HAPPEN: NOT AT ALL
2. NOT BEING ABLE TO STOP OR CONTROL WORRYING: NOT AT ALL
3. WORRYING TOO MUCH ABOUT DIFFERENT THINGS: NOT AT ALL
GAD7 TOTAL SCORE: 0
1. FEELING NERVOUS, ANXIOUS, OR ON EDGE: NOT AT ALL

## 2025-06-12 ASSESSMENT — PAIN SCALES - GENERAL: PAINLEVEL_OUTOF10: NO PAIN (0)

## 2025-06-12 NOTE — PROGRESS NOTES
"Chief Complaint   Patient presents with    Medication Request   Patient is here to be seen for help with sleeping.    FOOD SECURITY SCREENING QUESTIONS  Hunger Vital Signs:  Within the past 12 months we worried whether our food would run out before we got money to buy more. Never  Within the past 12 months the food we bought just didn't last and we didn't have money to get more. Never  Tiffany Gómez 6/12/2025 3:22 PM      Initial /82 (BP Location: Right arm, Patient Position: Sitting, Cuff Size: Adult Regular)   Pulse 61   Temp 98  F (36.7  C) (Tympanic)   Resp 20   Ht 1.702 m (5' 7\")   Wt 81.6 kg (180 lb)   SpO2 98%   BMI 28.19 kg/m   Estimated body mass index is 28.19 kg/m  as calculated from the following:    Height as of this encounter: 1.702 m (5' 7\").    Weight as of this encounter: 81.6 kg (180 lb).  Medication Reconciliation: complete    Tiffany Gómez   "

## 2025-06-12 NOTE — PROGRESS NOTES
Dajuan Basilio  1945    ASSESSMENT/PLAN      Presents to [facility] with concern for insomnia.  Patient typically has a difficult time sleeping and does use melatonin 10 mg.  Patient has had a stressful event, his wife has broken her leg and requires hospitalization.  Patient is very concerned about being able to sleep and would like something stronger.  Seroquel was not recommended given patient's age.  Will trial trazodone 50 mg.  Discussed with patient that he may feel groggy the next morning.  Patient has no plans on driving tomorrow and we will see how he feels after taking the medication.  I did discuss these recommendations with patient's son-in-law who will be assisting both the patient and his wife through this illness.  Of note patient appears to be very anxious at baseline.  Patient's vitals are stable and he appears nontoxic.        1. Adjustment insomnia (Primary)    - traZODone (DESYREL) 50 MG tablet; Take 1 tablet (50 mg) by mouth nightly as needed for sleep.  Dispense: 15 tablet; Refill: 0  - May use over-the-counter Tylenol or ibuprofen PRN  - Follow up as needed for new or worsening symptoms        *A shared decision making model was used.   *Patient and/or associated parties understood and were agreeable to treatment plan.   *Plan and all results were discussed.   *Explanation of diagnosis, treatment options and risk and benefits of medications reviewed with patient.    *Time was taken to answer all questions.   *Red flags symptoms were discussed and patient was advised when they should return for reevaluation or for prompt emergency evaluation.   *Patient was given verbal and written instructions on plan of care. Instructions were printed or are available on [email or web address] on electronic AVS.   *We discussed potential side effects of any prescribed or recommended therapies, as well as expectations for response to treatments.  *Patient discharged in stable condition    Sarai Fernández, PETRA  [place]  "[facility]    SUBJECTIVE  CHIEF COMPLAINT/ REASON FOR VISIT  Patient presents with:  Medication Request     HISTORY OF PRESENT ILLNESS  Dajuan Basilio is a pleasant 80 year old male presents to [facility] clinic today with concern for insomnia.  Patient typically has a difficult time sleeping and does use melatonin 10 mg.  Patient has had a stressful event, his wife has broken her leg and requires hospitalization.  Patient is very concerned about being able to sleep and would like something stronger.         I have reviewed the nursing notes.  I have reviewed allergies, medication list, problem list, and past medical history.    REVIEW OF SYSTEMS  Review of Systems   Constitutional: Negative.    HENT: Negative.     Eyes: Negative.    Respiratory: Negative.     Cardiovascular: Negative.    Gastrointestinal: Negative.    Endocrine: Negative.    Genitourinary: Negative.    Musculoskeletal: Negative.    Skin: Negative.    Neurological: Negative.    Psychiatric/Behavioral:  Positive for sleep disturbance. The patient is nervous/anxious.    All other systems reviewed and are negative.       VITAL SIGNS  Vitals:    06/12/25 1520   BP: 138/82   BP Location: Right arm   Patient Position: Sitting   Cuff Size: Adult Regular   Pulse: 61   Resp: 20   Temp: 98  F (36.7  C)   TempSrc: Tympanic   SpO2: 98%   Weight: 81.6 kg (180 lb)   Height: 1.702 m (5' 7\")      Body mass index is 28.19 kg/m .      OBJECTIVE  PHYSICAL EXAM  Physical Exam  Vitals and nursing note reviewed.   Constitutional:       Appearance: Normal appearance.   HENT:      Head: Normocephalic.      Nose: Nose normal.      Mouth/Throat:      Mouth: Mucous membranes are moist.   Cardiovascular:      Rate and Rhythm: Normal rate.      Pulses: Normal pulses.   Pulmonary:      Effort: Pulmonary effort is normal.   Abdominal:      Palpations: Abdomen is soft.   Musculoskeletal:         General: Normal range of motion.      Cervical back: Normal range of motion. "   Skin:     General: Skin is warm and dry.      Capillary Refill: Capillary refill takes less than 2 seconds.   Neurological:      General: No focal deficit present.      Mental Status: He is alert.

## 2025-06-12 NOTE — TELEPHONE ENCOUNTER
Patient is curretly waiting in the [facility] ED waiting room and would like a call back.  Please call Isai's phone [phone].    Effie Steiner on 6/12/2025 at 2:40 PM

## 2025-06-12 NOTE — TELEPHONE ENCOUNTER
"Writer spoke with patient who reports that he was sitting in the Emergency waiting room at [facility] due to his wife falling and breaking her femur.  Patient states \"I need something that is going to help me sleep.\"     Writer informed patient that he was recently prescribed Trazodone while at the the [facility] earlier today.      Patient stated \"Yeah she only gave me 15 tablets, and I don't know how strong they are, and I Have to be able to sleep.\"     Writer educated patient that his prescription is written for one tablet once nightly, and to not exceed this dose without talking to a provider due to possible effects.  Writer informed patient that message would be routed to PCP for further direction once he returned to clinic.      Patient verbalized understanding.      Penelope Pacheco LPN Ext. [phone] on 6/12/2025 at 3:48 PM    "

## 2025-07-23 ENCOUNTER — LAB (OUTPATIENT)
Dept: LAB | Facility: OTHER | Age: 80
End: 2025-07-23
Payer: MEDICARE

## 2025-07-23 DIAGNOSIS — E53.8 VITAMIN B12 DEFICIENCY: ICD-10-CM

## 2025-07-23 DIAGNOSIS — E78.2 MIXED HYPERLIPIDEMIA: ICD-10-CM

## 2025-07-23 DIAGNOSIS — E11.42 CONTROLLED TYPE 2 DIABETES MELLITUS WITH DIABETIC POLYNEUROPATHY, WITHOUT LONG-TERM CURRENT USE OF INSULIN (H): ICD-10-CM

## 2025-07-23 LAB
ALBUMIN SERPL BCG-MCNC: 4.5 G/DL (ref 3.5–5.2)
ALBUMIN UR-MCNC: NEGATIVE MG/DL
ALP SERPL-CCNC: 82 U/L (ref 40–150)
ALT SERPL W P-5'-P-CCNC: 13 U/L (ref 0–70)
ANION GAP SERPL CALCULATED.3IONS-SCNC: 11 MMOL/L (ref 7–15)
APPEARANCE UR: CLEAR
AST SERPL W P-5'-P-CCNC: 16 U/L (ref 0–45)
BILIRUB SERPL-MCNC: 0.6 MG/DL
BILIRUB UR QL STRIP: NEGATIVE
BUN SERPL-MCNC: 16 MG/DL (ref 8–23)
CALCIUM SERPL-MCNC: 9.4 MG/DL (ref 8.8–10.4)
CHLORIDE SERPL-SCNC: 106 MMOL/L (ref 98–107)
CHOLEST SERPL-MCNC: 120 MG/DL
COLOR UR AUTO: NORMAL
CREAT SERPL-MCNC: 0.68 MG/DL (ref 0.67–1.17)
CREAT UR-MCNC: 56.9 MG/DL
EGFRCR SERPLBLD CKD-EPI 2021: >90 ML/MIN/1.73M2
ERYTHROCYTE [DISTWIDTH] IN BLOOD BY AUTOMATED COUNT: 14 % (ref 10–15)
EST. AVERAGE GLUCOSE BLD GHB EST-MCNC: 137 MG/DL
FASTING STATUS PATIENT QL REPORTED: YES
FASTING STATUS PATIENT QL REPORTED: YES
GLUCOSE SERPL-MCNC: 101 MG/DL (ref 70–99)
GLUCOSE UR STRIP-MCNC: NEGATIVE MG/DL
HBA1C MFR BLD: 6.4 %
HCO3 SERPL-SCNC: 25 MMOL/L (ref 22–29)
HCT VFR BLD AUTO: 41 % (ref 40–53)
HDLC SERPL-MCNC: 45 MG/DL
HGB BLD-MCNC: 13.1 G/DL (ref 13.3–17.7)
HGB UR QL STRIP: NEGATIVE
KETONES UR STRIP-MCNC: NEGATIVE MG/DL
LDLC SERPL CALC-MCNC: 54 MG/DL
LEUKOCYTE ESTERASE UR QL STRIP: NEGATIVE
MCH RBC QN AUTO: 29 PG (ref 26.5–33)
MCHC RBC AUTO-ENTMCNC: 32 G/DL (ref 31.5–36.5)
MCV RBC AUTO: 91 FL (ref 78–100)
MICROALBUMIN UR-MCNC: 18.5 MG/L
MICROALBUMIN/CREAT UR: 32.51 MG/G CR (ref 0–17)
NITRATE UR QL: NEGATIVE
NONHDLC SERPL-MCNC: 75 MG/DL
PH UR STRIP: 5.5 [PH] (ref 5–9)
PLATELET # BLD AUTO: 218 10E3/UL (ref 150–450)
POTASSIUM SERPL-SCNC: 4 MMOL/L (ref 3.4–5.3)
PROT SERPL-MCNC: 7.2 G/DL (ref 6.4–8.3)
RBC # BLD AUTO: 4.51 10E6/UL (ref 4.4–5.9)
SODIUM SERPL-SCNC: 142 MMOL/L (ref 135–145)
SP GR UR STRIP: 1.01 (ref 1–1.03)
TRIGL SERPL-MCNC: 103 MG/DL
TSH SERPL DL<=0.005 MIU/L-ACNC: 0.73 UIU/ML (ref 0.3–4.2)
UROBILINOGEN UR STRIP-MCNC: NORMAL MG/DL
VIT B12 SERPL-MCNC: 1462 PG/ML (ref 232–1245)
WBC # BLD AUTO: 6.7 10E3/UL (ref 4–11)

## 2025-07-23 PROCEDURE — 83036 HEMOGLOBIN GLYCOSYLATED A1C: CPT | Mod: ZL

## 2025-07-23 PROCEDURE — 84443 ASSAY THYROID STIM HORMONE: CPT | Mod: ZL

## 2025-07-23 PROCEDURE — 85041 AUTOMATED RBC COUNT: CPT | Mod: ZL

## 2025-07-23 PROCEDURE — 82040 ASSAY OF SERUM ALBUMIN: CPT | Mod: ZL

## 2025-07-23 PROCEDURE — 81003 URINALYSIS AUTO W/O SCOPE: CPT | Mod: ZL

## 2025-07-23 PROCEDURE — 82043 UR ALBUMIN QUANTITATIVE: CPT | Mod: ZL

## 2025-07-23 PROCEDURE — 36415 COLL VENOUS BLD VENIPUNCTURE: CPT | Mod: ZL

## 2025-07-23 PROCEDURE — 80061 LIPID PANEL: CPT | Mod: ZL

## 2025-07-23 PROCEDURE — 82607 VITAMIN B-12: CPT | Mod: ZL

## 2025-07-24 ENCOUNTER — OFFICE VISIT (OUTPATIENT)
Dept: INTERNAL MEDICINE | Facility: OTHER | Age: 80
End: 2025-07-24
Attending: INTERNAL MEDICINE
Payer: COMMERCIAL

## 2025-07-24 VITALS
DIASTOLIC BLOOD PRESSURE: 66 MMHG | HEIGHT: 67 IN | SYSTOLIC BLOOD PRESSURE: 124 MMHG | HEART RATE: 53 BPM | OXYGEN SATURATION: 99 % | TEMPERATURE: 96.9 F | BODY MASS INDEX: 27.94 KG/M2 | WEIGHT: 178 LBS | RESPIRATION RATE: 16 BRPM

## 2025-07-24 DIAGNOSIS — F02.B0 MODERATE ALZHEIMER'S DEMENTIA WITHOUT BEHAVIORAL DISTURBANCE, PSYCHOTIC DISTURBANCE, MOOD DISTURBANCE, OR ANXIETY, UNSPECIFIED TIMING OF DEMENTIA ONSET (H): ICD-10-CM

## 2025-07-24 DIAGNOSIS — Z00.00 MEDICARE ANNUAL WELLNESS VISIT, SUBSEQUENT: Primary | ICD-10-CM

## 2025-07-24 DIAGNOSIS — F33.42 RECURRENT MAJOR DEPRESSIVE DISORDER, IN FULL REMISSION: ICD-10-CM

## 2025-07-24 DIAGNOSIS — E78.2 MIXED HYPERLIPIDEMIA: ICD-10-CM

## 2025-07-24 DIAGNOSIS — E03.4 HYPOTHYROIDISM DUE TO ACQUIRED ATROPHY OF THYROID: ICD-10-CM

## 2025-07-24 DIAGNOSIS — E11.42 CONTROLLED TYPE 2 DIABETES MELLITUS WITH DIABETIC POLYNEUROPATHY, WITHOUT LONG-TERM CURRENT USE OF INSULIN (H): ICD-10-CM

## 2025-07-24 DIAGNOSIS — M19.031 PRIMARY OSTEOARTHRITIS OF BOTH WRISTS: ICD-10-CM

## 2025-07-24 DIAGNOSIS — I25.10 CORONARY ARTERY DISEASE INVOLVING NATIVE CORONARY ARTERY OF NATIVE HEART WITHOUT ANGINA PECTORIS: ICD-10-CM

## 2025-07-24 DIAGNOSIS — M19.032 PRIMARY OSTEOARTHRITIS OF BOTH WRISTS: ICD-10-CM

## 2025-07-24 DIAGNOSIS — Z71.85 VACCINE COUNSELING: ICD-10-CM

## 2025-07-24 DIAGNOSIS — G30.9 MODERATE ALZHEIMER'S DEMENTIA WITHOUT BEHAVIORAL DISTURBANCE, PSYCHOTIC DISTURBANCE, MOOD DISTURBANCE, OR ANXIETY, UNSPECIFIED TIMING OF DEMENTIA ONSET (H): ICD-10-CM

## 2025-07-24 DIAGNOSIS — I10 BENIGN ESSENTIAL HYPERTENSION: ICD-10-CM

## 2025-07-24 DIAGNOSIS — J41.8 MIXED SIMPLE AND MUCOPURULENT CHRONIC BRONCHITIS (H): ICD-10-CM

## 2025-07-24 PROCEDURE — G2211 COMPLEX E/M VISIT ADD ON: HCPCS | Performed by: INTERNAL MEDICINE

## 2025-07-24 PROCEDURE — 1125F AMNT PAIN NOTED PAIN PRSNT: CPT | Performed by: INTERNAL MEDICINE

## 2025-07-24 PROCEDURE — G0439 PPPS, SUBSEQ VISIT: HCPCS | Performed by: INTERNAL MEDICINE

## 2025-07-24 PROCEDURE — 3074F SYST BP LT 130 MM HG: CPT | Performed by: INTERNAL MEDICINE

## 2025-07-24 PROCEDURE — 99214 OFFICE O/P EST MOD 30 MIN: CPT | Mod: 25 | Performed by: INTERNAL MEDICINE

## 2025-07-24 PROCEDURE — G0463 HOSPITAL OUTPT CLINIC VISIT: HCPCS

## 2025-07-24 PROCEDURE — 3078F DIAST BP <80 MM HG: CPT | Performed by: INTERNAL MEDICINE

## 2025-07-24 RX ORDER — LOSARTAN POTASSIUM 25 MG/1
25 TABLET ORAL DAILY
Qty: 90 TABLET | Refills: 4 | Status: SHIPPED | OUTPATIENT
Start: 2025-07-24

## 2025-07-24 RX ORDER — LEVOTHYROXINE SODIUM 75 UG/1
75 TABLET ORAL DAILY
Qty: 90 TABLET | Refills: 1 | Status: SHIPPED | OUTPATIENT
Start: 2025-07-24

## 2025-07-24 RX ORDER — BLOOD SUGAR DIAGNOSTIC
1 STRIP MISCELLANEOUS DAILY
Qty: 100 STRIP | Refills: 5 | Status: SHIPPED | OUTPATIENT
Start: 2025-07-24

## 2025-07-24 RX ORDER — ROSUVASTATIN CALCIUM 5 MG/1
5 TABLET, COATED ORAL DAILY
Qty: 90 TABLET | Refills: 4 | Status: SHIPPED | OUTPATIENT
Start: 2025-07-24

## 2025-07-24 RX ORDER — CITALOPRAM HYDROBROMIDE 10 MG/1
10 TABLET ORAL DAILY
Qty: 90 TABLET | Refills: 4 | Status: SHIPPED | OUTPATIENT
Start: 2025-07-24

## 2025-07-24 SDOH — HEALTH STABILITY: PHYSICAL HEALTH: ON AVERAGE, HOW MANY MINUTES DO YOU ENGAGE IN EXERCISE AT THIS LEVEL?: 60 MIN

## 2025-07-24 SDOH — HEALTH STABILITY: PHYSICAL HEALTH: ON AVERAGE, HOW MANY DAYS PER WEEK DO YOU ENGAGE IN MODERATE TO STRENUOUS EXERCISE (LIKE A BRISK WALK)?: 7 DAYS

## 2025-07-24 ASSESSMENT — ENCOUNTER SYMPTOMS
EYE PAIN: 1
NAUSEA: 0
DYSURIA: 0
HEMATOCHEZIA: 0
ARTHRALGIAS: 1
DIFFICULTY URINATING: 1
MYALGIAS: 0
PARESTHESIAS: 0
FREQUENCY: 0
PALPITATIONS: 0
SORE THROAT: 0
NUMBNESS: 1
ABDOMINAL PAIN: 0
HEADACHES: 1
VOMITING: 0
CONSTIPATION: 0
BRUISES/BLEEDS EASILY: 0
CHILLS: 0
JOINT SWELLING: 0
HEMATURIA: 0
AGITATION: 0
DIARRHEA: 0
NERVOUS/ANXIOUS: 1
FEVER: 0
SLEEP DISTURBANCE: 0
WHEEZING: 0
FATIGUE: 1
SHORTNESS OF BREATH: 0
BACK PAIN: 1
LIGHT-HEADEDNESS: 0
COUGH: 1
EYE REDNESS: 1
DIZZINESS: 0
HEARTBURN: 0

## 2025-07-24 ASSESSMENT — PATIENT HEALTH QUESTIONNAIRE - PHQ9
SUM OF ALL RESPONSES TO PHQ QUESTIONS 1-9: 4
SUM OF ALL RESPONSES TO PHQ QUESTIONS 1-9: 4
10. IF YOU CHECKED OFF ANY PROBLEMS, HOW DIFFICULT HAVE THESE PROBLEMS MADE IT FOR YOU TO DO YOUR WORK, TAKE CARE OF THINGS AT HOME, OR GET ALONG WITH OTHER PEOPLE: SOMEWHAT DIFFICULT

## 2025-07-24 ASSESSMENT — PAIN SCALES - GENERAL: PAINLEVEL_OUTOF10: MODERATE PAIN (4)

## 2025-07-24 ASSESSMENT — SOCIAL DETERMINANTS OF HEALTH (SDOH): HOW OFTEN DO YOU GET TOGETHER WITH FRIENDS OR RELATIVES?: NEVER

## 2025-07-24 NOTE — PATIENT INSTRUCTIONS
Blood pressure is well controlled.   Diabetes is well controlled.     Medications refilled.   Labs are stable.     Lab on 07/23/2025   Component Date Value Ref Range Status    Vitamin B12 07/23/2025 1,462 (H)  232 - 1,245 pg/mL Final    Sodium 07/23/2025 142  135 - 145 mmol/L Final    Potassium 07/23/2025 4.0  3.4 - 5.3 mmol/L Final    Carbon Dioxide (CO2) 07/23/2025 25  22 - 29 mmol/L Final    Anion Gap 07/23/2025 11  7 - 15 mmol/L Final    Urea Nitrogen 07/23/2025 16.0  8.0 - 23.0 mg/dL Final    Creatinine 07/23/2025 0.68  0.67 - 1.17 mg/dL Final    GFR Estimate 07/23/2025 >90  >60 mL/min/1.73m2 Final    eGFR calculated using 2021 CKD-EPI equation.    Calcium 07/23/2025 9.4  8.8 - 10.4 mg/dL Final    Chloride 07/23/2025 106  98 - 107 mmol/L Final    Glucose 07/23/2025 101 (H)  70 - 99 mg/dL Final    Alkaline Phosphatase 07/23/2025 82  40 - 150 U/L Final    AST 07/23/2025 16  0 - 45 U/L Final    ALT 07/23/2025 13  0 - 70 U/L Final    Protein Total 07/23/2025 7.2  6.4 - 8.3 g/dL Final    Albumin 07/23/2025 4.5  3.5 - 5.2 g/dL Final    Bilirubin Total 07/23/2025 0.6  <=1.2 mg/dL Final    Patient Fasting > 8hrs? 07/23/2025 Yes   Final    Cholesterol 07/23/2025 120  <200 mg/dL Final    Triglycerides 07/23/2025 103  <150 mg/dL Final    Direct Measure HDL 07/23/2025 45  >=40 mg/dL Final    LDL Cholesterol Calculated 07/23/2025 54  <100 mg/dL Final    LDL calculated using the Friedewald equation.    Non HDL Cholesterol 07/23/2025 75  <130 mg/dL Final    Patient Fasting > 8hrs? 07/23/2025 Yes   Final    Creatinine Urine mg/dL 07/23/2025 56.9  mg/dL Final    The reference ranges have not been established in urine creatinine. The results should be integrated into the clinical context for interpretation.    Albumin Urine mg/L 07/23/2025 18.5  mg/L Final    The reference ranges have not been established in urine albumin. The results should be integrated into the clinical context for interpretation.    Albumin Urine mg/g Cr  07/23/2025 32.51 (H)  0.00 - 17.00 mg/g Cr Final    Microalbuminuria is defined as an albumin:creatinine ratio of 17 to 299 for males and 25 to 299 for females. A ratio of albumin:creatinine of 300 or higher is indicative of overt proteinuria.  Due to biologic variability, positive results should be confirmed by a second, first-morning random or 24-hour timed urine specimen. If there is discrepancy, a third specimen is recommended. When 2 out of 3 results are in the microalbuminuria range, this is evidence for incipient nephropathy and warrants increased efforts at glucose control, blood pressure control, and institution of therapy with an angiotensin-converting-enzyme (ACE) inhibitor (if the patient can tolerate it).      WBC Count 07/23/2025 6.7  4.0 - 11.0 10e3/uL Final    RBC Count 07/23/2025 4.51  4.40 - 5.90 10e6/uL Final    Hemoglobin 07/23/2025 13.1 (L)  13.3 - 17.7 g/dL Final    Hematocrit 07/23/2025 41.0  40.0 - 53.0 % Final    MCV 07/23/2025 91  78 - 100 fL Final    MCH 07/23/2025 29.0  26.5 - 33.0 pg Final    MCHC 07/23/2025 32.0  31.5 - 36.5 g/dL Final    RDW 07/23/2025 14.0  10.0 - 15.0 % Final    Platelet Count 07/23/2025 218  150 - 450 10e3/uL Final    Estimated Average Glucose 07/23/2025 137 (H)  <117 mg/dL Final    Hemoglobin A1C 07/23/2025 6.4 (H)  <5.7 % Final    Normal <5.7%   Prediabetes 5.7-6.4%    Diabetes 6.5% or higher     Note: Adopted from ADA consensus guidelines.    TSH 07/23/2025 0.73  0.30 - 4.20 uIU/mL Final    Color Urine 07/23/2025 Light Yellow  Colorless, Straw, Light Yellow, Yellow Final    Appearance Urine 07/23/2025 Clear  Clear Final    Glucose Urine 07/23/2025 Negative  Negative mg/dL Final    Bilirubin Urine 07/23/2025 Negative  Negative Final    Ketones Urine 07/23/2025 Negative  Negative mg/dL Final    Specific Gravity Urine 07/23/2025 1.012  1.000 - 1.030 Final    Blood Urine 07/23/2025 Negative  Negative Final    pH Urine 07/23/2025 5.5  5.0 - 9.0 Final    Protein  Albumin Urine 07/23/2025 Negative  Negative mg/dL Final    Urobilinogen Urine 07/23/2025 Normal  Normal mg/dL Final    Nitrite Urine 07/23/2025 Negative  Negative Final    Leukocyte Esterase Urine 07/23/2025 Negative  Negative Final      Aspects of Diabetes:   Recent Labs   Lab Test 07/23/25  1339 03/13/25  1334 02/06/25  0806 01/03/25  1100 11/04/24  0938 07/05/22  0848 03/29/22  0953   A1C 6.4*  --  6.5*  --  6.5*   < > 6.6*   LDL 54  --  66  --  36   < > 76   HDL 45  --  49  --  42   < > 41   TRIG 103  --  102  --  70   < > 107   ALT 13  --  14 17 10   < > 16   CR 0.68 0.64* 0.70 0.67 0.59*   < > 0.83   GFRESTIMATED >90 >90 >90 >90 >90   < > 90   POTASSIUM 4.0 4.1 3.9 4.3 4.1   < > 3.9   TSH 0.73  --  1.91  --  1.14   < > 4.14*   T4  --   --   --   --   --   --  0.68   WBC 6.7  --  7.9 9.0 7.5   < > 5.6   HGB 13.1*  --  13.8 13.4 12.7*   < > 14.8     --  264 275 279   < > 220   ALBUMIN 4.5  --  4.5 4.4 4.4   < > 4.6    < > = values in this interval not displayed.      Hemoglobin A1c  Goal range is under 8%. Best is 6.5 to 7   Blood Pressure 124/66 Goal to keep less than 140/90   Tobacco  reports that he quit smoking about 41 years ago. His smoking use included cigarettes. He started smoking about 68 years ago. He has a 26.8 pack-year smoking history. He has never been exposed to tobacco smoke. He has never used smokeless tobacco. Goal to abstain from tobacco   Aspirin or Plavix Anti-platelet therapy Aspirin or Plavix reduces risk of heart disease and stroke  -- sometimes used with other blood thinners, depending on bleeding risk and risk factors.    ACE/ARB Specific blood pressure meds These medications can reduce risk of kidney disease   Cholesterol Statins (Lipitor, Crestor, vs others) Statins reduce risk of heart disease and stroke   Eye Exam -- Do Yearly -- Annual diabetic eye exam   Healthy weight Wt Readings from Last 4 Encounters:   07/24/25 80.7 kg (178 lb)   06/12/25 81.6 kg (180 lb)   04/25/25  81.6 kg (180 lb)   02/18/25 83.9 kg (185 lb)      Body mass index is 28.3 kg/m .  Goal BMI under 30, best is under 25.      -- Trying to exercise daily (goal at least 20 min/day) with moderate aerobic activity   -- Eat healthy (resources from ADA at http://www.diabetes.org/)   -- Taking good care of my feet. Consider seeing the Podiatrist   -- Check blood sugars as directed, record in log book and bring to every appointment    Insurance companies are grading you and I on your blood sugar control -- Goal is to get your A1c down to 7.9% or lower and NO Smoking!  -- Medicare and most insurance companies, will only cover Hemoglobin A1c labs to be rechecked every 91+ days.      Return for Diabetes labs and clinic follow-up appointment every 3 to 4 months.    Schedule lab only appointment --- A few days AFTER: 10/22/25   Schedule clinic appointment with Dr. Pickens -- Same day as labs, or 1-2 days later.      Patient Education   Preventive Care Advice   This is general advice given by our system to help you stay healthy. However, your care team may have specific advice just for you. Please talk to your care team about your preventive care needs.  Nutrition  Eat 5 or more servings of fruits and vegetables each day.  Try wheat bread, brown rice and whole grain pasta (instead of white bread, rice, and pasta).  Get enough calcium and vitamin D. Check the label on foods and aim for 100% of the RDA (recommended daily allowance).  Lifestyle  Exercise at least 150 minutes each week  (30 minutes a day, 5 days a week).  Do muscle strengthening activities 2 days a week. These help control your weight and prevent disease.  No smoking.  Wear sunscreen to prevent skin cancer.  Have a dental exam and cleaning every 6 months.  Yearly exams  See your health care team every year to talk about:  Any changes in your health.  Any medicines your care team has prescribed.  Preventive care, family planning, and ways to prevent chronic  diseases.  Shots (vaccines)   HPV shots (up to age 26), if you've never had them before.  Hepatitis B shots (up to age 59), if you've never had them before.  COVID-19 shot: Get this shot when it's due.  Flu shot: Get a flu shot every year.  Tetanus shot: Get a tetanus shot every 10 years.  Pneumococcal, hepatitis A, and RSV shots: Ask your care team if you need these based on your risk.  Shingles shot (for age 50 and up)  General health tests  Diabetes screening:  Starting at age 35, Get screened for diabetes at least every 3 years.  If you are younger than age 35, ask your care team if you should be screened for diabetes.  Cholesterol test: At age 39, start having a cholesterol test every 5 years, or more often if advised.  Bone density scan (DEXA): At age 50, ask your care team if you should have this scan for osteoporosis (brittle bones).  Hepatitis C: Get tested at least once in your life.  STIs (sexually transmitted infections)  Before age 24: Ask your care team if you should be screened for STIs.  After age 24: Get screened for STIs if you're at risk. You are at risk for STIs (including HIV) if:  You are sexually active with more than one person.  You don't use condoms every time.  You or a partner was diagnosed with a sexually transmitted infection.  If you are at risk for HIV, ask about PrEP medicine to prevent HIV.  Get tested for HIV at least once in your life, whether you are at risk for HIV or not.  Cancer screening tests  Cervical cancer screening: If you have a cervix, begin getting regular cervical cancer screening tests starting at age 21.  Breast cancer scan (mammogram): If you've ever had breasts, begin having regular mammograms starting at age 40. This is a scan to check for breast cancer.  Colon cancer screening: It is important to start screening for colon cancer at age 45.  Have a colonoscopy test every 10 years (or more often if you're at risk) Or, ask your provider about stool tests like a  FIT test every year or Cologuard test every 3 years.  To learn more about your testing options, visit:   .  For help making a decision, visit:   https://bit.ly/dp87192.  Prostate cancer screening test: If you have a prostate, ask your care team if a prostate cancer screening test (PSA) at age 55 is right for you.  Lung cancer screening: If you are a current or former smoker ages 50 to 80, ask your care team if ongoing lung cancer screenings are right for you.  For informational purposes only. Not to replace the advice of your health care provider. Copyright   2023 [employer]. All rights reserved. Clinically reviewed by the [facility]. [employer] [id number] - REV 01/24.  Relationships for Good Health  Relationships are important for our health and happiness. Social isolation, loneliness and lack of support are bad for your health. Studies show that loneliness can harm health and limit your life span as much as high blood pressure and smoking.   Take some time to reflect on your relationships. Then answer these questions:  Are there people in your life that cause you stress or drain your energy? What can you do to set limits?  ________________________________________________________________________________________________________________________________________________________________________________________________________________________________________________________________________________________________________________________________________________  Who do you enjoy spending time with? Who can you go to for support?  ________________________________________________________________________________________________________________________________________________________________________________________________________________________________________________________________________________________________________________________________________________  What can you do to  improve your relationships with others?  __________________________________________________________________________________________________________________________________________________________________________________________________________________  ______________________________________________________________________________________________________________________________  What do you like most about your relationships with others?  ________________________________________________________________________________________________________________________________________________________________________________________________________________________________________________________________________________________________________________________________________________  My goal: ______________________________________________________________________  I will: ______________________________________________________________________________________________________________________________________________________________________________________________    For informational purposes only. Not to replace the advice of your health care provider. Copyright   2018 [facility]. All rights reserved. Clinically reviewed by [facility]. [employer] 385981 - Rev 06/24.      This document is complete and the patient is ready for discharge.

## 2025-07-24 NOTE — NURSING NOTE
"Chief Complaint   Patient presents with    Medicare Visit       Initial /66   Pulse 53   Temp 96.9  F (36.1  C) (Temporal)   Resp 16   Ht 1.689 m (5' 6.5\")   Wt 80.7 kg (178 lb)   SpO2 99%   BMI 28.30 kg/m   Estimated body mass index is 28.3 kg/m  as calculated from the following:    Height as of this encounter: 1.689 m (5' 6.5\").    Weight as of this encounter: 80.7 kg (178 lb).  Medication Reconciliation: complete        Anita Velásquez LPN      "

## 2025-07-24 NOTE — PROGRESS NOTES
Assessment & Plan     ICD-10-CM    1. Medicare annual wellness visit, subsequent  Z00.00       2. Vaccine counseling  Z71.85       3. Controlled type 2 diabetes mellitus with diabetic polyneuropathy, without long-term current use of insulin (H)  E11.42 blood glucose (ACCU-CHEK ONEL PLUS) test strip     metFORMIN (GLUCOPHAGE) 500 MG tablet      4. Benign essential hypertension  I10 losartan (COZAAR) 25 MG tablet      5. Recurrent major depressive disorder, in full remission  F33.42 citalopram (CELEXA) 10 MG tablet      6. Hypothyroidism due to acquired atrophy of thyroid  E03.4 levothyroxine (SYNTHROID/LEVOTHROID) 75 MCG tablet      7. Mixed hyperlipidemia  E78.2 rosuvastatin (CRESTOR) 5 MG tablet      8. Coronary artery disease involving native coronary artery of native heart without angina pectoris  I25.10 rosuvastatin (CRESTOR) 5 MG tablet      9. Primary osteoarthritis of both wrists  M19.031     M19.032       10. Mixed simple and mucopurulent chronic bronchitis (H)  J41.8       11. Moderate Alzheimer's dementia without behavioral disturbance, psychotic disturbance, mood disturbance, or anxiety, unspecified timing of dementia onset (H)  G30.9     F02.B0          Patient presents for Medicare annual wellness visit as well as follow-up multiple issues.    Patient has well-controlled diabetes, managing with metformin.  Still checking blood sugars intermittently.  Needs refills.  No side effects reported.  Metformin refilled as noted.    History of depression, feeling quite stressed right now because his wife is needing a lot of care at home.  She fell and broke her femur.  Does report that emotionally he is doing reasonably well with the citalopram.  Declines need for dose adjustment.  Refill sent to pharmacy.    Coronary disease, appears stable.  Denies exertional angina.  Continues with low-dose Crestor.  Cholesterol levels are at goal.  No changes for now.    Wrist pain, bilateral.  Taking Tylenol  intermittently.    Mixed simple bronchitis, reasonably stable.  Air quality has affected his breathing somewhat, when he is outside.    Moderate Alzheimer's dementia, relatively stable.  Does acknowledge some stable memory loss/memory problems.  Continue monitoring.    HYPERTENSION - Ongoing. Blood pressure is currently well controlled.  Medication side effects: None. Denies syncope or presyncope.  Continue current medications.   Medication list reviewed/updated. Refills completed as needed.      MIXED HYPERLIPIDEMIA.  Ongoing. LDL is at goal: Yes. Triglycerides are at goal: Yes.    Medication side effects reported: None.   Continue current medications for now. Medication list reviewed/updated. Refills completed as needed.  Recent Labs   Lab Test 07/23/25  1339 02/06/25  0806   CHOL 120 135   HDL 45 49   LDL 54 66   TRIG 103 102     COPD - Patient has a longstanding history of COPD: Mild = FeV1 > 80%. Patient has been doing reasonably well overall noting NO SYMPTOMS and continues on NO medication regimen without adverse reactions or side effects.        Reports memory loss is relatively stable. No recent changes.   Caring for his wife, who fell and broke her leg. She is at home mending / healing.     HYPOTHYROIDISM - Patient has a longstanding history of hypothyroidism. Reports doing reasonably well. Reports: denies fatigue, weight changes, heat/cold intolerance, bowel/skin changes or CVS symptoms.  Continue: Synthroid oral thyroid replacement.  Denies adverse medication reactions or side effects.                       TSH   Date Value Ref Range Status   07/23/2025 0.73 0.30 - 4.20 uIU/mL Final   10/04/2022 2.52 0.40 - 4.00 mU/L Final        Chronic Kidney Disease, Stage 2 (GFR 60-89), chronic, ongoing.  Kidney function had been slowly declining.  Encourage NSAID avoidance.    Recent Labs   Lab Test 07/23/25  1339 03/13/25  1334   CR 0.68 0.64*   GFRESTIMATED >90 >90        Vaccine counseling completed.  Encourage  "routine / annual vaccinations.    Type 2 Diabetes Mellitus, with nephropathy, with neuropathy, Reports ongoing/previous: numbness and burning.  Blood sugar control has been good with minimal hyperglycemia. Doing well with diet, oral agents, and exercise - xNO Insulin injections per day.  Medication list reviewed/updated. Refills completed as needed.    Complicating factors include but are not limited to: hypertension, hyperlipidemia, neuropathy, and chronic kidney disease.     Recent Labs   Lab Test 07/23/25  1339 03/13/25  1334 02/06/25  0806 01/03/25  1100 11/04/24  0938 07/05/22  0848 03/29/22  0953   A1C 6.4*  --  6.5*  --  6.5*   < > 6.6*   LDL 54  --  66  --  36   < > 76   HDL 45  --  49  --  42   < > 41   TRIG 103  --  102  --  70   < > 107   ALT 13  --  14 17 10   < > 16   CR 0.68 0.64* 0.70 0.67 0.59*   < > 0.83   GFRESTIMATED >90 >90 >90 >90 >90   < > 90   POTASSIUM 4.0 4.1 3.9 4.3 4.1   < > 3.9   TSH 0.73  --  1.91  --  1.14   < > 4.14*   T4  --   --   --   --   --   --  0.68   WBC 6.7  --  7.9 9.0 7.5   < > 5.6   HGB 13.1*  --  13.8 13.4 12.7*   < > 14.8     --  264 275 279   < > 220   ALBUMIN 4.5  --  4.5 4.4 4.4   < > 4.6    < > = values in this interval not displayed.      The longitudinal plan of care for the diagnosis(es)/condition(s) as documented were addressed during this visit. Due to the added complexity in care, I will continue to support Dajuan in the subsequent management and with ongoing continuity of care.             BMI  Estimated body mass index is 28.3 kg/m  as calculated from the following:    Height as of this encounter: 1.689 m (5' 6.5\").    Weight as of this encounter: 80.7 kg (178 lb).     Counseling  Appropriate preventive services were addressed with this patient via screening, questionnaire, or discussion as appropriate for fall prevention, nutrition, physical activity, Tobacco-use cessation, social engagement, weight loss and cognition.  Checklist reviewing preventive " services available has been given to the patient.  Reviewed patient's diet, addressing concerns and/or questions.   Patient is at risk for social isolation and has been provided with information about the benefit of social connection.         Return in about 3 months (around 10/24/2025) for - Labs every 91+ days, with DM Follow-up, Same Day or 1-2 days later with Dr. Pickens.    Review of Systems   Constitutional:  Positive for fatigue (ongoing..). Negative for chills and fever.   HENT:  Positive for hearing loss. Negative for congestion, ear pain, nosebleeds and sore throat.    Eyes:  Positive for pain, redness and visual disturbance.        + Droopy left eyelid.    Respiratory:  Positive for cough. Negative for shortness of breath and wheezing.    Cardiovascular:  Negative for chest pain, palpitations and peripheral edema.   Gastrointestinal:  Negative for abdominal pain, constipation, diarrhea, heartburn, hematochezia, nausea and vomiting.        + left inguinal pains intermittently   Endocrine: Negative for cold intolerance and heat intolerance.   Genitourinary:  Positive for difficulty urinating (Feeling of incomplete bladder emptying and urinary frequency). Negative for dysuria, frequency, genital sores, hematuria, impotence, penile discharge and urgency.   Musculoskeletal:  Positive for arthralgias (Bilateral wrist pain), back pain and gait problem (staggering at times, low back and left knee pain). Negative for joint swelling and myalgias.        Bilateral feet with bunions   Skin:  Negative for pallor and rash.   Allergic/Immunologic: Negative for immunocompromised state.   Neurological:  Positive for numbness (right > left feet - better recently.) and headaches. Negative for dizziness, light-headedness and paresthesias.   Hematological:  Does not bruise/bleed easily.   Psychiatric/Behavioral:  Positive for mood changes. Negative for agitation and sleep disturbance. The patient is nervous/anxious.         +  short term memory loss -- stopped Exelon and Namenda -- due to side effects.   + reporting less anxiety / depression -- Seems much improved with Citalopram.       Preventive Care Visit  [facility]  Cm Pickens MD, Internal Medicine  Jul 24, 2025      Alisha Graff is a 80 year old, presenting for the following:  Medicare Visit        7/24/2025    10:24 AM   Additional Questions   Roomed by CAMERON Howard   Accompanied by Self         7/24/2025    10:24 AM   Patient Reported Additional Medications   Patient reports taking the following new medications N/A          HPI         Advance Care Planning    Discussed advance care planning with patient; however, patient declined at this time.        4/16/2024   General Health   How would you rate your overall physical health? (!) FAIR   Feel stress (tense, anxious, or unable to sleep) To some extent         4/16/2024   Nutrition   Diet: Regular (no restrictions)         4/16/2024   Exercise   Days per week of moderate/strenous exercise 3 days   Average minutes spent exercising at this level 60 min         4/16/2024   Social Factors   Frequency of gathering with friends or relatives Three times a week   Worry food won't last until get money to buy more No   Food not last or not have enough money for food? No   Do you have housing? (Housing is defined as stable permanent housing and does not include staying outside in a car, in a tent, in an abandoned building, in an overnight shelter, or couch-surfing.) Yes   Are you worried about losing your housing? No   Lack of transportation? No   Unable to get utilities (heat,electricity)? No         4/16/2024   Activities of Daily Living- Home Safety   Needs help with the following daily activites None of the above   Safety concerns in the home None of the above         4/16/2024   Dental   Dentist two times every year? Yes         4/16/2024   Hearing Screening   Hearing concerns? None of the above            2024   Urinary Incontinence Screening   Bothered by leaking urine in past 6 months No         Today's PHQ-9 Score:       2025    10:34 AM   PHQ-9 SCORE   PHQ-9 Total Score MyChart 4 (Minimal depression)   PHQ-9 Total Score 4        Proxy-reported           2024   Substance Use   Alcohol more than 3/day or more than 7/wk No   Do you have a current opioid prescription? No   How severe/bad is pain from 1 to 10? 3/10   Do you use any other substances recreationally? No     Social History     Tobacco Use    Smoking status: Former     Current packs/day: 0.00     Average packs/day: 1 pack/day for 26.8 years (26.8 ttl pk-yrs)     Types: Cigarettes     Start date:      Quit date: 10/25/1983     Years since quittin.7     Passive exposure: Never    Smokeless tobacco: Never   Vaping Use    Vaping status: Never Used   Substance Use Topics    Alcohol use: No    Drug use: No                 Reviewed and updated as needed this visit by Provider   Tobacco  Allergies  Meds  Problems  Med Hx  Surg Hx  Fam Hx     Sexual Activity            Current providers sharing in care for this patient include:  Patient Care Team:  Cm Pickens MD as PCP - General (Internal Medicine)  Cm Pickens MD as Assigned PCP  Foreign Loyd MD as Assigned Musculoskeletal Provider  Josse Voss APRN CNP as Assigned Surgical Provider    The following health maintenance items are reviewed in Epic and correct as of today:  Health Maintenance   Topic Date Due    URINE DRUG SCREEN  Never done    CONTROLLED SUBSTANCE AGREEMENT FOR CHRONIC PAIN MANAGEMENT  Never done    RSV VACCINE (1 - 1-dose 75+ series) Never done    DIABETIC FOOT EXAM  2022    COVID-19 VACCINE (2024- season) 2025    INFLUENZA VACCINE (1) 2025    A1C  2026    EYE EXAM  2026    NEERAJ ASSESSMENT  2026    BMP  2026    LIPID  2026    MICROALBUMIN  2026    TSH W/FREE T4 REFLEX  2026    MEDICARE  "ANNUAL WELLNESS VISIT  07/24/2026    FALL RISK ASSESSMENT  07/24/2026    PHQ-9  07/24/2026    COLORECTAL CANCER SCREENING  05/22/2030    ADVANCE CARE PLANNING  07/24/2030    DTAP/TDAP/TD VACCINE (3 - Td or Tdap) 12/06/2033    SPIROMETRY  Completed    PNEUMOCOCCAL VACCINE 50+ YEARS  Completed    URINALYSIS  Completed    HPV VACCINE (No Doses Required) Completed    ZOSTER VACCINE  Completed    COPD ACTION PLAN  Addressed    DEPRESSION ACTION PLAN  Addressed    MENINGITIS VACCINE  Aged Out    HEPATITIS A VACCINE  Discontinued            Objective    Exam  /66   Pulse 53   Temp 96.9  F (36.1  C) (Temporal)   Resp 16   Ht 1.689 m (5' 6.5\")   Wt 80.7 kg (178 lb)   SpO2 99%   BMI 28.30 kg/m     Estimated body mass index is 28.3 kg/m  as calculated from the following:    Height as of this encounter: 1.689 m (5' 6.5\").    Weight as of this encounter: 80.7 kg (178 lb).    Physical Exam  Constitutional:       Appearance: Normal appearance. He is not ill-appearing.   Eyes:      General: No scleral icterus.     Conjunctiva/sclera: Conjunctivae normal.   Neck:      Vascular: No carotid bruit.   Cardiovascular:      Rate and Rhythm: Normal rate and regular rhythm.      Pulses: Normal pulses.   Pulmonary:      Effort: Pulmonary effort is normal.      Breath sounds: No wheezing.   Abdominal:      Palpations: Abdomen is soft.      Tenderness: There is no abdominal tenderness.   Musculoskeletal:         General: Tenderness (Bilateral wrists with mild swelling / tenderness) present.      Right lower leg: Edema present.      Left lower leg: Edema present.   Skin:     Findings: No rash.   Neurological:      Mental Status: He is alert. Mental status is at baseline.   Psychiatric:         Mood and Affect: Mood normal.               7/24/2025   Mini Cog   Clock Draw Score 0 Abnormal   3 Item Recall 0 objects recalled   Mini Cog Total Score 0              Signed Electronically by: Cm Pickens MD    Answers submitted by the " patient for this visit:  Patient Health Questionnaire (Submitted on 7/24/2025)  If you checked off any problems, how difficult have these problems made it for you to do your work, take care of things at home, or get along with other people?: Somewhat difficult  PHQ9 TOTAL SCORE: 4

## 2025-07-25 ENCOUNTER — TRANSFERRED RECORDS (OUTPATIENT)
Dept: HEALTH INFORMATION MANAGEMENT | Facility: OTHER | Age: 80
End: 2025-07-25
Payer: COMMERCIAL

## (undated) RX ORDER — METHYLPREDNISOLONE ACETATE 80 MG/ML
INJECTION, SUSPENSION INTRA-ARTICULAR; INTRALESIONAL; INTRAMUSCULAR; SOFT TISSUE
Status: DISPENSED
Start: 2020-01-15

## (undated) RX ORDER — CYANOCOBALAMIN 1000 UG/ML
INJECTION, SOLUTION INTRAMUSCULAR; SUBCUTANEOUS
Status: DISPENSED
Start: 2019-02-28

## (undated) RX ORDER — CYANOCOBALAMIN 1000 UG/ML
INJECTION, SOLUTION INTRAMUSCULAR; SUBCUTANEOUS
Status: DISPENSED
Start: 2018-08-15

## (undated) RX ORDER — CYANOCOBALAMIN 1000 UG/ML
INJECTION, SOLUTION INTRAMUSCULAR; SUBCUTANEOUS
Status: DISPENSED
Start: 2022-05-26

## (undated) RX ORDER — CYANOCOBALAMIN 1000 UG/ML
INJECTION, SOLUTION INTRAMUSCULAR; SUBCUTANEOUS
Status: DISPENSED
Start: 2022-07-05

## (undated) RX ORDER — TRIAMCINOLONE ACETONIDE 40 MG/ML
INJECTION, SUSPENSION INTRA-ARTICULAR; INTRAMUSCULAR
Status: DISPENSED
Start: 2024-07-17

## (undated) RX ORDER — CYANOCOBALAMIN 1000 UG/ML
INJECTION, SOLUTION INTRAMUSCULAR; SUBCUTANEOUS
Status: DISPENSED
Start: 2018-05-03

## (undated) RX ORDER — LIDOCAINE HYDROCHLORIDE 10 MG/ML
INJECTION, SOLUTION EPIDURAL; INFILTRATION; INTRACAUDAL; PERINEURAL
Status: DISPENSED
Start: 2020-01-15

## (undated) RX ORDER — CYANOCOBALAMIN 1000 UG/ML
INJECTION, SOLUTION INTRAMUSCULAR; SUBCUTANEOUS
Status: DISPENSED
Start: 2018-05-10

## (undated) RX ORDER — BUPIVACAINE HYDROCHLORIDE 5 MG/ML
INJECTION, SOLUTION EPIDURAL; INTRACAUDAL
Status: DISPENSED
Start: 2019-06-04

## (undated) RX ORDER — CYANOCOBALAMIN 1000 UG/ML
INJECTION, SOLUTION INTRAMUSCULAR; SUBCUTANEOUS
Status: DISPENSED
Start: 2018-09-12

## (undated) RX ORDER — CYANOCOBALAMIN 1000 UG/ML
INJECTION, SOLUTION INTRAMUSCULAR; SUBCUTANEOUS
Status: DISPENSED
Start: 2018-10-12

## (undated) RX ORDER — CYANOCOBALAMIN 1000 UG/ML
INJECTION, SOLUTION INTRAMUSCULAR; SUBCUTANEOUS
Status: DISPENSED
Start: 2022-06-23

## (undated) RX ORDER — ONDANSETRON 2 MG/ML
INJECTION INTRAMUSCULAR; INTRAVENOUS
Status: DISPENSED
Start: 2019-03-20

## (undated) RX ORDER — CYANOCOBALAMIN 1000 UG/ML
INJECTION, SOLUTION INTRAMUSCULAR; SUBCUTANEOUS
Status: DISPENSED
Start: 2019-01-03

## (undated) RX ORDER — MECLIZINE HCL 12.5 MG 12.5 MG/1
TABLET ORAL
Status: DISPENSED
Start: 2019-03-20

## (undated) RX ORDER — CYANOCOBALAMIN 1000 UG/ML
INJECTION, SOLUTION INTRAMUSCULAR; SUBCUTANEOUS
Status: DISPENSED
Start: 2020-01-06

## (undated) RX ORDER — CYANOCOBALAMIN 1000 UG/ML
INJECTION, SOLUTION INTRAMUSCULAR; SUBCUTANEOUS
Status: DISPENSED
Start: 2022-05-12

## (undated) RX ORDER — CYANOCOBALAMIN 1000 UG/ML
INJECTION, SOLUTION INTRAMUSCULAR; SUBCUTANEOUS
Status: DISPENSED
Start: 2018-05-24

## (undated) RX ORDER — CYANOCOBALAMIN 1000 UG/ML
INJECTION, SOLUTION INTRAMUSCULAR; SUBCUTANEOUS
Status: DISPENSED
Start: 2018-04-19

## (undated) RX ORDER — CYANOCOBALAMIN 1000 UG/ML
INJECTION, SOLUTION INTRAMUSCULAR; SUBCUTANEOUS
Status: DISPENSED
Start: 2022-03-31

## (undated) RX ORDER — REGADENOSON 0.08 MG/ML
INJECTION, SOLUTION INTRAVENOUS
Status: DISPENSED
Start: 2025-01-08

## (undated) RX ORDER — CYANOCOBALAMIN 1000 UG/ML
INJECTION, SOLUTION INTRAMUSCULAR; SUBCUTANEOUS
Status: DISPENSED
Start: 2020-12-18

## (undated) RX ORDER — DIAZEPAM 5 MG
TABLET ORAL
Status: DISPENSED
Start: 2019-03-20

## (undated) RX ORDER — LIDOCAINE HYDROCHLORIDE 10 MG/ML
INJECTION, SOLUTION INFILTRATION; PERINEURAL
Status: DISPENSED
Start: 2019-06-04

## (undated) RX ORDER — CYANOCOBALAMIN 1000 UG/ML
INJECTION, SOLUTION INTRAMUSCULAR; SUBCUTANEOUS
Status: DISPENSED
Start: 2019-04-22

## (undated) RX ORDER — CYANOCOBALAMIN 1000 UG/ML
INJECTION, SOLUTION INTRAMUSCULAR; SUBCUTANEOUS
Status: DISPENSED
Start: 2019-11-26

## (undated) RX ORDER — CYANOCOBALAMIN 1000 UG/ML
INJECTION, SOLUTION INTRAMUSCULAR; SUBCUTANEOUS
Status: DISPENSED
Start: 2019-12-11

## (undated) RX ORDER — CYANOCOBALAMIN 1000 UG/ML
INJECTION, SOLUTION INTRAMUSCULAR; SUBCUTANEOUS
Status: DISPENSED
Start: 2022-10-05

## (undated) RX ORDER — CYANOCOBALAMIN 1000 UG/ML
INJECTION, SOLUTION INTRAMUSCULAR; SUBCUTANEOUS
Status: DISPENSED
Start: 2022-04-14

## (undated) RX ORDER — CYANOCOBALAMIN 1000 UG/ML
INJECTION, SOLUTION INTRAMUSCULAR; SUBCUTANEOUS
Status: DISPENSED
Start: 2018-07-11

## (undated) RX ORDER — CYANOCOBALAMIN 1000 UG/ML
INJECTION, SOLUTION INTRAMUSCULAR; SUBCUTANEOUS
Status: DISPENSED
Start: 2018-11-16

## (undated) RX ORDER — ASPIRIN 81 MG/1
TABLET, CHEWABLE ORAL
Status: DISPENSED
Start: 2021-10-19

## (undated) RX ORDER — CYANOCOBALAMIN 1000 UG/ML
INJECTION, SOLUTION INTRAMUSCULAR; SUBCUTANEOUS
Status: DISPENSED
Start: 2019-07-31

## (undated) RX ORDER — LIDOCAINE HYDROCHLORIDE 10 MG/ML
INJECTION, SOLUTION INFILTRATION; PERINEURAL
Status: DISPENSED
Start: 2020-01-15

## (undated) RX ORDER — CYANOCOBALAMIN 1000 UG/ML
INJECTION, SOLUTION INTRAMUSCULAR; SUBCUTANEOUS
Status: DISPENSED
Start: 2022-04-28

## (undated) RX ORDER — CYANOCOBALAMIN 1000 UG/ML
INJECTION, SOLUTION INTRAMUSCULAR; SUBCUTANEOUS
Status: DISPENSED
Start: 2020-02-12

## (undated) RX ORDER — REGADENOSON 0.08 MG/ML
INJECTION, SOLUTION INTRAVENOUS
Status: DISPENSED
Start: 2021-11-02

## (undated) RX ORDER — CYANOCOBALAMIN 1000 UG/ML
INJECTION, SOLUTION INTRAMUSCULAR; SUBCUTANEOUS
Status: DISPENSED
Start: 2018-04-26

## (undated) RX ORDER — SODIUM CHLORIDE, SODIUM LACTATE, POTASSIUM CHLORIDE, CALCIUM CHLORIDE 600; 310; 30; 20 MG/100ML; MG/100ML; MG/100ML; MG/100ML
INJECTION, SOLUTION INTRAVENOUS
Status: DISPENSED
Start: 2019-03-20

## (undated) RX ORDER — LIDOCAINE HYDROCHLORIDE 10 MG/ML
INJECTION, SOLUTION INFILTRATION; PERINEURAL
Status: DISPENSED
Start: 2024-07-17

## (undated) RX ORDER — ASPIRIN 81 MG/1
TABLET, CHEWABLE ORAL
Status: DISPENSED
Start: 2021-01-26

## (undated) RX ORDER — TRIAMCINOLONE ACETONIDE 40 MG/ML
INJECTION, SUSPENSION INTRA-ARTICULAR; INTRAMUSCULAR
Status: DISPENSED
Start: 2019-06-04

## (undated) RX ORDER — CYANOCOBALAMIN 1000 UG/ML
INJECTION, SOLUTION INTRAMUSCULAR; SUBCUTANEOUS
Status: DISPENSED
Start: 2022-06-09

## (undated) RX ORDER — CYANOCOBALAMIN 1000 UG/ML
INJECTION, SOLUTION INTRAMUSCULAR; SUBCUTANEOUS
Status: DISPENSED
Start: 2020-09-17